# Patient Record
Sex: FEMALE | Race: BLACK OR AFRICAN AMERICAN | NOT HISPANIC OR LATINO | Employment: OTHER | URBAN - METROPOLITAN AREA
[De-identification: names, ages, dates, MRNs, and addresses within clinical notes are randomized per-mention and may not be internally consistent; named-entity substitution may affect disease eponyms.]

---

## 2017-01-11 ENCOUNTER — OFFICE VISIT (OUTPATIENT)
Dept: SURGERY | Facility: CLINIC | Age: 72
End: 2017-01-11
Payer: MEDICARE

## 2017-01-11 VITALS
HEART RATE: 82 BPM | HEIGHT: 64 IN | DIASTOLIC BLOOD PRESSURE: 79 MMHG | SYSTOLIC BLOOD PRESSURE: 159 MMHG | WEIGHT: 208.75 LBS | BODY MASS INDEX: 35.64 KG/M2

## 2017-01-11 DIAGNOSIS — K51.219 ULCERATIVE PROCTITIS WITH COMPLICATION: Primary | ICD-10-CM

## 2017-01-11 PROCEDURE — 88305 TISSUE EXAM BY PATHOLOGIST: CPT | Performed by: PATHOLOGY

## 2017-01-11 PROCEDURE — 88342 IMHCHEM/IMCYTCHM 1ST ANTB: CPT | Mod: 26,,, | Performed by: PATHOLOGY

## 2017-01-11 PROCEDURE — 45331 SIGMOIDOSCOPY AND BIOPSY: CPT | Mod: S$PBB,,, | Performed by: COLON & RECTAL SURGERY

## 2017-01-11 PROCEDURE — 99999 PR PBB SHADOW E&M-EST. PATIENT-LVL III: CPT | Mod: PBBFAC,,, | Performed by: COLON & RECTAL SURGERY

## 2017-01-11 PROCEDURE — 99213 OFFICE O/P EST LOW 20 MIN: CPT | Mod: PBBFAC | Performed by: COLON & RECTAL SURGERY

## 2017-01-11 PROCEDURE — 99213 OFFICE O/P EST LOW 20 MIN: CPT | Mod: 25,S$PBB,, | Performed by: COLON & RECTAL SURGERY

## 2017-01-11 PROCEDURE — 45331 SIGMOIDOSCOPY AND BIOPSY: CPT | Mod: PBBFAC | Performed by: COLON & RECTAL SURGERY

## 2017-01-11 PROCEDURE — 88305 TISSUE EXAM BY PATHOLOGIST: CPT | Mod: 26,,, | Performed by: PATHOLOGY

## 2017-01-11 RX ORDER — MESALAMINE 1000 MG/1
1000 SUPPOSITORY RECTAL NIGHTLY
Qty: 60 SUPPOSITORY | Refills: 3 | Status: SHIPPED | OUTPATIENT
Start: 2017-01-11 | End: 2017-01-12 | Stop reason: SDUPTHER

## 2017-01-11 NOTE — MR AVS SNAPSHOT
[facility]  [address]  [facility] [address]  Phone: [phone]                  Anabella Aranda   2017 9:00 AM   Office Visit    Description:  Female : 1945   Provider:  Franco Barry MD   Department:  [facility]           Reason for Visit     Ulcerative Colitis     Follow-up           Diagnoses this Visit        Comments    Ulcerative proctitis with complication    -  Primary            To Do List           Goals (5 Years of Data)     None       These Medications        Disp Refills Start End    mesalamine (CANASA) 1000 MG Supp 60 suppository 3 2017    Place 1 suppository (1,000 mg total) rectally nightly. Twice daily for 2 weeks than nightly - Rectal    Pharmacy: [facility] - [address] #: [phone]         [facility] On Call     [facility] On Call Nurse Care Line -  Assistance  Registered nurses in the [facility] On Call Center provide clinical advisement, health education, appointment booking, and other advisory services.  Call for this free service at [phone].             Medications           Message regarding Medications     Verify the changes and/or additions to your medication regime listed below are the same as discussed with your clinician today.  If any of these changes or additions are incorrect, please notify your healthcare provider.        START taking these NEW medications        Refills    mesalamine (CANASA) 1000 MG Supp 3    Sig: Place 1 suppository (1,000 mg total) rectally nightly. Twice daily for 2 weeks than nightly    Class: Normal    Route: Rectal           Verify that the below list of medications is an accurate representation of the medications you are currently taking.  If none reported, the list may be blank. If incorrect, please contact your healthcare provider. Carry this list with you in case of emergency.           Current Medications      "calcium carbonate (OS-NELSY) 600 mg (1,500 mg) Tab Take 1 tablet (600 mg total) by mouth 2 (two) times daily with meals.    ergocalciferol (VITAMIN D2) 50,000 unit Cap Take 1 capsule (50,000 Units total) by mouth every 7 days.    ferrous sulfate 325 mg (65 mg iron) Tab tablet Take 1 tablet (325 mg total) by mouth 2 (two) times daily.    MULTIVITS W-FE,OTHER MIN/LUT (CENTRUM SILVER ULTRA WOMEN'S ORAL) Take by mouth.    omeprazole (PRILOSEC) 40 MG capsule Take 1 capsule (40 mg total) by mouth once daily.    potassium chloride SA (K-DUR,KLOR-CON) 20 MEQ tablet Take 1 tablet by mouth once daily    mesalamine (CANASA) 1000 MG Supp Place 1 suppository (1,000 mg total) rectally nightly. Twice daily for 2 weeks than nightly           Clinical Reference Information           Vital Signs - Last Recorded  Most recent update: 1/11/2017  9:15 AM by Sayda Nguyễn MA    BP Pulse Ht Wt BMI    (!) 159/79 82 5' 4" (1.626 m) 94.7 kg (208 lb 12.4 oz) 35.84 kg/m2      Blood Pressure          Most Recent Value    BP  (!)  159/79      Allergies as of 1/11/2017     Sulfa (Sulfonamide Antibiotics)      Immunizations Administered on Date of Encounter - 1/11/2017     None      Orders Placed During Today's Visit      Normal Orders This Visit    Tissue Specimen To Pathology, Surgery       Instructions    Imodium twice a day  Canasa suppository nightly       "

## 2017-01-11 NOTE — PROGRESS NOTES
Patient ID:  Anabella Aranda is a 71 y.o. female     Chief Complaint:   Chief Complaint   Patient presents with    Ulcerative Colitis    Follow-up        HPI: 6/24/16 TAC and TAMARA for UC  PAth UC no cancer    Doing well 3-5 BMs/day no Imodium Is taking fiber. HAs had no GI f/u    Several year hx of UC. Has anemia and fatigue with obstipation Had c-scope 4/29/16 The procedure was aborted due to bowel stenosis.                        - Inflammation was found in the colon secondary to colitis. The findings are unchanged compared to                         previous examinations. - Stricture in the sigmoid colon. BX Chronic colitis with mild to moderate activity No dysplasia No viral inclusions (CMV immunostain)    ROS:        Constitutional: No fever, chills, activity or appetite change.      HENT: No hearing loss, facial swelling, neck pain or stiffness.       Eyes: No discharge, itching and visual disturbance.      Respiratory: No apnea, cough, choking or shortness of breath.       Cardiovascular: No leg swelling or chest pain      Gastrointestinal: No abdominal distention or change in bowel habbits     Genitourinary: No dysuria, frequency or flank pain.      Musculoskeletal: No arthralgias or gait problem.      Neurological: No dizziness, seizures or weakness.      Hematological: No adenopathy.      Psychiatric/Behavioral: No hallucinations or behavioral problems.       PE:    APPEARANCE: Well nourished, well developed, in no acute distress.   CHEST: Lungs clear. Normal respiratory effort.  CARDIOVASCULAR: Normal S1, S2. No rubs, murmurs or gallops. No edema.  ABDOMEN: Soft. No tenderness or masses.  Rectum:  Normal skin, NST, no masses or tenderness    Musculoskeletal: Symmetric, normal range of motion and strength.   Neurological: Alert and oriented to person, place, and time. He has normal reflexes.   Skin: Skin is warm and dry.   Psychiatric: Normal mood and affect. Behavior is normal and appropriate.      PROCEDURE: Flexible Sigmoidoscopy   Scope # [id number]    With informed consent the flexible sigmoidoscope was passed 20 cm under direcet vision. Prep quality: good    Findings:patent anastomosis at 18 cm moderate proctitis. Biopsies taken x 4  EBL: None    The procedure was tolerated well.        Impression: UC  S/p TAC and TAMARA  PLAN: Imodium BID, Canasa check path RTC 6 months f/u with GI

## 2017-01-12 RX ORDER — MESALAMINE 1000 MG/1
1000 SUPPOSITORY RECTAL NIGHTLY
Qty: 60 SUPPOSITORY | Refills: 3 | Status: SHIPPED | OUTPATIENT
Start: 2017-01-12 | End: 2017-01-30

## 2017-01-12 NOTE — TELEPHONE ENCOUNTER
----- Message from Love Adler sent at 1/12/2017 11:25 AM CST -----  Contact: pt#[phone]  Pt wants Rx for suppositories moved to [facility] [address]

## 2017-01-17 ENCOUNTER — TELEPHONE (OUTPATIENT)
Dept: GASTROENTEROLOGY | Facility: CLINIC | Age: 72
End: 2017-01-17

## 2017-01-17 NOTE — TELEPHONE ENCOUNTER
Spoke to pt-she saw Dr Brambila this morning and he advised her to schedule a follow up visit with Dr Arenas (loose stools).  Schedule for 1/30/17 at 2:45p.  Pt will call to make a sooner appointment with Aisha Moy if her sx's get worst.    Pt had questions about the Rx Dr Brambila prescribed to her today.  She will contact his office in reference to that medicine.

## 2017-01-17 NOTE — TELEPHONE ENCOUNTER
----- Message from Lizbeth Andrews sent at 1/17/2017  9:52 AM CST -----  Contact: [phone]  Patient is requesting to see paul for a f/u on meds.

## 2017-01-19 RX ORDER — HYDROCORTISONE ACETATE 25 MG/1
25 SUPPOSITORY RECTAL 2 TIMES DAILY
Qty: 48 SUPPOSITORY | Refills: 0 | Status: SHIPPED | OUTPATIENT
Start: 2017-01-19 | End: 2017-01-30

## 2017-01-24 RX ORDER — HYDROCORTISONE ACETATE 25 MG/1
25 SUPPOSITORY RECTAL 2 TIMES DAILY
Qty: 36 SUPPOSITORY | Refills: 0 | Status: SHIPPED | OUTPATIENT
Start: 2017-01-24 | End: 2017-01-30

## 2017-01-30 ENCOUNTER — OFFICE VISIT (OUTPATIENT)
Dept: GASTROENTEROLOGY | Facility: CLINIC | Age: 72
End: 2017-01-30
Payer: MEDICARE

## 2017-01-30 ENCOUNTER — LAB VISIT (OUTPATIENT)
Dept: LAB | Facility: HOSPITAL | Age: 72
End: 2017-01-30
Attending: INTERNAL MEDICINE
Payer: MEDICARE

## 2017-01-30 VITALS
SYSTOLIC BLOOD PRESSURE: 124 MMHG | WEIGHT: 214.31 LBS | DIASTOLIC BLOOD PRESSURE: 82 MMHG | BODY MASS INDEX: 36.59 KG/M2 | HEIGHT: 64 IN

## 2017-01-30 DIAGNOSIS — K51.20 ULCERATIVE PROCTITIS WITHOUT COMPLICATION: ICD-10-CM

## 2017-01-30 DIAGNOSIS — K51.20 ULCERATIVE PROCTITIS WITHOUT COMPLICATION: Primary | ICD-10-CM

## 2017-01-30 LAB
ALBUMIN SERPL BCP-MCNC: 3.7 G/DL
ALP SERPL-CCNC: 103 U/L
ALT SERPL W/O P-5'-P-CCNC: 12 U/L
ANION GAP SERPL CALC-SCNC: 11 MMOL/L
AST SERPL-CCNC: 22 U/L
BASOPHILS # BLD AUTO: 0.03 K/UL
BASOPHILS NFR BLD: 0.5 %
BILIRUB SERPL-MCNC: 0.4 MG/DL
BUN SERPL-MCNC: 15 MG/DL
CALCIUM SERPL-MCNC: 9.3 MG/DL
CHLORIDE SERPL-SCNC: 105 MMOL/L
CO2 SERPL-SCNC: 25 MMOL/L
CREAT SERPL-MCNC: 0.9 MG/DL
DIFFERENTIAL METHOD: ABNORMAL
EOSINOPHIL # BLD AUTO: 0.1 K/UL
EOSINOPHIL NFR BLD: 1.8 %
ERYTHROCYTE [DISTWIDTH] IN BLOOD BY AUTOMATED COUNT: 13.4 %
EST. GFR  (AFRICAN AMERICAN): >60 ML/MIN/1.73 M^2
EST. GFR  (NON AFRICAN AMERICAN): >60 ML/MIN/1.73 M^2
GLUCOSE SERPL-MCNC: 80 MG/DL
HCT VFR BLD AUTO: 35.3 %
HGB BLD-MCNC: 11.5 G/DL
LYMPHOCYTES # BLD AUTO: 1.5 K/UL
LYMPHOCYTES NFR BLD: 25.8 %
MCH RBC QN AUTO: 29.5 PG
MCHC RBC AUTO-ENTMCNC: 32.6 %
MCV RBC AUTO: 91 FL
MONOCYTES # BLD AUTO: 0.4 K/UL
MONOCYTES NFR BLD: 7.8 %
NEUTROPHILS # BLD AUTO: 3.6 K/UL
NEUTROPHILS NFR BLD: 63.9 %
PLATELET # BLD AUTO: 352 K/UL
PMV BLD AUTO: 8.7 FL
POTASSIUM SERPL-SCNC: 4.3 MMOL/L
PROT SERPL-MCNC: 8 G/DL
RBC # BLD AUTO: 3.9 M/UL
SODIUM SERPL-SCNC: 141 MMOL/L
WBC # BLD AUTO: 5.63 K/UL

## 2017-01-30 PROCEDURE — 99214 OFFICE O/P EST MOD 30 MIN: CPT | Mod: S$PBB,,, | Performed by: INTERNAL MEDICINE

## 2017-01-30 PROCEDURE — 80053 COMPREHEN METABOLIC PANEL: CPT

## 2017-01-30 PROCEDURE — 85025 COMPLETE CBC W/AUTO DIFF WBC: CPT

## 2017-01-30 PROCEDURE — 36415 COLL VENOUS BLD VENIPUNCTURE: CPT

## 2017-01-30 PROCEDURE — 99999 PR PBB SHADOW E&M-EST. PATIENT-LVL II: CPT | Mod: PBBFAC,,, | Performed by: INTERNAL MEDICINE

## 2017-01-30 PROCEDURE — 99212 OFFICE O/P EST SF 10 MIN: CPT | Mod: PBBFAC,PN | Performed by: INTERNAL MEDICINE

## 2017-01-30 RX ORDER — HYDROCORTISONE 100 MG/60ML
100 SUSPENSION RECTAL NIGHTLY
Qty: 30 ENEMA | Refills: 1 | Status: SHIPPED | OUTPATIENT
Start: 2017-01-30 | End: 2017-10-06

## 2017-01-30 RX ORDER — AZATHIOPRINE 50 MG/1
50 TABLET ORAL DAILY
Qty: 30 TABLET | Refills: 3 | Status: SHIPPED | OUTPATIENT
Start: 2017-01-30 | End: 2017-09-24 | Stop reason: SDUPTHER

## 2017-01-30 NOTE — PROGRESS NOTES
Subjective:       Patient ID: Anabella Aranda is a 71 y.o. female.    Chief Complaint: Diarrhea (loose stools)    Diarrhea    Pertinent negatives include no coughing or headaches.    Patient has 3 to 5 BM/d. There is no blood in her stool. She denies abdominal pain.  Recent proctoscopy revealed moderately active disease in the rectum. Patient is s/p TAC and ileorectal anastomosis.  Canasa supp was recommended but patient could not afford the medication. Currently not taking any medication.    Review of Systems   Constitutional: Negative for appetite change.   HENT: Negative for trouble swallowing.    Eyes: Negative for photophobia.   Respiratory: Negative for cough and shortness of breath.    Cardiovascular: Negative for palpitations.   Gastrointestinal: Positive for diarrhea.        See HPI for details   Genitourinary: Negative for frequency and hematuria.   Skin: Negative for rash.   Neurological: Negative for weakness and headaches.   Psychiatric/Behavioral: Negative for behavioral problems and suicidal ideas.       Objective:       Vitals:    01/30/17 1429   BP: 124/82       Physical Exam   Eyes: Pupils are equal, round, and reactive to light.   Neck: No thyromegaly present.   Cardiovascular: Normal rate, regular rhythm and normal heart sounds.    Pulmonary/Chest: Effort normal and breath sounds normal.   Abdominal: Soft. She exhibits no mass. There is no tenderness. There is no rebound and no guarding.   Musculoskeletal: She exhibits no tenderness.   Psychiatric: Her behavior is normal. Thought content normal.       Assessment:       1. Ulcerative proctitis without complication        Plan:       Anabella was seen today for diarrhea.    Diagnoses and all orders for this visit:    Ulcerative proctitis without complication  -     CBC auto differential; Future  -     Comprehensive metabolic panel; Future    Other orders  -     hydrocortisone (CORTENEMA) 100 mg/60 mL enema; Place 1 enema (100 mg total) rectally  every evening.  -     azathioprine (IMURAN) 50 mg Tab; Take 1 tablet (50 mg total) by mouth once daily.    RTC in 1 month  Schedule FU flex sig next visit.

## 2017-02-08 ENCOUNTER — TELEPHONE (OUTPATIENT)
Dept: GASTROENTEROLOGY | Facility: CLINIC | Age: 72
End: 2017-02-08

## 2017-02-08 NOTE — TELEPHONE ENCOUNTER
Spoke with patient to inform her that she is mild anemia and she should take iron supplement. Patient verbalized understanding.

## 2017-02-20 ENCOUNTER — OFFICE VISIT (OUTPATIENT)
Dept: GASTROENTEROLOGY | Facility: CLINIC | Age: 72
End: 2017-02-20
Payer: MEDICARE

## 2017-02-20 VITALS
SYSTOLIC BLOOD PRESSURE: 128 MMHG | WEIGHT: 216.5 LBS | HEIGHT: 64 IN | HEART RATE: 90 BPM | DIASTOLIC BLOOD PRESSURE: 76 MMHG | BODY MASS INDEX: 36.96 KG/M2

## 2017-02-20 DIAGNOSIS — K51.20 ULCERATIVE PROCTITIS WITHOUT COMPLICATION: Primary | ICD-10-CM

## 2017-02-20 PROCEDURE — 99213 OFFICE O/P EST LOW 20 MIN: CPT | Mod: PBBFAC,PN | Performed by: INTERNAL MEDICINE

## 2017-02-20 PROCEDURE — 99214 OFFICE O/P EST MOD 30 MIN: CPT | Mod: S$PBB,,, | Performed by: INTERNAL MEDICINE

## 2017-02-20 PROCEDURE — 99999 PR PBB SHADOW E&M-EST. PATIENT-LVL III: CPT | Mod: PBBFAC,,, | Performed by: INTERNAL MEDICINE

## 2017-02-20 NOTE — PROGRESS NOTES
Subjective:       Patient ID: Anabella Aranda is a 71 y.o. female.    Chief Complaint: GI Problem (follow up from last visit)    GI Problem   Primary symptoms do not include rash.   Patient with h/o UC S/p TAC and TAMARA She was having up to 5 BM per day. Last flex sig revealed Proctitis. Dr. Barry prescribed Anusol HC which she did not . She also did not  the Cortenema that I prescribed. She denies blood in her stool. She reports that her BM frequency has improved.  There is no abdominal pain.    Review of Systems   Constitutional: Negative for appetite change.   HENT: Negative for trouble swallowing.    Eyes: Negative for photophobia.   Respiratory: Negative for cough and shortness of breath.    Cardiovascular: Negative for palpitations.   Gastrointestinal:        See HPI for details   Genitourinary: Negative for frequency and hematuria.   Skin: Negative for rash.   Neurological: Negative for weakness and headaches.   Psychiatric/Behavioral: Negative for behavioral problems and suicidal ideas.       Objective:      Physical Exam   Eyes: Pupils are equal, round, and reactive to light.   Neck: No thyromegaly present.   Cardiovascular: Normal rate, regular rhythm and normal heart sounds.    Pulmonary/Chest: Effort normal and breath sounds normal.   Abdominal: Soft. She exhibits no mass. There is no tenderness. There is no rebound and no guarding.   Musculoskeletal: She exhibits no tenderness.   Psychiatric: Her behavior is normal. Thought content normal.       Assessment:       1. Ulcerative proctitis without complication        Plan:       Anabella was seen today for gi problem.    Diagnoses and all orders for this visit:    Ulcerative proctitis without complication    UC S/p TAC and TAMARA  Medication compliance stressed.  She promised to  her prescription.  We will hold off on FU FS at this time.

## 2017-03-01 ENCOUNTER — TELEPHONE (OUTPATIENT)
Dept: GASTROENTEROLOGY | Facility: CLINIC | Age: 72
End: 2017-03-01

## 2017-03-01 RX ORDER — POTASSIUM CHLORIDE 20 MEQ/1
TABLET, EXTENDED RELEASE ORAL
Qty: 30 TABLET | Refills: 11 | Status: SHIPPED | OUTPATIENT
Start: 2017-03-01 | End: 2017-12-06 | Stop reason: SDUPTHER

## 2017-03-01 NOTE — TELEPHONE ENCOUNTER
Spoke with Pt and she stated that her Rx came in today. This Rx was a mail order and pt is doing fine. Verbal agreement.

## 2017-03-06 ENCOUNTER — PATIENT MESSAGE (OUTPATIENT)
Dept: GASTROENTEROLOGY | Facility: HOSPITAL | Age: 72
End: 2017-03-06

## 2017-05-01 ENCOUNTER — OFFICE VISIT (OUTPATIENT)
Dept: GASTROENTEROLOGY | Facility: CLINIC | Age: 72
End: 2017-05-01
Payer: MEDICARE

## 2017-05-01 ENCOUNTER — LAB VISIT (OUTPATIENT)
Dept: LAB | Facility: HOSPITAL | Age: 72
End: 2017-05-01
Attending: INTERNAL MEDICINE
Payer: MEDICARE

## 2017-05-01 VITALS
SYSTOLIC BLOOD PRESSURE: 116 MMHG | DIASTOLIC BLOOD PRESSURE: 62 MMHG | BODY MASS INDEX: 36.88 KG/M2 | WEIGHT: 216 LBS | HEIGHT: 64 IN

## 2017-05-01 DIAGNOSIS — K51.20 ULCERATIVE PROCTITIS WITHOUT COMPLICATION: Primary | ICD-10-CM

## 2017-05-01 DIAGNOSIS — K51.20 ULCERATIVE PROCTITIS WITHOUT COMPLICATION: ICD-10-CM

## 2017-05-01 LAB
ALBUMIN SERPL BCP-MCNC: 3.7 G/DL
ALP SERPL-CCNC: 94 U/L
ALT SERPL W/O P-5'-P-CCNC: 10 U/L
ANION GAP SERPL CALC-SCNC: 11 MMOL/L
AST SERPL-CCNC: 20 U/L
BASOPHILS # BLD AUTO: 0.01 K/UL
BASOPHILS NFR BLD: 0.2 %
BILIRUB SERPL-MCNC: 0.5 MG/DL
BUN SERPL-MCNC: 20 MG/DL
CALCIUM SERPL-MCNC: 9 MG/DL
CHLORIDE SERPL-SCNC: 107 MMOL/L
CO2 SERPL-SCNC: 23 MMOL/L
CREAT SERPL-MCNC: 1 MG/DL
DIFFERENTIAL METHOD: ABNORMAL
EOSINOPHIL # BLD AUTO: 0 K/UL
EOSINOPHIL NFR BLD: 1 %
ERYTHROCYTE [DISTWIDTH] IN BLOOD BY AUTOMATED COUNT: 13.6 %
EST. GFR  (AFRICAN AMERICAN): >60 ML/MIN/1.73 M^2
EST. GFR  (NON AFRICAN AMERICAN): 58 ML/MIN/1.73 M^2
GLUCOSE SERPL-MCNC: 86 MG/DL
HCT VFR BLD AUTO: 34.4 %
HGB BLD-MCNC: 11.8 G/DL
LYMPHOCYTES # BLD AUTO: 0.9 K/UL
LYMPHOCYTES NFR BLD: 22 %
MCH RBC QN AUTO: 29.6 PG
MCHC RBC AUTO-ENTMCNC: 34.3 %
MCV RBC AUTO: 86 FL
MONOCYTES # BLD AUTO: 0.4 K/UL
MONOCYTES NFR BLD: 9.6 %
NEUTROPHILS # BLD AUTO: 2.8 K/UL
NEUTROPHILS NFR BLD: 67.2 %
PLATELET # BLD AUTO: 293 K/UL
PMV BLD AUTO: 8.5 FL
POTASSIUM SERPL-SCNC: 4 MMOL/L
PROT SERPL-MCNC: 8.1 G/DL
RBC # BLD AUTO: 3.98 M/UL
SODIUM SERPL-SCNC: 141 MMOL/L
WBC # BLD AUTO: 4.18 K/UL

## 2017-05-01 PROCEDURE — 36415 COLL VENOUS BLD VENIPUNCTURE: CPT

## 2017-05-01 PROCEDURE — 99999 PR PBB SHADOW E&M-EST. PATIENT-LVL II: CPT | Mod: PBBFAC,,, | Performed by: INTERNAL MEDICINE

## 2017-05-01 PROCEDURE — 99214 OFFICE O/P EST MOD 30 MIN: CPT | Mod: S$PBB,,, | Performed by: INTERNAL MEDICINE

## 2017-05-01 PROCEDURE — 80053 COMPREHEN METABOLIC PANEL: CPT

## 2017-05-01 PROCEDURE — 85025 COMPLETE CBC W/AUTO DIFF WBC: CPT

## 2017-05-01 PROCEDURE — 99212 OFFICE O/P EST SF 10 MIN: CPT | Mod: PBBFAC,PN | Performed by: INTERNAL MEDICINE

## 2017-05-01 NOTE — PROGRESS NOTES
Subjective:       Patient ID: Anabella Aranda is a 71 y.o. female.    Chief Complaint: Follow-up    HPI Patient with h/o UC. She is s/p AC and TAMARA. Her BM is down 3 per day. There is no blood in her stool. She continues to gain weight- 50 pounds in 1 year.    Review of Systems   Constitutional: Negative for appetite change.   HENT: Negative for trouble swallowing.    Eyes: Negative for photophobia.   Respiratory: Negative for cough and shortness of breath.    Cardiovascular: Negative for palpitations.   Gastrointestinal:        See HPI for details   Genitourinary: Negative for frequency and hematuria.   Musculoskeletal: Positive for joint swelling (knees).   Skin: Negative for rash.   Neurological: Negative for weakness and headaches.   Psychiatric/Behavioral: Negative for behavioral problems and suicidal ideas.       Objective:       Vitals:    05/01/17 1400   BP: 116/62       Physical Exam   Eyes: Pupils are equal, round, and reactive to light.   Neck: No thyromegaly present.   Cardiovascular: Normal rate, regular rhythm and normal heart sounds.    Pulmonary/Chest: Effort normal and breath sounds normal.   Abdominal: Soft. She exhibits no mass. There is no tenderness. There is no rebound and no guarding.   Musculoskeletal: She exhibits no tenderness.   Psychiatric: Her behavior is normal. Thought content normal.       Assessment:       1. Ulcerative proctitis without complication        Plan:       Anabella was seen today for follow-up.    Diagnoses and all orders for this visit:    Ulcerative proctitis without complication  -     Comprehensive metabolic panel; Future  -     CBC auto differential; Future

## 2017-05-02 ENCOUNTER — TELEPHONE (OUTPATIENT)
Dept: GASTROENTEROLOGY | Facility: CLINIC | Age: 72
End: 2017-05-02

## 2017-05-02 NOTE — TELEPHONE ENCOUNTER
Informed pt of lab results . Verbal Understanding .           ----- Message from Umm Arenas MD sent at 5/1/2017  4:51 PM CDT -----  Blood count is slightly improved.  Continue current treatment.

## 2017-09-24 ENCOUNTER — NURSE TRIAGE (OUTPATIENT)
Dept: ADMINISTRATIVE | Facility: CLINIC | Age: 72
End: 2017-09-24

## 2017-09-24 RX ORDER — AZATHIOPRINE 50 MG/1
50 TABLET ORAL DAILY
Qty: 30 TABLET | Refills: 0 | Status: SHIPPED | OUTPATIENT
Start: 2017-09-24 | End: 2017-10-17 | Stop reason: SDUPTHER

## 2017-09-24 NOTE — TELEPHONE ENCOUNTER
"    Reason for Disposition   [1] Request for URGENT new prescription or refill of "essential" medication (i.e., likelihood of harm to patient if not taken) AND [2] triager unable to fill per unit policy    Answer Assessment - Initial Assessment Questions  1. SYMPTOMS: "Do you have any symptoms?"      Refill azathioprine   2. SEVERITY: If symptoms are present, ask "Are they mild, moderate or severe?"      N/a    Protocols used: ST MEDICATION QUESTION CALL-A-    Patient called for refills of the azathioprine 50 mg, 1 tab PO daily, #30, no refills. Paged Dr. Barrera for refill request. Refill request approved by Dr. Barrera. Left message on patient's voicemail to f/u with [facility] pharmacy.   "

## 2017-09-24 NOTE — TELEPHONE ENCOUNTER
Reason for Disposition   Caller has already spoken with another triager or PCP AND has further questions AND triager able to answer questions.    Protocols used: ST NO CONTACT OR DUPLICATE CONTACT CALL-A-AH    Call transferred to TONIE Means RN.

## 2017-10-03 RX ORDER — AZATHIOPRINE 50 MG/1
TABLET ORAL
Qty: 30 TABLET | Refills: 3 | Status: SHIPPED | OUTPATIENT
Start: 2017-10-03 | End: 2017-12-07 | Stop reason: SDUPTHER

## 2017-10-06 ENCOUNTER — TELEPHONE (OUTPATIENT)
Dept: FAMILY MEDICINE | Facility: CLINIC | Age: 72
End: 2017-10-06

## 2017-10-06 ENCOUNTER — TELEPHONE (OUTPATIENT)
Dept: GASTROENTEROLOGY | Facility: CLINIC | Age: 72
End: 2017-10-06

## 2017-10-06 ENCOUNTER — OFFICE VISIT (OUTPATIENT)
Dept: GASTROENTEROLOGY | Facility: CLINIC | Age: 72
End: 2017-10-06
Payer: MEDICARE

## 2017-10-06 VITALS
DIASTOLIC BLOOD PRESSURE: 65 MMHG | WEIGHT: 219.38 LBS | SYSTOLIC BLOOD PRESSURE: 129 MMHG | BODY MASS INDEX: 37.65 KG/M2

## 2017-10-06 DIAGNOSIS — F41.9 ANXIETY: ICD-10-CM

## 2017-10-06 DIAGNOSIS — K51.90 ULCERATIVE COLITIS WITHOUT COMPLICATIONS, UNSPECIFIED LOCATION: Primary | ICD-10-CM

## 2017-10-06 PROCEDURE — 99212 OFFICE O/P EST SF 10 MIN: CPT | Mod: PBBFAC,PN | Performed by: NURSE PRACTITIONER

## 2017-10-06 PROCEDURE — 99999 PR PBB SHADOW E&M-EST. PATIENT-LVL II: CPT | Mod: PBBFAC,,, | Performed by: NURSE PRACTITIONER

## 2017-10-06 PROCEDURE — 99213 OFFICE O/P EST LOW 20 MIN: CPT | Mod: S$PBB,,, | Performed by: NURSE PRACTITIONER

## 2017-10-06 NOTE — PROGRESS NOTES
"Subjective:       Patient ID: Anabella Aranda is a 72 y.o. female.    Chief Complaint: Other (Medication) and Other (leg and side pain )    HPI  History of ulcerative colitis, currently well controlled with Imuran daily, fiber supplement and imodium BID.  She has been doing well with this combination for two years.  Denies blood with bowel movements or abdominal pain.  Reports that she typically has a bowel movement four times daily.  Some foods will cause a looser type stool and she avoids these.  She reports that over the last two months she has been very anxious.  She questions that this is related to the imuran, though she has been on the medication for over two years.  She reports that she lives in a very large house and lives alone and hears things in the house- both voices ( she is unsure if these are inside or outside of the house) and a buzzing that she thinks may be the air conditioner. She reports anxiety over hearing these.  Rpeorts that she rarely gets outside besides "to do just what I need to".  She reports that she feels depressed at times.  She is laughing and smiling at the visit today.  She denies any thoughts of suicide, harming herself or others, or fear in her home.  She has not see her PCP in over one year.  She had colonoscopy last year.    Review of Systems   Constitutional: Negative.  Negative for activity change, appetite change, fatigue and fever.   Respiratory: Negative for shortness of breath.    Cardiovascular: Negative for chest pain.   Gastrointestinal: Negative for abdominal distention, abdominal pain, blood in stool, constipation, diarrhea, nausea and vomiting.   Skin: Negative.    Neurological: Negative.    Psychiatric/Behavioral: Negative for confusion, hallucinations, self-injury and suicidal ideas. The patient is nervous/anxious.        Objective:      Physical Exam   Constitutional: She is oriented to person, place, and time. She appears well-developed and well-nourished. " No distress.   HENT:   Head: Normocephalic.   Eyes: No scleral icterus.   Neck: Neck supple.   Cardiovascular: Normal rate.    Pulmonary/Chest: Effort normal. No respiratory distress.   Abdominal: Soft. There is no tenderness.   Neurological: She is alert and oriented to person, place, and time.   Skin: Skin is warm and dry. She is not diaphoretic.   Psychiatric: She has a normal mood and affect. Her behavior is normal. Judgment and thought content normal.   Vitals reviewed.      Assessment:       1. Ulcerative colitis without complications, unspecified location    2. Anxiety        Plan:        Anabella was seen today for other and other.    Diagnoses and all orders for this visit:    Ulcerative colitis without complications, unspecified location    Anxiety         Continue current medications.      Will make her an appointment with her PCP regarding her anxiety and depression.

## 2017-10-06 NOTE — TELEPHONE ENCOUNTER
----- Message from Sofia Bennett MA sent at 10/6/2017  2:56 PM CDT -----  Contact: Sofia Bennett  This pt was seen today in the [facility]. Pt is suffering with anxiety and depression. Pt needs to be scheduled for an OV to come in and see Dr. Brambila.     Can we get this pt scheduled soon?

## 2017-10-17 ENCOUNTER — TELEPHONE (OUTPATIENT)
Dept: FAMILY MEDICINE | Facility: CLINIC | Age: 72
End: 2017-10-17

## 2017-10-17 ENCOUNTER — OFFICE VISIT (OUTPATIENT)
Dept: FAMILY MEDICINE | Facility: CLINIC | Age: 72
End: 2017-10-17
Attending: FAMILY MEDICINE
Payer: MEDICARE

## 2017-10-17 VITALS
DIASTOLIC BLOOD PRESSURE: 78 MMHG | WEIGHT: 220.44 LBS | HEART RATE: 81 BPM | OXYGEN SATURATION: 98 % | SYSTOLIC BLOOD PRESSURE: 133 MMHG | BODY MASS INDEX: 37.63 KG/M2 | HEIGHT: 64 IN

## 2017-10-17 DIAGNOSIS — Z95.828 S/P IVC FILTER: Chronic | ICD-10-CM

## 2017-10-17 DIAGNOSIS — Z12.31 ENCOUNTER FOR SCREENING MAMMOGRAM FOR BREAST CANCER: ICD-10-CM

## 2017-10-17 DIAGNOSIS — K51.219 ULCERATIVE PROCTITIS WITH COMPLICATION: ICD-10-CM

## 2017-10-17 DIAGNOSIS — Z23 IMMUNIZATION DUE: ICD-10-CM

## 2017-10-17 DIAGNOSIS — I87.2 VENOUS INSUFFICIENCY OF BOTH LOWER EXTREMITIES: Chronic | ICD-10-CM

## 2017-10-17 DIAGNOSIS — G47.00 INSOMNIA, UNSPECIFIED TYPE: ICD-10-CM

## 2017-10-17 DIAGNOSIS — I82.5Y1 CHRONIC DEEP VEIN THROMBOSIS (DVT) OF PROXIMAL VEIN OF RIGHT LOWER EXTREMITY: ICD-10-CM

## 2017-10-17 DIAGNOSIS — E66.01 MORBID OBESITY: ICD-10-CM

## 2017-10-17 DIAGNOSIS — E55.9 VITAMIN D DEFICIENCY: ICD-10-CM

## 2017-10-17 DIAGNOSIS — Z00.00 ROUTINE GENERAL MEDICAL EXAMINATION AT A HEALTH CARE FACILITY: Primary | ICD-10-CM

## 2017-10-17 DIAGNOSIS — Z13.6 ENCOUNTER FOR SCREENING FOR CARDIOVASCULAR DISORDERS: ICD-10-CM

## 2017-10-17 DIAGNOSIS — R44.0 AUDITORY HALLUCINATIONS: ICD-10-CM

## 2017-10-17 PROCEDURE — 99214 OFFICE O/P EST MOD 30 MIN: CPT | Mod: PBBFAC,PO | Performed by: FAMILY MEDICINE

## 2017-10-17 PROCEDURE — 99397 PER PM REEVAL EST PAT 65+ YR: CPT | Mod: 25,S$PBB,, | Performed by: FAMILY MEDICINE

## 2017-10-17 PROCEDURE — 99999 PR PBB SHADOW E&M-EST. PATIENT-LVL IV: CPT | Mod: PBBFAC,,, | Performed by: FAMILY MEDICINE

## 2017-10-17 PROCEDURE — G0008 ADMIN INFLUENZA VIRUS VAC: HCPCS | Mod: PBBFAC,PO

## 2017-10-17 RX ORDER — ALPRAZOLAM 0.25 MG/1
0.25 TABLET ORAL NIGHTLY PRN
Qty: 90 TABLET | Refills: 0 | Status: SHIPPED | OUTPATIENT
Start: 2017-10-17 | End: 2017-11-09 | Stop reason: SDUPTHER

## 2017-10-17 NOTE — TELEPHONE ENCOUNTER
----- Message from Cecil Beltran sent at 10/17/2017 12:27 PM CDT -----  Contact: Bryson (son)/[phone]  Son called to speak to doctor or nurse about checking his mother's memory as she did not tell him about this appointment.    Please call and advise.

## 2017-10-17 NOTE — PROGRESS NOTES
Subjective:       Patient ID: Anabella Aranda is a 72 y.o. female.    Chief Complaint: Annual Exam; Memory Loss; and Home Health Need    72 yr old pleasant black female with chronic iron deficiency anemia ulcerative colitis,  Chronic venous insufficiency, s/o IVC filter, presents today for her annual wellness check and few issues.      Obesity - she gained a lot since last year - she is mostly staying home and eating a lot and  has no activity outside of house - she is lonely in a big house and sometimes hearing voices - they ar good voices and she is not hearing when she is out of house - she denies any depression or anxiety - she does have issues with sleeping at night - she denies any personal or family h/o psychiatry disorders - no SI/HI/delusions    Ulcerative colitis - she is on Imuran and follows GI - no side effects      Anemia - chronic - due to IBD - she is on diet alone and takes MVI - labs due        LE edema/lymphedema - had h/o DVT and she also had Green field filter - she was on anticoagulation till hospital and stopped after surgery - seen vascular medicine and stable since then - she denies any heart problems, liver disorder - she also denies any chest pain, SOB, ROBLES      History as below - reviewed         HM  -labs due  -vaccines defer to next visit      Review of Systems   Constitutional: Negative.  Negative for activity change, diaphoresis and unexpected weight change.   HENT: Negative.  Negative for congestion, ear discharge, hearing loss, rhinorrhea, sore throat and voice change.    Eyes: Negative.  Negative for pain, discharge and visual disturbance.   Respiratory: Negative.  Negative for chest tightness, shortness of breath and wheezing.    Cardiovascular: Negative.  Negative for chest pain.   Gastrointestinal: Negative.  Negative for abdominal distention, anal bleeding, constipation and nausea.   Endocrine: Negative.  Negative for cold intolerance, polydipsia and polyuria.    Genitourinary: Negative.  Negative for decreased urine volume, difficulty urinating, dysuria, frequency, menstrual problem and vaginal pain.   Musculoskeletal: Negative.  Negative for arthralgias, gait problem and myalgias.   Skin: Negative.  Negative for color change, pallor and wound.   Allergic/Immunologic: Negative.  Negative for environmental allergies and immunocompromised state.   Neurological: Negative.  Negative for dizziness, tremors, seizures, speech difficulty and headaches.   Hematological: Negative.  Negative for adenopathy. Does not bruise/bleed easily.   Psychiatric/Behavioral: Positive for hallucinations. Negative for agitation, confusion, decreased concentration, self-injury and suicidal ideas. The patient is not nervous/anxious.        Active Ambulatory Problems     Diagnosis Date Noted    Anemia due to acute blood loss 08/14/2014    S/P IVC filter 08/05/2015    Chronic deep vein thrombosis of right lower extremity 08/26/2015    Venous insufficiency of both lower extremities 08/26/2015    Iron deficiency anemia 09/09/2015    Chronic kidney disease, stage III (moderate) 07/15/2016    Ulcerative proctitis with complication 01/11/2017    Morbid obesity 10/17/2017     Resolved Ambulatory Problems     Diagnosis Date Noted    Rectal bleeding 03/13/2014    Diarrhea 08/07/2014    Genital sore 08/14/2014    CKD (chronic kidney disease) stage 3, GFR 30-59 ml/min 08/18/2014    ORIANA (acute kidney injury) 08/18/2014    Decubitus ulcers 08/18/2014    HTN (hypertension) 08/19/2014    Hypokalemia 08/19/2014    Hypoalbuminemia 08/19/2014    ORIANA (acute kidney injury) 08/19/2014    Hypomagnesemia 08/19/2014    Decubitus ulcer of buttock, unstageable 08/19/2014    Decubitus ulcer of thigh 08/19/2014    Volume depletion 08/19/2014    Weakness generalized 08/19/2014    Prediabetes 08/21/2014    Acidosis, metabolic 08/23/2014    Lower extremity edema 04/01/2015    BMI 33.0-33.9,adult  04/20/2015    UC (ulcerative colitis) 04/21/2015    BMI 32.0-32.9,adult 06/08/2015    DVT (deep venous thrombosis) 06/26/2015    Edema 08/05/2015    GI bleed 04/28/2016    Stenosis colon 04/29/2016    Ulcerative colitis 06/24/2016     Past Medical History:   Diagnosis Date    Arthritis     C. difficile colitis 10/13/2014    Hepatitis B     Hypertension     Ulcerative colitis 03/2014     Past Surgical History:   Procedure Laterality Date    CHOLECYSTECTOMY      COLONOSCOPY N/A 4/29/2016    Procedure: COLONOSCOPY;  Surgeon: Umm Arenas MD;  Location: [facility];  Service: Endoscopy;  Laterality: N/A;    BETZY FILTER PLACEMENT Right 01/2014    JOINT REPLACEMENT      knee    NASAL SINUS SURGERY      TONSILLECTOMY      TOTAL KNEE ARTHROPLASTY Right 04/07/2008     Family History   Problem Relation Age of Onset    Throat cancer Father     Colon cancer Paternal Grandmother      Social History     Social History    Marital status: Single     Spouse name: N/A    Number of children: N/A    Years of education: N/A     Occupational History    Not on file.     Social History Main Topics    Smoking status: Former Smoker     Types: Cigarettes     Quit date: 8/6/1997    Smokeless tobacco: Former User    Alcohol use No    Drug use: No    Sexual activity: Not Currently     Other Topics Concern    Not on file     Social History Narrative    No narrative on file     Review of patient's allergies indicates:   Allergen Reactions    Sulfa (sulfonamide antibiotics) Swelling     Current Outpatient Prescriptions on File Prior to Visit   Medication Sig Dispense Refill    azathioprine (IMURAN) 50 mg Tab TAKE 1 TABLET BY MOUTH ONCE DAILY 30 tablet 3    calcium carbonate (OS-NELSY) 600 mg (1,500 mg) Tab Take 1 tablet (600 mg total) by mouth 2 (two) times daily with meals. 180 tablet 3    ergocalciferol (VITAMIN D2) 50,000 unit Cap Take 1 capsule (50,000 Units total) by mouth every 7 days. 12  capsule 3    ferrous sulfate 325 mg (65 mg iron) Tab tablet Take 1 tablet (325 mg total) by mouth 2 (two) times daily. 180 tablet 3    loperamide (IMODIUM) 1 mg/5 mL solution Take by mouth every 6 (six) hours as needed for Diarrhea.      MULTIVITS W-FE,OTHER MIN/LUT (CENTRUM SILVER ULTRA WOMEN'S ORAL) Take by mouth.      omeprazole (PRILOSEC) 40 MG capsule Take 1 capsule (40 mg total) by mouth once daily. 90 capsule 3    potassium chloride SA (K-DUR,KLOR-CON) 20 MEQ tablet Take 1 tablet by mouth once daily 30 tablet 11    [DISCONTINUED] azathioprine (IMURAN) 50 mg Tab Take 1 tablet (50 mg total) by mouth once daily. 30 tablet 0     No current facility-administered medications on file prior to visit.        Objective:       Vitals:    10/17/17 1325   BP: 133/78   Pulse: 81       Physical Exam   Constitutional: She is oriented to person, place, and time. She appears well-developed and well-nourished. No distress.   HENT:   Head: Normocephalic and atraumatic.   Right Ear: External ear normal.   Left Ear: External ear normal.   Nose: Nose normal.   Mouth/Throat: Oropharynx is clear and moist. No oropharyngeal exudate.   Eyes: Conjunctivae and EOM are normal. Pupils are equal, round, and reactive to light. Right eye exhibits no discharge. Left eye exhibits no discharge. No scleral icterus.   Neck: Normal range of motion. Neck supple. No JVD present. No tracheal deviation present. No thyromegaly present.   Cardiovascular: Normal rate, regular rhythm, normal heart sounds and intact distal pulses.  Exam reveals no gallop and no friction rub.    No murmur heard.  Pulmonary/Chest: Effort normal and breath sounds normal. No stridor. She has no wheezes. She has no rales. She exhibits no tenderness.   Abdominal: Soft. Bowel sounds are normal. She exhibits no distension and no mass. There is no tenderness. There is no rebound and no guarding. No hernia.   Musculoskeletal: Normal range of motion. She exhibits no edema or  tenderness.   Lymphadenopathy:     She has no cervical adenopathy.   Neurological: She is alert and oriented to person, place, and time. She has normal reflexes. She displays normal reflexes. No cranial nerve deficit. She exhibits normal muscle tone. Coordination normal.   Skin: Skin is warm and dry. No rash noted. She is not diaphoretic. No erythema. No pallor.   Psychiatric: She has a normal mood and affect. Her behavior is normal. Judgment and thought content normal.       Assessment:       1. Routine general medical examination at a health care facility    2. Ulcerative proctitis with complication    3. S/P IVC filter    4. Chronic deep vein thrombosis (DVT) of proximal vein of right lower extremity    5. Venous insufficiency of both lower extremities    6. Morbid obesity    7. Auditory hallucinations    8. Insomnia, unspecified type    9. Encounter for screening mammogram for breast cancer    10. Vitamin D deficiency    11. Encounter for screening for cardiovascular disorders         Plan:       Anabella was seen today for annual exam.    Diagnoses and all orders for this visit:    Routine general medical examination at a health care facility  -     CBC auto differential; Future  -     Comprehensive metabolic panel; Future  -     Lipid panel; Future  -     TSH; Future  -     Vitamin D; Future    Ulcerative proctitis with complication  -     CBC auto differential; Future  -     Comprehensive metabolic panel; Future  -     Lipid panel; Future  -     TSH; Future  -     Vitamin D; Future    S/P IVC filter    Chronic deep vein thrombosis (DVT) of proximal vein of right lower extremity  -     CBC auto differential; Future  -     Comprehensive metabolic panel; Future  -     Lipid panel; Future  -     TSH; Future    Venous insufficiency of both lower extremities  -     CBC auto differential; Future  -     Comprehensive metabolic panel; Future  -     Lipid panel; Future  -     TSH; Future    Morbid obesity    Auditory  hallucinations  -     Ambulatory referral to Psychiatry    Insomnia, unspecified type  -     alprazolam (XANAX) 0.25 MG tablet; Take 1 tablet (0.25 mg total) by mouth nightly as needed for Insomnia or Anxiety.  -     TSH; Future    Encounter for screening mammogram for breast cancer  -     Mammo Digital Screening Bilat with Tomosynthesis CAD; Future    Vitamin D deficiency  -     Vitamin D; Future    Encounter for screening for cardiovascular disorders   -     Lipid panel; Future    Immunization due  -     Influenza - High Dose (65+) (PF) (IM)      Wellness check  -normal exam  -labs    auditory hallucinations  -likely loneliness  -advised to watch symptoms and also consult Psychiatry  -SI/HI/ER precautions given    S/P IVC filter and chronic venous insufficiency  -not on anticoagulant due to UC and rectal bleed/anemia  -stable    US/anemia  -refilled iron  - GI for follow up  -on Imuran daily    CKD III  -no worsening  -labs    Spent adequate time in obtaining history and explaining differentials    40 minutes spent during this visit of which greater than 50% devoted to face-face counseling and coordination of care regarding diagnosis and management plan    Return in about 6 months (around 4/17/2018), or if symptoms worsen or fail to improve.

## 2017-10-17 NOTE — TELEPHONE ENCOUNTER
Spoke to pt's son, Bryson and wanted to make sure that Dr. Brambila addressed his mother's memory loss and a need for Home Health.

## 2017-10-20 ENCOUNTER — HOSPITAL ENCOUNTER (OUTPATIENT)
Dept: RADIOLOGY | Facility: HOSPITAL | Age: 72
Discharge: HOME | End: 2017-10-20
Attending: FAMILY MEDICINE
Payer: MEDICARE

## 2017-10-20 ENCOUNTER — TELEPHONE (OUTPATIENT)
Dept: FAMILY MEDICINE | Facility: CLINIC | Age: 72
End: 2017-10-20

## 2017-10-20 DIAGNOSIS — Z12.31 ENCOUNTER FOR SCREENING MAMMOGRAM FOR BREAST CANCER: ICD-10-CM

## 2017-10-20 PROCEDURE — 77067 SCR MAMMO BI INCL CAD: CPT | Mod: 26,,, | Performed by: RADIOLOGY

## 2017-10-20 PROCEDURE — 77063 BREAST TOMOSYNTHESIS BI: CPT | Mod: 26,,, | Performed by: RADIOLOGY

## 2017-10-20 PROCEDURE — 77067 SCR MAMMO BI INCL CAD: CPT | Mod: TC

## 2017-10-20 NOTE — TELEPHONE ENCOUNTER
----- Message from Shon Brambila MD sent at 10/20/2017  9:34 AM CDT -----  Call    Normal mammogram

## 2017-11-09 DIAGNOSIS — G47.00 INSOMNIA, UNSPECIFIED TYPE: ICD-10-CM

## 2017-11-09 RX ORDER — ALPRAZOLAM 0.25 MG/1
0.25 TABLET ORAL NIGHTLY PRN
Qty: 90 TABLET | Refills: 0 | Status: SHIPPED | OUTPATIENT
Start: 2017-11-09 | End: 2017-11-13

## 2017-11-09 NOTE — TELEPHONE ENCOUNTER
----- Message from Phyllis Yeung sent at 11/9/2017 10:24 AM CST -----  Contact: Pt can be reached at [phone]  Pt is calling for new script for anxiety for daytime please      [facility] [address] phone [phone]      Thank you!

## 2017-11-13 ENCOUNTER — TELEPHONE (OUTPATIENT)
Dept: FAMILY MEDICINE | Facility: CLINIC | Age: 72
End: 2017-11-13

## 2017-11-13 RX ORDER — DIAZEPAM 5 MG/1
5 TABLET ORAL NIGHTLY PRN
Qty: 30 TABLET | Refills: 0 | Status: SHIPPED | OUTPATIENT
Start: 2017-11-13 | End: 2018-01-30

## 2017-11-13 NOTE — TELEPHONE ENCOUNTER
----- Message from Kj Antony sent at 11/13/2017  8:40 AM CST -----  Contact: Patient  Patient called in requesting that Dr. Brambila or Dr. Brambila's nurse would give her a call in requesting to have a prescription change. Please contact patient at [phone] and home number [phone].    Notified mom to set up an appointment for a px.  She plans to do this after school starts.  Karolina Dumont RN

## 2017-11-13 NOTE — TELEPHONE ENCOUNTER
Spoke to pt and states that the Xanax is not working. Pt states that she is still up at night and requesting something for during the day. Pt states that she is still hearing voices. Pt states that she is not suicidal. Pls advise.

## 2017-12-05 RX ORDER — ERGOCALCIFEROL 1.25 MG/1
CAPSULE ORAL
Qty: 12 CAPSULE | Refills: 3 | Status: SHIPPED | OUTPATIENT
Start: 2017-12-05 | End: 2017-12-06 | Stop reason: SDUPTHER

## 2017-12-06 RX ORDER — POTASSIUM CHLORIDE 20 MEQ/1
20 TABLET, EXTENDED RELEASE ORAL DAILY
Qty: 90 TABLET | Refills: 1 | Status: SHIPPED | OUTPATIENT
Start: 2017-12-06 | End: 2018-04-30

## 2017-12-06 RX ORDER — ERGOCALCIFEROL 1.25 MG/1
50000 CAPSULE ORAL
Qty: 12 CAPSULE | Refills: 3 | Status: SHIPPED | OUTPATIENT
Start: 2017-12-06 | End: 2018-12-06

## 2017-12-06 NOTE — TELEPHONE ENCOUNTER
----- Message from Marika Crump sent at 12/6/2017  4:23 PM CST -----  Contact: Self/ [phone]  Patient would like a refill for ergocalciferol (VITAMIN D2) 50,000 unit Cap and potassium chloride SA (K-DUR,KLOR-CON) 20 MEQ tablet sent to [facility] [address] [facility] [phone]. Please advise.

## 2017-12-07 DIAGNOSIS — K51.919 ULCERATIVE COLITIS WITH COMPLICATION, UNSPECIFIED LOCATION: Primary | ICD-10-CM

## 2017-12-07 RX ORDER — AZATHIOPRINE 50 MG/1
50 TABLET ORAL DAILY
Qty: 30 TABLET | Refills: 3 | Status: SHIPPED | OUTPATIENT
Start: 2017-12-07 | End: 2017-12-15 | Stop reason: SDUPTHER

## 2017-12-08 DIAGNOSIS — K51.919 ULCERATIVE COLITIS WITH COMPLICATION, UNSPECIFIED LOCATION: ICD-10-CM

## 2017-12-09 DIAGNOSIS — K51.919 ULCERATIVE COLITIS WITH COMPLICATION, UNSPECIFIED LOCATION: ICD-10-CM

## 2017-12-11 ENCOUNTER — TELEPHONE (OUTPATIENT)
Dept: GASTROENTEROLOGY | Facility: CLINIC | Age: 72
End: 2017-12-11

## 2017-12-11 RX ORDER — AZATHIOPRINE 50 MG/1
TABLET ORAL
Qty: 30 TABLET | Refills: 0 | OUTPATIENT
Start: 2017-12-11

## 2017-12-11 NOTE — TELEPHONE ENCOUNTER
30 day supply of Imuran [facility] until Mail Order is received.

## 2017-12-11 NOTE — TELEPHONE ENCOUNTER
----- Message from Markia Crump sent at 12/11/2017 10:31 AM CST -----  Contact: [facility]. [phone]  Pharmacy would like to receive an authorization on a refill for azaTHIOprine (IMURAN) 50 mg Tab. Please advise.

## 2017-12-15 ENCOUNTER — TELEPHONE (OUTPATIENT)
Dept: GASTROENTEROLOGY | Facility: CLINIC | Age: 72
End: 2017-12-15

## 2017-12-15 DIAGNOSIS — K51.919 ULCERATIVE COLITIS WITH COMPLICATION, UNSPECIFIED LOCATION: ICD-10-CM

## 2017-12-15 RX ORDER — AZATHIOPRINE 50 MG/1
50 TABLET ORAL DAILY
Qty: 30 TABLET | Refills: 3 | Status: SHIPPED | OUTPATIENT
Start: 2017-12-15 | End: 2018-03-01 | Stop reason: SDUPTHER

## 2017-12-15 NOTE — TELEPHONE ENCOUNTER
----- Message from Veronica Fuller MA sent at 12/15/2017  7:54 AM CST -----  Contact: patient  Patient requesting refill on IMURAN.   ----- Message -----  From: Torrie Cat LPN  Sent: 12/14/2017   2:57 PM  To: LASHAY Medina Dr. says he has never seen this patient.   ----- Message -----  From: Veronica Fuller MA  Sent: 12/14/2017   1:59 PM  To: Bruce Abrams Staff        ----- Message -----  From: Kj Antony  Sent: 12/14/2017   1:28 PM  To: Dagoberto GERMAIN Staff    Patient called in requesting that Dr. Barrera to call her for an authorization for medication. Patient stated that Dr. Barrera works with Dr. Arenas. Please contact patient at [phone] or [phone]. Thank You.

## 2017-12-15 NOTE — TELEPHONE ENCOUNTER
Spoke with patient. She is requesting refill on Imuran. Advised patient that message will be sent to NP to request this. Verbalized understanding.    ----- Message from Torrie Cat LPN sent at 12/14/2017  2:57 PM CST -----  Contact: patient  Dr. Barrera says he has never seen this patient.   ----- Message -----  From: Veronica Fuller MA  Sent: 12/14/2017   1:59 PM  To: Bruce Abrams Staff        ----- Message -----  From: Kj Antony  Sent: 12/14/2017   1:28 PM  To: Dagoberto GERMAIN Staff    Patient called in requesting that Dr. Barrera to call her for an authorization for medication. Patient stated that Dr. Barrera works with Dr. Arenas. Please contact patient at [phone] or [phone]. Thank You.

## 2017-12-18 ENCOUNTER — TELEPHONE (OUTPATIENT)
Dept: FAMILY MEDICINE | Facility: CLINIC | Age: 72
End: 2017-12-18

## 2017-12-18 NOTE — TELEPHONE ENCOUNTER
----- Message from Nathan Denton sent at 12/18/2017  9:13 AM CST -----  Contact: [phone] Bryson Cox  Patient son wanted to speak with the doctor about the patient mental state. Please call and advise.

## 2018-01-08 ENCOUNTER — TELEPHONE (OUTPATIENT)
Dept: FAMILY MEDICINE | Facility: CLINIC | Age: 73
End: 2018-01-08

## 2018-01-08 NOTE — TELEPHONE ENCOUNTER
----- Message from Destinee Caraballo sent at 1/8/2018  1:00 PM CST -----  Contact: [facility] [employer]/[phone]  She fax over a request for neck and back brace on January 4 and no one has responded.

## 2018-01-08 NOTE — TELEPHONE ENCOUNTER
Spoke to pt's son, Bryson and states that the pt has been hallucinating. Pt was seen by her Psychiatrist today and blood work was done. Pt's son, Bryson stated that she doesn't know which medication is causing the hallucinations, but he will call Dr. Brambila's office as soon as pt's lab results are available.

## 2018-01-08 NOTE — TELEPHONE ENCOUNTER
Spoke to Jorge with [facility] and informed her that we spoke to pt and has scheduled an appt for her to be evaluated for the neck and back pain.

## 2018-01-08 NOTE — TELEPHONE ENCOUNTER
----- Message from Rossy Neves sent at 1/8/2018  8:17 AM CST -----  Contact: Bryson (son)/ [phone]  Patients son called in requesting to speak with you. Son said patient is hallucinating with medication she is taking.    Please call and advise.

## 2018-01-25 ENCOUNTER — TELEPHONE (OUTPATIENT)
Dept: FAMILY MEDICINE | Facility: CLINIC | Age: 73
End: 2018-01-25

## 2018-01-25 NOTE — TELEPHONE ENCOUNTER
Spoke to pt's sonBryson and stated that it is no longer safe to leave his mom at home alone. She is still hallucinating and now she's hiding knives. Pt son's is unable to stay with her due to him having to work. Son is requesting a referral for a sitter. Spoke to Dr. Brambila and stated for pt's son to bring pt to the ER to be evaluated. Spoke back to pt's son and stated that he will have to find someone to bring his mom to the ER. She already see a Psychiatrist. Son did not call the Psychiatrist. Stated that pt was seen and released. Pt has another appt on Tuesday. Pt does not want to be committed. Son don't know the name of the DrMayte or the facility. Stated that the doctor put his mom on a sleeping aid that is really working, but son does not know what the medication. Son was advised again to bring pt to the ER to be treated. Son stated that he will find someone to bring her to the ER.

## 2018-01-25 NOTE — TELEPHONE ENCOUNTER
----- Message from Sarai Marinelli sent at 1/25/2018  1:34 PM CST -----  Contact: son Bryson [phone]  Patient's son calling to schedule a sitter for his mother. She has been hallucinating and is having a hard time caring for herself. Please call and advise.

## 2018-01-26 ENCOUNTER — TELEPHONE (OUTPATIENT)
Dept: FAMILY MEDICINE | Facility: CLINIC | Age: 73
End: 2018-01-26

## 2018-01-26 NOTE — TELEPHONE ENCOUNTER
Spoke to pt's son, Bryson and pt and pt's son states that he's still concern with pt hallucinations. Pt signed an Involvement of Care. Son called pt's psychiatrist on the phone regarding his concerns. Spoke to pt's Psychiatrist, Dr. Angel Mendoza at [facility] and stated that pt has Mild-Moderate Dementia and Paranoia. Dr. Mendoza would like to speak to Dr. Brambila regarding care. Dr. Mendoza phone number [phone].

## 2018-01-29 RX ORDER — QUETIAPINE FUMARATE 25 MG/1
TABLET, FILM COATED ORAL
COMMUNITY
Start: 2017-12-29 | End: 2018-08-02

## 2018-01-29 RX ORDER — QUETIAPINE FUMARATE 100 MG/1
TABLET, FILM COATED ORAL
COMMUNITY
Start: 2018-01-22 | End: 2018-01-29

## 2018-01-29 NOTE — TELEPHONE ENCOUNTER
CALLED N SPOKE TO DR BANUELOS, WHO REPORTED PT HAS MMSE 19/30 - PT HAS PSYCHOSIS - STARTED ON REMERON N SEROQUEL - NOT SURE IF PT IS TAKING HER MEDS - DR BANUELOS WILL TAKE CARE OF MRI AND MANAGEMENT OF PSYCHOSIS

## 2018-01-30 ENCOUNTER — LAB VISIT (OUTPATIENT)
Dept: LAB | Facility: HOSPITAL | Age: 73
End: 2018-01-30
Attending: FAMILY MEDICINE
Payer: MEDICARE

## 2018-01-30 ENCOUNTER — OFFICE VISIT (OUTPATIENT)
Dept: FAMILY MEDICINE | Facility: CLINIC | Age: 73
End: 2018-01-30
Attending: FAMILY MEDICINE
Payer: MEDICARE

## 2018-01-30 VITALS
HEART RATE: 80 BPM | DIASTOLIC BLOOD PRESSURE: 74 MMHG | TEMPERATURE: 98 F | OXYGEN SATURATION: 100 % | BODY MASS INDEX: 37.48 KG/M2 | SYSTOLIC BLOOD PRESSURE: 139 MMHG | WEIGHT: 219.56 LBS | HEIGHT: 64 IN

## 2018-01-30 DIAGNOSIS — E55.9 VITAMIN D DEFICIENCY: ICD-10-CM

## 2018-01-30 DIAGNOSIS — I82.5Y1 CHRONIC DEEP VEIN THROMBOSIS (DVT) OF PROXIMAL VEIN OF RIGHT LOWER EXTREMITY: ICD-10-CM

## 2018-01-30 DIAGNOSIS — R73.02 GLUCOSE INTOLERANCE (IMPAIRED GLUCOSE TOLERANCE): ICD-10-CM

## 2018-01-30 DIAGNOSIS — E66.01 MORBID OBESITY: ICD-10-CM

## 2018-01-30 DIAGNOSIS — R68.89 COLD INTOLERANCE: ICD-10-CM

## 2018-01-30 DIAGNOSIS — F32.A ANXIETY AND DEPRESSION: ICD-10-CM

## 2018-01-30 DIAGNOSIS — K51.219 ULCERATIVE PROCTITIS WITH COMPLICATION: Primary | ICD-10-CM

## 2018-01-30 DIAGNOSIS — Z95.828 S/P IVC FILTER: Chronic | ICD-10-CM

## 2018-01-30 DIAGNOSIS — Z11.59 NEED FOR HEPATITIS C SCREENING TEST: ICD-10-CM

## 2018-01-30 DIAGNOSIS — F41.9 ANXIETY AND DEPRESSION: ICD-10-CM

## 2018-01-30 DIAGNOSIS — R44.3 HALLUCINATIONS: ICD-10-CM

## 2018-01-30 LAB
25(OH)D3+25(OH)D2 SERPL-MCNC: 44 NG/ML
ALBUMIN SERPL BCP-MCNC: 3.5 G/DL
ALP SERPL-CCNC: 89 U/L
ALT SERPL W/O P-5'-P-CCNC: 12 U/L
ANION GAP SERPL CALC-SCNC: 9 MMOL/L
AST SERPL-CCNC: 20 U/L
BILIRUB SERPL-MCNC: 0.6 MG/DL
BUN SERPL-MCNC: 13 MG/DL
CALCIUM SERPL-MCNC: 9.4 MG/DL
CHLORIDE SERPL-SCNC: 107 MMOL/L
CO2 SERPL-SCNC: 28 MMOL/L
CREAT SERPL-MCNC: 0.9 MG/DL
EST. GFR  (AFRICAN AMERICAN): >60 ML/MIN/1.73 M^2
EST. GFR  (NON AFRICAN AMERICAN): >60 ML/MIN/1.73 M^2
ESTIMATED AVG GLUCOSE: 97 MG/DL
GLUCOSE SERPL-MCNC: 95 MG/DL
HBA1C MFR BLD HPLC: 5 %
HCV AB SERPL QL IA: NEGATIVE
POTASSIUM SERPL-SCNC: 3.6 MMOL/L
PROT SERPL-MCNC: 7.7 G/DL
SODIUM SERPL-SCNC: 144 MMOL/L

## 2018-01-30 PROCEDURE — 99214 OFFICE O/P EST MOD 30 MIN: CPT | Mod: PBBFAC,PO | Performed by: FAMILY MEDICINE

## 2018-01-30 PROCEDURE — 83036 HEMOGLOBIN GLYCOSYLATED A1C: CPT

## 2018-01-30 PROCEDURE — 99214 OFFICE O/P EST MOD 30 MIN: CPT | Mod: S$PBB,,, | Performed by: FAMILY MEDICINE

## 2018-01-30 PROCEDURE — 80053 COMPREHEN METABOLIC PANEL: CPT

## 2018-01-30 PROCEDURE — 86038 ANTINUCLEAR ANTIBODIES: CPT

## 2018-01-30 PROCEDURE — 36415 COLL VENOUS BLD VENIPUNCTURE: CPT

## 2018-01-30 PROCEDURE — 86803 HEPATITIS C AB TEST: CPT

## 2018-01-30 PROCEDURE — 82306 VITAMIN D 25 HYDROXY: CPT

## 2018-01-30 PROCEDURE — 99999 PR PBB SHADOW E&M-EST. PATIENT-LVL IV: CPT | Mod: PBBFAC,,, | Performed by: FAMILY MEDICINE

## 2018-01-30 NOTE — PROGRESS NOTES
Subjective:       Patient ID: Anabella Aranda is a 72 y.o. female.    Chief Complaint: Back Pain; Knee Pain; and Hallucinations    72 yr old pleasant black female with chronic iron deficiency anemia ulcerative colitis, Chronic venous insufficiency, s/o IVC filter, presents today for her 3 month check and few issues. C/o auditory hallucinations, and anxiety. FOLLOWS DR BANUELOS - SCHEDULED MRI BRAIN - HALLUCINATIONS ARE AUDITORY - PARANOID - SEROQUEL NIGHTLY      Obesity - she gained a lot since last year - she is mostly staying home and eating a lot and  has no activity outside of house - she is lonely in a big house and sometimes hearing voices - they ar good voices and she is not hearing when she is out of house - she denies any depression or anxiety - she does have issues with sleeping at night - she denies any personal or family h/o psychiatry disorders - no SI/HI/delusions    Ulcerative colitis - she is on Imuran and follows GI - no side effects      Anemia - chronic - due to IBD - she is on diet alone and takes MVI - labs due        LE edema/lymphedema - had h/o DVT and she also had Green field filter - she was on anticoagulation till hospital and stopped after surgery - seen vascular medicine and stable since then - she denies any heart problems, liver disorder - she also denies any chest pain, SOB, ROBLES      History as below - reviewed         HM  -labs UTD  -vaccines defer to next visit      Review of Systems   Constitutional: Negative.  Negative for activity change, diaphoresis and unexpected weight change.   HENT: Negative.  Negative for congestion, ear discharge, hearing loss, rhinorrhea, sore throat and voice change.    Eyes: Negative.  Negative for pain, discharge and visual disturbance.   Respiratory: Negative.  Negative for chest tightness, shortness of breath and wheezing.    Cardiovascular: Negative.  Negative for chest pain.   Gastrointestinal: Negative.  Negative for abdominal distention,  anal bleeding, constipation and nausea.   Endocrine: Negative.  Negative for cold intolerance, polydipsia and polyuria.   Genitourinary: Negative.  Negative for decreased urine volume, difficulty urinating, dysuria, frequency, menstrual problem and vaginal pain.   Musculoskeletal: Negative.  Negative for arthralgias, gait problem and myalgias.   Skin: Negative.  Negative for color change, pallor and wound.   Allergic/Immunologic: Negative.  Negative for environmental allergies and immunocompromised state.   Neurological: Negative.  Negative for dizziness, tremors, seizures, speech difficulty and headaches.   Hematological: Negative.  Negative for adenopathy. Does not bruise/bleed easily.   Psychiatric/Behavioral: Negative.  Negative for agitation, confusion, decreased concentration, hallucinations, self-injury and suicidal ideas. The patient is not nervous/anxious.        PMH/PSH/FH/SH/MED/ALLERGY reviewed    Objective:       Vitals:    01/30/18 0832   BP: 139/74   Pulse: 80   Temp: 98.1 °F (36.7 °C)       Physical Exam   Constitutional: She is oriented to person, place, and time. She appears well-developed and well-nourished. No distress.   HENT:   Head: Normocephalic and atraumatic.   Right Ear: External ear normal.   Left Ear: External ear normal.   Nose: Nose normal.   Mouth/Throat: Oropharynx is clear and moist. No oropharyngeal exudate.   Eyes: Conjunctivae and EOM are normal. Pupils are equal, round, and reactive to light. Right eye exhibits no discharge. Left eye exhibits no discharge. No scleral icterus.   Neck: Normal range of motion. Neck supple. No JVD present. No tracheal deviation present. No thyromegaly present.   Cardiovascular: Normal rate, regular rhythm, normal heart sounds and intact distal pulses.  Exam reveals no gallop and no friction rub.    No murmur heard.  Pulmonary/Chest: Effort normal and breath sounds normal. No stridor. She has no wheezes. She has no rales. She exhibits no tenderness.    Abdominal: Soft. Bowel sounds are normal. She exhibits no distension and no mass. There is no tenderness. There is no rebound and no guarding. No hernia.   Musculoskeletal: Normal range of motion. She exhibits no edema or tenderness.   Lymphadenopathy:     She has no cervical adenopathy.   Neurological: She is alert and oriented to person, place, and time. She has normal reflexes. She displays normal reflexes. No cranial nerve deficit. She exhibits normal muscle tone. Coordination normal.   Skin: Skin is warm and dry. No rash noted. She is not diaphoretic. No erythema. No pallor.   Psychiatric: She has a normal mood and affect. Her behavior is normal. Judgment and thought content normal.       Assessment:       1. Ulcerative proctitis with complication    2. Morbid obesity    3. Chronic deep vein thrombosis (DVT) of proximal vein of right lower extremity    4. S/P IVC filter    5. Anxiety and depression    6. Glucose intolerance (impaired glucose tolerance)    7. Cold intolerance    8. Vitamin D deficiency    9. Hallucinations    10. Need for hepatitis C screening test        Plan:       Anabella was seen today for back pain, knee pain and hallucinations.    Diagnoses and all orders for this visit:    Ulcerative proctitis with complication  -     Ambulatory referral to Home Health    Morbid obesity    Chronic deep vein thrombosis (DVT) of proximal vein of right lower extremity  -     Ambulatory referral to Hematology / Oncology  -     Ambulatory referral to Home Health    S/P IVC filter  -     Ambulatory referral to Hematology / Oncology  -     Ambulatory referral to Home Health    Anxiety and depression  -     Ambulatory referral to Home Health    Glucose intolerance (impaired glucose tolerance)  -     Hemoglobin A1c; Future  -     Comprehensive metabolic panel; Future  -     Ambulatory referral to Home Health    Cold intolerance  -     FRANCESCO; Future  -     Vitamin D; Future  -     Ambulatory referral to Home  Health    Vitamin D deficiency  -     Vitamin D; Future    Hallucinations  -     Ambulatory referral to Home Health    Need for hepatitis C screening test  -     Hepatitis C antibody; Future      Auditory hallucinations  -likely loneliness  -advised to watch symptoms and also FOLLOW Psychiatry  -SI/HI/ER precautions given    ANXIETY/DEPRESSION  -FOLLOW PSYCH    GLUC INTOLERANCE  -LABS    S/P IVC filter and chronic venous insufficiency  -not on anticoagulant due to UC and rectal bleed/anemia  -stable    US/anemia  -refilled iron  - GI for follow up  -on Imuran daily    CHRONIC DVT  -HEM/ONC REFERRAL    CKD III  -no worsening  -labs    Spent adequate time in obtaining history and explaining differentials    40 minutes spent during this visit of which greater than 50% devoted to face-face counseling and coordination of care regarding diagnosis and management plan    Follow-up in about 3 months (around 4/30/2018), or if symptoms worsen or fail to improve.

## 2018-01-31 LAB — ANA SER QL IF: NORMAL

## 2018-02-07 ENCOUNTER — TELEPHONE (OUTPATIENT)
Dept: FAMILY MEDICINE | Facility: CLINIC | Age: 73
End: 2018-02-07

## 2018-02-07 NOTE — TELEPHONE ENCOUNTER
----- Message from Bertha Lang sent at 2/7/2018  3:12 PM CST -----  Contact: Bryson, son, [phone]  Patient has an appointment scheduled on 2/14. States she feel, has a swollen knee and was seen at ED and would like to know if she needs to be seen sooner. Please advise.

## 2018-02-07 NOTE — TELEPHONE ENCOUNTER
Spoke pt's sonBryson and states that pt had a fall. Son was offered an appt with Dr. Brambila on Friday, but declined. Son stated that he will keep his appt on 2/14.

## 2018-02-08 ENCOUNTER — TELEPHONE (OUTPATIENT)
Dept: FAMILY MEDICINE | Facility: CLINIC | Age: 73
End: 2018-02-08

## 2018-02-08 NOTE — TELEPHONE ENCOUNTER
----- Message from Tracy Denton sent at 2/8/2018  8:20 AM CST -----  Contact: Florina from [facility] [phone] Fax [phone]  [facility]  requesting orders for Therapy. Please advise.

## 2018-02-12 RX ORDER — HYDROCODONE BITARTRATE AND ACETAMINOPHEN 5; 325 MG/1; MG/1
TABLET ORAL
COMMUNITY
Start: 2018-02-07 | End: 2019-10-09 | Stop reason: CLARIF

## 2018-03-01 DIAGNOSIS — K51.919 ULCERATIVE COLITIS WITH COMPLICATION, UNSPECIFIED LOCATION: ICD-10-CM

## 2018-03-01 RX ORDER — AZATHIOPRINE 50 MG/1
50 TABLET ORAL DAILY
Qty: 30 TABLET | Refills: 3 | Status: SHIPPED | OUTPATIENT
Start: 2018-03-01 | End: 2018-07-20 | Stop reason: SDUPTHER

## 2018-03-01 NOTE — TELEPHONE ENCOUNTER
----- Message from Amber Phelps sent at 3/1/2018 10:02 AM CST -----  Contact: [phone]/Bryson pt's son   Pt requesting a refill on rx azaTHIOprine (IMURAN) 50 mg Tab sent to [facility] [phone]. Please advise

## 2018-03-05 ENCOUNTER — TELEPHONE (OUTPATIENT)
Dept: GASTROENTEROLOGY | Facility: CLINIC | Age: 73
End: 2018-03-05

## 2018-03-05 NOTE — TELEPHONE ENCOUNTER
Spoke with patients son. He said that his mother is starting to hallucinate. He said that he read on the bottle that this is a side affect of this medication. Informed son that she should stop taking this medication until we get advice from Aisha. Informed Mr. Massey that he would receive a call back. Verbal Understanding.

## 2018-03-05 NOTE — TELEPHONE ENCOUNTER
----- Message from Sarai Marinelli sent at 3/5/2018 12:00 PM CST -----  Contact: kate Massey  [phone]  Patient's son is requesting to speak with you regarding side effects of azaTHIOprine (IMURAN) 50 mg Tab. Patient is experiencing hallucinations. Please advise.

## 2018-03-06 NOTE — TELEPHONE ENCOUNTER
She is being followed by a behavioral health specialist and has been diagnosed with paranoia and dementia.  She has been on Imuran for over one year.  Do not suspect Imuran as the cause and this as a side effect is not reported as a common effect of the medication on multiple sites including up to date.  I have recommended that son discuss this with the behavior health specialist.  If deemed that she needs to discontinue or change the medication, we will discuss other options.  Reports he will be seeing the behavior specialist today.

## 2018-03-08 ENCOUNTER — TELEPHONE (OUTPATIENT)
Dept: FAMILY MEDICINE | Facility: CLINIC | Age: 73
End: 2018-03-08

## 2018-03-08 DIAGNOSIS — R73.02 GLUCOSE INTOLERANCE (IMPAIRED GLUCOSE TOLERANCE): ICD-10-CM

## 2018-03-08 DIAGNOSIS — D50.9 IRON DEFICIENCY ANEMIA, UNSPECIFIED IRON DEFICIENCY ANEMIA TYPE: ICD-10-CM

## 2018-03-08 DIAGNOSIS — D62 ANEMIA DUE TO ACUTE BLOOD LOSS: Primary | ICD-10-CM

## 2018-03-08 DIAGNOSIS — K51.219 ULCERATIVE PROCTITIS WITH COMPLICATION: ICD-10-CM

## 2018-03-08 NOTE — TELEPHONE ENCOUNTER
----- Message from Va Chávez sent at 3/8/2018  8:44 AM CST -----  Contact:  BrysonKenny alvarez/  son  [phone]  Mr Cox states need to speak with nurse   Patient states doctor would like labwork.   Please call  Kenny [phone]

## 2018-03-08 NOTE — TELEPHONE ENCOUNTER
Patient's son, Bryson called asking if patient needs any lab work prior to follow up on 4/30.  Please advise.

## 2018-03-09 ENCOUNTER — TELEPHONE (OUTPATIENT)
Dept: FAMILY MEDICINE | Facility: CLINIC | Age: 73
End: 2018-03-09

## 2018-03-09 NOTE — TELEPHONE ENCOUNTER
----- Message from Amber Phelps sent at 3/9/2018  2:44 PM CST -----  Contact: [phone]/Bryson pt's son   Patient's son  called in returning your call. Please advise

## 2018-03-12 ENCOUNTER — TELEPHONE (OUTPATIENT)
Dept: FAMILY MEDICINE | Facility: CLINIC | Age: 73
End: 2018-03-12

## 2018-03-12 NOTE — TELEPHONE ENCOUNTER
Spoke to Galina with [facility] and stated that she has faxed will refax clinic notes.

## 2018-03-12 NOTE — TELEPHONE ENCOUNTER
----- Message from Rossy Neves sent at 3/12/2018  9:03 AM CDT -----  Contact: Galina from [facility]/ [phone]  Galina called in to give you a heads up regarding patients health. She also sent a fax to your office about the patient.     Please advise.

## 2018-04-23 ENCOUNTER — LAB VISIT (OUTPATIENT)
Dept: LAB | Facility: HOSPITAL | Age: 73
End: 2018-04-23
Attending: FAMILY MEDICINE
Payer: MEDICARE

## 2018-04-23 DIAGNOSIS — K51.219 ULCERATIVE PROCTITIS WITH COMPLICATION: ICD-10-CM

## 2018-04-23 DIAGNOSIS — R73.02 GLUCOSE INTOLERANCE (IMPAIRED GLUCOSE TOLERANCE): ICD-10-CM

## 2018-04-23 DIAGNOSIS — D62 ANEMIA DUE TO ACUTE BLOOD LOSS: ICD-10-CM

## 2018-04-23 LAB
ALBUMIN SERPL BCP-MCNC: 3.5 G/DL
ALP SERPL-CCNC: 86 U/L
ALT SERPL W/O P-5'-P-CCNC: 13 U/L
ANION GAP SERPL CALC-SCNC: 7 MMOL/L
AST SERPL-CCNC: 21 U/L
BASOPHILS # BLD AUTO: 0.02 K/UL
BASOPHILS NFR BLD: 0.6 %
BILIRUB SERPL-MCNC: 0.5 MG/DL
BUN SERPL-MCNC: 15 MG/DL
CALCIUM SERPL-MCNC: 8.9 MG/DL
CHLORIDE SERPL-SCNC: 108 MMOL/L
CHOLEST SERPL-MCNC: 206 MG/DL
CHOLEST/HDLC SERPL: 2.6 {RATIO}
CO2 SERPL-SCNC: 25 MMOL/L
CREAT SERPL-MCNC: 1 MG/DL
DIFFERENTIAL METHOD: ABNORMAL
EOSINOPHIL # BLD AUTO: 0.1 K/UL
EOSINOPHIL NFR BLD: 2.8 %
ERYTHROCYTE [DISTWIDTH] IN BLOOD BY AUTOMATED COUNT: 14.1 %
EST. GFR  (AFRICAN AMERICAN): >60 ML/MIN/1.73 M^2
EST. GFR  (NON AFRICAN AMERICAN): 57 ML/MIN/1.73 M^2
ESTIMATED AVG GLUCOSE: 97 MG/DL
GLUCOSE SERPL-MCNC: 86 MG/DL
HBA1C MFR BLD HPLC: 5 %
HCT VFR BLD AUTO: 34.3 %
HDLC SERPL-MCNC: 78 MG/DL
HDLC SERPL: 37.9 %
HGB BLD-MCNC: 11.2 G/DL
LDLC SERPL CALC-MCNC: 117.2 MG/DL
LYMPHOCYTES # BLD AUTO: 1.3 K/UL
LYMPHOCYTES NFR BLD: 40.1 %
MCH RBC QN AUTO: 29.5 PG
MCHC RBC AUTO-ENTMCNC: 32.7 G/DL
MCV RBC AUTO: 90 FL
MONOCYTES # BLD AUTO: 0.3 K/UL
MONOCYTES NFR BLD: 9.4 %
NEUTROPHILS # BLD AUTO: 1.5 K/UL
NEUTROPHILS NFR BLD: 47.1 %
NONHDLC SERPL-MCNC: 128 MG/DL
PLATELET # BLD AUTO: 261 K/UL
PMV BLD AUTO: 8.7 FL
POTASSIUM SERPL-SCNC: 3.9 MMOL/L
PROT SERPL-MCNC: 7.6 G/DL
RBC # BLD AUTO: 3.8 M/UL
SODIUM SERPL-SCNC: 140 MMOL/L
TRIGL SERPL-MCNC: 54 MG/DL
WBC # BLD AUTO: 3.19 K/UL

## 2018-04-23 PROCEDURE — 80053 COMPREHEN METABOLIC PANEL: CPT

## 2018-04-23 PROCEDURE — 80061 LIPID PANEL: CPT

## 2018-04-23 PROCEDURE — 36415 COLL VENOUS BLD VENIPUNCTURE: CPT

## 2018-04-23 PROCEDURE — 85025 COMPLETE CBC W/AUTO DIFF WBC: CPT

## 2018-04-23 PROCEDURE — 83036 HEMOGLOBIN GLYCOSYLATED A1C: CPT

## 2018-04-30 ENCOUNTER — OFFICE VISIT (OUTPATIENT)
Dept: FAMILY MEDICINE | Facility: CLINIC | Age: 73
End: 2018-04-30
Attending: FAMILY MEDICINE
Payer: MEDICARE

## 2018-04-30 VITALS
SYSTOLIC BLOOD PRESSURE: 131 MMHG | WEIGHT: 214.94 LBS | HEART RATE: 73 BPM | HEIGHT: 64 IN | BODY MASS INDEX: 36.7 KG/M2 | TEMPERATURE: 98 F | OXYGEN SATURATION: 100 % | DIASTOLIC BLOOD PRESSURE: 76 MMHG

## 2018-04-30 DIAGNOSIS — D62 ANEMIA DUE TO ACUTE BLOOD LOSS: ICD-10-CM

## 2018-04-30 DIAGNOSIS — E66.01 MORBID OBESITY: ICD-10-CM

## 2018-04-30 DIAGNOSIS — F32.3 MAJOR DEPRESSION WITH PSYCHOTIC FEATURES: ICD-10-CM

## 2018-04-30 DIAGNOSIS — I87.2 VENOUS INSUFFICIENCY OF BOTH LOWER EXTREMITIES: Chronic | ICD-10-CM

## 2018-04-30 DIAGNOSIS — I82.5Y1 CHRONIC DEEP VEIN THROMBOSIS (DVT) OF PROXIMAL VEIN OF RIGHT LOWER EXTREMITY: ICD-10-CM

## 2018-04-30 DIAGNOSIS — H53.8 BLURRED VISION, BILATERAL: ICD-10-CM

## 2018-04-30 DIAGNOSIS — R73.02 GLUCOSE INTOLERANCE (IMPAIRED GLUCOSE TOLERANCE): Primary | ICD-10-CM

## 2018-04-30 DIAGNOSIS — D50.9 IRON DEFICIENCY ANEMIA, UNSPECIFIED IRON DEFICIENCY ANEMIA TYPE: ICD-10-CM

## 2018-04-30 DIAGNOSIS — K51.219 ULCERATIVE PROCTITIS WITH COMPLICATION: ICD-10-CM

## 2018-04-30 PROCEDURE — 99213 OFFICE O/P EST LOW 20 MIN: CPT | Mod: PBBFAC,PO | Performed by: FAMILY MEDICINE

## 2018-04-30 PROCEDURE — 99999 PR PBB SHADOW E&M-EST. PATIENT-LVL III: CPT | Mod: PBBFAC,,, | Performed by: FAMILY MEDICINE

## 2018-04-30 PROCEDURE — 99214 OFFICE O/P EST MOD 30 MIN: CPT | Mod: S$PBB,,, | Performed by: FAMILY MEDICINE

## 2018-04-30 RX ORDER — DONEPEZIL HYDROCHLORIDE 10 MG/1
10 TABLET, FILM COATED ORAL NIGHTLY
COMMUNITY
End: 2019-09-24 | Stop reason: SDUPTHER

## 2018-04-30 NOTE — PROGRESS NOTES
Subjective:       Patient ID: Anabella Aranda is a 72 y.o. female.    Chief Complaint: Follow-up (3 month follow up.  Also wants to discuss anemia.) and Medication Management (Donepezil)    72 yr old pleasant black female with chronic iron deficiency anemia ulcerative colitis, Chronic venous insufficiency, s/o IVC filter, presents today for her 3 month check and few issues. C/o auditory hallucinations, depression and anxiety. FOLLOWS DR BANUELOS - SCHEDULED MRI BRAIN - HALLUCINATIONS ARE AUDITORY - PARANOID - SEROQUEL NIGHTLY. She also reported blurred vision and need referrals.      Obesity - she gained a lot since last year - she is mostly staying home and eating a lot and  has no activity outside of house - she is lonely in a big house and sometimes hearing voices - they ar good voices and she is not hearing when she is out of house - she denies any depression or anxiety - she does have issues with sleeping at night - she denies any personal or family h/o psychiatry disorders - no SI/HI/delusions    Ulcerative colitis - she is on Imuran and follows GI - no side effects      Anemia - chronic - due to IBD - she is on diet alone and takes MVI - labs due        LE edema/lymphedema - had h/o DVT and she also had Green field filter - she was on anticoagulation till hospital and stopped after surgery - seen vascular medicine and stable since then - she denies any heart problems, liver disorder - she also denies any chest pain, SOB, ROBLES      History as below - reviewed         HM  -labs UTD  -vaccines defer to next visit      Review of Systems   Constitutional: Negative.  Negative for activity change, diaphoresis and unexpected weight change.   HENT: Negative.  Negative for congestion, ear discharge, hearing loss, rhinorrhea, sore throat and voice change.    Eyes: Negative.  Negative for pain, discharge and visual disturbance.   Respiratory: Negative.  Negative for chest tightness, shortness of breath and wheezing.     Cardiovascular: Negative.  Negative for chest pain.   Gastrointestinal: Negative.  Negative for abdominal distention, anal bleeding, constipation and nausea.   Endocrine: Negative.  Negative for cold intolerance, polydipsia and polyuria.   Genitourinary: Negative.  Negative for decreased urine volume, difficulty urinating, dysuria, frequency, menstrual problem and vaginal pain.   Musculoskeletal: Negative.  Negative for arthralgias, gait problem and myalgias.   Skin: Negative.  Negative for color change, pallor and wound.   Allergic/Immunologic: Negative.  Negative for environmental allergies and immunocompromised state.   Neurological: Negative.  Negative for dizziness, tremors, seizures, speech difficulty and headaches.   Hematological: Negative.  Negative for adenopathy. Does not bruise/bleed easily.   Psychiatric/Behavioral: Negative.  Negative for agitation, confusion, decreased concentration, hallucinations, self-injury and suicidal ideas. The patient is not nervous/anxious.        PMH/PSH/FH/SH/MED/ALLERGY reviewed    Objective:       Vitals:    04/30/18 0825   BP: 131/76   Pulse: 73   Temp: 98 °F (36.7 °C)       Physical Exam   Constitutional: She is oriented to person, place, and time. She appears well-developed and well-nourished. No distress.   HENT:   Head: Normocephalic and atraumatic.   Right Ear: External ear normal.   Left Ear: External ear normal.   Nose: Nose normal.   Mouth/Throat: Oropharynx is clear and moist. No oropharyngeal exudate.   Eyes: Conjunctivae and EOM are normal. Pupils are equal, round, and reactive to light. Right eye exhibits no discharge. Left eye exhibits no discharge. No scleral icterus.   Neck: Normal range of motion. Neck supple. No JVD present. No tracheal deviation present. No thyromegaly present.   Cardiovascular: Normal rate, regular rhythm, normal heart sounds and intact distal pulses.  Exam reveals no gallop and no friction rub.    No murmur heard.  Pulmonary/Chest:  Effort normal and breath sounds normal. No stridor. She has no wheezes. She has no rales. She exhibits no tenderness.   Abdominal: Soft. Bowel sounds are normal. She exhibits no distension and no mass. There is no tenderness. There is no rebound and no guarding. No hernia.   Musculoskeletal: Normal range of motion. She exhibits no edema or tenderness.   Lymphadenopathy:     She has no cervical adenopathy.   Neurological: She is alert and oriented to person, place, and time. She has normal reflexes. She displays normal reflexes. No cranial nerve deficit. She exhibits normal muscle tone. Coordination normal.   Skin: Skin is warm and dry. No rash noted. She is not diaphoretic. No erythema. No pallor.   Psychiatric: She has a normal mood and affect. Her behavior is normal. Judgment and thought content normal.       Assessment:       1. Venous insufficiency of both lower extremities    2. Chronic deep vein thrombosis (DVT) of proximal vein of right lower extremity    3. Anemia due to acute blood loss    4. Iron deficiency anemia, unspecified iron deficiency anemia type    5. Morbid obesity    6. Glucose intolerance (impaired glucose tolerance)    7. Ulcerative proctitis with complication        Plan:       Anabella was seen today for follow-up and medication management.    Diagnoses and all orders for this visit:    Glucose intolerance (impaired glucose tolerance)    Venous insufficiency of both lower extremities    Chronic deep vein thrombosis (DVT) of proximal vein of right lower extremity    Anemia due to acute blood loss    Iron deficiency anemia, unspecified iron deficiency anemia type    Morbid obesity    Ulcerative proctitis with complication    Blurred vision, bilateral  -     Ambulatory Referral to Optometry    Major depression with psychotic features      Auditory hallucinations  -likely loneliness  -advised to watch symptoms and also FOLLOW Psychiatry  -SI/HI/ER precautions given    ANXIETY/DEPRESSION  -FOLLOW  PSYCH  -on seroquel    GLUC INTOLERANCE  -LABS reassuring    S/P IVC filter and chronic venous insufficiency  -not on anticoagulant due to UC and rectal bleed/anemia  -stable    US/anemia  -refilled iron  - GI for follow up  -on Imuran daily    CHRONIC DVT  -HEM/ONC REFERRAL done    CKD III  -no worsening  -labs    Spent adequate time in obtaining history and explaining differentials    40 minutes spent during this visit of which greater than 50% devoted to face-face counseling and coordination of care regarding diagnosis and management plan    Follow-up in about 3 months (around 7/30/2018), or if symptoms worsen or fail to improve.

## 2018-05-14 ENCOUNTER — INITIAL CONSULT (OUTPATIENT)
Dept: OPTOMETRY | Facility: CLINIC | Age: 73
End: 2018-05-14
Payer: MEDICARE

## 2018-05-14 DIAGNOSIS — H25.13 NUCLEAR SCLEROSIS, BILATERAL: Primary | ICD-10-CM

## 2018-05-14 DIAGNOSIS — Z13.5 GLAUCOMA SCREENING: ICD-10-CM

## 2018-05-14 DIAGNOSIS — H52.4 HYPEROPIA OF BOTH EYES WITH ASTIGMATISM AND PRESBYOPIA: ICD-10-CM

## 2018-05-14 DIAGNOSIS — H52.203 HYPEROPIA OF BOTH EYES WITH ASTIGMATISM AND PRESBYOPIA: ICD-10-CM

## 2018-05-14 DIAGNOSIS — H52.03 HYPEROPIA OF BOTH EYES WITH ASTIGMATISM AND PRESBYOPIA: ICD-10-CM

## 2018-05-14 DIAGNOSIS — H02.88B MEIBOMIAN GLAND DYSFUNCTION (MGD), BILATERAL, BOTH UPPER AND LOWER LIDS: ICD-10-CM

## 2018-05-14 DIAGNOSIS — H02.88A MEIBOMIAN GLAND DYSFUNCTION (MGD), BILATERAL, BOTH UPPER AND LOWER LIDS: ICD-10-CM

## 2018-05-14 PROCEDURE — 99212 OFFICE O/P EST SF 10 MIN: CPT | Mod: PBBFAC | Performed by: OPTOMETRIST

## 2018-05-14 PROCEDURE — 92015 DETERMINE REFRACTIVE STATE: CPT | Mod: ,,, | Performed by: OPTOMETRIST

## 2018-05-14 PROCEDURE — 92004 COMPRE OPH EXAM NEW PT 1/>: CPT | Mod: S$PBB,,, | Performed by: OPTOMETRIST

## 2018-05-14 PROCEDURE — 99999 PR PBB SHADOW E&M-EST. PATIENT-LVL II: CPT | Mod: PBBFAC,,, | Performed by: OPTOMETRIST

## 2018-05-14 NOTE — LETTER
May 14, 2018      Shon Brambila MD  [address]  [address]  [facility] [address]           [facility] - Optometry  [address]  [place] [address]  Phone: [phone]  Fax: [phone]          Patient: Anabella Aranda   MR Number: [id number]   YOB: 1945   Date of Visit: 5/14/2018       Dear Dr. Shon Brambila:    Thank you for referring Anabella Aranda to me for evaluation. Attached you will find relevant portions of my assessment and plan of care.    If you have questions, please do not hesitate to call me. I look forward to following Anabella Aranda along with you.    Sincerely,    Larry Robles, OD    Enclosure  CC:  No Recipients    If you would like to receive this communication electronically, please contact [email or web address] or [phone] to request more information on [facility] Link access.    For providers and/or their staff who would like to refer a patient to [facility], please contact us through our one-stop-shop provider referral line, [facility], at [phone].    If you feel you have received this communication in error or would no longer like to receive these types of communications, please e-mail [email or web address]

## 2018-07-20 ENCOUNTER — OFFICE VISIT (OUTPATIENT)
Dept: GASTROENTEROLOGY | Facility: CLINIC | Age: 73
End: 2018-07-20
Payer: MEDICARE

## 2018-07-20 ENCOUNTER — LAB VISIT (OUTPATIENT)
Dept: LAB | Facility: HOSPITAL | Age: 73
End: 2018-07-20
Attending: NURSE PRACTITIONER
Payer: MEDICARE

## 2018-07-20 VITALS
DIASTOLIC BLOOD PRESSURE: 73 MMHG | HEART RATE: 79 BPM | SYSTOLIC BLOOD PRESSURE: 149 MMHG | HEIGHT: 64 IN | WEIGHT: 216 LBS | BODY MASS INDEX: 36.88 KG/M2

## 2018-07-20 DIAGNOSIS — K51.919 ULCERATIVE COLITIS WITH COMPLICATION, UNSPECIFIED LOCATION: ICD-10-CM

## 2018-07-20 DIAGNOSIS — R10.13 ABDOMINAL PAIN, EPIGASTRIC: Primary | ICD-10-CM

## 2018-07-20 LAB
BASOPHILS # BLD AUTO: 0.02 K/UL
BASOPHILS NFR BLD: 0.5 %
DIFFERENTIAL METHOD: ABNORMAL
EOSINOPHIL # BLD AUTO: 0.1 K/UL
EOSINOPHIL NFR BLD: 1.3 %
ERYTHROCYTE [DISTWIDTH] IN BLOOD BY AUTOMATED COUNT: 13.3 %
HCT VFR BLD AUTO: 35.6 %
HGB BLD-MCNC: 11.4 G/DL
LYMPHOCYTES # BLD AUTO: 1.4 K/UL
LYMPHOCYTES NFR BLD: 36 %
MCH RBC QN AUTO: 29 PG
MCHC RBC AUTO-ENTMCNC: 32 G/DL
MCV RBC AUTO: 91 FL
MONOCYTES # BLD AUTO: 0.3 K/UL
MONOCYTES NFR BLD: 7.1 %
NEUTROPHILS # BLD AUTO: 2.2 K/UL
NEUTROPHILS NFR BLD: 55.1 %
PLATELET # BLD AUTO: 276 K/UL
PMV BLD AUTO: 8.7 FL
RBC # BLD AUTO: 3.93 M/UL
WBC # BLD AUTO: 3.97 K/UL

## 2018-07-20 PROCEDURE — 99999 PR PBB SHADOW E&M-EST. PATIENT-LVL II: CPT | Mod: PBBFAC,,, | Performed by: NURSE PRACTITIONER

## 2018-07-20 PROCEDURE — 85025 COMPLETE CBC W/AUTO DIFF WBC: CPT

## 2018-07-20 PROCEDURE — 36415 COLL VENOUS BLD VENIPUNCTURE: CPT

## 2018-07-20 PROCEDURE — 99212 OFFICE O/P EST SF 10 MIN: CPT | Mod: PBBFAC,PO | Performed by: NURSE PRACTITIONER

## 2018-07-20 PROCEDURE — 99213 OFFICE O/P EST LOW 20 MIN: CPT | Mod: S$PBB,,, | Performed by: NURSE PRACTITIONER

## 2018-07-20 RX ORDER — QUETIAPINE FUMARATE 200 MG/1
TABLET, FILM COATED ORAL
COMMUNITY
Start: 2018-05-14 | End: 2018-08-02 | Stop reason: SDUPTHER

## 2018-07-20 RX ORDER — QUETIAPINE FUMARATE 100 MG/1
TABLET, FILM COATED ORAL
Refills: 1 | Status: ON HOLD | COMMUNITY
Start: 2018-06-04 | End: 2019-11-30

## 2018-07-20 RX ORDER — AZATHIOPRINE 50 MG/1
50 TABLET ORAL DAILY
Qty: 30 TABLET | Refills: 6 | Status: SHIPPED | OUTPATIENT
Start: 2018-07-20 | End: 2019-03-02 | Stop reason: SDUPTHER

## 2018-07-20 RX ORDER — MEMANTINE HYDROCHLORIDE 10 MG/1
10 TABLET ORAL 2 TIMES DAILY
COMMUNITY
Start: 2018-07-17 | End: 2019-09-24 | Stop reason: SDUPTHER

## 2018-07-20 NOTE — PROGRESS NOTES
Subjective:       Patient ID: Anabella Aranda is a 73 y.o. female.    Chief Complaint: No chief complaint on file.    HPI  History of ulcerative colitis treated with imuran 50 mg daily.  She is also taking a fiber supplement and imodium daily.  With this combination, she reports regular bowel movements and no diarrhea.  She denies blood with bowel movements or black stools.   She is reporting epigastric burning today as her only complaint.  Worse at night.  Appetite is stable. No nausea or vomiting.    She is not currently using acid suppression.  She had EGD with gastritis and hiatal hernia, benign duodenal polyps in 9/2015.    Colonoscopy at that time also.    Since our last visit, she has been diagnosed with dementia and paranoia according to her son who accompanies her today.    Review of Systems   Constitutional: Negative for activity change, appetite change, fatigue, fever and unexpected weight change.   HENT: Negative for trouble swallowing.    Respiratory: Negative for shortness of breath.    Cardiovascular: Negative for chest pain.   Gastrointestinal: Positive for abdominal pain and diarrhea. Negative for blood in stool and constipation.   Skin: Negative.    Neurological: Negative.    Psychiatric/Behavioral: Positive for confusion.       Objective:      Physical Exam   Constitutional: She is oriented to person, place, and time. She appears well-developed and well-nourished. No distress.   Eyes: No scleral icterus.   Cardiovascular: Normal rate.    Pulmonary/Chest: Effort normal. No respiratory distress.   Abdominal: Soft. Bowel sounds are normal. She exhibits no distension and no mass. There is no tenderness. There is no guarding.   Neurological: She is alert and oriented to person, place, and time.   Skin: Skin is warm and dry. She is not diaphoretic.   Psychiatric: She has a normal mood and affect. Her behavior is normal.   Vitals reviewed.      Assessment:       1. Abdominal pain, epigastric    2.  Ulcerative colitis with complication, unspecified location        Plan:     Diagnoses and all orders for this visit:    Abdominal pain, epigastric    Ulcerative colitis with complication, unspecified location  -     azaTHIOprine (IMURAN) 50 mg Tab; Take 1 tablet (50 mg total) by mouth once daily.  -     CBC auto differential; Future    Other orders  -     ranitidine (ZANTAC) 150 MG tablet; Take 1 tablet (150 mg total) by mouth 2 (two) times daily.      Continue imuran.  Trial of ranitidine.    Follow up if complaints continue.

## 2018-07-23 ENCOUNTER — TELEPHONE (OUTPATIENT)
Dept: GASTROENTEROLOGY | Facility: CLINIC | Age: 73
End: 2018-07-23

## 2018-07-23 NOTE — TELEPHONE ENCOUNTER
----- Message from ANNEL Ribera sent at 7/20/2018  1:07 PM CDT -----  Let her know that WBC that I was wanting to recheck is normal.  Remains anemic but stable.

## 2018-08-02 ENCOUNTER — OFFICE VISIT (OUTPATIENT)
Dept: FAMILY MEDICINE | Facility: CLINIC | Age: 73
End: 2018-08-02
Attending: FAMILY MEDICINE
Payer: MEDICARE

## 2018-08-02 ENCOUNTER — HOSPITAL ENCOUNTER (OUTPATIENT)
Dept: RADIOLOGY | Facility: HOSPITAL | Age: 73
Discharge: HOME | End: 2018-08-02
Attending: FAMILY MEDICINE
Payer: MEDICARE

## 2018-08-02 ENCOUNTER — TELEPHONE (OUTPATIENT)
Dept: FAMILY MEDICINE | Facility: CLINIC | Age: 73
End: 2018-08-02

## 2018-08-02 VITALS
WEIGHT: 220 LBS | OXYGEN SATURATION: 99 % | DIASTOLIC BLOOD PRESSURE: 73 MMHG | BODY MASS INDEX: 37.56 KG/M2 | SYSTOLIC BLOOD PRESSURE: 136 MMHG | HEART RATE: 86 BPM | TEMPERATURE: 98 F | HEIGHT: 64 IN

## 2018-08-02 DIAGNOSIS — M81.0 OSTEOPOROSIS, UNSPECIFIED OSTEOPOROSIS TYPE, UNSPECIFIED PATHOLOGICAL FRACTURE PRESENCE: ICD-10-CM

## 2018-08-02 DIAGNOSIS — E66.01 MORBID OBESITY: ICD-10-CM

## 2018-08-02 DIAGNOSIS — Z95.828 S/P IVC FILTER: Chronic | ICD-10-CM

## 2018-08-02 DIAGNOSIS — M81.0 OSTEOPOROSIS, UNSPECIFIED OSTEOPOROSIS TYPE, UNSPECIFIED PATHOLOGICAL FRACTURE PRESENCE: Primary | ICD-10-CM

## 2018-08-02 DIAGNOSIS — Z12.31 ENCOUNTER FOR SCREENING MAMMOGRAM FOR BREAST CANCER: ICD-10-CM

## 2018-08-02 DIAGNOSIS — D62 ANEMIA DUE TO ACUTE BLOOD LOSS: ICD-10-CM

## 2018-08-02 DIAGNOSIS — K51.219 ULCERATIVE PROCTITIS WITH COMPLICATION: Primary | ICD-10-CM

## 2018-08-02 DIAGNOSIS — F32.3 MAJOR DEPRESSION WITH PSYCHOTIC FEATURES: ICD-10-CM

## 2018-08-02 DIAGNOSIS — I82.5Y1 CHRONIC DEEP VEIN THROMBOSIS (DVT) OF PROXIMAL VEIN OF RIGHT LOWER EXTREMITY: ICD-10-CM

## 2018-08-02 DIAGNOSIS — I87.2 VENOUS INSUFFICIENCY OF BOTH LOWER EXTREMITIES: Chronic | ICD-10-CM

## 2018-08-02 DIAGNOSIS — D50.9 IRON DEFICIENCY ANEMIA, UNSPECIFIED IRON DEFICIENCY ANEMIA TYPE: ICD-10-CM

## 2018-08-02 DIAGNOSIS — R73.02 GLUCOSE INTOLERANCE (IMPAIRED GLUCOSE TOLERANCE): ICD-10-CM

## 2018-08-02 PROCEDURE — 77080 DXA BONE DENSITY AXIAL: CPT | Mod: TC

## 2018-08-02 PROCEDURE — 99999 PR PBB SHADOW E&M-EST. PATIENT-LVL III: CPT | Mod: PBBFAC,,, | Performed by: FAMILY MEDICINE

## 2018-08-02 PROCEDURE — 77080 DXA BONE DENSITY AXIAL: CPT | Mod: 26,,, | Performed by: RADIOLOGY

## 2018-08-02 PROCEDURE — 99214 OFFICE O/P EST MOD 30 MIN: CPT | Mod: S$PBB,,, | Performed by: FAMILY MEDICINE

## 2018-08-02 PROCEDURE — 99213 OFFICE O/P EST LOW 20 MIN: CPT | Mod: PBBFAC,25,PO | Performed by: FAMILY MEDICINE

## 2018-08-02 RX ORDER — ALENDRONATE SODIUM 70 MG/1
70 TABLET ORAL
Qty: 12 TABLET | Refills: 3 | Status: SHIPPED | OUTPATIENT
Start: 2018-08-02 | End: 2019-01-03

## 2018-08-02 NOTE — TELEPHONE ENCOUNTER
Informed pt's Bryson that pt can start taking Fosamax weekly. Drink first thing in Am with full glass of water and not lie down down for an hr and then eat something. Pt's son verbalized understanding.

## 2018-08-02 NOTE — TELEPHONE ENCOUNTER
----- Message from Shon Brambila MD sent at 8/2/2018 11:08 AM CDT -----  Call    Bone density still shows osteoporosis in hip - let me know if interested in treatment

## 2018-08-02 NOTE — TELEPHONE ENCOUNTER
Informed pt's son, Bryson that pt's bone density still shows Osteoporosis in hip. Informed pt that her bone density showed Osteoporosis and pt would like to start  Treatment. Pls advise.

## 2018-08-02 NOTE — PROGRESS NOTES
Subjective:       Patient ID: Anabella Aranda is a 73 y.o. female.    Chief Complaint: Follow-up    73 yr old pleasant black female with chronic iron deficiency anemia ulcerative colitis, Chronic venous insufficiency, s/o IVC filter, presents today for her 3 month check and few issues. C/o auditory hallucinations, depression and anxiety. NO NEW COMPLAINTS TODAY.    Obesity - she gained a lot since last year - she is mostly staying home and eating a lot and  has no activity outside of house - she is lonely in a big house and sometimes hearing voices - they ar good voices and she is not hearing when she is out of house - she denies any depression or anxiety - she does have issues with sleeping at night - she denies any personal or family h/o psychiatry disorders - no SI/HI/delusions    Ulcerative colitis - she is on Imuran and follows GI - no side effects      Anemia - chronic - due to IBD - she is on diet alone and takes MVI - labs due        LE edema/lymphedema - had h/o DVT and she also had Green field filter - she was on anticoagulation till hospital and stopped after surgery - seen vascular medicine and stable since then - she denies any heart problems, liver disorder - she also denies any chest pain, SOB, ROBLES      History as below - reviewed         HM  -labs UTD  -vaccines defer to next visit      Review of Systems   Constitutional: Negative.  Negative for activity change, diaphoresis and unexpected weight change.   HENT: Negative.  Negative for congestion, ear discharge, hearing loss, rhinorrhea, sore throat and voice change.    Eyes: Negative.  Negative for pain, discharge and visual disturbance.   Respiratory: Negative.  Negative for chest tightness, shortness of breath and wheezing.    Cardiovascular: Negative.  Negative for chest pain.   Gastrointestinal: Negative.  Negative for abdominal distention, anal bleeding, constipation and nausea.   Endocrine: Negative.  Negative for cold intolerance,  polydipsia and polyuria.   Genitourinary: Negative.  Negative for decreased urine volume, difficulty urinating, dysuria, frequency, menstrual problem and vaginal pain.   Musculoskeletal: Negative.  Negative for arthralgias, gait problem and myalgias.   Skin: Negative.  Negative for color change, pallor and wound.   Allergic/Immunologic: Negative.  Negative for environmental allergies and immunocompromised state.   Neurological: Negative.  Negative for dizziness, tremors, seizures, speech difficulty and headaches.   Hematological: Negative.  Negative for adenopathy. Does not bruise/bleed easily.   Psychiatric/Behavioral: Negative.  Negative for agitation, confusion, decreased concentration, hallucinations, self-injury and suicidal ideas. The patient is not nervous/anxious.        PMH/PSH/FH/SH/MED/ALLERGY reviewed    Objective:       Vitals:    08/02/18 0812   BP: 136/73   Pulse: 86   Temp: 98.1 °F (36.7 °C)       Physical Exam   Constitutional: She is oriented to person, place, and time. She appears well-developed and well-nourished. No distress.   HENT:   Head: Normocephalic and atraumatic.   Right Ear: External ear normal.   Left Ear: External ear normal.   Nose: Nose normal.   Mouth/Throat: Oropharynx is clear and moist. No oropharyngeal exudate.   Eyes: Conjunctivae and EOM are normal. Pupils are equal, round, and reactive to light. Right eye exhibits no discharge. Left eye exhibits no discharge. No scleral icterus.   Neck: Normal range of motion. Neck supple. No JVD present. No tracheal deviation present. No thyromegaly present.   Cardiovascular: Normal rate, regular rhythm, normal heart sounds and intact distal pulses.  Exam reveals no gallop and no friction rub.    No murmur heard.  Pulmonary/Chest: Effort normal and breath sounds normal. No stridor. She has no wheezes. She has no rales. She exhibits no tenderness.   Abdominal: Soft. Bowel sounds are normal. She exhibits no distension and no mass. There is no  tenderness. There is no rebound and no guarding. No hernia.   Musculoskeletal: Normal range of motion. She exhibits no edema or tenderness.   Lymphadenopathy:     She has no cervical adenopathy.   Neurological: She is alert and oriented to person, place, and time. She has normal reflexes. She displays normal reflexes. No cranial nerve deficit. She exhibits normal muscle tone. Coordination normal.   Skin: Skin is warm and dry. No rash noted. She is not diaphoretic. No erythema. No pallor.   Psychiatric: She has a normal mood and affect. Her behavior is normal. Judgment and thought content normal.       Assessment:       1. Ulcerative proctitis with complication    2. Glucose intolerance (impaired glucose tolerance)    3. Morbid obesity    4. S/P IVC filter    5. Chronic deep vein thrombosis (DVT) of proximal vein of right lower extremity    6. Anemia due to acute blood loss    7. Major depression with psychotic features    8. Iron deficiency anemia, unspecified iron deficiency anemia type    9. Venous insufficiency of both lower extremities    10. Osteoporosis, unspecified osteoporosis type, unspecified pathological fracture presence    11. Encounter for screening mammogram for breast cancer        Plan:       Anabella was seen today for follow-up.    Diagnoses and all orders for this visit:    Ulcerative proctitis with complication    Glucose intolerance (impaired glucose tolerance)    Morbid obesity    S/P IVC filter    Chronic deep vein thrombosis (DVT) of proximal vein of right lower extremity    Anemia due to acute blood loss    Major depression with psychotic features    Iron deficiency anemia, unspecified iron deficiency anemia type    Venous insufficiency of both lower extremities    Osteoporosis, unspecified osteoporosis type, unspecified pathological fracture presence  -     DXA Bone Density Spine And Hip; Future    Encounter for screening mammogram for breast cancer  -     Mammo Digital Screening Bilat  with Tomosynthesis CAD; Future      ANXIETY/DEPRESSION  -FOLLOW PSYCH  -on seroquel    GLUC INTOLERANCE  -LABS reassuring    S/P IVC filter and chronic venous insufficiency  -not on anticoagulant due to UC and rectal bleed/anemia  -stable    US/anemia  -refilled iron  - GI for follow up  -on Imuran daily    CHRONIC DVT  -HEM/ONC REFERRAL done    CKD III  -no worsening  -labs    Spent adequate time in obtaining history and explaining differentials    40 minutes spent during this visit of which greater than 50% devoted to face-face counseling and coordination of care regarding diagnosis and management plan    Follow-up in about 3 months (around 11/2/2018), or if symptoms worsen or fail to improve.

## 2018-08-02 NOTE — TELEPHONE ENCOUNTER
Left msg for CB to inform pt She can start Fosamax - drink first thing in Am with full glass of water and not lie down down for an hr and then eat something

## 2018-08-02 NOTE — TELEPHONE ENCOUNTER
She can start Fosamax - drink first thing in Am with full glass of water and not lie down down for an hr and then eat something

## 2018-08-03 ENCOUNTER — TELEPHONE (OUTPATIENT)
Dept: FAMILY MEDICINE | Facility: CLINIC | Age: 73
End: 2018-08-03

## 2018-08-03 NOTE — TELEPHONE ENCOUNTER
----- Message from Marika Crump sent at 8/3/2018 12:05 PM CDT -----  Contact: Son [phone]  Patient's son would like to speak with you about verifying the directions of alendronate (FOSAMAX) 70 MG tablet. Please advise.

## 2018-08-03 NOTE — TELEPHONE ENCOUNTER
Spoke to pt's son, Bryson and informed him that pt takes the Fosamax weekly and to take with a full glass of water. Sit up for 1 hour and then she can eat thereafter. Pt's son verbalized understanding.

## 2018-10-23 ENCOUNTER — TELEPHONE (OUTPATIENT)
Dept: FAMILY MEDICINE | Facility: CLINIC | Age: 73
End: 2018-10-23

## 2018-10-23 NOTE — TELEPHONE ENCOUNTER
----- Message from Belen Bran sent at 10/23/2018  2:10 PM CDT -----  Contact: Son [phone]  Patient's son is wondering about a cancer test he found for his mother and he would like to look into home health. Please call and advise.

## 2018-10-23 NOTE — TELEPHONE ENCOUNTER
Spoke to pt's sonBryson regarding Home Health orders regarding pt's Alzheimer's diagnosis. Son states that he needs help with her getting out of the home. Pt's son states that pt had a cancer screening test that was ordered and wanted to know if Dr. Brambila's office ordered the test for her. Pls advise.

## 2018-10-23 NOTE — TELEPHONE ENCOUNTER
Pt's son Bryson stated that he found the cancer screening kit and his mother's house, but did not know where it came from. Son thought the kit came from Dr. Brambila's office. Son states that he needs someone to be there at the house with him mom while he works. Pt may need a sitter. Pls advise.

## 2018-10-24 ENCOUNTER — TELEPHONE (OUTPATIENT)
Dept: FAMILY MEDICINE | Facility: CLINIC | Age: 73
End: 2018-10-24

## 2018-10-24 NOTE — TELEPHONE ENCOUNTER
Can he contact insurance and see if pt qualifies for sitter - then let us know - is it home health or any agency that we need to contact    Yes it may be stool test for FIT - she need to do sample and mail it

## 2018-10-24 NOTE — TELEPHONE ENCOUNTER
Informed pt he can contact insurance and see if pt qualifies for sitter and let Dr. Brambila know. Yes, it may be stool test for FIT and she need to do sample and mail it. SonBryson stated that he will review the kit when he gets home.

## 2018-11-06 ENCOUNTER — HOSPITAL ENCOUNTER (OUTPATIENT)
Dept: RADIOLOGY | Facility: HOSPITAL | Age: 73
Discharge: HOME | End: 2018-11-06
Attending: FAMILY MEDICINE
Payer: MEDICARE

## 2018-11-06 ENCOUNTER — OFFICE VISIT (OUTPATIENT)
Dept: FAMILY MEDICINE | Facility: CLINIC | Age: 73
End: 2018-11-06
Attending: FAMILY MEDICINE
Payer: MEDICARE

## 2018-11-06 VITALS
OXYGEN SATURATION: 99 % | HEIGHT: 64 IN | BODY MASS INDEX: 36.4 KG/M2 | SYSTOLIC BLOOD PRESSURE: 120 MMHG | HEART RATE: 76 BPM | DIASTOLIC BLOOD PRESSURE: 78 MMHG | WEIGHT: 213.19 LBS

## 2018-11-06 DIAGNOSIS — Z13.6 ENCOUNTER FOR SCREENING FOR CARDIOVASCULAR DISORDERS: ICD-10-CM

## 2018-11-06 DIAGNOSIS — E66.01 MORBID OBESITY: ICD-10-CM

## 2018-11-06 DIAGNOSIS — M81.0 OSTEOPOROSIS, UNSPECIFIED OSTEOPOROSIS TYPE, UNSPECIFIED PATHOLOGICAL FRACTURE PRESENCE: ICD-10-CM

## 2018-11-06 DIAGNOSIS — Z95.828 S/P IVC FILTER: Chronic | ICD-10-CM

## 2018-11-06 DIAGNOSIS — R73.02 GLUCOSE INTOLERANCE (IMPAIRED GLUCOSE TOLERANCE): ICD-10-CM

## 2018-11-06 DIAGNOSIS — D50.9 IRON DEFICIENCY ANEMIA, UNSPECIFIED IRON DEFICIENCY ANEMIA TYPE: Primary | ICD-10-CM

## 2018-11-06 DIAGNOSIS — Z23 IMMUNIZATION DUE: ICD-10-CM

## 2018-11-06 DIAGNOSIS — I82.5Y1 CHRONIC DEEP VEIN THROMBOSIS (DVT) OF PROXIMAL VEIN OF RIGHT LOWER EXTREMITY: ICD-10-CM

## 2018-11-06 DIAGNOSIS — K51.219 ULCERATIVE PROCTITIS WITH COMPLICATION: ICD-10-CM

## 2018-11-06 DIAGNOSIS — F32.3 MAJOR DEPRESSION WITH PSYCHOTIC FEATURES: ICD-10-CM

## 2018-11-06 DIAGNOSIS — Z12.31 ENCOUNTER FOR SCREENING MAMMOGRAM FOR BREAST CANCER: ICD-10-CM

## 2018-11-06 PROCEDURE — 99999 PR PBB SHADOW E&M-EST. PATIENT-LVL III: CPT | Mod: PBBFAC,,, | Performed by: FAMILY MEDICINE

## 2018-11-06 PROCEDURE — 77063 BREAST TOMOSYNTHESIS BI: CPT | Mod: 26,,, | Performed by: RADIOLOGY

## 2018-11-06 PROCEDURE — 77063 BREAST TOMOSYNTHESIS BI: CPT | Mod: TC

## 2018-11-06 PROCEDURE — 90662 IIV NO PRSV INCREASED AG IM: CPT | Mod: PBBFAC,PO

## 2018-11-06 PROCEDURE — 99213 OFFICE O/P EST LOW 20 MIN: CPT | Mod: PBBFAC,PO | Performed by: FAMILY MEDICINE

## 2018-11-06 PROCEDURE — 77067 SCR MAMMO BI INCL CAD: CPT | Mod: TC

## 2018-11-06 PROCEDURE — 99214 OFFICE O/P EST MOD 30 MIN: CPT | Mod: 25,S$PBB,, | Performed by: FAMILY MEDICINE

## 2018-11-06 PROCEDURE — 77067 SCR MAMMO BI INCL CAD: CPT | Mod: 26,,, | Performed by: RADIOLOGY

## 2018-11-06 NOTE — PROGRESS NOTES
Subjective:       Patient ID: Anabella Aranda is a 73 y.o. female.    Chief Complaint: Follow-up (3 mths follow-up)    73 yr old pleasant black female with chronic iron deficiency anemia ulcerative colitis, Chronic venous insufficiency, s/o IVC filter, presents today for her 3 month check. NO NEW COMPLAINTS TODAY.    Obesity - she gained a lot since last year - she is mostly staying home and eating a lot and  has no activity outside of house - she is lonely in a big house and sometimes hearing voices - they ar good voices and she is not hearing when she is out of house - she denies any depression or anxiety - she does have issues with sleeping at night - she denies any personal or family h/o psychiatry disorders - no SI/HI/delusions    Ulcerative colitis - she is on Imuran and follows GI - no side effects    Depression with dementia - follows Psych and neuro - pt grand son reports she is still same but not worse - she still has issues with sleeping - caregiver also applied for home health      Anemia - chronic - due to IBD - she is on diet alone and takes MVI - labs due        LE edema/lymphedema - had h/o DVT and she also had Green field filter - she was on anticoagulation till hospital and stopped after surgery - seen vascular medicine and stable since then - she denies any heart problems, liver disorder - she also denies any chest pain, SOB, ROBLES      History as below - reviewed         HM  -labs UTD  -vaccines defer to next visit      Review of Systems   Constitutional: Negative.  Negative for activity change, diaphoresis and unexpected weight change.   HENT: Negative.  Negative for congestion, ear discharge, hearing loss, rhinorrhea, sore throat and voice change.    Eyes: Negative.  Negative for pain, discharge and visual disturbance.   Respiratory: Negative.  Negative for chest tightness, shortness of breath and wheezing.    Cardiovascular: Negative.  Negative for chest pain.   Gastrointestinal: Negative.   Negative for abdominal distention, anal bleeding, constipation and nausea.   Endocrine: Negative.  Negative for cold intolerance, polydipsia and polyuria.   Genitourinary: Negative.  Negative for decreased urine volume, difficulty urinating, dysuria, frequency, menstrual problem and vaginal pain.   Musculoskeletal: Negative.  Negative for arthralgias, gait problem and myalgias.   Skin: Negative.  Negative for color change, pallor and wound.   Allergic/Immunologic: Negative.  Negative for environmental allergies and immunocompromised state.   Neurological: Negative.  Negative for dizziness, tremors, seizures, speech difficulty and headaches.   Hematological: Negative.  Negative for adenopathy. Does not bruise/bleed easily.   Psychiatric/Behavioral: Negative.  Negative for agitation, confusion, decreased concentration, hallucinations, self-injury and suicidal ideas. The patient is not nervous/anxious.        PMH/PSH/FH/SH/MED/ALLERGY reviewed    Objective:       Vitals:    11/06/18 1125   BP: 120/78   Pulse: 76       Physical Exam   Constitutional: She is oriented to person, place, and time. She appears well-developed and well-nourished. No distress.   HENT:   Head: Normocephalic and atraumatic.   Right Ear: External ear normal.   Left Ear: External ear normal.   Nose: Nose normal.   Mouth/Throat: Oropharynx is clear and moist. No oropharyngeal exudate.   Eyes: Conjunctivae and EOM are normal. Pupils are equal, round, and reactive to light. Right eye exhibits no discharge. Left eye exhibits no discharge. No scleral icterus.   Neck: Normal range of motion. Neck supple. No JVD present. No tracheal deviation present. No thyromegaly present.   Cardiovascular: Normal rate, regular rhythm, normal heart sounds and intact distal pulses. Exam reveals no gallop and no friction rub.   No murmur heard.  Pulmonary/Chest: Effort normal and breath sounds normal. No stridor. She has no wheezes. She has no rales. She exhibits no  tenderness.   Abdominal: Soft. Bowel sounds are normal. She exhibits no distension and no mass. There is no tenderness. There is no rebound and no guarding. No hernia.   Musculoskeletal: Normal range of motion. She exhibits no edema or tenderness.   Lymphadenopathy:     She has no cervical adenopathy.   Neurological: She is alert and oriented to person, place, and time. She has normal reflexes. She displays normal reflexes. No cranial nerve deficit. She exhibits normal muscle tone. Coordination normal.   Skin: Skin is warm and dry. No rash noted. She is not diaphoretic. No erythema. No pallor.   Psychiatric: She has a normal mood and affect. Her behavior is normal. Judgment and thought content normal.       Assessment:       1. Iron deficiency anemia, unspecified iron deficiency anemia type    2. Major depression with psychotic features    3. Morbid obesity    4. Osteoporosis, unspecified osteoporosis type, unspecified pathological fracture presence    5. S/P IVC filter    6. Ulcerative proctitis with complication    7. Chronic deep vein thrombosis (DVT) of proximal vein of right lower extremity    8. Glucose intolerance (impaired glucose tolerance)    9. Immunization due    10. Encounter for screening for cardiovascular disorders         Plan:       Anabella was seen today for follow-up.    Diagnoses and all orders for this visit:    Iron deficiency anemia, unspecified iron deficiency anemia type  -     CBC auto differential; Future  -     Iron and TIBC; Future  -     Ferritin; Future    Major depression with psychotic features  -     TSH; Future    Morbid obesity    Osteoporosis, unspecified osteoporosis type, unspecified pathological fracture presence  -     CBC auto differential; Future  -     Comprehensive metabolic panel; Future  -     Lipid panel; Future    S/P IVC filter    Ulcerative proctitis with complication  -     CBC auto differential; Future  -     Comprehensive metabolic panel; Future  -     TSH;  Future    Chronic deep vein thrombosis (DVT) of proximal vein of right lower extremity    Glucose intolerance (impaired glucose tolerance)  -     Comprehensive metabolic panel; Future  -     Hemoglobin A1c; Future    Immunization due  -     Influenza - High Dose (65+) (PF) (IM)    Encounter for screening for cardiovascular disorders   -     Lipid panel; Future      ANXIETY/DEPRESSION/dementia  -FOLLOW PSYCH  -on seroquel and aricept and namenda    GLUC INTOLERANCE  -LABS reassuring    S/P IVC filter and chronic venous insufficiency  -not on anticoagulant due to UC and rectal bleed/anemia  -stable    US/anemia  -refilled iron  - GI for follow up  -on Imuran daily    CHRONIC DVT  -HEM/ONC follow up    CKD III  -no worsening  -labs    Spent adequate time in obtaining history and explaining differentials    40 minutes spent during this visit of which greater than 50% devoted to face-face counseling and coordination of care regarding diagnosis and management plan    Follow-up in about 3 months (around 2/6/2019), or if symptoms worsen or fail to improve.

## 2018-12-19 ENCOUNTER — TELEPHONE (OUTPATIENT)
Dept: FAMILY MEDICINE | Facility: CLINIC | Age: 73
End: 2018-12-19

## 2018-12-19 NOTE — TELEPHONE ENCOUNTER
----- Message from Tracy Denton sent at 12/19/2018 11:28 AM CST -----  Contact: [phone]/pt's son Bryson  Patient's son called in requesting to speak with you. Patient prefers to speak with a nurse. Please advise.

## 2018-12-20 NOTE — TELEPHONE ENCOUNTER
----- Message from Sofia Beltran sent at 12/20/2018 10:49 AM CST -----  Contact: [phone]/ pts son Bryson   Called in requesting call back in regards to symptoms pts is experiencing.  prefers to discuss symptoms with nurse to determine if office visit or surgery is needed. Please call to further discuss and advise.

## 2019-01-03 ENCOUNTER — OFFICE VISIT (OUTPATIENT)
Dept: GASTROENTEROLOGY | Facility: CLINIC | Age: 74
End: 2019-01-03
Payer: MEDICARE

## 2019-01-03 ENCOUNTER — LAB VISIT (OUTPATIENT)
Dept: LAB | Facility: HOSPITAL | Age: 74
End: 2019-01-03
Attending: NURSE PRACTITIONER
Payer: MEDICARE

## 2019-01-03 VITALS
DIASTOLIC BLOOD PRESSURE: 80 MMHG | HEIGHT: 64 IN | BODY MASS INDEX: 36.7 KG/M2 | WEIGHT: 214.94 LBS | SYSTOLIC BLOOD PRESSURE: 136 MMHG

## 2019-01-03 DIAGNOSIS — K51.919 ULCERATIVE COLITIS WITH COMPLICATION, UNSPECIFIED LOCATION: ICD-10-CM

## 2019-01-03 DIAGNOSIS — K51.919 ULCERATIVE COLITIS WITH COMPLICATION, UNSPECIFIED LOCATION: Primary | ICD-10-CM

## 2019-01-03 LAB
ALBUMIN SERPL BCP-MCNC: 3.4 G/DL
ALP SERPL-CCNC: 89 U/L
ALT SERPL W/O P-5'-P-CCNC: 12 U/L
ANION GAP SERPL CALC-SCNC: 10 MMOL/L
AST SERPL-CCNC: 22 U/L
BASOPHILS # BLD AUTO: 0.01 K/UL
BASOPHILS NFR BLD: 0.3 %
BILIRUB SERPL-MCNC: 0.5 MG/DL
BUN SERPL-MCNC: 10 MG/DL
CALCIUM SERPL-MCNC: 9.2 MG/DL
CHLORIDE SERPL-SCNC: 107 MMOL/L
CO2 SERPL-SCNC: 24 MMOL/L
CREAT SERPL-MCNC: 1 MG/DL
DIFFERENTIAL METHOD: ABNORMAL
EOSINOPHIL # BLD AUTO: 0.1 K/UL
EOSINOPHIL NFR BLD: 2.3 %
ERYTHROCYTE [DISTWIDTH] IN BLOOD BY AUTOMATED COUNT: 13.8 %
EST. GFR  (AFRICAN AMERICAN): >60 ML/MIN/1.73 M^2
EST. GFR  (NON AFRICAN AMERICAN): 57 ML/MIN/1.73 M^2
GLUCOSE SERPL-MCNC: 85 MG/DL
HCT VFR BLD AUTO: 34.6 %
HGB BLD-MCNC: 11.3 G/DL
LYMPHOCYTES # BLD AUTO: 1.3 K/UL
LYMPHOCYTES NFR BLD: 33.2 %
MCH RBC QN AUTO: 30.1 PG
MCHC RBC AUTO-ENTMCNC: 32.7 G/DL
MCV RBC AUTO: 92 FL
MONOCYTES # BLD AUTO: 0.5 K/UL
MONOCYTES NFR BLD: 11.9 %
NEUTROPHILS # BLD AUTO: 2.1 K/UL
NEUTROPHILS NFR BLD: 52 %
PLATELET # BLD AUTO: 271 K/UL
PMV BLD AUTO: 8.6 FL
POTASSIUM SERPL-SCNC: 3.2 MMOL/L
PROT SERPL-MCNC: 7.9 G/DL
RBC # BLD AUTO: 3.75 M/UL
SODIUM SERPL-SCNC: 141 MMOL/L
WBC # BLD AUTO: 3.94 K/UL

## 2019-01-03 PROCEDURE — 99999 PR PBB SHADOW E&M-EST. PATIENT-LVL II: CPT | Mod: PBBFAC,,, | Performed by: NURSE PRACTITIONER

## 2019-01-03 PROCEDURE — 80053 COMPREHEN METABOLIC PANEL: CPT

## 2019-01-03 PROCEDURE — 85025 COMPLETE CBC W/AUTO DIFF WBC: CPT

## 2019-01-03 PROCEDURE — 99214 OFFICE O/P EST MOD 30 MIN: CPT | Mod: S$PBB,,, | Performed by: NURSE PRACTITIONER

## 2019-01-03 PROCEDURE — 99212 OFFICE O/P EST SF 10 MIN: CPT | Mod: PBBFAC,PO | Performed by: NURSE PRACTITIONER

## 2019-01-03 PROCEDURE — 99999 PR PBB SHADOW E&M-EST. PATIENT-LVL II: ICD-10-PCS | Mod: PBBFAC,,, | Performed by: NURSE PRACTITIONER

## 2019-01-03 PROCEDURE — 99214 PR OFFICE/OUTPT VISIT, EST, LEVL IV, 30-39 MIN: ICD-10-PCS | Mod: S$PBB,,, | Performed by: NURSE PRACTITIONER

## 2019-01-03 PROCEDURE — 36415 COLL VENOUS BLD VENIPUNCTURE: CPT

## 2019-01-03 NOTE — PROGRESS NOTES
Subjective:       Patient ID: Anabella Aranda is a 73 y.o. female.    Chief Complaint: No chief complaint on file.    HPI  Presents for routine follow up and is accompanied by her son today.  She has a history of ulcerative colitis, with diarrhea difficult to control and colonoscopy 2016:  - The procedure was aborted due to bowel stenosis.                        - Inflammation was found in the colon secondary to                         colitis. The findings are unchanged compared to                         previous examinations.                        - Stricture in the sigmoid colon.                        - No specimens collected.    She was referred to colorectal surgery for TAC and TAMARA.  She had flex sig 1/2017:  PROCEDURE: Flexible Sigmoidoscopy   Scope # [id number]     With informed consent the flexible sigmoidoscope was passed 20 cm under direcet vision. Prep quality: good     Findings:patent anastomosis at 18 cm moderate proctitis. Biopsies taken x 4  EBL: None    Pathology:  Rectum, biopsy:  Marked chronic active colitis with cryptitis, crypt abscess and surface erosion.  Immunohistochemical stain for cytomegalovirus will be completed and results will follow in a supplemental  report.  No evidence of dysplasia or malignancy.    Imodium BID and canasa recommended.  She has continued with imuran.  Not using canasa.  Using fiber supplement and imodium.  Her son reports she is taking imodium every time she eats.  Report that she has three mushy but formed stools daily with no watery stool, no abdominal pain and no blood with bowel movements.  She does at times note dark stools.    No fever, no weight loss.  She is being treated for dementia.  She is not using an iron supplement any longer.  She has had chronic anemia.  Last labs 7/2018 stable.  EGD 2015:  - Hiatus hernia.                        - Gastritis.                        - Three duodenal polyps. Resected and retrieved.                        -  Normal 2nd part of the duodenum.  Polyps fundic gland.  She has occasional epigastric burning.  No nausea or vomiting.  No pain with eating.  She uses ranitidine as needed.      Review of Systems   Constitutional: Positive for chills. Negative for activity change, appetite change, fatigue, fever and unexpected weight change.   Respiratory: Negative for shortness of breath.    Cardiovascular: Negative for chest pain.   Gastrointestinal: Negative for abdominal pain, blood in stool, constipation, diarrhea, nausea and vomiting.   Skin: Negative.    Neurological: Negative.    Psychiatric/Behavioral: Positive for confusion.       Objective:      Physical Exam   Constitutional: She is oriented to person, place, and time. She appears well-developed and well-nourished. No distress.   Eyes: No scleral icterus.   Cardiovascular: Normal rate.   Pulmonary/Chest: Effort normal. No respiratory distress.   Abdominal: Soft. Bowel sounds are normal. She exhibits no distension and no mass. There is no tenderness. There is no guarding.   Neurological: She is alert and oriented to person, place, and time.   Skin: Skin is warm and dry. She is not diaphoretic.   Psychiatric: She has a normal mood and affect. Her behavior is normal. Judgment and thought content normal.   Vitals reviewed.      Assessment:       1. Ulcerative colitis with complication, unspecified location        Plan:         Diagnoses and all orders for this visit:    Ulcerative colitis with complication, unspecified location  -     CBC auto differential; Future  -     Comprehensive metabolic panel; Future    Labs today.  Continue imuran.  Decrease imodium to BID.  Continue fiber supplement.    Will make further recommendations based on findings.

## 2019-01-04 DIAGNOSIS — E87.6 HYPOKALEMIA: Primary | ICD-10-CM

## 2019-01-08 ENCOUNTER — TELEPHONE (OUTPATIENT)
Dept: GASTROENTEROLOGY | Facility: CLINIC | Age: 74
End: 2019-01-08

## 2019-01-08 NOTE — TELEPHONE ENCOUNTER
----- Message from ANNEL Ribera sent at 1/4/2019  2:26 PM CST -----  Let her know that her labs look fine.  Anemia is stable.  Potassium is very slightly low.  Would recommend recheck in one week.  I have ordered, please schedule.  Please review this also with her son per her request

## 2019-01-08 NOTE — TELEPHONE ENCOUNTER
Spoke with patients son and he stated that he was told to cut imodium in half and she takes half in the morning and half at night. Patient has c/o black stools and diarrhea now. She was originally taking the imodium every time she would eat.

## 2019-01-08 NOTE — TELEPHONE ENCOUNTER
Informed pts son of lab results. He son stated that he would bring her this week to [facility] to have labs done.

## 2019-01-08 NOTE — TELEPHONE ENCOUNTER
----- Message from Sayda Caraballo sent at 1/8/2019  2:16 PM CST -----  Contact: [phone]/Bryson (son)  Would like to speak with you concerning patients medication. Please call and advise

## 2019-01-11 ENCOUNTER — LAB VISIT (OUTPATIENT)
Dept: LAB | Facility: OTHER | Age: 74
End: 2019-01-11
Payer: MEDICARE

## 2019-01-11 DIAGNOSIS — E87.6 HYPOKALEMIA: ICD-10-CM

## 2019-01-11 LAB — POTASSIUM SERPL-SCNC: 3.9 MMOL/L

## 2019-01-11 PROCEDURE — 84132 ASSAY OF SERUM POTASSIUM: CPT

## 2019-01-11 PROCEDURE — 36415 COLL VENOUS BLD VENIPUNCTURE: CPT

## 2019-01-14 ENCOUNTER — TELEPHONE (OUTPATIENT)
Dept: GASTROENTEROLOGY | Facility: CLINIC | Age: 74
End: 2019-01-14

## 2019-01-14 DIAGNOSIS — Z90.49 S/P COLECTOMY: ICD-10-CM

## 2019-01-14 DIAGNOSIS — K92.1 MELENA: Primary | ICD-10-CM

## 2019-01-14 DIAGNOSIS — K51.919 ULCERATIVE COLITIS WITH COMPLICATION, UNSPECIFIED LOCATION: ICD-10-CM

## 2019-01-14 DIAGNOSIS — D64.9 ANEMIA, UNSPECIFIED TYPE: ICD-10-CM

## 2019-01-14 NOTE — TELEPHONE ENCOUNTER
Spoke with patients son and he stated that she is still experiencing diarrhea and Black stools. He would like a call back to proceed with the next step.       I also informed him of her normal Potassium results.

## 2019-01-15 NOTE — TELEPHONE ENCOUNTER
With continued reports of diarrhea and black stools, can schedule EGD and flex sig.    Please call to schedule.     Let her and son know that Dagoberto is not doing procedures here and if they would like to discuss/schedule with her they will need appt at [facility].

## 2019-01-16 ENCOUNTER — TELEPHONE (OUTPATIENT)
Dept: GASTROENTEROLOGY | Facility: CLINIC | Age: 74
End: 2019-01-16

## 2019-01-16 NOTE — TELEPHONE ENCOUNTER
Spoke with patients son and was able to explain instructions. Instructions were mailed out to [address] [facility] [id number]. Verbal Understanding.

## 2019-01-16 NOTE — TELEPHONE ENCOUNTER
Spoke with patients son and was able to schedule the EGD and Flex Sig on 1/25/19 with Dr. Judah stratton. I informed her son that she will have to prep the day of the procedures and that I would call him back with the instructions.

## 2019-01-23 ENCOUNTER — TELEPHONE (OUTPATIENT)
Dept: ENDOSCOPY | Facility: HOSPITAL | Age: 74
End: 2019-01-23

## 2019-01-23 NOTE — TELEPHONE ENCOUNTER
.Spoke with patient about arrival time @ *. 0830    NPO status reviewed: Patient must have nothing to eat after midnight.  Pt may have CLEAR liquids ONLY until completely NPO 3 hrs @ 0430*    Medications: Do not take Insulin or oral diabetic medications the day of the procedure.  Take as prescribed: heart, seizure and blood pressure medication in the morning with a sip of water (less than an ounce).  Take any breathing medications and bring inhalers to hospital with you Leave all valuables and jewelry at home.     Wear comfortable clothes to procedure to change into hospital gown You cannot drive for 24 hours after your procedure because you will receive sedation for your procedure to make you comfortable.  A ride must be provided at discharge.   1st Enema instructed to give 2 hours before arrival time  2nd enema give to patient an hour before arrival time  Son will consent for patient. Patient has dementia

## 2019-01-24 ENCOUNTER — TELEPHONE (OUTPATIENT)
Dept: GASTROENTEROLOGY | Facility: CLINIC | Age: 74
End: 2019-01-24

## 2019-01-24 NOTE — TELEPHONE ENCOUNTER
----- Message from Tawana Lr sent at 1/24/2019  9:13 AM CST -----  Patient's son, Bryson, called.   No. [phone]    Patient is having a procedure on 1/25/19.  The prep is not at [facility] on [address] [facility].

## 2019-01-24 NOTE — TELEPHONE ENCOUNTER
Informed pt of the flex sig prep. And how patient will have to purchase 2 enemas. Instructions were reviewed with pts son. Verbal Understanding.

## 2019-01-25 ENCOUNTER — ANESTHESIA EVENT (OUTPATIENT)
Dept: ENDOSCOPY | Facility: HOSPITAL | Age: 74
End: 2019-01-25
Payer: MEDICARE

## 2019-01-25 ENCOUNTER — HOSPITAL ENCOUNTER (OUTPATIENT)
Facility: HOSPITAL | Age: 74
Discharge: HOME | End: 2019-01-25
Attending: INTERNAL MEDICINE | Admitting: INTERNAL MEDICINE
Payer: MEDICARE

## 2019-01-25 ENCOUNTER — ANESTHESIA (OUTPATIENT)
Dept: ENDOSCOPY | Facility: HOSPITAL | Age: 74
End: 2019-01-25
Payer: MEDICARE

## 2019-01-25 VITALS
HEART RATE: 75 BPM | HEIGHT: 64 IN | RESPIRATION RATE: 13 BRPM | DIASTOLIC BLOOD PRESSURE: 68 MMHG | WEIGHT: 220 LBS | OXYGEN SATURATION: 100 % | SYSTOLIC BLOOD PRESSURE: 128 MMHG | TEMPERATURE: 98 F | BODY MASS INDEX: 37.56 KG/M2

## 2019-01-25 DIAGNOSIS — K92.1 MELENA: ICD-10-CM

## 2019-01-25 DIAGNOSIS — D50.9 IRON DEFICIENCY ANEMIA, UNSPECIFIED IRON DEFICIENCY ANEMIA TYPE: Primary | ICD-10-CM

## 2019-01-25 PROCEDURE — 43239 EGD BIOPSY SINGLE/MULTIPLE: CPT | Performed by: INTERNAL MEDICINE

## 2019-01-25 PROCEDURE — 45331 SIGMOIDOSCOPY AND BIOPSY: CPT | Performed by: INTERNAL MEDICINE

## 2019-01-25 PROCEDURE — 88305 TISSUE SPECIMEN TO PATHOLOGY: ICD-10-PCS | Mod: 26,,, | Performed by: PATHOLOGY

## 2019-01-25 PROCEDURE — 25000003 PHARM REV CODE 250: Performed by: NURSE ANESTHETIST, CERTIFIED REGISTERED

## 2019-01-25 PROCEDURE — 88305 TISSUE EXAM BY PATHOLOGIST: CPT | Mod: 26,,, | Performed by: PATHOLOGY

## 2019-01-25 PROCEDURE — 45334 SIGMOIDOSCOPY FOR BLEEDING: CPT | Performed by: INTERNAL MEDICINE

## 2019-01-25 PROCEDURE — 27200997: Performed by: INTERNAL MEDICINE

## 2019-01-25 PROCEDURE — 88305 TISSUE EXAM BY PATHOLOGIST: CPT | Performed by: PATHOLOGY

## 2019-01-25 PROCEDURE — 25000003 PHARM REV CODE 250: Performed by: INTERNAL MEDICINE

## 2019-01-25 PROCEDURE — 45331 SIGMOIDOSCOPY AND BIOPSY: CPT | Mod: 51,,, | Performed by: INTERNAL MEDICINE

## 2019-01-25 PROCEDURE — 45331 PR SIGMOIDOSCOPY,BIOPSY: ICD-10-PCS | Mod: 51,,, | Performed by: INTERNAL MEDICINE

## 2019-01-25 PROCEDURE — 37000009 HC ANESTHESIA EA ADD 15 MINS: Performed by: INTERNAL MEDICINE

## 2019-01-25 PROCEDURE — 43239 EGD BIOPSY SINGLE/MULTIPLE: CPT | Mod: ,,, | Performed by: INTERNAL MEDICINE

## 2019-01-25 PROCEDURE — 37000008 HC ANESTHESIA 1ST 15 MINUTES: Performed by: INTERNAL MEDICINE

## 2019-01-25 PROCEDURE — 43239 PR EGD, FLEX, W/BIOPSY, SGL/MULTI: ICD-10-PCS | Mod: ,,, | Performed by: INTERNAL MEDICINE

## 2019-01-25 PROCEDURE — 27201012 HC FORCEPS, HOT/COLD, DISP: Performed by: INTERNAL MEDICINE

## 2019-01-25 PROCEDURE — 63600175 PHARM REV CODE 636 W HCPCS: Performed by: NURSE ANESTHETIST, CERTIFIED REGISTERED

## 2019-01-25 RX ORDER — SODIUM CHLORIDE 9 MG/ML
INJECTION, SOLUTION INTRAVENOUS CONTINUOUS
Status: DISCONTINUED | OUTPATIENT
Start: 2019-01-25 | End: 2019-01-25 | Stop reason: HOSPADM

## 2019-01-25 RX ORDER — LIDOCAINE HCL/PF 100 MG/5ML
SYRINGE (ML) INTRAVENOUS
Status: DISCONTINUED | OUTPATIENT
Start: 2019-01-25 | End: 2019-01-25

## 2019-01-25 RX ORDER — PHENYLEPHRINE HYDROCHLORIDE 10 MG/ML
INJECTION INTRAVENOUS
Status: DISCONTINUED | OUTPATIENT
Start: 2019-01-25 | End: 2019-01-25

## 2019-01-25 RX ORDER — LOPERAMIDE HCL 2 MG
1 TABLET ORAL
Status: ON HOLD | COMMUNITY
End: 2019-11-30

## 2019-01-25 RX ORDER — PROPOFOL 10 MG/ML
VIAL (ML) INTRAVENOUS
Status: DISCONTINUED | OUTPATIENT
Start: 2019-01-25 | End: 2019-01-25

## 2019-01-25 RX ORDER — PROPOFOL 10 MG/ML
VIAL (ML) INTRAVENOUS CONTINUOUS PRN
Status: DISCONTINUED | OUTPATIENT
Start: 2019-01-25 | End: 2019-01-25

## 2019-01-25 RX ORDER — SODIUM CHLORIDE 0.9 % (FLUSH) 0.9 %
3 SYRINGE (ML) INJECTION
Status: DISCONTINUED | OUTPATIENT
Start: 2019-01-25 | End: 2019-01-25 | Stop reason: HOSPADM

## 2019-01-25 RX ADMIN — PROPOFOL 20 MG: 10 INJECTION, EMULSION INTRAVENOUS at 11:01

## 2019-01-25 RX ADMIN — PHENYLEPHRINE HYDROCHLORIDE 200 MCG: 10 INJECTION INTRAVENOUS at 11:01

## 2019-01-25 RX ADMIN — TOPICAL ANESTHETIC 1 EACH: 200 SPRAY DENTAL; PERIODONTAL at 11:01

## 2019-01-25 RX ADMIN — LIDOCAINE HYDROCHLORIDE 100 MG: 20 INJECTION, SOLUTION INTRAVENOUS at 11:01

## 2019-01-25 RX ADMIN — PROPOFOL 140 MCG/KG/MIN: 10 INJECTION, EMULSION INTRAVENOUS at 11:01

## 2019-01-25 RX ADMIN — PROPOFOL 70 MG: 10 INJECTION, EMULSION INTRAVENOUS at 11:01

## 2019-01-25 RX ADMIN — SODIUM CHLORIDE: 0.9 INJECTION, SOLUTION INTRAVENOUS at 10:01

## 2019-01-25 NOTE — PROVATION PATIENT INSTRUCTIONS
Discharge Summary/Instructions after an Endoscopic Procedure  Patient Name: Anabella Aranda  Patient MRN: [id number]  Patient YOB: 1945 Friday, January 25, 2019  Jenise Hallman MD  RESTRICTIONS:  During your procedure today, you received medications for sedation.  These   medications may affect your judgment, balance and coordination.  Therefore,   for 24 hours, you have the following restrictions:   - DO NOT drive a car, operate machinery, make legal/financial decisions,   sign important papers or drink alcohol.    ACTIVITY:  Today: no heavy lifting, straining or running due to procedural   sedation/anesthesia.  The following day: return to full activity including work.  DIET:  Eat and drink normally unless instructed otherwise.     TREATMENT FOR COMMON SIDE EFFECTS:  - Mild abdominal pain, nausea, belching, bloating or excessive gas:  rest,   eat lightly and use a heating pad.  - Sore Throat: treat with throat lozenges and/or gargle with warm salt   water.  - Because air was used during the procedure, expelling large amounts of air   from your rectum or belching is normal.  - If a bowel prep was taken, you may not have a bowel movement for 1-3 days.    This is normal.  SYMPTOMS TO WATCH FOR AND REPORT TO YOUR PHYSICIAN:  1. Abdominal pain or bloating, other than gas cramps.  2. Chest pain.  3. Back pain.  4. Signs of infection such as: chills or fever occurring within 24 hours   after the procedure.  5. Rectal bleeding, which would show as bright red, maroon, or black stools.   (A tablespoon of blood from the rectum is not serious, especially if   hemorrhoids are present.)  6. Vomiting.  7. Weakness or dizziness.  GO DIRECTLY TO THE NEAREST EMERGENCY ROOM IF YOU HAVE ANY OF THE FOLLOWING:      Difficulty breathing              Chills and/or fever over 101 F   Persistent vomiting and/or vomiting blood   Severe abdominal pain   Severe chest pain   Black, tarry stools   Bleeding- more than one  tablespoon   Any other symptom or condition that you feel may need urgent attention  Your doctor recommends these additional instructions:  If any biopsies were taken, your doctors clinic will contact you in 1 to 2   weeks with any results.  - Discharge patient to home.   - Resume previous diet.   - Continue present medications.   - Return to referring physician.   - May benefit of 5 ASA enamas if clinically indicated.   - Await pathology results.  For questions, problems or results please call your physician - Jenise Hallman MD at Work:  [phone].  EMERGENCY PHONE NUMBER: [phone],  LAB RESULTS: [phone]  IF A COMPLICATION OR EMERGENCY SITUATION ARISES AND YOU ARE UNABLE TO REACH   YOUR PHYSICIAN - GO DIRECTLY TO THE EMERGENCY ROOM.  Jenise Hallman MD  1/25/2019 12:04:42 PM  This report has been verified and signed electronically.  PROVATION

## 2019-01-25 NOTE — ANESTHESIA PREPROCEDURE EVALUATION
01/25/2019  Anabella Aranda is a 73 y.o., female.  Problem List        Anemia due to acute blood loss   S/P IVC filter   Chronic deep vein thrombosis of right lower extremity   Venous insufficiency of both lower extremities   Iron deficiency anemia   Ulcerative proctitis with complication   Morbid obesity   Glucose intolerance (impaired glucose tolerance)   Major depression with psychotic features   Osteoporosis       Anesthesia Evaluation      I have reviewed the Medications.     Review of Systems  Anesthesia Hx:  Denies Family Hx of Anesthesia complications.   Cardiovascular:   Exercise tolerance: poor Hypertension    Hepatic/GI:   PUD, Liver Disease, Hepatitis, B    Psych:   Psychiatric History          Physical Exam  General:  Well nourished    Airway/Jaw/Neck:  Airway Findings: Mouth Opening: Normal Tongue: Normal  General Airway Assessment: Adult  Mallampati: II      Dental:  Dental Findings: In tact   Chest/Lungs:  Chest/Lungs Findings: Clear to auscultation, Normal Respiratory Rate     Heart/Vascular:  Heart Findings: Rate: Normal  Rhythm: Regular Rhythm        Mental Status:  Mental Status Findings:  Cooperative, Alert and Oriented         Anesthesia Plan  Type of Anesthesia, risks & benefits discussed:  Anesthesia Type:  MAC  Patient's Preference: MAC  Intra-op Monitoring Plan:   Intra-op Monitoring Plan Comments:   Post Op Pain Control Plan:   Post Op Pain Control Plan Comments:   Induction:   IV  Beta Blocker:  Patient is not currently on a Beta-Blocker (No further documentation required).       Informed Consent: Patient understands risks and agrees with Anesthesia plan.  Questions answered. Anesthesia consent signed with patient.  ASA Score: 2     Day of Surgery Review of History & Physical: I have interviewed and examined the patient. I have reviewed the patient's H&P dated:            Ready  For Surgery From Anesthesia Perspective.

## 2019-01-25 NOTE — H&P
History & Physical - Short Stay  Gastroenterology      SUBJECTIVE:     Procedure: EGD and Flexible Sigmoidoscopy    Chief Complaint/Indication for Procedure: Melena  History of Present Illness:  Patient is a 73 y.o. female with melena and hx of UC s/p TAMARA.     PTA Medications   Medication Sig    azaTHIOprine (IMURAN) 50 mg Tab Take 1 tablet (50 mg total) by mouth once daily.    donepezil (ARICEPT) 10 MG tablet Take 10 mg by mouth every evening.    loperamide (IMODIUM A-D) 2 mg Tab Take 1 mg by mouth 2 (two) times daily before meals.    memantine (NAMENDA) 10 MG Tab Take 10 mg by mouth 2 (two) times daily.     MULTIVITS W-FE,OTHER MIN/LUT (CENTRUM SILVER ULTRA WOMEN'S ORAL) Take by mouth.    QUEtiapine (SEROQUEL) 100 MG Tab TAKE 1 2 TABLET BY MOUTH EVERY NIGHT AT BEDTIME, THEN TAKE ONE TABLET BY MOUTH EVERY NIGHT AT BEDTIME THEREAFTER    calcium carbonate (OS-NELSY) 600 mg (1,500 mg) Tab Take 1 tablet (600 mg total) by mouth 2 (two) times daily with meals.    ferrous sulfate 325 mg (65 mg iron) Tab tablet Take 1 tablet (325 mg total) by mouth 2 (two) times daily.    hydrocodone-acetaminophen 5-325mg (NORCO) 5-325 mg per tablet     loperamide (IMODIUM) 1 mg/5 mL solution Take by mouth every 6 (six) hours as needed for Diarrhea.    ranitidine (ZANTAC) 150 MG tablet Take 1 tablet (150 mg total) by mouth 2 (two) times daily.       Review of patient's allergies indicates:   Allergen Reactions    Sulfa (sulfonamide antibiotics) Swelling        Past Medical History:   Diagnosis Date    Arthritis     C. difficile colitis 10/13/2014    4/15/2015    Hepatitis B     Hypertension     Ulcerative colitis 03/2014     Past Surgical History:   Procedure Laterality Date    CHOLECYSTECTOMY      COLECTOMY-LAPAROSCOPIC-TOTAL, ileorectal anastomosis N/A 6/24/2016    Performed by Franco Barry MD at [facility] OR [address] [place] [address]    COLON SURGERY      COLONOSCOPY N/A 4/29/2016    [facility]     COLONOSCOPY N/A 2013    [facility]    ESOPHAGOGASTRODUODENOSCOPY (EGD) N/A 2015    [facility]    BETZY FILTER PLACEMENT Right 2014    JOINT REPLACEMENT      knee    NASAL SINUS SURGERY      SIGMOIDOSCOPY-FLEXIBLE N/A 4/15/2015    [facility]    TONSILLECTOMY      TOTAL KNEE ARTHROPLASTY Right 2008     Family History   Problem Relation Age of Onset    Throat cancer Father     Colon cancer Paternal Grandmother      Social History     Tobacco Use    Smoking status: Former Smoker     Types: Cigarettes     Last attempt to quit: 1997     Years since quittin.4    Smokeless tobacco: Former User   Substance Use Topics    Alcohol use: No    Drug use: No            OBJECTIVE:     Vital Signs (Most Recent)  Temp: 98.1 °F (36.7 °C) (19)  Pulse: 68 (19)  Resp: 18 (19)  BP: 128/61 (19)  SpO2: 97 % (19)         ASSESSMENT/PLAN:      Patient is a 73 y.o. female with melena and hx of UC s/p TAMARA.     Plan: EGD and Flexible Sigmoidoscopy    Anesthesia Plan: Moderate Sedation    ASA Grade: ASA 2 - Patient with mild systemic disease with no functional limitations

## 2019-01-25 NOTE — PROVATION PATIENT INSTRUCTIONS
Discharge Summary/Instructions after an Endoscopic Procedure  Patient Name: Anabella Aranda  Patient MRN: [id number]  Patient YOB: 1945 Friday, January 25, 2019  Jenise Hallman MD  RESTRICTIONS:  During your procedure today, you received medications for sedation.  These   medications may affect your judgment, balance and coordination.  Therefore,   for 24 hours, you have the following restrictions:   - DO NOT drive a car, operate machinery, make legal/financial decisions,   sign important papers or drink alcohol.    ACTIVITY:  Today: no heavy lifting, straining or running due to procedural   sedation/anesthesia.  The following day: return to full activity including work.  DIET:  Eat and drink normally unless instructed otherwise.     TREATMENT FOR COMMON SIDE EFFECTS:  - Mild abdominal pain, nausea, belching, bloating or excessive gas:  rest,   eat lightly and use a heating pad.  - Sore Throat: treat with throat lozenges and/or gargle with warm salt   water.  - Because air was used during the procedure, expelling large amounts of air   from your rectum or belching is normal.  - If a bowel prep was taken, you may not have a bowel movement for 1-3 days.    This is normal.  SYMPTOMS TO WATCH FOR AND REPORT TO YOUR PHYSICIAN:  1. Abdominal pain or bloating, other than gas cramps.  2. Chest pain.  3. Back pain.  4. Signs of infection such as: chills or fever occurring within 24 hours   after the procedure.  5. Rectal bleeding, which would show as bright red, maroon, or black stools.   (A tablespoon of blood from the rectum is not serious, especially if   hemorrhoids are present.)  6. Vomiting.  7. Weakness or dizziness.  GO DIRECTLY TO THE NEAREST EMERGENCY ROOM IF YOU HAVE ANY OF THE FOLLOWING:      Difficulty breathing              Chills and/or fever over 101 F   Persistent vomiting and/or vomiting blood   Severe abdominal pain   Severe chest pain   Black, tarry stools   Bleeding- more than one  tablespoon   Any other symptom or condition that you feel may need urgent attention  Your doctor recommends these additional instructions:  If any biopsies were taken, your doctors clinic will contact you in 1 to 2   weeks with any results.  - Discharge patient to home.   - Resume previous diet.   - Continue present medications.   - Await pathology results.  For questions, problems or results please call your physician - Jenise Hallman MD at Work:  [phone].  EMERGENCY PHONE NUMBER: [phone],  LAB RESULTS: [phone]  IF A COMPLICATION OR EMERGENCY SITUATION ARISES AND YOU ARE UNABLE TO REACH   YOUR PHYSICIAN - GO DIRECTLY TO THE EMERGENCY ROOM.  Jenise Hallman MD  1/25/2019 11:57:43 AM  This report has been verified and signed electronically.  PROVATION

## 2019-01-25 NOTE — PLAN OF CARE
Recovery complete. Patient recovered to baseline. Discharge instructions reviewed with patient and her son. Both verbalized understanding. VSS and no complaints of pain or discomfort noted at this time. Patient ready for discharge. Awaiting Dr. Judah Hallman to speak to patient and her son about outcome of the procedure.

## 2019-01-25 NOTE — ANESTHESIA POSTPROCEDURE EVALUATION
"Anesthesia Post Evaluation    Patient: Anabella Aranda    Procedure(s) Performed: Procedure(s) (LRB):  ESOPHAGOGASTRODUODENOSCOPY (EGD) (N/A)  SIGMOIDOSCOPY, FLEXIBLE (N/A)    Final Anesthesia Type: MAC  Patient location during evaluation: GI PACU  Patient participation: Yes- Able to Participate  Level of consciousness: awake and alert and oriented  Post-procedure vital signs: reviewed and stable  Pain management: adequate  Airway patency: patent  PONV status at discharge: No PONV  Anesthetic complications: no      Cardiovascular status: stable, hemodynamically stable and blood pressure returned to baseline  Respiratory status: room air, unassisted and spontaneous ventilation  Hydration status: euvolemic  Follow-up not needed.        Visit Vitals  /61   Pulse 68   Temp 36.7 °C (98.1 °F)   Resp 18   Ht 5' 4" (1.626 m)   Wt 99.8 kg (220 lb)   LMP  (LMP Unknown)   SpO2 97%   Breastfeeding? No   BMI 37.76 kg/m²       Pain/Dimitry Score: No Data Recorded      "

## 2019-01-25 NOTE — TRANSFER OF CARE
"Anesthesia Transfer of Care Note    Patient: Anabella Aranda    Procedure(s) Performed: Procedure(s) (LRB):  ESOPHAGOGASTRODUODENOSCOPY (EGD) (N/A)  SIGMOIDOSCOPY, FLEXIBLE (N/A)    Patient location: GI    Anesthesia Type: MAC    Transport from OR: Transported from OR on room air with adequate spontaneous ventilation    Post pain: adequate analgesia    Post assessment: no apparent anesthetic complications and tolerated procedure well    Post vital signs: stable    Level of consciousness: awake, alert and oriented    Nausea/Vomiting: no nausea/vomiting    Complications: none    Transfer of care protocol was followed      Last vitals:   Visit Vitals  /61   Pulse 68   Temp 36.7 °C (98.1 °F)   Resp 18   Ht 5' 4" (1.626 m)   Wt 99.8 kg (220 lb)   LMP  (LMP Unknown)   SpO2 97%   Breastfeeding? No   BMI 37.76 kg/m²     "

## 2019-02-04 ENCOUNTER — LAB VISIT (OUTPATIENT)
Dept: LAB | Facility: HOSPITAL | Age: 74
End: 2019-02-04
Attending: FAMILY MEDICINE
Payer: MEDICARE

## 2019-02-04 DIAGNOSIS — Z13.6 ENCOUNTER FOR SCREENING FOR CARDIOVASCULAR DISORDERS: ICD-10-CM

## 2019-02-04 DIAGNOSIS — M81.0 OSTEOPOROSIS, UNSPECIFIED OSTEOPOROSIS TYPE, UNSPECIFIED PATHOLOGICAL FRACTURE PRESENCE: ICD-10-CM

## 2019-02-04 DIAGNOSIS — R73.02 GLUCOSE INTOLERANCE (IMPAIRED GLUCOSE TOLERANCE): ICD-10-CM

## 2019-02-04 DIAGNOSIS — D50.9 IRON DEFICIENCY ANEMIA, UNSPECIFIED IRON DEFICIENCY ANEMIA TYPE: ICD-10-CM

## 2019-02-04 DIAGNOSIS — F32.3 MAJOR DEPRESSION WITH PSYCHOTIC FEATURES: ICD-10-CM

## 2019-02-04 DIAGNOSIS — K51.219 ULCERATIVE PROCTITIS WITH COMPLICATION: ICD-10-CM

## 2019-02-04 LAB
ALBUMIN SERPL BCP-MCNC: 3.4 G/DL
ALP SERPL-CCNC: 87 U/L
ALT SERPL W/O P-5'-P-CCNC: 10 U/L
ANION GAP SERPL CALC-SCNC: 6 MMOL/L
AST SERPL-CCNC: 18 U/L
BASOPHILS # BLD AUTO: 0.05 K/UL
BASOPHILS NFR BLD: 1.6 %
BILIRUB SERPL-MCNC: 0.5 MG/DL
BUN SERPL-MCNC: 17 MG/DL
CALCIUM SERPL-MCNC: 8.7 MG/DL
CHLORIDE SERPL-SCNC: 108 MMOL/L
CHOLEST SERPL-MCNC: 221 MG/DL
CHOLEST/HDLC SERPL: 2.8 {RATIO}
CO2 SERPL-SCNC: 27 MMOL/L
CREAT SERPL-MCNC: 1.1 MG/DL
DIFFERENTIAL METHOD: ABNORMAL
EOSINOPHIL # BLD AUTO: 0.1 K/UL
EOSINOPHIL NFR BLD: 2.6 %
ERYTHROCYTE [DISTWIDTH] IN BLOOD BY AUTOMATED COUNT: 14 %
EST. GFR  (AFRICAN AMERICAN): 58 ML/MIN/1.73 M^2
EST. GFR  (NON AFRICAN AMERICAN): 51 ML/MIN/1.73 M^2
ESTIMATED AVG GLUCOSE: 97 MG/DL
FERRITIN SERPL-MCNC: 97 NG/ML
GLUCOSE SERPL-MCNC: 79 MG/DL
HBA1C MFR BLD HPLC: 5 %
HCT VFR BLD AUTO: 35.2 %
HDLC SERPL-MCNC: 79 MG/DL
HDLC SERPL: 35.7 %
HGB BLD-MCNC: 11.4 G/DL
IRON SERPL-MCNC: 59 UG/DL
LDLC SERPL CALC-MCNC: 133.2 MG/DL
LYMPHOCYTES # BLD AUTO: 1.2 K/UL
LYMPHOCYTES NFR BLD: 40.7 %
MCH RBC QN AUTO: 30.2 PG
MCHC RBC AUTO-ENTMCNC: 32.4 G/DL
MCV RBC AUTO: 93 FL
MONOCYTES # BLD AUTO: 0.3 K/UL
MONOCYTES NFR BLD: 10.2 %
NEUTROPHILS # BLD AUTO: 1.4 K/UL
NEUTROPHILS NFR BLD: 44.9 %
NONHDLC SERPL-MCNC: 142 MG/DL
PLATELET # BLD AUTO: 256 K/UL
PMV BLD AUTO: 8.8 FL
POTASSIUM SERPL-SCNC: 4.3 MMOL/L
PROT SERPL-MCNC: 7.4 G/DL
RBC # BLD AUTO: 3.78 M/UL
SATURATED IRON: 18 %
SODIUM SERPL-SCNC: 141 MMOL/L
TOTAL IRON BINDING CAPACITY: 327 UG/DL
TRANSFERRIN SERPL-MCNC: 221 MG/DL
TRIGL SERPL-MCNC: 44 MG/DL
TSH SERPL DL<=0.005 MIU/L-ACNC: 1.94 UIU/ML
WBC # BLD AUTO: 3.05 K/UL

## 2019-02-04 PROCEDURE — 80053 COMPREHEN METABOLIC PANEL: CPT

## 2019-02-04 PROCEDURE — 82728 ASSAY OF FERRITIN: CPT

## 2019-02-04 PROCEDURE — 36415 COLL VENOUS BLD VENIPUNCTURE: CPT

## 2019-02-04 PROCEDURE — 83036 HEMOGLOBIN GLYCOSYLATED A1C: CPT

## 2019-02-04 PROCEDURE — 84443 ASSAY THYROID STIM HORMONE: CPT

## 2019-02-04 PROCEDURE — 85025 COMPLETE CBC W/AUTO DIFF WBC: CPT

## 2019-02-04 PROCEDURE — 80061 LIPID PANEL: CPT

## 2019-02-04 PROCEDURE — 83540 ASSAY OF IRON: CPT

## 2019-02-07 ENCOUNTER — OFFICE VISIT (OUTPATIENT)
Dept: FAMILY MEDICINE | Facility: CLINIC | Age: 74
End: 2019-02-07
Attending: FAMILY MEDICINE
Payer: MEDICARE

## 2019-02-07 VITALS
SYSTOLIC BLOOD PRESSURE: 117 MMHG | DIASTOLIC BLOOD PRESSURE: 67 MMHG | WEIGHT: 212.94 LBS | BODY MASS INDEX: 36.35 KG/M2 | HEIGHT: 64 IN | OXYGEN SATURATION: 99 % | TEMPERATURE: 98 F | HEART RATE: 79 BPM

## 2019-02-07 DIAGNOSIS — F32.3 MAJOR DEPRESSION WITH PSYCHOTIC FEATURES: Primary | ICD-10-CM

## 2019-02-07 DIAGNOSIS — Z95.828 S/P IVC FILTER: Chronic | ICD-10-CM

## 2019-02-07 DIAGNOSIS — I82.5Y1 CHRONIC DEEP VEIN THROMBOSIS (DVT) OF PROXIMAL VEIN OF RIGHT LOWER EXTREMITY: ICD-10-CM

## 2019-02-07 DIAGNOSIS — I87.2 VENOUS INSUFFICIENCY OF BOTH LOWER EXTREMITIES: Chronic | ICD-10-CM

## 2019-02-07 DIAGNOSIS — M81.0 OSTEOPOROSIS, UNSPECIFIED OSTEOPOROSIS TYPE, UNSPECIFIED PATHOLOGICAL FRACTURE PRESENCE: ICD-10-CM

## 2019-02-07 DIAGNOSIS — R73.02 GLUCOSE INTOLERANCE (IMPAIRED GLUCOSE TOLERANCE): ICD-10-CM

## 2019-02-07 DIAGNOSIS — E66.01 MORBID OBESITY: ICD-10-CM

## 2019-02-07 DIAGNOSIS — K51.219 ULCERATIVE PROCTITIS WITH COMPLICATION: ICD-10-CM

## 2019-02-07 DIAGNOSIS — D50.9 IRON DEFICIENCY ANEMIA, UNSPECIFIED IRON DEFICIENCY ANEMIA TYPE: ICD-10-CM

## 2019-02-07 DIAGNOSIS — E55.9 VITAMIN D DEFICIENCY: ICD-10-CM

## 2019-02-07 PROBLEM — K92.1 MELENA: Status: RESOLVED | Noted: 2019-01-25 | Resolved: 2019-02-07

## 2019-02-07 PROCEDURE — 99214 PR OFFICE/OUTPT VISIT, EST, LEVL IV, 30-39 MIN: ICD-10-PCS | Mod: S$PBB,,, | Performed by: FAMILY MEDICINE

## 2019-02-07 PROCEDURE — 99999 PR PBB SHADOW E&M-EST. PATIENT-LVL III: ICD-10-PCS | Mod: PBBFAC,,, | Performed by: FAMILY MEDICINE

## 2019-02-07 PROCEDURE — 99999 PR PBB SHADOW E&M-EST. PATIENT-LVL III: CPT | Mod: PBBFAC,,, | Performed by: FAMILY MEDICINE

## 2019-02-07 PROCEDURE — 99214 OFFICE O/P EST MOD 30 MIN: CPT | Mod: S$PBB,,, | Performed by: FAMILY MEDICINE

## 2019-02-07 PROCEDURE — 99213 OFFICE O/P EST LOW 20 MIN: CPT | Mod: PBBFAC,PO | Performed by: FAMILY MEDICINE

## 2019-02-07 RX ORDER — ERGOCALCIFEROL 1.25 MG/1
50000 CAPSULE ORAL
Qty: 12 CAPSULE | Refills: 3 | Status: ON HOLD | OUTPATIENT
Start: 2019-02-07 | End: 2019-11-30

## 2019-02-07 NOTE — PROGRESS NOTES
Subjective:       Patient ID: Anabella Aranda is a 73 y.o. female.    Chief Complaint: Follow-up (3 month)    73 yr old pleasant black female with HLD,  chronic iron deficiency anemia ulcerative colitis, Chronic venous insufficiency, s/o IVC filter, presents today for her 3 month check. NO NEW COMPLAINTS TODAY.    Obesity - she gained a lot since last year - she is mostly staying home and eating a lot and  has no activity outside of house - she is lonely in a big house and sometimes hearing voices - they ar good voices and she is not hearing when she is out of house - she denies any depression or anxiety - she does have issues with sleeping at night - she denies any personal or family h/o psychiatry disorders - no SI/HI/delusions    Ulcerative colitis - she is on Imuran and follows GI - no side effects    Depression with dementia - follows Psych and neuro - pt grand son reports she is still same but not worse - she still has issues with sleeping - caregiver also applied for home health      Anemia - chronic - due to IBD - she is on diet alone and takes MVI -      HLD - need to b on low fat diet - LDLCALC                  133.2               02/04/2019                  LE edema/lymphedema - had h/o DVT and she also had Green field filter - she was on anticoagulation till hospital and stopped after surgery - seen vascular medicine and stable since then - she denies any heart problems, liver disorder - she also denies any chest pain, SOB, ROBLES      History as below - reviewed         HM  -labs UTD  -vaccines defer to next visit      Review of Systems   Constitutional: Negative.  Negative for activity change, diaphoresis and unexpected weight change.   HENT: Negative.  Negative for congestion, ear discharge, hearing loss, rhinorrhea, sore throat and voice change.    Eyes: Negative.  Negative for pain, discharge and visual disturbance.   Respiratory: Negative.  Negative for chest tightness, shortness of breath and  wheezing.    Cardiovascular: Negative.  Negative for chest pain.   Gastrointestinal: Negative.  Negative for abdominal distention, anal bleeding, constipation and nausea.   Endocrine: Negative.  Negative for cold intolerance, polydipsia and polyuria.   Genitourinary: Negative.  Negative for decreased urine volume, difficulty urinating, dysuria, frequency, menstrual problem and vaginal pain.   Musculoskeletal: Negative.  Negative for arthralgias, gait problem and myalgias.   Skin: Negative.  Negative for color change, pallor and wound.   Allergic/Immunologic: Negative.  Negative for environmental allergies and immunocompromised state.   Neurological: Negative.  Negative for dizziness, tremors, seizures, speech difficulty and headaches.   Hematological: Negative.  Negative for adenopathy. Does not bruise/bleed easily.   Psychiatric/Behavioral: Negative.  Negative for agitation, confusion, decreased concentration, hallucinations, self-injury and suicidal ideas. The patient is not nervous/anxious.        PMH/PSH/FH/SH/MED/ALLERGY reviewed    Objective:       Vitals:    02/07/19 0818   BP: 117/67   Pulse: 79   Temp: 97.5 °F (36.4 °C)       Physical Exam   Constitutional: She is oriented to person, place, and time. She appears well-developed and well-nourished. No distress.   HENT:   Head: Normocephalic and atraumatic.   Right Ear: External ear normal.   Left Ear: External ear normal.   Nose: Nose normal.   Mouth/Throat: Oropharynx is clear and moist. No oropharyngeal exudate.   Eyes: Conjunctivae and EOM are normal. Pupils are equal, round, and reactive to light. Right eye exhibits no discharge. Left eye exhibits no discharge. No scleral icterus.   Neck: Normal range of motion. Neck supple. No JVD present. No tracheal deviation present. No thyromegaly present.   Cardiovascular: Normal rate, regular rhythm, normal heart sounds and intact distal pulses. Exam reveals no gallop and no friction rub.   No murmur  heard.  Pulmonary/Chest: Effort normal and breath sounds normal. No stridor. She has no wheezes. She has no rales. She exhibits no tenderness.   Abdominal: Soft. Bowel sounds are normal. She exhibits no distension and no mass. There is no tenderness. There is no rebound and no guarding. No hernia.   Musculoskeletal: Normal range of motion. She exhibits no edema or tenderness.   Lymphadenopathy:     She has no cervical adenopathy.   Neurological: She is alert and oriented to person, place, and time. She has normal reflexes. She displays normal reflexes. No cranial nerve deficit. She exhibits normal muscle tone. Coordination normal.   Skin: Skin is warm and dry. No rash noted. She is not diaphoretic. No erythema. No pallor.   Psychiatric: She has a normal mood and affect. Her behavior is normal. Judgment and thought content normal.       Assessment:       1. Major depression with psychotic features    2. Iron deficiency anemia, unspecified iron deficiency anemia type    3. Glucose intolerance (impaired glucose tolerance)    4. Chronic deep vein thrombosis (DVT) of proximal vein of right lower extremity    5. Osteoporosis, unspecified osteoporosis type, unspecified pathological fracture presence    6. Morbid obesity    7. Ulcerative proctitis with complication    8. S/P IVC filter    9. Venous insufficiency of both lower extremities    10. Vitamin D deficiency        Plan:       Anabella was seen today for follow-up.    Diagnoses and all orders for this visit:    Major depression with psychotic features    Iron deficiency anemia, unspecified iron deficiency anemia type    Glucose intolerance (impaired glucose tolerance)    Chronic deep vein thrombosis (DVT) of proximal vein of right lower extremity    Osteoporosis, unspecified osteoporosis type, unspecified pathological fracture presence    Morbid obesity    Ulcerative proctitis with complication    S/P IVC filter    Venous insufficiency of both lower  extremities    Vitamin D deficiency  -     ergocalciferol (ERGOCALCIFEROL) 50,000 unit Cap; Take 1 capsule (50,000 Units total) by mouth every 7 days.      ANXIETY/DEPRESSION/dementia  -FOLLOW outside PSYCH  -on seroquel and aricept and namenda    GLUC INTOLERANCE  -LABS reassuring    S/P IVC filter and chronic venous insufficiency  -not on anticoagulant due to UC and rectal bleed/anemia  -stable    US/anemia  -refilled iron  - GI for follow up  -on Imuran daily    CHRONIC DVT  -HEM/ONC follow up    CKD III  -no worsening  -labs    HLD  -low fat diet    Spent adequate time in obtaining history and explaining differentials    40 minutes spent during this visit of which greater than 50% devoted to face-face counseling and coordination of care regarding diagnosis and management plan    Follow-up in about 6 months (around 8/7/2019), or if symptoms worsen or fail to improve.

## 2019-03-02 DIAGNOSIS — K51.919 ULCERATIVE COLITIS WITH COMPLICATION, UNSPECIFIED LOCATION: ICD-10-CM

## 2019-03-04 RX ORDER — AZATHIOPRINE 50 MG/1
TABLET ORAL
Qty: 30 TABLET | Refills: 1 | Status: SHIPPED | OUTPATIENT
Start: 2019-03-04 | End: 2019-09-14 | Stop reason: SDUPTHER

## 2019-03-04 NOTE — TELEPHONE ENCOUNTER
This is a very low dose of Imuran, not usually used for anything other than autoimmune hepatitis.  Please check dose and see if it needs to be increased (should be 2-2.5 mg/kg)

## 2019-03-25 ENCOUNTER — TELEPHONE (OUTPATIENT)
Dept: GASTROENTEROLOGY | Facility: CLINIC | Age: 74
End: 2019-03-25

## 2019-03-25 NOTE — TELEPHONE ENCOUNTER
----- Message from Aisha Roman sent at 3/25/2019  2:31 PM CDT -----  Contact: Self [phone]  Patient Returning Your Phone Call

## 2019-03-25 NOTE — TELEPHONE ENCOUNTER
----- Message from Bisi Abernathy sent at 3/25/2019  1:52 PM CDT -----  Contact: Pt's Son Bryson Cox [phone]  Pt's Son Bryson Cox called to speak to the nurse regarding the pt's care due to pt running out of a medication that he doesn't know the name of and would like a call back today.    Pt stats that her stomach is feeling hot and can be reached at [phone]

## 2019-03-25 NOTE — TELEPHONE ENCOUNTER
Spoke with patient's son Bryson. Bryson stated that patient has been complaining of stomach pains because she ran out of her Zantac and would like another refill. Informed Bryson that Aisha is not in the office on Mondays but a refill request will be sent.

## 2019-04-18 ENCOUNTER — TELEPHONE (OUTPATIENT)
Dept: FAMILY MEDICINE | Facility: CLINIC | Age: 74
End: 2019-04-18

## 2019-04-18 NOTE — TELEPHONE ENCOUNTER
----- Message from Cecil Beltran sent at 4/18/2019  2:30 PM CDT -----  Contact: Bryson (son)/[phone]  Patient's son called to state he received  2 home cancer test kits.    Please call to discuss exactly what this is for and if your office ordered this for her.

## 2019-04-18 NOTE — TELEPHONE ENCOUNTER
Spoke to pt's son, Bryson and informed him that Dr. Brambila's office did not send out the cancer test kits. Informed pt's son, Bryson to look on the paperwork and see if there is a phone number and call them to find who sent the kits and why. Son verbalized understanding.

## 2019-05-07 DIAGNOSIS — K51.90 ULCERATIVE COLITIS WITHOUT COMPLICATIONS, UNSPECIFIED LOCATION: Primary | ICD-10-CM

## 2019-05-07 RX ORDER — AZATHIOPRINE 50 MG/1
50 TABLET ORAL DAILY
Qty: 30 TABLET | Refills: 3 | Status: SHIPPED | OUTPATIENT
Start: 2019-05-07 | End: 2019-06-06

## 2019-05-23 ENCOUNTER — TELEPHONE (OUTPATIENT)
Dept: FAMILY MEDICINE | Facility: CLINIC | Age: 74
End: 2019-05-23

## 2019-05-23 NOTE — TELEPHONE ENCOUNTER
----- Message from Perri Rodriguez sent at 5/23/2019  8:37 AM CDT -----  Contact: [phone]/Alex/  [employer] [facility] is requesting confirmation you received a faxed order. This is for back and knee braces for the patient. Please call to confirm

## 2019-06-04 ENCOUNTER — TELEPHONE (OUTPATIENT)
Dept: FAMILY MEDICINE | Facility: CLINIC | Age: 74
End: 2019-06-04

## 2019-06-04 NOTE — TELEPHONE ENCOUNTER
Spoke to pt's sonBryson and states that pt has been having problems with her knees. Pt was offered an appt with another doctor, but pt declined. Son stated that he will call back if pt starts to complain about her knees again.

## 2019-06-04 NOTE — TELEPHONE ENCOUNTER
----- Message from Tawana Lr sent at 6/4/2019 12:01 PM CDT -----  Patient's son, Bryson, called.   No. [phone]    Patient is having knee problems.  He would like to speak to the nurse.

## 2019-06-26 ENCOUNTER — TELEPHONE (OUTPATIENT)
Dept: FAMILY MEDICINE | Facility: CLINIC | Age: 74
End: 2019-06-26

## 2019-06-26 NOTE — TELEPHONE ENCOUNTER
----- Message from Roseline Paz sent at 6/26/2019  8:19 AM CDT -----  Contact: SON  Pt son Bryson would like to be called back regarding a referral   Pt can be reached at [phone]

## 2019-06-26 NOTE — TELEPHONE ENCOUNTER
Spoke to pt's son, Brsyon and stated that he received a letter stating that Aisha Moy is no longer with [facility]. Son was informed to call the Gastro dept and see who is taking over [facility] patients. Son verbalized understanding.

## 2019-07-10 ENCOUNTER — TELEPHONE (OUTPATIENT)
Dept: GASTROENTEROLOGY | Facility: CLINIC | Age: 74
End: 2019-07-10

## 2019-07-10 NOTE — TELEPHONE ENCOUNTER
----- Message from Mi Lozada NP sent at 7/10/2019  9:25 AM CDT -----  This pt has Ulcerative Colitis. Needs to be seen by an MD, not me. She was supposed to see me today but rescheduled to the 16th.

## 2019-07-10 NOTE — TELEPHONE ENCOUNTER
Spoke with patient's son. He is aware Mrs. Aranda was scheduled incorrectly with Mi and the next available MD appointment is 9/11 w/Dr. Magana.     He verbalized understanding, but is requesting a sooner appointment than 9/11.

## 2019-07-25 ENCOUNTER — TELEPHONE (OUTPATIENT)
Dept: FAMILY MEDICINE | Facility: CLINIC | Age: 74
End: 2019-07-25

## 2019-07-25 NOTE — TELEPHONE ENCOUNTER
----- Message from Sarai Marinelli sent at 7/25/2019  2:56 PM CDT -----  Contact: Chris kumar/ [facility] [phone] fax  Chris is calling to get the status of a fax sent today for DME products. Please advise.

## 2019-08-06 ENCOUNTER — TELEPHONE (OUTPATIENT)
Dept: FAMILY MEDICINE | Facility: CLINIC | Age: 74
End: 2019-08-06

## 2019-08-06 DIAGNOSIS — D50.9 IRON DEFICIENCY ANEMIA, UNSPECIFIED IRON DEFICIENCY ANEMIA TYPE: Primary | ICD-10-CM

## 2019-08-06 DIAGNOSIS — K51.219 ULCERATIVE PROCTITIS WITH COMPLICATION: ICD-10-CM

## 2019-08-06 DIAGNOSIS — Z13.6 ENCOUNTER FOR SCREENING FOR CARDIOVASCULAR DISORDERS: ICD-10-CM

## 2019-08-06 NOTE — TELEPHONE ENCOUNTER
Spoke to pt's son, Bryson and would like to know if pt needs labs prior to pt's appt on 8/14. Pls advise.

## 2019-08-06 NOTE — TELEPHONE ENCOUNTER
----- Message from Phyllis Yeung sent at 8/6/2019 12:09 PM CDT -----  Pt is requesting orders for blood labs.      Pt kate Massey can be reached at [phone]    Thank you!

## 2019-08-10 ENCOUNTER — LAB VISIT (OUTPATIENT)
Dept: LAB | Facility: HOSPITAL | Age: 74
End: 2019-08-10
Attending: FAMILY MEDICINE
Payer: MEDICARE

## 2019-08-10 DIAGNOSIS — D50.9 IRON DEFICIENCY ANEMIA, UNSPECIFIED IRON DEFICIENCY ANEMIA TYPE: ICD-10-CM

## 2019-08-10 DIAGNOSIS — Z13.6 ENCOUNTER FOR SCREENING FOR CARDIOVASCULAR DISORDERS: ICD-10-CM

## 2019-08-10 DIAGNOSIS — K51.219 ULCERATIVE PROCTITIS WITH COMPLICATION: ICD-10-CM

## 2019-08-10 LAB
ALBUMIN SERPL BCP-MCNC: 3.3 G/DL (ref 3.5–5.2)
ALP SERPL-CCNC: 101 U/L (ref 55–135)
ALT SERPL W/O P-5'-P-CCNC: 6 U/L (ref 10–44)
ANION GAP SERPL CALC-SCNC: 9 MMOL/L (ref 8–16)
AST SERPL-CCNC: 17 U/L (ref 10–40)
BASOPHILS # BLD AUTO: 0.01 K/UL (ref 0–0.2)
BASOPHILS NFR BLD: 0.2 % (ref 0–1.9)
BILIRUB SERPL-MCNC: 0.4 MG/DL (ref 0.1–1)
BUN SERPL-MCNC: 9 MG/DL (ref 8–23)
CALCIUM SERPL-MCNC: 9.3 MG/DL (ref 8.7–10.5)
CHLORIDE SERPL-SCNC: 107 MMOL/L (ref 95–110)
CHOLEST SERPL-MCNC: 223 MG/DL (ref 120–199)
CHOLEST/HDLC SERPL: 3.6 {RATIO} (ref 2–5)
CO2 SERPL-SCNC: 27 MMOL/L (ref 23–29)
CREAT SERPL-MCNC: 1.2 MG/DL (ref 0.5–1.4)
DIFFERENTIAL METHOD: ABNORMAL
EOSINOPHIL # BLD AUTO: 0.1 K/UL (ref 0–0.5)
EOSINOPHIL NFR BLD: 3.1 % (ref 0–8)
ERYTHROCYTE [DISTWIDTH] IN BLOOD BY AUTOMATED COUNT: 13.1 % (ref 11.5–14.5)
EST. GFR  (AFRICAN AMERICAN): 52.2 ML/MIN/1.73 M^2
EST. GFR  (NON AFRICAN AMERICAN): 45.3 ML/MIN/1.73 M^2
GLUCOSE SERPL-MCNC: 103 MG/DL (ref 70–110)
HCT VFR BLD AUTO: 35.1 % (ref 37–48.5)
HDLC SERPL-MCNC: 62 MG/DL (ref 40–75)
HDLC SERPL: 27.8 % (ref 20–50)
HGB BLD-MCNC: 11.6 G/DL (ref 12–16)
LDLC SERPL CALC-MCNC: 145.2 MG/DL (ref 63–159)
LYMPHOCYTES # BLD AUTO: 1 K/UL (ref 1–4.8)
LYMPHOCYTES NFR BLD: 22.7 % (ref 18–48)
MCH RBC QN AUTO: 31.7 PG (ref 27–31)
MCHC RBC AUTO-ENTMCNC: 33 G/DL (ref 32–36)
MCV RBC AUTO: 96 FL (ref 82–98)
MONOCYTES # BLD AUTO: 0.4 K/UL (ref 0.3–1)
MONOCYTES NFR BLD: 8.7 % (ref 4–15)
NEUTROPHILS # BLD AUTO: 2.9 K/UL (ref 1.8–7.7)
NEUTROPHILS NFR BLD: 65.3 % (ref 38–73)
NONHDLC SERPL-MCNC: 161 MG/DL
PLATELET # BLD AUTO: 243 K/UL (ref 150–350)
PMV BLD AUTO: 8.9 FL (ref 9.2–12.9)
POTASSIUM SERPL-SCNC: 4.2 MMOL/L (ref 3.5–5.1)
PROT SERPL-MCNC: 7.5 G/DL (ref 6–8.4)
RBC # BLD AUTO: 3.66 M/UL (ref 4–5.4)
SODIUM SERPL-SCNC: 143 MMOL/L (ref 136–145)
TRIGL SERPL-MCNC: 79 MG/DL (ref 30–150)
WBC # BLD AUTO: 4.5 K/UL (ref 3.9–12.7)

## 2019-08-10 PROCEDURE — 85025 COMPLETE CBC W/AUTO DIFF WBC: CPT

## 2019-08-10 PROCEDURE — 36415 COLL VENOUS BLD VENIPUNCTURE: CPT

## 2019-08-10 PROCEDURE — 80053 COMPREHEN METABOLIC PANEL: CPT

## 2019-08-10 PROCEDURE — 80061 LIPID PANEL: CPT

## 2019-08-14 ENCOUNTER — OFFICE VISIT (OUTPATIENT)
Dept: FAMILY MEDICINE | Facility: CLINIC | Age: 74
End: 2019-08-14
Attending: FAMILY MEDICINE
Payer: MEDICARE

## 2019-08-14 VITALS
OXYGEN SATURATION: 97 % | BODY MASS INDEX: 37.98 KG/M2 | HEART RATE: 94 BPM | DIASTOLIC BLOOD PRESSURE: 66 MMHG | SYSTOLIC BLOOD PRESSURE: 116 MMHG | WEIGHT: 222.44 LBS | HEIGHT: 64 IN

## 2019-08-14 DIAGNOSIS — D50.9 IRON DEFICIENCY ANEMIA, UNSPECIFIED IRON DEFICIENCY ANEMIA TYPE: ICD-10-CM

## 2019-08-14 DIAGNOSIS — M81.0 OSTEOPOROSIS, UNSPECIFIED OSTEOPOROSIS TYPE, UNSPECIFIED PATHOLOGICAL FRACTURE PRESENCE: ICD-10-CM

## 2019-08-14 DIAGNOSIS — F32.3 MAJOR DEPRESSION WITH PSYCHOTIC FEATURES: ICD-10-CM

## 2019-08-14 DIAGNOSIS — K21.9 GASTROESOPHAGEAL REFLUX DISEASE, ESOPHAGITIS PRESENCE NOT SPECIFIED: Primary | ICD-10-CM

## 2019-08-14 DIAGNOSIS — G89.29 CHRONIC PAIN OF BOTH KNEES: ICD-10-CM

## 2019-08-14 DIAGNOSIS — I87.2 VENOUS INSUFFICIENCY OF BOTH LOWER EXTREMITIES: Chronic | ICD-10-CM

## 2019-08-14 DIAGNOSIS — K51.219 ULCERATIVE PROCTITIS WITH COMPLICATION: ICD-10-CM

## 2019-08-14 DIAGNOSIS — I82.5Y1 CHRONIC DEEP VEIN THROMBOSIS (DVT) OF PROXIMAL VEIN OF RIGHT LOWER EXTREMITY: ICD-10-CM

## 2019-08-14 DIAGNOSIS — R73.02 GLUCOSE INTOLERANCE (IMPAIRED GLUCOSE TOLERANCE): ICD-10-CM

## 2019-08-14 DIAGNOSIS — M25.562 CHRONIC PAIN OF BOTH KNEES: ICD-10-CM

## 2019-08-14 DIAGNOSIS — M17.0 PRIMARY OSTEOARTHRITIS OF BOTH KNEES: ICD-10-CM

## 2019-08-14 DIAGNOSIS — M25.561 CHRONIC PAIN OF BOTH KNEES: ICD-10-CM

## 2019-08-14 DIAGNOSIS — E66.01 MORBID OBESITY: ICD-10-CM

## 2019-08-14 PROCEDURE — 99999 PR PBB SHADOW E&M-EST. PATIENT-LVL IV: CPT | Mod: PBBFAC,,, | Performed by: FAMILY MEDICINE

## 2019-08-14 PROCEDURE — 99214 PR OFFICE/OUTPT VISIT, EST, LEVL IV, 30-39 MIN: ICD-10-PCS | Mod: S$PBB,,, | Performed by: FAMILY MEDICINE

## 2019-08-14 PROCEDURE — 99999 PR PBB SHADOW E&M-EST. PATIENT-LVL IV: ICD-10-PCS | Mod: PBBFAC,,, | Performed by: FAMILY MEDICINE

## 2019-08-14 PROCEDURE — 99214 OFFICE O/P EST MOD 30 MIN: CPT | Mod: PBBFAC,PO | Performed by: FAMILY MEDICINE

## 2019-08-14 PROCEDURE — 99214 OFFICE O/P EST MOD 30 MIN: CPT | Mod: S$PBB,,, | Performed by: FAMILY MEDICINE

## 2019-08-14 RX ORDER — DICLOFENAC SODIUM 10 MG/G
2 GEL TOPICAL DAILY
Qty: 100 G | Refills: 3 | Status: ON HOLD | OUTPATIENT
Start: 2019-08-14 | End: 2019-11-08 | Stop reason: HOSPADM

## 2019-08-14 NOTE — PROGRESS NOTES
Subjective:       Patient ID: Anabella Aranda is a 74 y.o. female.    Chief Complaint: Knee Pain and Abdominal Pain (Referral for Gastro)    74 yr old pleasant black female with HLD,  chronic iron deficiency anemia ulcerative colitis, Chronic venous insufficiency, s/o IVC filter, presents today for her 3 month check. C/O KNEE PAIN AND ALSO WORSENING GERD SYMPTOMS.       KNEE PAIN BILATERAL - CHRONIC AND GETTING WORSE - SHE HAS OA AND SHE USES WALKER FOR THAT -     GERD - ON ZANTAC AS NEEDED HOWEVER IT IS NOT HELPING - SEEN GI BEFORE - DENIES ANY N/V/C/D/FEVER     Obesity - she gained a lot since last year - she is mostly staying home and eating a lot and  has no activity outside of house - she is lonely in a big house and sometimes hearing voices - they ar good voices and she is not hearing when she is out of house - she denies any depression or anxiety - she does have issues with sleeping at night - she denies any personal or family h/o psychiatry disorders - no SI/HI/delusions    Ulcerative colitis - she is on Imuran and follows GI - no side effects    Depression with dementia - follows Psych and neuro - pt grand son reports she is still same but not worse - she still has issues with sleeping - caregiver also applied for home health      Anemia - chronic - due to IBD - she is on diet alone and takes MVI -      HLD - need to b on low fat diet - LDLCALC                  133.2               02/04/2019                  LE edema/lymphedema - had h/o DVT and she also had Green field filter - she was on anticoagulation till hospital and stopped after surgery - seen vascular medicine and stable since then - she denies any heart problems, liver disorder - she also denies any chest pain, SOB, ROBLES      History as below - reviewed         HM  -labs UTD  -vaccines defer to next visit    Review of Systems   Constitutional: Negative.  Negative for activity change, diaphoresis and unexpected weight change.   HENT: Negative.   Negative for congestion, ear discharge, hearing loss, rhinorrhea, sore throat and voice change.    Eyes: Negative.  Negative for pain, discharge and visual disturbance.   Respiratory: Negative.  Negative for chest tightness, shortness of breath and wheezing.    Cardiovascular: Negative.  Negative for chest pain.   Gastrointestinal: Negative.  Negative for abdominal distention, anal bleeding, constipation and nausea.   Endocrine: Negative.  Negative for cold intolerance, polydipsia and polyuria.   Genitourinary: Negative.  Negative for decreased urine volume, difficulty urinating, dysuria, frequency, menstrual problem and vaginal pain.   Musculoskeletal: Positive for arthralgias and myalgias. Negative for gait problem.   Skin: Negative.  Negative for color change, pallor and wound.   Allergic/Immunologic: Negative.  Negative for environmental allergies and immunocompromised state.   Neurological: Negative.  Negative for dizziness, tremors, seizures, speech difficulty and headaches.   Hematological: Negative.  Negative for adenopathy. Does not bruise/bleed easily.   Psychiatric/Behavioral: Negative.  Negative for agitation, confusion, decreased concentration, hallucinations, self-injury and suicidal ideas. The patient is not nervous/anxious.        PMH/PSH/FH/SH/MED/ALLERGY reviewed    Objective:       Vitals:    08/14/19 1110   BP: 116/66   Pulse: 94       Physical Exam   Constitutional: She is oriented to person, place, and time. She appears well-developed and well-nourished. No distress.   HENT:   Head: Normocephalic and atraumatic.   Right Ear: External ear normal.   Left Ear: External ear normal.   Nose: Nose normal.   Mouth/Throat: Oropharynx is clear and moist. No oropharyngeal exudate.   Eyes: Pupils are equal, round, and reactive to light. Conjunctivae and EOM are normal. Right eye exhibits no discharge. Left eye exhibits no discharge. No scleral icterus.   Neck: Normal range of motion. Neck supple. No JVD  present. No tracheal deviation present. No thyromegaly present.   Cardiovascular: Normal rate, regular rhythm, normal heart sounds and intact distal pulses. Exam reveals no gallop and no friction rub.   No murmur heard.  Pulmonary/Chest: Effort normal and breath sounds normal. No stridor. She has no wheezes. She has no rales. She exhibits no tenderness.   Abdominal: Soft. Bowel sounds are normal. She exhibits no distension and no mass. There is no tenderness. There is no rebound and no guarding. No hernia.   Musculoskeletal: Normal range of motion. She exhibits edema and tenderness.   B/l knee edematous, restricted rom and impingement+   Lymphadenopathy:     She has no cervical adenopathy.   Neurological: She is alert and oriented to person, place, and time. She has normal reflexes. She displays normal reflexes. No cranial nerve deficit. She exhibits normal muscle tone. Coordination normal.   Skin: Skin is warm and dry. No rash noted. She is not diaphoretic. No erythema. No pallor.   Psychiatric: She has a normal mood and affect. Her behavior is normal. Judgment and thought content normal.       Assessment:       1. Gastroesophageal reflux disease, esophagitis presence not specified    2. Major depression with psychotic features    3. Morbid obesity    4. Osteoporosis, unspecified osteoporosis type, unspecified pathological fracture presence    5. Ulcerative proctitis with complication    6. Glucose intolerance (impaired glucose tolerance)    7. Iron deficiency anemia, unspecified iron deficiency anemia type    8. Chronic deep vein thrombosis (DVT) of proximal vein of right lower extremity    9. Venous insufficiency of both lower extremities    10. Primary osteoarthritis of both knees    11. Chronic pain of both knees        Plan:           Anabella was seen today for knee pain and abdominal pain.    Diagnoses and all orders for this visit:    Gastroesophageal reflux disease, esophagitis presence not specified  -      Ambulatory referral to Gastroenterology    Major depression with psychotic features    Morbid obesity    Osteoporosis, unspecified osteoporosis type, unspecified pathological fracture presence    Ulcerative proctitis with complication    Glucose intolerance (impaired glucose tolerance)    Iron deficiency anemia, unspecified iron deficiency anemia type    Chronic deep vein thrombosis (DVT) of proximal vein of right lower extremity    Venous insufficiency of both lower extremities    Primary osteoarthritis of both knees  -     Ambulatory referral to Home Health  -     diclofenac sodium (VOLTAREN) 1 % Gel; Apply 2 g topically once daily.    Chronic pain of both knees  -     Ambulatory referral to Home Health  -     diclofenac sodium (VOLTAREN) 1 % Gel; Apply 2 g topically once daily.    B/L knee pain/OA  -voltaren gel prn  -heat/ice  -refer HH for PT    GERD  -worsening  -refer GI    ANXIETY/DEPRESSION/dementia  -FOLLOW outside PSYCH  -on seroquel and aricept and namenda    GLUC INTOLERANCE  -LABS reassuring    S/P IVC filter and chronic venous insufficiency  -not on anticoagulant due to UC and rectal bleed/anemia  -stable    US/anemia  -refilled iron  - GI for follow up  -on Imuran daily    CHRONIC DVT  -HEM/ONC follow up    CKD III  -no worsening  -labs    HLD  -low fat diet    Spent adequate time in obtaining history and explaining differentials    40 minutes spent during this visit of which greater than 50% devoted to face-face counseling and coordination of care regarding diagnosis and management plan    rtc 6 M OR PRN

## 2019-08-20 ENCOUNTER — TELEPHONE (OUTPATIENT)
Dept: FAMILY MEDICINE | Facility: CLINIC | Age: 74
End: 2019-08-20

## 2019-08-20 DIAGNOSIS — I82.5Y1 CHRONIC DEEP VEIN THROMBOSIS (DVT) OF PROXIMAL VEIN OF RIGHT LOWER EXTREMITY: ICD-10-CM

## 2019-08-20 DIAGNOSIS — M25.561 CHRONIC PAIN OF BOTH KNEES: ICD-10-CM

## 2019-08-20 DIAGNOSIS — M81.0 OSTEOPOROSIS, UNSPECIFIED OSTEOPOROSIS TYPE, UNSPECIFIED PATHOLOGICAL FRACTURE PRESENCE: ICD-10-CM

## 2019-08-20 DIAGNOSIS — K51.219 ULCERATIVE PROCTITIS WITH COMPLICATION: ICD-10-CM

## 2019-08-20 DIAGNOSIS — G89.29 CHRONIC PAIN OF BOTH KNEES: ICD-10-CM

## 2019-08-20 DIAGNOSIS — D50.9 IRON DEFICIENCY ANEMIA, UNSPECIFIED IRON DEFICIENCY ANEMIA TYPE: ICD-10-CM

## 2019-08-20 DIAGNOSIS — I87.2 VENOUS INSUFFICIENCY OF BOTH LOWER EXTREMITIES: Primary | Chronic | ICD-10-CM

## 2019-08-20 DIAGNOSIS — M25.562 CHRONIC PAIN OF BOTH KNEES: ICD-10-CM

## 2019-08-20 DIAGNOSIS — F32.3 MAJOR DEPRESSION WITH PSYCHOTIC FEATURES: ICD-10-CM

## 2019-08-20 PROCEDURE — G0180 PR HOME HEALTH MD CERTIFICATION: ICD-10-PCS | Mod: ,,, | Performed by: FAMILY MEDICINE

## 2019-08-20 PROCEDURE — G0180 MD CERTIFICATION HHA PATIENT: HCPCS | Mod: ,,, | Performed by: FAMILY MEDICINE

## 2019-08-20 NOTE — TELEPHONE ENCOUNTER
----- Message from Bryce De Jesus sent at 8/20/2019  1:31 PM CDT -----  Contact: Zhen kumar/[facility]  [phone]  Zhen is requesting orders for a  be put in the system.  Call back to assist at [phone]

## 2019-08-21 ENCOUNTER — TELEPHONE (OUTPATIENT)
Dept: FAMILY MEDICINE | Facility: CLINIC | Age: 74
End: 2019-08-21

## 2019-08-21 NOTE — TELEPHONE ENCOUNTER
She can hold aripcet if it not helping with memory and use seroquel for depression and sleep - continue vitamins

## 2019-08-21 NOTE — TELEPHONE ENCOUNTER
Zhen with [facility] stated that there is an interaction between Aricept and Seroquel. Does the Dr. Brambila want the pt to take Oscal and Ferrous Sulfate. Pls advise.

## 2019-08-21 NOTE — TELEPHONE ENCOUNTER
----- Message from Bisi Abernathy sent at 8/21/2019  1:36 PM CDT -----  Contact: Zhen NAYAK with [facility] [phone]  NAEL Fontaine with [facility] called to speak to the nurse to request an order for a  for the Pt to assist the Pt with community resources. There is an interaction between Aricept and Seroquel. Does the MD want the pt to take Oscal and Perrous Sulfate. These medications were listed on the pt's profile. NAEL Fontaine can be reached at [phone].

## 2019-08-21 NOTE — TELEPHONE ENCOUNTER
Informed Zhen with [facility] that pt  can hold Aricept if it not helping with memory and use Seroquel for depression and sleep and to continue vitamins

## 2019-08-26 ENCOUNTER — TELEPHONE (OUTPATIENT)
Dept: FAMILY MEDICINE | Facility: CLINIC | Age: 74
End: 2019-08-26

## 2019-08-26 DIAGNOSIS — M25.561 CHRONIC PAIN OF BOTH KNEES: Primary | ICD-10-CM

## 2019-08-26 DIAGNOSIS — M25.562 CHRONIC PAIN OF BOTH KNEES: Primary | ICD-10-CM

## 2019-08-26 DIAGNOSIS — M17.0 PRIMARY OSTEOARTHRITIS OF BOTH KNEES: ICD-10-CM

## 2019-08-26 DIAGNOSIS — G89.29 CHRONIC PAIN OF BOTH KNEES: Primary | ICD-10-CM

## 2019-08-26 NOTE — TELEPHONE ENCOUNTER
Spoke to pt's sonBryson and stated that since pt started PT. Pt has not been able to get out of bed. Son is requesting an Orthopedic referral. Pls advise.

## 2019-08-26 NOTE — TELEPHONE ENCOUNTER
----- Message from Marika Crump sent at 8/26/2019 10:22 AM CDT -----  Contact:  [phone]  Patient would like to speak with you about needing a referral for her knees. Please advise

## 2019-08-29 ENCOUNTER — HOSPITAL ENCOUNTER (OUTPATIENT)
Dept: RADIOLOGY | Facility: HOSPITAL | Age: 74
Discharge: HOME | End: 2019-08-29
Attending: ORTHOPAEDIC SURGERY
Payer: MEDICARE

## 2019-08-29 ENCOUNTER — OFFICE VISIT (OUTPATIENT)
Dept: ORTHOPEDICS | Facility: CLINIC | Age: 74
End: 2019-08-29
Attending: FAMILY MEDICINE
Payer: MEDICARE

## 2019-08-29 ENCOUNTER — TELEPHONE (OUTPATIENT)
Dept: ORTHOPEDICS | Facility: CLINIC | Age: 74
End: 2019-08-29

## 2019-08-29 DIAGNOSIS — M17.12 PRIMARY OSTEOARTHRITIS OF LEFT KNEE: Primary | ICD-10-CM

## 2019-08-29 DIAGNOSIS — M25.569 KNEE PAIN, UNSPECIFIED CHRONICITY, UNSPECIFIED LATERALITY: Primary | ICD-10-CM

## 2019-08-29 DIAGNOSIS — M25.569 KNEE PAIN, UNSPECIFIED CHRONICITY, UNSPECIFIED LATERALITY: ICD-10-CM

## 2019-08-29 PROCEDURE — 99203 PR OFFICE/OUTPT VISIT, NEW, LEVL III, 30-44 MIN: ICD-10-PCS | Mod: S$PBB,,, | Performed by: ORTHOPAEDIC SURGERY

## 2019-08-29 PROCEDURE — 73562 X-RAY EXAM OF KNEE 3: CPT | Mod: 50,TC,PN

## 2019-08-29 PROCEDURE — 99203 OFFICE O/P NEW LOW 30 MIN: CPT | Mod: S$PBB,,, | Performed by: ORTHOPAEDIC SURGERY

## 2019-08-29 PROCEDURE — 73562 XR KNEE 3 VIEW BILATERAL: ICD-10-PCS | Mod: 26,50,, | Performed by: RADIOLOGY

## 2019-08-29 PROCEDURE — 99999 PR PBB SHADOW E&M-EST. PATIENT-LVL II: CPT | Mod: PBBFAC,,, | Performed by: ORTHOPAEDIC SURGERY

## 2019-08-29 PROCEDURE — 99999 PR PBB SHADOW E&M-EST. PATIENT-LVL II: ICD-10-PCS | Mod: PBBFAC,,, | Performed by: ORTHOPAEDIC SURGERY

## 2019-08-29 PROCEDURE — 73562 X-RAY EXAM OF KNEE 3: CPT | Mod: 26,50,, | Performed by: RADIOLOGY

## 2019-08-29 PROCEDURE — 99212 OFFICE O/P EST SF 10 MIN: CPT | Mod: PBBFAC,25,PN | Performed by: ORTHOPAEDIC SURGERY

## 2019-08-29 RX ORDER — MUPIROCIN 20 MG/G
OINTMENT TOPICAL
Status: CANCELLED | OUTPATIENT
Start: 2019-08-29

## 2019-08-29 RX ORDER — NAPROXEN 250 MG/1
500 TABLET ORAL
Status: CANCELLED | OUTPATIENT
Start: 2019-08-29 | End: 2019-08-29

## 2019-08-29 RX ORDER — SODIUM CHLORIDE 0.9 % (FLUSH) 0.9 %
10 SYRINGE (ML) INJECTION
Status: CANCELLED | OUTPATIENT
Start: 2019-08-29

## 2019-08-29 RX ORDER — PREGABALIN 50 MG/1
150 CAPSULE ORAL
Status: CANCELLED | OUTPATIENT
Start: 2019-08-29 | End: 2019-08-29

## 2019-08-29 RX ORDER — ACETAMINOPHEN 325 MG/1
1000 TABLET ORAL
Status: CANCELLED | OUTPATIENT
Start: 2019-08-29 | End: 2019-08-29

## 2019-08-29 NOTE — PROGRESS NOTES
Subjective:      Patient ID: Anabella Aranda is a 74 y.o. female.    Chief Complaint:  Left knee pain  HPI     They have experienced problems with their left knee over the past Several years. The patient denies relevant history of injury/aggravation. Pain is located medially and  anteriorly Associated symptoms include swelling and giving way.  Symptoms are aggravated by walking. They have been treated with over the counter analgesics and cane/walker.   Symptoms have recently worsened. Ambulation reportedly has been impaired. Self care ADLs are painful.     Review of Systems   Constitution: Negative for fever and weight loss.   HENT: Negative for congestion.    Eyes: Negative for visual disturbance.   Cardiovascular: Negative for chest pain.   Respiratory: Negative for shortness of breath.    Hematologic/Lymphatic: Negative for bleeding problem. Does not bruise/bleed easily.   Skin: Negative for poor wound healing.   Musculoskeletal: Positive for joint pain.   Gastrointestinal: Negative for abdominal pain.   Genitourinary: Negative for dysuria.   Neurological: Negative for seizures.   Psychiatric/Behavioral: Negative for altered mental status.   Allergic/Immunologic: Negative for persistent infections.         Objective:      Ortho/SPM Exam      Left knee    [unfilled]    The patient is not in acute distress.   Sclerae normal  Body habitus is overweight.  Respiratory distress:  none   The patient walks with a limp.  Hip irritability  negative.   The skin over the knee is intact. Old, traumatic puncture wound noted.  Knee effusion 1+  Tendernes is located medially  Range of motion- Flexion 100 deg, Extension 5 deg,   Ligament laxity exam:   MCL 2+   Lachman 0   Post sag  0    LCL 0  Patellar apprehension negative.  Popliteal cyst negative  Patellar crepitation present.  Flexion/pinch negative  Pulses DP present, PT present.  Motor normal 5/5 strength in all tested muscle groups.   Sensory normal.    I reviewed the  relevant radiographic images for the patient's condition:  Left knee films show advanced loss of medial joint space with osteophytes and varus deformity  Assessment:       Encounter Diagnosis   Name Primary?    Primary osteoarthritis of left knee Yes          The condition is objectively advanced with significant pain. Activity modification and use of a walker not controlled symptoms.  Plan:       Anabella was seen today for pain and pain.    Diagnoses and all orders for this visit:    Primary osteoarthritis of left knee        I explained my diagnostic impression and the reasoning behind it in detail, using layman's terms.  Models and/or pictures were used to help in the explanation.    Treatment options were discussed. The surgical process of left knee replacement was discussed in detail with the patient including a detailed discussion of the procedure itself (including visual model, x-ray review, and literature review). The typical perioperative and post-operative course was discussed and perioperative risks were discussed to the patient's satisfaction.  Risks and complications discussed included but were not limited to the risks of anesthetic complications, infection, bleeding, wound healing complications, stiffness, aseptic loosening, instability, limb length inequality, neurologic dysfunction including numbness and weakness, additional surgery,  DVT, pulmonary embolism, perioperative medical risks (cardiac, pulmonary, renal, neurologic), and death and the patient elects to proceed.

## 2019-08-29 NOTE — LETTER
August 29, 2019      Shon Brambila MD  [address]  [address]  [place] [address]           [facility]  [address]  [place] [address]  Phone: [phone]          Patient: Anabella Aranda   MR Number: [id number]   YOB: 1945   Date of Visit: 8/29/2019       Dear Dr. Shon Brambila:    Thank you for referring Anabella Aranda to me for evaluation. Attached you will find relevant portions of my assessment and plan of care.    If you have questions, please do not hesitate to call me. I look forward to following Anabella Aranda along with you.    Sincerely,    Ghassan Lindsay MD    Enclosure  CC:  No Recipients    If you would like to receive this communication electronically, please contact [email or web address] or [phone] to request more information on [employer] access.    For providers and/or their staff who would like to refer a patient to [facility], please contact us through our one-stop-shop provider referral line, [facility], at [phone].    If you feel you have received this communication in error or would no longer like to receive these types of communications, please e-mail [email or web address]

## 2019-09-03 ENCOUNTER — TELEPHONE (OUTPATIENT)
Dept: HOME HEALTH SERVICES | Facility: HOSPITAL | Age: 74
End: 2019-09-03

## 2019-09-03 ENCOUNTER — EXTERNAL HOME HEALTH (OUTPATIENT)
Dept: HOME HEALTH SERVICES | Facility: HOSPITAL | Age: 74
End: 2019-09-03
Payer: MEDICARE

## 2019-09-14 ENCOUNTER — NURSE TRIAGE (OUTPATIENT)
Dept: ADMINISTRATIVE | Facility: CLINIC | Age: 74
End: 2019-09-14

## 2019-09-14 DIAGNOSIS — K51.919 ULCERATIVE COLITIS WITH COMPLICATION, UNSPECIFIED LOCATION: ICD-10-CM

## 2019-09-14 RX ORDER — AZATHIOPRINE 50 MG/1
TABLET ORAL
Qty: 30 TABLET | Refills: 0 | Status: SHIPPED | OUTPATIENT
Start: 2019-09-14 | End: 2019-09-14 | Stop reason: SDUPTHER

## 2019-09-14 NOTE — TELEPHONE ENCOUNTER
"  Reason for Disposition   Caller has urgent medication question about med that PCP prescribed and triager unable to answer question    Additional Information   Negative: Drug overdose and nurse unable to answer question   Negative: Caller requesting information not related to medicine   Negative: Caller requesting a prescription for Strep throat and has a positive culture result   Negative: Rash while taking a medication or within 3 days of stopping it   Negative: Immunization reaction suspected   Negative: [1] Asthma AND [2] having symptoms of asthma (cough, wheezing, etc)   Negative: MORE THAN A DOUBLE DOSE of a prescription or over-the-counter (OTC) drug   Negative: [1] DOUBLE DOSE (an extra dose or lesser amount) of over-the-counter (OTC) drug AND [2] any symptoms (e.g., dizziness, nausea, pain, sleepiness)   Negative: [1] DOUBLE DOSE (an extra dose or lesser amount) of prescription drug AND [2] any symptoms (e.g., dizziness, nausea, pain, sleepiness)   Negative: Took another person's prescription drug   Negative: Pharmacy calling with prescription questions and triager unable to answer question   Negative: [1] Prescription not at pharmacy AND [2] was prescribed today by PCP   Negative: [1] Request for URGENT new prescription or refill of "essential" medication (i.e., likelihood of harm to patient if not taken) AND [2] triager unable to fill per unit policy   Negative: Diabetes drug error or overdose (e.g., insulin or extra dose)   Negative: [1] DOUBLE DOSE (an extra dose or lesser amount) of prescription drug AND [2] NO symptoms (Exception: a double dose of antibiotics)    Protocols used: MEDICATION QUESTION CALL-A-  Relative called re gastro in [facility] - pt hasnt been in new gastro appt yet. Pt needs imuran refill. Spoke with dr claudia cabello for 30 day supply of imuran. No refill. attempted to call med into pharm-about to close son notified. States pt last took med a week ago. MD " notified. Son states to [facility]. LM on pharm VM at 550pm

## 2019-09-15 RX ORDER — AZATHIOPRINE 50 MG/1
TABLET ORAL
Qty: 90 TABLET | Refills: 0 | Status: ON HOLD | OUTPATIENT
Start: 2019-09-15 | End: 2019-10-16

## 2019-09-20 ENCOUNTER — PATIENT OUTREACH (OUTPATIENT)
Dept: ADMINISTRATIVE | Facility: OTHER | Age: 74
End: 2019-09-20

## 2019-09-23 ENCOUNTER — TELEPHONE (OUTPATIENT)
Dept: FAMILY MEDICINE | Facility: CLINIC | Age: 74
End: 2019-09-23

## 2019-09-23 DIAGNOSIS — F02.80 LATE ONSET ALZHEIMER'S DISEASE WITHOUT BEHAVIORAL DISTURBANCE: Primary | ICD-10-CM

## 2019-09-23 DIAGNOSIS — G30.1 LATE ONSET ALZHEIMER'S DISEASE WITHOUT BEHAVIORAL DISTURBANCE: Primary | ICD-10-CM

## 2019-09-23 DIAGNOSIS — G31.84 MCI (MILD COGNITIVE IMPAIRMENT): ICD-10-CM

## 2019-09-23 NOTE — TELEPHONE ENCOUNTER
Spoke to Dr. Ware, pt 's Psychiartist and requesting a phone call from Dr. Brambila in regards to stopping the pt's Aricept. Pls advise.

## 2019-09-23 NOTE — TELEPHONE ENCOUNTER
"----- Message from Perri Rodriguez sent at 9/23/2019  3:45 PM CDT -----  Contact: Dr. Ware/[phone]/personal phone  Dr. Ware is requesting a call doctor stopping her "Aricept" and patient is declining. Would like to discuss the reason for stopping it. The family is upset. Thanks  "

## 2019-09-24 ENCOUNTER — OFFICE VISIT (OUTPATIENT)
Dept: GASTROENTEROLOGY | Facility: CLINIC | Age: 74
End: 2019-09-24
Attending: FAMILY MEDICINE
Payer: MEDICARE

## 2019-09-24 ENCOUNTER — LAB VISIT (OUTPATIENT)
Dept: LAB | Facility: HOSPITAL | Age: 74
End: 2019-09-24
Attending: INTERNAL MEDICINE
Payer: MEDICARE

## 2019-09-24 ENCOUNTER — OFFICE VISIT (OUTPATIENT)
Dept: ORTHOPEDICS | Facility: CLINIC | Age: 74
End: 2019-09-24
Payer: MEDICARE

## 2019-09-24 ENCOUNTER — TELEPHONE (OUTPATIENT)
Dept: GASTROENTEROLOGY | Facility: CLINIC | Age: 74
End: 2019-09-24

## 2019-09-24 VITALS — TEMPERATURE: 99 F | HEART RATE: 81 BPM | SYSTOLIC BLOOD PRESSURE: 125 MMHG | DIASTOLIC BLOOD PRESSURE: 66 MMHG

## 2019-09-24 VITALS
WEIGHT: 222.69 LBS | BODY MASS INDEX: 38.02 KG/M2 | SYSTOLIC BLOOD PRESSURE: 122 MMHG | DIASTOLIC BLOOD PRESSURE: 75 MMHG | HEIGHT: 64 IN

## 2019-09-24 DIAGNOSIS — Z90.49 S/P COLECTOMY: ICD-10-CM

## 2019-09-24 DIAGNOSIS — Z01.818 PREOP EXAMINATION: Primary | ICD-10-CM

## 2019-09-24 DIAGNOSIS — K52.9 INFLAMMATORY BOWEL DISEASE: ICD-10-CM

## 2019-09-24 DIAGNOSIS — K51.919 ULCERATIVE COLITIS WITH COMPLICATION, UNSPECIFIED LOCATION: ICD-10-CM

## 2019-09-24 DIAGNOSIS — K51.919 ULCERATIVE COLITIS WITH COMPLICATION, UNSPECIFIED LOCATION: Primary | ICD-10-CM

## 2019-09-24 DIAGNOSIS — K21.9 GASTROESOPHAGEAL REFLUX DISEASE, ESOPHAGITIS PRESENCE NOT SPECIFIED: ICD-10-CM

## 2019-09-24 DIAGNOSIS — R19.7 DIARRHEA, UNSPECIFIED TYPE: ICD-10-CM

## 2019-09-24 LAB
C DIFF GDH STL QL: NEGATIVE
C DIFF TOX A+B STL QL IA: NEGATIVE
OB PNL STL: POSITIVE
WBC #/AREA STL HPF: ABNORMAL /[HPF]

## 2019-09-24 PROCEDURE — 87329 GIARDIA AG IA: CPT

## 2019-09-24 PROCEDURE — 87045 FECES CULTURE AEROBIC BACT: CPT

## 2019-09-24 PROCEDURE — 99499 NO LOS: ICD-10-PCS | Mod: S$PBB,,, | Performed by: ORTHOPAEDIC SURGERY

## 2019-09-24 PROCEDURE — 87046 STOOL CULTR AEROBIC BACT EA: CPT | Mod: 59

## 2019-09-24 PROCEDURE — 99999 PR PBB SHADOW E&M-EST. PATIENT-LVL III: CPT | Mod: PBBFAC,,, | Performed by: INTERNAL MEDICINE

## 2019-09-24 PROCEDURE — 99214 OFFICE O/P EST MOD 30 MIN: CPT | Mod: S$PBB,,, | Performed by: INTERNAL MEDICINE

## 2019-09-24 PROCEDURE — 99499 UNLISTED E&M SERVICE: CPT | Mod: S$PBB,,, | Performed by: ORTHOPAEDIC SURGERY

## 2019-09-24 PROCEDURE — 87449 NOS EACH ORGANISM AG IA: CPT

## 2019-09-24 PROCEDURE — 99999 PR PBB SHADOW E&M-EST. PATIENT-LVL III: ICD-10-PCS | Mod: PBBFAC,,, | Performed by: ORTHOPAEDIC SURGERY

## 2019-09-24 PROCEDURE — 82272 OCCULT BLD FECES 1-3 TESTS: CPT

## 2019-09-24 PROCEDURE — 99999 PR PBB SHADOW E&M-EST. PATIENT-LVL III: CPT | Mod: PBBFAC,,, | Performed by: ORTHOPAEDIC SURGERY

## 2019-09-24 PROCEDURE — 99999 PR PBB SHADOW E&M-EST. PATIENT-LVL III: ICD-10-PCS | Mod: PBBFAC,,, | Performed by: INTERNAL MEDICINE

## 2019-09-24 PROCEDURE — 99214 PR OFFICE/OUTPT VISIT, EST, LEVL IV, 30-39 MIN: ICD-10-PCS | Mod: S$PBB,,, | Performed by: INTERNAL MEDICINE

## 2019-09-24 PROCEDURE — 89055 LEUKOCYTE ASSESSMENT FECAL: CPT

## 2019-09-24 PROCEDURE — 99213 OFFICE O/P EST LOW 20 MIN: CPT | Mod: PBBFAC,PO | Performed by: INTERNAL MEDICINE

## 2019-09-24 PROCEDURE — 99213 OFFICE O/P EST LOW 20 MIN: CPT | Mod: PBBFAC,27,PN | Performed by: ORTHOPAEDIC SURGERY

## 2019-09-24 PROCEDURE — 87427 SHIGA-LIKE TOXIN AG IA: CPT | Mod: 59

## 2019-09-24 RX ORDER — DONEPEZIL HYDROCHLORIDE 10 MG/1
10 TABLET, FILM COATED ORAL NIGHTLY
Qty: 90 TABLET | Refills: 3 | Status: ON HOLD | OUTPATIENT
Start: 2019-09-24 | End: 2019-11-30

## 2019-09-24 RX ORDER — PANTOPRAZOLE SODIUM 40 MG/1
40 TABLET, DELAYED RELEASE ORAL DAILY
Qty: 30 TABLET | Refills: 11 | Status: ON HOLD | OUTPATIENT
Start: 2019-09-24 | End: 2019-11-30

## 2019-09-24 RX ORDER — MESALAMINE 0.38 G/1
1.5 CAPSULE, EXTENDED RELEASE ORAL DAILY
Qty: 120 CAPSULE | Refills: 11 | Status: ON HOLD | OUTPATIENT
Start: 2019-09-24 | End: 2019-11-08 | Stop reason: HOSPADM

## 2019-09-24 RX ORDER — MEMANTINE HYDROCHLORIDE 10 MG/1
10 TABLET ORAL 2 TIMES DAILY
Qty: 180 TABLET | Refills: 3 | Status: ON HOLD | OUTPATIENT
Start: 2019-09-24 | End: 2019-10-17 | Stop reason: SDUPTHER

## 2019-09-24 RX ORDER — MESALAMINE 1000 MG/1
1000 SUPPOSITORY RECTAL NIGHTLY
Qty: 30 SUPPOSITORY | Refills: 5 | Status: SHIPPED | OUTPATIENT
Start: 2019-09-24 | End: 2019-10-09 | Stop reason: CLARIF

## 2019-09-24 RX ORDER — DIPHENOXYLATE HYDROCHLORIDE AND ATROPINE SULFATE 2.5; .025 MG/1; MG/1
1 TABLET ORAL 3 TIMES DAILY PRN
Qty: 30 TABLET | Refills: 1 | Status: SHIPPED | OUTPATIENT
Start: 2019-09-24 | End: 2019-10-14

## 2019-09-24 NOTE — PROGRESS NOTES
GASTROENTEROLOGY CLINIC NOTE    Reason for visit: The primary encounter diagnosis was Ulcerative colitis with complication, unspecified location. Diagnoses of S/P colectomy, Diarrhea, unspecified type, and Gastroesophageal reflux disease, esophagitis presence not specified were also pertinent to this visit.  Referring provider/PCP: Shon Brambila MD      HPI:  Anabella Aranda is a 74 y.o. female here today for follow up of epigastric burning, loose stool and diarrhea, and black stool. Accompanied by her son, who assists with history. New patient to me, previously seen by Aisha in our clinic.  Hx of colectomy for refractory UC with stricture, now with TAMARA in 2016. Hx of dementia on aricept.     Epigastric burning.  This has been worsening over the past few months described as worse after meals.  There is some indigestion and some heartburn symptoms.  She feels like her stomach is hot.  She is currently taking Zantac twice a day.  She is not on a PPI.  Recent EGD as described below, 1/2019    She has black stools and diarrhea at times.  The son feels like she is always in the bathroom with loose stool.  She states at times this is very small volume stool.  She does not see bright red blood.  She thinks it is black but she believes that is been before she started the iron, she is currently on iron therapy.  She takes Imodium approximately twice a day.        Prior Endoscopy:  EGD:  1/2019   Impression:           - Normal esophagus.                        - Small hiatal hernia.                        - One duodenal polyp. Resected and retrieved.                        - Normal examined duodenum.    Colon:  1/2019  Impression:           - Patent end-to-end ileo-rectal anastomosis,                         characterized by healthy appearing mucosa.                        - Scattered moderate inflammation was found in the                         rectum. Biopsied. Clip was placed.  Recommendation:       - Discharge  patient to home.                        - Resume previous diet.                        - Continue present medications.                        - Return to referring physician.                        - May benefit of 5 ASA enamas if clinically                         indicated.  PATH - brunners gland. Hyperplastic polyp    colonoscopy 2016:  - The procedure was aborted due to bowel stenosis.                        - Inflammation was found in the colon secondary to                         colitis. The findings are unchanged compared to                         previous examinations.                        - Stricture in the sigmoid colon.                        - No specimens collected.     She was referred to colorectal surgery for TAC and TAMAAR.  She had flex sig 1/2017:  PROCEDURE: Flexible Sigmoidoscopy   Scope # [id number]   With informed consent the flexible sigmoidoscope was passed 20 cm under direcet vision. Prep quality: good   Findings:patent anastomosis at 18 cm moderate proctitis. Biopsies taken x 4  EBL: None     Pathology:  Rectum, biopsy:  Marked chronic active colitis with cryptitis, crypt abscess and surface erosion.  Immunohistochemical stain for cytomegalovirus will be completed and results will follow in a supplemental  Report. - negative  No evidence of dysplasia or malignancy.      (Portions of this note were dictated using voice recognition software and may contain dictation related errors in spelling/grammar/syntax not found on text review)    Review of Systems   Constitutional: Negative for chills and fever.   Respiratory: Negative for cough and hemoptysis.    Gastrointestinal: Positive for diarrhea, heartburn, melena and nausea. Negative for blood in stool.       Past Medical History: has a past medical history of Arthritis, C. difficile colitis, Hepatitis B, Hypertension, and Ulcerative colitis.    Past Surgical History: has a past surgical history that includes Total knee arthroplasty  (Right, 04/07/2008); Nasal sinus surgery; Cholecystectomy; Tonsillectomy; Joint replacement; emma filter placement (Right, 01/2014); Colonoscopy (N/A, 4/29/2016); Colon surgery; Esophagogastroduodenoscopy (N/A, 1/25/2019); and Flexible sigmoidoscopy (N/A, 1/25/2019).    Family History:family history includes Colon cancer in her paternal grandmother; Throat cancer in her father.    Allergies:   Review of patient's allergies indicates:   Allergen Reactions    Sulfa (sulfonamide antibiotics) Swelling       Social History: reports that she quit smoking about 22 years ago. Her smoking use included cigarettes. She has quit using smokeless tobacco. She reports that she does not drink alcohol or use drugs.    Home medications:   Current Outpatient Medications on File Prior to Visit   Medication Sig Dispense Refill    azaTHIOprine (IMURAN) 50 mg Tab TAKE 1 TABLET BY MOUTH ONCE DAILY 90 tablet 0    donepezil (ARICEPT) 10 MG tablet Take 10 mg by mouth every evening.      ergocalciferol (ERGOCALCIFEROL) 50,000 unit Cap Take 1 capsule (50,000 Units total) by mouth every 7 days. 12 capsule 3    ferrous sulfate 325 mg (65 mg iron) Tab tablet Take 1 tablet (325 mg total) by mouth 2 (two) times daily. 180 tablet 3    memantine (NAMENDA) 10 MG Tab Take 10 mg by mouth 2 (two) times daily.       QUEtiapine (SEROQUEL) 100 MG Tab TAKE 1 2 TABLET BY MOUTH EVERY NIGHT AT BEDTIME, THEN TAKE ONE TABLET BY MOUTH EVERY NIGHT AT BEDTIME THEREAFTER  1    [DISCONTINUED] ranitidine (ZANTAC) 150 MG tablet Take 1 tablet (150 mg total) by mouth 2 (two) times daily. 60 tablet 11    calcium carbonate (OS-NELSY) 600 mg (1,500 mg) Tab Take 1 tablet (600 mg total) by mouth 2 (two) times daily with meals. 180 tablet 3    diclofenac sodium (VOLTAREN) 1 % Gel Apply 2 g topically once daily. 100 g 3    hydrocodone-acetaminophen 5-325mg (NORCO) 5-325 mg per tablet       loperamide (IMODIUM A-D) 2 mg Tab Take 1 mg by mouth 2 (two) times daily  "before meals.      loperamide (IMODIUM) 1 mg/5 mL solution Take by mouth every 6 (six) hours as needed for Diarrhea.      MULTIVITS W-FE,OTHER MIN/LUT (CENTRUM SILVER ULTRA WOMEN'S ORAL) Take by mouth.       No current facility-administered medications on file prior to visit.        Vital signs:  /75   Ht 5' 4" (1.626 m)   Wt 101 kg (222 lb 10.6 oz)   LMP  (LMP Unknown)   BMI 38.22 kg/m²     Physical Exam   Constitutional: No distress.   HENT:   Head: Normocephalic and atraumatic.   Eyes: Conjunctivae are normal. No scleral icterus.   Cardiovascular: Normal rate and intact distal pulses.   Abdominal: Soft. She exhibits no distension and no mass. There is no tenderness. There is no rebound.   Skin: Skin is warm. No rash noted. No pallor.   Psychiatric: She has a normal mood and affect. Her behavior is normal.   Vitals reviewed.      Routine labs:  Lab Results   Component Value Date    WBC 4.50 08/10/2019    HGB 11.6 (L) 08/10/2019    HCT 35.1 (L) 08/10/2019    MCV 96 08/10/2019     08/10/2019     Lab Results   Component Value Date    INR 1.0 08/25/2014     Lab Results   Component Value Date    IRON 59 02/04/2019    FERRITIN 97 02/04/2019    TIBC 327 02/04/2019    FESATURATED 18 (L) 02/04/2019     Lab Results   Component Value Date     08/10/2019    K 4.2 08/10/2019     08/10/2019    CO2 27 08/10/2019    BUN 9 08/10/2019    CREATININE 1.2 08/10/2019     Lab Results   Component Value Date    ALBUMIN 3.3 (L) 08/10/2019    ALT 6 (L) 08/10/2019    AST 17 08/10/2019    ALKPHOS 101 08/10/2019    BILITOT 0.4 08/10/2019     No results found for: GLUCOSE    Labs reviewed  Normal LFTs  Mild chronic normocytic anemia with normal iron studies. Normal ferritin.    Imaging:  Reviewed      Assessment:    1. Ulcerative colitis with complication, unspecified location    2. S/P colectomy    3. Diarrhea, unspecified type    4. Gastroesophageal reflux disease, esophagitis presence not specified  "       Plan:    Orders Placed This Encounter    Stool culture    Clostridium difficile EIA    Occult blood x 1, stool    WBC, Stool    Giardia / Cryptosporidum, EIA    Stool Exam-Ova,Cysts,Parasites    pantoprazole (PROTONIX) 40 MG tablet    mesalamine (CANASA) 1000 MG Supp    diphenoxylate-atropine 2.5-0.025 mg (LOMOTIL) 2.5-0.025 mg per tablet     Stool studies. Rule out infection although suspicion low.  Switch zantac to protonix to see if helps upper symptoms.    Check for occult blood. If positive, need to consider VCE vs. CT enterography to rule out small bowel source.    canasa for rectal inflammation seen at last flex sig.   - Can consider oral 5ASA if she is ultimately unable to tolerate suppository    Continue immodium, will give lomotil to use PRN  Advised to use metamucil and make slurry to bulk up stool   - can consider bile sequestrant down the road.     Will need flex sig every 2 years, surveillance the remaining rectal tissue. Will need to weigh risk / benefit given medical comorbidities / dementia.    RTC  2 months    A total of 25 minutes were spent face-to-face with the patient during this encounter and over half of that time was spent on counseling and coordination of care.       Og Carrera MD  [facility] [place]

## 2019-09-24 NOTE — TELEPHONE ENCOUNTER
----- Message from Margaret Del Castillo sent at 9/24/2019  2:58 PM CDT -----  Contact: patient  Called to get a cheaper brand of medication prescribed for patient.       She can be reached at [phone]    Thanks  KB

## 2019-09-24 NOTE — TELEPHONE ENCOUNTER
----- Message from Sherie Guzman sent at 9/24/2019  9:22 AM CDT -----  Contact: Patient came in   Patient dropped off paper work to be completed by Dr. Brambila. Paperwork is placed in Family Medicine at the . Contact number for patient is [phone].

## 2019-09-24 NOTE — H&P (VIEW-ONLY)
Subjective:       Patient ID: Anabella Aranda is a 74 y.o. female.    Chief Complaint: No chief complaint on file.      Anabella Aranda is a 74 y.o. female with PMH significant for Dementia, h/o DVT status post IVC filter, ulcerative colitis, iron deficiency anemia, GERD,  Major depression with psychotic features, and history of hep B. She is here today for a pre-operative visit in preparation for a Left total knee arthroplasty to be performed by  Dr. Lindsay on 10/16/19.  Anabella Aranda has a >1 year history of Left knee pain.  Pain is worse with activity and weight bearing. Patient has experienced interference of ADLs due to increased pain and decreased range of motion. Patient has failed non-operative treatment including NSAIDs, activity modification, and bracing. Anabella Aranda ambulates Rolator walker.  There has been no significant change in medical status since last visit.  She was seen by PCP Dr. Brambila on 08/14/19 at which time chronic conditions were well controlled.     Past Medical History:   Diagnosis Date    Arthritis     C. difficile colitis 10/13/2014    4/15/2015    Hepatitis B     Hypertension     Ulcerative colitis 03/2014     Past Surgical History:   Procedure Laterality Date    CHOLECYSTECTOMY      COLON SURGERY      COLONOSCOPY N/A 4/29/2016    Procedure: COLONOSCOPY;  Surgeon: Umm Arenas MD;  Location: [facility];  Service: Endoscopy;  Laterality: N/A;    ESOPHAGOGASTRODUODENOSCOPY N/A 1/25/2019    Procedure: ESOPHAGOGASTRODUODENOSCOPY (EGD);  Surgeon: Jenise Hallman MD;  Location: [facility];  Service: Endoscopy;  Laterality: N/A;    FLEXIBLE SIGMOIDOSCOPY N/A 1/25/2019    Procedure: SIGMOIDOSCOPY, FLEXIBLE;  Surgeon: Jenise Hallman MD;  Location: [facility];  Service: Endoscopy;  Laterality: N/A;    BETZY FILTER PLACEMENT Right 01/2014    JOINT REPLACEMENT      knee    NASAL SINUS SURGERY      TONSILLECTOMY      TOTAL KNEE ARTHROPLASTY  Right 2008     Family History   Problem Relation Age of Onset    Throat cancer Father     Colon cancer Paternal Grandmother      Social History     Socioeconomic History    Marital status: Single     Spouse name: Not on file    Number of children: Not on file    Years of education: Not on file    Highest education level: Not on file   Occupational History    Not on file   Social Needs    Financial resource strain: Not on file    Food insecurity:     Worry: Not on file     Inability: Not on file    Transportation needs:     Medical: Not on file     Non-medical: Not on file   Tobacco Use    Smoking status: Former Smoker     Types: Cigarettes     Last attempt to quit: 1997     Years since quittin.1    Smokeless tobacco: Former User   Substance and Sexual Activity    Alcohol use: No    Drug use: No    Sexual activity: Not Currently   Lifestyle    Physical activity:     Days per week: Not on file     Minutes per session: Not on file    Stress: Not on file   Relationships    Social connections:     Talks on phone: Not on file     Gets together: Not on file     Attends Evangelical service: Not on file     Active member of club or organization: Not on file     Attends meetings of clubs or organizations: Not on file     Relationship status: Not on file   Other Topics Concern    Not on file   Social History Narrative    Not on file       Current Outpatient Medications   Medication Sig Dispense Refill    azaTHIOprine (IMURAN) 50 mg Tab TAKE 1 TABLET BY MOUTH ONCE DAILY 90 tablet 0    calcium carbonate (OS-NELSY) 600 mg (1,500 mg) Tab Take 1 tablet (600 mg total) by mouth 2 (two) times daily with meals. 180 tablet 3    diclofenac sodium (VOLTAREN) 1 % Gel Apply 2 g topically once daily. 100 g 3    diphenoxylate-atropine 2.5-0.025 mg (LOMOTIL) 2.5-0.025 mg per tablet Take 1 tablet by mouth 3 (three) times daily as needed for Diarrhea. 30 tablet 1    ergocalciferol (ERGOCALCIFEROL) 50,000 unit  Cap Take 1 capsule (50,000 Units total) by mouth every 7 days. 12 capsule 3    ferrous sulfate 325 mg (65 mg iron) Tab tablet Take 1 tablet (325 mg total) by mouth 2 (two) times daily. 180 tablet 3    hydrocodone-acetaminophen 5-325mg (NORCO) 5-325 mg per tablet       loperamide (IMODIUM A-D) 2 mg Tab Take 1 mg by mouth 2 (two) times daily before meals.      loperamide (IMODIUM) 1 mg/5 mL solution Take by mouth every 6 (six) hours as needed for Diarrhea.      mesalamine (CANASA) 1000 MG Supp Place 1 suppository (1,000 mg total) rectally nightly. 30 suppository 5    MULTIVITS W-FE,OTHER MIN/LUT (CENTRUM SILVER ULTRA WOMEN'S ORAL) Take by mouth.      pantoprazole (PROTONIX) 40 MG tablet Take 1 tablet (40 mg total) by mouth once daily. 30 tablet 11    QUEtiapine (SEROQUEL) 100 MG Tab TAKE 1 2 TABLET BY MOUTH EVERY NIGHT AT BEDTIME, THEN TAKE ONE TABLET BY MOUTH EVERY NIGHT AT BEDTIME THEREAFTER  1    donepezil (ARICEPT) 10 MG tablet Take 1 tablet (10 mg total) by mouth every evening. 90 tablet 3    memantine (NAMENDA) 10 MG Tab Take 1 tablet (10 mg total) by mouth 2 (two) times daily. 180 tablet 3    mesalamine (APRISO) 0.375 gram Cp24 Take 4 capsules (1.5 g total) by mouth once daily. 120 capsule 11     No current facility-administered medications for this visit.      Review of patient's allergies indicates:   Allergen Reactions    Sulfa (sulfonamide antibiotics) Swelling       Review of Systems   Constitutional: Negative for chills and fever.   HENT: Negative for trouble swallowing.    Respiratory: Negative for chest tightness and shortness of breath.    Cardiovascular: Negative for chest pain and palpitations.   Gastrointestinal: Positive for blood in stool. Negative for abdominal pain.   Genitourinary: Negative for dysuria and hematuria.   Musculoskeletal: Positive for arthralgias and gait problem.   Skin: Negative for rash and wound.   Allergic/Immunologic: Negative for immunocompromised state.    Neurological: Negative for dizziness, syncope and light-headedness.   Psychiatric/Behavioral: Negative for confusion and hallucinations.       Objective:      Vitals:    09/24/19 1310   BP: 125/66   BP Location: Right arm   Patient Position: Sitting   BP Method: Large (Automatic)   Pulse: 81   Temp: 98.6 °F (37 °C)   TempSrc: Oral     Physical Exam   Constitutional: She appears well-developed and well-nourished.   HENT:   Head: Normocephalic and atraumatic.   Cardiovascular: Normal rate, regular rhythm and normal heart sounds.   Pulmonary/Chest: Effort normal and breath sounds normal.   Neurological: She is alert.   Confused about upcoming surgery and medical hx. Alert to place and time.    Skin: Skin is dry. Capillary refill takes less than 2 seconds.   Psychiatric: She has a normal mood and affect. Her behavior is normal.   Vitals reviewed.    LEFT KNEE  Body habitus is overweight.  Respiratory distress:  none   The patient walks with a limp.  Hip irritability  negative.   The skin over the knee is intact. Old, traumatic puncture wound noted.  Knee effusion 1+  Tendernes is located medially  Range of motion- Flexion 100 deg, Extension 5 deg,   Ligament laxity exam:   MCL 2+   Lachman 0   Post sag  0    LCL 0  Patellar apprehension negative.  Popliteal cyst negative  Patellar crepitation present.  Flexion/pinch negative  Pulses DP present, PT present.  Motor normal 5/5 strength in all tested muscle groups.   Sensory normal.    Lab Review:   CBC:   Lab Results   Component Value Date    WBC 3.57 (L) 10/03/2019    RBC 3.96 (L) 10/03/2019    HGB 11.9 (L) 10/03/2019    HCT 37.0 10/03/2019     10/03/2019     BMP:   Lab Results   Component Value Date    GLU 90 10/03/2019     10/03/2019    K 3.8 10/03/2019     10/03/2019    CO2 25 10/03/2019    BUN 16 10/03/2019    CREATININE 1.4 10/03/2019    CALCIUM 9.6 10/03/2019     Diagnostics Review: X-Ray: Reviewed   EKG PENDING  Assessment:       1. Preop  examination        Plan:       EKG pending.  Son will contact PCP  I will send staff message to PCP and GI about DVT prophalaxis and medical optimization.  We discussed the risk of increased confusion after surgery. Both the pt and son would still like to proceed.   Plan for LEFT TKA ON 10/16/19     Pre, ger, and post operative procedures and expectations discussed. All questions were answered.   Anabella Aranda will contact us if there are any questions, concerns, or changes in medical status prior to surgery.     addendum:   I spoke with both the PCP and GI.  They  Both have no objection to upcoming surgery with  standard DVT prophylaxis.   at this point, the only barrier to surgery would be patient's cognitive awareness/ status

## 2019-09-24 NOTE — TELEPHONE ENCOUNTER
Spoke with patients son and the mesalamine suppositories were too expensive. With insurance they are $200. Patients son requesting a cheaper brand.

## 2019-09-24 NOTE — PATIENT INSTRUCTIONS
1. Switch zantac to daily protonix - for burning and indigestion  2. Start taking metamucil daily - mix with small amount of water to make a slurry  3. Continue immodium, can use the new prescription called lomotil as needed for diarrhea.  4. Start taking canasa suppository nightly    Stool studies.

## 2019-09-24 NOTE — TELEPHONE ENCOUNTER
----- Message from Og Carrera MD sent at 9/24/2019  2:09 PM CDT -----  Occult blood in stool positive.  May be related to rectal inflammation, but would like to proceed with CT enterography (then VCE if CTE negative.)  Will need to repeat labs, for contrast administration prior to CT and repeat cbc as well.    Will place orders.

## 2019-09-24 NOTE — LETTER
September 24, 2019      Shon Brambila MD  [address]  [address]  [facility] [address]           [facility]  [address]  [facility] [address]  Phone: [phone]          Patient: Anabella Aranda   MR Number: [id number]   YOB: 1945   Date of Visit: 9/24/2019       Dear Dr. Shon Brambila:    Thank you for referring Anabella Aranda to me for evaluation. Attached you will find relevant portions of my assessment and plan of care.    If you have questions, please do not hesitate to call me. I look forward to following Anabella Aranda along with you.    Sincerely,    Og Carrera MD    Enclosure  CC:  No Recipients    If you would like to receive this communication electronically, please contact [email or web address] or [phone] to request more information on [employer] access.    For providers and/or their staff who would like to refer a patient to [facility], please contact us through our one-stop-shop provider referral line, [facility], at [phone].    If you feel you have received this communication in error or would no longer like to receive these types of communications, please e-mail [email or web address]

## 2019-09-25 LAB
CRYPTOSP AG STL QL IA: NEGATIVE
E COLI SXT1 STL QL IA: NEGATIVE
E COLI SXT2 STL QL IA: NEGATIVE
G LAMBLIA AG STL QL IA: NEGATIVE

## 2019-09-27 LAB
BACTERIA STL CULT: NORMAL
O+P STL MICRO: NORMAL

## 2019-10-03 ENCOUNTER — HOSPITAL ENCOUNTER (OUTPATIENT)
Dept: RADIOLOGY | Facility: HOSPITAL | Age: 74
Discharge: HOME | End: 2019-10-03
Attending: INTERNAL MEDICINE
Payer: MEDICARE

## 2019-10-03 DIAGNOSIS — K52.9 INFLAMMATORY BOWEL DISEASE: ICD-10-CM

## 2019-10-03 DIAGNOSIS — K51.919 ULCERATIVE COLITIS WITH COMPLICATION, UNSPECIFIED LOCATION: ICD-10-CM

## 2019-10-03 DIAGNOSIS — K51.919 ULCERATIVE COLITIS WITH COMPLICATION, UNSPECIFIED LOCATION: Primary | ICD-10-CM

## 2019-10-03 PROCEDURE — 25500020 PHARM REV CODE 255: Performed by: INTERNAL MEDICINE

## 2019-10-03 PROCEDURE — 74177 CT ABD & PELVIS W/CONTRAST: CPT | Mod: 26,,, | Performed by: RADIOLOGY

## 2019-10-03 PROCEDURE — 74177 CT ENTEROGRAPHY ABD_PELVIS WITH CONTRAST: ICD-10-PCS | Mod: 26,,, | Performed by: RADIOLOGY

## 2019-10-03 PROCEDURE — 74177 CT ABD & PELVIS W/CONTRAST: CPT | Mod: TC

## 2019-10-03 RX ADMIN — IOHEXOL 150 ML: 350 INJECTION, SOLUTION INTRAVENOUS at 12:10

## 2019-10-08 ENCOUNTER — TELEPHONE (OUTPATIENT)
Dept: GASTROENTEROLOGY | Facility: CLINIC | Age: 74
End: 2019-10-08

## 2019-10-08 NOTE — TELEPHONE ENCOUNTER
Spoke with patients son Bryson and patient still has c/o diarrhea. She is currently taking imodium, lomotil, also metamucil. Bryson stated that patient has been complaining of diarrhea since office visit on 9/24/19 with no relief. Please advise.

## 2019-10-08 NOTE — TELEPHONE ENCOUNTER
----- Message from Veronica Dominguez sent at 10/8/2019  2:16 PM CDT -----  Contact: Peter Massey [phone]    Patient's son is calling to let you know pt is having severe diarrhea from medication, does not know the name. Please advise.

## 2019-10-09 ENCOUNTER — HOSPITAL ENCOUNTER (OUTPATIENT)
Dept: PREADMISSION TESTING | Facility: HOSPITAL | Age: 74
Discharge: HOME | End: 2019-10-09
Attending: ORTHOPAEDIC SURGERY
Payer: MEDICARE

## 2019-10-09 ENCOUNTER — ANESTHESIA EVENT (OUTPATIENT)
Dept: SURGERY | Facility: HOSPITAL | Age: 74
DRG: 469 | End: 2019-10-09
Payer: MEDICARE

## 2019-10-09 ENCOUNTER — CLINICAL SUPPORT (OUTPATIENT)
Dept: LAB | Facility: HOSPITAL | Age: 74
End: 2019-10-09
Attending: ORTHOPAEDIC SURGERY
Payer: MEDICARE

## 2019-10-09 VITALS
HEIGHT: 64 IN | TEMPERATURE: 98 F | HEART RATE: 100 BPM | SYSTOLIC BLOOD PRESSURE: 134 MMHG | DIASTOLIC BLOOD PRESSURE: 66 MMHG | OXYGEN SATURATION: 96 % | RESPIRATION RATE: 18 BRPM | WEIGHT: 212.5 LBS | BODY MASS INDEX: 36.28 KG/M2

## 2019-10-09 DIAGNOSIS — Z01.818 PREOP EXAMINATION: ICD-10-CM

## 2019-10-09 PROCEDURE — 93010 ELECTROCARDIOGRAM REPORT: CPT | Mod: ,,, | Performed by: INTERNAL MEDICINE

## 2019-10-09 PROCEDURE — 93010 EKG 12-LEAD: ICD-10-PCS | Mod: ,,, | Performed by: INTERNAL MEDICINE

## 2019-10-09 PROCEDURE — 93005 ELECTROCARDIOGRAM TRACING: CPT

## 2019-10-09 RX ORDER — SODIUM CHLORIDE, SODIUM LACTATE, POTASSIUM CHLORIDE, CALCIUM CHLORIDE 600; 310; 30; 20 MG/100ML; MG/100ML; MG/100ML; MG/100ML
INJECTION, SOLUTION INTRAVENOUS CONTINUOUS
Status: CANCELLED | OUTPATIENT
Start: 2019-10-09

## 2019-10-09 RX ORDER — LIDOCAINE HYDROCHLORIDE 10 MG/ML
1 INJECTION, SOLUTION EPIDURAL; INFILTRATION; INTRACAUDAL; PERINEURAL ONCE
Status: CANCELLED | OUTPATIENT
Start: 2019-10-09 | End: 2019-10-09

## 2019-10-09 NOTE — ANESTHESIA PREPROCEDURE EVALUATION
10/09/2019  Anabella Aranda is a 74 y.o., female scheduled for left TKA under spinal/MAC/gen on 10/16/19.    Past Medical History:   Diagnosis Date    Arthritis     C. difficile colitis 10/13/2014    4/15/2015    Hepatitis B     Hypertension     Ulcerative colitis 03/2014     Past Surgical History:   Procedure Laterality Date    CHOLECYSTECTOMY      COLON SURGERY      COLONOSCOPY N/A 4/29/2016    Procedure: COLONOSCOPY;  Surgeon: Umm Arenas MD;  Location: [facility];  Service: Endoscopy;  Laterality: N/A;    ESOPHAGOGASTRODUODENOSCOPY N/A 1/25/2019    Procedure: ESOPHAGOGASTRODUODENOSCOPY (EGD);  Surgeon: Jenise Hallman MD;  Location: [facility];  Service: Endoscopy;  Laterality: N/A;    FLEXIBLE SIGMOIDOSCOPY N/A 1/25/2019    Procedure: SIGMOIDOSCOPY, FLEXIBLE;  Surgeon: Jenise Hallman MD;  Location: [facility];  Service: Endoscopy;  Laterality: N/A;    BETZY FILTER PLACEMENT Right 01/2014    JOINT REPLACEMENT Left     knee    NASAL SINUS SURGERY      TONSILLECTOMY      TOTAL KNEE ARTHROPLASTY Right 04/07/2008       Anesthesia Evaluation    I have reviewed the Patient Summary Reports.    I have reviewed the Nursing Notes.   I have reviewed the Medications.     Review of Systems  Anesthesia Hx:  No problems with previous Anesthesia  Denies Family Hx of Anesthesia complications.    Social:  Non-Smoker, No Alcohol Use    Hematology/Oncology:  Hematology Normal        Cardiovascular:   Hypertension, well controlled  Denies Angina.  Deep Venous Thrombosis (DVT) (5 yrs ago s/p IVC filter)    Pulmonary:  Pulmonary Normal  Denies Shortness of breath.    Renal/:   Chronic Renal Disease (CKD)    Hepatic/GI:   Hiatal Hernia, GERD  Bowel Conditions:  Inflammatory Bowel Disease, Ulcerative Colitis    Neurological:  Dementia    Endocrine:  Endocrine Normal        Physical  Exam  General:  Obesity    Airway/Jaw/Neck:  Airway Findings: Mouth Opening: Normal Tongue: Normal  General Airway Assessment: Adult  Mallampati: III      Dental:  DENTAL FINDINGS: Normal   Chest/Lungs:  Chest/Lungs Findings: Clear to auscultation, Normal Respiratory Rate     Heart/Vascular:  Heart Findings: Rate: Tachycardia  Rhythm: Regular Rhythm  Sounds: Normal        Mental Status:  Mental Status Findings:  Cooperative, Alert and Oriented         Anesthesia Plan  Type of Anesthesia, risks & benefits discussed:  Anesthesia Type:  spinal, MAC, regional, general  Patient's Preference:   Intra-op Monitoring Plan: standard ASA monitors  Intra-op Monitoring Plan Comments:   Post Op Pain Control Plan: multimodal analgesia  Post Op Pain Control Plan Comments:   Induction:   IV  Beta Blocker:  Patient is not currently on a Beta-Blocker (No further documentation required).       Informed Consent:    ASA Score: 3     Day of Surgery Review of History & Physical: I have interviewed and examined the patient. I have reviewed the patient's H&P dated:            Ready For Surgery From Anesthesia Perspective.

## 2019-10-09 NOTE — DISCHARGE INSTRUCTIONS
Your surgery is scheduled for 10/16    Please report to Procedure Check In Room on the 2nd FLOOR at 0730a.m.     We will call  you 10/15 after 2pm to verify your arrival time.     INSTRUCTIONS IMPORTANT!!!  ¨ Do not eat or drink after 12 midnight-including water. OK to brush teeth, no   gum, candy or mints!    ¨ Take only these medicines with a small swallow of water-morning of surgery.        ____  Proceed to [facility] on *** for additional testing.        ____  Do not wear makeup, including mascara.  ____  No powder, lotions or creams to surgical area.  ____  Please remove all jewelry, including piercings and leave at home.  ____  No money or valuables needed. Please leave at home.  ____  Please bring any documents given by your doctor.  ____  If going home the same day, arrange for a ride home. You will not be able to             drive if Anesthesia was used.  ____  Children under 18 years require a parent / guardian present the entire time             they are in surgery / recovery.  ____  Wear loose fitting clothing. Allow for dressings, bandages.  ____  Stop Aspirin, Ibuprofen, Motrin and Aleve at least 3-5 days before surgery, unless otherwise instructed by your doctor, or the nurse.   You MAY use Tylenol/acetaminophen until day of surgery.  ____  Wash the surgical area with Hibiclens the night before surgery, and again the             morning of surgery.  Be sure to rinse hibiclens off completely (if instructed by   nurse).  ____  If you take diabetic medication, do not take am of surgery unless instructed by Doctor.  ____  Call MD for temperature above 101 degrees or any other signs of infection such as Urinary (bladder) infection, Upper respiratory infection, skin boils, etc.  ____ Stop taking any Fish Oil supplement or any Vitamins that contain Vitamin E at least 5 days prior to surgery.  ____ Do Not wear your contact lenses the day of your procedure.  You may wear your glasses.       ____Do not shave surgical site for 3 days prior to surgery.  ____ Practice Good hand washing before, during, and after procedure.      I have read or had read and explained to me, and understand the above information.  Additional comments or instructions:  For additional questions call [phone]      ANESTHESIA SIDE EFFECTS  -For the first 24 hours after surgery:  Do not drive, use heavy equipment, make important decisions, or drink alcohol  -It is normal to feel sleepy for several hours.  Rest until you are more awake.  -Have someone stay with you, if needed.  They can watch for problems and help keep you safe.  -Some possible post anesthesia side effects include: nausea and vomiting, sore throat and hoarseness, sleepiness, and dizziness.        Pre-Op Bathing Instructions    Before surgery, you can play an important role in your own health.    Because skin is not sterile, we need to be sure that your skin is as free of germs as possible. By following the instructions below, you can reduce the number of germs on your skin before surgery.    IMPORTANT: You will need to shower with a special soap called Hibiclens*, available at any pharmacy.  If you are allergic to Chlorhexidine (the antiseptic in Hibiclens), use an antibacterial soap such as Dial Soap for your preoperative shower.  You will shower with Hibiclens both the night before your surgery and the morning of your surgery.  Do not use Hibiclens on the head, face or genitals to avoid injury to those areas.    STEP #1: THE NIGHT BEFORE YOUR SURGERY     1. Do not shave the area of your body where your surgery will be performed.  2. Shower and wash your hair and body as usual with your normal soap and shampoo.  3. Rinse your hair and body thoroughly after you shower to remove all soap residue.  4. With your hand, apply one packet of Hibiclens soap to the surgical site.   5. Wash the site gently for five (5) minutes. Do not scrub your skin too hard.   6. Do not  wash with your regular soap after Hibiclens is used.  7. Rinse your body thoroughly.  8. Pat yourself dry with a clean, soft towel.  9. Do not use lotion, cream, or powder.  10. Wear clean clothes.    STEP #2: THE MORNING OF YOUR SURGERY     1. Repeat Step #1.    * Not to be used by people allergic to Chlorhexidine.      Total Knee Replacement  During total knee replacement surgery, your damaged knee joint is replaced with an artificial joint, called a prosthesis. This surgery almost always reduces joint pain and improves your quality of life.     The parts of the prosthesis are secured to the bones of the knee. Together they form the new joint.   Before your surgery  You will most likely arrive at the hospital on the morning of the surgery. Be sure to follow all of your doctors instructions on preparing for surgery:  · Follow any directions you are given for taking medicines or for not eating or drinking before surgery.  · At the hospital, your temperature, pulse, breathing, and blood pressure will be checked.  · An IV (intravenous) line will be started to provide fluids and medicines needed during surgery.  The surgical procedure  When the surgical team is ready, youll be taken to the operating room. There youll be given anesthesia to help you sleep through surgery, or to make you numb from the waist down. Then an incision is made on the front or side of your knee. Any damaged bone is cleaned away, and the new joint components are put into place. The incision is closed with surgical staples or stitches.  After your surgery  After surgery, youll be sent to the recovery room. When you are fully awake, youll be moved to your room. The nurses will give you medicines to ease your pain. You may have a catheter (small tube) in your bladder. A continuous passive motion machine may be used on your knee to keep it from getting stiff. A sequential compression machine may be used to prevent blood clots by gently  squeezing then releasing your leg. You may be given medicine to prevent blood clots. Soon, healthcare providers will help you get up and moving.  When to call your doctor  Once at home, call your doctor if you have any of the symptoms below:  · An increase in knee pain  · Pain or swelling in the calf or leg  · Unusual redness, heat, or drainage at the incision site  · Fever of 100.4°F (38°C) or higher  · Shaking chills  · Trouble breathing or chest pain (call 911)   Date Last Reviewed: 9/20/2015  © 2958-0494 [employer]. [address] [place] [address]. All rights reserved. This information is not intended as a substitute for professional medical care. Always follow your healthcare professional's instructions.        Types of Anesthesia  Your anesthesiologist is a key member of your surgical team. He or she gives you anesthetics (medications to keep you comfortable and decrease your awareness of surgery) and monitors your condition to keep you safe during surgery. You will have 1 of 3 kinds of anesthesia during your surgery.  Monitored anesthesia care (MAC)  · Often used for surgery that is short or not too invasive.  · Sedatives (medicines to relax you) are given through an IV (intravenous) line.  · The area around the surgical site is usually numbed with a local anesthetic.  · You may choose to remain awake or sleep lightly.  Regional anesthesia (sometimes called spinal epidural or genny block)  · Often used for surgery on the arms, legs, and abdomen. It is also used during childbirth.  · A specific region of your body is numbed by injecting anesthetic near nerves, near your spine, or near the operative site.  · You may also be given sedatives through an IV line to relax you.  · With regional anesthesia, you may choose to remain awake or sleep lightly.  General anesthesia  · Often used for extensive surgery, such as on the heart, brain, or abdominal operation.  · Also used when the patient  wants to be totally asleep.  · May be given as a gas that you breathe and as medicines that are injected through an IV line.  · Because you are asleep, you feel no pain and remember nothing of the surgery.  The risks and complications of anesthesia depend on your overall health. If you are healthy, the risks are low. The risks are higher for patients with heart or lung problems. Your anesthesiologist or nurse anesthetist will discuss the risks with you.   Date Last Reviewed: 12/1/2016 © 2000-2017 [employer]. [address] [place] [address]. All rights reserved. This information is not intended as a substitute for professional medical care. Always follow your healthcare professional's instructions.

## 2019-10-09 NOTE — TELEPHONE ENCOUNTER
Spoke with chelsie.  Still having loose stools and urgency and some incontinence.  Advised lomotil PRN and immodium prn , can alternate    Still taking apriso.    All questions answerd.

## 2019-10-09 NOTE — PRE-PROCEDURE INSTRUCTIONS
Being admitted.    Allergies, medical, surgical, family and psychosocial histories reviewed with patient. Periop plan of care reviewed. Preop instructions given, including medications to take and to hold. Hibiclens soap and instructions on use given. Time allotted for questions to be addressed.  Patient verbalized understanding.

## 2019-10-10 ENCOUNTER — OFFICE VISIT (OUTPATIENT)
Dept: ORTHOPEDICS | Facility: CLINIC | Age: 74
End: 2019-10-10
Payer: MEDICARE

## 2019-10-10 VITALS — WEIGHT: 212 LBS | BODY MASS INDEX: 36.19 KG/M2 | HEIGHT: 64 IN

## 2019-10-10 DIAGNOSIS — M17.12 PRIMARY OSTEOARTHRITIS OF LEFT KNEE: Primary | ICD-10-CM

## 2019-10-10 PROCEDURE — 99212 OFFICE O/P EST SF 10 MIN: CPT | Mod: PBBFAC,PN | Performed by: ORTHOPAEDIC SURGERY

## 2019-10-10 PROCEDURE — 99214 PR OFFICE/OUTPT VISIT, EST, LEVL IV, 30-39 MIN: ICD-10-PCS | Mod: S$PBB,,, | Performed by: ORTHOPAEDIC SURGERY

## 2019-10-10 PROCEDURE — 99999 PR PBB SHADOW E&M-EST. PATIENT-LVL II: CPT | Mod: PBBFAC,,, | Performed by: ORTHOPAEDIC SURGERY

## 2019-10-10 PROCEDURE — 99999 PR PBB SHADOW E&M-EST. PATIENT-LVL II: ICD-10-PCS | Mod: PBBFAC,,, | Performed by: ORTHOPAEDIC SURGERY

## 2019-10-10 PROCEDURE — 99214 OFFICE O/P EST MOD 30 MIN: CPT | Mod: S$PBB,,, | Performed by: ORTHOPAEDIC SURGERY

## 2019-10-10 NOTE — PROGRESS NOTES
Subjective:      Patient ID: Anabella Aranda is a 74 y.o. female.    Chief Complaint: Follow-up of the Left Knee    HPI the patient is seen along with son.  The purpose of the visit was to ascertain that the patient and her family desired to proceed with left total knee replacement. The patient and her family confirm that she has increasing pain in her left knee and that she is comfortable with her right total knee. The patient stated that she wished that her left knee felt the same as her right.  Review of Systems   Constitution: Negative for fever and weight loss.   HENT: Negative for congestion.    Eyes: Negative for visual disturbance.   Cardiovascular: Negative for chest pain.   Respiratory: Negative for shortness of breath.    Hematologic/Lymphatic: Negative for bleeding problem. Does not bruise/bleed easily.   Skin: Negative for poor wound healing.   Musculoskeletal: Positive for joint pain and joint swelling.   Gastrointestinal: Negative for abdominal pain.   Genitourinary: Negative for dysuria.   Neurological: Negative for seizures.   Psychiatric/Behavioral: Negative for altered mental status.   Allergic/Immunologic: Negative for persistent infections.         Objective:      Ortho/SPM Exam      Left knee      The patient is not in acute distress.   Sclerae normal  Body habitus is overweight.  Respiratory distress:  none   The patient walks with a limp.  Hip irritability  negative.   The skin over the knee is intact. Old, traumatic puncture wound noted.  Knee effusion 1+  Tendernes is located medially  Range of motion- Flexion 100 deg, Extension 5 deg,   Ligament laxity exam:   MCL 2+   Lachman 0   Post sag  0    LCL 0  Patellar apprehension negative.  Popliteal cyst negative  Patellar crepitation present.  Flexion/pinch negative  Pulses DP present, PT present.  Motor normal 5/5 strength in all tested muscle groups.   Sensory normal.        Radiographs previously confirmed advanced osteoarthritis of the  left knee  Assessment:       Encounter Diagnosis   Name Primary?    Primary osteoarthritis of left knee Yes        The condition objectively advanced.  Despite the patient's cognitive issues, she and her family confirm that her left knee limits her ability to move around the community and causes her constant pain.    Considering this, left knee arthroplasty is the only intervention likely to be of significant benefit in improving the situation.      Plan:       Anabella was seen today for follow-up.    Diagnoses and all orders for this visit:    Primary osteoarthritis of left knee          I explained my diagnostic impression and the reasoning behind it in detail, using layman's terms.  I explained that nonsurgical care is possible for palliative reasons, but progression is likely, and future surgery would be more difficult in terms of stress to the patient's medical and cognitive status.    I specifically explained that in addition to complications previously discussed that the patient is likely to have increased confusion for significant amount time after surgery and that it may slow her rehabilitation.  I explained the usual course of rehabilitation is 6-12 weeks.  They understood and accepted that wish to proceed with left knee replacement

## 2019-10-14 ENCOUNTER — TELEPHONE (OUTPATIENT)
Dept: GASTROENTEROLOGY | Facility: CLINIC | Age: 74
End: 2019-10-14

## 2019-10-14 NOTE — TELEPHONE ENCOUNTER
Spoke with chelsie.  Patient reporting going to bathroom multiple times but he cannot confirm this.  He did say she soiled herself one time and it was very tiny amount of loose dark stool (she takes iron).  Her albumin and K values have been completely normal of recent, with favors against high volume diarrhea or poor nutritional values.    Advised close monitoring and we can repeat labs in future if need be.  All questions answered.

## 2019-10-14 NOTE — TELEPHONE ENCOUNTER
Spoke with patients son Bryson, patient has not gotten out of the bed since Thursday. She has c/o diarrhea. Bryson does not know if she is eating correctly. He said that she just has black diarrhea, and sometimes she does not make it to the bathroom. She is currently taking Lomotil, Imodium, and Apriso.

## 2019-10-14 NOTE — TELEPHONE ENCOUNTER
----- Message from Heidi Hart sent at 10/14/2019  3:08 PM CDT -----  Contact: [phone] Bryson son  Pt's son is calling to inform you that the pt is having serve diarrhea and is refusing to get out of the bed. He states that she only get out of the bed to use the bathroom. Pt's son would also like to inform you that she is having a procedure with Dr. Lindsay on Wednesday 10/16. Please advise

## 2019-10-16 ENCOUNTER — ANESTHESIA (OUTPATIENT)
Dept: SURGERY | Facility: HOSPITAL | Age: 74
DRG: 469 | End: 2019-10-16
Payer: MEDICARE

## 2019-10-16 ENCOUNTER — HOSPITAL ENCOUNTER (INPATIENT)
Facility: HOSPITAL | Age: 74
LOS: 13 days | Discharge: NURSING, REHAB OR LONG-TERM CARE | DRG: 469 | End: 2019-10-29
Attending: ORTHOPAEDIC SURGERY | Admitting: ORTHOPAEDIC SURGERY
Payer: MEDICARE

## 2019-10-16 DIAGNOSIS — Z96.652 HISTORY OF LEFT KNEE REPLACEMENT: Primary | ICD-10-CM

## 2019-10-16 DIAGNOSIS — M17.12 PRIMARY OSTEOARTHRITIS OF LEFT KNEE: ICD-10-CM

## 2019-10-16 DIAGNOSIS — K51.219 ULCERATIVE PROCTITIS WITH COMPLICATION: Chronic | ICD-10-CM

## 2019-10-16 DIAGNOSIS — M25.562 CHRONIC PAIN OF BOTH KNEES: ICD-10-CM

## 2019-10-16 DIAGNOSIS — G30.1 LATE ONSET ALZHEIMER'S DISEASE WITHOUT BEHAVIORAL DISTURBANCE: ICD-10-CM

## 2019-10-16 DIAGNOSIS — M25.561 CHRONIC PAIN OF BOTH KNEES: ICD-10-CM

## 2019-10-16 DIAGNOSIS — K51.219 ULCERATIVE PROCTITIS WITH COMPLICATION: Primary | ICD-10-CM

## 2019-10-16 DIAGNOSIS — G89.29 CHRONIC PAIN OF BOTH KNEES: ICD-10-CM

## 2019-10-16 DIAGNOSIS — R00.0 TACHYCARDIA: ICD-10-CM

## 2019-10-16 DIAGNOSIS — F02.80 LATE ONSET ALZHEIMER'S DISEASE WITHOUT BEHAVIORAL DISTURBANCE: ICD-10-CM

## 2019-10-16 DIAGNOSIS — D50.9 IRON DEFICIENCY ANEMIA, UNSPECIFIED IRON DEFICIENCY ANEMIA TYPE: Chronic | ICD-10-CM

## 2019-10-16 DIAGNOSIS — R19.7 DIARRHEA, UNSPECIFIED TYPE: ICD-10-CM

## 2019-10-16 PROBLEM — M17.0 PRIMARY OSTEOARTHRITIS OF BOTH KNEES: Chronic | Status: ACTIVE | Noted: 2019-08-14

## 2019-10-16 PROBLEM — N17.9 ACUTE RENAL FAILURE SUPERIMPOSED ON STAGE 3 CHRONIC KIDNEY DISEASE: Status: ACTIVE | Noted: 2019-10-16

## 2019-10-16 PROBLEM — E66.01 MORBID OBESITY: Chronic | Status: ACTIVE | Noted: 2017-10-17

## 2019-10-16 PROBLEM — N18.30 ACUTE RENAL FAILURE SUPERIMPOSED ON STAGE 3 CHRONIC KIDNEY DISEASE: Status: ACTIVE | Noted: 2019-10-16

## 2019-10-16 PROBLEM — F32.3 MAJOR DEPRESSION WITH PSYCHOTIC FEATURES: Chronic | Status: ACTIVE | Noted: 2018-04-30

## 2019-10-16 LAB
ALBUMIN SERPL BCP-MCNC: 2.6 G/DL (ref 3.5–5.2)
ALP SERPL-CCNC: 103 U/L (ref 55–135)
ALT SERPL W/O P-5'-P-CCNC: 12 U/L (ref 10–44)
ANION GAP SERPL CALC-SCNC: 11 MMOL/L (ref 8–16)
AST SERPL-CCNC: 14 U/L (ref 10–40)
BASOPHILS # BLD AUTO: 0.01 K/UL (ref 0–0.2)
BASOPHILS NFR BLD: 0.1 % (ref 0–1.9)
BILIRUB SERPL-MCNC: 0.3 MG/DL (ref 0.1–1)
BUN SERPL-MCNC: 42 MG/DL (ref 8–23)
CALCIUM SERPL-MCNC: 8.4 MG/DL (ref 8.7–10.5)
CHLORIDE SERPL-SCNC: 99 MMOL/L (ref 95–110)
CO2 SERPL-SCNC: 22 MMOL/L (ref 23–29)
CREAT SERPL-MCNC: 4.8 MG/DL (ref 0.5–1.4)
DIFFERENTIAL METHOD: ABNORMAL
EOSINOPHIL # BLD AUTO: 0 K/UL (ref 0–0.5)
EOSINOPHIL NFR BLD: 0.4 % (ref 0–8)
ERYTHROCYTE [DISTWIDTH] IN BLOOD BY AUTOMATED COUNT: 12.9 % (ref 11.5–14.5)
EST. GFR  (AFRICAN AMERICAN): 10 ML/MIN/1.73 M^2
EST. GFR  (NON AFRICAN AMERICAN): 8 ML/MIN/1.73 M^2
GLUCOSE SERPL-MCNC: 114 MG/DL (ref 70–110)
HCT VFR BLD AUTO: 32 % (ref 37–48.5)
HGB BLD-MCNC: 10.9 G/DL (ref 12–16)
INR PPP: 1.1 (ref 0.8–1.2)
LYMPHOCYTES # BLD AUTO: 0.7 K/UL (ref 1–4.8)
LYMPHOCYTES NFR BLD: 7.7 % (ref 18–48)
MCH RBC QN AUTO: 30 PG (ref 27–31)
MCHC RBC AUTO-ENTMCNC: 34.1 G/DL (ref 32–36)
MCV RBC AUTO: 88 FL (ref 82–98)
MONOCYTES # BLD AUTO: 0.8 K/UL (ref 0.3–1)
MONOCYTES NFR BLD: 8.7 % (ref 4–15)
NEUTROPHILS # BLD AUTO: 7.8 K/UL (ref 1.8–7.7)
NEUTROPHILS NFR BLD: 83.1 % (ref 38–73)
PLATELET # BLD AUTO: 312 K/UL (ref 150–350)
PMV BLD AUTO: 8.4 FL (ref 9.2–12.9)
POTASSIUM SERPL-SCNC: 4.2 MMOL/L (ref 3.5–5.1)
PROT SERPL-MCNC: 6.8 G/DL (ref 6–8.4)
PROTHROMBIN TIME: 11.7 SEC (ref 9–12.5)
RBC # BLD AUTO: 3.63 M/UL (ref 4–5.4)
SODIUM SERPL-SCNC: 132 MMOL/L (ref 136–145)
WBC # BLD AUTO: 9.47 K/UL (ref 3.9–12.7)

## 2019-10-16 PROCEDURE — 37000008 HC ANESTHESIA 1ST 15 MINUTES: Performed by: ORTHOPAEDIC SURGERY

## 2019-10-16 PROCEDURE — 25000003 PHARM REV CODE 250: Performed by: STUDENT IN AN ORGANIZED HEALTH CARE EDUCATION/TRAINING PROGRAM

## 2019-10-16 PROCEDURE — 37000009 HC ANESTHESIA EA ADD 15 MINS: Performed by: ORTHOPAEDIC SURGERY

## 2019-10-16 PROCEDURE — 27447 PR TOTAL KNEE ARTHROPLASTY: ICD-10-PCS | Mod: AS,LT,, | Performed by: ORTHOPAEDIC SURGERY

## 2019-10-16 PROCEDURE — 63600175 PHARM REV CODE 636 W HCPCS: Performed by: STUDENT IN AN ORGANIZED HEALTH CARE EDUCATION/TRAINING PROGRAM

## 2019-10-16 PROCEDURE — 63600175 PHARM REV CODE 636 W HCPCS: Performed by: ANESTHESIOLOGY

## 2019-10-16 PROCEDURE — C9290 INJ, BUPIVACAINE LIPOSOME: HCPCS | Performed by: STUDENT IN AN ORGANIZED HEALTH CARE EDUCATION/TRAINING PROGRAM

## 2019-10-16 PROCEDURE — 94761 N-INVAS EAR/PLS OXIMETRY MLT: CPT

## 2019-10-16 PROCEDURE — 80053 COMPREHEN METABOLIC PANEL: CPT

## 2019-10-16 PROCEDURE — 27201423 OPTIME MED/SURG SUP & DEVICES STERILE SUPPLY: Performed by: ORTHOPAEDIC SURGERY

## 2019-10-16 PROCEDURE — 36000710: Performed by: ORTHOPAEDIC SURGERY

## 2019-10-16 PROCEDURE — 27447 TOTAL KNEE ARTHROPLASTY: CPT | Mod: AS,LT,, | Performed by: ORTHOPAEDIC SURGERY

## 2019-10-16 PROCEDURE — 71000039 HC RECOVERY, EACH ADD'L HOUR: Performed by: ORTHOPAEDIC SURGERY

## 2019-10-16 PROCEDURE — 71000033 HC RECOVERY, INTIAL HOUR: Performed by: ORTHOPAEDIC SURGERY

## 2019-10-16 PROCEDURE — S0020 INJECTION, BUPIVICAINE HYDRO: HCPCS | Performed by: ANESTHESIOLOGY

## 2019-10-16 PROCEDURE — 25000003 PHARM REV CODE 250: Performed by: ORTHOPAEDIC SURGERY

## 2019-10-16 PROCEDURE — 27447 PR TOTAL KNEE ARTHROPLASTY: ICD-10-PCS | Mod: LT,,, | Performed by: ORTHOPAEDIC SURGERY

## 2019-10-16 PROCEDURE — 85025 COMPLETE CBC W/AUTO DIFF WBC: CPT

## 2019-10-16 PROCEDURE — 63600175 PHARM REV CODE 636 W HCPCS: Performed by: NURSE PRACTITIONER

## 2019-10-16 PROCEDURE — 25000003 PHARM REV CODE 250: Performed by: ANESTHESIOLOGY

## 2019-10-16 PROCEDURE — 85610 PROTHROMBIN TIME: CPT

## 2019-10-16 PROCEDURE — 64447 NJX AA&/STRD FEMORAL NRV IMG: CPT | Performed by: STUDENT IN AN ORGANIZED HEALTH CARE EDUCATION/TRAINING PROGRAM

## 2019-10-16 PROCEDURE — 20000000 HC ICU ROOM

## 2019-10-16 PROCEDURE — 36415 COLL VENOUS BLD VENIPUNCTURE: CPT

## 2019-10-16 PROCEDURE — 63600175 PHARM REV CODE 636 W HCPCS: Performed by: ORTHOPAEDIC SURGERY

## 2019-10-16 PROCEDURE — 27201121 HC TRAY,SPINAL-HYPERBARIC: Performed by: STUDENT IN AN ORGANIZED HEALTH CARE EDUCATION/TRAINING PROGRAM

## 2019-10-16 PROCEDURE — 36000711: Performed by: ORTHOPAEDIC SURGERY

## 2019-10-16 PROCEDURE — 27447 TOTAL KNEE ARTHROPLASTY: CPT | Mod: LT,,, | Performed by: ORTHOPAEDIC SURGERY

## 2019-10-16 PROCEDURE — C1713 ANCHOR/SCREW BN/BN,TIS/BN: HCPCS | Performed by: ORTHOPAEDIC SURGERY

## 2019-10-16 PROCEDURE — C1776 JOINT DEVICE (IMPLANTABLE): HCPCS | Performed by: ORTHOPAEDIC SURGERY

## 2019-10-16 DEVICE — BASEPLATE TIBIAL CEM MOD SZ 2: Type: IMPLANTABLE DEVICE | Site: KNEE | Status: FUNCTIONAL

## 2019-10-16 DEVICE — CEMENT BONE IMPLANT: Type: IMPLANTABLE DEVICE | Site: KNEE | Status: FUNCTIONAL

## 2019-10-16 DEVICE — COMPONENT PATELLA PFCSIG 32MM: Type: IMPLANTABLE DEVICE | Site: KNEE | Status: FUNCTIONAL

## 2019-10-16 DEVICE — KIT PIN STEINMANN PFC .025X5IN: Type: IMPLANTABLE DEVICE | Site: KNEE | Status: FUNCTIONAL

## 2019-10-16 DEVICE — COMPONENT FEM POST SZ 3 LEFT: Type: IMPLANTABLE DEVICE | Site: KNEE | Status: FUNCTIONAL

## 2019-10-16 RX ORDER — EPHEDRINE SULFATE 50 MG/ML
INJECTION, SOLUTION INTRAVENOUS
Status: COMPLETED
Start: 2019-10-16 | End: 2019-10-16

## 2019-10-16 RX ORDER — OXYCODONE HYDROCHLORIDE 5 MG/1
5 TABLET ORAL
Status: DISCONTINUED | OUTPATIENT
Start: 2019-10-16 | End: 2019-10-29 | Stop reason: HOSPADM

## 2019-10-16 RX ORDER — BISACODYL 10 MG
10 SUPPOSITORY, RECTAL RECTAL DAILY PRN
Status: DISCONTINUED | OUTPATIENT
Start: 2019-10-16 | End: 2019-10-19

## 2019-10-16 RX ORDER — LACTULOSE 10 G/15ML
20 SOLUTION ORAL EVERY 6 HOURS PRN
Status: DISCONTINUED | OUTPATIENT
Start: 2019-10-16 | End: 2019-10-19

## 2019-10-16 RX ORDER — ACETAMINOPHEN 500 MG
1000 TABLET ORAL 3 TIMES DAILY
Status: DISCONTINUED | OUTPATIENT
Start: 2019-10-16 | End: 2019-10-29 | Stop reason: HOSPADM

## 2019-10-16 RX ORDER — CEFAZOLIN SODIUM 2 G/50ML
2 SOLUTION INTRAVENOUS
Status: COMPLETED | OUTPATIENT
Start: 2019-10-16 | End: 2019-10-16

## 2019-10-16 RX ORDER — SODIUM CHLORIDE, SODIUM LACTATE, POTASSIUM CHLORIDE, CALCIUM CHLORIDE 600; 310; 30; 20 MG/100ML; MG/100ML; MG/100ML; MG/100ML
INJECTION, SOLUTION INTRAVENOUS CONTINUOUS
Status: DISCONTINUED | OUTPATIENT
Start: 2019-10-16 | End: 2019-10-16

## 2019-10-16 RX ORDER — BUPIVACAINE HYDROCHLORIDE 2.5 MG/ML
INJECTION, SOLUTION INFILTRATION; PERINEURAL
Status: COMPLETED | OUTPATIENT
Start: 2019-10-16 | End: 2019-10-16

## 2019-10-16 RX ORDER — EPHEDRINE SULFATE 50 MG/ML
INJECTION, SOLUTION INTRAVENOUS
Status: DISCONTINUED | OUTPATIENT
Start: 2019-10-16 | End: 2019-10-18

## 2019-10-16 RX ORDER — SODIUM CHLORIDE 9 MG/ML
INJECTION, SOLUTION INTRAVENOUS CONTINUOUS
Status: DISCONTINUED | OUTPATIENT
Start: 2019-10-16 | End: 2019-10-17

## 2019-10-16 RX ORDER — ONDANSETRON 2 MG/ML
INJECTION INTRAMUSCULAR; INTRAVENOUS
Status: DISCONTINUED | OUTPATIENT
Start: 2019-10-16 | End: 2019-10-18

## 2019-10-16 RX ORDER — BUPIVACAINE HYDROCHLORIDE 7.5 MG/ML
INJECTION, SOLUTION INTRASPINAL
Status: DISCONTINUED | OUTPATIENT
Start: 2019-10-16 | End: 2019-10-18

## 2019-10-16 RX ORDER — SODIUM CHLORIDE, SODIUM LACTATE, POTASSIUM CHLORIDE, CALCIUM CHLORIDE 600; 310; 30; 20 MG/100ML; MG/100ML; MG/100ML; MG/100ML
INJECTION, SOLUTION INTRAVENOUS CONTINUOUS PRN
Status: DISCONTINUED | OUTPATIENT
Start: 2019-10-16 | End: 2019-10-18

## 2019-10-16 RX ORDER — FERROUS SULFATE 325(65) MG
325 TABLET, DELAYED RELEASE (ENTERIC COATED) ORAL 2 TIMES DAILY
Status: DISCONTINUED | OUTPATIENT
Start: 2019-10-16 | End: 2019-10-29 | Stop reason: HOSPADM

## 2019-10-16 RX ORDER — AZATHIOPRINE 50 MG/1
50 TABLET ORAL DAILY
Status: DISCONTINUED | OUTPATIENT
Start: 2019-10-17 | End: 2019-10-17

## 2019-10-16 RX ORDER — ACETAMINOPHEN 500 MG
1000 TABLET ORAL
Status: COMPLETED | OUTPATIENT
Start: 2019-10-16 | End: 2019-10-16

## 2019-10-16 RX ORDER — ASPIRIN 81 MG/1
81 TABLET ORAL DAILY
Status: DISCONTINUED | OUTPATIENT
Start: 2019-10-17 | End: 2019-10-29 | Stop reason: HOSPADM

## 2019-10-16 RX ORDER — PREGABALIN 75 MG/1
150 CAPSULE ORAL
Status: COMPLETED | OUTPATIENT
Start: 2019-10-16 | End: 2019-10-16

## 2019-10-16 RX ORDER — AZATHIOPRINE 50 MG/1
100 TABLET ORAL DAILY
Qty: 60 TABLET | Refills: 3 | Status: SHIPPED | OUTPATIENT
Start: 2019-10-16 | End: 2019-10-18 | Stop reason: SDUPTHER

## 2019-10-16 RX ORDER — ONDANSETRON 2 MG/ML
4 INJECTION INTRAMUSCULAR; INTRAVENOUS DAILY PRN
Status: DISCONTINUED | OUTPATIENT
Start: 2019-10-16 | End: 2019-10-16 | Stop reason: HOSPADM

## 2019-10-16 RX ORDER — PROPOFOL 10 MG/ML
VIAL (ML) INTRAVENOUS
Status: DISCONTINUED | OUTPATIENT
Start: 2019-10-16 | End: 2019-10-18

## 2019-10-16 RX ORDER — SODIUM CHLORIDE, SODIUM LACTATE, POTASSIUM CHLORIDE, CALCIUM CHLORIDE 600; 310; 30; 20 MG/100ML; MG/100ML; MG/100ML; MG/100ML
INJECTION, SOLUTION INTRAVENOUS CONTINUOUS
Status: DISCONTINUED | OUTPATIENT
Start: 2019-10-16 | End: 2019-10-27

## 2019-10-16 RX ORDER — SODIUM CHLORIDE 0.9 % (FLUSH) 0.9 %
10 SYRINGE (ML) INJECTION
Status: DISCONTINUED | OUTPATIENT
Start: 2019-10-16 | End: 2019-10-29 | Stop reason: HOSPADM

## 2019-10-16 RX ORDER — PHENYLEPHRINE HYDROCHLORIDE 10 MG/ML
INJECTION INTRAVENOUS
Status: DISCONTINUED | OUTPATIENT
Start: 2019-10-16 | End: 2019-10-18

## 2019-10-16 RX ORDER — QUETIAPINE FUMARATE 100 MG/1
100 TABLET, FILM COATED ORAL NIGHTLY
Status: DISCONTINUED | OUTPATIENT
Start: 2019-10-16 | End: 2019-10-29 | Stop reason: HOSPADM

## 2019-10-16 RX ORDER — DONEPEZIL HYDROCHLORIDE 5 MG/1
10 TABLET, FILM COATED ORAL NIGHTLY
Status: DISCONTINUED | OUTPATIENT
Start: 2019-10-16 | End: 2019-10-29 | Stop reason: HOSPADM

## 2019-10-16 RX ORDER — TRANEXAMIC ACID 100 MG/ML
INJECTION, SOLUTION INTRAVENOUS
Status: DISCONTINUED | OUTPATIENT
Start: 2019-10-16 | End: 2019-10-16 | Stop reason: HOSPADM

## 2019-10-16 RX ORDER — OXYCODONE HYDROCHLORIDE 5 MG/1
5 TABLET ORAL
Status: DISCONTINUED | OUTPATIENT
Start: 2019-10-16 | End: 2019-10-16 | Stop reason: HOSPADM

## 2019-10-16 RX ORDER — MEMANTINE HYDROCHLORIDE 5 MG/1
10 TABLET ORAL 2 TIMES DAILY
Status: DISCONTINUED | OUTPATIENT
Start: 2019-10-16 | End: 2019-10-29 | Stop reason: HOSPADM

## 2019-10-16 RX ORDER — ONDANSETRON 4 MG/1
4 TABLET, ORALLY DISINTEGRATING ORAL EVERY 6 HOURS PRN
Status: DISCONTINUED | OUTPATIENT
Start: 2019-10-16 | End: 2019-10-29 | Stop reason: HOSPADM

## 2019-10-16 RX ORDER — ONDANSETRON 2 MG/ML
4 INJECTION INTRAMUSCULAR; INTRAVENOUS EVERY 6 HOURS PRN
Status: DISCONTINUED | OUTPATIENT
Start: 2019-10-16 | End: 2019-10-29 | Stop reason: HOSPADM

## 2019-10-16 RX ORDER — HYDROMORPHONE HYDROCHLORIDE 1 MG/ML
0.2 INJECTION, SOLUTION INTRAMUSCULAR; INTRAVENOUS; SUBCUTANEOUS EVERY 6 HOURS PRN
Status: DISCONTINUED | OUTPATIENT
Start: 2019-10-16 | End: 2019-10-29 | Stop reason: HOSPADM

## 2019-10-16 RX ORDER — MUPIROCIN 20 MG/G
OINTMENT TOPICAL
Status: DISCONTINUED | OUTPATIENT
Start: 2019-10-16 | End: 2019-10-16 | Stop reason: HOSPADM

## 2019-10-16 RX ORDER — CEFAZOLIN SODIUM 2 G/50ML
2 SOLUTION INTRAVENOUS
Status: COMPLETED | OUTPATIENT
Start: 2019-10-16 | End: 2019-10-17

## 2019-10-16 RX ORDER — HYDROMORPHONE HYDROCHLORIDE 2 MG/ML
0.2 INJECTION, SOLUTION INTRAMUSCULAR; INTRAVENOUS; SUBCUTANEOUS EVERY 5 MIN PRN
Status: DISCONTINUED | OUTPATIENT
Start: 2019-10-16 | End: 2019-10-16 | Stop reason: HOSPADM

## 2019-10-16 RX ORDER — LOPERAMIDE HYDROCHLORIDE 2 MG/1
4 CAPSULE ORAL 3 TIMES DAILY
Status: DISCONTINUED | OUTPATIENT
Start: 2019-10-16 | End: 2019-10-18

## 2019-10-16 RX ORDER — LIDOCAINE HYDROCHLORIDE 10 MG/ML
1 INJECTION, SOLUTION EPIDURAL; INFILTRATION; INTRACAUDAL; PERINEURAL ONCE
Status: DISCONTINUED | OUTPATIENT
Start: 2019-10-16 | End: 2019-10-16 | Stop reason: HOSPADM

## 2019-10-16 RX ORDER — HYDROMORPHONE HYDROCHLORIDE 2 MG/ML
0.5 INJECTION, SOLUTION INTRAMUSCULAR; INTRAVENOUS; SUBCUTANEOUS
Status: DISCONTINUED | OUTPATIENT
Start: 2019-10-16 | End: 2019-10-16 | Stop reason: HOSPADM

## 2019-10-16 RX ORDER — SODIUM CHLORIDE 0.9 % (FLUSH) 0.9 %
10 SYRINGE (ML) INJECTION
Status: DISCONTINUED | OUTPATIENT
Start: 2019-10-16 | End: 2019-10-16 | Stop reason: HOSPADM

## 2019-10-16 RX ORDER — CALCIUM CARBONATE 200(500)MG
500 TABLET,CHEWABLE ORAL 2 TIMES DAILY
Status: DISCONTINUED | OUTPATIENT
Start: 2019-10-16 | End: 2019-10-29 | Stop reason: HOSPADM

## 2019-10-16 RX ORDER — PANTOPRAZOLE SODIUM 40 MG/1
40 TABLET, DELAYED RELEASE ORAL DAILY
Status: DISCONTINUED | OUTPATIENT
Start: 2019-10-17 | End: 2019-10-22

## 2019-10-16 RX ORDER — BUPIVACAINE HYDROCHLORIDE 7.5 MG/ML
INJECTION, SOLUTION EPIDURAL; RETROBULBAR
Status: DISCONTINUED | OUTPATIENT
Start: 2019-10-16 | End: 2019-10-16

## 2019-10-16 RX ORDER — NAPROXEN 500 MG/1
500 TABLET ORAL
Status: COMPLETED | OUTPATIENT
Start: 2019-10-16 | End: 2019-10-16

## 2019-10-16 RX ORDER — CALCIUM CARBONATE 500(1250)
500 TABLET ORAL 2 TIMES DAILY
Status: DISCONTINUED | OUTPATIENT
Start: 2019-10-16 | End: 2019-10-16

## 2019-10-16 RX ADMIN — SODIUM CHLORIDE, SODIUM LACTATE, POTASSIUM CHLORIDE, AND CALCIUM CHLORIDE 1000 ML: .6; .31; .03; .02 INJECTION, SOLUTION INTRAVENOUS at 03:10

## 2019-10-16 RX ADMIN — BUPIVACAINE 10 ML: 13.3 INJECTION, SUSPENSION, LIPOSOMAL INFILTRATION at 06:10

## 2019-10-16 RX ADMIN — ACETAMINOPHEN 1000 MG: 500 TABLET ORAL at 08:10

## 2019-10-16 RX ADMIN — CEFAZOLIN SODIUM 2 G: 2 SOLUTION INTRAVENOUS at 09:10

## 2019-10-16 RX ADMIN — PHENYLEPHRINE HYDROCHLORIDE 200 MCG: 10 INJECTION INTRAVENOUS at 12:10

## 2019-10-16 RX ADMIN — LOPERAMIDE HYDROCHLORIDE 4 MG: 2 CAPSULE ORAL at 08:10

## 2019-10-16 RX ADMIN — PHENYLEPHRINE HYDROCHLORIDE 100 MCG: 10 INJECTION INTRAVENOUS at 12:10

## 2019-10-16 RX ADMIN — BUPIVACAINE HYDROCHLORIDE 20 ML: 2.5 INJECTION, SOLUTION INFILTRATION; PERINEURAL at 06:10

## 2019-10-16 RX ADMIN — SODIUM CHLORIDE 500 ML: 0.9 INJECTION, SOLUTION INTRAVENOUS at 08:10

## 2019-10-16 RX ADMIN — SODIUM CHLORIDE: 0.9 INJECTION, SOLUTION INTRAVENOUS at 07:10

## 2019-10-16 RX ADMIN — TRANEXAMIC ACID 1000 MG: 100 INJECTION, SOLUTION INTRAVENOUS at 12:10

## 2019-10-16 RX ADMIN — QUETIAPINE 100 MG: 100 TABLET ORAL at 08:10

## 2019-10-16 RX ADMIN — PROPOFOL 20 MG: 10 INJECTION, EMULSION INTRAVENOUS at 12:10

## 2019-10-16 RX ADMIN — PHENYLEPHRINE HYDROCHLORIDE 200 MCG: 10 INJECTION INTRAVENOUS at 03:10

## 2019-10-16 RX ADMIN — FERROUS SULFATE TAB EC 325 MG (65 MG FE EQUIVALENT) 325 MG: 325 (65 FE) TABLET DELAYED RESPONSE at 08:10

## 2019-10-16 RX ADMIN — CEFAZOLIN SODIUM 2 G: 2 SOLUTION INTRAVENOUS at 12:10

## 2019-10-16 RX ADMIN — DONEPEZIL HYDROCHLORIDE 10 MG: 5 TABLET, FILM COATED ORAL at 08:10

## 2019-10-16 RX ADMIN — MUPIROCIN: 20 OINTMENT TOPICAL at 08:10

## 2019-10-16 RX ADMIN — NAPROXEN 500 MG: 500 TABLET ORAL at 08:10

## 2019-10-16 RX ADMIN — CALCIUM CARBONATE 500 MG: 500 TABLET, CHEWABLE ORAL at 08:10

## 2019-10-16 RX ADMIN — PREGABALIN 150 MG: 75 CAPSULE ORAL at 08:10

## 2019-10-16 RX ADMIN — SODIUM CHLORIDE, SODIUM LACTATE, POTASSIUM CHLORIDE, AND CALCIUM CHLORIDE: .6; .31; .03; .02 INJECTION, SOLUTION INTRAVENOUS at 08:10

## 2019-10-16 RX ADMIN — EPHEDRINE SULFATE 50 MG: 50 INJECTION, SOLUTION INTRAMUSCULAR; INTRAVENOUS; SUBCUTANEOUS at 03:10

## 2019-10-16 RX ADMIN — SODIUM CHLORIDE, SODIUM LACTATE, POTASSIUM CHLORIDE, AND CALCIUM CHLORIDE: .6; .31; .03; .02 INJECTION, SOLUTION INTRAVENOUS at 11:10

## 2019-10-16 RX ADMIN — ONDANSETRON 4 MG: 2 INJECTION, SOLUTION INTRAMUSCULAR; INTRAVENOUS at 02:10

## 2019-10-16 RX ADMIN — PROPOFOL 30 MG: 10 INJECTION, EMULSION INTRAVENOUS at 12:10

## 2019-10-16 RX ADMIN — SODIUM CHLORIDE, SODIUM LACTATE, POTASSIUM CHLORIDE, AND CALCIUM CHLORIDE: .6; .31; .03; .02 INJECTION, SOLUTION INTRAVENOUS at 12:10

## 2019-10-16 RX ADMIN — PHENYLEPHRINE HYDROCHLORIDE 100 MCG: 10 INJECTION INTRAVENOUS at 02:10

## 2019-10-16 RX ADMIN — PHENYLEPHRINE HYDROCHLORIDE 25 MCG/MIN: 10 INJECTION INTRAVENOUS at 12:10

## 2019-10-16 RX ADMIN — MEMANTINE HYDROCHLORIDE 10 MG: 5 TABLET ORAL at 08:10

## 2019-10-16 RX ADMIN — BUPIVACAINE HYDROCHLORIDE 1.6 ML: 7.5 INJECTION, SOLUTION SUBARACHNOID at 12:10

## 2019-10-16 RX ADMIN — DEXMEDETOMIDINE HYDROCHLORIDE 1 MCG/KG/HR: 100 INJECTION, SOLUTION, CONCENTRATE INTRAVENOUS at 12:10

## 2019-10-16 NOTE — EICU
This is an eICU summary note. Called into paitient's room via eElert for documentation and time out of nerve block. Anesthesia at bedside to perform post op nerve block. Dr. Anabel Beltran and Dr. Watkins at bedside. Time out done; procedure under anesthesia consent on chart. Procedure explained to patient and patient's family member. All questions answered. Patient tolerated well.

## 2019-10-16 NOTE — ANESTHESIA PROCEDURE NOTES
Spinal    Diagnosis: knee ostearthritis  Patient location during procedure: OR  Start time: 10/16/2019 12:02 PM  Timeout: 10/16/2019 12:00 PM  End time: 10/16/2019 12:15 PM    Staffing  Authorizing Provider: Roberta Mcelroy MD  Performing Provider: Roberta Mcelroy MD    Staffing  Other anesthesia staff: Fabiana Ruiz MD  Preanesthetic Checklist  Completed: patient identified, site marked, surgical consent, pre-op evaluation, timeout performed, IV checked, risks and benefits discussed and monitors and equipment checked  Spinal Block  Patient position: sitting  Prep: ChloraPrep  Patient monitoring: heart rate, cardiac monitor, continuous pulse ox and frequent blood pressure checks  Approach: midline  Location: L2-3  Injection technique: single shot  CSF Fluid: clear free-flowing CSF  Needle  Needle type: Quincke   Needle gauge: 22 G  Needle length: 4 in  Additional Documentation: incremental injection, negative aspiration for heme and no paresthesia on injection  Needle localization: anatomical landmarks  Assessment  Sensory level: T8   Dermatomal levels determined by alcohol wipe  Ease of block: moderate  Patient's tolerance of the procedure: comfortable throughout block

## 2019-10-16 NOTE — NURSING
Dr. GRETCHEN Watkins and Dr. CHANEL Giles on bedside doing a nerve block to the saphenous and Left Femoral Cutaneous Nerves.

## 2019-10-16 NOTE — TREATMENT PLAN
GI Brief Consultation Note    Pt underwent knee replacement with Dr. Lindsay today.    Patient was seen in post-op area. She was being recovered from surgery. I was unable to complete full assessment given she had just arrived post-op.    I met with her son Bryson and had long discussion.    There are multiple ongoing symptoms which I have adressed over the phone with Bryson.  He now states he thinks patient has not eaten in 1 week. He states she chews food and then spits it out. He states she was spitting out or vomiting up her pills.  He is concerned about her stating she constantly goes to the bathroom.    I have reviewed her recent labs  I have reviewed her recent endoscopy and EGD which showed normal esophagus.    I suspect there may be some volitional or dementia related issues ongoing.    PLAN:  OK for standard post-op prophylaxis from GI perspective  I will order some basic labs overnight.  Patient should be monitored for stool output overnight.  Any possible Home health services in post- op state would be greatly appreciated as there seems to be some confusion from the son as to the exact nature of all of her symptoms at times.      I will be out of office tomorrow but will follow up with patient on Friday is she remains in the hospital.  Otherwise the family knows how to contact me and will be available.  Please call with any additional concerns /questions    Og Carrera MD  [facility]

## 2019-10-16 NOTE — OP NOTE
Certification of Assistant at Surgery       Surgery Date: 10/16/2019     Participating Surgeons:  Surgeon(s) and Role:     * Ghassan Lindsay MD - Primary    Procedures:  Procedure(s) (LRB):  ARTHROPLASTY, KNEE (Left)    Assistant Surgeon's Certification of Necessity:  I understand that section 1842 (b) (6) (d) of the Social Security Act generally prohibits Medicare Part B reasonable charge payment for the services of assistants at surgery in teaching hospitals when qualified residents are available to furnish such services. I certify that the services for which payment is claimed were medically necessary, and that no qualified resident was available to perform the services. I further understand that these services are subject to post-payment review by the Medicare carrier.  **    Umu Cuellar PA-C    10/16/2019  2:58 PM

## 2019-10-16 NOTE — BRIEF OP NOTE
[facility]  Brief Operative Note    SUMMARY     Surgery Date: 10/16/2019     Surgeon(s) and Role:     * Ghassan Lindsay MD - Primary    Assisting Surgeon:  Alisa ROBERSON    Pre-op Diagnosis:  Primary osteoarthritis of left knee [M17.12]    Post-op Diagnosis:  Post-Op Diagnosis Codes:     * Primary osteoarthritis of left knee [M17.12]    Procedure(s) (LRB):  ARTHROPLASTY, KNEE (Left)    Anesthesia: Choice    Description of Procedure:  Left total knee replacement    Description of the findings of the procedure:  Severe osteoarthritis    Estimated Blood Loss: * No values recorded between 10/16/2019 12:41 PM and 10/16/2019  2:27 PM *         Specimens:   Specimen (12h ago, onward)    None

## 2019-10-16 NOTE — TRANSFER OF CARE
"Anesthesia Transfer of Care Note    Patient: Anabella Aranda    Procedure(s) Performed: Procedure(s) (LRB):  ARTHROPLASTY, KNEE (Left)    Patient location: PACU    Anesthesia Type: MAC and spinal    Transport from OR: Transported from OR on room air with adequate spontaneous ventilation    Post pain: adequate analgesia    Post assessment: no apparent anesthetic complications    Post vital signs: stable (hypotension that is actively being treated. )    Level of consciousness: responds to stimulation and lethargic (neuro baseline)    Nausea/Vomiting: no nausea/vomiting    Complications: none    Transfer of care protocol was followed      Last vitals:   Visit Vitals  BP (!) 105/58 (BP Location: Right arm, Patient Position: Lying)   Pulse (!) 112   Temp 36.4 °C (97.5 °F) (Skin)   Resp 18   Ht 5' 4" (1.626 m)   Wt 96.2 kg (212 lb)   LMP  (LMP Unknown)   SpO2 98%   Breastfeeding? No   BMI 36.39 kg/m²     "

## 2019-10-16 NOTE — ANESTHESIA PROCEDURE NOTES
Peripheral Block    Patient location during procedure: ICU   Block not for primary anesthetic.  Reason for block: at surgeon's request and post-op pain management   Post-op Pain Location: left knee luma  Start time: 10/16/2019 6:32 PM  Timeout: 10/16/2019 6:31 PM   End time: 10/16/2019 6:40 PM    Staffing  Authorizing Provider: Roberta Mcelroy MD  Performing Provider: Belen Barton MD    Preanesthetic Checklist  Completed: patient identified, site marked, surgical consent, pre-op evaluation, timeout performed, IV checked, risks and benefits discussed and monitors and equipment checked  Peripheral Block  Patient position: sitting  Prep: ChloraPrep  Patient monitoring: heart rate, continuous pulse ox, cardiac monitor and frequent blood pressure checks  Block type: lateral femoral cutaneous and adductor canal  Laterality: left  Injection technique: single shot  Needle  Needle type: Echogenic   Needle gauge: 22 G  Needle length: 3.5 in  Needle localization: anatomical landmarks     Assessment  Injection assessment: negative parasthesia and local visualized surrounding nerve  Paresthesia pain: immediately resolved  Heart rate change: no  Slow fractionated injection: yes  Additional Notes  VSS.  [facility] RN monitoring vitals throughout procedure.  Patient tolerated procedure well.

## 2019-10-16 NOTE — OP NOTE
INDICATION/PREOPERATIVE DIAGNOSIS: Osteoarthritis of the leftknee.    POSTOPERATIVE DIAGNOSIS: Same.    DATE OF SURGERY: 10/16/2019    NAME OF PROCEDURE:left total knee replacement.    SURGEON: Ghassan Lindsay MD    ASSISTANT: KAROL Cuellar    IMPLANT SYSTEM: Depuy PFC Sigma PS     EBL: trace    DESCRIPTION OF OPERATION: The patient was taken to the Operating Room. spinal anesthesia was administered and preoperative antibiotics were given. A tourniquet was placed on the proximal thigh. The lower extremity was prepped and draped in the usual sterile fashion. It was exsanguinated with an Esmarch and the tourniquet was inflated to 300 mmHg. An anterior incision was made. Sharp dissection was carried down to the extensor mechanism. A medial parapatellar arthrotomy was performed. The proximal medial face of the tibia was exposed. The patella was everted. The knee was carefully flexed. The remnants of the anterior cruciate ligament and the patellofemoral ligament were released. The fat pad was resected with the anterior part of the lateral meniscus. Osteophytes around the femur were debrided. The femoral canal was entered with the drill. The alignment guide was inserted. The distal jig was applied in 7 degrees of valgus and the distal cut was made. The distal femur was measured as a size 3. The combination cutting block was applied in 3 degrees of external rotation. Anterior, posterior and chamfer cuts were made. The center box was then cut at the same size. The meniscal remnants were resected. The proximal tibia was exposed. The surrounding soft tissues were protected with retractors. The external jig was applied in neutral alignment and a neutral cut was made. This cut was checked and found to be satisfactory. A lamina  was inserted with the knee in flexion. The posterior compartment was debrided of osteophytes and synovitis. The tibia was then drilled and punched with the size to base plate in the appropriate  rotational position, aligned with the tibial tubercle. Trial implants were inserted. There was excellent range of motion and stability with a size 8 mm spacer. The patella was then exposed. It was resected using the guide leaving 15 mm of residual bone. It was then drilled for the size 32 mm button. The trial had central tracking. The trials were all removed. The knee was irrigated and dried. Antibiotic cement was mixed. The implants were cemented with the knee held in extension and the patella clamped. After the cement had dried a careful search for extra cement was made and all was removed. The knee was irrigated. The final spacer was snapped into place. The extensor mechanism was closed with interrupted #1 Vicryl. This was checked in flexion and found to be secure. The joint was injected with 1g of Transexemic acid. The subcutaneous tissue was closed with 2-0 Vicryl. The skin was closed with clips and an occlusive plastic dressing with an Ace wrap was applied. The tourniquet was released. There were no known complications. I was present for the entire procedure.

## 2019-10-16 NOTE — PLAN OF CARE
Dr Escalante in PACU and pt will now transfer to ICU as blood pressure decreased into the 80's . phenelephrine on att 0.5mcg/kg/min

## 2019-10-17 PROBLEM — M25.562 CHRONIC PAIN OF BOTH KNEES: Chronic | Status: RESOLVED | Noted: 2019-08-14 | Resolved: 2019-10-17

## 2019-10-17 PROBLEM — M17.12 PRIMARY OSTEOARTHRITIS OF LEFT KNEE: Status: RESOLVED | Noted: 2019-10-16 | Resolved: 2019-10-17

## 2019-10-17 PROBLEM — G89.29 CHRONIC PAIN OF BOTH KNEES: Chronic | Status: RESOLVED | Noted: 2019-08-14 | Resolved: 2019-10-17

## 2019-10-17 PROBLEM — M25.561 CHRONIC PAIN OF BOTH KNEES: Chronic | Status: RESOLVED | Noted: 2019-08-14 | Resolved: 2019-10-17

## 2019-10-17 LAB
ALBUMIN SERPL BCP-MCNC: 2.4 G/DL (ref 3.5–5.2)
ANION GAP SERPL CALC-SCNC: 12 MMOL/L (ref 8–16)
ANION GAP SERPL CALC-SCNC: 12 MMOL/L (ref 8–16)
BUN SERPL-MCNC: 43 MG/DL (ref 8–23)
BUN SERPL-MCNC: 43 MG/DL (ref 8–23)
CALCIUM SERPL-MCNC: 7.9 MG/DL (ref 8.7–10.5)
CALCIUM SERPL-MCNC: 7.9 MG/DL (ref 8.7–10.5)
CHLORIDE SERPL-SCNC: 102 MMOL/L (ref 95–110)
CHLORIDE SERPL-SCNC: 102 MMOL/L (ref 95–110)
CO2 SERPL-SCNC: 20 MMOL/L (ref 23–29)
CO2 SERPL-SCNC: 20 MMOL/L (ref 23–29)
CREAT SERPL-MCNC: 4.7 MG/DL (ref 0.5–1.4)
CREAT SERPL-MCNC: 4.7 MG/DL (ref 0.5–1.4)
EST. GFR  (AFRICAN AMERICAN): 10 ML/MIN/1.73 M^2
EST. GFR  (AFRICAN AMERICAN): 10 ML/MIN/1.73 M^2
EST. GFR  (NON AFRICAN AMERICAN): 9 ML/MIN/1.73 M^2
EST. GFR  (NON AFRICAN AMERICAN): 9 ML/MIN/1.73 M^2
GLUCOSE SERPL-MCNC: 102 MG/DL (ref 70–110)
GLUCOSE SERPL-MCNC: 102 MG/DL (ref 70–110)
HCT VFR BLD AUTO: 29.9 % (ref 37–48.5)
HGB BLD-MCNC: 10.2 G/DL (ref 12–16)
POTASSIUM SERPL-SCNC: 3.6 MMOL/L (ref 3.5–5.1)
POTASSIUM SERPL-SCNC: 3.6 MMOL/L (ref 3.5–5.1)
SODIUM SERPL-SCNC: 134 MMOL/L (ref 136–145)
SODIUM SERPL-SCNC: 134 MMOL/L (ref 136–145)

## 2019-10-17 PROCEDURE — 36415 COLL VENOUS BLD VENIPUNCTURE: CPT

## 2019-10-17 PROCEDURE — 82040 ASSAY OF SERUM ALBUMIN: CPT

## 2019-10-17 PROCEDURE — 25000003 PHARM REV CODE 250: Performed by: ORTHOPAEDIC SURGERY

## 2019-10-17 PROCEDURE — 63600175 PHARM REV CODE 636 W HCPCS: Performed by: ORTHOPAEDIC SURGERY

## 2019-10-17 PROCEDURE — 85018 HEMOGLOBIN: CPT

## 2019-10-17 PROCEDURE — 80048 BASIC METABOLIC PNL TOTAL CA: CPT

## 2019-10-17 PROCEDURE — 85014 HEMATOCRIT: CPT

## 2019-10-17 PROCEDURE — 11000001 HC ACUTE MED/SURG PRIVATE ROOM

## 2019-10-17 PROCEDURE — 97162 PT EVAL MOD COMPLEX 30 MIN: CPT | Performed by: PHYSICAL THERAPIST

## 2019-10-17 PROCEDURE — 97165 OT EVAL LOW COMPLEX 30 MIN: CPT

## 2019-10-17 PROCEDURE — 97535 SELF CARE MNGMENT TRAINING: CPT

## 2019-10-17 PROCEDURE — 30200315 PPD INTRADERMAL TEST REV CODE 302: Performed by: ORTHOPAEDIC SURGERY

## 2019-10-17 PROCEDURE — 94761 N-INVAS EAR/PLS OXIMETRY MLT: CPT

## 2019-10-17 PROCEDURE — 86580 TB INTRADERMAL TEST: CPT | Performed by: ORTHOPAEDIC SURGERY

## 2019-10-17 RX ORDER — ASPIRIN 81 MG/1
81 TABLET ORAL DAILY
Refills: 0 | Status: ON HOLD | COMMUNITY
Start: 2019-10-18 | End: 2019-11-30

## 2019-10-17 RX ORDER — OXYCODONE AND ACETAMINOPHEN 5; 325 MG/1; MG/1
1 TABLET ORAL EVERY 6 HOURS PRN
Qty: 40 TABLET | Refills: 0 | Status: ON HOLD | OUTPATIENT
Start: 2019-10-17 | End: 2019-11-08 | Stop reason: HOSPADM

## 2019-10-17 RX ORDER — OXYCODONE HYDROCHLORIDE 5 MG/1
5 TABLET ORAL EVERY 4 HOURS PRN
Qty: 60 TABLET | Refills: 0 | Status: SHIPPED | OUTPATIENT
Start: 2019-10-17 | End: 2019-10-17 | Stop reason: HOSPADM

## 2019-10-17 RX ORDER — MEMANTINE HYDROCHLORIDE 10 MG/1
10 TABLET ORAL DAILY
Qty: 180 TABLET | Refills: 3 | Status: ON HOLD | OUTPATIENT
Start: 2019-10-17 | End: 2019-11-30

## 2019-10-17 RX ADMIN — ACETAMINOPHEN 1000 MG: 500 TABLET ORAL at 03:10

## 2019-10-17 RX ADMIN — CALCIUM CARBONATE 500 MG: 500 TABLET, CHEWABLE ORAL at 09:10

## 2019-10-17 RX ADMIN — MESALAMINE 1500 MG: 250 CAPSULE ORAL at 09:10

## 2019-10-17 RX ADMIN — LOPERAMIDE HYDROCHLORIDE 4 MG: 2 CAPSULE ORAL at 09:10

## 2019-10-17 RX ADMIN — OXYCODONE HYDROCHLORIDE 5 MG: 5 TABLET ORAL at 11:10

## 2019-10-17 RX ADMIN — LOPERAMIDE HYDROCHLORIDE 4 MG: 2 CAPSULE ORAL at 03:10

## 2019-10-17 RX ADMIN — ACETAMINOPHEN 1000 MG: 500 TABLET ORAL at 09:10

## 2019-10-17 RX ADMIN — DONEPEZIL HYDROCHLORIDE 10 MG: 5 TABLET, FILM COATED ORAL at 09:10

## 2019-10-17 RX ADMIN — OXYCODONE HYDROCHLORIDE 5 MG: 5 TABLET ORAL at 03:10

## 2019-10-17 RX ADMIN — FERROUS SULFATE TAB EC 325 MG (65 MG FE EQUIVALENT) 325 MG: 325 (65 FE) TABLET DELAYED RESPONSE at 09:10

## 2019-10-17 RX ADMIN — QUETIAPINE 100 MG: 100 TABLET ORAL at 09:10

## 2019-10-17 RX ADMIN — MEMANTINE HYDROCHLORIDE 10 MG: 5 TABLET ORAL at 09:10

## 2019-10-17 RX ADMIN — CEFAZOLIN SODIUM 2 G: 2 SOLUTION INTRAVENOUS at 04:10

## 2019-10-17 RX ADMIN — PANTOPRAZOLE SODIUM 40 MG: 40 TABLET, DELAYED RELEASE ORAL at 09:10

## 2019-10-17 RX ADMIN — ASPIRIN 81 MG: 81 TABLET, COATED ORAL at 09:10

## 2019-10-17 RX ADMIN — ONDANSETRON 4 MG: 4 TABLET, ORALLY DISINTEGRATING ORAL at 09:10

## 2019-10-17 RX ADMIN — TUBERCULIN PURIFIED PROTEIN DERIVATIVE 5 UNITS: 5 INJECTION, SOLUTION INTRADERMAL at 03:10

## 2019-10-17 NOTE — DISCHARGE INSTRUCTIONS
GENERAL DISCHARGE INSTRUCTIONS:        FOLLOW UP APPOINTMENT:  Call your surgeon's office to schedule your post operative appointment, if one has not already been scheduled.     WEIGHTBEARING:  You may bear as much weight as you can tolerate on your operative leg.  Continue to use a walking device until d/c'd by your physical therapist.    PAIN MANAGEMENT:  Take your pain medication as prescribed.  Ween yourself off narcotic pain medication slowly, supplementing with acetominophen (Tylenol).  Do not exceed 3000 mg of Tylenol per day.    SURGICAL DRESSING: Do not change dressing unless it falls off or your nurse feels that it is saturated. Replace with similar dressing, only if absolutely necessary.    DRIVING:  Do not drive until you have your follow up appointment with your surgeon.    SHOWERING:  You can shower as long as your dressing is intact and your physical therapist has instructed you on how to safely get in and out of your shower.    TO PREVENT BLOOD CLOTS: continue to take aspirin (or other medication) as instructed above.  Also continue to walk frequently throughout the day.    TO PREVENT CONSTIPATION:  continue to take stool softeners regularly for as long as you are on narcotic pain medication (oxycodone/hydrocodone). If you do not have normal bowel movement for 1-2 days, purchase an over the counter laxative, such as Milk of Magnesia, and follow the instructions on the label. Call your doctor for severe abdominal pain, vomiting or diarrhea.    To PREVENT SWELLING:  This is normal for at least 2 months after surgery. Spend time each day with your leg elevated on several pillows. Use ice as needed. VIVIANA stockings are helpful for swelling, but MAY be removed, if desired.    NOTIFY YOUR SURGEON IF: Unrelenting pain that does not improve, even after taking prescribed pain medication. Increasing drainage from the wound.

## 2019-10-17 NOTE — EICU
New admit, notified of hypotension    73 y/o F history of , Hep B, DVT s/p IVC filter insertion, ulcerative colitis s/p colectomy for refractory UC with stricture with end to end ileo-rectal anastomosis maintained on azathioprine, denies recent steroid use, underwent left knee arthroplasty under spinal anesthesia earlier today without significant blood loss.  Transferred to ICU for closer monitoring as with hypotension.  She reportedly has not been eating well for the past week, spitting out food and pills, and going to the bathroom often. Most recent CT enterography shows active colitis of remaining rectal tissue GI following.     Camera assessment:  Patient not in distress, conversant, son doing most of the talking though    Telemetry:  88/46  HR 92 O2 100%    Data:  Labs from 1-2 weeks ago without significant abnormality    · Patient with decreased PO intake and possible GI losses and was NPO for surgery.  Will give fluids, ordered a 500 cc NS bolus. Ordered repeat labs.  · Enoxaparin for DVT prophylaxis once cleared by orthopedic surgery

## 2019-10-17 NOTE — PT/OT/SLP EVAL
Occupational Therapy   Evaluation    Name: Anabella Aranda  MRN: [id number]  Admitting Diagnosis:  History of left knee replacement 1 Day Post-Op    Recommendations:     Discharge Recommendations: nursing facility, skilled, home health OT, home health PT  Discharge Equipment Recommendations:  none  Barriers to discharge:  None    Assessment:     Anabella Aranda is a 74 y.o. female with a medical diagnosis of History of left knee replacement.  She presents with performance deficits affecting function: weakness, impaired endurance, impaired self care skills, impaired functional mobilty, gait instability, impaired balance, impaired cognition, decreased lower extremity function, pain, decreased safety awareness, decreased ROM.      Rehab Prognosis: Fair; patient would benefit from acute skilled OT services to address these deficits and reach maximum level of function.       Plan:     Patient to be seen 5 x/week to address the above listed problems via self-care/home management, therapeutic activities, therapeutic exercises  · Plan of Care Expires: 11/17/19  · Plan of Care Reviewed with: patient, son    Subjective     Chief Complaint: knee pain w/mvmt or wt bearing  Patient/Family Comments/goals: son would like pt to regain strength and indep    Occupational Profile:  Living Environment: Lives w/son  Previous level of function: son reports pt required supervision 2/2 dementia, but was able to perform basic ADLs  Roles and Routines:   Equipment Used at Home:  cane, straight, bath bench, bedside commode, walker, rolling  Assistance upon Discharge: return to PLOF    Pain/Comfort:  · Pain Rating 1: (did not rate)  · Location - Side 1: Left  · Location - Orientation 1: generalized(in WB'ing)  · Location 1: knee  · Pain Addressed 1: Cessation of Activity, Reposition, Distraction, Nurse notified  · Pain Rating Post-Intervention 1: 0/10    Patients cultural, spiritual, Taoism conflicts given the current situation:  no    Objective:     Communicated with: nurse prior to session.  Patient found HOB elevated with peripheral IV, telemetry, SCD, pulse ox (continuous), blood pressure cuff upon OT entry to room.    General Precautions: Standard, fall   Orthopedic Precautions:LLE weight bearing as tolerated   Braces:       Occupational Performance:    Bed Mobility:    · Patient completed Rolling/Turning to Left with  stand by assistance and contact guard assistance  · Patient completed Scooting/Bridging with stand by assistance and contact guard assistance  · Patient completed Supine to Sit with stand by assistance and contact guard assistance    Functional Mobility/Transfers:  · Patient completed Sit <> Stand Transfer with contact guard assistance and minimum assistance  with  rolling walker   · Patient completed Bed <> Chair Transfer using Step Transfer technique with contact guard assistance and minimum assistance with rolling walker  · Patient completed Toilet Transfer Step Transfer technique with contact guard assistance and minimum assistance with  bedside commode  · Functional Mobility: NT    Activities of Daily Living:  · Toileting: minimum assistance      Cognitive/Visual Perceptual:  Alert, oriented to self only    Physical Exam:  BUE AROM/strength WFL  Good sit balance, fair stand balance    AMPAC 6 Click ADL:  AMPAC Total Score: 17    Treatment & Education:  Pt/fam educated on role of OT/POC, general safety, use of DME  Education:    Patient left up in chair with all lines intact, call button in reach, nurse notified and son present    GOALS:   Multidisciplinary Problems     Occupational Therapy Goals        Problem: Occupational Therapy Goal    Goal Priority Disciplines Outcome Interventions   Occupational Therapy Goal     OT, PT/OT Ongoing, Progressing    Description:  Goals to be met by: 11/17     Patient will increase functional independence with ADLs by performing:    UE Dressing with Supervision.  LE Dressing with  Supervision.  Grooming while standing with Supervision.  Toileting from toilet with Supervision for hygiene and clothing management.   Toilet transfer to toilet with Supervision.  Upper extremity exercise program x10 reps per handout, with assistance as needed.                      History:     Past Medical History:   Diagnosis Date    Arthritis     C. difficile colitis 10/13/2014    4/15/2015    Chronic kidney disease, stage 3     Dementia     Hepatitis B     Hypertension     Major depression with psychotic features     Ulcerative colitis 03/2014       Past Surgical History:   Procedure Laterality Date    CHOLECYSTECTOMY      COLONOSCOPY N/A 4/29/2016    Procedure: COLONOSCOPY;  Surgeon: Umm rAenas MD;  Location: [facility] ENDO;  Service: Endoscopy;  Laterality: N/A;    ESOPHAGOGASTRODUODENOSCOPY N/A 1/25/2019    Procedure: ESOPHAGOGASTRODUODENOSCOPY (EGD);  Surgeon: Jenise Hallman MD;  Location: [facility] ENDO;  Service: Endoscopy;  Laterality: N/A;    FLEXIBLE SIGMOIDOSCOPY N/A 1/25/2019    Procedure: SIGMOIDOSCOPY, FLEXIBLE;  Surgeon: Jenise Hallman MD;  Location: [facility] ENDO;  Service: Endoscopy;  Laterality: N/A;    BETZY FILTER PLACEMENT Right 01/2014    KNEE ARTHROPLASTY Left 10/16/2019    Procedure: ARTHROPLASTY, KNEE;  Surgeon: Ghassan Lindsay MD;  Location: [facility] OR;  Service: Orthopedics;  Laterality: Left;  Franco notifiedconfirmed with Franco  [phone] 10/15/19 kb    LAPAROSCOPIC TOTAL COLECTOMY  06/24/2016    NASAL SINUS SURGERY      TONSILLECTOMY      TOTAL KNEE ARTHROPLASTY Right 04/07/2008    TOTAL KNEE ARTHROPLASTY Left 10/16/2019       Time Tracking:     OT Date of Treatment: 10/17/19  OT Start Time: 1129  OT Stop Time: 1154  OT Total Time (min): 25 min w/PT    Billable Minutes:Evaluation 15  Self Care/Home Management 10    Nick Munoz, OT  10/17/2019

## 2019-10-17 NOTE — PLAN OF CARE
Pt is AAOx3. Pt is forgetful. No complaints of nausea, vomiting, or diarrhea. No complaints of pain. L knee dressing is intact. Pt's MAP has been maintained >65. Purewick applied. Safety precautions maintained. Tolerated all medications well.

## 2019-10-17 NOTE — PLAN OF CARE
10/17/19 1531   Post-Acute Status   Post-Acute Authorization Placement   Post-Acute Placement Status Pending Post-Acute Medical Review

## 2019-10-17 NOTE — NURSING
Pt arrived to ICU via stretcher with two PACU nurses. Pt awake, alert, and oriented to person, place, time, and situation. Pt placed in ICU bed, ICU monitor applied, and son called to bedside. Pt denies pain at this time. Dressing to left knee dry and intact. No signs or symptoms of distress noted. Phenylephrine gtt not started due to systolic blood pressure being above 90. Vitals per flowsheet.

## 2019-10-17 NOTE — PLAN OF CARE
Problem: Physical Therapy Goal  Goal: Physical Therapy Goal  Description  1.  Supine to/from sit with with supervision  2.  Bed to/from chair with RW with supervision  3.  Ambulate 50' with RW with supervision  4.  Tolerate sitting 2 hours   Outcome: Ongoing, Progressing   Pt would benefit from skilled PT to progress functional mobility.

## 2019-10-17 NOTE — PLAN OF CARE
IPR referral sent to [facility].       10/17/19 2861   Post-Acute Status   Post-Acute Authorization Placement   Post-Acute Placement Status Referrals Sent

## 2019-10-17 NOTE — PT/OT/SLP EVAL
Physical Therapy Evaluation    Patient Name:  Anabella Aranda   MRN:  [id number]    Recommendations:     Discharge Recommendations:  nursing facility, skilled   Discharge Equipment Recommendations: none   Barriers to discharge: None    Assessment:     Anabella Aranda is a 74 y.o. female admitted with a medical diagnosis of History of left knee replacement.  She presents with the following impairments/functional limitations:  weakness, impaired self care skills, impaired balance, impaired joint extensibility, impaired skin, decreased safety awareness, impaired endurance, impaired functional mobilty, pain, gait instability, impaired cognition, decreased lower extremity function, impaired cardiopulmonary response to activity, orthopedic precautions.  Pt required Mod assist of 1 for supine to sit.  Pt transferred bed to b/s commode with RW with WBAT LLE with Min assist of 2 and verbal cueing for safety and to place LLE forward to avoid excessive knee flexion.  Pt sit to stand with RW with WBAT LLE with Min assist of 1.    Rehab Prognosis: Fair and noting patient's cognitive deficits, unaware that she had knee surgery, not oriented to place, time or situation.  ; patient would benefit from acute skilled PT services to address these deficits and reach maximum level of function.    Recent Surgery: Procedure(s) (LRB):  ARTHROPLASTY, KNEE (Left) 1 Day Post-Op    Plan:     During this hospitalization, patient to be seen daily to address the identified rehab impairments via gait training, therapeutic activities, therapeutic exercises, neuromuscular re-education and progress toward the following goals:    · Plan of Care Expires:  11/17/19    Subjective     Chief Complaint: reported she did not like sitting with left knee flexed  Patient/Family Comments/goals: have her return to adult day care setting when able to walk again  Pain/Comfort:  · Pain Rating 1: (no pain prior to treatment, vocalized pain when moving LLE and  initially with weight bearing and with prolonged sitting with left knee flexed)  · Pain Addressed 1: Reposition, Distraction, Cessation of Activity  · Pain Rating Post-Intervention 1: 0/10    Patients cultural, spiritual, Restoration conflicts given the current situation: no    Living Environment:  Pt lives with her son and attends an adult day care setting.    Prior to admission, patients level of function was limited ambulation with RW or Rollator.  Equipment used at home: cane, straight, walker, standard.  DME owned (not currently used): none.  Upon discharge, patient will have assistance from the next level of care/son.    Objective:     Communicated with nurse prior to session.  Patient found supine with telemetry, SCD, blood pressure cuff, peripheral IV, PureWick  upon PT entry to room.    General Precautions: Standard, fall   Orthopedic Precautions:LLE weight bearing as tolerated   Braces:       Exams:  · Pt is not oriented to place, time or situation.  Pt was pleasant and fairly cooperative.  Pt has RLE PROM WFL.  Pt has 3+ to 5/5 RLE strength.     Functional Mobility:   Pt required Mod assist of 1 for supine to sit.  Pt transferred bed to b/s commode with RW with WBAT LLE with Min assist of 2 and verbal cueing for safety and to place LLE forward to avoid excessive knee flexion.  Pt sit to stand with RW with WBAT LLE with Min assist of 1.        AM-PAC 6 CLICK MOBILITY  Total Score:15     Patient left up in chair with all lines intact, call button in reach, nurse notified, son present and left knee extended forward while eating lunch.    GOALS:   Multidisciplinary Problems     Physical Therapy Goals        Problem: Physical Therapy Goal    Goal Priority Disciplines Outcome Goal Variances Interventions   Physical Therapy Goal     PT, PT/OT Ongoing, Progressing     Description:  1.  Supine to/from sit with with supervision  2.  Bed to/from chair with RW with supervision  3.  Ambulate 50' with RW with  supervision  4.  Tolerate sitting 2 hours                    History:     Past Medical History:   Diagnosis Date    Arthritis     C. difficile colitis 10/13/2014    4/15/2015    Chronic kidney disease, stage 3     Dementia     Hepatitis B     Hypertension     Major depression with psychotic features     Ulcerative colitis 03/2014       Past Surgical History:   Procedure Laterality Date    CHOLECYSTECTOMY      COLONOSCOPY N/A 4/29/2016    Procedure: COLONOSCOPY;  Surgeon: Umm Arenas MD;  Location: [facility] ENDO;  Service: Endoscopy;  Laterality: N/A;    ESOPHAGOGASTRODUODENOSCOPY N/A 1/25/2019    Procedure: ESOPHAGOGASTRODUODENOSCOPY (EGD);  Surgeon: Jenise Hallman MD;  Location: [facility] ENDO;  Service: Endoscopy;  Laterality: N/A;    FLEXIBLE SIGMOIDOSCOPY N/A 1/25/2019    Procedure: SIGMOIDOSCOPY, FLEXIBLE;  Surgeon: Jenise Hallman MD;  Location: [facility] ENDO;  Service: Endoscopy;  Laterality: N/A;    BETZY FILTER PLACEMENT Right 01/2014    KNEE ARTHROPLASTY Left 10/16/2019    Procedure: ARTHROPLASTY, KNEE;  Surgeon: Ghassan Lindsay MD;  Location: [facility] OR;  Service: Orthopedics;  Laterality: Left;  Franco notifiedconfirmed with Franco  [phone] 10/15/19 kb    LAPAROSCOPIC TOTAL COLECTOMY  06/24/2016    NASAL SINUS SURGERY      TONSILLECTOMY      TOTAL KNEE ARTHROPLASTY Right 04/07/2008    TOTAL KNEE ARTHROPLASTY Left 10/16/2019       Time Tracking:     PT Received On: 10/17/19  PT Start Time: 1130     PT Stop Time: 1157  PT Total Time (min): 27 min     Billable Minutes: Evaluation 27      Carol Tomlin, PT  10/17/2019

## 2019-10-17 NOTE — NURSING
Pt has documented pressure wounds. Upon assessment, pt does not have any pressure wounds and mepliex applied.

## 2019-10-17 NOTE — PLAN OF CARE
Pt currently remains in the ICU, KAROL Cuellar suggesting that pt will need SNF placement on discharge. TN met pt's son Bryson at bedside to discuss discharge planning and complete DCA. TN discussed different levels of post acute care needs with pt's son (IPR,SNF, and Home health). Pt's son states he believes that pt would be able to complete the 3 hrs of therapy at [facility] and prefers for patient to go to [facility] upon discharge. Pt's son does not want patient to go SNF at this time, TN discussed SNF locations and hospital based SNFs. Pt's son believes patient needs more aggressive therapy than SNF and HH. TN informed him that pt would have to be medically accepted into IPR and IPR would have to be able to meet patient needs regarding dx of Dementia. Pt's son's states his IPR preferences is [facility] and [facility]. TN made contact with Kristen [facility] clinical liaison who states will come to hospital to speak with patient's son and assess pt.    Discharge brochure and blue discharge folder given to pt. TN updated contact information on whiteboard.       10/17/19 0588   Discharge Assessment   Assessment Type Discharge Planning Assessment   Confirmed/corrected address and phone number on facesheet? Yes   Assessment information obtained from? Caregiver   Communicated expected length of stay with patient/caregiver yes   Prior to hospitilization cognitive status: Unable to Assess   Prior to hospitalization functional status: Assistive Equipment   Current cognitive status: Unable to Assess   Current Functional Status: Assistive Equipment   Lives With child(angela), adult   Able to Return to Prior Arrangements   (TBD)   Is patient able to care for self after discharge? No   Who are your caregiver(s) and their phone number(s)? Bryson Cox (son) [phone]   Patient's perception of discharge disposition rehab facility   Readmission Within the Last 30 Days no previous admission in last 30 days   Patient currently being  followed by outpatient case management? No   Patient currently receives any other outside agency services? No   Equipment Currently Used at Home rollator;cane, straight   Do you have any problems affording any of your prescribed medications? No   Does the patient have transportation home? Yes   Transportation Anticipated health plan transportation   Does the patient receive services at the [facility]? No   Discharge Plan A Rehab   Discharge Plan B Skilled Nursing Facility   DME Needed Upon Discharge  none   Patient/Family in Agreement with Plan yes   Giovani Rose RN-BC  Transitional Navigator  [phone]

## 2019-10-17 NOTE — PLAN OF CARE
TN called Charleen at [facility] to check on bed availability for Monday due to patient would require 3 IP midnights per Medicare guidelines. Per Charleen, Monday beds has already bed filled but will flag patient if any admissions get cancelled.

## 2019-10-17 NOTE — PLAN OF CARE
Problem: Occupational Therapy Goal  Goal: Occupational Therapy Goal  Description  Goals to be met by: 11/17     Patient will increase functional independence with ADLs by performing:    UE Dressing with Supervision.  LE Dressing with Supervision.  Grooming while standing with Supervision.  Toileting from toilet with Supervision for hygiene and clothing management.   Toilet transfer to toilet with Supervision.  Upper extremity exercise program x10 reps per handout, with assistance as needed.     10/17/2019 1318 by Nick Munoz, OT  Outcome: Ongoing, Progressing  10/17/2019 1317 by Nick Munoz, OT  Outcome: Ongoing, Progressing

## 2019-10-17 NOTE — PROGRESS NOTES
[facility]  Orthopedics  Progress Note    Patient Name: Anabella Aranda  MRN: [id number]  Admission Date: 10/16/2019  Hospital Length of Stay: 1 days  Attending Provider: Ghassan Lindsay MD  Primary Care Provider: Shon Brambila MD  Follow-up For: Procedure(s) (LRB):  ARTHROPLASTY, KNEE (Left)    Post-Operative Day: 1 Day Post-Op  Subjective:     Principal Problem:History of left knee replacement    Principal Orthopedic Problem:  Same    Interval History:  Patient indicates that she is comfortable.    Review of patient's allergies indicates:   Allergen Reactions    Sulfa (sulfonamide antibiotics) Swelling    Tomato (solanum lycopersicum) Other (See Comments)       Current Facility-Administered Medications   Medication    acetaminophen tablet 1,000 mg    aspirin EC tablet 81 mg    bisacodyl suppository 10 mg    calcium carbonate 200 mg calcium (500 mg) chewable tablet 500 mg    donepezil tablet 10 mg    ferrous sulfate EC tablet 325 mg    HYDROmorphone injection 0.2 mg    lactated ringers infusion    lactulose 20 gram/30 mL solution Soln 20 g    loperamide capsule 4 mg    memantine tablet 10 mg    mesalamine CR capsule 1,500 mg    ondansetron disintegrating tablet 4 mg    ondansetron injection 4 mg    oxyCODONE immediate release tablet 5 mg    pantoprazole EC tablet 40 mg    phenylephrine (MEGGAN-SYNEPHRINE) 20 mg in sodium chloride 0.9% 250 mL infusion    QUEtiapine tablet 100 mg    sodium chloride 0.9% flush 10 mL    tuberculin injection 5 Units     Facility-Administered Medications Ordered in Other Encounters   Medication    bupivacaine 0.75% in dextrose 8.25% (intrathecal) 0.75 % (7.5 mg/mL) injection    bupivacaine liposome (PF) 1.3 % (13.3 mg/mL) suspension    dexMEDEtomidine (PRECEDEX) 200 mcg in dextrose 5 % 48 mL IV syringe (conc: 4 mcg/mL)    dexmedetomidine IV bolus from bag/infusion    ePHEDrine sulfate    lactated ringers infusion    ondansetron injection  "   phenylephrine (MEGGAN-SYNEPHRINE) 20 mg in sodium chloride 0.9% 250 mL infusion    phenylephrine injection    propofol (DIPRIVAN) 10 mg/mL infusion     Objective:     Vital Signs (Most Recent):  Temp: 97.9 °F (36.6 °C) (10/17/19 1115)  Pulse: 94 (10/17/19 1045)  Resp: 14 (10/17/19 1045)  BP: (!) 119/58 (10/17/19 1000)  SpO2: 100 % (10/17/19 1045) Vital Signs (24h Range):  Temp:  [97.4 °F (36.3 °C)-98.7 °F (37.1 °C)] 97.9 °F (36.6 °C)  Pulse:  [] 94  Resp:  [8-53] 14  SpO2:  [95 %-100 %] 100 %  BP: ()/() 119/58     Weight: 96.7 kg (213 lb 3 oz)  Height: 5' 4" (162.6 cm)  Body mass index is 36.59 kg/m².      Intake/Output Summary (Last 24 hours) at 10/17/2019 1232  Last data filed at 10/17/2019 0935  Gross per 24 hour   Intake 1642.5 ml   Output 500 ml   Net 1142.5 ml       Ortho/SPM Exam     Appears alert and comfortable  Mental status and conversation is at baseline clarity  Left knee dressing intact with minimal blood  Left lower extremity neurovascular intact    Significant Labs: All pertinent labs within the past 24 hours have been reviewed.    Significant Imaging: None    Assessment/Plan:     Active Diagnoses:    Diagnosis Date Noted POA    PRINCIPAL PROBLEM:  History of left knee replacement [Z96.652] 10/16/2019 Not Applicable    Acute renal failure superimposed on stage 3 chronic kidney disease [N17.9, N18.3] 10/16/2019 Yes    Dementia [F03.90]  Yes     Chronic    Major depression with psychotic features [F32.3] 04/30/2018 Yes     Chronic    Morbid obesity [E66.01] 10/17/2017 Yes     Chronic    Ulcerative proctitis with complication [K51.219] 01/11/2017 Yes     Chronic    History of total colectomy [Z90.49] 06/24/2016 Not Applicable     Chronic    Iron deficiency anemia [D50.9] 09/09/2015 Yes     Chronic    Venous insufficiency of both lower extremities [I87.2] 08/26/2015 Yes     Chronic      Problems Resolved During this Admission:    Diagnosis Date Noted Date Resolved POA    " Primary osteoarthritis of left knee [M17.12] 10/16/2019 10/17/2019 Yes    Chronic pain of both knees [M25.561, M25.562, G89.29] 08/14/2019 10/17/2019 Yes     Chronic    Normal postop day 1 with regard to the knee itself.  The patient should start physical therapy as soon as possible.     The patient's hemodynamic status is at baseline.  She is stable for transfer to the floor.    The patient's mental status is also at baseline.    Given her overall condition, discharged to another facility is anticipated    Ghassan Lindsay MD  Orthopedics  [facility]

## 2019-10-17 NOTE — NURSING
Notified Dr Lindsay about medical management consult in the ICU. Stated he was aware Anesthesia team was taking over as primary. I stated Anesthesia was under the impression that Dr Lindsay was going put in a consult for medical management. Dr Lindsay stated to put in consult to [facility] hospitalist for medical management. Sayda Duque NP notified of the consult.

## 2019-10-17 NOTE — PLAN OF CARE
[facility]    FACILITY TRANSFER ORDERS      Patient Name: Anabella Aranda  YOB: 1945    PCP: Shon Brambila MD   PCP Address: [address] / [place] [address]  PCP Phone Number: [phone]  PCP Fax: [phone]    Encounter Date: 10/17/2019    Admit to: SNF    Vital Signs:  Routine    Diagnoses:   Active Hospital Problems    Diagnosis  POA    *History of left knee replacement [Z96.652]  Not Applicable    Acute renal failure superimposed on stage 3 chronic kidney disease [N17.9, N18.3]  Yes    Dementia [F03.90]  Yes     Chronic    Major depression with psychotic features [F32.3]  Yes     Chronic    Morbid obesity [E66.01]  Yes     Chronic    Ulcerative proctitis with complication [K51.219]  Yes     Chronic    History of total colectomy [Z90.49]  Not Applicable     Chronic    Iron deficiency anemia [D50.9]  Yes     Chronic    Venous insufficiency of both lower extremities [I87.2]  Yes     Chronic      Resolved Hospital Problems    Diagnosis Date Resolved POA    Primary osteoarthritis of left knee [M17.12] 10/17/2019 Yes    Chronic pain of both knees [M25.561, M25.562, G89.29] 10/17/2019 Yes     Chronic       Allergies:  Review of patient's allergies indicates:   Allergen Reactions    Sulfa (sulfonamide antibiotics) Swelling    Tomato (solanum lycopersicum) Other (See Comments)       Diet: regular diet    Activities: Activity as tolerated    Nursing: n/a     Labs: n/a    CONSULTS:    Physical Therapy to evaluate and treat.  and Occupational Therapy to evaluate and treat.    MISCELLANEOUS CARE:  n/a    WOUND CARE ORDERS:   Yes: Leave dressing in place until follow-up.  Dressing is waterproof.  Patient may shower regularly.  Pat dry.  Only replace if leaking or peeling off.  Then replaced with similar occlusive dressing and keep dry.    Medications: Review discharge medications with patient and family and provide education.      Current Discharge Medication  List      START taking these medications    Details   aspirin (ECOTRIN) 81 MG EC tablet Take 1 tablet (81 mg total) by mouth once daily.  Refills: 0      oxyCODONE-acetaminophen (PERCOCET) 5-325 mg per tablet Take 1 tablet by mouth every 6 (six) hours as needed for Pain.  Qty: 40 tablet, Refills: 0    Comments: Quantity prescribed more than 7 day supply? Yes, quantity medically necessary         CONTINUE these medications which have CHANGED    Details   memantine (NAMENDA) 10 MG Tab Take 1 tablet (10 mg total) by mouth once daily.  Qty: 180 tablet, Refills: 3    Associated Diagnoses: Late onset Alzheimer's disease without behavioral disturbance         CONTINUE these medications which have NOT CHANGED    Details   calcium carbonate (OS-NELSY) 600 mg (1,500 mg) Tab Take 1 tablet (600 mg total) by mouth 2 (two) times daily with meals.  Qty: 180 tablet, Refills: 3      diclofenac sodium (VOLTAREN) 1 % Gel Apply 2 g topically once daily.  Qty: 100 g, Refills: 3    Associated Diagnoses: Primary osteoarthritis of both knees; Chronic pain of both knees      ergocalciferol (ERGOCALCIFEROL) 50,000 unit Cap Take 1 capsule (50,000 Units total) by mouth every 7 days.  Qty: 12 capsule, Refills: 3    Associated Diagnoses: Vitamin D deficiency      ferrous sulfate 325 mg (65 mg iron) Tab tablet Take 1 tablet (325 mg total) by mouth 2 (two) times daily.  Qty: 180 tablet, Refills: 3    Associated Diagnoses: Iron deficiency anemia, unspecified iron deficiency; Anemia due to acute blood loss      loperamide (IMODIUM A-D) 2 mg Tab Take 1 mg by mouth 2 (two) times daily before meals.      mesalamine (APRISO) 0.375 gram Cp24 Take 4 capsules (1.5 g total) by mouth once daily.  Qty: 120 capsule, Refills: 11    Associated Diagnoses: Ulcerative colitis with complication, unspecified location      MULTIVITS W-FE,OTHER MIN/LUT (CENTRUM SILVER ULTRA WOMEN'S ORAL) Take by mouth.      pantoprazole (PROTONIX) 40 MG tablet Take 1 tablet (40 mg total)  by mouth once daily.  Qty: 30 tablet, Refills: 11    Associated Diagnoses: Ulcerative colitis with complication, unspecified location; S/P colectomy; Diarrhea, unspecified type      QUEtiapine (SEROQUEL) 100 MG Tab TAKE 1 2 TABLET BY MOUTH EVERY NIGHT AT BEDTIME, THEN TAKE ONE TABLET BY MOUTH EVERY NIGHT AT BEDTIME THEREAFTER  Refills: 1      azaTHIOprine (IMURAN) 50 mg Tab Take 2 tablets (100 mg total) by mouth once daily.  Qty: 60 tablet, Refills: 3    Associated Diagnoses: Ulcerative proctitis with complication      donepezil (ARICEPT) 10 MG tablet Take 1 tablet (10 mg total) by mouth every evening.  Qty: 90 tablet, Refills: 3    Associated Diagnoses: Late onset Alzheimer's disease without behavioral disturbance         STOP taking these medications       loperamide (IMODIUM) 1 mg/5 mL solution Comments:   Reason for Stopping:                    _________________________________  Umu Cuellar PA-C  10/17/2019

## 2019-10-18 DIAGNOSIS — K51.219 ULCERATIVE PROCTITIS WITH COMPLICATION: ICD-10-CM

## 2019-10-18 LAB
ANION GAP SERPL CALC-SCNC: 13 MMOL/L (ref 8–16)
BUN SERPL-MCNC: 36 MG/DL (ref 8–23)
C DIFF GDH STL QL: NEGATIVE
C DIFF TOX A+B STL QL IA: NEGATIVE
CALCIUM SERPL-MCNC: 8.3 MG/DL (ref 8.7–10.5)
CHLORIDE SERPL-SCNC: 104 MMOL/L (ref 95–110)
CO2 SERPL-SCNC: 18 MMOL/L (ref 23–29)
CREAT SERPL-MCNC: 2.7 MG/DL (ref 0.5–1.4)
EST. GFR  (AFRICAN AMERICAN): 20 ML/MIN/1.73 M^2
EST. GFR  (NON AFRICAN AMERICAN): 17 ML/MIN/1.73 M^2
GLUCOSE SERPL-MCNC: 107 MG/DL (ref 70–110)
HCT VFR BLD AUTO: 32.6 % (ref 37–48.5)
HGB BLD-MCNC: 11.3 G/DL (ref 12–16)
LACTATE SERPL-SCNC: 0.9 MMOL/L (ref 0.5–2.2)
POTASSIUM SERPL-SCNC: 4.3 MMOL/L (ref 3.5–5.1)
SODIUM SERPL-SCNC: 135 MMOL/L (ref 136–145)

## 2019-10-18 PROCEDURE — 85014 HEMATOCRIT: CPT

## 2019-10-18 PROCEDURE — 94761 N-INVAS EAR/PLS OXIMETRY MLT: CPT

## 2019-10-18 PROCEDURE — 97530 THERAPEUTIC ACTIVITIES: CPT | Performed by: PHYSICAL THERAPIST

## 2019-10-18 PROCEDURE — 97110 THERAPEUTIC EXERCISES: CPT | Performed by: PHYSICAL THERAPIST

## 2019-10-18 PROCEDURE — 99232 PR SUBSEQUENT HOSPITAL CARE,LEVL II: ICD-10-PCS | Mod: ,,, | Performed by: INTERNAL MEDICINE

## 2019-10-18 PROCEDURE — 36415 COLL VENOUS BLD VENIPUNCTURE: CPT

## 2019-10-18 PROCEDURE — 97110 THERAPEUTIC EXERCISES: CPT

## 2019-10-18 PROCEDURE — 63600175 PHARM REV CODE 636 W HCPCS: Performed by: HOSPITALIST

## 2019-10-18 PROCEDURE — 25000003 PHARM REV CODE 250: Performed by: ORTHOPAEDIC SURGERY

## 2019-10-18 PROCEDURE — 27000221 HC OXYGEN, UP TO 24 HOURS

## 2019-10-18 PROCEDURE — 63600175 PHARM REV CODE 636 W HCPCS: Performed by: INTERNAL MEDICINE

## 2019-10-18 PROCEDURE — 25000003 PHARM REV CODE 250: Performed by: INTERNAL MEDICINE

## 2019-10-18 PROCEDURE — 87449 NOS EACH ORGANISM AG IA: CPT

## 2019-10-18 PROCEDURE — 21400001 HC TELEMETRY ROOM

## 2019-10-18 PROCEDURE — 97535 SELF CARE MNGMENT TRAINING: CPT

## 2019-10-18 PROCEDURE — 80048 BASIC METABOLIC PNL TOTAL CA: CPT

## 2019-10-18 PROCEDURE — 63600175 PHARM REV CODE 636 W HCPCS: Performed by: ANESTHESIOLOGY

## 2019-10-18 PROCEDURE — 99232 SBSQ HOSP IP/OBS MODERATE 35: CPT | Mod: ,,, | Performed by: INTERNAL MEDICINE

## 2019-10-18 PROCEDURE — 85018 HEMOGLOBIN: CPT

## 2019-10-18 PROCEDURE — 83605 ASSAY OF LACTIC ACID: CPT

## 2019-10-18 RX ORDER — LOPERAMIDE HYDROCHLORIDE 2 MG/1
4 CAPSULE ORAL 2 TIMES DAILY PRN
Status: DISCONTINUED | OUTPATIENT
Start: 2019-10-18 | End: 2019-10-29 | Stop reason: HOSPADM

## 2019-10-18 RX ORDER — AZATHIOPRINE 50 MG/1
100 TABLET ORAL DAILY
Qty: 60 TABLET | Refills: 3 | Status: SHIPPED | OUTPATIENT
Start: 2019-10-18 | End: 2019-11-08 | Stop reason: SDUPTHER

## 2019-10-18 RX ORDER — AZATHIOPRINE 50 MG/1
100 TABLET ORAL DAILY
Status: DISCONTINUED | OUTPATIENT
Start: 2019-10-18 | End: 2019-10-21

## 2019-10-18 RX ORDER — DIPHENOXYLATE HYDROCHLORIDE AND ATROPINE SULFATE 2.5; .025 MG/1; MG/1
1 TABLET ORAL 2 TIMES DAILY
Status: DISCONTINUED | OUTPATIENT
Start: 2019-10-18 | End: 2019-10-29 | Stop reason: HOSPADM

## 2019-10-18 RX ORDER — HYDROCORTISONE ACETATE 25 MG/1
25 SUPPOSITORY RECTAL 2 TIMES DAILY
Status: DISCONTINUED | OUTPATIENT
Start: 2019-10-18 | End: 2019-10-21

## 2019-10-18 RX ADMIN — QUETIAPINE 100 MG: 100 TABLET ORAL at 08:10

## 2019-10-18 RX ADMIN — FERROUS SULFATE TAB EC 325 MG (65 MG FE EQUIVALENT) 325 MG: 325 (65 FE) TABLET DELAYED RESPONSE at 08:10

## 2019-10-18 RX ADMIN — ACETAMINOPHEN 1000 MG: 500 TABLET ORAL at 02:10

## 2019-10-18 RX ADMIN — ASPIRIN 81 MG: 81 TABLET, COATED ORAL at 09:10

## 2019-10-18 RX ADMIN — PANTOPRAZOLE SODIUM 40 MG: 40 TABLET, DELAYED RELEASE ORAL at 08:10

## 2019-10-18 RX ADMIN — METHOTREXATE 1 TABLET: 2.5 TABLET ORAL at 08:10

## 2019-10-18 RX ADMIN — SODIUM CHLORIDE 250 ML: 0.9 INJECTION, SOLUTION INTRAVENOUS at 09:10

## 2019-10-18 RX ADMIN — MESALAMINE 1500 MG: 250 CAPSULE ORAL at 08:10

## 2019-10-18 RX ADMIN — CALCIUM CARBONATE 500 MG: 500 TABLET, CHEWABLE ORAL at 08:10

## 2019-10-18 RX ADMIN — LOPERAMIDE HYDROCHLORIDE 4 MG: 2 CAPSULE ORAL at 08:10

## 2019-10-18 RX ADMIN — OXYCODONE HYDROCHLORIDE 5 MG: 5 TABLET ORAL at 12:10

## 2019-10-18 RX ADMIN — ACETAMINOPHEN 1000 MG: 500 TABLET ORAL at 08:10

## 2019-10-18 RX ADMIN — AZATHIOPRINE 100 MG: 50 TABLET ORAL at 04:10

## 2019-10-18 RX ADMIN — MEMANTINE HYDROCHLORIDE 10 MG: 5 TABLET ORAL at 08:10

## 2019-10-18 RX ADMIN — DONEPEZIL HYDROCHLORIDE 10 MG: 5 TABLET, FILM COATED ORAL at 08:10

## 2019-10-18 RX ADMIN — SODIUM CHLORIDE, SODIUM LACTATE, POTASSIUM CHLORIDE, AND CALCIUM CHLORIDE: .6; .31; .03; .02 INJECTION, SOLUTION INTRAVENOUS at 08:10

## 2019-10-18 RX ADMIN — HYDROCORTISONE ACETATE 25 MG: 25 SUPPOSITORY RECTAL at 08:10

## 2019-10-18 NOTE — NURSING
Notified by REESE Duque through securechat floor nurse was concerned with bp 88/56 hr-126 at this time.

## 2019-10-18 NOTE — ANESTHESIA POSTPROCEDURE EVALUATION
Anesthesia Post Evaluation    Patient: Anabella Aranda    Procedure(s) Performed: Procedure(s) (LRB):  ARTHROPLASTY, KNEE (Left)    Final Anesthesia Type: general  Patient location during evaluation: PACU  Patient participation: Yes- Able to Participate  Level of consciousness: awake and alert, oriented and awake  Post-procedure vital signs: reviewed and stable  Pain management: adequate  Airway patency: patent  PONV status at discharge: No PONV  Anesthetic complications: no      Cardiovascular status: blood pressure returned to baseline  Respiratory status: unassisted and room air  Hydration status: euvolemic  Follow-up not needed.          Vitals Value Taken Time   BP 84/58 10/18/2019  8:57 AM   Temp 36.6 °C (97.9 °F) 10/18/2019  7:52 AM   Pulse 131 10/18/2019  7:52 AM   Resp 15 10/18/2019  7:52 AM   SpO2 96 % 10/18/2019  7:42 AM         Event Time     Out of Recovery 18:09:57          Pain/Dimitry Score: Pain Rating Prior to Med Admin: 0 (10/18/2019  1:04 AM)  Pain Rating Post Med Admin: 0 (10/18/2019  1:04 AM)  Dimitry Score: 9 (10/17/2019  7:43 PM)         cc:

ASHLEE MOODY

****

 

DATE OF ADMISSION:  3/23/2017

 

ADMITTING DIAGNOSIS:

Right knee osteoarthritis.

 

HISTORY

This is an 86 year-old female with significant right knee pain and deformity

related to significant osteoarthritis.  Despite conservative care the patient

is painful and symptomatic.  She presents for surgical treatment.

 

PAST MEDICAL HISTORY, SOCIAL HISTORY AND FAMILY HISTORY

See attached notes.

 

PHYSICAL EXAMINATION:

GENERAL:  The patient is an 86 year-old female in moderate distress with her

right knee.

HEENT:  Normocephalic, atraumatic.  Pupils equal, round, reactive to light and

accommodation.  Extraocular motions intact.

NECK:  Supple.

CHEST:  Clear.

HEART:  Regular rate and rhythm.

ABDOMEN:  Soft, nontender, normoactive bowel sounds.  MUSCULOSKELETAL

EXAMINATION:  Right knee, pain with range of motion, mild deformity, crepitus

with range of motion.  Neurologic and vascular examination is within normal

limits.

 

IMPRESSION:

Osteoarthritis right knee.

 

PLAN:

Right total knee replacement arthroplasty.

 

CONSENT:

There are risks with surgery including infection, bleeding, loss of motion,

continued pain, need for further surgery, neurologic or vascular injury, etc.

The patient understands these issues and wishes to press on with surgery as

outlined above.

 

 

 

                               __________________________________

                           Ashlee Moody MD

 

 

 

 

[facility]

D:  3/22/2017/11:17 PM

T:  3/22/2017/11:37 PM

Visit #:  [id number]

Job #:  [id number]

## 2019-10-18 NOTE — CONSULTS
[facility] Hospital Medicine  Consult Note    Patient Name: Anabella Aranda  MRN: [id number]  Admission Date: 10/16/2019  Hospital Length of Stay: 2 days  Attending Physician: Herberth Leblanc DO  Primary Care Provider: Shon Brambila MD           Patient information was obtained from patient, past medical records and ER records.     Inpatient consult to [facility]  Consult performed by: Herberth Leblanc DO  Consult ordered by: Ghassan Lindsay MD        Subjective:     Principal Problem: History of left knee replacement    Chief Complaint: No chief complaint on file.       HPI: The patient is a 74 y.o. female with PMH significant for Dementia, h/o DVT status post IVC filter, ulcerative colitis, iron deficiency anemia, GERD,  Major depression with psychotic features, and history of hep B. She is here today for a pre-operative visit in preparation for a Left total knee arthroplasty to be performed by  Dr. Lindsay on 10/16/19.  Anabella Aranda has a >1 year history of Left knee pain.  Pain is worse with activity and weight bearing. Patient has experienced interference of ADLs due to increased pain and decreased range of motion. Patient has failed non-operative treatment including NSAIDs, activity modification, and bracing. Anabella Aranda ambulates Rolator walker.  There has been no significant change in medical status since last visit.  She was seen by PCP Dr. Brambila on 08/14/19 at which time chronic conditions were well controlled    The pt underwent a left TKA during this admission, and we were consulted for medical management.      Past Medical History:   Diagnosis Date    Arthritis     C. difficile colitis 10/13/2014    4/15/2015    Chronic kidney disease, stage 3     Dementia     Hepatitis B     Hypertension     Major depression with psychotic features     Ulcerative colitis 03/2014       Past Surgical History:   Procedure Laterality Date    CHOLECYSTECTOMY       COLONOSCOPY N/A 4/29/2016    Procedure: COLONOSCOPY;  Surgeon: Umm Arenas MD;  Location: [facility] ENDO;  Service: Endoscopy;  Laterality: N/A;    ESOPHAGOGASTRODUODENOSCOPY N/A 1/25/2019    Procedure: ESOPHAGOGASTRODUODENOSCOPY (EGD);  Surgeon: Jenise Hallman MD;  Location: [facility] ENDO;  Service: Endoscopy;  Laterality: N/A;    FLEXIBLE SIGMOIDOSCOPY N/A 1/25/2019    Procedure: SIGMOIDOSCOPY, FLEXIBLE;  Surgeon: Jenise Hallman MD;  Location: [facility] ENDO;  Service: Endoscopy;  Laterality: N/A;    BETZY FILTER PLACEMENT Right 01/2014    KNEE ARTHROPLASTY Left 10/16/2019    Procedure: ARTHROPLASTY, KNEE;  Surgeon: Ghassan Lindsay MD;  Location: [facility] OR;  Service: Orthopedics;  Laterality: Left;  Franco notifiedconfirmed with Franco  [phone] 10/15/19 kb    LAPAROSCOPIC TOTAL COLECTOMY  06/24/2016    NASAL SINUS SURGERY      TONSILLECTOMY      TOTAL KNEE ARTHROPLASTY Right 04/07/2008    TOTAL KNEE ARTHROPLASTY Left 10/16/2019       Review of patient's allergies indicates:   Allergen Reactions    Sulfa (sulfonamide antibiotics) Swelling    Tomato (solanum lycopersicum) Other (See Comments)       No current facility-administered medications on file prior to encounter.      Current Outpatient Medications on File Prior to Encounter   Medication Sig    calcium carbonate (OS-NELSY) 600 mg (1,500 mg) Tab Take 1 tablet (600 mg total) by mouth 2 (two) times daily with meals.    diclofenac sodium (VOLTAREN) 1 % Gel Apply 2 g topically once daily.    ergocalciferol (ERGOCALCIFEROL) 50,000 unit Cap Take 1 capsule (50,000 Units total) by mouth every 7 days.    ferrous sulfate 325 mg (65 mg iron) Tab tablet Take 1 tablet (325 mg total) by mouth 2 (two) times daily.    loperamide (IMODIUM A-D) 2 mg Tab Take 1 mg by mouth 2 (two) times daily before meals.    MULTIVITS W-FE,OTHER MIN/LUT (CENTRUM SILVER ULTRA WOMEN'S ORAL) Take by mouth.    QUEtiapine (SEROQUEL) 100 MG Tab TAKE 1 2 TABLET BY MOUTH  EVERY NIGHT AT BEDTIME, THEN TAKE ONE TABLET BY MOUTH EVERY NIGHT AT BEDTIME THEREAFTER     Family History     Problem Relation (Age of Onset)    Colon cancer Paternal Grandmother    Throat cancer Father        Tobacco Use    Smoking status: Former Smoker     Types: Cigarettes     Last attempt to quit: 1997     Years since quittin.2    Smokeless tobacco: Former User   Substance and Sexual Activity    Alcohol use: No    Drug use: No    Sexual activity: Not Currently     Review of Systems   Constitutional: Positive for activity change and fatigue. Negative for fever.   Respiratory: Negative for apnea, cough and shortness of breath.    Cardiovascular: Negative for chest pain and palpitations.   Gastrointestinal: Positive for diarrhea and nausea. Negative for abdominal distention and vomiting.   Genitourinary: Negative for difficulty urinating and hematuria.   Musculoskeletal: Positive for arthralgias.   Neurological: Negative for dizziness and numbness.   Psychiatric/Behavioral: Negative for agitation. The patient is not nervous/anxious.      Objective:     Vital Signs (Most Recent):  Temp: 98 °F (36.7 °C) (10/18/19 1201)  Pulse: (!) 133 (10/18/19 1201)  Resp: 15 (10/18/19 1201)  BP: 113/61 (10/18/19 1201)  SpO2: 97 % (10/18/19 1232) Vital Signs (24h Range):  Temp:  [97.6 °F (36.4 °C)-98.7 °F (37.1 °C)] 98 °F (36.7 °C)  Pulse:  [] 133  Resp:  [12-46] 15  SpO2:  [96 %-100 %] 97 %  BP: ()/(50-67) 113/61     Weight: 99.8 kg (220 lb 0.3 oz)  Body mass index is 37.77 kg/m².    Physical Exam   Constitutional: She appears well-nourished.   HENT:   Head: Normocephalic and atraumatic.   Eyes: EOM are normal.   Cardiovascular: Normal rate and regular rhythm.   Pulmonary/Chest: Effort normal. No respiratory distress.   Abdominal: Soft.   Musculoskeletal: Normal range of motion.   Neurological: She is alert.   Skin: Skin is warm and dry.   Psychiatric: She has a normal mood and affect. Her behavior is  normal.   Vitals reviewed.      Significant Labs:   Recent Labs   Lab 10/16/19  1950 10/17/19  0341 10/18/19  0611   WBC 9.47  --   --    HGB 10.9* 10.2* 11.3*   HCT 32.0* 29.9* 32.6*     --   --      Recent Labs   Lab 10/16/19  1950 10/17/19  0341 10/18/19  0452   * 134*  134* 135*   K 4.2 3.6  3.6 4.3   CL 99 102  102 104   CO2 22* 20*  20* 18*   BUN 42* 43*  43* 36*   CREATININE 4.8* 4.7*  4.7* 2.7*   * 102  102 107   CALCIUM 8.4* 7.9*  7.9* 8.3*     Recent Labs   Lab 10/16/19  1950 10/17/19  0341   ALKPHOS 103  --    ALT 12  --    AST 14  --    ALBUMIN 2.6* 2.4*   PROT 6.8  --    BILITOT 0.3  --    INR 1.1  --       No results for input(s): CPK, CPKMB, MB, TROPONINI in the last 72 hours.  No results for input(s): POCTGLUCOSE in the last 168 hours.  Hemoglobin A1C   Date Value Ref Range Status   02/04/2019 5.0 4.0 - 5.6 % Final     Comment:     ADA Screening Guidelines:  5.7-6.4%  Consistent with prediabetes  >or=6.5%  Consistent with diabetes  High levels of fetal hemoglobin interfere with the HbA1C  assay. Heterozygous hemoglobin variants (HbS, HgC, etc)do  not significantly interfere with this assay.   However, presence of multiple variants may affect accuracy.     04/23/2018 5.0 4.0 - 5.6 % Final     Comment:     According to ADA guidelines, hemoglobin A1c <7.0% represents  optimal control in non-pregnant diabetic patients. Different  metrics may apply to specific patient populations.   Standards of Medical Care in Diabetes-2016.  For the purpose of screening for the presence of diabetes:  <5.7%     Consistent with the absence of diabetes  5.7-6.4%  Consistent with increasing risk for diabetes   (prediabetes)  >or=6.5%  Consistent with diabetes  Currently, no consensus exists for use of hemoglobin A1c  for diagnosis of diabetes for children.  This Hemoglobin A1c assay has significant interference with fetal   hemoglobin   (HbF). The results are invalid for patients with abnormal  amounts of   HbF,   including those with known Hereditary Persistence   of Fetal Hemoglobin. Heterozygous hemoglobin variants (HbAS, HbAC,   HbAD, HbAE, HbA2) do not significantly interfere with this assay;   however, presence of multiple variants in a sample may impact the %   interference.     01/30/2018 5.0 4.0 - 5.6 % Final     Comment:     According to ADA guidelines, hemoglobin A1c <7.0% represents  optimal control in non-pregnant diabetic patients. Different  metrics may apply to specific patient populations.   Standards of Medical Care in Diabetes-2016.  For the purpose of screening for the presence of diabetes:  <5.7%     Consistent with the absence of diabetes  5.7-6.4%  Consistent with increasing risk for diabetes   (prediabetes)  >or=6.5%  Consistent with diabetes  Currently, no consensus exists for use of hemoglobin A1c  for diagnosis of diabetes for children.  This Hemoglobin A1c assay has significant interference with fetal   hemoglobin   (HbF). The results are invalid for patients with abnormal amounts of   HbF,   including those with known Hereditary Persistence   of Fetal Hemoglobin. Heterozygous hemoglobin variants (HbAS, HbAC,   HbAD, HbAE, HbA2) do not significantly interfere with this assay;   however, presence of multiple variants in a sample may impact the %   interference.       Scheduled Meds:   acetaminophen  1,000 mg Oral TID    aspirin  81 mg Oral Daily    azaTHIOprine  100 mg Oral Daily    calcium carbonate  500 mg Oral BID    diphenoxylate-atropine 2.5-0.025 mg  1 tablet Oral BID    donepezil  10 mg Oral QHS    ferrous sulfate  325 mg Oral BID    hydrocortisone  25 mg Rectal BID    memantine  10 mg Oral BID    mesalamine  1,500 mg Oral Daily    pantoprazole  40 mg Oral Daily    QUEtiapine  100 mg Oral QHS     Continuous Infusions:   lactated ringers 125 mL/hr at 10/18/19 0000     As Needed: bisacodyl, HYDROmorphone, lactulose, loperamide, ondansetron, ondansetron,  oxyCODONE, sodium chloride 0.9%      Significant Imaging:   X-Ray Chest AP Portable  Narrative: EXAMINATION:  XR CHEST AP PORTABLE    CLINICAL HISTORY:  snf;    TECHNIQUE:  Single frontal view of the chest was performed.    COMPARISON:  August 20, 2014    FINDINGS:  Single portable chest view is submitted.  Mild diminished depth of inspiration noted, the cardiomediastinal silhouette appears stable.  Minimal atelectatic change noted at the lung bases there is no evidence for confluent infiltrate or consolidation, significant pleural effusion or pneumothorax.  The osseous structures demonstrate chronic change.  Impression: Minimal atelectatic change noted at the lung bases, there is no additional radiographic evidence for acute intrathoracic process.    Electronically signed by: Hiren Delgado  Date:    10/18/2019  Time:    00:56      Assessment/Plan:     Acute renal failure superimposed on stage 3 chronic kidney disease  Cr is improving from 2 days ago,   Down to 2.7 from the 4's  Dehydrated.  Given a bolus of IVF.  Will monitor labs and BP/symptoms       Dementia  On matias meds  Continue current management      Major depression with psychotic features  On oral meds.  Continue current management      Ulcerative proctitis with complication  Per GI management      Iron deficiency anemia  H/H is stable  GI is following        VTE Risk Mitigation (From admission, onward)         Ordered     IP VTE HIGH RISK PATIENT  Once      10/16/19 1816     Place VIVIANA hose  Until discontinued      10/16/19 1816     Place sequential compression device  Until discontinued      10/16/19 1816                    Thank you for your consult. I will follow-up with patient. Please contact us if you have any additional questions.    Herberth Leblanc DO  Department of Hospital Medicine   [facility]

## 2019-10-18 NOTE — PLAN OF CARE
Pt accepted to [facility] pending medical stability. Pt can admit over the weekend if medically stable. SW called patient's son due to patient's hx of dementia to update him on patient's anticipated discharge plan to [facility]. Pt's son in agreement with discharge plan.        10/18/19 1658   Discharge Reassessment   Assessment Type Discharge Planning Reassessment   Provided patient/caregiver education on the expected discharge date and the discharge plan Yes   Do you have any problems affording any of your prescribed medications? No   Discharge Plan A Rehab   Discharge Plan B Skilled Nursing Facility   DME Needed Upon Discharge  none   Patient choice form signed by patient/caregiver No   Anticipated Discharge Disposition Rehab   Can the patient answer the patient profile reliably? No, cognitively impaired

## 2019-10-18 NOTE — PLAN OF CARE
"Pt found supine in bed. Pt agreeable to participate /c encouragement. Pt reported "no pain". Pt only complaint was of constant diarrhea. Pt tolerated session well and reported feeling "tired" at end of session.  "

## 2019-10-18 NOTE — HOSPITAL COURSE
The pt seen at [facility], she knows her name and age, but has some memory lapses to the events.  The pt had hypotension earlier and was given a bolus on 250ml NS x 1 dose.  GI is also on the case given her colitis.  Ordering c diff titers, and adjusting her immunosuppression meds as well.  10/19 pt still feeling weak and not at the level where she can participate in therapy, still having diarrhea. Will need to order labs to f/u on bmp  10/21 cr level is down to normal, pt is being also managed by GI for her diarrhea/colitis.  GI report on EGD and Flex sif 10/21 with pathology report severe small bowel inflammation and severe retained rectum inflammation with deep heaped up ulcers. No evidence of infectious process, stains negative. However on review of colectomy specimen from years ago, there was evidence of Crohns disease at that time. Thus, this is likely severe crohns flare given the extent of small bowel involvement  10/29 pt seen sitting in chair, pt has low oral intake, pt could continue to benefit from TPN.  ANTHONY had mentioned the pt is being accepted [facility], called the son and updated him, he agreed for transferring the pt to [facility]

## 2019-10-18 NOTE — NURSING
Gwen Duque notified of pts bp and hr . Orders in computer for stat EKG and manual bp . Attending MD to be notified. Charge Nurse and Floor nurse notified .

## 2019-10-18 NOTE — SUBJECTIVE & OBJECTIVE
Past Medical History:   Diagnosis Date    Arthritis     C. difficile colitis 10/13/2014    4/15/2015    Chronic kidney disease, stage 3     Dementia     Hepatitis B     Hypertension     Major depression with psychotic features     Ulcerative colitis 03/2014       Past Surgical History:   Procedure Laterality Date    CHOLECYSTECTOMY      COLONOSCOPY N/A 4/29/2016    Procedure: COLONOSCOPY;  Surgeon: Umm Arenas MD;  Location: [facility] ENDO;  Service: Endoscopy;  Laterality: N/A;    ESOPHAGOGASTRODUODENOSCOPY N/A 1/25/2019    Procedure: ESOPHAGOGASTRODUODENOSCOPY (EGD);  Surgeon: Jenise Hallman MD;  Location: [facility] ENDO;  Service: Endoscopy;  Laterality: N/A;    FLEXIBLE SIGMOIDOSCOPY N/A 1/25/2019    Procedure: SIGMOIDOSCOPY, FLEXIBLE;  Surgeon: Jenise Hallman MD;  Location: [facility] ENDO;  Service: Endoscopy;  Laterality: N/A;    BETZY FILTER PLACEMENT Right 01/2014    KNEE ARTHROPLASTY Left 10/16/2019    Procedure: ARTHROPLASTY, KNEE;  Surgeon: Ghassan Lindsay MD;  Location: [facility] OR;  Service: Orthopedics;  Laterality: Left;  Franco notifiedconfirmed with Franco  [phone] 10/15/19 kb    LAPAROSCOPIC TOTAL COLECTOMY  06/24/2016    NASAL SINUS SURGERY      TONSILLECTOMY      TOTAL KNEE ARTHROPLASTY Right 04/07/2008    TOTAL KNEE ARTHROPLASTY Left 10/16/2019       Review of patient's allergies indicates:   Allergen Reactions    Sulfa (sulfonamide antibiotics) Swelling    Tomato (solanum lycopersicum) Other (See Comments)       No current facility-administered medications on file prior to encounter.      Current Outpatient Medications on File Prior to Encounter   Medication Sig    calcium carbonate (OS-NELSY) 600 mg (1,500 mg) Tab Take 1 tablet (600 mg total) by mouth 2 (two) times daily with meals.    diclofenac sodium (VOLTAREN) 1 % Gel Apply 2 g topically once daily.    ergocalciferol (ERGOCALCIFEROL) 50,000 unit Cap Take 1 capsule (50,000 Units total) by mouth every 7  days.    ferrous sulfate 325 mg (65 mg iron) Tab tablet Take 1 tablet (325 mg total) by mouth 2 (two) times daily.    loperamide (IMODIUM A-D) 2 mg Tab Take 1 mg by mouth 2 (two) times daily before meals.    MULTIVITS W-FE,OTHER MIN/LUT (CENTRUM SILVER ULTRA WOMEN'S ORAL) Take by mouth.    QUEtiapine (SEROQUEL) 100 MG Tab TAKE 1 2 TABLET BY MOUTH EVERY NIGHT AT BEDTIME, THEN TAKE ONE TABLET BY MOUTH EVERY NIGHT AT BEDTIME THEREAFTER     Family History     Problem Relation (Age of Onset)    Colon cancer Paternal Grandmother    Throat cancer Father        Tobacco Use    Smoking status: Former Smoker     Types: Cigarettes     Last attempt to quit: 1997     Years since quittin.2    Smokeless tobacco: Former User   Substance and Sexual Activity    Alcohol use: No    Drug use: No    Sexual activity: Not Currently     Review of Systems   Constitutional: Positive for activity change and fatigue. Negative for fever.   Respiratory: Negative for apnea, cough and shortness of breath.    Cardiovascular: Negative for chest pain and palpitations.   Gastrointestinal: Positive for diarrhea and nausea. Negative for abdominal distention and vomiting.   Genitourinary: Negative for difficulty urinating and hematuria.   Musculoskeletal: Positive for arthralgias.   Neurological: Negative for dizziness and numbness.   Psychiatric/Behavioral: Negative for agitation. The patient is not nervous/anxious.      Objective:     Vital Signs (Most Recent):  Temp: 98 °F (36.7 °C) (10/18/19 1201)  Pulse: (!) 133 (10/18/19 1201)  Resp: 15 (10/18/19 1201)  BP: 113/61 (10/18/19 1201)  SpO2: 97 % (10/18/19 1232) Vital Signs (24h Range):  Temp:  [97.6 °F (36.4 °C)-98.7 °F (37.1 °C)] 98 °F (36.7 °C)  Pulse:  [] 133  Resp:  [12-46] 15  SpO2:  [96 %-100 %] 97 %  BP: ()/(50-67) 113/61     Weight: 99.8 kg (220 lb 0.3 oz)  Body mass index is 37.77 kg/m².    Physical Exam   Constitutional: She appears well-nourished.   HENT:   Head:  Normocephalic and atraumatic.   Eyes: EOM are normal.   Cardiovascular: Normal rate and regular rhythm.   Pulmonary/Chest: Effort normal. No respiratory distress.   Abdominal: Soft.   Musculoskeletal: Normal range of motion.   Neurological: She is alert.   Skin: Skin is warm and dry.   Psychiatric: She has a normal mood and affect. Her behavior is normal.   Vitals reviewed.      Significant Labs:   Recent Labs   Lab 10/16/19  1950 10/17/19  0341 10/18/19  0611   WBC 9.47  --   --    HGB 10.9* 10.2* 11.3*   HCT 32.0* 29.9* 32.6*     --   --      Recent Labs   Lab 10/16/19  1950 10/17/19  0341 10/18/19  0452   * 134*  134* 135*   K 4.2 3.6  3.6 4.3   CL 99 102  102 104   CO2 22* 20*  20* 18*   BUN 42* 43*  43* 36*   CREATININE 4.8* 4.7*  4.7* 2.7*   * 102  102 107   CALCIUM 8.4* 7.9*  7.9* 8.3*     Recent Labs   Lab 10/16/19  1950 10/17/19  0341   ALKPHOS 103  --    ALT 12  --    AST 14  --    ALBUMIN 2.6* 2.4*   PROT 6.8  --    BILITOT 0.3  --    INR 1.1  --       No results for input(s): CPK, CPKMB, MB, TROPONINI in the last 72 hours.  No results for input(s): POCTGLUCOSE in the last 168 hours.  Hemoglobin A1C   Date Value Ref Range Status   02/04/2019 5.0 4.0 - 5.6 % Final     Comment:     ADA Screening Guidelines:  5.7-6.4%  Consistent with prediabetes  >or=6.5%  Consistent with diabetes  High levels of fetal hemoglobin interfere with the HbA1C  assay. Heterozygous hemoglobin variants (HbS, HgC, etc)do  not significantly interfere with this assay.   However, presence of multiple variants may affect accuracy.     04/23/2018 5.0 4.0 - 5.6 % Final     Comment:     According to ADA guidelines, hemoglobin A1c <7.0% represents  optimal control in non-pregnant diabetic patients. Different  metrics may apply to specific patient populations.   Standards of Medical Care in Diabetes-2016.  For the purpose of screening for the presence of diabetes:  <5.7%     Consistent with the absence of  diabetes  5.7-6.4%  Consistent with increasing risk for diabetes   (prediabetes)  >or=6.5%  Consistent with diabetes  Currently, no consensus exists for use of hemoglobin A1c  for diagnosis of diabetes for children.  This Hemoglobin A1c assay has significant interference with fetal   hemoglobin   (HbF). The results are invalid for patients with abnormal amounts of   HbF,   including those with known Hereditary Persistence   of Fetal Hemoglobin. Heterozygous hemoglobin variants (HbAS, HbAC,   HbAD, HbAE, HbA2) do not significantly interfere with this assay;   however, presence of multiple variants in a sample may impact the %   interference.     01/30/2018 5.0 4.0 - 5.6 % Final     Comment:     According to ADA guidelines, hemoglobin A1c <7.0% represents  optimal control in non-pregnant diabetic patients. Different  metrics may apply to specific patient populations.   Standards of Medical Care in Diabetes-2016.  For the purpose of screening for the presence of diabetes:  <5.7%     Consistent with the absence of diabetes  5.7-6.4%  Consistent with increasing risk for diabetes   (prediabetes)  >or=6.5%  Consistent with diabetes  Currently, no consensus exists for use of hemoglobin A1c  for diagnosis of diabetes for children.  This Hemoglobin A1c assay has significant interference with fetal   hemoglobin   (HbF). The results are invalid for patients with abnormal amounts of   HbF,   including those with known Hereditary Persistence   of Fetal Hemoglobin. Heterozygous hemoglobin variants (HbAS, HbAC,   HbAD, HbAE, HbA2) do not significantly interfere with this assay;   however, presence of multiple variants in a sample may impact the %   interference.       Scheduled Meds:   acetaminophen  1,000 mg Oral TID    aspirin  81 mg Oral Daily    azaTHIOprine  100 mg Oral Daily    calcium carbonate  500 mg Oral BID    diphenoxylate-atropine 2.5-0.025 mg  1 tablet Oral BID    donepezil  10 mg Oral QHS    ferrous sulfate   325 mg Oral BID    hydrocortisone  25 mg Rectal BID    memantine  10 mg Oral BID    mesalamine  1,500 mg Oral Daily    pantoprazole  40 mg Oral Daily    QUEtiapine  100 mg Oral QHS     Continuous Infusions:   lactated ringers 125 mL/hr at 10/18/19 0000     As Needed: bisacodyl, HYDROmorphone, lactulose, loperamide, ondansetron, ondansetron, oxyCODONE, sodium chloride 0.9%      Significant Imaging:   X-Ray Chest AP Portable  Narrative: EXAMINATION:  XR CHEST AP PORTABLE    CLINICAL HISTORY:  snf;    TECHNIQUE:  Single frontal view of the chest was performed.    COMPARISON:  August 20, 2014    FINDINGS:  Single portable chest view is submitted.  Mild diminished depth of inspiration noted, the cardiomediastinal silhouette appears stable.  Minimal atelectatic change noted at the lung bases there is no evidence for confluent infiltrate or consolidation, significant pleural effusion or pneumothorax.  The osseous structures demonstrate chronic change.  Impression: Minimal atelectatic change noted at the lung bases, there is no additional radiographic evidence for acute intrathoracic process.    Electronically signed by: Hiren Delgado  Date:    10/18/2019  Time:    00:56

## 2019-10-18 NOTE — PT/OT/SLP PROGRESS
"Occupational Therapy   Treatment    Name: Anabella Aranda  MRN: [id number]  Admitting Diagnosis:  History of left knee replacement  2 Days Post-Op    Recommendations:     Discharge Recommendations: nursing facility, skilled, home health OT, home health PT  Discharge Equipment Recommendations:  none  Barriers to discharge:  None    Assessment:     Pt found supine in bed. Pt agreeable to participate /c encouragement. Pt reported "no pain". Pt only complaint was of constant diarrhea. Pt tolerated session well and reported feeling "tired" at end of session.    Anabella Aranda is a 74 y.o. female with a medical diagnosis of History of left knee replacement Performance deficits affecting function are weakness, impaired endurance, impaired self care skills, impaired functional mobilty, gait instability, impaired balance, impaired cognition, decreased safety awareness, decreased ROM.     Rehab Prognosis:  Fair; patient would benefit from acute skilled OT services to address these deficits and reach maximum level of function.       Plan:     Patient to be seen 5 x/week to address the above listed problems via self-care/home management, therapeutic activities, therapeutic exercises  · Plan of Care Expires: 11/17/19  · Plan of Care Reviewed with: patient, son    Subjective     Pain/Comfort:  · Pain Rating Post-Intervention 1: 0/10  · Pain Rating 2: 0/10  · Pain Rating Post-Intervention 2: 0/10    Objective:     Communicated with: nurse prior to session.  Patient found supine with peripheral IV, SCD upon OT entry to room.    General Precautions: Standard, fall   Orthopedic Precautions:LLE weight bearing as tolerated   Braces: N/A     Occupational Performance:     Bed Mobility:    · Patient completed Rolling/Turning to Left with  stand by assistance and contact guard assistance  · Patient completed Scooting/Bridging with contact guard assistance and for VCs for sequencing  · Patient completed Supine to Sit with stand by " "assistance  · Patient completed Sit to Supine with stand by assistance     Functional Mobility/Transfers:  · Patient completed Sit <> Stand Transfer with minimum assistance  with  rolling walker   · Patient completed Toilet Transfer Stand Pivot technique with contact guard assistance with  rolling walker      Activities of Daily Living:  · Grooming:  pt completed G/H while seated on BSC /c set up    · Toileting: contact guard assistance pt requested assisntance /c pericare       AMPAC 6 Click ADL: 16    Treatment & Education:  Pt approached while supine in bed. Pt agreeable to therapy. Pt supine>EOB /c CGA and VC for sequencing and pushing up /c elbow. Pt using BUE to pull from RW EOB>stance /c CGA. Pt stand pivoted to BSC. Pt expressed disinterest in standing at sink to completed G/H even though pt reported no pain. Pt completed G/H while seated on BSC /c set up. Pt BSC>EOB /c CGA. Pt returned to supine and completed scooting up in bed /c VCs for sequencing. Pt participated in there ex of shoulder ab/ad, horizontal ab/ad, circ, [id number] /c 3 lb dowel. Pt reported feeling "tired' post therapy ut tolerated session well. Cont OT POC.    Patient left supine with call button in reach and son presentEducation:      GOALS:   Multidisciplinary Problems     Occupational Therapy Goals        Problem: Occupational Therapy Goal    Goal Priority Disciplines Outcome Interventions   Occupational Therapy Goal     OT, PT/OT Ongoing, Progressing    Description:  Goals to be met by: 11/17     Patient will increase functional independence with ADLs by performing:    UE Dressing with Supervision.  LE Dressing with Supervision.  Grooming while standing with Supervision.  Toileting from toilet with Supervision for hygiene and clothing management.   Toilet transfer to toilet with Supervision.  Upper extremity exercise program x10 reps per handout, with assistance as needed.---MET 10/18/2019                       Time Tracking:     OT Date " of Treatment: 10/18/19  OT Start Time: 1013  OT Stop Time: 1037  OT Total Time (min): 24 min    Billable Minutes:Therapeutic Activity 16  Therapeutic Exercise 8    AIXA Hernnadez  10/18/2019

## 2019-10-18 NOTE — PLAN OF CARE
Pt vitals were maintained, MD was notified about Pt BP which was low. Also NP was notified as well. EKG was ordered as well as a bladder scan. MD called back and canceled EKG and kept bladder scan. Pt complained of mild pain and was given PO pain meds, before BP was recorded. Pt was confused and mumbled at lot at night, son stayed with her to keep her calm also CATERINA was also ordered. Cardiac monitoring was placed.

## 2019-10-18 NOTE — SUBJECTIVE & OBJECTIVE
Subjective:     Interval History:   Pt seen today in room.  Having some bloody streaks with her diarrhea.  Mild diffuse abd pain.  Ate yesterday after surgery but minimal PO intake today.  No vomiting.    Nursing reports continued diarrhea, moderate amount as well.    Review of Systems   Constitutional: Positive for activity change, appetite change and fatigue. Negative for fever.   Gastrointestinal: Positive for abdominal pain, anal bleeding, diarrhea and nausea. Negative for vomiting.     Objective:     Vital Signs (Most Recent):  Temp: 98 °F (36.7 °C) (10/18/19 1201)  Pulse: (!) 133 (10/18/19 1201)  Resp: 15 (10/18/19 1201)  BP: 113/61 (10/18/19 1201)  SpO2: 96 % (10/18/19 0742) Vital Signs (24h Range):  Temp:  [97.6 °F (36.4 °C)-98.7 °F (37.1 °C)] 98 °F (36.7 °C)  Pulse:  [] 133  Resp:  [12-46] 15  SpO2:  [80 %-100 %] 96 %  BP: ()/(50-67) 113/61     Weight: 99.8 kg (220 lb 0.3 oz) (10/18/19 0446)  Body mass index is 37.77 kg/m².      Intake/Output Summary (Last 24 hours) at 10/18/2019 1245  Last data filed at 10/18/2019 0951  Gross per 24 hour   Intake 1120 ml   Output --   Net 1120 ml       Lines/Drains/Airways     Peripheral Intravenous Line                 Peripheral IV - Single Lumen 20 G Right Antecubital -- days                Physical Exam   Eyes: Conjunctivae are normal. No scleral icterus.   Abdominal: Soft. She exhibits no mass.   Active BS ; minimally tender throughout ; nonrigid   Neurological: She is alert.   Vitals reviewed.      Significant Labs:  CBC:   Recent Labs   Lab 10/16/19  1950 10/17/19  0341 10/18/19  0611   WBC 9.47  --   --    HGB 10.9* 10.2* 11.3*   HCT 32.0* 29.9* 32.6*     --   --      CMP:   Recent Labs   Lab 10/16/19  1950 10/17/19  0341 10/18/19  0452   * 102  102 107   CALCIUM 8.4* 7.9*  7.9* 8.3*   ALBUMIN 2.6* 2.4*  --    PROT 6.8  --   --    * 134*  134* 135*   K 4.2 3.6  3.6 4.3   CO2 22* 20*  20* 18*   CL 99 102  102 104   BUN 42* 43*   43* 36*   CREATININE 4.8* 4.7*  4.7* 2.7*   ALKPHOS 103  --   --    ALT 12  --   --    AST 14  --   --    BILITOT 0.3  --   --      Coagulation:   Recent Labs   Lab 10/16/19  1950   INR 1.1     CRP: No results for input(s): CRP in the last 48 hours.      Significant Imaging:  Imaging results within the past 24 hours have been reviewed.     Reviewed prior imaging as well.

## 2019-10-18 NOTE — PT/OT/SLP PROGRESS
Physical Therapy Treatment    Patient Name:  Anabella Aranda   MRN:  [id number]    Recommendations:     Discharge Recommendations:  Skilled nursing  Discharge Equipment Recommendations: none   Barriers to discharge: decreased functional mobility tolerance    Assessment:     Anabella Aranda is a 74 y.o. female admitted with a medical diagnosis of History of left knee replacement.  She presents with the following impairments/functional limitations:  weakness, impaired self care skills, impaired endurance, impaired functional mobilty, gait instability, decreased lower extremity function, impaired cardiopulmonary response to activity, impaired cognition, pain, decreased ROM, impaired balance, impaired joint extensibility.  Pt performed supine to sit with min assist of 1.  Pt went sit to stand with RW with min assist of 1. Pt scooted to [facility] with supervision.  Pt declined further functional treatment 2/2 belly pain, not feeling well.  Pt required Mod assist for sit to supine transition.  Pt functional progress is limited by c/o belly pain.    Rehab Prognosis: Fair; patient would benefit from acute skilled PT services to address these deficits and reach maximum level of function.    Recent Surgery: Procedure(s) (LRB):  ARTHROPLASTY, KNEE (Left) 2 Days Post-Op    Plan:     During this hospitalization, patient to be seen daily to address the identified rehab impairments via gait training, therapeutic activities, therapeutic exercises, neuromuscular re-education and progress toward the following goals:    · Plan of Care Expires:  11/18/19    Subjective     Chief Complaint: belly pain  Patient/Family Comments/goals: lay back down  Pain/Comfort:  · Pain Rating 1: (Pt c/o belly pain throughout treatment, left knee pain with movement only.)  · Pain Addressed 1: Reposition, Cessation of Activity, Distraction, Nurse notified  · Pain Rating Post-Intervention 1: (continued c/o belly pain, not rated)      Objective:  "    Communicated with nurse prior to session.  Patient found HOB elevated with peripheral IV, SCD upon PT entry to room.     General Precautions: Standard, fall   Orthopedic Precautions:LLE weight bearing as tolerated   Braces:       Functional Mobility:   Pt performed supine to sit with min assist of 1.  Pt went sit to stand with RW with min assist of 1. Pt scooted to HOB with supervision.  Pt declined further functional treatment 2/2 belly pain, not feeling well.  Pt required Mod assist for sit to supine transition.  Pt functional progress is limited by c/o belly pain.        AM-PAC 6 CLICK MOBILITY  Turning over in bed (including adjusting bedclothes, sheets and blankets)?: 2  Sitting down on and standing up from a chair with arms (e.g., wheelchair, bedside commode, etc.): 3  Moving from lying on back to sitting on the side of the bed?: 3  Moving to and from a bed to a chair (including a wheelchair)?: 3  Need to walk in hospital room?: 3  Climbing 3-5 steps with a railing?: 1  Basic Mobility Total Score: 15       Therapeutic Activities and Exercises:   Pt performed supine AP x 20.  Rx of achilles stretch BLE 30" x 3.  Rx of assisted heel slide left x 10 and hip abd/add x 10.  Positioned LLE in knee extension with pillow lateral to calf to prevent hip external rotation and knee flexion.  Nurse educated in proper positioning.     Patient left HOB elevated with all lines intact, call button in reach, bed alarm on and nurse notified..    GOALS:   Multidisciplinary Problems     Physical Therapy Goals        Problem: Physical Therapy Goal    Goal Priority Disciplines Outcome Goal Variances Interventions   Physical Therapy Goal     PT, PT/OT Ongoing, Progressing     Description:  1.  Supine to/from sit with with supervision  2.  Bed to/from chair with RW with supervision  3.  Ambulate 50' with RW with supervision  4.  Tolerate sitting 2 hours                    Time Tracking:     PT Received On: 10/18/19  PT Start Time: " 1133     PT Stop Time: 1201  PT Total Time (min): 28 min     Billable Minutes: Therapeutic Activity 14 and Therapeutic Exercise 14    Treatment Type: Treatment  PT/PTA: PT     PTA Visit Number: 0     Carol Tomlin, PT  10/18/2019

## 2019-10-18 NOTE — PLAN OF CARE
Per Kristin with [facility]- patient medically accepted pending medical clearance.       10/18/19 1026   Post-Acute Status   Post-Acute Authorization Placement   Post-Acute Placement Status Awaiting Internal Medical Clearance   Discharge Delays (!) Change in Medical Condition

## 2019-10-18 NOTE — ASSESSMENT & PLAN NOTE
Hx of UC s/p colectomy with retained rectal stump, had flex sig 1/2019 showing continued inflammation of the rectal stump, otherwise the small bowel was normal.  CT enterography recently showed continued inflammation of the rectal stump.  I suspect her symptoms are related to ongoing rectal inflammation, but this has been very difficult to treat as outpatient as the patient / family cannot afford topical treatments (canasa etc).    Recs:  Ordered Cdiff, given her history and recent ABx exposure  Continue oral mesalamine  Will add hydrocortisone suppository  I will order scheduled lomotil BID  Can use immodium PRN and alternate with lomotil  Will add back imuran at 100mg daily and can titrate up (goal near 2.5 mg / kg)    Can consider questran powder in future     Please ensure patient is being followed by hospital medicine for multiple other medical co morbidities.     I will not be rounding over weekend, but if there is any acute GI needs, please contact GI on call team.

## 2019-10-18 NOTE — ASSESSMENT & PLAN NOTE
Cr is improving from 2 days ago,   Down to 2.7 from the 4's  Dehydrated.  Given a bolus of IVF.  Will monitor labs and BP/symptoms

## 2019-10-18 NOTE — NURSING
VN called into pt's room per floor nurse. Floor nurse reporting that pt has had multiple episodes of diarrhea, now with mod amount of bright red blood. IRVING notified MD.

## 2019-10-18 NOTE — PROGRESS NOTES
[facility]  Gastroenterology  Progress Note    Patient Name: Anabella Aranda  MRN: [id number]  Admission Date: 10/16/2019  Hospital Length of Stay: 2 days  Code Status: Full Code   Attending Provider: Ghassan Lindsay MD  Consulting Provider: Og Carrera MD  Primary Care Physician: Shon Brambila MD  Principal Problem: History of left knee replacement      Subjective:     Interval History:   Pt seen today in room.  Having some bloody streaks with her diarrhea.  Mild diffuse abd pain.  Ate yesterday after surgery but minimal PO intake today.  No vomiting.    Nursing reports continued diarrhea, moderate amount as well.    Review of Systems   Constitutional: Positive for activity change, appetite change and fatigue. Negative for fever.   Gastrointestinal: Positive for abdominal pain, anal bleeding, diarrhea and nausea. Negative for vomiting.     Objective:     Vital Signs (Most Recent):  Temp: 98 °F (36.7 °C) (10/18/19 1201)  Pulse: (!) 133 (10/18/19 1201)  Resp: 15 (10/18/19 1201)  BP: 113/61 (10/18/19 1201)  SpO2: 96 % (10/18/19 0742) Vital Signs (24h Range):  Temp:  [97.6 °F (36.4 °C)-98.7 °F (37.1 °C)] 98 °F (36.7 °C)  Pulse:  [] 133  Resp:  [12-46] 15  SpO2:  [80 %-100 %] 96 %  BP: ()/(50-67) 113/61     Weight: 99.8 kg (220 lb 0.3 oz) (10/18/19 0446)  Body mass index is 37.77 kg/m².      Intake/Output Summary (Last 24 hours) at 10/18/2019 1245  Last data filed at 10/18/2019 0951  Gross per 24 hour   Intake 1120 ml   Output --   Net 1120 ml       Lines/Drains/Airways     Peripheral Intravenous Line                 Peripheral IV - Single Lumen 20 G Right Antecubital -- days                Physical Exam   Eyes: Conjunctivae are normal. No scleral icterus.   Abdominal: Soft. She exhibits no mass.   Active BS ; minimally tender throughout ; nonrigid   Neurological: She is alert.   Vitals reviewed.      Significant Labs:  CBC:   Recent Labs   Lab 10/16/19  1950 10/17/19  0343  10/18/19  0611   WBC 9.47  --   --    HGB 10.9* 10.2* 11.3*   HCT 32.0* 29.9* 32.6*     --   --      CMP:   Recent Labs   Lab 10/16/19  1950 10/17/19  0341 10/18/19  0452   * 102  102 107   CALCIUM 8.4* 7.9*  7.9* 8.3*   ALBUMIN 2.6* 2.4*  --    PROT 6.8  --   --    * 134*  134* 135*   K 4.2 3.6  3.6 4.3   CO2 22* 20*  20* 18*   CL 99 102  102 104   BUN 42* 43*  43* 36*   CREATININE 4.8* 4.7*  4.7* 2.7*   ALKPHOS 103  --   --    ALT 12  --   --    AST 14  --   --    BILITOT 0.3  --   --      Coagulation:   Recent Labs   Lab 10/16/19  1950   INR 1.1     CRP: No results for input(s): CRP in the last 48 hours.      Significant Imaging:  Imaging results within the past 24 hours have been reviewed.     Reviewed prior imaging as well.    Assessment/Plan:     Ulcerative proctitis with complication  Hx of UC s/p colectomy with retained rectal stump, had flex sig 1/2019 showing continued inflammation of the rectal stump, otherwise the small bowel was normal.  CT enterography recently showed continued inflammation of the rectal stump.  I suspect her symptoms are related to ongoing rectal inflammation, but this has been very difficult to treat as outpatient as the patient / family cannot afford topical treatments (canasa etc).    Recs:  Ordered Cdiff, given her history and recent ABx exposure  Continue oral mesalamine  Will add hydrocortisone suppository  I will order scheduled lomotil BID  Can use immodium PRN and alternate with lomotil  Will add back imuran at 100mg daily and can titrate up (goal near 2.5 mg / kg)    Can consider questran powder in future     Please ensure patient is being followed by hospital medicine for multiple other medical co morbidities.     I will not be rounding over weekend, but if there is any acute GI needs, please contact GI on call team.          Thank you for your consult.      Og Carrera MD  Gastroenterology  [facility]

## 2019-10-18 NOTE — PLAN OF CARE
Problem: Physical Therapy Goal  Goal: Physical Therapy Goal  Description  1.  Supine to/from sit with with supervision  2.  Bed to/from chair with RW with supervision  3.  Ambulate 50' with RW with supervision  4.  Tolerate sitting 2 hours   Outcome: Ongoing, Progressing   Pt would benefit from ongoing skilled PT to progress functional mobility.

## 2019-10-18 NOTE — HPI
The patient is a 74 y.o. female with PMH significant for Dementia, h/o DVT status post IVC filter, ulcerative colitis, iron deficiency anemia, GERD,  Major depression with psychotic features, and history of hep B. She is here today for a pre-operative visit in preparation for a Left total knee arthroplasty to be performed by  Dr. Lindsay on 10/16/19.  Anabella Aranda has a >1 year history of Left knee pain.  Pain is worse with activity and weight bearing. Patient has experienced interference of ADLs due to increased pain and decreased range of motion. Patient has failed non-operative treatment including NSAIDs, activity modification, and bracing. Anabella Aranda ambulates Rolator walker.  There has been no significant change in medical status since last visit.  She was seen by PCP Dr. Brambila on 08/14/19 at which time chronic conditions were well controlled    The pt underwent a left TKA during this admission, and we were consulted for medical management.

## 2019-10-18 NOTE — PLAN OF CARE
Progress notes reviewed. Evening rounds completed. Introduced self as VN for this shift. Educated pt on VN's role in patient's care.  Plan of care reviewed with patient. Opportunity given for pt's questions. No questions or concerns expressed at this time. VN to continue to monitor.

## 2019-10-18 NOTE — PLAN OF CARE
ACO/CM met with patient. Patient with nurse at bedside to start IV. Son on couch sleeping. Patient states she lives by herself but her son has been staying with her. She does state she has a person who comes to her home 5 days a week to help out with ADLs, meds, cooking, cleaning, etc. Patient does not know what the discharge plan is but son woke up and states he thinks is a rehab plan as of yesterday. PLOF is ambulatory with walker and currently working with PT. Patient son drives her to her appts and he manages her medications. Educated patient/son on post discharge TCC call for fu and to write down any question that he/she may need to ask the nurse at that time. Also gave patient/son education on 24/7 nurse triage line as a free tool to use for himself/herself or anyone in the community. Patient verbalized understanding of all the above.    IQ done for possible IP rehab but did not meet criteria. Recommendations for SNF per PT/OT.

## 2019-10-19 ENCOUNTER — TELEPHONE (OUTPATIENT)
Dept: FAMILY MEDICINE | Facility: CLINIC | Age: 74
End: 2019-10-19

## 2019-10-19 LAB
ANION GAP SERPL CALC-SCNC: 14 MMOL/L (ref 8–16)
BUN SERPL-MCNC: 27 MG/DL (ref 8–23)
CALCIUM SERPL-MCNC: 8.5 MG/DL (ref 8.7–10.5)
CHLORIDE SERPL-SCNC: 103 MMOL/L (ref 95–110)
CO2 SERPL-SCNC: 17 MMOL/L (ref 23–29)
CREAT SERPL-MCNC: 1.5 MG/DL (ref 0.5–1.4)
EST. GFR  (AFRICAN AMERICAN): 40 ML/MIN/1.73 M^2
EST. GFR  (NON AFRICAN AMERICAN): 35 ML/MIN/1.73 M^2
GLUCOSE SERPL-MCNC: 81 MG/DL (ref 70–110)
POTASSIUM SERPL-SCNC: 4.2 MMOL/L (ref 3.5–5.1)
SODIUM SERPL-SCNC: 134 MMOL/L (ref 136–145)
TB INDURATION 48 - 72 HR READ: 0 MM
TROPONIN I SERPL DL<=0.01 NG/ML-MCNC: 0.09 NG/ML (ref 0–0.03)

## 2019-10-19 PROCEDURE — 21400001 HC TELEMETRY ROOM

## 2019-10-19 PROCEDURE — 84484 ASSAY OF TROPONIN QUANT: CPT

## 2019-10-19 PROCEDURE — 63600175 PHARM REV CODE 636 W HCPCS: Performed by: ANESTHESIOLOGY

## 2019-10-19 PROCEDURE — 94761 N-INVAS EAR/PLS OXIMETRY MLT: CPT

## 2019-10-19 PROCEDURE — 93010 EKG 12-LEAD: ICD-10-PCS | Mod: ,,, | Performed by: INTERNAL MEDICINE

## 2019-10-19 PROCEDURE — 93005 ELECTROCARDIOGRAM TRACING: CPT

## 2019-10-19 PROCEDURE — 25000003 PHARM REV CODE 250: Performed by: INTERNAL MEDICINE

## 2019-10-19 PROCEDURE — 80048 BASIC METABOLIC PNL TOTAL CA: CPT

## 2019-10-19 PROCEDURE — 97530 THERAPEUTIC ACTIVITIES: CPT

## 2019-10-19 PROCEDURE — 36415 COLL VENOUS BLD VENIPUNCTURE: CPT

## 2019-10-19 PROCEDURE — 97110 THERAPEUTIC EXERCISES: CPT

## 2019-10-19 PROCEDURE — 93010 ELECTROCARDIOGRAM REPORT: CPT | Mod: ,,, | Performed by: INTERNAL MEDICINE

## 2019-10-19 PROCEDURE — 25000003 PHARM REV CODE 250: Performed by: ORTHOPAEDIC SURGERY

## 2019-10-19 PROCEDURE — 63600175 PHARM REV CODE 636 W HCPCS: Performed by: INTERNAL MEDICINE

## 2019-10-19 RX ADMIN — CALCIUM CARBONATE 500 MG: 500 TABLET, CHEWABLE ORAL at 08:10

## 2019-10-19 RX ADMIN — LOPERAMIDE HYDROCHLORIDE 4 MG: 2 CAPSULE ORAL at 03:10

## 2019-10-19 RX ADMIN — HYDROCORTISONE ACETATE 25 MG: 25 SUPPOSITORY RECTAL at 11:10

## 2019-10-19 RX ADMIN — PANTOPRAZOLE SODIUM 40 MG: 40 TABLET, DELAYED RELEASE ORAL at 08:10

## 2019-10-19 RX ADMIN — ACETAMINOPHEN 1000 MG: 500 TABLET ORAL at 08:10

## 2019-10-19 RX ADMIN — METHOTREXATE 1 TABLET: 2.5 TABLET ORAL at 11:10

## 2019-10-19 RX ADMIN — METHOTREXATE 1 TABLET: 2.5 TABLET ORAL at 08:10

## 2019-10-19 RX ADMIN — HYDROCORTISONE ACETATE 25 MG: 25 SUPPOSITORY RECTAL at 08:10

## 2019-10-19 RX ADMIN — MESALAMINE 1500 MG: 250 CAPSULE ORAL at 08:10

## 2019-10-19 RX ADMIN — FERROUS SULFATE TAB EC 325 MG (65 MG FE EQUIVALENT) 325 MG: 325 (65 FE) TABLET DELAYED RESPONSE at 11:10

## 2019-10-19 RX ADMIN — FERROUS SULFATE TAB EC 325 MG (65 MG FE EQUIVALENT) 325 MG: 325 (65 FE) TABLET DELAYED RESPONSE at 08:10

## 2019-10-19 RX ADMIN — LOPERAMIDE HYDROCHLORIDE 4 MG: 2 CAPSULE ORAL at 12:10

## 2019-10-19 RX ADMIN — MEMANTINE HYDROCHLORIDE 10 MG: 5 TABLET ORAL at 08:10

## 2019-10-19 RX ADMIN — QUETIAPINE 100 MG: 100 TABLET ORAL at 11:10

## 2019-10-19 RX ADMIN — ACETAMINOPHEN 1000 MG: 500 TABLET ORAL at 11:10

## 2019-10-19 RX ADMIN — CALCIUM CARBONATE 500 MG: 500 TABLET, CHEWABLE ORAL at 11:10

## 2019-10-19 RX ADMIN — ACETAMINOPHEN 1000 MG: 500 TABLET ORAL at 03:10

## 2019-10-19 RX ADMIN — MEMANTINE HYDROCHLORIDE 10 MG: 5 TABLET ORAL at 11:10

## 2019-10-19 RX ADMIN — SODIUM CHLORIDE, SODIUM LACTATE, POTASSIUM CHLORIDE, AND CALCIUM CHLORIDE: .6; .31; .03; .02 INJECTION, SOLUTION INTRAVENOUS at 11:10

## 2019-10-19 RX ADMIN — AZATHIOPRINE 100 MG: 50 TABLET ORAL at 08:10

## 2019-10-19 RX ADMIN — DONEPEZIL HYDROCHLORIDE 10 MG: 5 TABLET, FILM COATED ORAL at 11:10

## 2019-10-19 RX ADMIN — ASPIRIN 81 MG: 81 TABLET, COATED ORAL at 08:10

## 2019-10-19 NOTE — PT/OT/SLP PROGRESS
Physical Therapy Treatment    Patient Name:  Anabella Aranda   MRN:  [id number]    Recommendations:     Discharge Recommendations:  nursing facility, skilled, home with home health   Discharge Equipment Recommendations: none   Barriers to discharge: decreased mobility,endurance and strength    Assessment:     Anabella Aranda is a 74 y.o. female admitted with a medical diagnosis of History of left knee replacement.  She presents with the following impairments/functional limitations:  weakness, impaired endurance, impaired functional mobilty, gait instability, impaired balance, impaired cognition, decreased lower extremity function, decreased ROM, impaired coordination, impaired skin, orthopedic precautions,pt requires increased encouragement and and appears to have memory loss with inability to answer questions,pt remains with poor mobility,endurance and strength and will benefit from continuing PT services upon discharge.    Rehab Prognosis: Fair; patient would benefit from acute skilled PT services to address these deficits and reach maximum level of function.    Recent Surgery: Procedure(s) (LRB):  ARTHROPLASTY, KNEE (Left) 3 Days Post-Op    Plan:     During this hospitalization, patient to be seen daily to address the identified rehab impairments via gait training, therapeutic activities, therapeutic exercises, neuromuscular re-education and progress toward the following goals:    · Plan of Care Expires:  11/18/19    Subjective     Chief Complaint: n/a  Patient/Family Comments/goals: pt states she doesn't remember.  Pain/Comfort:  · Pain Rating 1: (no c/o's)      Objective:     Communicated with nsg prior to session.  Patient found supine with peripheral IV, telemetry upon PT entry to room.     General Precautions: Standard, fall   Orthopedic Precautions:LLE weight bearing as tolerated   Braces: N/A     Functional Mobility:  · Bed Mobility:     · Supine to Sit: minimum assistance  · Sit to Supine: moderate  assistance and L le  · Transfers:     · Sit to Stand:  minimum assistance and X 2 trials with rolling walker  · Balance: fair standing balance with RW      AM-PAC 6 CLICK MOBILITY  Turning over in bed (including adjusting bedclothes, sheets and blankets)?: 2  Sitting down on and standing up from a chair with arms (e.g., wheelchair, bedside commode, etc.): 3  Moving from lying on back to sitting on the side of the bed?: 3  Moving to and from a bed to a chair (including a wheelchair)?: 3  Need to walk in hospital room?: 2  Climbing 3-5 steps with a railing?: 1  Basic Mobility Total Score: 14       Therapeutic Activities and Exercises: le supine ex's X 10-12 reps inc: ap,qs,hs,abd/add,slr with assistance on L,pt stood inside RW X 2 trials with CGA/Min A with 2 sidesteps to HOB with Min A and flexed posture,declined OOB.       Patient left supine with all lines intact, call button in reach and bed alarm on..    GOALS: see general POC  Multidisciplinary Problems     Physical Therapy Goals        Problem: Physical Therapy Goal    Goal Priority Disciplines Outcome Goal Variances Interventions   Physical Therapy Goal     PT, PT/OT Ongoing, Progressing     Description:  1.  Supine to/from sit with with supervision  2.  Bed to/from chair with RW with supervision  3.  Ambulate 50' with RW with supervision  4.  Tolerate sitting 2 hours                    Time Tracking:     PT Received On: 10/19/19  PT Start Time: 1351     PT Stop Time: 1415  PT Total Time (min): 24 min     Billable Minutes: Therapeutic Activity 11 and Therapeutic Exercise 13    Treatment Type: Treatment  PT/PTA: PTA     PTA Visit Number: 1     Kris Henderson, DONI  10/19/2019

## 2019-10-19 NOTE — PLAN OF CARE
Pt vitals were maintained, pt had 4x large liquid bowel movements that were green and jelly like. Pt was mildly confused but didn't get up without calling. Pt didn't eat much of her dinner stated that she wasn't hungry, no complaints of nausea or abd pain. Will continue to monitor. Pt GFR rate did increase from last labs.

## 2019-10-19 NOTE — CONSULTS
[facility] Hospital Medicine  Consult Note    Patient Name: Anabella Aranda  MRN: [id number]  Admission Date: 10/16/2019  Hospital Length of Stay: 3 days  Attending Physician: Herberth Leblanc DO  Primary Care Provider: Shon Brambila MD           Patient information was obtained from patient, relative(s) and ER records.     Consults  Subjective:     Principal Problem: History of left knee replacement    Chief Complaint: No chief complaint on file.       HPI: The patient is a 74 y.o. female with PMH significant for Dementia, h/o DVT status post IVC filter, ulcerative colitis, iron deficiency anemia, GERD,  Major depression with psychotic features, and history of hep B. She is here today for a pre-operative visit in preparation for a Left total knee arthroplasty to be performed by  Dr. Lindsay on 10/16/19.  Anabella Aranda has a >1 year history of Left knee pain.  Pain is worse with activity and weight bearing. Patient has experienced interference of ADLs due to increased pain and decreased range of motion. Patient has failed non-operative treatment including NSAIDs, activity modification, and bracing. Anabella Aranda ambulates Rolator walker.  There has been no significant change in medical status since last visit.  She was seen by PCP Dr. Brambila on 08/14/19 at which time chronic conditions were well controlled    The pt underwent a left TKA during this admission, and we were consulted for medical management.      Past Medical History:   Diagnosis Date    Arthritis     C. difficile colitis 10/13/2014    4/15/2015    Chronic kidney disease, stage 3     Dementia     Hepatitis B     Hypertension     Major depression with psychotic features     Ulcerative colitis 03/2014       Past Surgical History:   Procedure Laterality Date    CHOLECYSTECTOMY      COLONOSCOPY N/A 4/29/2016    Procedure: COLONOSCOPY;  Surgeon: Umm Arenas MD;  Location: [facility];  Service: Endoscopy;   Laterality: N/A;    ESOPHAGOGASTRODUODENOSCOPY N/A 1/25/2019    Procedure: ESOPHAGOGASTRODUODENOSCOPY (EGD);  Surgeon: Jenise Hallman MD;  Location: [facility] ENDO;  Service: Endoscopy;  Laterality: N/A;    FLEXIBLE SIGMOIDOSCOPY N/A 1/25/2019    Procedure: SIGMOIDOSCOPY, FLEXIBLE;  Surgeon: Jenise Hallman MD;  Location: [facility] ENDO;  Service: Endoscopy;  Laterality: N/A;    BETZY FILTER PLACEMENT Right 01/2014    KNEE ARTHROPLASTY Left 10/16/2019    Procedure: ARTHROPLASTY, KNEE;  Surgeon: Ghassan Lindsay MD;  Location: [facility] OR;  Service: Orthopedics;  Laterality: Left;  Franco notifiedconfirmed with Franco  [phone] 10/15/19 kb    LAPAROSCOPIC TOTAL COLECTOMY  06/24/2016    NASAL SINUS SURGERY      TONSILLECTOMY      TOTAL KNEE ARTHROPLASTY Right 04/07/2008    TOTAL KNEE ARTHROPLASTY Left 10/16/2019       Review of patient's allergies indicates:   Allergen Reactions    Sulfa (sulfonamide antibiotics) Swelling    Tomato (solanum lycopersicum) Other (See Comments)       No current facility-administered medications on file prior to encounter.      Current Outpatient Medications on File Prior to Encounter   Medication Sig    calcium carbonate (OS-NELSY) 600 mg (1,500 mg) Tab Take 1 tablet (600 mg total) by mouth 2 (two) times daily with meals.    diclofenac sodium (VOLTAREN) 1 % Gel Apply 2 g topically once daily.    ergocalciferol (ERGOCALCIFEROL) 50,000 unit Cap Take 1 capsule (50,000 Units total) by mouth every 7 days.    ferrous sulfate 325 mg (65 mg iron) Tab tablet Take 1 tablet (325 mg total) by mouth 2 (two) times daily.    loperamide (IMODIUM A-D) 2 mg Tab Take 1 mg by mouth 2 (two) times daily before meals.    MULTIVITS W-FE,OTHER MIN/LUT (CENTRUM SILVER ULTRA WOMEN'S ORAL) Take by mouth.    QUEtiapine (SEROQUEL) 100 MG Tab TAKE 1 2 TABLET BY MOUTH EVERY NIGHT AT BEDTIME, THEN TAKE ONE TABLET BY MOUTH EVERY NIGHT AT BEDTIME THEREAFTER     Family History     Problem Relation (Age  of Onset)    Colon cancer Paternal Grandmother    Throat cancer Father        Tobacco Use    Smoking status: Former Smoker     Types: Cigarettes     Last attempt to quit: 1997     Years since quittin.2    Smokeless tobacco: Former User   Substance and Sexual Activity    Alcohol use: No    Drug use: No    Sexual activity: Not Currently     Review of Systems   Constitutional: Positive for activity change and fatigue. Negative for fever.   Respiratory: Negative for apnea, cough and shortness of breath.    Cardiovascular: Negative for chest pain and palpitations.   Gastrointestinal: Positive for diarrhea. Negative for abdominal distention, nausea and vomiting.   Genitourinary: Negative for difficulty urinating and hematuria.   Musculoskeletal: Positive for arthralgias.   Neurological: Negative for dizziness and numbness.   Psychiatric/Behavioral: Negative for agitation. The patient is not nervous/anxious.      Objective:     Vital Signs (Most Recent):  Temp: 96.5 °F (35.8 °C) (10/19/19 07)  Pulse: (!) 111 (10/19/19 0800)  Resp: 18 (10/19/19 07)  BP: 118/62 (10/19/19 0743)  SpO2: 95 % (10/19/19 0743) Vital Signs (24h Range):  Temp:  [96.5 °F (35.8 °C)-98 °F (36.7 °C)] 96.5 °F (35.8 °C)  Pulse:  [111-144] 111  Resp:  [15-18] 18  SpO2:  [95 %-97 %] 95 %  BP: ()/(53-76) 118/62     Weight: 101.1 kg (222 lb 14.2 oz)  Body mass index is 38.26 kg/m².    Physical Exam   Constitutional: She appears well-nourished.   HENT:   Head: Normocephalic and atraumatic.   Eyes: EOM are normal.   Cardiovascular: Normal rate and regular rhythm.   Pulmonary/Chest: Effort normal. No respiratory distress.   Abdominal: Soft.   Musculoskeletal: Normal range of motion.   Neurological: She is alert.   Skin: Skin is warm and dry.   Psychiatric: She has a normal mood and affect. Her behavior is normal.   Vitals reviewed.      Significant Labs:   Recent Labs   Lab 10/16/19  1950 10/17/19  0341 10/18/19  0611   WBC 9.47  --   --     HGB 10.9* 10.2* 11.3*   HCT 32.0* 29.9* 32.6*     --   --      Recent Labs   Lab 10/16/19  1950 10/17/19  0341 10/18/19  0452   * 134*  134* 135*   K 4.2 3.6  3.6 4.3   CL 99 102  102 104   CO2 22* 20*  20* 18*   BUN 42* 43*  43* 36*   CREATININE 4.8* 4.7*  4.7* 2.7*   * 102  102 107   CALCIUM 8.4* 7.9*  7.9* 8.3*     Recent Labs   Lab 10/16/19  1950 10/17/19  0341   ALKPHOS 103  --    ALT 12  --    AST 14  --    ALBUMIN 2.6* 2.4*   PROT 6.8  --    BILITOT 0.3  --    INR 1.1  --       No results for input(s): CPK, CPKMB, MB, TROPONINI in the last 72 hours.  No results for input(s): POCTGLUCOSE in the last 168 hours.  Hemoglobin A1C   Date Value Ref Range Status   02/04/2019 5.0 4.0 - 5.6 % Final     Comment:     ADA Screening Guidelines:  5.7-6.4%  Consistent with prediabetes  >or=6.5%  Consistent with diabetes  High levels of fetal hemoglobin interfere with the HbA1C  assay. Heterozygous hemoglobin variants (HbS, HgC, etc)do  not significantly interfere with this assay.   However, presence of multiple variants may affect accuracy.     04/23/2018 5.0 4.0 - 5.6 % Final     Comment:     According to ADA guidelines, hemoglobin A1c <7.0% represents  optimal control in non-pregnant diabetic patients. Different  metrics may apply to specific patient populations.   Standards of Medical Care in Diabetes-2016.  For the purpose of screening for the presence of diabetes:  <5.7%     Consistent with the absence of diabetes  5.7-6.4%  Consistent with increasing risk for diabetes   (prediabetes)  >or=6.5%  Consistent with diabetes  Currently, no consensus exists for use of hemoglobin A1c  for diagnosis of diabetes for children.  This Hemoglobin A1c assay has significant interference with fetal   hemoglobin   (HbF). The results are invalid for patients with abnormal amounts of   HbF,   including those with known Hereditary Persistence   of Fetal Hemoglobin. Heterozygous hemoglobin variants (HbAS, HbAC,    HbAD, HbAE, HbA2) do not significantly interfere with this assay;   however, presence of multiple variants in a sample may impact the %   interference.     01/30/2018 5.0 4.0 - 5.6 % Final     Comment:     According to ADA guidelines, hemoglobin A1c <7.0% represents  optimal control in non-pregnant diabetic patients. Different  metrics may apply to specific patient populations.   Standards of Medical Care in Diabetes-2016.  For the purpose of screening for the presence of diabetes:  <5.7%     Consistent with the absence of diabetes  5.7-6.4%  Consistent with increasing risk for diabetes   (prediabetes)  >or=6.5%  Consistent with diabetes  Currently, no consensus exists for use of hemoglobin A1c  for diagnosis of diabetes for children.  This Hemoglobin A1c assay has significant interference with fetal   hemoglobin   (HbF). The results are invalid for patients with abnormal amounts of   HbF,   including those with known Hereditary Persistence   of Fetal Hemoglobin. Heterozygous hemoglobin variants (HbAS, HbAC,   HbAD, HbAE, HbA2) do not significantly interfere with this assay;   however, presence of multiple variants in a sample may impact the %   interference.       Scheduled Meds:   acetaminophen  1,000 mg Oral TID    aspirin  81 mg Oral Daily    azaTHIOprine  100 mg Oral Daily    calcium carbonate  500 mg Oral BID    diphenoxylate-atropine 2.5-0.025 mg  1 tablet Oral BID    donepezil  10 mg Oral QHS    ferrous sulfate  325 mg Oral BID    hydrocortisone  25 mg Rectal BID    memantine  10 mg Oral BID    mesalamine  1,500 mg Oral Daily    pantoprazole  40 mg Oral Daily    QUEtiapine  100 mg Oral QHS     Continuous Infusions:   lactated ringers 125 mL/hr at 10/18/19 2038     As Needed: HYDROmorphone, loperamide, ondansetron, ondansetron, oxyCODONE, sodium chloride 0.9%      Significant Imaging:   X-Ray Chest AP Portable  Narrative: EXAMINATION:  XR CHEST AP PORTABLE    CLINICAL  HISTORY:  snf;    TECHNIQUE:  Single frontal view of the chest was performed.    COMPARISON:  August 20, 2014    FINDINGS:  Single portable chest view is submitted.  Mild diminished depth of inspiration noted, the cardiomediastinal silhouette appears stable.  Minimal atelectatic change noted at the lung bases there is no evidence for confluent infiltrate or consolidation, significant pleural effusion or pneumothorax.  The osseous structures demonstrate chronic change.  Impression: Minimal atelectatic change noted at the lung bases, there is no additional radiographic evidence for acute intrathoracic process.    Electronically signed by: Hiren Delgado  Date:    10/18/2019  Time:    00:56      Assessment/Plan:     Acute renal failure superimposed on stage 3 chronic kidney disease  Cr is improving from 2 days ago,   Down to 2.7 from the 4's  Dehydrated.  Given a bolus of IVF.  Will monitor labs and BP/symptoms       Dementia  On matias meds  Continue current management      Major depression with psychotic features  On oral meds.  Continue current management      Ulcerative proctitis with complication  Per GI management      Iron deficiency anemia  H/H is stable  GI is following        VTE Risk Mitigation (From admission, onward)         Ordered     IP VTE HIGH RISK PATIENT  Once      10/16/19 1816     Place VIVIANA hose  Until discontinued      10/16/19 1816     Place sequential compression device  Until discontinued      10/16/19 1816                    Thank you for your consult. I will follow-up with patient. Please contact us if you have any additional questions.    Herberth Leblanc DO  Department of Hospital Medicine   [facility]

## 2019-10-19 NOTE — PLAN OF CARE
Cued into patient's room.  Permission received per patient to turn camera to view patient.  Introduced as VN for night shift that will be working with floor nurse and nursing assistant.  Bhavna DUTTA and son at bedside.  Educated patient on VN's role in patient care. Plan of care reviewed with patient. Education per flowsheet.  Opportunity given for questions and questions answered.  C/o diarrhea and blood in stool; explained collecting stool to r/o c. Diff and using hydrocortisone supp. for rectal inflammation.  Instructed to call for assistance.  Will cont to monitor.    Labs, notes, and orders reviewed.

## 2019-10-19 NOTE — PLAN OF CARE
Problem: Hypertension Comorbidity  Goal: Blood Pressure in Desired Range  Outcome: Ongoing, Progressing   Goals ongoing

## 2019-10-19 NOTE — PROGRESS NOTES
"[facility]  Orthopedics  Progress Note    Patient Name: Anabella Aranda  MRN: [id number]  Admission Date: 10/16/2019  Hospital Length of Stay: 3 days  Attending Provider: Ghassan Lindsay MD  Primary Care Provider: Shon Brambila MD  Follow-up For: Procedure(s) (LRB):  ARTHROPLASTY, KNEE (Left)    Post-Operative Day: 3 Days Post-Op  Subjective:     Principal Problem:History of left knee replacement    Principal Orthopedic Problem: left knee TKA    Interval History: Still having diarrhea No pain in knee    Review of patient's allergies indicates:   Allergen Reactions    Sulfa (sulfonamide antibiotics) Swelling    Tomato (solanum lycopersicum) Other (See Comments)       Current Facility-Administered Medications   Medication    acetaminophen tablet 1,000 mg    aspirin EC tablet 81 mg    azaTHIOprine tablet 100 mg    calcium carbonate 200 mg calcium (500 mg) chewable tablet 500 mg    diphenoxylate-atropine 2.5-0.025 mg per tablet 1 tablet    donepezil tablet 10 mg    ferrous sulfate EC tablet 325 mg    hydrocortisone suppository 25 mg    HYDROmorphone injection 0.2 mg    lactated ringers infusion    loperamide capsule 4 mg    memantine tablet 10 mg    mesalamine CR capsule 1,500 mg    ondansetron disintegrating tablet 4 mg    ondansetron injection 4 mg    oxyCODONE immediate release tablet 5 mg    pantoprazole EC tablet 40 mg    QUEtiapine tablet 100 mg    sodium chloride 0.9% flush 10 mL     Objective:     Vital Signs (Most Recent):  Temp: 96.5 °F (35.8 °C) (10/19/19 0743)  Pulse: (!) 111 (10/19/19 0800)  Resp: 18 (10/19/19 0743)  BP: 118/62 (10/19/19 0743)  SpO2: 95 % (10/19/19 0743) Vital Signs (24h Range):  Temp:  [96.5 °F (35.8 °C)-98 °F (36.7 °C)] 96.5 °F (35.8 °C)  Pulse:  [111-144] 111  Resp:  [15-18] 18  SpO2:  [95 %-97 %] 95 %  BP: ()/(53-76) 118/62     Weight: 101.1 kg (222 lb 14.2 oz)  Height: 5' 4" (162.6 cm)  Body mass index is 38.26 kg/m².      Intake/Output " Summary (Last 24 hours) at 10/19/2019 1003  Last data filed at 10/19/2019 0001  Gross per 24 hour   Intake 2252.08 ml   Output --   Net 2252.08 ml       Ortho/SPM Exam Left knee dressing intact  ROM limited    Significant Labs: All pertinent labs within the past 24 hours have been reviewed.    Significant Imaging: None    Assessment/Plan:     Active Diagnoses:    Diagnosis Date Noted POA    PRINCIPAL PROBLEM:  History of left knee replacement [Z96.652] 10/16/2019 Not Applicable    Acute renal failure superimposed on stage 3 chronic kidney disease [N17.9, N18.3] 10/16/2019 Yes    Dementia [F03.90]  Yes     Chronic    Major depression with psychotic features [F32.3] 04/30/2018 Yes     Chronic    Morbid obesity [E66.01] 10/17/2017 Yes     Chronic    Ulcerative proctitis with complication [K51.219] 01/11/2017 Yes     Chronic    History of total colectomy [Z90.49] 06/24/2016 Not Applicable     Chronic    Iron deficiency anemia [D50.9] 09/09/2015 Yes     Chronic    Venous insufficiency of both lower extremities [I87.2] 08/26/2015 Yes     Chronic      Problems Resolved During this Admission:    Diagnosis Date Noted Date Resolved POA    Primary osteoarthritis of left knee [M17.12] 10/16/2019 10/17/2019 Yes    Chronic pain of both knees [M25.561, M25.562, G89.29] 08/14/2019 10/17/2019 Yes     Chronic     P: Treat for GI issues        PT    Trung Roger Jr, MD  Orthopedics  [facility]

## 2019-10-19 NOTE — SUBJECTIVE & OBJECTIVE
Past Medical History:   Diagnosis Date    Arthritis     C. difficile colitis 10/13/2014    4/15/2015    Chronic kidney disease, stage 3     Dementia     Hepatitis B     Hypertension     Major depression with psychotic features     Ulcerative colitis 03/2014       Past Surgical History:   Procedure Laterality Date    CHOLECYSTECTOMY      COLONOSCOPY N/A 4/29/2016    Procedure: COLONOSCOPY;  Surgeon: Umm Arenas MD;  Location: [facility] ENDO;  Service: Endoscopy;  Laterality: N/A;    ESOPHAGOGASTRODUODENOSCOPY N/A 1/25/2019    Procedure: ESOPHAGOGASTRODUODENOSCOPY (EGD);  Surgeon: Jenise Hallman MD;  Location: [facility] ENDO;  Service: Endoscopy;  Laterality: N/A;    FLEXIBLE SIGMOIDOSCOPY N/A 1/25/2019    Procedure: SIGMOIDOSCOPY, FLEXIBLE;  Surgeon: Jenise Hallman MD;  Location: [facility] ENDO;  Service: Endoscopy;  Laterality: N/A;    BETZY FILTER PLACEMENT Right 01/2014    KNEE ARTHROPLASTY Left 10/16/2019    Procedure: ARTHROPLASTY, KNEE;  Surgeon: Ghassan Lindsay MD;  Location: [facility] OR;  Service: Orthopedics;  Laterality: Left;  Franco notifiedconfirmed with Franco  [phone] 10/15/19 kb    LAPAROSCOPIC TOTAL COLECTOMY  06/24/2016    NASAL SINUS SURGERY      TONSILLECTOMY      TOTAL KNEE ARTHROPLASTY Right 04/07/2008    TOTAL KNEE ARTHROPLASTY Left 10/16/2019       Review of patient's allergies indicates:   Allergen Reactions    Sulfa (sulfonamide antibiotics) Swelling    Tomato (solanum lycopersicum) Other (See Comments)       No current facility-administered medications on file prior to encounter.      Current Outpatient Medications on File Prior to Encounter   Medication Sig    calcium carbonate (OS-NELSY) 600 mg (1,500 mg) Tab Take 1 tablet (600 mg total) by mouth 2 (two) times daily with meals.    diclofenac sodium (VOLTAREN) 1 % Gel Apply 2 g topically once daily.    ergocalciferol (ERGOCALCIFEROL) 50,000 unit Cap Take 1 capsule (50,000 Units total) by mouth every 7  days.    ferrous sulfate 325 mg (65 mg iron) Tab tablet Take 1 tablet (325 mg total) by mouth 2 (two) times daily.    loperamide (IMODIUM A-D) 2 mg Tab Take 1 mg by mouth 2 (two) times daily before meals.    MULTIVITS W-FE,OTHER MIN/LUT (CENTRUM SILVER ULTRA WOMEN'S ORAL) Take by mouth.    QUEtiapine (SEROQUEL) 100 MG Tab TAKE 1 2 TABLET BY MOUTH EVERY NIGHT AT BEDTIME, THEN TAKE ONE TABLET BY MOUTH EVERY NIGHT AT BEDTIME THEREAFTER     Family History     Problem Relation (Age of Onset)    Colon cancer Paternal Grandmother    Throat cancer Father        Tobacco Use    Smoking status: Former Smoker     Types: Cigarettes     Last attempt to quit: 1997     Years since quittin.2    Smokeless tobacco: Former User   Substance and Sexual Activity    Alcohol use: No    Drug use: No    Sexual activity: Not Currently     Review of Systems   Constitutional: Positive for activity change and fatigue. Negative for fever.   Respiratory: Negative for apnea, cough and shortness of breath.    Cardiovascular: Negative for chest pain and palpitations.   Gastrointestinal: Positive for diarrhea. Negative for abdominal distention, nausea and vomiting.   Genitourinary: Negative for difficulty urinating and hematuria.   Musculoskeletal: Positive for arthralgias.   Neurological: Negative for dizziness and numbness.   Psychiatric/Behavioral: Negative for agitation. The patient is not nervous/anxious.      Objective:     Vital Signs (Most Recent):  Temp: 96.5 °F (35.8 °C) (10/19/19 0743)  Pulse: (!) 111 (10/19/19 0800)  Resp: 18 (10/19/19 0743)  BP: 118/62 (10/19/19 0743)  SpO2: 95 % (10/19/19 0743) Vital Signs (24h Range):  Temp:  [96.5 °F (35.8 °C)-98 °F (36.7 °C)] 96.5 °F (35.8 °C)  Pulse:  [111-144] 111  Resp:  [15-18] 18  SpO2:  [95 %-97 %] 95 %  BP: ()/(53-76) 118/62     Weight: 101.1 kg (222 lb 14.2 oz)  Body mass index is 38.26 kg/m².    Physical Exam   Constitutional: She appears well-nourished.   HENT:   Head:  Normocephalic and atraumatic.   Eyes: EOM are normal.   Cardiovascular: Normal rate and regular rhythm.   Pulmonary/Chest: Effort normal. No respiratory distress.   Abdominal: Soft.   Musculoskeletal: Normal range of motion.   Neurological: She is alert.   Skin: Skin is warm and dry.   Psychiatric: She has a normal mood and affect. Her behavior is normal.   Vitals reviewed.      Significant Labs:   Recent Labs   Lab 10/16/19  1950 10/17/19  0341 10/18/19  0611   WBC 9.47  --   --    HGB 10.9* 10.2* 11.3*   HCT 32.0* 29.9* 32.6*     --   --      Recent Labs   Lab 10/16/19  1950 10/17/19  0341 10/18/19  0452   * 134*  134* 135*   K 4.2 3.6  3.6 4.3   CL 99 102  102 104   CO2 22* 20*  20* 18*   BUN 42* 43*  43* 36*   CREATININE 4.8* 4.7*  4.7* 2.7*   * 102  102 107   CALCIUM 8.4* 7.9*  7.9* 8.3*     Recent Labs   Lab 10/16/19  1950 10/17/19  0341   ALKPHOS 103  --    ALT 12  --    AST 14  --    ALBUMIN 2.6* 2.4*   PROT 6.8  --    BILITOT 0.3  --    INR 1.1  --       No results for input(s): CPK, CPKMB, MB, TROPONINI in the last 72 hours.  No results for input(s): POCTGLUCOSE in the last 168 hours.  Hemoglobin A1C   Date Value Ref Range Status   02/04/2019 5.0 4.0 - 5.6 % Final     Comment:     ADA Screening Guidelines:  5.7-6.4%  Consistent with prediabetes  >or=6.5%  Consistent with diabetes  High levels of fetal hemoglobin interfere with the HbA1C  assay. Heterozygous hemoglobin variants (HbS, HgC, etc)do  not significantly interfere with this assay.   However, presence of multiple variants may affect accuracy.     04/23/2018 5.0 4.0 - 5.6 % Final     Comment:     According to ADA guidelines, hemoglobin A1c <7.0% represents  optimal control in non-pregnant diabetic patients. Different  metrics may apply to specific patient populations.   Standards of Medical Care in Diabetes-2016.  For the purpose of screening for the presence of diabetes:  <5.7%     Consistent with the absence of  diabetes  5.7-6.4%  Consistent with increasing risk for diabetes   (prediabetes)  >or=6.5%  Consistent with diabetes  Currently, no consensus exists for use of hemoglobin A1c  for diagnosis of diabetes for children.  This Hemoglobin A1c assay has significant interference with fetal   hemoglobin   (HbF). The results are invalid for patients with abnormal amounts of   HbF,   including those with known Hereditary Persistence   of Fetal Hemoglobin. Heterozygous hemoglobin variants (HbAS, HbAC,   HbAD, HbAE, HbA2) do not significantly interfere with this assay;   however, presence of multiple variants in a sample may impact the %   interference.     01/30/2018 5.0 4.0 - 5.6 % Final     Comment:     According to ADA guidelines, hemoglobin A1c <7.0% represents  optimal control in non-pregnant diabetic patients. Different  metrics may apply to specific patient populations.   Standards of Medical Care in Diabetes-2016.  For the purpose of screening for the presence of diabetes:  <5.7%     Consistent with the absence of diabetes  5.7-6.4%  Consistent with increasing risk for diabetes   (prediabetes)  >or=6.5%  Consistent with diabetes  Currently, no consensus exists for use of hemoglobin A1c  for diagnosis of diabetes for children.  This Hemoglobin A1c assay has significant interference with fetal   hemoglobin   (HbF). The results are invalid for patients with abnormal amounts of   HbF,   including those with known Hereditary Persistence   of Fetal Hemoglobin. Heterozygous hemoglobin variants (HbAS, HbAC,   HbAD, HbAE, HbA2) do not significantly interfere with this assay;   however, presence of multiple variants in a sample may impact the %   interference.       Scheduled Meds:   acetaminophen  1,000 mg Oral TID    aspirin  81 mg Oral Daily    azaTHIOprine  100 mg Oral Daily    calcium carbonate  500 mg Oral BID    diphenoxylate-atropine 2.5-0.025 mg  1 tablet Oral BID    donepezil  10 mg Oral QHS    ferrous sulfate   325 mg Oral BID    hydrocortisone  25 mg Rectal BID    memantine  10 mg Oral BID    mesalamine  1,500 mg Oral Daily    pantoprazole  40 mg Oral Daily    QUEtiapine  100 mg Oral QHS     Continuous Infusions:   lactated ringers 125 mL/hr at 10/18/19 2038     As Needed: HYDROmorphone, loperamide, ondansetron, ondansetron, oxyCODONE, sodium chloride 0.9%      Significant Imaging:   X-Ray Chest AP Portable  Narrative: EXAMINATION:  XR CHEST AP PORTABLE    CLINICAL HISTORY:  snf;    TECHNIQUE:  Single frontal view of the chest was performed.    COMPARISON:  August 20, 2014    FINDINGS:  Single portable chest view is submitted.  Mild diminished depth of inspiration noted, the cardiomediastinal silhouette appears stable.  Minimal atelectatic change noted at the lung bases there is no evidence for confluent infiltrate or consolidation, significant pleural effusion or pneumothorax.  The osseous structures demonstrate chronic change.  Impression: Minimal atelectatic change noted at the lung bases, there is no additional radiographic evidence for acute intrathoracic process.    Electronically signed by: Hiren Delgado  Date:    10/18/2019  Time:    00:56

## 2019-10-19 NOTE — TELEPHONE ENCOUNTER
"Called number back that was given, it was number to "life lock" number was incorrect. Could not return phone call.  "

## 2019-10-19 NOTE — TELEPHONE ENCOUNTER
----- Message from Karolina Hirsch sent at 10/19/2019  9:29 AM CDT -----  Contact: Paul kumar/ [facility] [phone]   Paul kumar/ [facility] is requesting a callback with confirmation of receiving the pt's prescription for a back brace.    Please call and advise.

## 2019-10-19 NOTE — PLAN OF CARE
VN informed Dr. Leblanc of negative Cdiff results of toxin and antigen. Special contact precautions DC per MD.

## 2019-10-20 PROCEDURE — 94761 N-INVAS EAR/PLS OXIMETRY MLT: CPT

## 2019-10-20 PROCEDURE — 21400001 HC TELEMETRY ROOM

## 2019-10-20 PROCEDURE — 25000003 PHARM REV CODE 250: Performed by: INTERNAL MEDICINE

## 2019-10-20 PROCEDURE — 63600175 PHARM REV CODE 636 W HCPCS: Performed by: ANESTHESIOLOGY

## 2019-10-20 PROCEDURE — 25000003 PHARM REV CODE 250: Performed by: NURSE PRACTITIONER

## 2019-10-20 PROCEDURE — 63600175 PHARM REV CODE 636 W HCPCS: Performed by: INTERNAL MEDICINE

## 2019-10-20 PROCEDURE — 97110 THERAPEUTIC EXERCISES: CPT

## 2019-10-20 PROCEDURE — 97530 THERAPEUTIC ACTIVITIES: CPT

## 2019-10-20 PROCEDURE — 25000003 PHARM REV CODE 250: Performed by: ORTHOPAEDIC SURGERY

## 2019-10-20 RX ORDER — HYOSCYAMINE SULFATE 0.12 MG/1
0.12 TABLET SUBLINGUAL ONCE
Status: COMPLETED | OUTPATIENT
Start: 2019-10-20 | End: 2019-10-20

## 2019-10-20 RX ORDER — HYOSCYAMINE SULFATE 0.12 MG/1
0.25 TABLET SUBLINGUAL ONCE
Status: DISCONTINUED | OUTPATIENT
Start: 2019-10-20 | End: 2019-10-20

## 2019-10-20 RX ORDER — HYOSCYAMINE SULFATE 0.5 MG/ML
0.25 INJECTION, SOLUTION SUBCUTANEOUS ONCE
Status: DISCONTINUED | OUTPATIENT
Start: 2019-10-20 | End: 2019-10-20

## 2019-10-20 RX ADMIN — METHOTREXATE 1 TABLET: 2.5 TABLET ORAL at 08:10

## 2019-10-20 RX ADMIN — MEMANTINE HYDROCHLORIDE 10 MG: 5 TABLET ORAL at 08:10

## 2019-10-20 RX ADMIN — HYDROCORTISONE ACETATE 25 MG: 25 SUPPOSITORY RECTAL at 08:10

## 2019-10-20 RX ADMIN — QUETIAPINE 100 MG: 100 TABLET ORAL at 09:10

## 2019-10-20 RX ADMIN — HYOSCYAMINE SULFATE 0.12 MG: 0.12 TABLET ORAL; SUBLINGUAL at 11:10

## 2019-10-20 RX ADMIN — MESALAMINE 1500 MG: 250 CAPSULE ORAL at 08:10

## 2019-10-20 RX ADMIN — LOPERAMIDE HYDROCHLORIDE 4 MG: 2 CAPSULE ORAL at 03:10

## 2019-10-20 RX ADMIN — CALCIUM CARBONATE 500 MG: 500 TABLET, CHEWABLE ORAL at 08:10

## 2019-10-20 RX ADMIN — FERROUS SULFATE TAB EC 325 MG (65 MG FE EQUIVALENT) 325 MG: 325 (65 FE) TABLET DELAYED RESPONSE at 09:10

## 2019-10-20 RX ADMIN — AZATHIOPRINE 100 MG: 50 TABLET ORAL at 08:10

## 2019-10-20 RX ADMIN — HYDROCORTISONE ACETATE 25 MG: 25 SUPPOSITORY RECTAL at 09:10

## 2019-10-20 RX ADMIN — FERROUS SULFATE TAB EC 325 MG (65 MG FE EQUIVALENT) 325 MG: 325 (65 FE) TABLET DELAYED RESPONSE at 08:10

## 2019-10-20 RX ADMIN — SODIUM CHLORIDE, SODIUM LACTATE, POTASSIUM CHLORIDE, AND CALCIUM CHLORIDE: .6; .31; .03; .02 INJECTION, SOLUTION INTRAVENOUS at 03:10

## 2019-10-20 RX ADMIN — DONEPEZIL HYDROCHLORIDE 10 MG: 5 TABLET, FILM COATED ORAL at 09:10

## 2019-10-20 RX ADMIN — SODIUM CHLORIDE, SODIUM LACTATE, POTASSIUM CHLORIDE, AND CALCIUM CHLORIDE: .6; .31; .03; .02 INJECTION, SOLUTION INTRAVENOUS at 09:10

## 2019-10-20 RX ADMIN — ACETAMINOPHEN 1000 MG: 500 TABLET ORAL at 08:10

## 2019-10-20 RX ADMIN — PANTOPRAZOLE SODIUM 40 MG: 40 TABLET, DELAYED RELEASE ORAL at 08:10

## 2019-10-20 RX ADMIN — CALCIUM CARBONATE 500 MG: 500 TABLET, CHEWABLE ORAL at 09:10

## 2019-10-20 RX ADMIN — METHOTREXATE 1 TABLET: 2.5 TABLET ORAL at 09:10

## 2019-10-20 RX ADMIN — MEMANTINE HYDROCHLORIDE 10 MG: 5 TABLET ORAL at 09:10

## 2019-10-20 RX ADMIN — HYOSCYAMINE SULFATE 0.25 MG: 0.12 TABLET ORAL at 10:10

## 2019-10-20 RX ADMIN — ASPIRIN 81 MG: 81 TABLET, COATED ORAL at 08:10

## 2019-10-20 RX ADMIN — ACETAMINOPHEN 1000 MG: 500 TABLET ORAL at 03:10

## 2019-10-20 NOTE — PLAN OF CARE
VN rounds: VN cued into pt's room with pt's permission. Pt resting in bed. Fall risk protocol discussed with pt. VN instructed pt to call for assistance. Pt aware and agreeable. NAD noted. Allowed time for questions.  Will cont to be available and intervene as needed.     10/20/19 1152   Type of Frequent Check   Type Patient Rounds;Other (see comments)  (vn rounds)   Safety/Activity   Patient Rounds visualized patient;call light in patient/parent reach   Safety Promotion/Fall Prevention instructed to call staff for mobility;Fall Risk reviewed with patient/family   Activity Management up in chair   Pain/Comfort/Sleep   Preferred Pain Scale word (verbal rating pain scale)   Pain Rating (0-10): Rest 0   Sleep/Rest/Relaxation awake;no problem identified   Assessments (Pre/Post)   Level of Consciousness (AVPU) alert

## 2019-10-20 NOTE — EICU
Spoke with Moni RN, Charge Nurse.  Explained that watching patient's constant movement was very difficult since she moves about so frequently in room.  Moni stated her main concern was Elopement, and that is what we need to focus on.  Nicolette Aguirre RN, BSN,  [facility]

## 2019-10-20 NOTE — PT/OT/SLP PROGRESS
Physical Therapy Treatment    Patient Name:  Anabella Aranda   MRN:  [id number]    Recommendations:     Discharge Recommendations:  nursing facility, skilled, home with home health   Discharge Equipment Recommendations: none   Barriers to discharge: Decreased caregiver support    Assessment:     Anabella Aranda is a 74 y.o. female admitted with a medical diagnosis of History of left knee replacement.  She presents with the following impairments/functional limitations:  weakness, impaired self care skills, impaired balance, decreased safety awareness, decreased ROM, impaired skin, impaired endurance, impaired functional mobilty, pain, orthopedic precautions, decreased lower extremity function, impaired cognition, gait instability, edema, decreased upper extremity function. Pt required Min A-CGA /c bed mobility and T/F. Pt incontinent of BM. Pt required vc's for encouragement to sit up OOB for at least 2 hrs to increase tolerance.     Rehab Prognosis: Good; patient would benefit from acute skilled PT services to address these deficits and reach maximum level of function.    Recent Surgery: Procedure(s) (LRB):  ARTHROPLASTY, KNEE (Left) 4 Days Post-Op    Plan:     During this hospitalization, patient to be seen daily to address the identified rehab impairments via gait training, therapeutic activities, therapeutic exercises and progress toward the following goals:    · Plan of Care Expires:  11/18/19    Subjective     Chief Complaint: none stated  Patient/Family Comments/goals: How long do I have to sit up?  Pain/Comfort:  · Pain Rating 1: (pt reports no pain initial tx session)  · Location - Side 1: Left  · Location - Orientation 1: generalized  · Location 1: knee  · Pain Addressed 1: Distraction, Reposition  · Pain Rating Post-Intervention 1: (pt reports minimal pain in L knee /c activities but did not rate pain)      Objective:     Communicated with nurse Downing prior to session.  Patient found supine /c HOB  elevated with telemetry, bed alarm, peripheral IV upon PT entry to room.     General Precautions: Standard, fall   Orthopedic Precautions:LLE weight bearing as tolerated   Braces: N/A     Functional Mobility:  · Bed Mobility:     · Rolling Left:  stand by assistance  · Rolling Right: minimum assistance  · Scooting: minimum assistance  · Supine to Sit: minimum assistance  · Sit to Supine: minimum assistance  · Transfers:     · Sit to Stand:  contact guard assistance with rolling walker  · Bed to Chair: contact guard assistance with  rolling walker  using  Step Transfer  · Gait: Amb'd from EOB to bedside chair ~5 ft /c RW /c CGA      AM-PAC 6 CLICK MOBILITY  Turning over in bed (including adjusting bedclothes, sheets and blankets)?: 3  Sitting down on and standing up from a chair with arms (e.g., wheelchair, bedside commode, etc.): 3  Moving from lying on back to sitting on the side of the bed?: 3  Moving to and from a bed to a chair (including a wheelchair)?: 3  Need to walk in hospital room?: 3  Climbing 3-5 steps with a railing?: 2  Basic Mobility Total Score: 17       Therapeutic Activities and Exercises:   Performed above activities as noted.  Sup>sit /c Min A /c LLE x2 trials. Scooted to EOB /c Min A. 1st trial, noted pt /c incontinent of BM. Returned pt back to supine for pericare.  Rolled ea way x couple of trials /c Min-SBA for pericare /c Total A.  Performed BLE's seated therex in bedside chair. AROM RLE & AA/AROM LLE x10 reps for strengthening and ROM: AP, LAQ, hip flex, HS, hip abd/add /c pillow    Educated benefits and importance of performing BLE's therex and being OOB in BSChair for 1-2 hours for strengthening, ROM, and to increase tolerance. Pt verb'd understanding but required vc's for encouragement.      Patient left up in chair with call button in reach and nurse notified..    GOALS:   Multidisciplinary Problems     Physical Therapy Goals        Problem: Physical Therapy Goal    Goal Priority  Disciplines Outcome Goal Variances Interventions   Physical Therapy Goal     PT, PT/OT Ongoing, Progressing     Description:  1.  Supine to/from sit with with supervision  2.  Bed to/from chair with RW with supervision  3.  Ambulate 50' with RW with supervision  4.  Tolerate sitting 2 hours                    Time Tracking:     PT Received On: 10/20/19  PT Start Time: 0942     PT Stop Time: 1016  PT Total Time (min): 34 min     Billable Minutes: Therapeutic Activity 22 Therapeutic Exercise 12    Treatment Type: Treatment  PT/PTA: PTA     PTA Visit Number: 2     Meka Heath PTA  10/20/2019

## 2019-10-20 NOTE — PLAN OF CARE
Pt VS stable throughout shift, no c/o pain. Up to chair today with moderate assist. Diarrhea continued, treated prn med. Pt has poor appetite, intake an fluids encouraged throughout shift. Pt educated on importance of nutrition for healing, however due to dementia, pt understanding is very limited. Boost plus given, pt tolerated well. WCM

## 2019-10-21 ENCOUNTER — TELEPHONE (OUTPATIENT)
Dept: FAMILY MEDICINE | Facility: CLINIC | Age: 74
End: 2019-10-21

## 2019-10-21 ENCOUNTER — ANESTHESIA (OUTPATIENT)
Dept: ENDOSCOPY | Facility: HOSPITAL | Age: 74
DRG: 469 | End: 2019-10-21
Payer: MEDICARE

## 2019-10-21 ENCOUNTER — ANESTHESIA EVENT (OUTPATIENT)
Dept: ENDOSCOPY | Facility: HOSPITAL | Age: 74
DRG: 469 | End: 2019-10-21
Payer: MEDICARE

## 2019-10-21 LAB
ANION GAP SERPL CALC-SCNC: 17 MMOL/L (ref 8–16)
ANISOCYTOSIS BLD QL SMEAR: SLIGHT
BASOPHILS # BLD AUTO: ABNORMAL K/UL (ref 0–0.2)
BASOPHILS NFR BLD: 0 % (ref 0–1.9)
BUN SERPL-MCNC: 18 MG/DL (ref 8–23)
CALCIUM SERPL-MCNC: 8.6 MG/DL (ref 8.7–10.5)
CHLORIDE SERPL-SCNC: 102 MMOL/L (ref 95–110)
CO2 SERPL-SCNC: 18 MMOL/L (ref 23–29)
CREAT SERPL-MCNC: 1 MG/DL (ref 0.5–1.4)
DIFFERENTIAL METHOD: ABNORMAL
EOSINOPHIL # BLD AUTO: ABNORMAL K/UL (ref 0–0.5)
EOSINOPHIL NFR BLD: 0 % (ref 0–8)
ERYTHROCYTE [DISTWIDTH] IN BLOOD BY AUTOMATED COUNT: 13.6 % (ref 11.5–14.5)
EST. GFR  (AFRICAN AMERICAN): >60 ML/MIN/1.73 M^2
EST. GFR  (NON AFRICAN AMERICAN): 56 ML/MIN/1.73 M^2
GLUCOSE SERPL-MCNC: 55 MG/DL (ref 70–110)
HCT VFR BLD AUTO: 32 % (ref 37–48.5)
HGB BLD-MCNC: 10.7 G/DL (ref 12–16)
HYPOCHROMIA BLD QL SMEAR: ABNORMAL
LYMPHOCYTES # BLD AUTO: ABNORMAL K/UL (ref 1–4.8)
LYMPHOCYTES NFR BLD: 16 % (ref 18–48)
MCH RBC QN AUTO: 30 PG (ref 27–31)
MCHC RBC AUTO-ENTMCNC: 33.4 G/DL (ref 32–36)
MCV RBC AUTO: 90 FL (ref 82–98)
MONOCYTES # BLD AUTO: ABNORMAL K/UL (ref 0.3–1)
MONOCYTES NFR BLD: 6 % (ref 4–15)
NEUTROPHILS NFR BLD: 57 % (ref 38–73)
NEUTS BAND NFR BLD MANUAL: 21 %
PLATELET # BLD AUTO: 362 K/UL (ref 150–350)
PLATELET BLD QL SMEAR: ABNORMAL
PMV BLD AUTO: 8.4 FL (ref 9.2–12.9)
POLYCHROMASIA BLD QL SMEAR: ABNORMAL
POTASSIUM SERPL-SCNC: 4.2 MMOL/L (ref 3.5–5.1)
RBC # BLD AUTO: 3.57 M/UL (ref 4–5.4)
SODIUM SERPL-SCNC: 137 MMOL/L (ref 136–145)
WBC # BLD AUTO: 7.81 K/UL (ref 3.9–12.7)

## 2019-10-21 PROCEDURE — 21400001 HC TELEMETRY ROOM

## 2019-10-21 PROCEDURE — 27201012 HC FORCEPS, HOT/COLD, DISP: Performed by: INTERNAL MEDICINE

## 2019-10-21 PROCEDURE — 88313 SPECIAL STAINS GROUP 2: CPT | Mod: 26,,, | Performed by: PATHOLOGY

## 2019-10-21 PROCEDURE — 88312 TISSUE SPECIMEN TO PATHOLOGY - SURGERY: ICD-10-PCS | Mod: 26,,, | Performed by: PATHOLOGY

## 2019-10-21 PROCEDURE — 88305 TISSUE SPECIMEN TO PATHOLOGY - SURGERY: ICD-10-PCS | Mod: 26,,, | Performed by: PATHOLOGY

## 2019-10-21 PROCEDURE — 45331 PR SIGMOIDOSCOPY,BIOPSY: ICD-10-PCS | Mod: 51,,, | Performed by: INTERNAL MEDICINE

## 2019-10-21 PROCEDURE — 25000003 PHARM REV CODE 250: Performed by: INTERNAL MEDICINE

## 2019-10-21 PROCEDURE — 88342 IMHCHEM/IMCYTCHM 1ST ANTB: CPT | Mod: 26,,, | Performed by: PATHOLOGY

## 2019-10-21 PROCEDURE — 97530 THERAPEUTIC ACTIVITIES: CPT

## 2019-10-21 PROCEDURE — 37000008 HC ANESTHESIA 1ST 15 MINUTES: Performed by: INTERNAL MEDICINE

## 2019-10-21 PROCEDURE — 37000009 HC ANESTHESIA EA ADD 15 MINS: Performed by: INTERNAL MEDICINE

## 2019-10-21 PROCEDURE — 85027 COMPLETE CBC AUTOMATED: CPT

## 2019-10-21 PROCEDURE — 88342 TISSUE SPECIMEN TO PATHOLOGY - SURGERY: ICD-10-PCS | Mod: 26,,, | Performed by: PATHOLOGY

## 2019-10-21 PROCEDURE — 88312 SPECIAL STAINS GROUP 1: CPT | Mod: 26,,, | Performed by: PATHOLOGY

## 2019-10-21 PROCEDURE — 43239 PR EGD, FLEX, W/BIOPSY, SGL/MULTI: ICD-10-PCS | Mod: ,,, | Performed by: INTERNAL MEDICINE

## 2019-10-21 PROCEDURE — 85007 BL SMEAR W/DIFF WBC COUNT: CPT

## 2019-10-21 PROCEDURE — 97535 SELF CARE MNGMENT TRAINING: CPT

## 2019-10-21 PROCEDURE — 43239 EGD BIOPSY SINGLE/MULTIPLE: CPT | Performed by: INTERNAL MEDICINE

## 2019-10-21 PROCEDURE — 43239 EGD BIOPSY SINGLE/MULTIPLE: CPT | Mod: ,,, | Performed by: INTERNAL MEDICINE

## 2019-10-21 PROCEDURE — 63600175 PHARM REV CODE 636 W HCPCS: Performed by: INTERNAL MEDICINE

## 2019-10-21 PROCEDURE — 94761 N-INVAS EAR/PLS OXIMETRY MLT: CPT

## 2019-10-21 PROCEDURE — 45331 SIGMOIDOSCOPY AND BIOPSY: CPT | Mod: 51,,, | Performed by: INTERNAL MEDICINE

## 2019-10-21 PROCEDURE — 80048 BASIC METABOLIC PNL TOTAL CA: CPT

## 2019-10-21 PROCEDURE — 88305 TISSUE EXAM BY PATHOLOGIST: CPT | Mod: 26,,, | Performed by: PATHOLOGY

## 2019-10-21 PROCEDURE — 97110 THERAPEUTIC EXERCISES: CPT

## 2019-10-21 PROCEDURE — 88313 SPECIAL STAINS GROUP 2: CPT | Performed by: PATHOLOGY

## 2019-10-21 PROCEDURE — 88341 IMHCHEM/IMCYTCHM EA ADD ANTB: CPT | Mod: 26,,, | Performed by: PATHOLOGY

## 2019-10-21 PROCEDURE — 97116 GAIT TRAINING THERAPY: CPT

## 2019-10-21 PROCEDURE — 99232 SBSQ HOSP IP/OBS MODERATE 35: CPT | Mod: 25,,, | Performed by: INTERNAL MEDICINE

## 2019-10-21 PROCEDURE — 88341 PR IHC OR ICC EACH ADD'L SINGLE ANTIBODY  STAINPR: ICD-10-PCS | Mod: 26,,, | Performed by: PATHOLOGY

## 2019-10-21 PROCEDURE — 88313 TISSUE SPECIMEN TO PATHOLOGY - SURGERY: ICD-10-PCS | Mod: 26,,, | Performed by: PATHOLOGY

## 2019-10-21 PROCEDURE — 25000003 PHARM REV CODE 250: Performed by: ORTHOPAEDIC SURGERY

## 2019-10-21 PROCEDURE — 45331 SIGMOIDOSCOPY AND BIOPSY: CPT | Performed by: INTERNAL MEDICINE

## 2019-10-21 PROCEDURE — 63600175 PHARM REV CODE 636 W HCPCS: Performed by: NURSE ANESTHETIST, CERTIFIED REGISTERED

## 2019-10-21 PROCEDURE — 63600175 PHARM REV CODE 636 W HCPCS: Performed by: ANESTHESIOLOGY

## 2019-10-21 PROCEDURE — 99232 PR SUBSEQUENT HOSPITAL CARE,LEVL II: ICD-10-PCS | Mod: 25,,, | Performed by: INTERNAL MEDICINE

## 2019-10-21 PROCEDURE — 36415 COLL VENOUS BLD VENIPUNCTURE: CPT

## 2019-10-21 RX ORDER — PROPOFOL 10 MG/ML
VIAL (ML) INTRAVENOUS CONTINUOUS PRN
Status: DISCONTINUED | OUTPATIENT
Start: 2019-10-21 | End: 2019-10-21

## 2019-10-21 RX ORDER — SODIUM CHLORIDE 9 MG/ML
INJECTION, SOLUTION INTRAVENOUS CONTINUOUS PRN
Status: DISCONTINUED | OUTPATIENT
Start: 2019-10-21 | End: 2019-10-21

## 2019-10-21 RX ORDER — PROPOFOL 10 MG/ML
VIAL (ML) INTRAVENOUS
Status: DISCONTINUED | OUTPATIENT
Start: 2019-10-21 | End: 2019-10-21

## 2019-10-21 RX ORDER — LIDOCAINE HCL/PF 100 MG/5ML
SYRINGE (ML) INTRAVENOUS
Status: DISCONTINUED | OUTPATIENT
Start: 2019-10-21 | End: 2019-10-21

## 2019-10-21 RX ADMIN — SODIUM CHLORIDE, SODIUM LACTATE, POTASSIUM CHLORIDE, AND CALCIUM CHLORIDE: .6; .31; .03; .02 INJECTION, SOLUTION INTRAVENOUS at 02:10

## 2019-10-21 RX ADMIN — DONEPEZIL HYDROCHLORIDE 10 MG: 5 TABLET, FILM COATED ORAL at 08:10

## 2019-10-21 RX ADMIN — OXYCODONE HYDROCHLORIDE 5 MG: 5 TABLET ORAL at 09:10

## 2019-10-21 RX ADMIN — AZATHIOPRINE 100 MG: 50 TABLET ORAL at 11:10

## 2019-10-21 RX ADMIN — SODIUM CHLORIDE: 0.9 INJECTION, SOLUTION INTRAVENOUS at 04:10

## 2019-10-21 RX ADMIN — FERROUS SULFATE TAB EC 325 MG (65 MG FE EQUIVALENT) 325 MG: 325 (65 FE) TABLET DELAYED RESPONSE at 11:10

## 2019-10-21 RX ADMIN — ACETAMINOPHEN 1000 MG: 500 TABLET ORAL at 08:10

## 2019-10-21 RX ADMIN — CALCIUM CARBONATE 500 MG: 500 TABLET, CHEWABLE ORAL at 08:10

## 2019-10-21 RX ADMIN — ACETAMINOPHEN 1000 MG: 500 TABLET ORAL at 11:10

## 2019-10-21 RX ADMIN — METHOTREXATE 1 TABLET: 2.5 TABLET ORAL at 08:10

## 2019-10-21 RX ADMIN — SODIUM CHLORIDE, SODIUM LACTATE, POTASSIUM CHLORIDE, AND CALCIUM CHLORIDE: .6; .31; .03; .02 INJECTION, SOLUTION INTRAVENOUS at 06:10

## 2019-10-21 RX ADMIN — QUETIAPINE 100 MG: 100 TABLET ORAL at 08:10

## 2019-10-21 RX ADMIN — PANTOPRAZOLE SODIUM 40 MG: 40 TABLET, DELAYED RELEASE ORAL at 11:10

## 2019-10-21 RX ADMIN — ASPIRIN 81 MG: 81 TABLET, COATED ORAL at 11:10

## 2019-10-21 RX ADMIN — MESALAMINE 1500 MG: 250 CAPSULE ORAL at 11:10

## 2019-10-21 RX ADMIN — MEMANTINE HYDROCHLORIDE 10 MG: 5 TABLET ORAL at 08:10

## 2019-10-21 RX ADMIN — PROPOFOL 125 MCG/KG/MIN: 10 INJECTION, EMULSION INTRAVENOUS at 04:10

## 2019-10-21 RX ADMIN — HYDROCORTISONE ACETATE 25 MG: 25 SUPPOSITORY RECTAL at 11:10

## 2019-10-21 RX ADMIN — FERROUS SULFATE TAB EC 325 MG (65 MG FE EQUIVALENT) 325 MG: 325 (65 FE) TABLET DELAYED RESPONSE at 08:10

## 2019-10-21 RX ADMIN — CALCIUM CARBONATE 500 MG: 500 TABLET, CHEWABLE ORAL at 11:10

## 2019-10-21 RX ADMIN — METHOTREXATE 1 TABLET: 2.5 TABLET ORAL at 11:10

## 2019-10-21 RX ADMIN — MEMANTINE HYDROCHLORIDE 10 MG: 5 TABLET ORAL at 11:10

## 2019-10-21 RX ADMIN — LIDOCAINE HYDROCHLORIDE 100 MG: 20 INJECTION, SOLUTION INTRAVENOUS at 04:10

## 2019-10-21 RX ADMIN — PROPOFOL 80 MG: 10 INJECTION, EMULSION INTRAVENOUS at 04:10

## 2019-10-21 NOTE — ANESTHESIA POSTPROCEDURE EVALUATION
Anesthesia Post Evaluation    Patient: Anabella Aranda    Procedure(s) Performed: Procedure(s) (LRB):  EGD (ESOPHAGOGASTRODUODENOSCOPY) (N/A)  SIGMOIDOSCOPY, FLEXIBLE (N/A)    Final Anesthesia Type: MAC  Patient location during evaluation: GI PACU  Patient participation: Yes- Able to Participate  Level of consciousness: awake and alert and oriented  Post-procedure vital signs: reviewed and stable  Pain management: adequate  Airway patency: patent  PONV status at discharge: No PONV  Anesthetic complications: no      Cardiovascular status: blood pressure returned to baseline, hemodynamically stable and stable  Respiratory status: unassisted and spontaneous ventilation  Hydration status: euvolemic  Follow-up not needed.          Vitals Value Taken Time   /80 10/21/2019  5:15 PM   Temp 36.6 °C (97.9 °F) 10/21/2019  5:15 PM   Pulse 116 10/21/2019  5:15 PM   Resp 16 10/21/2019  5:15 PM   SpO2 98 % 10/21/2019  5:15 PM         No case tracking events are documented in the log.      Pain/Dimitry Score: Pain Rating Prior to Med Admin: 0 (10/21/2019 11:13 AM)  Dimitry Score: 9 (10/21/2019  5:15 PM)

## 2019-10-21 NOTE — TELEPHONE ENCOUNTER
Tried calling representative at [facility], but was unable to speak to someone. Was put on hold and the call eventually disconnected.

## 2019-10-21 NOTE — PLAN OF CARE
Pt progressing well towards goals. Pt c/o dizziness upon seated on BSC, headache once returned to bed. RN notified and BP taken (122/60 ) Cont OT POC

## 2019-10-21 NOTE — CONSULTS
[facility] Hospital Medicine  Consult Note    Patient Name: Anabella Aranda  MRN: [id number]  Admission Date: 10/16/2019  Hospital Length of Stay: 5 days  Attending Physician: Herberth Leblanc DO  Primary Care Provider: Shon Brambila MD           Patient information was obtained from past medical records.     Consults  Subjective:     Principal Problem: History of left knee replacement    Chief Complaint: No chief complaint on file.       HPI: The patient is a 74 y.o. female with PMH significant for Dementia, h/o DVT status post IVC filter, ulcerative colitis, iron deficiency anemia, GERD,  Major depression with psychotic features, and history of hep B. She is here today for a pre-operative visit in preparation for a Left total knee arthroplasty to be performed by  Dr. Lindsay on 10/16/19.  Anabella Aranda has a >1 year history of Left knee pain.  Pain is worse with activity and weight bearing. Patient has experienced interference of ADLs due to increased pain and decreased range of motion. Patient has failed non-operative treatment including NSAIDs, activity modification, and bracing. Anabella Aranda ambulates Rolator walker.  There has been no significant change in medical status since last visit.  She was seen by PCP Dr. Brambila on 08/14/19 at which time chronic conditions were well controlled    The pt underwent a left TKA during this admission, and we were consulted for medical management.      Past Medical History:   Diagnosis Date    Arthritis     C. difficile colitis 10/13/2014    4/15/2015    Chronic kidney disease, stage 3     Dementia     Hepatitis B     Hypertension     Major depression with psychotic features     Ulcerative colitis 03/2014       Past Surgical History:   Procedure Laterality Date    CHOLECYSTECTOMY      COLONOSCOPY N/A 4/29/2016    Procedure: COLONOSCOPY;  Surgeon: Umm Arenas MD;  Location: [facility];  Service: Endoscopy;  Laterality: N/A;     ESOPHAGOGASTRODUODENOSCOPY N/A 1/25/2019    Procedure: ESOPHAGOGASTRODUODENOSCOPY (EGD);  Surgeon: Jenise Hallman MD;  Location: [facility] ENDO;  Service: Endoscopy;  Laterality: N/A;    FLEXIBLE SIGMOIDOSCOPY N/A 1/25/2019    Procedure: SIGMOIDOSCOPY, FLEXIBLE;  Surgeon: Jenise Hallman MD;  Location: [facility] ENDO;  Service: Endoscopy;  Laterality: N/A;    BETZY FILTER PLACEMENT Right 01/2014    KNEE ARTHROPLASTY Left 10/16/2019    Procedure: ARTHROPLASTY, KNEE;  Surgeon: Ghassan Lindsay MD;  Location: [facility] OR;  Service: Orthopedics;  Laterality: Left;  Franco notifiedconfirmed with Franco  [phone] 10/15/19 kb    LAPAROSCOPIC TOTAL COLECTOMY  06/24/2016    NASAL SINUS SURGERY      TONSILLECTOMY      TOTAL KNEE ARTHROPLASTY Right 04/07/2008    TOTAL KNEE ARTHROPLASTY Left 10/16/2019       Review of patient's allergies indicates:   Allergen Reactions    Sulfa (sulfonamide antibiotics) Swelling    Tomato (solanum lycopersicum) Other (See Comments)       No current facility-administered medications on file prior to encounter.      Current Outpatient Medications on File Prior to Encounter   Medication Sig    calcium carbonate (OS-NELSY) 600 mg (1,500 mg) Tab Take 1 tablet (600 mg total) by mouth 2 (two) times daily with meals.    diclofenac sodium (VOLTAREN) 1 % Gel Apply 2 g topically once daily.    ergocalciferol (ERGOCALCIFEROL) 50,000 unit Cap Take 1 capsule (50,000 Units total) by mouth every 7 days.    ferrous sulfate 325 mg (65 mg iron) Tab tablet Take 1 tablet (325 mg total) by mouth 2 (two) times daily.    loperamide (IMODIUM A-D) 2 mg Tab Take 1 mg by mouth 2 (two) times daily before meals.    MULTIVITS W-FE,OTHER MIN/LUT (CENTRUM SILVER ULTRA WOMEN'S ORAL) Take by mouth.    QUEtiapine (SEROQUEL) 100 MG Tab TAKE 1 2 TABLET BY MOUTH EVERY NIGHT AT BEDTIME, THEN TAKE ONE TABLET BY MOUTH EVERY NIGHT AT BEDTIME THEREAFTER     Family History     Problem Relation (Age of Onset)    Colon  cancer Paternal Grandmother    Throat cancer Father        Tobacco Use    Smoking status: Former Smoker     Types: Cigarettes     Last attempt to quit: 1997     Years since quittin.2    Smokeless tobacco: Former User   Substance and Sexual Activity    Alcohol use: No    Drug use: No    Sexual activity: Not Currently     Review of Systems   Constitutional: Negative for activity change, fatigue and fever.   Respiratory: Negative for apnea, cough and shortness of breath.    Cardiovascular: Negative for chest pain and palpitations.   Gastrointestinal: Positive for diarrhea. Negative for abdominal distention, nausea and vomiting.   Genitourinary: Negative for difficulty urinating and hematuria.   Musculoskeletal: Positive for arthralgias.   Neurological: Negative for dizziness and numbness.   Psychiatric/Behavioral: Negative for agitation. The patient is not nervous/anxious.      Objective:     Vital Signs (Most Recent):  Temp: 98.5 °F (36.9 °C) (10/21/19 1218)  Pulse: 102 (10/21/19 1218)  Resp: 20 (10/21/19 1218)  BP: (!) 134/59 (10/21/19 1218)  SpO2: (!) 94 % (10/21/19 0853) Vital Signs (24h Range):  Temp:  [97.1 °F (36.2 °C)-98.6 °F (37 °C)] 98.5 °F (36.9 °C)  Pulse:  [102-143] 102  Resp:  [16-20] 20  SpO2:  [94 %-96 %] 94 %  BP: (112-134)/(56-70) 134/59     Weight: 99 kg (218 lb 4.1 oz)  Body mass index is 37.46 kg/m².    Physical Exam   Constitutional: She appears well-nourished.   HENT:   Head: Normocephalic and atraumatic.   Pulmonary/Chest: No respiratory distress.   Neurological: She is alert.   Vitals reviewed.      Significant Labs:   Recent Labs   Lab 10/16/19  1950 10/17/19  0341 10/18/19  0611 10/21/19  1209   WBC 9.47  --   --  7.81   HGB 10.9* 10.2* 11.3* 10.7*   HCT 32.0* 29.9* 32.6* 32.0*     --   --  362*     Recent Labs   Lab 10/18/19  0452 10/19/19  1110 10/21/19  1209   * 134* 137   K 4.3 4.2 4.2    103 102   CO2 18* 17* 18*   BUN 36* 27* 18   CREATININE 2.7* 1.5*  1.0    81 55*   CALCIUM 8.3* 8.5* 8.6*     Recent Labs   Lab 10/16/19  1950 10/17/19  0341   ALKPHOS 103  --    ALT 12  --    AST 14  --    ALBUMIN 2.6* 2.4*   PROT 6.8  --    BILITOT 0.3  --    INR 1.1  --       Recent Labs     10/19/19  1110   TROPONINI 0.090*     No results for input(s): POCTGLUCOSE in the last 168 hours.  Hemoglobin A1C   Date Value Ref Range Status   02/04/2019 5.0 4.0 - 5.6 % Final     Comment:     ADA Screening Guidelines:  5.7-6.4%  Consistent with prediabetes  >or=6.5%  Consistent with diabetes  High levels of fetal hemoglobin interfere with the HbA1C  assay. Heterozygous hemoglobin variants (HbS, HgC, etc)do  not significantly interfere with this assay.   However, presence of multiple variants may affect accuracy.     04/23/2018 5.0 4.0 - 5.6 % Final     Comment:     According to ADA guidelines, hemoglobin A1c <7.0% represents  optimal control in non-pregnant diabetic patients. Different  metrics may apply to specific patient populations.   Standards of Medical Care in Diabetes-2016.  For the purpose of screening for the presence of diabetes:  <5.7%     Consistent with the absence of diabetes  5.7-6.4%  Consistent with increasing risk for diabetes   (prediabetes)  >or=6.5%  Consistent with diabetes  Currently, no consensus exists for use of hemoglobin A1c  for diagnosis of diabetes for children.  This Hemoglobin A1c assay has significant interference with fetal   hemoglobin   (HbF). The results are invalid for patients with abnormal amounts of   HbF,   including those with known Hereditary Persistence   of Fetal Hemoglobin. Heterozygous hemoglobin variants (HbAS, HbAC,   HbAD, HbAE, HbA2) do not significantly interfere with this assay;   however, presence of multiple variants in a sample may impact the %   interference.     01/30/2018 5.0 4.0 - 5.6 % Final     Comment:     According to ADA guidelines, hemoglobin A1c <7.0% represents  optimal control in non-pregnant diabetic  patients. Different  metrics may apply to specific patient populations.   Standards of Medical Care in Diabetes-2016.  For the purpose of screening for the presence of diabetes:  <5.7%     Consistent with the absence of diabetes  5.7-6.4%  Consistent with increasing risk for diabetes   (prediabetes)  >or=6.5%  Consistent with diabetes  Currently, no consensus exists for use of hemoglobin A1c  for diagnosis of diabetes for children.  This Hemoglobin A1c assay has significant interference with fetal   hemoglobin   (HbF). The results are invalid for patients with abnormal amounts of   HbF,   including those with known Hereditary Persistence   of Fetal Hemoglobin. Heterozygous hemoglobin variants (HbAS, HbAC,   HbAD, HbAE, HbA2) do not significantly interfere with this assay;   however, presence of multiple variants in a sample may impact the %   interference.       Scheduled Meds:   acetaminophen  1,000 mg Oral TID    aspirin  81 mg Oral Daily    azaTHIOprine  100 mg Oral Daily    calcium carbonate  500 mg Oral BID    diphenoxylate-atropine 2.5-0.025 mg  1 tablet Oral BID    donepezil  10 mg Oral QHS    ferrous sulfate  325 mg Oral BID    hydrocortisone  25 mg Rectal BID    memantine  10 mg Oral BID    mesalamine  1,500 mg Oral Daily    pantoprazole  40 mg Oral Daily    QUEtiapine  100 mg Oral QHS     Continuous Infusions:   lactated ringers 125 mL/hr at 10/21/19 1427     As Needed: HYDROmorphone, loperamide, ondansetron, ondansetron, oxyCODONE, sodium chloride 0.9%      Significant Imaging:   X-Ray Chest AP Portable  Narrative: EXAMINATION:  XR CHEST AP PORTABLE    CLINICAL HISTORY:  snf;    TECHNIQUE:  Single frontal view of the chest was performed.    COMPARISON:  August 20, 2014    FINDINGS:  Single portable chest view is submitted.  Mild diminished depth of inspiration noted, the cardiomediastinal silhouette appears stable.  Minimal atelectatic change noted at the lung bases there is no evidence for  confluent infiltrate or consolidation, significant pleural effusion or pneumothorax.  The osseous structures demonstrate chronic change.  Impression: Minimal atelectatic change noted at the lung bases, there is no additional radiographic evidence for acute intrathoracic process.    Electronically signed by: Hiren Delgado  Date:    10/18/2019  Time:    00:56      Assessment/Plan:     Acute renal failure superimposed on stage 3 chronic kidney disease        Dementia  On matias meds  Continue current management      Major depression with psychotic features  On oral meds.  Continue current management      Ulcerative proctitis with complication  Per GI management      Iron deficiency anemia  H/H is stable  GI is following      Venous insufficiency of both lower extremities          VTE Risk Mitigation (From admission, onward)         Ordered     IP VTE HIGH RISK PATIENT  Once      10/16/19 1816     Place VIVIANA hose  Until discontinued      10/16/19 1816     Place sequential compression device  Until discontinued      10/16/19 1816                    Thank you for your consult. I will sign off. Please contact us if you have any additional questions.    Herberth Leblanc DO  Department of Hospital Medicine   [facility]

## 2019-10-21 NOTE — TRANSFER OF CARE
"Anesthesia Transfer of Care Note    Patient: Anabella Aranda    Procedure(s) Performed: Procedure(s) (LRB):  EGD (ESOPHAGOGASTRODUODENOSCOPY) (N/A)  SIGMOIDOSCOPY, FLEXIBLE (N/A)    Patient location: GI    Anesthesia Type: MAC    Transport from OR: Transported from OR on room air with adequate spontaneous ventilation    Post pain: adequate analgesia    Post assessment: no apparent anesthetic complications and tolerated procedure well    Post vital signs: stable    Level of consciousness: awake and alert    Nausea/Vomiting: no nausea/vomiting    Complications: none    Transfer of care protocol was followed      Last vitals:   Visit Vitals  /80   Pulse (!) 116   Temp 36.6 °C (97.9 °F) (Temporal)   Resp 16   Ht 5' 4" (1.626 m)   Wt 99 kg (218 lb 4.1 oz)   LMP  (LMP Unknown)   SpO2 98%   Breastfeeding? No   BMI 37.46 kg/m²     "

## 2019-10-21 NOTE — ASSESSMENT & PLAN NOTE
Hx of UC s/p colectomy with retained rectal stump, had flex sig 1/2019 showing continued inflammation of the rectal stump, otherwise the small bowel was normal. CT enterography recently showed continued inflammation of the rectal stump.    I suspect her symptoms are related to ongoing rectal inflammation, but this has been very difficult to treat as outpatient as the patient / family cannot afford topical treatments (canasa etc).    Diarrhea continues. Cdiff negative this admission.  Seems like rectal bleeding has stopped.    Recs:  Continue oral mesalamine  Continue hydrocortisone suppository  scheduled lomotil BID ; Can use immodium PRN and alternate with lomotil  Will add back imuran at 100mg daily and can titrate up (goal near 2.5 mg / kg)    Will plan Flex sig today to rule out CMV, further assess extent of colitis, rule out small bowel changes as well   - will need to ensure troponin leak is resolved  Continued management of medical comorbidites by hospital medicine.    Can consider questran powder in future vs. Course of steroids (although reluctant to add more aggressive therapies given hx of dementia / recent surgery etc)

## 2019-10-21 NOTE — [employer] PATIENT INSTRUCTIONS
Discharge Summary/Instructions after an Endoscopic Procedure  Patient Name: Anabella Aranda  Patient MRN: [id number]  Patient YOB: 1945 Monday, October 21, 2019  Og Carrera MD  RESTRICTIONS:  During your procedure today, you received medications for sedation.  These   medications may affect your judgment, balance and coordination.  Therefore,   for 24 hours, you have the following restrictions:   - DO NOT drive a car, operate machinery, make legal/financial decisions,   sign important papers or drink alcohol.    ACTIVITY:  Today: no heavy lifting, straining or running due to procedural   sedation/anesthesia.  The following day: return to full activity including work.  DIET:  Eat and drink normally unless instructed otherwise.     TREATMENT FOR COMMON SIDE EFFECTS:  - Mild abdominal pain, nausea, belching, bloating or excessive gas:  rest,   eat lightly and use a heating pad.  - Sore Throat: treat with throat lozenges and/or gargle with warm salt   water.  - Because air was used during the procedure, expelling large amounts of air   from your rectum or belching is normal.  - If a bowel prep was taken, you may not have a bowel movement for 1-3 days.    This is normal.  SYMPTOMS TO WATCH FOR AND REPORT TO YOUR PHYSICIAN:  1. Abdominal pain or bloating, other than gas cramps.  2. Chest pain.  3. Back pain.  4. Signs of infection such as: chills or fever occurring within 24 hours   after the procedure.  5. Rectal bleeding, which would show as bright red, maroon, or black stools.   (A tablespoon of blood from the rectum is not serious, especially if   hemorrhoids are present.)  6. Vomiting.  7. Weakness or dizziness.  GO DIRECTLY TO THE NEAREST EMERGENCY ROOM IF YOU HAVE ANY OF THE FOLLOWING:      Difficulty breathing              Chills and/or fever over 101 F   Persistent vomiting and/or vomiting blood   Severe abdominal pain   Severe chest pain   Black, tarry stools   Bleeding- more than one  tablespoon   Any other symptom or condition that you feel may need urgent attention  Your doctor recommends these additional instructions:  If any biopsies were taken, your doctors clinic will contact you in 1 to 2   weeks with any results.  - Return patient to [facility] for ongoing care.   - Advance diet as tolerated.   - Await pathology results.   - Stop immunosuppression and await path, suspect infectious etiology.  For questions, problems or results please call your physician - Og Carrera MD at Work:  [phone].  EMERGENCY PHONE NUMBER: [phone],  LAB RESULTS: [phone]  IF A COMPLICATION OR EMERGENCY SITUATION ARISES AND YOU ARE UNABLE TO REACH   YOUR PHYSICIAN - GO DIRECTLY TO THE EMERGENCY ROOM.  Og Carrera MD  10/21/2019 5:15:22 PM  This report has been verified and signed electronically.  [employer]

## 2019-10-21 NOTE — TELEPHONE ENCOUNTER
----- Message from Perri Rodriguez sent at 10/21/2019 11:16 AM CDT -----  Contact: [phone]/[facility]  Paul with pain resource management is calling about paperwork faxed to the office 10/18. Please verify if the rx will be signed by the doctor or not. Please call . thanks

## 2019-10-21 NOTE — PLAN OF CARE
Report received from Madai wisdom   Patient npo since 8:am  vss  Daughter present   Iv patent and infusing

## 2019-10-21 NOTE — PT/OT/SLP PROGRESS
Occupational Therapy  MISSED VISIT    Patient Name:  Anabella Aranda   MRN:  [id number]    Patient visit attempted at 11:05 but not seen 2/2 patient being bathed by aid. Will follow-up as time allows.    AIXA Hernandez  10/21/2019

## 2019-10-21 NOTE — PROGRESS NOTES
[facility]  Gastroenterology  Progress Note    Patient Name: Anabella Aranda  MRN: [id number]  Admission Date: 10/16/2019  Hospital Length of Stay: 5 days  Code Status: Full Code   Attending Provider: Ghassan Lindsay MD  Consulting Provider: Og Carrera MD  Primary Care Physician: Shon Brambila MD  Principal Problem: History of left knee replacement      Subjective:     Interval History:   Reviewed events over weekend.    This am, patient without complaints. States she is 'doing fine'. Denies any diarrhea.    Reviewed events overnight with nurse.  Had 1 larger bowel movement and 2 very small BM, all liquid.  No blood.  No vomiting although poor PO intake. Some abd cramping overnight.    Denies cp or sob.    Review of Systems   Constitutional: Positive for activity change and appetite change. Negative for chills and fever.   Respiratory: Negative for cough and shortness of breath.    Cardiovascular: Negative for chest pain and palpitations.   Gastrointestinal: Positive for diarrhea. Negative for abdominal distention, abdominal pain and blood in stool.     Objective:     Vital Signs (Most Recent):  Temp: 97.1 °F (36.2 °C) (10/21/19 0739)  Pulse: (!) 118 (10/21/19 0746)  Resp: 20 (10/21/19 0739)  BP: 112/60 (10/21/19 0739)  SpO2: 95 % (10/21/19 0401) Vital Signs (24h Range):  Temp:  [97.1 °F (36.2 °C)-98.6 °F (37 °C)] 97.1 °F (36.2 °C)  Pulse:  [103-143] 118  Resp:  [16-20] 20  SpO2:  [94 %-99 %] 95 %  BP: (112-123)/(56-70) 112/60     Weight: 99 kg (218 lb 4.1 oz) (10/21/19 0447)  Body mass index is 37.46 kg/m².      Intake/Output Summary (Last 24 hours) at 10/21/2019 0752  Last data filed at 10/21/2019 0601  Gross per 24 hour   Intake 3615 ml   Output 1 ml   Net 3614 ml       Lines/Drains/Airways     Peripheral Intravenous Line                 Peripheral IV - Single Lumen 10/18/19 20 G Right Antecubital 3 days                Physical Exam   Constitutional: No distress.   Pleasant and  appearing non toxic ; resting   Pulmonary/Chest: Effort normal. No respiratory distress.   Abdominal: Soft. Bowel sounds are normal. She exhibits no distension and no mass. There is no tenderness.   Neurological: She is alert.   Vitals reviewed.      Significant Labs:  Amylase: No results for input(s): AMYLASE in the last 48 hours.  Blood Culture: No results for input(s): LABBLOO in the last 48 hours.  CBC: No results for input(s): WBC, HGB, HCT, PLT in the last 48 hours.  CMP:   Recent Labs   Lab 10/19/19  1110   GLU 81   CALCIUM 8.5*   *   K 4.2   CO2 17*      BUN 27*   CREATININE 1.5*     Coagulation: No results for input(s): PT, INR, APTT in the last 48 hours.  Lipase: No results for input(s): LIPASE in the last 48 hours.      Significant Imaging:  Imaging results within the past 24 hours have been reviewed.    Assessment/Plan:     Ulcerative proctitis with complication  Hx of UC s/p colectomy with retained rectal stump, had flex sig 1/2019 showing continued inflammation of the rectal stump, otherwise the small bowel was normal. CT enterography recently showed continued inflammation of the rectal stump.    I suspect her symptoms are related to ongoing rectal inflammation, but this has been very difficult to treat as outpatient as the patient / family cannot afford topical treatments (canasa etc).    Diarrhea continues. Cdiff negative this admission.  Seems like rectal bleeding has stopped.    Recs:  Continue oral mesalamine  Continue hydrocortisone suppository  scheduled lomotil BID ; Can use immodium PRN and alternate with lomotil  Will add back imuran at 100mg daily and can titrate up (goal near 2.5 mg / kg)    Will plan Flex sig today to rule out CMV, further assess extent of colitis, rule out small bowel changes as well   - will need to ensure troponin leak is resolved  Continued management of medical comorbidites by hospital medicine.    Can consider questran powder in future vs. Course of  steroids (although reluctant to add more aggressive therapies given hx of dementia / recent surgery etc)              Thank you for your consult. I will follow-up with patient. Please contact us if you have any additional questions.    Og Carrera MD  Gastroenterology  [facility]

## 2019-10-21 NOTE — SUBJECTIVE & OBJECTIVE
Subjective:     Interval History:   Reviewed events over weekend.    This am, patient without complaints. States she is 'doing fine'. Denies any diarrhea.    Reviewed events overnight with nurse.  Had 1 larger bowel movement and 2 very small BM, all liquid.  No blood.  No vomiting although poor PO intake. Some abd cramping overnight.    Denies cp or sob.    Review of Systems   Constitutional: Positive for activity change and appetite change. Negative for chills and fever.   Respiratory: Negative for cough and shortness of breath.    Cardiovascular: Negative for chest pain and palpitations.   Gastrointestinal: Positive for diarrhea. Negative for abdominal distention, abdominal pain and blood in stool.     Objective:     Vital Signs (Most Recent):  Temp: 97.1 °F (36.2 °C) (10/21/19 0739)  Pulse: (!) 118 (10/21/19 0746)  Resp: 20 (10/21/19 0739)  BP: 112/60 (10/21/19 0739)  SpO2: 95 % (10/21/19 0401) Vital Signs (24h Range):  Temp:  [97.1 °F (36.2 °C)-98.6 °F (37 °C)] 97.1 °F (36.2 °C)  Pulse:  [103-143] 118  Resp:  [16-20] 20  SpO2:  [94 %-99 %] 95 %  BP: (112-123)/(56-70) 112/60     Weight: 99 kg (218 lb 4.1 oz) (10/21/19 0447)  Body mass index is 37.46 kg/m².      Intake/Output Summary (Last 24 hours) at 10/21/2019 0752  Last data filed at 10/21/2019 0601  Gross per 24 hour   Intake 3615 ml   Output 1 ml   Net 3614 ml       Lines/Drains/Airways     Peripheral Intravenous Line                 Peripheral IV - Single Lumen 10/18/19 20 G Right Antecubital 3 days                Physical Exam   Constitutional: No distress.   Pleasant and appearing non toxic ; resting   Pulmonary/Chest: Effort normal. No respiratory distress.   Abdominal: Soft. Bowel sounds are normal. She exhibits no distension and no mass. There is no tenderness.   Neurological: She is alert.   Vitals reviewed.      Significant Labs:  Amylase: No results for input(s): AMYLASE in the last 48 hours.  Blood Culture: No results for input(s): LABBLOO in the  last 48 hours.  CBC: No results for input(s): WBC, HGB, HCT, PLT in the last 48 hours.  CMP:   Recent Labs   Lab 10/19/19  1110   GLU 81   CALCIUM 8.5*   *   K 4.2   CO2 17*      BUN 27*   CREATININE 1.5*     Coagulation: No results for input(s): PT, INR, APTT in the last 48 hours.  Lipase: No results for input(s): LIPASE in the last 48 hours.      Significant Imaging:  Imaging results within the past 24 hours have been reviewed.

## 2019-10-21 NOTE — PLAN OF CARE
Past Medical History:   Diagnosis Date    Arthritis     C. difficile colitis 10/13/2014    4/15/2015    Chronic kidney disease, stage 3     Dementia     Hepatitis B     Hypertension     Major depression with psychotic features     Ulcerative colitis 03/2014   Recovery complete. Patient recovered to baseline. Discharge instructions reviewed with patient. VSS.

## 2019-10-21 NOTE — [employer] PATIENT INSTRUCTIONS
Discharge Summary/Instructions after an Endoscopic Procedure  Patient Name: Anabella Aranda  Patient MRN: [id number]  Patient YOB: 1945 Monday, October 21, 2019  Og Carrera MD  RESTRICTIONS:  During your procedure today, you received medications for sedation.  These   medications may affect your judgment, balance and coordination.  Therefore,   for 24 hours, you have the following restrictions:   - DO NOT drive a car, operate machinery, make legal/financial decisions,   sign important papers or drink alcohol.    ACTIVITY:  Today: no heavy lifting, straining or running due to procedural   sedation/anesthesia.  The following day: return to full activity including work.  DIET:  Eat and drink normally unless instructed otherwise.     TREATMENT FOR COMMON SIDE EFFECTS:  - Mild abdominal pain, nausea, belching, bloating or excessive gas:  rest,   eat lightly and use a heating pad.  - Sore Throat: treat with throat lozenges and/or gargle with warm salt   water.  - Because air was used during the procedure, expelling large amounts of air   from your rectum or belching is normal.  - If a bowel prep was taken, you may not have a bowel movement for 1-3 days.    This is normal.  SYMPTOMS TO WATCH FOR AND REPORT TO YOUR PHYSICIAN:  1. Abdominal pain or bloating, other than gas cramps.  2. Chest pain.  3. Back pain.  4. Signs of infection such as: chills or fever occurring within 24 hours   after the procedure.  5. Rectal bleeding, which would show as bright red, maroon, or black stools.   (A tablespoon of blood from the rectum is not serious, especially if   hemorrhoids are present.)  6. Vomiting.  7. Weakness or dizziness.  GO DIRECTLY TO THE NEAREST EMERGENCY ROOM IF YOU HAVE ANY OF THE FOLLOWING:      Difficulty breathing              Chills and/or fever over 101 F   Persistent vomiting and/or vomiting blood   Severe abdominal pain   Severe chest pain   Black, tarry stools   Bleeding- more than one  tablespoon   Any other symptom or condition that you feel may need urgent attention  Your doctor recommends these additional instructions:  If any biopsies were taken, your doctors clinic will contact you in 1 to 2   weeks with any results.  - [facility] to [facility].   - Await pathology results.   - Perform a flexible sigmoidoscopy today.  For questions, problems or results please call your physician - Og Carrera MD at Work:  [phone].  EMERGENCY PHONE NUMBER: [phone],  LAB RESULTS: [phone]  IF A COMPLICATION OR EMERGENCY SITUATION ARISES AND YOU ARE UNABLE TO REACH   YOUR PHYSICIAN - GO DIRECTLY TO THE EMERGENCY ROOM.  Og Carrera MD  10/21/2019 5:20:31 PM  This report has been verified and signed electronically.  [employer]

## 2019-10-21 NOTE — PT/OT/SLP PROGRESS
Physical Therapy Treatment    Patient Name:  Anabella Aranda   MRN:  [id number]    Recommendations:     Discharge Recommendations:  home with home health, nursing facility, skilled   Discharge Equipment Recommendations: none   Barriers to discharge: pt with decreased mobility,endurance and strength    Assessment:     Anabella Aranda is a 74 y.o. female admitted with a medical diagnosis of History of left knee replacement.  She presents with the following impairments/functional limitations:  weakness, impaired endurance, impaired functional mobilty, gait instability, impaired balance, impaired cognition, decreased lower extremity function, decreased safety awareness, decreased ROM, impaired coordination, impaired skin, orthopedic precautions,pt requested commode again in PM and requires assistance with all mobility,pt able to take some steps this PM and will benefit from continuing PT services upon discharge.    Rehab Prognosis: Fair; patient would benefit from acute skilled PT services to address these deficits and reach maximum level of function.    Recent Surgery: Procedure(s) (LRB):  ARTHROPLASTY, KNEE (Left) 5 Days Post-Op    Plan:     During this hospitalization, patient to be seen BID to address the identified rehab impairments via gait training, therapeutic activities, therapeutic exercises and progress toward the following goals:    · Plan of Care Expires:  11/18/19    Subjective     Chief Complaint: n/a  Patient/Family Comments/goals: pt wants some cold water.  Pain/Comfort:  · Pain Rating 1: (no c/o's)      Objective:     Communicated with nsg prior to session.  Patient found supine with bed alarm, peripheral IV, telemetry upon PT entry to room.     General Precautions: Standard, fall   Orthopedic Precautions:LLE weight bearing as tolerated   Braces: N/A     Functional Mobility:  · Bed Mobility:     · Supine to Sit: minimum assistance  · Sit to Supine: minimum assistance  · Transfers:     · Sit to  Stand:  minimum assistance with rolling walker  · Toilet Transfer: minimum assistance with  rolling walker  using  Step Transfer  · Gait: amb ~8' X 1 with RW and Min A with v/c's,IV in tow  · Balance: fair standing balance with RW      AM-PAC 6 CLICK MOBILITY  Turning over in bed (including adjusting bedclothes, sheets and blankets)?: 3  Sitting down on and standing up from a chair with arms (e.g., wheelchair, bedside commode, etc.): 3  Moving from lying on back to sitting on the side of the bed?: 3  Moving to and from a bed to a chair (including a wheelchair)?: 3  Need to walk in hospital room?: 3  Climbing 3-5 steps with a railing?: 2  Basic Mobility Total Score: 17       Therapeutic Activities and Exercises: le supine ex's X 15 reps inc: ap,qs,hs,abd/add,slr with assistance on L,AA L knee flexion,pt stood inside RW with CGA while pct cleaned pt from BM.       Patient left supine with all lines intact, call button in reach, bed alarm on and son present..    GOALS: see general POC  Multidisciplinary Problems     Physical Therapy Goals        Problem: Physical Therapy Goal    Goal Priority Disciplines Outcome Goal Variances Interventions   Physical Therapy Goal     PT, PT/OT Ongoing, Progressing     Description:  1.  Supine to/from sit with with supervision  2.  Bed to/from chair with RW with supervision  3.  Ambulate 50' with RW with supervision  4.  Tolerate sitting 2 hours                    Time Tracking:     PT Received On: 10/21/19  PT Start Time: 1216     PT Stop Time: 1255  PT Total Time (min): 39 min     Billable Minutes: Gait Training 15, Therapeutic Activity 13 and Therapeutic Exercise 11    Treatment Type: Treatment  PT/PTA: PTA     PTA Visit Number: 3     Kris Henderson, PTA  10/21/2019

## 2019-10-21 NOTE — PLAN OF CARE
IV fluids infusing per MAR. Patient c/o abd cramping  scheduled medications given per MD orders. Safety maintain Diarrhea continued throughout  shift. Safety maintained.

## 2019-10-21 NOTE — PROGRESS NOTES
[facility]  Orthopedics  Progress Note    Patient Name: Anabella Aranda  MRN: [id number]  Admission Date: 10/16/2019  Hospital Length of Stay: 5 days  Attending Provider: Ghassan Lindsay MD  Primary Care Provider: Shon Brambila MD  Follow-up For: Procedure(s) (LRB):  ARTHROPLASTY, KNEE (Left)    Post-Operative Day: 5 Days Post-Op  Subjective:     Principal Problem:History of left knee replacement    Principal Orthopedic Problem: same    Interval History: Pt resting in bed. She has no complaints at all. Son reports no complaints of knee pain. Still having frequent watery BMs; neg for Cdiff. She is going for flex sig with GI today. Pt renal fx has improved. Cognition at baseline.     Spoke with PT (Kris Henderson) who states pt has no pain/difficutly with therapy but is most limited by cognition.    Review of patient's allergies indicates:   Allergen Reactions    Sulfa (sulfonamide antibiotics) Swelling    Tomato (solanum lycopersicum) Other (See Comments)       Current Facility-Administered Medications   Medication    acetaminophen tablet 1,000 mg    aspirin EC tablet 81 mg    azaTHIOprine tablet 100 mg    calcium carbonate 200 mg calcium (500 mg) chewable tablet 500 mg    diphenoxylate-atropine 2.5-0.025 mg per tablet 1 tablet    donepezil tablet 10 mg    ferrous sulfate EC tablet 325 mg    hydrocortisone suppository 25 mg    HYDROmorphone injection 0.2 mg    lactated ringers infusion    loperamide capsule 4 mg    memantine tablet 10 mg    mesalamine CR capsule 1,500 mg    ondansetron disintegrating tablet 4 mg    ondansetron injection 4 mg    oxyCODONE immediate release tablet 5 mg    pantoprazole EC tablet 40 mg    QUEtiapine tablet 100 mg    sodium chloride 0.9% flush 10 mL     Objective:     Vital Signs (Most Recent):  Temp: 98.5 °F (36.9 °C) (10/21/19 1218)  Pulse: 102 (10/21/19 1218)  Resp: 20 (10/21/19 1218)  BP: (!) 134/59 (10/21/19 1218)  SpO2: (!) 94 % (10/21/19  "1656) Vital Signs (24h Range):  Temp:  [97.1 °F (36.2 °C)-98.6 °F (37 °C)] 98.5 °F (36.9 °C)  Pulse:  [102-143] 102  Resp:  [16-20] 20  SpO2:  [94 %-96 %] 94 %  BP: (112-134)/(56-70) 134/59     Weight: 99 kg (218 lb 4.1 oz)  Height: 5' 4" (162.6 cm)  Body mass index is 37.46 kg/m².      Intake/Output Summary (Last 24 hours) at 10/21/2019 1337  Last data filed at 10/21/2019 0601  Gross per 24 hour   Intake 3615 ml   Output --   Net 3615 ml       General    Nursing note and vitals reviewed.  Constitutional: She appears well-developed and well-nourished.   HENT:   Head: Atraumatic.   Pulmonary/Chest: Effort normal.   Neurological: She is alert.           LEFT LOWER EXTREMITY:   Bandage in tact. Moderate amount of bloody drainage.   No redness or sign of infection.   No significant swelling.   Negative homans.  Wiggle toes well.  PROM 75 deg flexion, 0 deg extension.     Significant Labs:   CBC:   Recent Labs   Lab 10/21/19  1209   WBC 7.81   HGB 10.7*   HCT 32.0*   *     CMP:   Recent Labs   Lab 10/21/19  1209      K 4.2      CO2 18*   GLU 55*   BUN 18   CREATININE 1.0   CALCIUM 8.6*   ANIONGAP 17*   EGFRNONAA 56*     All pertinent labs within the past 24 hours have been reviewed.    Significant Imaging: I have reviewed all pertinent imaging results/findings.    Assessment/Plan:     Active Diagnoses:    Diagnosis Date Noted POA    PRINCIPAL PROBLEM:  History of left knee replacement [Z96.652] 10/16/2019 Not Applicable    Acute renal failure superimposed on stage 3 chronic kidney disease [N17.9, N18.3] 10/16/2019 Yes    Dementia [F03.90]  Yes     Chronic    Major depression with psychotic features [F32.3] 04/30/2018 Yes     Chronic    Morbid obesity [E66.01] 10/17/2017 Yes     Chronic    Ulcerative proctitis with complication [K51.219] 01/11/2017 Yes     Chronic    History of total colectomy [Z90.49] 06/24/2016 Not Applicable     Chronic    Iron deficiency anemia [D50.9] 09/09/2015 Yes     " Chronic    Venous insufficiency of both lower extremities [I87.2] 08/26/2015 Yes     Chronic      Problems Resolved During this Admission:    Diagnosis Date Noted Date Resolved POA    Primary osteoarthritis of left knee [M17.12] 10/16/2019 10/17/2019 Yes    Chronic pain of both knees [M25.561, M25.562, G89.29] 08/14/2019 10/17/2019 Yes     Chronic     Continue PT/OT.  Will change bandage before d/c.    Continue management of medical comorbidites by hospital medicine -- Cr normalized and BP stable. Cognition at baseline.     Pt going for flex sig today.    Pt is ready for d/c to [facility] from ortho standpoint.   Will look for reccomendations from hospital medicine and GI regarding d/c.    KEVIN YeagerC  Orthopedics  [facility]

## 2019-10-21 NOTE — PLAN OF CARE
Cued into patient's room.  Permission received per patient to turn camera to view patient.  Introduced as VN for night shift that will be working with floor nurse and nursing assistant.  Family member at bedside.  Educated patient on VN's role in patient care. Plan of care reviewed with patient.  Education per flowsheet.  Opportunity given for questions and questions answered.  C/o abd cramping; NP Dunia notified.  Instructed to call for assistance.  Will cont to monitor.    Labs, notes, and orders reviewed.

## 2019-10-21 NOTE — PLAN OF CARE
Dr. Carrera visited at bedside, discussed findings and recommendations with  family member; all questions asked and answered. Verbalized understanding of information given. Procedure reports provided for personal records.

## 2019-10-21 NOTE — PLAN OF CARE
TN met with pt's son Bryson for continued discharge planning. Pt currently working with PT at this time  Pt to transfer to [facility] when medically stable; pt going for flex sig today. Pt's son verbalized understanding of anticipated discharge plan.        10/21/19 1255   Discharge Reassessment   Assessment Type Discharge Planning Reassessment   Provided patient/caregiver education on the expected discharge date and the discharge plan Yes   Do you have any problems affording any of your prescribed medications? No   Discharge Plan A Rehab   Discharge Plan B Skilled Nursing Facility   DME Needed Upon Discharge  none   Patient choice form signed by patient/caregiver No   Anticipated Discharge Disposition Rehab

## 2019-10-21 NOTE — PLAN OF CARE
Report cvalled to nurse on unit. Patient alert and talkative, son at bedside. Transported back to  509 on stretcher.

## 2019-10-21 NOTE — SUBJECTIVE & OBJECTIVE
Past Medical History:   Diagnosis Date    Arthritis     C. difficile colitis 10/13/2014    4/15/2015    Chronic kidney disease, stage 3     Dementia     Hepatitis B     Hypertension     Major depression with psychotic features     Ulcerative colitis 03/2014       Past Surgical History:   Procedure Laterality Date    CHOLECYSTECTOMY      COLONOSCOPY N/A 4/29/2016    Procedure: COLONOSCOPY;  Surgeon: Umm Arenas MD;  Location: [facility] ENDO;  Service: Endoscopy;  Laterality: N/A;    ESOPHAGOGASTRODUODENOSCOPY N/A 1/25/2019    Procedure: ESOPHAGOGASTRODUODENOSCOPY (EGD);  Surgeon: Jenise Hallman MD;  Location: [facility] ENDO;  Service: Endoscopy;  Laterality: N/A;    FLEXIBLE SIGMOIDOSCOPY N/A 1/25/2019    Procedure: SIGMOIDOSCOPY, FLEXIBLE;  Surgeon: Jenise Hallman MD;  Location: [facility] ENDO;  Service: Endoscopy;  Laterality: N/A;    BETZY FILTER PLACEMENT Right 01/2014    KNEE ARTHROPLASTY Left 10/16/2019    Procedure: ARTHROPLASTY, KNEE;  Surgeon: Ghassan Lindsay MD;  Location: [facility] OR;  Service: Orthopedics;  Laterality: Left;  Franco notifiedconfirmed with Franco  [phone] 10/15/19 kb    LAPAROSCOPIC TOTAL COLECTOMY  06/24/2016    NASAL SINUS SURGERY      TONSILLECTOMY      TOTAL KNEE ARTHROPLASTY Right 04/07/2008    TOTAL KNEE ARTHROPLASTY Left 10/16/2019       Review of patient's allergies indicates:   Allergen Reactions    Sulfa (sulfonamide antibiotics) Swelling    Tomato (solanum lycopersicum) Other (See Comments)       No current facility-administered medications on file prior to encounter.      Current Outpatient Medications on File Prior to Encounter   Medication Sig    calcium carbonate (OS-NELSY) 600 mg (1,500 mg) Tab Take 1 tablet (600 mg total) by mouth 2 (two) times daily with meals.    diclofenac sodium (VOLTAREN) 1 % Gel Apply 2 g topically once daily.    ergocalciferol (ERGOCALCIFEROL) 50,000 unit Cap Take 1 capsule (50,000 Units total) by mouth every 7  days.    ferrous sulfate 325 mg (65 mg iron) Tab tablet Take 1 tablet (325 mg total) by mouth 2 (two) times daily.    loperamide (IMODIUM A-D) 2 mg Tab Take 1 mg by mouth 2 (two) times daily before meals.    MULTIVITS W-FE,OTHER MIN/LUT (CENTRUM SILVER ULTRA WOMEN'S ORAL) Take by mouth.    QUEtiapine (SEROQUEL) 100 MG Tab TAKE 1 2 TABLET BY MOUTH EVERY NIGHT AT BEDTIME, THEN TAKE ONE TABLET BY MOUTH EVERY NIGHT AT BEDTIME THEREAFTER     Family History     Problem Relation (Age of Onset)    Colon cancer Paternal Grandmother    Throat cancer Father        Tobacco Use    Smoking status: Former Smoker     Types: Cigarettes     Last attempt to quit: 1997     Years since quittin.2    Smokeless tobacco: Former User   Substance and Sexual Activity    Alcohol use: No    Drug use: No    Sexual activity: Not Currently     Review of Systems   Constitutional: Negative for activity change, fatigue and fever.   Respiratory: Negative for apnea, cough and shortness of breath.    Cardiovascular: Negative for chest pain and palpitations.   Gastrointestinal: Positive for diarrhea. Negative for abdominal distention, nausea and vomiting.   Genitourinary: Negative for difficulty urinating and hematuria.   Musculoskeletal: Positive for arthralgias.   Neurological: Negative for dizziness and numbness.   Psychiatric/Behavioral: Negative for agitation. The patient is not nervous/anxious.      Objective:     Vital Signs (Most Recent):  Temp: 98.5 °F (36.9 °C) (10/21/19 1218)  Pulse: 102 (10/21/19 1218)  Resp: 20 (10/21/19 1218)  BP: (!) 134/59 (10/21/19 1218)  SpO2: (!) 94 % (10/21/19 0853) Vital Signs (24h Range):  Temp:  [97.1 °F (36.2 °C)-98.6 °F (37 °C)] 98.5 °F (36.9 °C)  Pulse:  [102-143] 102  Resp:  [16-20] 20  SpO2:  [94 %-96 %] 94 %  BP: (112-134)/(56-70) 134/59     Weight: 99 kg (218 lb 4.1 oz)  Body mass index is 37.46 kg/m².    Physical Exam   Constitutional: She appears well-nourished.   HENT:   Head:  Normocephalic and atraumatic.   Pulmonary/Chest: No respiratory distress.   Neurological: She is alert.   Vitals reviewed.      Significant Labs:   Recent Labs   Lab 10/16/19  1950 10/17/19  0341 10/18/19  0611 10/21/19  1209   WBC 9.47  --   --  7.81   HGB 10.9* 10.2* 11.3* 10.7*   HCT 32.0* 29.9* 32.6* 32.0*     --   --  362*     Recent Labs   Lab 10/18/19  0452 10/19/19  1110 10/21/19  1209   * 134* 137   K 4.3 4.2 4.2    103 102   CO2 18* 17* 18*   BUN 36* 27* 18   CREATININE 2.7* 1.5* 1.0    81 55*   CALCIUM 8.3* 8.5* 8.6*     Recent Labs   Lab 10/16/19  1950 10/17/19  0341   ALKPHOS 103  --    ALT 12  --    AST 14  --    ALBUMIN 2.6* 2.4*   PROT 6.8  --    BILITOT 0.3  --    INR 1.1  --       Recent Labs     10/19/19  1110   TROPONINI 0.090*     No results for input(s): POCTGLUCOSE in the last 168 hours.  Hemoglobin A1C   Date Value Ref Range Status   02/04/2019 5.0 4.0 - 5.6 % Final     Comment:     ADA Screening Guidelines:  5.7-6.4%  Consistent with prediabetes  >or=6.5%  Consistent with diabetes  High levels of fetal hemoglobin interfere with the HbA1C  assay. Heterozygous hemoglobin variants (HbS, HgC, etc)do  not significantly interfere with this assay.   However, presence of multiple variants may affect accuracy.     04/23/2018 5.0 4.0 - 5.6 % Final     Comment:     According to ADA guidelines, hemoglobin A1c <7.0% represents  optimal control in non-pregnant diabetic patients. Different  metrics may apply to specific patient populations.   Standards of Medical Care in Diabetes-2016.  For the purpose of screening for the presence of diabetes:  <5.7%     Consistent with the absence of diabetes  5.7-6.4%  Consistent with increasing risk for diabetes   (prediabetes)  >or=6.5%  Consistent with diabetes  Currently, no consensus exists for use of hemoglobin A1c  for diagnosis of diabetes for children.  This Hemoglobin A1c assay has significant interference with fetal   hemoglobin    (HbF). The results are invalid for patients with abnormal amounts of   HbF,   including those with known Hereditary Persistence   of Fetal Hemoglobin. Heterozygous hemoglobin variants (HbAS, HbAC,   HbAD, HbAE, HbA2) do not significantly interfere with this assay;   however, presence of multiple variants in a sample may impact the %   interference.     01/30/2018 5.0 4.0 - 5.6 % Final     Comment:     According to ADA guidelines, hemoglobin A1c <7.0% represents  optimal control in non-pregnant diabetic patients. Different  metrics may apply to specific patient populations.   Standards of Medical Care in Diabetes-2016.  For the purpose of screening for the presence of diabetes:  <5.7%     Consistent with the absence of diabetes  5.7-6.4%  Consistent with increasing risk for diabetes   (prediabetes)  >or=6.5%  Consistent with diabetes  Currently, no consensus exists for use of hemoglobin A1c  for diagnosis of diabetes for children.  This Hemoglobin A1c assay has significant interference with fetal   hemoglobin   (HbF). The results are invalid for patients with abnormal amounts of   HbF,   including those with known Hereditary Persistence   of Fetal Hemoglobin. Heterozygous hemoglobin variants (HbAS, HbAC,   HbAD, HbAE, HbA2) do not significantly interfere with this assay;   however, presence of multiple variants in a sample may impact the %   interference.       Scheduled Meds:   acetaminophen  1,000 mg Oral TID    aspirin  81 mg Oral Daily    azaTHIOprine  100 mg Oral Daily    calcium carbonate  500 mg Oral BID    diphenoxylate-atropine 2.5-0.025 mg  1 tablet Oral BID    donepezil  10 mg Oral QHS    ferrous sulfate  325 mg Oral BID    hydrocortisone  25 mg Rectal BID    memantine  10 mg Oral BID    mesalamine  1,500 mg Oral Daily    pantoprazole  40 mg Oral Daily    QUEtiapine  100 mg Oral QHS     Continuous Infusions:   lactated ringers 125 mL/hr at 10/21/19 1427     As Needed: HYDROmorphone,  loperamide, ondansetron, ondansetron, oxyCODONE, sodium chloride 0.9%      Significant Imaging:   X-Ray Chest AP Portable  Narrative: EXAMINATION:  XR CHEST AP PORTABLE    CLINICAL HISTORY:  snf;    TECHNIQUE:  Single frontal view of the chest was performed.    COMPARISON:  August 20, 2014    FINDINGS:  Single portable chest view is submitted.  Mild diminished depth of inspiration noted, the cardiomediastinal silhouette appears stable.  Minimal atelectatic change noted at the lung bases there is no evidence for confluent infiltrate or consolidation, significant pleural effusion or pneumothorax.  The osseous structures demonstrate chronic change.  Impression: Minimal atelectatic change noted at the lung bases, there is no additional radiographic evidence for acute intrathoracic process.    Electronically signed by: Hiren Delgado  Date:    10/18/2019  Time:    00:56

## 2019-10-21 NOTE — PT/OT/SLP PROGRESS
Physical Therapy Treatment    Patient Name:  Anabella Aranda   MRN:  [id number]    Recommendations:     Discharge Recommendations:  home with home health, nursing facility, skilled   Discharge Equipment Recommendations: none   Barriers to discharge: decreased mobility,strength and endurance    Assessment:     Anabella Aranda is a 74 y.o. female admitted with a medical diagnosis of History of left knee replacement.  She presents with the following impairments/functional limitations:  weakness, impaired endurance, impaired functional mobilty, gait instability, impaired balance, impaired cognition, decreased lower extremity function, decreased safety awareness, decreased ROM, impaired coordination, impaired skin, orthopedic precautions,pt with good participation and requires assistance with all mobility,pt remains with decreased strength and mobility and will benefit from continuing PT services upon discharge  .    Rehab Prognosis: Fair; patient would benefit from acute skilled PT services to address these deficits and reach maximum level of function.    Recent Surgery: Procedure(s) (LRB):  ARTHROPLASTY, KNEE (Left) 5 Days Post-Op    Plan:     During this hospitalization, patient to be seen BID to address the identified rehab impairments via gait training, therapeutic activities, therapeutic exercises and progress toward the following goals:    · Plan of Care Expires:  11/18/19    Subjective     Chief Complaint: n/a  Patient/Family Comments/goals: pt requested to use commode.  Pain/Comfort:  · Pain Rating 1: (no c/o's)      Objective:     Communicated with nsg prior to session.  Patient found supine with bed alarm, telemetry, peripheral IV upon PT entry to room.     General Precautions: Standard, fall   Orthopedic Precautions:LLE weight bearing as tolerated   Braces: N/A     Functional Mobility:  · Bed Mobility:     · Supine to Sit: minimum assistance  · Sit to Supine: minimum assistance  · Transfers:     · Sit to  Stand:  minimum assistance with rolling walker  · Toilet Transfer: minimum assistance with  rolling walker  using  Step Transfer  · Balance: fair standing balance with RW      AM-PAC 6 CLICK MOBILITY  Turning over in bed (including adjusting bedclothes, sheets and blankets)?: 3  Sitting down on and standing up from a chair with arms (e.g., wheelchair, bedside commode, etc.): 3  Moving from lying on back to sitting on the side of the bed?: 3  Need to walk in hospital room?: 3  Climbing 3-5 steps with a railing?: 2       Therapeutic Activities and Exercises: le supine ex's X 15 reps inc: ap,qs,hs,abd/add,slr with assistance on L.       Patient left supine with all lines intact, call button in reach, bed alarm on and nsg notified..    GOALS: see general POC  Multidisciplinary Problems     Physical Therapy Goals        Problem: Physical Therapy Goal    Goal Priority Disciplines Outcome Goal Variances Interventions   Physical Therapy Goal     PT, PT/OT Ongoing, Progressing     Description:  1.  Supine to/from sit with with supervision  2.  Bed to/from chair with RW with supervision  3.  Ambulate 50' with RW with supervision  4.  Tolerate sitting 2 hours                    Time Tracking:     PT Received On: 10/21/19  PT Start Time: 0818     PT Stop Time: 0845  PT Total Time (min): 27 min     Billable Minutes: Therapeutic Activity 17 and Therapeutic Exercise 10    Treatment Type: Treatment  PT/PTA: PTA     PTA Visit Number: 3     Kris Henderson, PTA  10/21/2019

## 2019-10-21 NOTE — PLAN OF CARE
Problem: Physical Therapy Goal  Goal: Physical Therapy Goal  Description  1.  Supine to/from sit with with supervision  2.  Bed to/from chair with RW with supervision  3.  Ambulate 50' with RW with supervision  4.  Tolerate sitting 2 hours   Outcome: Ongoing, Progressing   Goals ongoing

## 2019-10-21 NOTE — PT/OT/SLP PROGRESS
Occupational Therapy   Treatment    Name: Anabella Aranda  MRN: [id number]  Admitting Diagnosis:  History of left knee replacement  5 Days Post-Op    Recommendations:     Discharge Recommendations: (HHOT/PT vs SNF)  Discharge Equipment Recommendations:  none  Barriers to discharge:  None    Assessment:     Anabella Aranda is a 74 y.o. female with a medical diagnosis of History of left knee replacement.  She presents with deconditioning, limited tolerance for therapies today 2/2 c/o dizziness upon t/f to [facility]  and headache after return to bed. Performance deficits affecting function are weakness, impaired self care skills, impaired balance, decreased coordination, decreased safety awareness, decreased ROM, decreased upper extremity function, decreased lower extremity function, impaired cognition, gait instability, impaired endurance, impaired functional mobilty, impaired coordination, impaired skin, orthopedic precautions.     Rehab Prognosis:  Good; patient would benefit from acute skilled OT services to address these deficits and reach maximum level of function.       Plan:     Patient to be seen 5 x/week to address the above listed problems via self-care/home management, therapeutic activities, therapeutic exercises  · Plan of Care Expires: 11/17/19  · Plan of Care Reviewed with: patient, son    Subjective     Pain/Comfort:  · Pain Rating 1: 0/10    Objective:     Communicated with: nsg prior to session.  Patient found HOB elevated with peripheral IV, telemetry upon OT entry to room.    General Precautions: Standard, fall   Orthopedic Precautions:LLE weight bearing as tolerated   Braces: N/A     Occupational Performance:     Bed Mobility:    · Patient completed Rolling/Turning to Left with  stand by assistance  · Patient completed Scooting/Bridging with stand by assistance  · Patient completed Supine to Sit with stand by assistance  · Patient completed Sit to Supine with stand by assistance     Functional  Mobility/Transfers:  · Patient completed Sit <> Stand Transfer with minimum assistance  with  rolling walker   · Patient completed Toilet Transfer Step Transfer technique with contact guard assistance with  rolling walker  Functional Mobility: Pt with fair dynamic seated and standing balance.    Activities of Daily Living:  · Lower Body Dressing: minimum assistance total A to don/doff L sock but able to don/doff R sock seated EOB with use of side seated modified figure of four technique  · Toileting: minimum assistance for clothing management      [facility] 6 Click ADL: 19    Treatment & Education:  Pt educated on role of OT and POC.   Pt performing skills as listed above.  Pt c/o dizziness seated on BSC after ~3 minutes and was assisted back to bed. Pt stated decreased dizziness but c/o headache with HOB elevated. Nsg notified of watery green stool, dizziness, and headache and entered. Nurse assessed BP, 122/6 .     Patient left HOB elevated with all lines intact, call button in reach, bed alarm on, nsg notified and nsg and son presentEducation:      GOALS:   Multidisciplinary Problems     Occupational Therapy Goals        Problem: Occupational Therapy Goal    Goal Priority Disciplines Outcome Interventions   Occupational Therapy Goal     OT, PT/OT Ongoing, Progressing    Description:  Goals to be met by: 11/17     Patient will increase functional independence with ADLs by performing:    UE Dressing with Supervision.  LE Dressing with Supervision.  Grooming while standing with Supervision.  Toileting from toilet with Supervision for hygiene and clothing management.   Toilet transfer to toilet with Supervision.  Upper extremity exercise program x10 reps per handout, with assistance as needed.---MET 10/18/2019                       Time Tracking:     OT Date of Treatment: 10/21/19  OT Start Time: 1418  OT Stop Time: 1445  OT Total Time (min): 27 min    Billable Minutes:Self Care/Home Management 27    Sumaya  Jamar OT  10/21/2019

## 2019-10-21 NOTE — ANESTHESIA PREPROCEDURE EVALUATION
10/21/2019  Anabella Aranda is a 74 y.o., female hx Alzheimers dementia, CKD3, HTN, ulcerative colitis scheduled for EGD/sigmoidoscopy. Underwent left TKA under spinal/MAC with precedex infusion on 10/16/19 with postoperative hypotension requiring prolonged phenylephrine gtt and ICU admission    Past Medical History:   Diagnosis Date    Arthritis     C. difficile colitis 10/13/2014    4/15/2015    Chronic kidney disease, stage 3     Dementia     Hepatitis B     Hypertension     Major depression with psychotic features     Ulcerative colitis 03/2014     Past Surgical History:   Procedure Laterality Date    CHOLECYSTECTOMY      COLONOSCOPY N/A 4/29/2016    Procedure: COLONOSCOPY;  Surgeon: Umm Arenas MD;  Location: [facility] ENDO;  Service: Endoscopy;  Laterality: N/A;    ESOPHAGOGASTRODUODENOSCOPY N/A 1/25/2019    Procedure: ESOPHAGOGASTRODUODENOSCOPY (EGD);  Surgeon: Jenise Hallman MD;  Location: [facility] ENDO;  Service: Endoscopy;  Laterality: N/A;    FLEXIBLE SIGMOIDOSCOPY N/A 1/25/2019    Procedure: SIGMOIDOSCOPY, FLEXIBLE;  Surgeon: Jenise Hallman MD;  Location: [facility] ENDO;  Service: Endoscopy;  Laterality: N/A;    BETZY FILTER PLACEMENT Right 01/2014    KNEE ARTHROPLASTY Left 10/16/2019    Procedure: ARTHROPLASTY, KNEE;  Surgeon: Ghassan Lindsay MD;  Location: [facility] OR;  Service: Orthopedics;  Laterality: Left;  Franco notifiedconfirmed with Franco  [phone] 10/15/19 kb    LAPAROSCOPIC TOTAL COLECTOMY  06/24/2016    NASAL SINUS SURGERY      TONSILLECTOMY      TOTAL KNEE ARTHROPLASTY Right 04/07/2008    TOTAL KNEE ARTHROPLASTY Left 10/16/2019       Pre-op Assessment    I have reviewed the Patient Summary Reports.     I have reviewed the Nursing Notes.   I have reviewed the Medications.     Review of Systems  Anesthesia Hx:  No problems with previous Anesthesia   Denies Family Hx of Anesthesia complications.    Social:  Non-Smoker, No Alcohol Use    Hematology/Oncology:  Hematology Normal        Cardiovascular:   Hypertension, well controlled  Deep Venous Thrombosis (DVT) (5 yrs ago s/p IVC filter)    Pulmonary:  Pulmonary Normal    Renal/:   Chronic Renal Disease (CKD)    Hepatic/GI:   Hiatal Hernia, GERD  Bowel Conditions:  Inflammatory Bowel Disease, Ulcerative Colitis    Neurological:  Dementia    Endocrine:  Endocrine Normal    Psych:   alzheimers dementia         Physical Exam  General:  Obesity    Airway/Jaw/Neck:  Airway Findings: Mouth Opening: Normal Tongue: Normal  General Airway Assessment: Adult  Mallampati: III      Dental:  DENTAL FINDINGS: Normal   Chest/Lungs:  Chest/Lungs Findings: Clear to auscultation, Normal Respiratory Rate     Heart/Vascular:  Heart Findings: Rate: Tachycardia  Rhythm: Regular Rhythm  Sounds: Normal        Mental Status:  Mental Status Findings:  Cooperative, Alert and Oriented       Lab Results   Component Value Date    WBC 7.81 10/21/2019    HGB 10.7 (L) 10/21/2019    HCT 32.0 (L) 10/21/2019     (H) 10/21/2019    CHOL 223 (H) 08/10/2019    TRIG 79 08/10/2019    HDL 62 08/10/2019    ALT 12 10/16/2019    AST 14 10/16/2019     10/21/2019    K 4.2 10/21/2019     10/21/2019    CREATININE 1.0 10/21/2019    BUN 18 10/21/2019    CO2 18 (L) 10/21/2019    TSH 1.941 02/04/2019    INR 1.1 10/16/2019    HGBA1C 5.0 02/04/2019         Anesthesia Plan  Type of Anesthesia, risks & benefits discussed:  Anesthesia Type:  MAC, general  Patient's Preference:   Intra-op Monitoring Plan: standard ASA monitors  Intra-op Monitoring Plan Comments:   Post Op Pain Control Plan: per primary service following discharge from PACU  Post Op Pain Control Plan Comments:   Induction:   IV  Beta Blocker:  Patient is not currently on a Beta-Blocker (No further documentation required).       Informed Consent: Patient representative understands risks  and agrees with Anesthesia plan.  Questions answered. Anesthesia consent signed with patient representative.  ASA Score: 3     Day of Surgery Review of History & Physical: I have interviewed and examined the patient. I have reviewed the patient's H&P dated:        Anesthesia Plan Notes: Phone consent obtained from kate Cox [phone] with two witnesses        Ready For Surgery From Anesthesia Perspective.

## 2019-10-22 LAB
ALBUMIN SERPL BCP-MCNC: 1.8 G/DL (ref 3.5–5.2)
ALP SERPL-CCNC: 99 U/L (ref 55–135)
ALT SERPL W/O P-5'-P-CCNC: 5 U/L (ref 10–44)
ANION GAP SERPL CALC-SCNC: 17 MMOL/L (ref 8–16)
AST SERPL-CCNC: 18 U/L (ref 10–40)
BILIRUB SERPL-MCNC: 0.6 MG/DL (ref 0.1–1)
BUN SERPL-MCNC: 17 MG/DL (ref 8–23)
CALCIUM SERPL-MCNC: 8.2 MG/DL (ref 8.7–10.5)
CHLORIDE SERPL-SCNC: 103 MMOL/L (ref 95–110)
CO2 SERPL-SCNC: 17 MMOL/L (ref 23–29)
CREAT SERPL-MCNC: 1.1 MG/DL (ref 0.5–1.4)
EST. GFR  (AFRICAN AMERICAN): 58 ML/MIN/1.73 M^2
EST. GFR  (NON AFRICAN AMERICAN): 50 ML/MIN/1.73 M^2
GLUCOSE SERPL-MCNC: 54 MG/DL (ref 70–110)
POCT GLUCOSE: 100 MG/DL (ref 70–110)
POCT GLUCOSE: 54 MG/DL (ref 70–110)
POTASSIUM SERPL-SCNC: 4.1 MMOL/L (ref 3.5–5.1)
PROT SERPL-MCNC: 6.1 G/DL (ref 6–8.4)
SODIUM SERPL-SCNC: 137 MMOL/L (ref 136–145)

## 2019-10-22 PROCEDURE — 97116 GAIT TRAINING THERAPY: CPT

## 2019-10-22 PROCEDURE — 94761 N-INVAS EAR/PLS OXIMETRY MLT: CPT

## 2019-10-22 PROCEDURE — 25000003 PHARM REV CODE 250: Performed by: INTERNAL MEDICINE

## 2019-10-22 PROCEDURE — 97530 THERAPEUTIC ACTIVITIES: CPT

## 2019-10-22 PROCEDURE — 36415 COLL VENOUS BLD VENIPUNCTURE: CPT

## 2019-10-22 PROCEDURE — 99232 PR SUBSEQUENT HOSPITAL CARE,LEVL II: ICD-10-PCS | Mod: ,,, | Performed by: INTERNAL MEDICINE

## 2019-10-22 PROCEDURE — 21400001 HC TELEMETRY ROOM

## 2019-10-22 PROCEDURE — 80053 COMPREHEN METABOLIC PANEL: CPT

## 2019-10-22 PROCEDURE — 25000003 PHARM REV CODE 250: Performed by: ORTHOPAEDIC SURGERY

## 2019-10-22 PROCEDURE — 63600175 PHARM REV CODE 636 W HCPCS: Performed by: ANESTHESIOLOGY

## 2019-10-22 PROCEDURE — 99232 SBSQ HOSP IP/OBS MODERATE 35: CPT | Mod: ,,, | Performed by: INTERNAL MEDICINE

## 2019-10-22 RX ORDER — PANTOPRAZOLE SODIUM 40 MG/1
40 TABLET, DELAYED RELEASE ORAL 2 TIMES DAILY
Status: DISCONTINUED | OUTPATIENT
Start: 2019-10-22 | End: 2019-10-29 | Stop reason: HOSPADM

## 2019-10-22 RX ADMIN — PANTOPRAZOLE SODIUM 40 MG: 40 TABLET, DELAYED RELEASE ORAL at 08:10

## 2019-10-22 RX ADMIN — ACETAMINOPHEN 1000 MG: 500 TABLET ORAL at 03:10

## 2019-10-22 RX ADMIN — DONEPEZIL HYDROCHLORIDE 10 MG: 5 TABLET, FILM COATED ORAL at 09:10

## 2019-10-22 RX ADMIN — FERROUS SULFATE TAB EC 325 MG (65 MG FE EQUIVALENT) 325 MG: 325 (65 FE) TABLET DELAYED RESPONSE at 09:10

## 2019-10-22 RX ADMIN — METHOTREXATE 1 TABLET: 2.5 TABLET ORAL at 09:10

## 2019-10-22 RX ADMIN — LOPERAMIDE HYDROCHLORIDE 4 MG: 2 CAPSULE ORAL at 09:10

## 2019-10-22 RX ADMIN — CALCIUM CARBONATE 500 MG: 500 TABLET, CHEWABLE ORAL at 09:10

## 2019-10-22 RX ADMIN — OXYCODONE HYDROCHLORIDE 5 MG: 5 TABLET ORAL at 08:10

## 2019-10-22 RX ADMIN — ACETAMINOPHEN 1000 MG: 500 TABLET ORAL at 08:10

## 2019-10-22 RX ADMIN — OXYCODONE HYDROCHLORIDE 5 MG: 5 TABLET ORAL at 09:10

## 2019-10-22 RX ADMIN — MEMANTINE HYDROCHLORIDE 10 MG: 5 TABLET ORAL at 09:10

## 2019-10-22 RX ADMIN — METHOTREXATE 1 TABLET: 2.5 TABLET ORAL at 08:10

## 2019-10-22 RX ADMIN — CALCIUM CARBONATE 500 MG: 500 TABLET, CHEWABLE ORAL at 08:10

## 2019-10-22 RX ADMIN — ASPIRIN 81 MG: 81 TABLET, COATED ORAL at 08:10

## 2019-10-22 RX ADMIN — SODIUM CHLORIDE, SODIUM LACTATE, POTASSIUM CHLORIDE, AND CALCIUM CHLORIDE: .6; .31; .03; .02 INJECTION, SOLUTION INTRAVENOUS at 11:10

## 2019-10-22 RX ADMIN — OXYCODONE HYDROCHLORIDE 5 MG: 5 TABLET ORAL at 03:10

## 2019-10-22 RX ADMIN — ACETAMINOPHEN 1000 MG: 500 TABLET ORAL at 09:10

## 2019-10-22 RX ADMIN — SODIUM CHLORIDE, SODIUM LACTATE, POTASSIUM CHLORIDE, AND CALCIUM CHLORIDE: .6; .31; .03; .02 INJECTION, SOLUTION INTRAVENOUS at 01:10

## 2019-10-22 RX ADMIN — FERROUS SULFATE TAB EC 325 MG (65 MG FE EQUIVALENT) 325 MG: 325 (65 FE) TABLET DELAYED RESPONSE at 08:10

## 2019-10-22 RX ADMIN — PANTOPRAZOLE SODIUM 40 MG: 40 TABLET, DELAYED RELEASE ORAL at 09:10

## 2019-10-22 RX ADMIN — MEMANTINE HYDROCHLORIDE 10 MG: 5 TABLET ORAL at 08:10

## 2019-10-22 RX ADMIN — QUETIAPINE 100 MG: 100 TABLET ORAL at 09:10

## 2019-10-22 NOTE — ASSESSMENT & PLAN NOTE
S/p left knee replacement  Continue PT/OT   Pt accepted to [facility] but not ready for d/c due to UC complication

## 2019-10-22 NOTE — PT/OT/SLP PROGRESS
Occupational Therapy  Multiple Visit Attempts    Patient Name:  Anabella Aranda   MRN:  [id number]    Patient not seen today 2/2: 8:45 - Patient /c PT.  13:51 - EVS currently mopping and floor wet. 15:07 - Phlebotomist present to draw labs.    Will attempt tomorrow's date.     ZACKARY Ramirez  10/22/2019

## 2019-10-22 NOTE — NURSING
Patient's glucose 54 with morning labs. 120 ml of orange juice with 2 sugar packets given. Will obtain a POCT glucose level and continue to monitor.

## 2019-10-22 NOTE — PLAN OF CARE
VN cued into room.  Pt resting comfortably in bed with call light and personal belongings within reach.  NADN.  Family at bedside.  Pt had no needs or complaints at this time.  VN will continue to follow and be available as needed.

## 2019-10-22 NOTE — PLAN OF CARE
Patient awake and alert. Scheduled medications administered per MD order. Pt complains of pain. Prn pain medication administered for relief of pain. Afebrile. Patient safety maintained. Will continue to monitor.

## 2019-10-22 NOTE — PT/OT/SLP PROGRESS
Physical Therapy Treatment    Patient Name:  Anabella Aranda   MRN:  [id number]    Recommendations:     Discharge Recommendations:  home with home health, nursing facility, skilled   Discharge Equipment Recommendations: none   Barriers to discharge: decreased mobility,strength and endurance    Assessment:     Anabella Aranda is a 74 y.o. female admitted with a medical diagnosis of History of left knee replacement.  She presents with the following impairments/functional limitations:  weakness, impaired functional mobilty, gait instability, impaired balance, impaired cognition, pain, decreased ROM, impaired coordination, impaired skin, orthopedic precautions,ptvery heavily soiled this AM,assisted nsg with pt,remains with decreased mobility and endurance and will benefit from continuing PT services upon discharge.    Rehab Prognosis: Fair; patient would benefit from acute skilled PT services to address these deficits and reach maximum level of function.    Recent Surgery: Procedure(s) (LRB):  EGD (ESOPHAGOGASTRODUODENOSCOPY) (N/A)  SIGMOIDOSCOPY, FLEXIBLE (N/A) 1 Day Post-Op    Plan:     During this hospitalization, patient to be seen BID to address the identified rehab impairments via gait training, therapeutic activities, therapeutic exercises and progress toward the following goals:    · Plan of Care Expires:  11/18/19    Subjective     Chief Complaint: n/a  Patient/Family Comments/goals: pt agreeable to up in chair.  Pain/Comfort:  · Pain Rating 1: (no rating)  · Location 1: (tooth)      Objective:     Communicated with nsg prior to session.  Patient found supine with bed alarm, telemetry upon PT entry to room.     General Precautions: Standard, fall   Orthopedic Precautions:LLE weight bearing as tolerated   Braces: N/A     Functional Mobility:  · Bed Mobility:     · Rolling Left:  minimum assistance  · Rolling Right: minimum assistance  · Supine to Sit: minimum assistance  · Transfers:     · Sit to Stand:   minimum assistance with rolling walker  · Bed to Chair: minimum assistance with  rolling walker  using  ambulation  · Gait: amb ~14' X 1 with RW amd Min A  · Balance: fair standing balance with RW      AM-PAC 6 CLICK MOBILITY  Turning over in bed (including adjusting bedclothes, sheets and blankets)?: 3  Sitting down on and standing up from a chair with arms (e.g., wheelchair, bedside commode, etc.): 3  Moving from lying on back to sitting on the side of the bed?: 3  Moving to and from a bed to a chair (including a wheelchair)?: 3  Need to walk in hospital room?: 3  Climbing 3-5 steps with a railing?: 2  Basic Mobility Total Score: 17       Therapeutic Activities and Exercises: pt heavily soiled this AM,assisted nsg with pt's rolling and cleaning.       Patient left up in chair with all lines intact, call button in reach, chair alarm on and nsg notified..    GOALS: see general POC  Multidisciplinary Problems     Physical Therapy Goals        Problem: Physical Therapy Goal    Goal Priority Disciplines Outcome Goal Variances Interventions   Physical Therapy Goal     PT, PT/OT Ongoing, Progressing     Description:  1.  Supine to/from sit with with supervision  2.  Bed to/from chair with RW with supervision  3.  Ambulate 50' with RW with supervision  4.  Tolerate sitting 2 hours                    Time Tracking:     PT Received On: 10/22/19  PT Start Time: 0812     PT Stop Time: 0852  PT Total Time (min): 40 min     Billable Minutes: Gait Training 13 and Therapeutic Activity 27    Treatment Type: Treatment  PT/PTA: PTA     PTA Visit Number: 4     Kris Henderson, DONI  10/22/2019

## 2019-10-22 NOTE — PROGRESS NOTES
[facility]  Gastroenterology  Progress Note    Patient Name: Anabella Aranda  MRN: [id number]  Admission Date: 10/16/2019  Hospital Length of Stay: 6 days  Code Status: Full Code   Attending Provider: Ghassan Lindsay MD  Consulting Provider: Og Carrera MD  Primary Care Physician: Shon Brambila MD  Principal Problem: History of left knee replacement      Subjective:     Interval History:   States she is doing 'ok' today.  States still has mild abd pain.  Sitting up in bedside chair with lunch tray in front of her. Has not eaten any but states she will try.    EGD and Flex sig yesterday  - SEVERE inflammation throughout small bowel and retained rectum with DEEP punched out ulcerations, concerning for infectious etiology, most concerning for CMV.      Review of Systems   Constitutional: Positive for activity change and appetite change. Negative for chills and fever.   Respiratory: Negative for cough and shortness of breath.    Cardiovascular: Negative for chest pain and palpitations.   Gastrointestinal: Positive for abdominal pain and diarrhea. Negative for abdominal distention and blood in stool.     Objective:     Vital Signs (Most Recent):  Temp: 97 °F (36.1 °C) (10/22/19 0900)  Pulse: (!) 119 (10/22/19 1141)  Resp: 16 (10/22/19 0900)  BP: 123/62 (10/22/19 0900)  SpO2: 97 % (10/22/19 1235) Vital Signs (24h Range):  Temp:  [96.8 °F (36 °C)-99.1 °F (37.3 °C)] 97 °F (36.1 °C)  Pulse:  [] 119  Resp:  [14-17] 16  SpO2:  [94 %-100 %] 97 %  BP: (106-131)/(55-80) 123/62     Weight: 98.5 kg (217 lb 2.5 oz) (10/22/19 0556)  Body mass index is 37.27 kg/m².      Intake/Output Summary (Last 24 hours) at 10/22/2019 1258  Last data filed at 10/22/2019 0600  Gross per 24 hour   Intake 2907.92 ml   Output 250 ml   Net 2657.92 ml       Lines/Drains/Airways     Airway                 Airway - Non-Surgical 10/21/19 1634 Nasal Cannula less than 1 day          Peripheral Intravenous Line                  Peripheral IV - Single Lumen 10/18/19 20 G Right Antecubital 4 days                Physical Exam   Constitutional: No distress.   Pulmonary/Chest: Effort normal.   Abdominal: Soft. She exhibits no distension and no mass. There is no rebound.   Neurological: She is alert.   Psychiatric: She has a normal mood and affect. Her behavior is normal.   Vitals reviewed.      Significant Labs:  Amylase: No results for input(s): AMYLASE in the last 48 hours.  Blood Culture: No results for input(s): LABBLOO in the last 48 hours.  CBC:   Recent Labs   Lab 10/21/19  1209   WBC 7.81   HGB 10.7*   HCT 32.0*   *     CMP:   Recent Labs   Lab 10/22/19  0420   GLU 54*   CALCIUM 8.2*   ALBUMIN 1.8*   PROT 6.1      K 4.1   CO2 17*      BUN 17   CREATININE 1.1   ALKPHOS 99   ALT 5*   AST 18   BILITOT 0.6     Coagulation: No results for input(s): PT, INR, APTT in the last 48 hours.  CRP: No results for input(s): CRP in the last 48 hours.  ESR: No results for input(s): SEDRATE in the last 48 hours.  H.Pylori Ab IgG: No results for input(s): HPYLORIIGG in the last 48 hours.      Significant Imaging:  Imaging results within the past 24 hours have been reviewed.    Assessment/Plan:     Ulcerative proctitis with complication  Hx of UC s/p colectomy with retained rectal stump, had flex sig 1/2019 showing continued inflammation of the rectal stump, otherwise the small bowel was normal. CT enterography recently showed continued inflammation of the rectal stump.    EGD and Flex sig 10/21  SEVERE small bowel inflammation and SEVERE retained rectum inflammation with deep heaped up ulcers, concerning for infectious etiology such as CMV.    Recs:  PPI BID  STOPPED all immunosuppressants.  Have asked pathology to [facility] specimen  Await path results  Likely will need ID consultation for CMV  Await CMV PCR serum  Supportive care per primary and medical team    scheduled lomotil BID ; Can use immodium PRN and alternate with lomotil    Will  need to await path and formulate treatment plan prior to discharge to [facility] given severity of infectious enteritis / colitis.          Thank you for your consult. I will follow-up with patient. Please contact us if you have any additional questions.    Og Carrera MD  Gastroenterology  [facility]

## 2019-10-22 NOTE — NURSING
Patient's glucose still 54 after recheck. Patient awake and alert. Complains of feeling tired. 120 ml of apple juice with 3 packets of sugar given. Dr. Herr paged. No new orders at this time.

## 2019-10-22 NOTE — SUBJECTIVE & OBJECTIVE
Subjective:     Interval History:   States she is doing 'ok' today.  States still has mild abd pain.  Sitting up in bedside chair with lunch tray in front of her. Has not eaten any but states she will try.    EGD and Flex sig yesterday  - SEVERE inflammation throughout small bowel and retained rectum with DEEP punched out ulcerations, concerning for infectious etiology, most concerning for CMV.      Review of Systems   Constitutional: Positive for activity change and appetite change. Negative for chills and fever.   Respiratory: Negative for cough and shortness of breath.    Cardiovascular: Negative for chest pain and palpitations.   Gastrointestinal: Positive for abdominal pain and diarrhea. Negative for abdominal distention and blood in stool.     Objective:     Vital Signs (Most Recent):  Temp: 97 °F (36.1 °C) (10/22/19 0900)  Pulse: (!) 119 (10/22/19 1141)  Resp: 16 (10/22/19 0900)  BP: 123/62 (10/22/19 0900)  SpO2: 97 % (10/22/19 1235) Vital Signs (24h Range):  Temp:  [96.8 °F (36 °C)-99.1 °F (37.3 °C)] 97 °F (36.1 °C)  Pulse:  [] 119  Resp:  [14-17] 16  SpO2:  [94 %-100 %] 97 %  BP: (106-131)/(55-80) 123/62     Weight: 98.5 kg (217 lb 2.5 oz) (10/22/19 0556)  Body mass index is 37.27 kg/m².      Intake/Output Summary (Last 24 hours) at 10/22/2019 1258  Last data filed at 10/22/2019 0600  Gross per 24 hour   Intake 2907.92 ml   Output 250 ml   Net 2657.92 ml       Lines/Drains/Airways     Airway                 Airway - Non-Surgical 10/21/19 1634 Nasal Cannula less than 1 day          Peripheral Intravenous Line                 Peripheral IV - Single Lumen 10/18/19 20 G Right Antecubital 4 days                Physical Exam   Constitutional: No distress.   Pulmonary/Chest: Effort normal.   Abdominal: Soft. She exhibits no distension and no mass. There is no rebound.   Neurological: She is alert.   Psychiatric: She has a normal mood and affect. Her behavior is normal.   Vitals reviewed.      Significant  Labs:  Amylase: No results for input(s): AMYLASE in the last 48 hours.  Blood Culture: No results for input(s): LABBLOO in the last 48 hours.  CBC:   Recent Labs   Lab 10/21/19  1209   WBC 7.81   HGB 10.7*   HCT 32.0*   *     CMP:   Recent Labs   Lab 10/22/19  0420   GLU 54*   CALCIUM 8.2*   ALBUMIN 1.8*   PROT 6.1      K 4.1   CO2 17*      BUN 17   CREATININE 1.1   ALKPHOS 99   ALT 5*   AST 18   BILITOT 0.6     Coagulation: No results for input(s): PT, INR, APTT in the last 48 hours.  CRP: No results for input(s): CRP in the last 48 hours.  ESR: No results for input(s): SEDRATE in the last 48 hours.  H.Pylori Ab IgG: No results for input(s): HPYLORIIGG in the last 48 hours.      Significant Imaging:  Imaging results within the past 24 hours have been reviewed.

## 2019-10-22 NOTE — PROGRESS NOTES
"[facility]  Orthopedics  Progress Note    Patient Name: Anabella Aranda  MRN: [id number]  Admission Date: 10/16/2019  Hospital Length of Stay: 6 days  Attending Provider: Ghassan Lindsay MD  Primary Care Provider: Shon Brambila MD  Follow-up For: Procedure(s) (LRB):  EGD (ESOPHAGOGASTRODUODENOSCOPY) (N/A)  SIGMOIDOSCOPY, FLEXIBLE (N/A)    Post-Operative Day: 1 Day Post-Op  Subjective:     Principal Problem:History of left knee replacement    Principal Orthopedic Problem: same.     Interval History: Pt sitting up in chair comfortably. Reports no pain or complaints about the knee. Still complains of GI discomfort. Cognition at baseline.     Review of patient's allergies indicates:   Allergen Reactions    Sulfa (sulfonamide antibiotics) Swelling    Tomato (solanum lycopersicum) Other (See Comments)       Current Facility-Administered Medications   Medication    acetaminophen tablet 1,000 mg    aspirin EC tablet 81 mg    calcium carbonate 200 mg calcium (500 mg) chewable tablet 500 mg    diphenoxylate-atropine 2.5-0.025 mg per tablet 1 tablet    donepezil tablet 10 mg    ferrous sulfate EC tablet 325 mg    HYDROmorphone injection 0.2 mg    lactated ringers infusion    loperamide capsule 4 mg    memantine tablet 10 mg    ondansetron disintegrating tablet 4 mg    ondansetron injection 4 mg    oxyCODONE immediate release tablet 5 mg    pantoprazole EC tablet 40 mg    QUEtiapine tablet 100 mg    sodium chloride 0.9% flush 10 mL     Objective:     Vital Signs (Most Recent):  Temp: 97 °F (36.1 °C) (10/22/19 0900)  Pulse: (!) 119 (10/22/19 1141)  Resp: 16 (10/22/19 0900)  BP: 123/62 (10/22/19 0900)  SpO2: (pt with PT) (10/22/19 0824) Vital Signs (24h Range):  Temp:  [96.8 °F (36 °C)-99.1 °F (37.3 °C)] 97 °F (36.1 °C)  Pulse:  [] 119  Resp:  [14-17] 16  SpO2:  [94 %-100 %] 95 %  BP: (106-131)/(55-80) 123/62     Weight: 98.5 kg (217 lb 2.5 oz)  Height: 5' 4" (162.6 cm)  Body mass " index is 37.27 kg/m².      Intake/Output Summary (Last 24 hours) at 10/22/2019 1230  Last data filed at 10/22/2019 0600  Gross per 24 hour   Intake 2907.92 ml   Output 250 ml   Net 2657.92 ml       General    Vitals reviewed.  Constitutional: She appears well-developed and well-nourished.   Cardiovascular: Normal rate.    Pulmonary/Chest: Effort normal.   Neurological: She is alert.   Oriented to place             LEFT LOWER EXTREMITY:   Bandage in tact. Moderate amount of bloody drainage.   No redness or sign of infection.   No significant swelling.   Negative homans.  Wiggle toes well.    Significant Labs:   CBC:   Recent Labs   Lab 10/21/19  1209   WBC 7.81   HGB 10.7*   HCT 32.0*   *     CMP:   Recent Labs   Lab 10/21/19  1209 10/22/19  0420    137   K 4.2 4.1    103   CO2 18* 17*   GLU 55* 54*   BUN 18 17   CREATININE 1.0 1.1   CALCIUM 8.6* 8.2*   PROT  --  6.1   ALBUMIN  --  1.8*   BILITOT  --  0.6   ALKPHOS  --  99   AST  --  18   ALT  --  5*   ANIONGAP 17* 17*   EGFRNONAA 56* 50*     All pertinent labs within the past 24 hours have been reviewed.    Significant Imaging: I have reviewed all pertinent imaging results/findings.    Assessment/Plan:     Active Diagnoses:    Diagnosis Date Noted POA    PRINCIPAL PROBLEM:  History of left knee replacement [Z96.652] 10/16/2019 Not Applicable    Acute renal failure superimposed on stage 3 chronic kidney disease [N17.9, N18.3] 10/16/2019 Yes    Dementia [F03.90]  Yes     Chronic    Major depression with psychotic features [F32.3] 04/30/2018 Yes     Chronic    Morbid obesity [E66.01] 10/17/2017 Yes     Chronic    Ulcerative proctitis with complication [K51.219] 01/11/2017 Yes     Chronic    History of total colectomy [Z90.49] 06/24/2016 Not Applicable     Chronic    Iron deficiency anemia [D50.9] 09/09/2015 Yes     Chronic    Venous insufficiency of both lower extremities [I87.2] 08/26/2015 Yes     Chronic      Problems Resolved During this  Admission:    Diagnosis Date Noted Date Resolved POA    Primary osteoarthritis of left knee [M17.12] 10/16/2019 10/17/2019 Yes    Chronic pain of both knees [M25.561, M25.562, G89.29] 08/14/2019 10/17/2019 Yes     Chronic     Continue PT/OT  Pt accepted to [facility] but not ready for d/c at this time 2/2 complication with UC. Pt is being followed by GUNJAN Cuellar PA-C  Orthopedics  [facility]

## 2019-10-22 NOTE — PLAN OF CARE
Problem: Physical Therapy Goal  Goal: Physical Therapy Goal  Description  1.  Supine to/from sit with with supervision  2.  Bed to/from chair with RW with supervision  3.  Ambulate 50' with RW with supervision  4.  Tolerate sitting 2 hours  MET 10/22/19   Outcome: Ongoing, Progressing   Goal 4 met

## 2019-10-22 NOTE — SUBJECTIVE & OBJECTIVE
Interval History: awake and alert, still having loose stool.   Stool test for C.diff neg on 10/18    Review of Systems   Constitutional: Negative for activity change, fatigue and fever.   Respiratory: Negative for apnea, cough and shortness of breath.    Cardiovascular: Negative for chest pain and palpitations.   Gastrointestinal: Positive for diarrhea. Negative for abdominal distention, nausea and vomiting.   Genitourinary: Negative for difficulty urinating and hematuria.   Musculoskeletal: Positive for arthralgias.   Neurological: Negative for dizziness and numbness.   Psychiatric/Behavioral: Negative for agitation. The patient is not nervous/anxious.      Objective:     Vital Signs (Most Recent):  Temp: 97 °F (36.1 °C) (10/22/19 0900)  Pulse: (!) 119 (10/22/19 1141)  Resp: 16 (10/22/19 0900)  BP: 123/62 (10/22/19 0900)  SpO2: (!) 94 % (10/22/19 1513) Vital Signs (24h Range):  Temp:  [96.8 °F (36 °C)-99.1 °F (37.3 °C)] 97 °F (36.1 °C)  Pulse:  [109-128] 119  Resp:  [16-17] 16  SpO2:  [94 %-97 %] 94 %  BP: (107-123)/(57-70) 123/62     Weight: 98.5 kg (217 lb 2.5 oz)  Body mass index is 37.27 kg/m².    Intake/Output Summary (Last 24 hours) at 10/22/2019 1855  Last data filed at 10/22/2019 1712  Gross per 24 hour   Intake 3122.5 ml   Output 250 ml   Net 2872.5 ml      Physical Exam   Constitutional: She appears well-nourished.   HENT:   Head: Normocephalic and atraumatic.   Pulmonary/Chest: No respiratory distress.   Neurological: She is alert.   Vitals reviewed.      Significant Labs:   CBC:   Recent Labs   Lab 10/21/19  1209   WBC 7.81   HGB 10.7*   HCT 32.0*   *     CMP:   Recent Labs   Lab 10/21/19  1209 10/22/19  0420    137   K 4.2 4.1    103   CO2 18* 17*   GLU 55* 54*   BUN 18 17   CREATININE 1.0 1.1   CALCIUM 8.6* 8.2*   PROT  --  6.1   ALBUMIN  --  1.8*   BILITOT  --  0.6   ALKPHOS  --  99   AST  --  18   ALT  --  5*   ANIONGAP 17* 17*   EGFRNONAA 56* 50*     TSH: No results for input(s):  TSH in the last 4320 hours.  Urine Culture: No results for input(s): LABURIN in the last 48 hours.  Urine Studies: No results for input(s): COLORU, APPEARANCEUA, PHUR, SPECGRAV, PROTEINUA, GLUCUA, KETONESU, BILIRUBINUA, OCCULTUA, NITRITE, UROBILINOGEN, LEUKOCYTESUR, RBCUA, WBCUA, BACTERIA, SQUAMEPITHEL, HYALINECASTS in the last 48 hours.    Invalid input(s): WRIGHTSUR    Significant Imaging: none

## 2019-10-22 NOTE — PT/OT/SLP PROGRESS
Physical Therapy Treatment    Patient Name:  Anabella Aranda   MRN:  [id number]    Recommendations:     Discharge Recommendations:  home with home health, nursing facility, skilled   Discharge Equipment Recommendations: none   Barriers to discharge: decreased mobility,endurance and cognition    Assessment:     Anabella Aranda is a 74 y.o. female admitted with a medical diagnosis of History of left knee replacement.  She presents with the following impairments/functional limitations:  weakness, impaired endurance, impaired functional mobilty, gait instability, impaired balance, decreased lower extremity function, decreased safety awareness, decreased ROM, impaired coordination, impaired skin, orthopedic precautions,pt with increased sitting tolerance today and limited by loose bowels,ppt remains with decreased endurance and strength and will benefit from continuing PT services upon discharge.    Rehab Prognosis: Fair; patient would benefit from acute skilled PT services to address these deficits and reach maximum level of function.    Recent Surgery: Procedure(s) (LRB):  EGD (ESOPHAGOGASTRODUODENOSCOPY) (N/A)  SIGMOIDOSCOPY, FLEXIBLE (N/A) 1 Day Post-Op    Plan:     During this hospitalization, patient to be seen BID to address the identified rehab impairments via gait training, therapeutic activities, therapeutic exercises and progress toward the following goals:    · Plan of Care Expires:  11/18/19    Subjective     Chief Complaint: n/a  Patient/Family Comments/goals: pt states her bowels keep moving.  Pain/Comfort:  · Pain Rating 1: 0/10  · Location 1: (tooth)      Objective:     Communicated with nsg prior to session.  Patient found up in chair with telemetry(chair alarm) upon PT entry to room.     General Precautions: Standard, fall   Orthopedic Precautions:LLE weight bearing as tolerated   Braces: N/A     Functional Mobility:  · Bed Mobility:     · Sit to Supine: minimum assistance and moderate  assistance  · Transfers:     · Sit to Stand:  moderate assistance and from decreased surface with rolling walker  · Balance: fair standing balance with RW      AM-PAC 6 CLICK MOBILITY  Turning over in bed (including adjusting bedclothes, sheets and blankets)?: 3  Sitting down on and standing up from a chair with arms (e.g., wheelchair, bedside commode, etc.): 2(decreased surface)  Moving from lying on back to sitting on the side of the bed?: 3  Moving to and from a bed to a chair (including a wheelchair)?: 3  Need to walk in hospital room?: 3  Climbing 3-5 steps with a railing?: 2  Basic Mobility Total Score: 16       Therapeutic Activities and Exercises: pt soiled again in chair,positioned pt in bed and contacted pct to come clean pt.       Patient left supine with all lines intact, call button in reach, bed alarm on and pct notified..    GOALS: see general POC  Multidisciplinary Problems     Physical Therapy Goals        Problem: Physical Therapy Goal    Goal Priority Disciplines Outcome Goal Variances Interventions   Physical Therapy Goal     PT, PT/OT Ongoing, Progressing     Description:  1.  Supine to/from sit with with supervision  2.  Bed to/from chair with RW with supervision  3.  Ambulate 50' with RW with supervision  4.  Tolerate sitting 2 hours  MET 10/22/19                    Time Tracking:     PT Received On: 10/22/19  PT Start Time: 1238     PT Stop Time: 1302  PT Total Time (min): 24 min     Billable Minutes: Therapeutic Activity 24    Treatment Type: Treatment  PT/PTA: PTA     PTA Visit Number: 0     Kris Henderson, PTA  10/22/2019

## 2019-10-22 NOTE — PLAN OF CARE
For 6:45 to 19:45 shift      Pt remains disoriented to situation    pt's son finds her still more confused than on admission    pt completed egd and sigmoidoscopy as ordered    telemetry maintained with sinus tach up to 120's   safety maintained   cleaned frequently for incontinence of stool

## 2019-10-22 NOTE — ASSESSMENT & PLAN NOTE
Hx of UC s/p colectomy with retained rectal stump, had flex sig 1/2019 showing continued inflammation of the rectal stump, otherwise the small bowel was normal. CT enterography recently showed continued inflammation of the rectal stump.    EGD and Flex sig 10/21  SEVERE small bowel inflammation and SEVERE retained rectum inflammation with deep heaped up ulcers, concerning for infectious etiology such as CMV.    Recs:  PPI BID  STOPPED all immunosuppressants.  Have asked pathology to [facility] specimen  Await path results  Likely will need ID consultation for CMV  Await CMV PCR serum  Supportive care per primary and medical team    scheduled lomotil BID ; Can use immodium PRN and alternate with lomotil    Will need to await path and formulate treatment plan prior to discharge to [facility] given severity of infectious enteritis / colitis.

## 2019-10-23 ENCOUNTER — TELEPHONE (OUTPATIENT)
Dept: FAMILY MEDICINE | Facility: CLINIC | Age: 74
End: 2019-10-23

## 2019-10-23 LAB — CMV DNA SERPL NAA+PROBE-ACNC: NORMAL IU/ML

## 2019-10-23 PROCEDURE — 25000003 PHARM REV CODE 250: Performed by: ORTHOPAEDIC SURGERY

## 2019-10-23 PROCEDURE — 21400001 HC TELEMETRY ROOM

## 2019-10-23 PROCEDURE — 97530 THERAPEUTIC ACTIVITIES: CPT

## 2019-10-23 PROCEDURE — 63600175 PHARM REV CODE 636 W HCPCS: Performed by: ANESTHESIOLOGY

## 2019-10-23 PROCEDURE — 25000003 PHARM REV CODE 250: Performed by: INTERNAL MEDICINE

## 2019-10-23 PROCEDURE — 94761 N-INVAS EAR/PLS OXIMETRY MLT: CPT

## 2019-10-23 PROCEDURE — 97535 SELF CARE MNGMENT TRAINING: CPT

## 2019-10-23 PROCEDURE — 99232 SBSQ HOSP IP/OBS MODERATE 35: CPT | Mod: ,,, | Performed by: INTERNAL MEDICINE

## 2019-10-23 PROCEDURE — 97116 GAIT TRAINING THERAPY: CPT

## 2019-10-23 PROCEDURE — 99232 PR SUBSEQUENT HOSPITAL CARE,LEVL II: ICD-10-PCS | Mod: ,,, | Performed by: INTERNAL MEDICINE

## 2019-10-23 PROCEDURE — 97110 THERAPEUTIC EXERCISES: CPT

## 2019-10-23 RX ADMIN — ACETAMINOPHEN 1000 MG: 500 TABLET ORAL at 09:10

## 2019-10-23 RX ADMIN — LOPERAMIDE HYDROCHLORIDE 4 MG: 2 CAPSULE ORAL at 01:10

## 2019-10-23 RX ADMIN — OXYCODONE HYDROCHLORIDE 5 MG: 5 TABLET ORAL at 12:10

## 2019-10-23 RX ADMIN — CALCIUM CARBONATE 500 MG: 500 TABLET, CHEWABLE ORAL at 09:10

## 2019-10-23 RX ADMIN — ACETAMINOPHEN 1000 MG: 500 TABLET ORAL at 08:10

## 2019-10-23 RX ADMIN — MEMANTINE HYDROCHLORIDE 10 MG: 5 TABLET ORAL at 08:10

## 2019-10-23 RX ADMIN — CALCIUM CARBONATE 500 MG: 500 TABLET, CHEWABLE ORAL at 08:10

## 2019-10-23 RX ADMIN — ASPIRIN 81 MG: 81 TABLET, COATED ORAL at 08:10

## 2019-10-23 RX ADMIN — PANTOPRAZOLE SODIUM 40 MG: 40 TABLET, DELAYED RELEASE ORAL at 08:10

## 2019-10-23 RX ADMIN — ACETAMINOPHEN 1000 MG: 500 TABLET ORAL at 03:10

## 2019-10-23 RX ADMIN — METHOTREXATE 1 TABLET: 2.5 TABLET ORAL at 09:10

## 2019-10-23 RX ADMIN — FERROUS SULFATE TAB EC 325 MG (65 MG FE EQUIVALENT) 325 MG: 325 (65 FE) TABLET DELAYED RESPONSE at 08:10

## 2019-10-23 RX ADMIN — DONEPEZIL HYDROCHLORIDE 10 MG: 5 TABLET, FILM COATED ORAL at 09:10

## 2019-10-23 RX ADMIN — LIDOCAINE HYDROCHLORIDE: 20 SOLUTION ORAL; TOPICAL at 12:10

## 2019-10-23 RX ADMIN — PANTOPRAZOLE SODIUM 40 MG: 40 TABLET, DELAYED RELEASE ORAL at 09:10

## 2019-10-23 RX ADMIN — QUETIAPINE 100 MG: 100 TABLET ORAL at 09:10

## 2019-10-23 RX ADMIN — FERROUS SULFATE TAB EC 325 MG (65 MG FE EQUIVALENT) 325 MG: 325 (65 FE) TABLET DELAYED RESPONSE at 09:10

## 2019-10-23 RX ADMIN — SODIUM CHLORIDE, SODIUM LACTATE, POTASSIUM CHLORIDE, AND CALCIUM CHLORIDE: .6; .31; .03; .02 INJECTION, SOLUTION INTRAVENOUS at 04:10

## 2019-10-23 RX ADMIN — MEMANTINE HYDROCHLORIDE 10 MG: 5 TABLET ORAL at 09:10

## 2019-10-23 RX ADMIN — METHOTREXATE 1 TABLET: 2.5 TABLET ORAL at 08:10

## 2019-10-23 NOTE — PT/OT/SLP PROGRESS
Physical Therapy Treatment    Patient Name:  Anabella Aranda   MRN:  [id number]    Recommendations:     Discharge Recommendations:  nursing facility, skilled, home with home health   Discharge Equipment Recommendations: none   Barriers to discharge: impaired mobility    Assessment:     Anabella Aranda is a 74 y.o. female admitted with a medical diagnosis of History of left knee replacement.  She presents with the following impairments/functional limitations:  weakness, decreased upper extremity function, gait instability, decreased ROM, impaired cardiopulmonary response to activity, impaired endurance, impaired balance, decreased lower extremity function, impaired coordination, impaired joint extensibility, decreased safety awareness, impaired self care skills, impaired cognition, pain, impaired skin, orthopedic precautions, impaired fine motor, impaired muscle length, impaired functional mobilty, decreased coordination, edema. Pt instructed in progressive mobility and gait as detailed above.    Rehab Prognosis: Good; patient would benefit from acute skilled PT services to address these deficits and reach maximum level of function.    Recent Surgery: Procedure(s) (LRB):  EGD (ESOPHAGOGASTRODUODENOSCOPY) (N/A)  SIGMOIDOSCOPY, FLEXIBLE (N/A) 2 Days Post-Op    Plan:     During this hospitalization, patient to be seen BID to address the identified rehab impairments via gait training, therapeutic activities, therapeutic exercises and progress toward the following goals:    · Plan of Care Expires:  11/18/19    Subjective     Chief Complaint: pain in abd  Patient/Family Comments/goals: requests to use BSC to have BM  Pain/Comfort:  · Pain Rating 1: 8/10  · Location 1: abdomen  · Pain Addressed 1: Pre-medicate for activity, Reposition, Distraction, Cessation of Activity, Nurse notified  · Pain Rating Post-Intervention 1: (Did not rate; r/o relief post BM)      Objective:     Communicated with RNethan prior to  session.  Patient found HOB elevated with bed alarm, peripheral IV, telemetry upon PT entry to room.     General Precautions: Standard, fall   Orthopedic Precautions:LLE weight bearing as tolerated   Braces: N/A     Functional Mobility:  · Bed Mobility:     · Rolling Left:  minimum assistance  · Scooting: minimum assistance  · Supine to Sit: minimum assistance  · Transfers:     · Sit to Stand:  minimum assistance with rolling walker  · Bed to Chair: minimum assistance with  rolling walker  using  Step Transfer  · Gait: x15ft at Min A c/ RW; slow [place] c/ flat foot landing. VC for safety c/ PT approximation of walker d/t cognitive deficits  · Balance: dynamic gait - fair      AM-PAC 6 CLICK MOBILITY  Turning over in bed (including adjusting bedclothes, sheets and blankets)?: 3  Sitting down on and standing up from a chair with arms (e.g., wheelchair, bedside commode, etc.): 3  Moving from lying on back to sitting on the side of the bed?: 3  Moving to and from a bed to a chair (including a wheelchair)?: 3  Need to walk in hospital room?: 3  Climbing 3-5 steps with a railing?: 2  Basic Mobility Total Score: 17       Therapeutic Activities and Exercises:  Pt r/o stomach burning pain 8/10 on entrance into room; instructed in transfer to BSC at  min A to have loose BM; progressed to gait x15ft at Min A c/ RW.  In sitting in recliner; pt instructed in L knee strength and -90 deg x10 3reps c/ rest as needed; VC for correct mechanics of exercises.       Patient left up in chair with all lines intact, call button in reach, chair alarm on and RN notified..    GOALS:   Multidisciplinary Problems     Physical Therapy Goals        Problem: Physical Therapy Goal    Goal Priority Disciplines Outcome Goal Variances Interventions   Physical Therapy Goal     PT, PT/OT Ongoing, Progressing     Description:  1.  Supine to/from sit with with supervision  2.  Bed to/from chair with RW with supervision  3.  Ambulate 50' with RW  with supervision  4.  Tolerate sitting 2 hours  MET 10/22/19                    Time Tracking:     PT Received On: 10/23/19  PT Start Time: 1026     PT Stop Time: 1055  PT Total Time (min): 29 min     Billable Minutes: Gait Training 10 and Therapeutic Exercise 19    Treatment Type: Treatment, 6th Visit  PT/PTA: PT     PTA Visit Number: 0     Xiang Barone PT, DPT  10/23/2019

## 2019-10-23 NOTE — PLAN OF CARE
Problem: Physical Therapy Goal  Goal: Physical Therapy Goal  Description  1.  Supine to/from sit with with supervision  2.  Bed to/from chair with RW with supervision  3.  Ambulate 50' with RW with supervision  4.  Tolerate sitting 2 hours  MET 10/22/19   Outcome: Ongoing, Progressing       Patient is making progress towards goals; all goals addressed as appropriate. Continue with PT POC.

## 2019-10-23 NOTE — PLAN OF CARE
Pt remains in the hospital, GI following patient-awaiting path results, pt will discharge to [facility] rehab when medically stable. [place] spoke to pt's son Bryson to update him on anticipated discharge plan.        10/23/19 5110   Discharge Reassessment   Assessment Type Discharge Planning Reassessment   Provided patient/caregiver education on the expected discharge date and the discharge plan Yes   Do you have any problems affording any of your prescribed medications? No   Discharge Plan A Rehab   Discharge Plan B Rehab   DME Needed Upon Discharge  none   Patient choice form signed by patient/caregiver No

## 2019-10-23 NOTE — SUBJECTIVE & OBJECTIVE
Subjective:     Interval History:     Pt without complaints this am.  Says no abd pain.  Thinks she has some loose stool overnight.  nontoxic    Review of Systems   Constitutional: Positive for activity change and appetite change. Negative for chills and fever.   Respiratory: Negative for cough and shortness of breath.    Cardiovascular: Negative for chest pain and palpitations.   Gastrointestinal: Positive for abdominal pain and diarrhea. Negative for abdominal distention and blood in stool.     Objective:     Vital Signs (Most Recent):  Temp: 97.7 °F (36.5 °C) (10/23/19 1140)  Pulse: 101 (10/23/19 1148)  Resp: 20 (10/23/19 1140)  BP: 139/62 (10/23/19 1140)  SpO2: 96 % (10/23/19 1156) Vital Signs (24h Range):  Temp:  [97.7 °F (36.5 °C)-98.6 °F (37 °C)] 97.7 °F (36.5 °C)  Pulse:  [101-129] 101  Resp:  [17-20] 20  SpO2:  [96 %-98 %] 96 %  BP: (113-139)/(60-87) 139/62     Weight: 98.1 kg (216 lb 4.3 oz) (10/23/19 0435)  Body mass index is 37.12 kg/m².      Intake/Output Summary (Last 24 hours) at 10/23/2019 1532  Last data filed at 10/23/2019 0600  Gross per 24 hour   Intake 3280 ml   Output 900 ml   Net 2380 ml       Lines/Drains/Airways     Airway                 Airway - Non-Surgical 10/21/19 1634 Nasal Cannula 1 day          Peripheral Intravenous Line                 Peripheral IV - Single Lumen 10/22/19 2247 22 G Distal;Right;Posterior Wrist less than 1 day                Physical Exam   Constitutional: No distress.   Pulmonary/Chest: Effort normal.   Abdominal: Soft. She exhibits no distension and no mass. There is no rebound.   Neurological: She is alert.   Psychiatric: She has a normal mood and affect. Her behavior is normal.   Vitals reviewed.      Significant Labs:  Amylase: No results for input(s): AMYLASE in the last 48 hours.  Blood Culture: No results for input(s): LABBLOO in the last 48 hours.  CBC: No results for input(s): WBC, HGB, HCT, PLT in the last 48 hours.  CMP:   Recent Labs   Lab  10/22/19  0420   GLU 54*   CALCIUM 8.2*   ALBUMIN 1.8*   PROT 6.1      K 4.1   CO2 17*      BUN 17   CREATININE 1.1   ALKPHOS 99   ALT 5*   AST 18   BILITOT 0.6     Coagulation: No results for input(s): PT, INR, APTT in the last 48 hours.  CRP: No results for input(s): CRP in the last 48 hours.  ESR: No results for input(s): SEDRATE in the last 48 hours.      Significant Imaging:  Imaging results within the past 24 hours have been reviewed.

## 2019-10-23 NOTE — ASSESSMENT & PLAN NOTE
Hx of UC s/p colectomy with retained rectal stump, had flex sig 1/2019 showing continued inflammation of the rectal stump, otherwise the small bowel was normal. CT enterography recently showed continued inflammation of the rectal stump.    EGD and Flex sig 10/21  SEVERE small bowel inflammation and SEVERE retained rectum inflammation with deep heaped up ulcers, concerning for infectious etiology such as CMV.    Recs:  I have spoken to pathology and slides are being prepared, results likely this afternoon or tomorrow.  Await CMV PCR in serum as well  PPI BID  STOPPED all immunosuppressants.  Await path results  Likely will need ID consultation for CMV  Supportive care per primary and medical team, appreciate medical management per medicine consult    scheduled lomotil BID ; Can use immodium PRN and alternate with lomotil    Will need to await path and formulate treatment plan prior to discharge to [facility] given severity of infectious enteritis / colitis.

## 2019-10-23 NOTE — PROGRESS NOTES
"[facility]  Orthopedics  Progress Note    Patient Name: Anabella Aranda  MRN: [id number]  Admission Date: 10/16/2019  Hospital Length of Stay: 7 days  Attending Provider: Ghassan Lindsay MD  Primary Care Provider: Shon Brambila MD  Follow-up For: Procedure(s) (LRB):  EGD (ESOPHAGOGASTRODUODENOSCOPY) (N/A)  SIGMOIDOSCOPY, FLEXIBLE (N/A)    Post-Operative Day: 7 Days Post-Op  Subjective:     Principal Problem:History of left knee replacement    Principal Orthopedic Problem: Same    Interval History:  Patient found resting comfortably in chair. Denies knee pain. Complains of burning stomach pain.    Review of patient's allergies indicates:   Allergen Reactions    Sulfa (sulfonamide antibiotics) Swelling    Tomato (solanum lycopersicum) Other (See Comments)       Current Facility-Administered Medications   Medication    acetaminophen tablet 1,000 mg    aspirin EC tablet 81 mg    calcium carbonate 200 mg calcium (500 mg) chewable tablet 500 mg    diphenoxylate-atropine 2.5-0.025 mg per tablet 1 tablet    donepezil tablet 10 mg    ferrous sulfate EC tablet 325 mg    HYDROmorphone injection 0.2 mg    lactated ringers infusion    loperamide capsule 4 mg    memantine tablet 10 mg    ondansetron disintegrating tablet 4 mg    ondansetron injection 4 mg    oxyCODONE immediate release tablet 5 mg    pantoprazole EC tablet 40 mg    QUEtiapine tablet 100 mg    sodium chloride 0.9% flush 10 mL     Objective:     Vital Signs (Most Recent):  Temp: 97.7 °F (36.5 °C) (10/23/19 1140)  Pulse: 101 (10/23/19 1148)  Resp: 20 (10/23/19 1140)  BP: 139/62 (10/23/19 1140)  SpO2: 96 % (10/23/19 1156) Vital Signs (24h Range):  Temp:  [97.7 °F (36.5 °C)-98.6 °F (37 °C)] 97.7 °F (36.5 °C)  Pulse:  [101-129] 101  Resp:  [16-20] 20  SpO2:  [94 %-98 %] 96 %  BP: (113-139)/(60-87) 139/62     Weight: 98.1 kg (216 lb 4.3 oz)  Height: 5' 4" (162.6 cm)  Body mass index is 37.12 kg/m².      Intake/Output Summary (Last " 24 hours) at 10/23/2019 1253  Last data filed at 10/23/2019 0600  Gross per 24 hour   Intake 3600 ml   Output 1300 ml   Net 2300 ml       General    Vitals reviewed.  Constitutional: She appears well-developed.   Cardiovascular: Normal rate.    Neurological: She is alert.   Oriented to self             LEFT LOWER EXTREMITY:   Bandage in tact. Moderate amount of bloody drainage.   No redness or sign of infection.   No significant swelling.   Negative homans.  Wiggle toes well.  Exam unchanged.     Significant Labs: All pertinent labs within the past 24 hours have been reviewed.    Significant Imaging: I have reviewed all pertinent imaging results/findings.    Assessment/Plan:     Active Diagnoses:    Diagnosis Date Noted POA    PRINCIPAL PROBLEM:  History of left knee replacement [Z96.652] 10/16/2019 Not Applicable    Acute renal failure superimposed on stage 3 chronic kidney disease [N17.9, N18.3] 10/16/2019 Yes    Dementia [F03.90]  Yes     Chronic    Major depression with psychotic features [F32.3] 04/30/2018 Yes     Chronic    Morbid obesity [E66.01] 10/17/2017 Yes     Chronic    Ulcerative proctitis with complication [K51.219] 01/11/2017 Yes     Chronic    History of total colectomy [Z90.49] 06/24/2016 Not Applicable     Chronic    Iron deficiency anemia [D50.9] 09/09/2015 Yes     Chronic    Venous insufficiency of both lower extremities [I87.2] 08/26/2015 Yes     Chronic      Problems Resolved During this Admission:    Diagnosis Date Noted Date Resolved POA    Primary osteoarthritis of left knee [M17.12] 10/16/2019 10/17/2019 Yes    Chronic pain of both knees [M25.561, M25.562, G89.29] 08/14/2019 10/17/2019 Yes     Chronic     Continue PT/OT  Hospital med is following for commodities   Waiting for GI results/ plan of care      Umu Cuellar PA-C  Orthopedics  [facility]

## 2019-10-23 NOTE — PLAN OF CARE
Problem: Occupational Therapy Goal  Goal: Occupational Therapy Goal  Description  Goals to be met by: 11/17     Patient will increase functional independence with ADLs by performing:    UE Dressing with Supervision.  LE Dressing with Supervision.  Grooming while standing with Supervision.  Toileting from toilet with Supervision for hygiene and clothing management.   Toilet transfer to toilet with Supervision.  Upper extremity exercise program x10 reps per handout, with assistance as needed.---MET 10/18/2019      Outcome: Ongoing, Progressing

## 2019-10-23 NOTE — PLAN OF CARE
Problem: Physical Therapy Goal  Goal: Physical Therapy Goal  Description  1.  Supine to/from sit with with supervision  2.  Bed to/from chair with RW with supervision  3.  Ambulate 50' with RW with supervision  4.  Tolerate sitting 2 hours  MET 10/22/19   Outcome: Ongoing, Progressing   Goals ongoing

## 2019-10-23 NOTE — PLAN OF CARE
VN rounds: VN cued into pt's room with pt's permission. Pt resting in bed. Fall risk protocol discussed with pt. VN instructed pt to call for assistance. Pt aware and agreeable. NAD noted. Allowed time for questions. C/o abd pain. Pain medication already given.  Will cont to be available and intervene as needed.     10/23/19 1303   Type of Frequent Check   Type Patient Rounds;Other (see comments)  (vn round)   Safety/Activity   Patient Rounds visualized patient;call light in patient/parent reach   Safety Promotion/Fall Prevention instructed to call staff for mobility;Fall Risk reviewed with patient/family   Activity Management up in chair   Pain/Comfort/Sleep   Preferred Pain Scale word (verbal rating pain scale)   Pain Rating (0-10): Rest 8   Sleep/Rest/Relaxation awake

## 2019-10-23 NOTE — SUBJECTIVE & OBJECTIVE
Interval History: awake and alert, still having loose stool. Patient on liquid diet consisting of applesauce, milk and juice- switch diet     Review of Systems   Constitutional: Negative for activity change, fatigue and fever.   Respiratory: Negative for apnea, cough and shortness of breath.    Cardiovascular: Negative for chest pain and palpitations.   Gastrointestinal: Positive for diarrhea. Negative for abdominal distention, nausea and vomiting.   Genitourinary: Negative for difficulty urinating and hematuria.   Musculoskeletal: Positive for arthralgias.   Neurological: Negative for dizziness and numbness.   Psychiatric/Behavioral: Negative for agitation. The patient is not nervous/anxious.      Objective:     Vital Signs (Most Recent):  Temp: 98.6 °F (37 °C) (10/23/19 1543)  Pulse: (!) 113 (10/23/19 1600)  Resp: 18 (10/23/19 1543)  BP: (!) 143/64 (10/23/19 1543)  SpO2: 98 % (10/23/19 1615) Vital Signs (24h Range):  Temp:  [97.7 °F (36.5 °C)-98.6 °F (37 °C)] 98.6 °F (37 °C)  Pulse:  [101-129] 113  Resp:  [17-20] 18  SpO2:  [96 %-98 %] 98 %  BP: (113-143)/(60-87) 143/64     Weight: 98.1 kg (216 lb 4.3 oz)  Body mass index is 37.12 kg/m².    Intake/Output Summary (Last 24 hours) at 10/23/2019 1802  Last data filed at 10/23/2019 0600  Gross per 24 hour   Intake 1600 ml   Output 900 ml   Net 700 ml      Physical Exam   Constitutional: She appears well-nourished.   HENT:   Head: Normocephalic and atraumatic.   Pulmonary/Chest: No respiratory distress.   Neurological: She is alert.   Vitals reviewed.      Significant Labs:   CBC:   No results for input(s): WBC, HGB, HCT, PLT in the last 48 hours.  CMP:   Recent Labs   Lab 10/22/19  0420      K 4.1      CO2 17*   GLU 54*   BUN 17   CREATININE 1.1   CALCIUM 8.2*   PROT 6.1   ALBUMIN 1.8*   BILITOT 0.6   ALKPHOS 99   AST 18   ALT 5*   ANIONGAP 17*   EGFRNONAA 50*     TSH: No results for input(s): TSH in the last 4320 hours.  Urine Culture: No results for  input(s): LABURIN in the last 48 hours.  Urine Studies: No results for input(s): COLORU, APPEARANCEUA, PHUR, SPECGRAV, PROTEINUA, GLUCUA, KETONESU, BILIRUBINUA, OCCULTUA, NITRITE, UROBILINOGEN, LEUKOCYTESUR, RBCUA, WBCUA, BACTERIA, SQUAMEPITHEL, HYALINECASTS in the last 48 hours.    Invalid input(s): WRIGHTSUR    Significant Imaging: none

## 2019-10-23 NOTE — PT/OT/SLP PROGRESS
Occupational Therapy   Treatment    Name: Anabella Aranda  MRN: [id number]  Admitting Diagnosis:  History of left knee replacement  2 Days Post-Op    Recommendations:     Discharge Recommendations: rehabilitation facility  Discharge Equipment Recommendations:  none  Barriers to discharge:  None    Assessment:     Anabella Aranda is a 74 y.o. female with a medical diagnosis of History of left knee replacement.  She presents with c/o her stomach feels agravating complaints, loose watery stools during OT toileting session. Performance deficits affecting function are weakness, impaired self care skills, impaired balance, pain, decreased safety awareness, decreased ROM, impaired skin, impaired joint extensibility, edema, impaired functional mobilty, impaired endurance, gait instability, impaired cognition, orthopedic precautions, decreased lower extremity function.     Rehab Prognosis:  Good; patient would benefit from acute skilled OT services to address these deficits and reach maximum level of function.       Plan:     Patient to be seen 5 x/week to address the above listed problems via self-care/home management, therapeutic exercises, therapeutic activities  · Plan of Care Expires: 11/17/19  · Plan of Care Reviewed with: patient    Subjective     Pain/Comfort:  · Pain Rating 1: 0/10  · Pain Rating Post-Intervention 1: 0/10    Objective:     Communicated with: nurse prior to session.  Patient found HOB elevated with bed alarm, telemetry(telesitter) upon OT entry to room.    General Precautions: Standard, fall   Orthopedic Precautions:LLE weight bearing as tolerated   Braces: N/A     Occupational Performance:     Bed Mobility:    · Patient completed Rolling/Turning to Right with stand by assistance and with side rail  · Patient completed Scooting/Bridging with stand by assistance and with side rail  · Patient completed Supine to Sit with stand by assistance and with side rail  · Patient completed Sit to Supine  with stand by assistance and with side rail     Functional Mobility/Transfers:  · Patient completed Sit <> Stand Transfer with contact guard assistance  with  rolling walker   · Patient completed Toilet Transfer Step Transfer technique with contact guard assistance with  rolling walker and bedside commode    Activities of Daily Living:  · Toileting: moderate assistance don brief supine and doffed seated on BSC; perirectal hygiene  2/2 loose watery stools       AMPAC 6 Click ADL: 19    Treatment & Education:  See above    Patient left HOB elevated with all lines intact, call button in reach, bed alarm on and nurse notifiedEducation:      GOALS:   Multidisciplinary Problems     Occupational Therapy Goals        Problem: Occupational Therapy Goal    Goal Priority Disciplines Outcome Interventions   Occupational Therapy Goal     OT, PT/OT Ongoing, Progressing    Description:  Goals to be met by: 11/17     Patient will increase functional independence with ADLs by performing:    UE Dressing with Supervision.  LE Dressing with Supervision.  Grooming while standing with Supervision.  Toileting from toilet with Supervision for hygiene and clothing management.   Toilet transfer to toilet with Supervision.  Upper extremity exercise program x10 reps per handout, with assistance as needed.---MET 10/18/2019                       Time Tracking:     OT Date of Treatment: 10/23/19  OT Start Time: 1510  OT Stop Time: 1541  OT Total Time (min): 31 min    Billable Minutes:Self Care/Home Management 15  Therapeutic Activity 16  Total Time 31    Natalia Heaton OT  10/23/2019

## 2019-10-23 NOTE — TELEPHONE ENCOUNTER
----- Message from Cecil Beltran sent at 10/23/2019 10:01 AM CDT -----  Contact: [facility]  [facility] called to have form faxed back to them as completed by the doctor.    Please fax to [phone]

## 2019-10-23 NOTE — PT/OT/SLP PROGRESS
Physical Therapy Treatment    Patient Name:  Anabella Aranda   MRN:  [id number]    Recommendations:     Discharge Recommendations:  nursing facility, skilled, home with home health   Discharge Equipment Recommendations: none   Barriers to discharge: decreased mobility,endurance and strength    Assessment:     Anabella Aranda is a 74 y.o. female admitted with a medical diagnosis of History of left knee replacement.  She presents with the following impairments/functional limitations:  weakness, impaired endurance, impaired functional mobilty, decreased lower extremity function, decreased ROM, impaired coordination, impaired skin, orthopedic precautions,pt just back to bed from up and chair and being cleaned from being soiled,pt performed le supine ex's only,pt remains with decreased mobility and endurance and will benefit from continuing PT services upon discharge.    Rehab Prognosis: Fair; patient would benefit from acute skilled PT services to address these deficits and reach maximum level of function.    Recent Surgery: Procedure(s) (LRB):  EGD (ESOPHAGOGASTRODUODENOSCOPY) (N/A)  SIGMOIDOSCOPY, FLEXIBLE (N/A) 2 Days Post-Op    Plan:     During this hospitalization, patient to be seen BID to address the identified rehab impairments via gait training, therapeutic activities, therapeutic exercises and progress toward the following goals:    · Plan of Care Expires:  11/18/19    Subjective     Chief Complaint: n/a  Patient/Family Comments/goals: pt has mouth pain.  Pain/Comfort:  · Pain Rating 1: (no c/o's)      Objective:     Communicated with nsg prior to session.  Patient found supine with telemetry upon PT entry to room.     General Precautions: Standard, fall   Orthopedic Precautions:LLE weight bearing as tolerated   Braces: N/A     Functional Mobility:  · Gait: n/a      AM-PAC 6 CLICK MOBILITY  Turning over in bed (including adjusting bedclothes, sheets and blankets)?: 3  Sitting down on and standing up from  a chair with arms (e.g., wheelchair, bedside commode, etc.): 2  Moving from lying on back to sitting on the side of the bed?: 3  Moving to and from a bed to a chair (including a wheelchair)?: 3  Need to walk in hospital room?: 3  Climbing 3-5 steps with a railing?: 2  Basic Mobility Total Score: 16       Therapeutic Activities and Exercises: le supine ex's x 10-12 reps inc: ap,qs,hs,abd/add,slr with assist on L.       Patient left supine with all lines intact, call button in reach and bed alarm on..    GOALS:   Multidisciplinary Problems     Physical Therapy Goals        Problem: Physical Therapy Goal    Goal Priority Disciplines Outcome Goal Variances Interventions   Physical Therapy Goal     PT, PT/OT Ongoing, Progressing     Description:  1.  Supine to/from sit with with supervision  2.  Bed to/from chair with RW with supervision  3.  Ambulate 50' with RW with supervision  4.  Tolerate sitting 2 hours  MET 10/22/19                    Time Tracking:     PT Received On: 10/23/19  PT Start Time: 1330     PT Stop Time: 1341  PT Total Time (min): 11 min     Billable Minutes: Therapeutic Exercise 11    Treatment Type: Treatment  PT/PTA: PTA     PTA Visit Number: 0     Kris Henderson, PTA  10/23/2019

## 2019-10-23 NOTE — PLAN OF CARE
Patient awake and alert. Scheduled medications administered per MD order. Prn medications administered for pain and diarrhea. Afebrile. Denies any nausea. Patient safety maintained. Will continue to monitor.

## 2019-10-23 NOTE — PROGRESS NOTES
[facility]  Gastroenterology  Progress Note    Patient Name: Anabella Aranda  MRN: [id number]  Admission Date: 10/16/2019  Hospital Length of Stay: 7 days  Code Status: Full Code   Attending Provider: Ghassan Lindsay MD  Consulting Provider: Og Carrera MD  Primary Care Physician: Shon Brambila MD  Principal Problem: History of left knee replacement      Subjective:     Interval History:     Pt without complaints this am.  Says no abd pain.  Thinks she has some loose stool overnight.  nontoxic    Review of Systems   Constitutional: Positive for activity change and appetite change. Negative for chills and fever.   Respiratory: Negative for cough and shortness of breath.    Cardiovascular: Negative for chest pain and palpitations.   Gastrointestinal: Positive for abdominal pain and diarrhea. Negative for abdominal distention and blood in stool.     Objective:     Vital Signs (Most Recent):  Temp: 97.7 °F (36.5 °C) (10/23/19 1140)  Pulse: 101 (10/23/19 1148)  Resp: 20 (10/23/19 1140)  BP: 139/62 (10/23/19 1140)  SpO2: 96 % (10/23/19 1156) Vital Signs (24h Range):  Temp:  [97.7 °F (36.5 °C)-98.6 °F (37 °C)] 97.7 °F (36.5 °C)  Pulse:  [101-129] 101  Resp:  [17-20] 20  SpO2:  [96 %-98 %] 96 %  BP: (113-139)/(60-87) 139/62     Weight: 98.1 kg (216 lb 4.3 oz) (10/23/19 0435)  Body mass index is 37.12 kg/m².      Intake/Output Summary (Last 24 hours) at 10/23/2019 1532  Last data filed at 10/23/2019 0600  Gross per 24 hour   Intake 3280 ml   Output 900 ml   Net 2380 ml       Lines/Drains/Airways     Airway                 Airway - Non-Surgical 10/21/19 1634 Nasal Cannula 1 day          Peripheral Intravenous Line                 Peripheral IV - Single Lumen 10/22/19 2247 22 G Distal;Right;Posterior Wrist less than 1 day                Physical Exam   Constitutional: No distress.   Pulmonary/Chest: Effort normal.   Abdominal: Soft. She exhibits no distension and no mass. There is no rebound.    Neurological: She is alert.   Psychiatric: She has a normal mood and affect. Her behavior is normal.   Vitals reviewed.      Significant Labs:  Amylase: No results for input(s): AMYLASE in the last 48 hours.  Blood Culture: No results for input(s): LABBLOO in the last 48 hours.  CBC: No results for input(s): WBC, HGB, HCT, PLT in the last 48 hours.  CMP:   Recent Labs   Lab 10/22/19  0420   GLU 54*   CALCIUM 8.2*   ALBUMIN 1.8*   PROT 6.1      K 4.1   CO2 17*      BUN 17   CREATININE 1.1   ALKPHOS 99   ALT 5*   AST 18   BILITOT 0.6     Coagulation: No results for input(s): PT, INR, APTT in the last 48 hours.  CRP: No results for input(s): CRP in the last 48 hours.  ESR: No results for input(s): SEDRATE in the last 48 hours.      Significant Imaging:  Imaging results within the past 24 hours have been reviewed.    Assessment/Plan:     Ulcerative proctitis with complication  Hx of UC s/p colectomy with retained rectal stump, had flex sig 1/2019 showing continued inflammation of the rectal stump, otherwise the small bowel was normal. CT enterography recently showed continued inflammation of the rectal stump.    EGD and Flex sig 10/21  SEVERE small bowel inflammation and SEVERE retained rectum inflammation with deep heaped up ulcers, concerning for infectious etiology such as CMV.    Recs:  I have spoken to pathology and slides are being prepared, results likely this afternoon or tomorrow.  Await CMV PCR in serum as well  PPI BID  STOPPED all immunosuppressants.  Await path results  Likely will need ID consultation for CMV  Supportive care per primary and medical team, appreciate medical management per medicine consult    scheduled lomotil BID ; Can use immodium PRN and alternate with lomotil    Will need to await path and formulate treatment plan prior to discharge to [facility] given severity of infectious enteritis / colitis.          Thank you for your consult. I will follow-up with patient. Please contact us  if you have any additional questions.    Og Carrera MD  Gastroenterology  [facility]

## 2019-10-23 NOTE — TELEPHONE ENCOUNTER
Tried calling [facility] at [phone], but the number is for [place]. Faxed paperwork back stating that Dr. Brambila is not the treating physician for her Pain Management for her back.

## 2019-10-23 NOTE — ASSESSMENT & PLAN NOTE
Hx of total colectomy  Still having diarrhea  C.diff neg on 10/18  GI on board- appreciates re'c Lomotil and imodium.awaiting pathology report

## 2019-10-24 ENCOUNTER — TELEPHONE (OUTPATIENT)
Dept: PHARMACY | Facility: CLINIC | Age: 74
End: 2019-10-24

## 2019-10-24 ENCOUNTER — TELEPHONE (OUTPATIENT)
Dept: FAMILY MEDICINE | Facility: CLINIC | Age: 74
End: 2019-10-24

## 2019-10-24 PROCEDURE — 97110 THERAPEUTIC EXERCISES: CPT

## 2019-10-24 PROCEDURE — 94761 N-INVAS EAR/PLS OXIMETRY MLT: CPT

## 2019-10-24 PROCEDURE — 21400001 HC TELEMETRY ROOM

## 2019-10-24 PROCEDURE — 25000003 PHARM REV CODE 250: Performed by: INTERNAL MEDICINE

## 2019-10-24 PROCEDURE — 97535 SELF CARE MNGMENT TRAINING: CPT

## 2019-10-24 PROCEDURE — 97530 THERAPEUTIC ACTIVITIES: CPT

## 2019-10-24 PROCEDURE — 25000003 PHARM REV CODE 250: Performed by: ORTHOPAEDIC SURGERY

## 2019-10-24 RX ADMIN — ASPIRIN 81 MG: 81 TABLET, COATED ORAL at 09:10

## 2019-10-24 RX ADMIN — CALCIUM CARBONATE 500 MG: 500 TABLET, CHEWABLE ORAL at 08:10

## 2019-10-24 RX ADMIN — MEMANTINE HYDROCHLORIDE 10 MG: 5 TABLET ORAL at 08:10

## 2019-10-24 RX ADMIN — ACETAMINOPHEN 1000 MG: 500 TABLET ORAL at 09:10

## 2019-10-24 RX ADMIN — FERROUS SULFATE TAB EC 325 MG (65 MG FE EQUIVALENT) 325 MG: 325 (65 FE) TABLET DELAYED RESPONSE at 08:10

## 2019-10-24 RX ADMIN — PANTOPRAZOLE SODIUM 40 MG: 40 TABLET, DELAYED RELEASE ORAL at 08:10

## 2019-10-24 RX ADMIN — PANTOPRAZOLE SODIUM 40 MG: 40 TABLET, DELAYED RELEASE ORAL at 09:10

## 2019-10-24 RX ADMIN — METHOTREXATE 1 TABLET: 2.5 TABLET ORAL at 08:10

## 2019-10-24 RX ADMIN — QUETIAPINE 100 MG: 100 TABLET ORAL at 08:10

## 2019-10-24 RX ADMIN — FERROUS SULFATE TAB EC 325 MG (65 MG FE EQUIVALENT) 325 MG: 325 (65 FE) TABLET DELAYED RESPONSE at 09:10

## 2019-10-24 RX ADMIN — METHOTREXATE 1 TABLET: 2.5 TABLET ORAL at 09:10

## 2019-10-24 RX ADMIN — DONEPEZIL HYDROCHLORIDE 10 MG: 5 TABLET, FILM COATED ORAL at 08:10

## 2019-10-24 RX ADMIN — MEMANTINE HYDROCHLORIDE 10 MG: 5 TABLET ORAL at 09:10

## 2019-10-24 RX ADMIN — CALCIUM CARBONATE 500 MG: 500 TABLET, CHEWABLE ORAL at 09:10

## 2019-10-24 RX ADMIN — ACETAMINOPHEN 1000 MG: 500 TABLET ORAL at 03:10

## 2019-10-24 NOTE — PT/OT/SLP PROGRESS
Occupational Therapy   Treatment    Name: Anabella Aranda  MRN: [id number]  Admitting Diagnosis:  History of left knee replacement  3 Days Post-Op    Recommendations:     Discharge Recommendations: home with home health, home health PT, home health OT, nursing facility, skilled(HH vs SNF)  Discharge Equipment Recommendations:  none  Barriers to discharge:  None    Assessment:     Anabella Aranda is a 74 y.o. female with a medical diagnosis of History of left knee replacement.  Performance deficits affecting function are weakness, impaired endurance, impaired self care skills, impaired functional mobilty, gait instability, impaired balance, decreased lower extremity function, decreased safety awareness, pain, impaired skin, edema, orthopedic precautions. Pt found in bed, agreeable to therapy, despite continuous diarrhea. Pt progressing towards goals.  No complaints of pain in LLE throughout tx. Continue OT services to address functional goals, progressing as able.      Rehab Prognosis:  Good; patient would benefit from acute skilled OT services to address these deficits and reach maximum level of function.       Plan:     Patient to be seen 5 x/week to address the above listed problems via self-care/home management, therapeutic activities, therapeutic exercises  · Plan of Care Expires: 11/17/19  · Plan of Care Reviewed with: patient    Subjective     Pain/Comfort:  · Pain Rating 1: 0/10  · Pain Rating Post-Intervention 1: 0/10    Objective:     Communicated with: RN prior to session.  Patient found HOB elevated with bed alarm, telemetry, peripheral IV upon OT entry to room.    General Precautions: Standard, fall   Orthopedic Precautions:LLE weight bearing as tolerated   Braces: N/A     Occupational Performance:     Bed Mobility:    · Patient completed Rolling/Turning to Right with stand by assistance and with side rail  · Patient completed Scooting/Bridging with stand by assistance and scoot seated to  EOB  · Patient completed Supine to Sit with stand by assistance and with side rail     Functional Mobility/Transfers:  · Patient completed Sit <> Stand Transfer with stand by assistance  with  rolling walker and vc's for hand placement   · Patient completed Bed <> Chair Transfer using Stand Pivot technique with stand by assistance with rolling walker  · Patient completed Toilet Transfer Stand Pivot technique with stand by assistance with  rolling walker and bedside commode  · Functional Mobility: Pt ambulated ~8 steps to chair with SBA using RW.    Activities of Daily Living:  · Toileting: total assistance for perianal/perineal cleaning after diarrhea.  Pt soiled diaper during Bed Mob and then transferred to [facility] for continued diarrhea.  RN aware.      [facility] 6 Click ADL: 18    Treatment & Education:  Pt performed BUE AROM ex 2 x 10 reps all major jts/planes.  Pt tolerated well without complaints.    Patient left up in chair with all lines intact, call button in reach, chair alarm on and RN notifiedEducation:      GOALS:   Multidisciplinary Problems     Occupational Therapy Goals        Problem: Occupational Therapy Goal    Goal Priority Disciplines Outcome Interventions   Occupational Therapy Goal     OT, PT/OT Ongoing, Progressing    Description:  Goals to be met by: 11/17     Patient will increase functional independence with ADLs by performing:    UE Dressing with Supervision.  LE Dressing with Supervision.  Grooming while standing with Supervision.  Toileting from toilet with Supervision for hygiene and clothing management.   Toilet transfer to toilet with Supervision.  Upper extremity exercise program x10 reps per handout, with assistance as needed.---MET 10/18/2019                       Time Tracking:     OT Date of Treatment: 10/24/19  OT Start Time: 1305  OT Stop Time: 1331  OT Total Time (min): 26 min    Billable Minutes:Self Care/Home Management 16  Therapeutic Exercise 10    Cammy Jean  GUEVARA/L  10/24/2019

## 2019-10-24 NOTE — PT/OT/SLP PROGRESS
Physical Therapy Treatment    Patient Name:  Anabella Aranda   MRN:  [id number]    Recommendations:     Discharge Recommendations:  home with home health, nursing facility, skilled   Discharge Equipment Recommendations: none   Barriers to discharge: decreased mobility,endurance and strength    Assessment:     Anabella Aranda is a 74 y.o. female admitted with a medical diagnosis of History of left knee replacement.  She presents with the following impairments/functional limitations:  weakness, impaired endurance, impaired functional mobilty, impaired cognition, decreased ROM, impaired skin, orthopedic precautions,pt with good participation and remains limited by increased diarrhea,pt remains with decreased endurance,mobility and strength and will benefit from continuing PT services upon discharge.    Rehab Prognosis: Fair; patient would benefit from acute skilled PT services to address these deficits and reach maximum level of function.    Recent Surgery: Procedure(s) (LRB):  EGD (ESOPHAGOGASTRODUODENOSCOPY) (N/A)  SIGMOIDOSCOPY, FLEXIBLE (N/A) 3 Days Post-Op    Plan:     During this hospitalization, patient to be seen BID to address the identified rehab impairments via gait training, therapeutic activities, therapeutic exercises and progress toward the following goals:    · Plan of Care Expires:  11/18/19    Subjective     Chief Complaint: n/a  Patient/Family Comments/goals: pt had a BM in diaper again.  Pain/Comfort:  · Pain Rating 1: 0/10      Objective:     Communicated with nsg prior to session.  Patient found supine with bed alarm, telemetry upon PT entry to room.     General Precautions: Standard, fall   Orthopedic Precautions:LLE weight bearing as tolerated   Braces: N/A     Functional Mobility:  · Bed Mobility:     · Supine to Sit: minimum assistance  · Sit to Supine: minimum assistance  · Transfers:     · Sit to Stand:  minimum assistance with rolling walker  · Balance: fair standing balance with  RW      AM-PAC 6 CLICK MOBILITY  Turning over in bed (including adjusting bedclothes, sheets and blankets)?: 3  Sitting down on and standing up from a chair with arms (e.g., wheelchair, bedside commode, etc.): 3  Moving from lying on back to sitting on the side of the bed?: 3  Moving to and from a bed to a chair (including a wheelchair)?: 3  Need to walk in hospital room?: 3  Climbing 3-5 steps with a railing?: 2  Basic Mobility Total Score: 17       Therapeutic Activities and Exercises: le supine ex's x 15 reps inc: ap,qs,hs,abd/add,slr,sit-stand to RW X 2 trials with Min A and pt soiled herself again,place back in supine to be cleaned and pct notified,set up chair for pt to sit in.       (PM) up in chair,sit-stand to RW with Min A,3-4 steps to bed with RW and Min A,pt soiled again,pct notified,supine with alarm set.       Patient left supine with all lines intact, call button in reach, bed alarm on and pct notified..    GOALS: see general POC  Multidisciplinary Problems     Physical Therapy Goals        Problem: Physical Therapy Goal    Goal Priority Disciplines Outcome Goal Variances Interventions   Physical Therapy Goal     PT, PT/OT Ongoing, Progressing     Description:  1.  Supine to/from sit with with supervision  2.  Bed to/from chair with RW with supervision  3.  Ambulate 50' with RW with supervision  4.  Tolerate sitting 2 hours  MET 10/22/19                    Time Tracking:     PT Received On: 10/24/19  PT Start Time: 0903       1425(PM)  PT Stop Time: 0928        1438(PM)  PT Total Time (min): 25 min     13 min(PM)    Billable Minutes: Therapeutic Activity 15 and Therapeutic Exercise 10    Treatment Type: Treatment  PT/PTA: PTA     PTA Visit Number: 1     Kris Henderson, PTA  10/24/2019

## 2019-10-24 NOTE — PLAN OF CARE
Pt AAOx4. CATERINA at the bedside. Medications administered as ordered. Pt denies any pain, nausea, or discomfort. Cardiac monitoring maintained. Encouraged to call with questions/concerns. Will continue to monitor. Safety maintained.

## 2019-10-24 NOTE — TREATMENT PLAN
GI Follow-up Note    Brief note.    Have contacted pathology daily.  Still awaiting results (was expected to have results by today..)    Treatment decision will need to be sstrictly based on pathology results.    Plan as prior.  Await path.  Needs to stay inpatient until treatment plan figured out, which is based on results of path.      Please call with any additional concerns /questions    Og Carrera MD  [facility]

## 2019-10-24 NOTE — TELEPHONE ENCOUNTER
----- Message from Tawana Lr sent at 10/24/2019  9:26 AM CDT -----  Paul with [facility] called.   No. [phone]   What is the status of the form fax sent on 10/21/19 regarding a back brace.     Fax no. [phone]

## 2019-10-24 NOTE — PLAN OF CARE
Reviewed plan of care with patient.  Patient verbalized understanding.  Patient has been resting most of the shift.  Patient had 5 loose BMs, unable to sit up in chair today.  VSS.  NAD noted.  Safety measures in place.  No falls or injuries noted.  No c/o of pain or discomfort.  Will continue to monitor patients care.

## 2019-10-24 NOTE — TELEPHONE ENCOUNTER
Tried calling Paul with [facility] at [phone]. No answer and voicemail is full and unable to leave a message.

## 2019-10-24 NOTE — PLAN OF CARE
Problem: Occupational Therapy Goal  Goal: Occupational Therapy Goal  Description  Goals to be met by: 11/17     Patient will increase functional independence with ADLs by performing:    UE Dressing with Supervision.  LE Dressing with Supervision.  Grooming while standing with Supervision.  Toileting from toilet with Supervision for hygiene and clothing management.   Toilet transfer to toilet with Supervision.  Upper extremity exercise program x10 reps per handout, with assistance as needed.---MET 10/18/2019      Outcome: Ongoing, Progressing     Anabella Aranda is a 74 y.o. female with a medical diagnosis of History of left knee replacement.  Performance deficits affecting function are weakness, impaired endurance, impaired self care skills, impaired functional mobilty, gait instability, impaired balance, decreased lower extremity function, decreased safety awareness, pain, impaired skin, edema, orthopedic precautions. Pt found in bed, agreeable to therapy, despite continuous diarrhea. Pt progressing towards goals.  No complaints of pain in LLE throughout tx. Continue OT services to address functional goals, progressing as able.      PAOLA Maldonado/HOWARD

## 2019-10-25 ENCOUNTER — TELEPHONE (OUTPATIENT)
Dept: FAMILY MEDICINE | Facility: CLINIC | Age: 74
End: 2019-10-25

## 2019-10-25 PROBLEM — K50.819 CROHN'S DISEASE OF SMALL AND LARGE INTESTINES WITH COMPLICATION: Chronic | Status: ACTIVE | Noted: 2019-10-25

## 2019-10-25 PROBLEM — N17.9 ACUTE RENAL FAILURE SUPERIMPOSED ON STAGE 3 CHRONIC KIDNEY DISEASE: Status: RESOLVED | Noted: 2019-10-16 | Resolved: 2019-10-25

## 2019-10-25 PROBLEM — N18.30 ACUTE RENAL FAILURE SUPERIMPOSED ON STAGE 3 CHRONIC KIDNEY DISEASE: Status: RESOLVED | Noted: 2019-10-16 | Resolved: 2019-10-25

## 2019-10-25 LAB
ALBUMIN SERPL BCP-MCNC: 1.7 G/DL (ref 3.5–5.2)
ALP SERPL-CCNC: 104 U/L (ref 55–135)
ALT SERPL W/O P-5'-P-CCNC: 8 U/L (ref 10–44)
ANION GAP SERPL CALC-SCNC: 13 MMOL/L (ref 8–16)
AST SERPL-CCNC: 15 U/L (ref 10–40)
BASOPHILS # BLD AUTO: 0.01 K/UL (ref 0–0.2)
BASOPHILS NFR BLD: 0.2 % (ref 0–1.9)
BILIRUB SERPL-MCNC: 0.6 MG/DL (ref 0.1–1)
BUN SERPL-MCNC: 8 MG/DL (ref 8–23)
CALCIUM SERPL-MCNC: 8 MG/DL (ref 8.7–10.5)
CHLORIDE SERPL-SCNC: 103 MMOL/L (ref 95–110)
CO2 SERPL-SCNC: 22 MMOL/L (ref 23–29)
CREAT SERPL-MCNC: 0.7 MG/DL (ref 0.5–1.4)
CRP SERPL-MCNC: 179.9 MG/L (ref 0–8.2)
DIFFERENTIAL METHOD: ABNORMAL
EOSINOPHIL # BLD AUTO: 0 K/UL (ref 0–0.5)
EOSINOPHIL NFR BLD: 0.6 % (ref 0–8)
ERYTHROCYTE [DISTWIDTH] IN BLOOD BY AUTOMATED COUNT: 13.8 % (ref 11.5–14.5)
EST. GFR  (AFRICAN AMERICAN): >60 ML/MIN/1.73 M^2
EST. GFR  (NON AFRICAN AMERICAN): >60 ML/MIN/1.73 M^2
GLUCOSE SERPL-MCNC: 64 MG/DL (ref 70–110)
HBV CORE AB SERPL QL IA: POSITIVE
HBV SURFACE AB SER-ACNC: POSITIVE M[IU]/ML
HBV SURFACE AG SERPL QL IA: NEGATIVE
HCT VFR BLD AUTO: 27.6 % (ref 37–48.5)
HGB BLD-MCNC: 9.3 G/DL (ref 12–16)
LYMPHOCYTES # BLD AUTO: 0.8 K/UL (ref 1–4.8)
LYMPHOCYTES NFR BLD: 16.2 % (ref 18–48)
MCH RBC QN AUTO: 30.1 PG (ref 27–31)
MCHC RBC AUTO-ENTMCNC: 33.7 G/DL (ref 32–36)
MCV RBC AUTO: 89 FL (ref 82–98)
MONOCYTES # BLD AUTO: 0.6 K/UL (ref 0.3–1)
MONOCYTES NFR BLD: 11.9 % (ref 4–15)
NEUTROPHILS # BLD AUTO: 3.5 K/UL (ref 1.8–7.7)
NEUTROPHILS NFR BLD: 71.1 % (ref 38–73)
PLATELET # BLD AUTO: 384 K/UL (ref 150–350)
PMV BLD AUTO: 7.7 FL (ref 9.2–12.9)
POTASSIUM SERPL-SCNC: 4.1 MMOL/L (ref 3.5–5.1)
PROT SERPL-MCNC: 6.3 G/DL (ref 6–8.4)
RBC # BLD AUTO: 3.09 M/UL (ref 4–5.4)
SODIUM SERPL-SCNC: 138 MMOL/L (ref 136–145)
WBC # BLD AUTO: 5.05 K/UL (ref 3.9–12.7)

## 2019-10-25 PROCEDURE — 99232 SBSQ HOSP IP/OBS MODERATE 35: CPT | Mod: ,,, | Performed by: INTERNAL MEDICINE

## 2019-10-25 PROCEDURE — 80053 COMPREHEN METABOLIC PANEL: CPT

## 2019-10-25 PROCEDURE — 63600175 PHARM REV CODE 636 W HCPCS: Performed by: INTERNAL MEDICINE

## 2019-10-25 PROCEDURE — 21400001 HC TELEMETRY ROOM

## 2019-10-25 PROCEDURE — 97110 THERAPEUTIC EXERCISES: CPT

## 2019-10-25 PROCEDURE — 97116 GAIT TRAINING THERAPY: CPT

## 2019-10-25 PROCEDURE — 36415 COLL VENOUS BLD VENIPUNCTURE: CPT

## 2019-10-25 PROCEDURE — 97530 THERAPEUTIC ACTIVITIES: CPT

## 2019-10-25 PROCEDURE — 86704 HEP B CORE ANTIBODY TOTAL: CPT

## 2019-10-25 PROCEDURE — 86480 TB TEST CELL IMMUN MEASURE: CPT

## 2019-10-25 PROCEDURE — 97535 SELF CARE MNGMENT TRAINING: CPT

## 2019-10-25 PROCEDURE — 94761 N-INVAS EAR/PLS OXIMETRY MLT: CPT

## 2019-10-25 PROCEDURE — 87340 HEPATITIS B SURFACE AG IA: CPT

## 2019-10-25 PROCEDURE — 25000003 PHARM REV CODE 250: Performed by: INTERNAL MEDICINE

## 2019-10-25 PROCEDURE — 63600175 PHARM REV CODE 636 W HCPCS: Performed by: ANESTHESIOLOGY

## 2019-10-25 PROCEDURE — 85025 COMPLETE CBC W/AUTO DIFF WBC: CPT

## 2019-10-25 PROCEDURE — 99232 PR SUBSEQUENT HOSPITAL CARE,LEVL II: ICD-10-PCS | Mod: ,,, | Performed by: INTERNAL MEDICINE

## 2019-10-25 PROCEDURE — 86140 C-REACTIVE PROTEIN: CPT

## 2019-10-25 PROCEDURE — 86706 HEP B SURFACE ANTIBODY: CPT

## 2019-10-25 PROCEDURE — 25000003 PHARM REV CODE 250: Performed by: ORTHOPAEDIC SURGERY

## 2019-10-25 RX ORDER — AZATHIOPRINE 50 MG/1
100 TABLET ORAL DAILY
Status: DISCONTINUED | OUTPATIENT
Start: 2019-10-25 | End: 2019-10-29 | Stop reason: HOSPADM

## 2019-10-25 RX ORDER — ENOXAPARIN SODIUM 100 MG/ML
40 INJECTION SUBCUTANEOUS EVERY 24 HOURS
Status: DISCONTINUED | OUTPATIENT
Start: 2019-10-25 | End: 2019-10-29 | Stop reason: HOSPADM

## 2019-10-25 RX ADMIN — QUETIAPINE 100 MG: 100 TABLET ORAL at 09:10

## 2019-10-25 RX ADMIN — SODIUM CHLORIDE, SODIUM LACTATE, POTASSIUM CHLORIDE, AND CALCIUM CHLORIDE: .6; .31; .03; .02 INJECTION, SOLUTION INTRAVENOUS at 09:10

## 2019-10-25 RX ADMIN — FERROUS SULFATE TAB EC 325 MG (65 MG FE EQUIVALENT) 325 MG: 325 (65 FE) TABLET DELAYED RESPONSE at 09:10

## 2019-10-25 RX ADMIN — MEMANTINE HYDROCHLORIDE 10 MG: 5 TABLET ORAL at 09:10

## 2019-10-25 RX ADMIN — METHYLPREDNISOLONE SODIUM SUCCINATE 40 MG: 40 INJECTION, POWDER, FOR SOLUTION INTRAMUSCULAR; INTRAVENOUS at 01:10

## 2019-10-25 RX ADMIN — PANTOPRAZOLE SODIUM 40 MG: 40 TABLET, DELAYED RELEASE ORAL at 09:10

## 2019-10-25 RX ADMIN — CALCIUM CARBONATE 500 MG: 500 TABLET, CHEWABLE ORAL at 09:10

## 2019-10-25 RX ADMIN — SODIUM CHLORIDE, SODIUM LACTATE, POTASSIUM CHLORIDE, AND CALCIUM CHLORIDE: .6; .31; .03; .02 INJECTION, SOLUTION INTRAVENOUS at 04:10

## 2019-10-25 RX ADMIN — ACETAMINOPHEN 1000 MG: 500 TABLET ORAL at 09:10

## 2019-10-25 RX ADMIN — ASPIRIN 81 MG: 81 TABLET, COATED ORAL at 09:10

## 2019-10-25 RX ADMIN — ACETAMINOPHEN 1000 MG: 500 TABLET ORAL at 03:10

## 2019-10-25 RX ADMIN — AZATHIOPRINE 100 MG: 50 TABLET ORAL at 01:10

## 2019-10-25 RX ADMIN — METHOTREXATE 1 TABLET: 2.5 TABLET ORAL at 09:10

## 2019-10-25 RX ADMIN — ENOXAPARIN SODIUM 40 MG: 100 INJECTION SUBCUTANEOUS at 09:10

## 2019-10-25 RX ADMIN — LOPERAMIDE HYDROCHLORIDE 4 MG: 2 CAPSULE ORAL at 03:10

## 2019-10-25 RX ADMIN — OXYCODONE HYDROCHLORIDE 5 MG: 5 TABLET ORAL at 03:10

## 2019-10-25 RX ADMIN — DONEPEZIL HYDROCHLORIDE 10 MG: 5 TABLET, FILM COATED ORAL at 09:10

## 2019-10-25 NOTE — PLAN OF CARE
TN met with pt and pt's son Bryson for continued discharge planning. Anticipated discharge plan is [facility] once medically stable. Per GI note, pt dx with Crohn's disease. Chart and plan of care reviewed.    Per GI note:     Crohn's disease of small and large intestines with complication  Hx of UC s/p colectomy with retained rectal stump, had flex sig 1/2019 showing continued inflammation of the rectal stump, otherwise the small bowel was normal. CT enterography recently showed continued inflammation of the rectal stump.     EGD and Flex sig 10/21  SEVERE small bowel inflammation and SEVERE retained rectum inflammation with deep heaped up ulcers.  Discussed case with pathology.  No evidence of infectious process, stains negative  However on review of colectomy specimen from years ago, there was evidence of Crohns disease at that time.  Thus, this is likely severe crohns flare given the extent of small bowel involvement.     Recs:  Will start immunosuppression with solumedrol 40mg IV once today          - solumedrol 20mg BID IV thereafter  Restart imuran at 100mg daily  Daily labs including CMP, CBC  Await hepatitis B serologies  Will likely need remicade, inpatient vs. Outpatient pending response to IV steroids     scheduled lomotil BID ; Can use immodium PRN and alternate with lomotil  PPI BID     I hope to avoid completion proctectomy with end ileostomy, but that would be last resort if she does not respond to steroids.  I would recommend transfer of care to medical team at this point given complexity of medical diagnoses and Severe crohns disease flair.       10/25/19 1513   Discharge Reassessment   Assessment Type Discharge Planning Reassessment   Provided patient/caregiver education on the expected discharge date and the discharge plan Yes   Do you have any problems affording any of your prescribed medications? No   Discharge Plan A Rehab   Discharge Plan B Rehab   DME Needed Upon Discharge  none   Patient  choice form signed by patient/caregiver No   Anticipated Discharge Disposition Rehab   Can the patient answer the patient profile reliably? No, cognitively impaired

## 2019-10-25 NOTE — TELEPHONE ENCOUNTER
----- Message from Izabel Paul sent at 10/25/2019 11:22 AM CDT -----  Contact: Paul with [facility] - [phone]  Did your office receive a fax on October 21, 2019 for a back brace. Please advise       Fax # [phone]

## 2019-10-25 NOTE — PLAN OF CARE
Problem: Physical Therapy Goal  Goal: Physical Therapy Goal  Description  1.  Supine to/from sit with with supervision  2.  Bed to/from chair with RW with supervision  3.  Ambulate 50' with RW with supervision  4.  Tolerate sitting 2 hours  MET 10/22/19   Outcome: Ongoing, Progressing   Making slow progress with functional mobility

## 2019-10-25 NOTE — PROGRESS NOTES
[facility]  Gastroenterology  Progress Note    Patient Name: Anabella Aranda  MRN: [id number]  Admission Date: 10/16/2019  Hospital Length of Stay: 9 days  Code Status: Full Code   Attending Provider: Ghassan Lindsay MD  Consulting Provider: Og Carrera MD  Primary Care Physician: Shon Brambila MD  Principal Problem: History of left knee replacement      Subjective:     Interval History:  Sitting in bedside chair, no complaints.  No pain.     Review of Systems   Constitutional: Positive for activity change and appetite change. Negative for chills and fever.   Respiratory: Negative for cough and shortness of breath.    Cardiovascular: Negative for chest pain and palpitations.   Gastrointestinal: Positive for abdominal pain and diarrhea. Negative for abdominal distention and blood in stool.     Objective:     Vital Signs (Most Recent):  Temp: 97.9 °F (36.6 °C) (10/25/19 1125)  Pulse: 99 (10/25/19 1159)  Resp: 18 (10/25/19 1125)  BP: 130/63 (10/25/19 1125)  SpO2: 96 % (10/25/19 0034) Vital Signs (24h Range):  Temp:  [97.9 °F (36.6 °C)-99.2 °F (37.3 °C)] 97.9 °F (36.6 °C)  Pulse:  [] 99  Resp:  [16-20] 18  SpO2:  [96 %-99 %] 96 %  BP: (124-168)/(57-74) 130/63     Weight: 99 kg (218 lb 4.1 oz) (10/25/19 1104)  Body mass index is 37.46 kg/m².      Intake/Output Summary (Last 24 hours) at 10/25/2019 1314  Last data filed at 10/25/2019 0845  Gross per 24 hour   Intake 3450 ml   Output 300 ml   Net 3150 ml       Lines/Drains/Airways     Airway                 Airway - Non-Surgical 10/21/19 1634 Nasal Cannula 3 days          Peripheral Intravenous Line                 Peripheral IV - Single Lumen 10/22/19 2247 22 G Distal;Right;Posterior Wrist 2 days                Physical Exam   Constitutional: No distress.   Pulmonary/Chest: Effort normal.   Abdominal: Soft. She exhibits no distension and no mass. There is no rebound.   Neurological: She is alert.   Psychiatric: She has a normal mood and  affect. Her behavior is normal.   Vitals reviewed.      Significant Labs:  Blood Culture: No results for input(s): LABBLOO in the last 48 hours.  CBC:   Recent Labs   Lab 10/25/19  0759   WBC 5.05   HGB 9.3*   HCT 27.6*   *     CMP:   Recent Labs   Lab 10/25/19  0758   GLU 64*   CALCIUM 8.0*   ALBUMIN 1.7*   PROT 6.3      K 4.1   CO2 22*      BUN 8   CREATININE 0.7   ALKPHOS 104   ALT 8*   AST 15   BILITOT 0.6     Coagulation: No results for input(s): PT, INR, APTT in the last 48 hours.  CRP:   Recent Labs   Lab 10/25/19  0758   .9*     ESR: No results for input(s): SEDRATE in the last 48 hours.  Lipase: No results for input(s): LIPASE in the last 48 hours.      Significant Imaging:  Imaging results within the past 24 hours have been reviewed.    Assessment/Plan:     Crohn's disease of small and large intestines with complication  Hx of UC s/p colectomy with retained rectal stump, had flex sig 1/2019 showing continued inflammation of the rectal stump, otherwise the small bowel was normal. CT enterography recently showed continued inflammation of the rectal stump.    EGD and Flex sig 10/21  SEVERE small bowel inflammation and SEVERE retained rectum inflammation with deep heaped up ulcers.  Discussed case with pathology.  No evidence of infectious process, stains negative  However on review of colectomy specimen from years ago, there was evidence of Crohns disease at that time.  Thus, this is likely severe crohns flare given the extent of small bowel involvement.    Recs:  Will start immunosuppression with solumedrol 40mg IV once today   - solumedrol 20mg BID IV thereafter  Restart imuran at 100mg daily  Daily labs including CMP, CBC  Await hepatitis B serologies  Will likely need remicade, inpatient vs. Outpatient pending response to IV steroids    scheduled lomotil BID ; Can use immodium PRN and alternate with lomotil  PPI BID    I hope to avoid completion proctectomy with end ileostomy,  but that would be last resort if she does not respond to steroids.  I would recommend transfer of care to medical team at this point given complexity of medical diagnoses and Severe crohns disease flair.    Discussed at length with son at bedside Byrson.      Ulcerative proctitis with complication            Thank you for your consult. I will follow-up with patient. Please contact us if you have any additional questions.    Og Carrera MD  Gastroenterology  [facility]

## 2019-10-25 NOTE — PLAN OF CARE
Problem: Occupational Therapy Goal  Goal: Occupational Therapy Goal  Description  Goals to be met by: 11/17     Patient will increase functional independence with ADLs by performing:    UE Dressing with Supervision.  LE Dressing with Supervision.  Grooming while standing with Supervision.  Toileting from toilet with Supervision for hygiene and clothing management.   Toilet transfer to toilet with Supervision.  Upper extremity exercise program x10 reps per handout, with assistance as needed.---MET 10/18/2019      Outcome: Ongoing, Progressing     nAabella Aranda is a 74 y.o. female with a medical diagnosis of History of left knee replacement.   Performance deficits affecting function are weakness, impaired endurance, impaired self care skills, impaired functional mobilty, gait instability, impaired balance, impaired cognition, decreased lower extremity function, decreased safety awareness, impaired skin, edema, orthopedic precautions.  Pt found sitting EOB with PT present.  Pt requires CGA-Min A with transfers using RW. Pt slow with all activities and requires vc's for safety. Continue OT services to address functional goals, progressing as able.      ZACKARY Maldonado

## 2019-10-25 NOTE — SUBJECTIVE & OBJECTIVE
Interval History: awake and alert, diarrhea is improving.     GI reported severe Crohn's disease and plan for IV steroid, Imuran daily       Review of Systems   Constitutional: Negative for activity change, fatigue and fever.   Respiratory: Negative for apnea, cough and shortness of breath.    Cardiovascular: Negative for chest pain and palpitations.   Gastrointestinal: Positive for diarrhea. Negative for abdominal distention, nausea and vomiting.   Genitourinary: Negative for difficulty urinating and hematuria.   Musculoskeletal: Positive for arthralgias.   Neurological: Negative for dizziness and numbness.   Psychiatric/Behavioral: Negative for agitation. The patient is not nervous/anxious.      Objective:     Vital Signs (Most Recent):  Temp: 97.9 °F (36.6 °C) (10/25/19 1125)  Pulse: 99 (10/25/19 1159)  Resp: 18 (10/25/19 1125)  BP: 130/63 (10/25/19 1125)  SpO2: 96 % (10/25/19 0034) Vital Signs (24h Range):  Temp:  [97.9 °F (36.6 °C)-99.2 °F (37.3 °C)] 97.9 °F (36.6 °C)  Pulse:  [] 99  Resp:  [16-20] 18  SpO2:  [96 %] 96 %  BP: (124-168)/(57-74) 130/63     Weight: 99 kg (218 lb 4.1 oz)  Body mass index is 37.46 kg/m².    Intake/Output Summary (Last 24 hours) at 10/25/2019 1457  Last data filed at 10/25/2019 1245  Gross per 24 hour   Intake 3575 ml   Output 300 ml   Net 3275 ml      Physical Exam   Constitutional: She appears well-nourished.   HENT:   Head: Normocephalic and atraumatic.   Pulmonary/Chest: No respiratory distress.   Neurological: She is alert.   Vitals reviewed.      Significant Labs:   CBC:   Recent Labs   Lab 10/25/19  0759   WBC 5.05   HGB 9.3*   HCT 27.6*   *     CMP:   Recent Labs   Lab 10/25/19  0758      K 4.1      CO2 22*   GLU 64*   BUN 8   CREATININE 0.7   CALCIUM 8.0*   PROT 6.3   ALBUMIN 1.7*   BILITOT 0.6   ALKPHOS 104   AST 15   ALT 8*   ANIONGAP 13   EGFRNONAA >60     TSH: No results for input(s): TSH in the last 4320 hours.  Urine Culture: No results for  input(s): LABURIN in the last 48 hours.  Urine Studies: No results for input(s): COLORU, APPEARANCEUA, PHUR, SPECGRAV, PROTEINUA, GLUCUA, KETONESU, BILIRUBINUA, OCCULTUA, NITRITE, UROBILINOGEN, LEUKOCYTESUR, RBCUA, WBCUA, BACTERIA, SQUAMEPITHEL, HYALINECASTS in the last 48 hours.    Invalid input(s): WRIGHTSUR    Significant Imaging: none

## 2019-10-25 NOTE — SUBJECTIVE & OBJECTIVE
Subjective:     Interval History:  Sitting in bedside chair, no complaints.  No pain.     Review of Systems   Constitutional: Positive for activity change and appetite change. Negative for chills and fever.   Respiratory: Negative for cough and shortness of breath.    Cardiovascular: Negative for chest pain and palpitations.   Gastrointestinal: Positive for abdominal pain and diarrhea. Negative for abdominal distention and blood in stool.     Objective:     Vital Signs (Most Recent):  Temp: 97.9 °F (36.6 °C) (10/25/19 1125)  Pulse: 99 (10/25/19 1159)  Resp: 18 (10/25/19 1125)  BP: 130/63 (10/25/19 1125)  SpO2: 96 % (10/25/19 0034) Vital Signs (24h Range):  Temp:  [97.9 °F (36.6 °C)-99.2 °F (37.3 °C)] 97.9 °F (36.6 °C)  Pulse:  [] 99  Resp:  [16-20] 18  SpO2:  [96 %-99 %] 96 %  BP: (124-168)/(57-74) 130/63     Weight: 99 kg (218 lb 4.1 oz) (10/25/19 1104)  Body mass index is 37.46 kg/m².      Intake/Output Summary (Last 24 hours) at 10/25/2019 1314  Last data filed at 10/25/2019 0845  Gross per 24 hour   Intake 3450 ml   Output 300 ml   Net 3150 ml       Lines/Drains/Airways     Airway                 Airway - Non-Surgical 10/21/19 1634 Nasal Cannula 3 days          Peripheral Intravenous Line                 Peripheral IV - Single Lumen 10/22/19 2247 22 G Distal;Right;Posterior Wrist 2 days                Physical Exam   Constitutional: No distress.   Pulmonary/Chest: Effort normal.   Abdominal: Soft. She exhibits no distension and no mass. There is no rebound.   Neurological: She is alert.   Psychiatric: She has a normal mood and affect. Her behavior is normal.   Vitals reviewed.      Significant Labs:  Blood Culture: No results for input(s): LABBLOO in the last 48 hours.  CBC:   Recent Labs   Lab 10/25/19  0759   WBC 5.05   HGB 9.3*   HCT 27.6*   *     CMP:   Recent Labs   Lab 10/25/19  0758   GLU 64*   CALCIUM 8.0*   ALBUMIN 1.7*   PROT 6.3      K 4.1   CO2 22*      BUN 8   CREATININE  0.7   ALKPHOS 104   ALT 8*   AST 15   BILITOT 0.6     Coagulation: No results for input(s): PT, INR, APTT in the last 48 hours.  CRP:   Recent Labs   Lab 10/25/19  0758   .9*     ESR: No results for input(s): SEDRATE in the last 48 hours.  Lipase: No results for input(s): LIPASE in the last 48 hours.      Significant Imaging:  Imaging results within the past 24 hours have been reviewed.

## 2019-10-25 NOTE — PT/OT/SLP PROGRESS
Physical Therapy Treatment    Patient Name:  Anabella Aranda   MRN:  [id number]    Recommendations:     Discharge Recommendations:  nursing facility, skilled   Discharge Equipment Recommendations: none   Barriers to discharge: None    Assessment:     Anabella Aranda is a 74 y.o. female admitted with a medical diagnosis of History of left knee replacement.  She presents with the following impairments/functional limitations:  weakness, gait instability, decreased lower extremity function, decreased ROM, impaired balance, impaired endurance, impaired cognition, impaired skin, edema, impaired functional mobilty, impaired self care skills, decreased safety awareness, orthopedic precautions . Patient making slow progress with functional mobility. Patient required min to mod assist to come from supine <> sit. Reviewed and practiced scooting and rolling in bent knee position. Gait with RW min to CG assist with vc for RW management..    Rehab Prognosis: Good; patient would benefit from acute skilled PT services to address these deficits and reach maximum level of function.    Recent Surgery: Procedure(s) (LRB):  EGD (ESOPHAGOGASTRODUODENOSCOPY) (N/A)  SIGMOIDOSCOPY, FLEXIBLE (N/A) 4 Days Post-Op    Plan:     During this hospitalization, patient to be seen BID to address the identified rehab impairments via gait training, therapeutic activities, therapeutic exercises and progress toward the following goals:    · Plan of Care Expires:  11/18/19    Subjective     Chief Complaint: upset stomach  Patient/Family Comments/goals: go home  Pain/Comfort:  · Pain Rating 1: 0/10  · Pain Rating Post-Intervention 1: 0/10      Objective:     Communicated with primary nurse prior to session.  Patient found HOB elevated with bed alarm, peripheral IV, telemetry upon PT entry to room.     General Precautions: Standard, fall   Orthopedic Precautions:LLE weight bearing as tolerated   Braces: N/A     Functional Mobility:  · Bed Mobility:      · Supine to Sit: minimum assistance and moderate assistance  · Transfers:     · Sit to Stand:  contact guard assistance with rolling walker  · Gait: Min to CG ~ 10 ft with RW   · Balance: Poor + to fair withAD      AM-PAC 6 CLICK MOBILITY  Turning over in bed (including adjusting bedclothes, sheets and blankets)?: 3  Sitting down on and standing up from a chair with arms (e.g., wheelchair, bedside commode, etc.): 3  Moving from lying on back to sitting on the side of the bed?: 2  Moving to and from a bed to a chair (including a wheelchair)?: 3  Need to walk in hospital room?: 3  Climbing 3-5 steps with a railing?: 2  Basic Mobility Total Score: 16       Therapeutic Activities and Exercises:   see assessment    Patient left up in chair with all lines intact, call button in reach and GUEVARA present..    GOALS:   Multidisciplinary Problems     Physical Therapy Goals        Problem: Physical Therapy Goal    Goal Priority Disciplines Outcome Goal Variances Interventions   Physical Therapy Goal     PT, PT/OT Ongoing, Progressing     Description:  1.  Supine to/from sit with with supervision  2.  Bed to/from chair with RW with supervision  3.  Ambulate 50' with RW with supervision  4.  Tolerate sitting 2 hours  MET 10/22/19                    Time Tracking:     PT Received On: 10/25/19  PT Start Time: 1035     PT Stop Time: 1058  PT Total Time (min): 23 min     Billable Minutes: Gait Training 10 and Therapeutic Activity 13    Treatment Type: Treatment  PT/PTA: PT     PTA Visit Number: 1     Mo Vera, PT  10/25/2019

## 2019-10-25 NOTE — PT/OT/SLP PROGRESS
Physical Therapy Treatment    Patient Name:  Anabella Aranda   MRN:  [id number]    Recommendations:     Discharge Recommendations:  nursing facility, skilled   Discharge Equipment Recommendations: none   Barriers to discharge: Decreased caregiver support and decreased mobility,endurance and strength,pt remains with increased loose stools    Assessment:     Anabella Aranda is a 74 y.o. female admitted with a medical diagnosis of History of left knee replacement.  She presents with the following impairments/functional limitations:  weakness, impaired endurance, impaired functional mobilty, gait instability, impaired balance, impaired cognition, decreased lower extremity function, decreased safety awareness, decreased ROM, impaired coordination, impaired skin, orthopedic precautions,pt participating as able with main limitation being watery stools upon movement,will progress as able,pt will benefit from continuing PT services.    Rehab Prognosis: Fair; patient would benefit from acute skilled PT services to address these deficits and reach maximum level of function.    Recent Surgery: Procedure(s) (LRB):  EGD (ESOPHAGOGASTRODUODENOSCOPY) (N/A)  SIGMOIDOSCOPY, FLEXIBLE (N/A) 4 Days Post-Op    Plan:     During this hospitalization, patient to be seen BID to address the identified rehab impairments via gait training, therapeutic activities, therapeutic exercises and progress toward the following goals:    · Plan of Care Expires:  11/18/19    Subjective     Chief Complaint: n/a  Patient/Family Comments/goals: pt states she is soiled again.  Pain/Comfort:  · Pain Rating 1: 0/10      Objective:     Communicated with nsg prior to session.  Patient found up in chair with telemetry(chair alarm) upon PT entry to room.     General Precautions: Standard, fall   Orthopedic Precautions:LLE weight bearing as tolerated   Braces: N/A     Functional Mobility:  · Bed Mobility:     · Sit to Supine: moderate assistance  · Transfers:      · Sit to Stand:  moderate assistance and from decreased surface with rolling walker      AM-PAC 6 CLICK MOBILITY  Turning over in bed (including adjusting bedclothes, sheets and blankets)?: 3  Sitting down on and standing up from a chair with arms (e.g., wheelchair, bedside commode, etc.): 2(decreased surface)  Moving from lying on back to sitting on the side of the bed?: 2  Moving to and from a bed to a chair (including a wheelchair)?: 3  Need to walk in hospital room?: 3  Climbing 3-5 steps with a railing?: 2  Basic Mobility Total Score: 15       Therapeutic Activities and Exercises: le supine ex's X 15 reps inc: ap,qs,hs abd/add,slr with assistance on L,pt very heavily soiled with watery stool on gown socks and floor upon standing,wiped up floor and EVS notified.       Patient left supine with all lines intact, call button in reach, bed alarm on, pct notified and son present..    GOALS: see general POC  Multidisciplinary Problems     Physical Therapy Goals        Problem: Physical Therapy Goal    Goal Priority Disciplines Outcome Goal Variances Interventions   Physical Therapy Goal     PT, PT/OT Ongoing, Not Progressing     Description:  1.  Supine to/from sit with with supervision  2.  Bed to/from chair with RW with supervision  3.  Ambulate 50' with RW with supervision  4.  Tolerate sitting 2 hours  MET 10/22/19                    Time Tracking:     PT Received On: 10/25/19  PT Start Time: 1245     PT Stop Time: 1308  PT Total Time (min): 23 min     Billable Minutes: Therapeutic Activity 13 and Therapeutic Exercise 10    Treatment Type: Treatment  PT/PTA: PTA     PTA Visit Number: 0     Kris Henderson PTA  10/25/2019

## 2019-10-25 NOTE — ASSESSMENT & PLAN NOTE
Hx of UC s/p colectomy with retained rectal stump, had flex sig 1/2019 showing continued inflammation of the rectal stump, otherwise the small bowel was normal. CT enterography recently showed continued inflammation of the rectal stump.    EGD and Flex sig 10/21  SEVERE small bowel inflammation and SEVERE retained rectum inflammation with deep heaped up ulcers.  Discussed case with pathology.  No evidence of infectious process, stains negative  However on review of colectomy specimen from years ago, there was evidence of Crohns disease at that time.  Thus, this is likely severe crohns flare given the extent of small bowel involvement.    Recs:  Will start immunosuppression with solumedrol 40mg IV once today   - solumedrol 20mg BID IV thereafter  Restart imuran at 100mg daily  Daily labs including CMP, CBC  Await hepatitis B serologies  Will likely need remicade, inpatient vs. Outpatient pending response to IV steroids    scheduled lomotil BID ; Can use immodium PRN and alternate with lomotil  PPI BID    I hope to avoid completion proctectomy with end ileostomy, but that would be last resort if she does not respond to steroids.  I would recommend transfer of care to medical team at this point given complexity of medical diagnoses and Severe crohns disease flair.    Discussed at length with son at bedside Bryson.

## 2019-10-25 NOTE — PROGRESS NOTES
[facility] Hospital Medicine  Progress Note    Patient Name: Anabella Aranda  MRN: [id number]  Patient Class: IP- Inpatient   Admission Date: 10/16/2019  Length of Stay: 9 days  Attending Physician: Ghassan Lindsay MD  Primary Care Provider: Shon Brambila MD        Subjective:     Principal Problem:History of left knee replacement        HPI:  The patient is a 74 y.o. female with PMH significant for Dementia, h/o DVT status post IVC filter, ulcerative colitis, iron deficiency anemia, GERD,  Major depression with psychotic features, and history of hep B. She is here today for a pre-operative visit in preparation for a Left total knee arthroplasty to be performed by  Dr. Lindsay on 10/16/19.  Anabella Aranda has a >1 year history of Left knee pain.  Pain is worse with activity and weight bearing. Patient has experienced interference of ADLs due to increased pain and decreased range of motion. Patient has failed non-operative treatment including NSAIDs, activity modification, and bracing. Anabella Aranda ambulates Rolator walker.  There has been no significant change in medical status since last visit.  She was seen by PCP Dr. Brambila on 08/14/19 at which time chronic conditions were well controlled    The pt underwent a left TKA during this admission, and we were consulted for medical management.      Overview/Hospital Course:  The pt seen at [facility], she knows her name and age, but has some memory lapses to the events.  The pt had hypotension earlier and was given a bolus on 250ml NS x 1 dose.  GI is also on the case given her colitis.  Ordering c diff titers, and adjusting her immunosuppression meds as well.      10/19 pt still feeling weak and not at the level where she can participate in therapy, still having diarrhea. Will need to order labs to f/u on bmp    10/21 cr level is down to normal, pt is being also managed by GI for her diarrhea/colitis.  From the hospital medicine point of  view, the pt is ready for the next level of care at rehab once it is ok with GI who are also consultant on this case    Interval History: awake and alert, still having loose stool. Patient on liquid diet consisting of applesauce, milk and juice- switch diet     Review of Systems   Constitutional: Negative for activity change, fatigue and fever.   Respiratory: Negative for apnea, cough and shortness of breath.    Cardiovascular: Negative for chest pain and palpitations.   Gastrointestinal: Positive for diarrhea. Negative for abdominal distention, nausea and vomiting.   Genitourinary: Negative for difficulty urinating and hematuria.   Musculoskeletal: Positive for arthralgias.   Neurological: Negative for dizziness and numbness.   Psychiatric/Behavioral: Negative for agitation. The patient is not nervous/anxious.      Objective:     Vital Signs (Most Recent):  Temp: 98.6 °F (37 °C) (10/23/19 1543)  Pulse: (!) 113 (10/23/19 1600)  Resp: 18 (10/23/19 1543)  BP: (!) 143/64 (10/23/19 1543)  SpO2: 98 % (10/23/19 1615) Vital Signs (24h Range):  Temp:  [97.7 °F (36.5 °C)-98.6 °F (37 °C)] 98.6 °F (37 °C)  Pulse:  [101-129] 113  Resp:  [17-20] 18  SpO2:  [96 %-98 %] 98 %  BP: (113-143)/(60-87) 143/64     Weight: 98.1 kg (216 lb 4.3 oz)  Body mass index is 37.12 kg/m².    Intake/Output Summary (Last 24 hours) at 10/23/2019 1802  Last data filed at 10/23/2019 0600  Gross per 24 hour   Intake 1600 ml   Output 900 ml   Net 700 ml      Physical Exam   Constitutional: She appears well-nourished.   HENT:   Head: Normocephalic and atraumatic.   Pulmonary/Chest: No respiratory distress.   Neurological: She is alert.   Vitals reviewed.      Significant Labs:   CBC:   No results for input(s): WBC, HGB, HCT, PLT in the last 48 hours.  CMP:   Recent Labs   Lab 10/22/19  0420      K 4.1      CO2 17*   GLU 54*   BUN 17   CREATININE 1.1   CALCIUM 8.2*   PROT 6.1   ALBUMIN 1.8*   BILITOT 0.6   ALKPHOS 99   AST 18   ALT 5*    ANIONGAP 17*   EGFRNONAA 50*     TSH: No results for input(s): TSH in the last 4320 hours.  Urine Culture: No results for input(s): LABURIN in the last 48 hours.  Urine Studies: No results for input(s): COLORU, APPEARANCEUA, PHUR, SPECGRAV, PROTEINUA, GLUCUA, KETONESU, BILIRUBINUA, OCCULTUA, NITRITE, UROBILINOGEN, LEUKOCYTESUR, RBCUA, WBCUA, BACTERIA, SQUAMEPITHEL, HYALINECASTS in the last 48 hours.    Invalid input(s): WRIGHTSUR    Significant Imaging: none      Assessment/Plan:      * History of left knee replacement  S/p left knee replacement  Continue PT/OT   Pt accepted to [facility] but not ready for d/c due to UC complication    Dementia  On oral meds  Continue current management      Major depression with psychotic features  On oral meds.  Continue current management      Ulcerative proctitis with complication  Hx of total colectomy  Still having diarrhea  C.diff neg on 10/18  GI on board- appreciates re'c Lomotil and imodium.awaiting pathology report    Iron deficiency anemia  H/H is stable  GI is following      Venous insufficiency of both lower extremities          VTE Risk Mitigation (From admission, onward)         Ordered     enoxaparin injection 40 mg  Daily      10/25/19 1406     IP VTE HIGH RISK PATIENT  Once      10/16/19 1816     Place VIVIANA hose  Until discontinued      10/16/19 1816     Place sequential compression device  Until discontinued      10/16/19 1816                      Maggie De León MD  Department of Hospital Medicine   [facility]

## 2019-10-25 NOTE — ASSESSMENT & PLAN NOTE
EGD and Flex sig 10/21    Severe small bowel inflammation and severe retained rectum inflammation with deep heaped up ulcers. No evidence of infectious process, stains negative. However on review of colectomy specimen from years ago, there was evidence of Crohns disease at that time. Thus, this is likely severe crohns flare given the extent of small bowel involvement  Appreciates GI rec's solumedrol 40mg IV stat then 20mg BID IV thereafter  Restart Imuran   FU hepatitis B serologies  PPI, lomotil with imodium.

## 2019-10-25 NOTE — TELEPHONE ENCOUNTER
DOCUMENTATION ONLY:  Prior authorization for Azathioprine does not required prior authorization with insurance company.     Co-pay: $10.00    Patient Assistance IS NOT required.     Forward to clinical pharmacist for consult & shipment.

## 2019-10-25 NOTE — PLAN OF CARE
Recommendations    Recommendation/Intervention:   1. Encourage PO intake.   Goals: > 85% of EEN, EPN  Nutrition Goal Status: new

## 2019-10-25 NOTE — TELEPHONE ENCOUNTER
----- Message from Tawana Lr sent at 10/25/2019  2:28 PM CDT -----  Paul with [facility] called.   No. [phone]  What is the status of the back brace.   Please fax paper back.   Fax no. [phone]

## 2019-10-25 NOTE — PT/OT/SLP PROGRESS
Occupational Therapy   Treatment    Name: Anabella Aranda  MRN: [id number]  Admitting Diagnosis:  History of left knee replacement  4 Days Post-Op    Recommendations:     Discharge Recommendations: home with home health, home health PT, home health OT, nursing facility, skilled(Home with HH OT/PT with 24 hour assistance vs SNF)  Discharge Equipment Recommendations:  none  Barriers to discharge:  None    Assessment:     Anabella Aranda is a 74 y.o. female with a medical diagnosis of History of left knee replacement.   Performance deficits affecting function are weakness, impaired endurance, impaired self care skills, impaired functional mobilty, gait instability, impaired balance, impaired cognition, decreased lower extremity function, decreased safety awareness, impaired skin, edema, orthopedic precautions.  Pt found sitting EOB with PT present.  Pt requires CGA-Min A with transfers using RW. Pt slow with all activities and requires vc's for safety. Continue OT services to address functional goals, progressing as able.      Rehab Prognosis:  Good; patient would benefit from acute skilled OT services to address these deficits and reach maximum level of function.       Plan:     Patient to be seen 5 x/week to address the above listed problems via self-care/home management, therapeutic activities, therapeutic exercises  · Plan of Care Expires: 11/17/19  · Plan of Care Reviewed with: patient    Subjective     Pain/Comfort:  · Pain Rating 1: 0/10  · Pain Rating Post-Intervention 1: 0/10    Objective:     Communicated with: RN prior to session.  Patient found seated EOB with bed alarm, telemetry, peripheral IV upon OT entry to room.    General Precautions: Standard, fall   Orthopedic Precautions:LLE weight bearing as tolerated   Braces: N/A     Occupational Performance:     Functional Mobility/Transfers:  · Patient completed Sit <> Stand Transfer with contact guard assistance  with  rolling walker and vc's for hand  placement, increased time   · Patient completed Bed <> Chair Transfer using Stand Pivot technique with contact guard assistance and minimum assistance with rolling walker  · Functional Mobility: Pt ambulated with CGA-Min A using RW.  See PT note for details.     Activities of Daily Living:  · Grooming: stand by assistance chair level      [facility] 6 Click ADL: 18    Treatment & Education:  Pt performed BUE AROM ex 2 x 10 reps all jts/planes.  Good tolerance, no complaints.     Patient left up in chair with all lines intact, call button in reach, chair alarm on and RN notifiedEducation:      GOALS:   Multidisciplinary Problems     Occupational Therapy Goals        Problem: Occupational Therapy Goal    Goal Priority Disciplines Outcome Interventions   Occupational Therapy Goal     OT, PT/OT Ongoing, Progressing    Description:  Goals to be met by: 11/17     Patient will increase functional independence with ADLs by performing:    UE Dressing with Supervision.  LE Dressing with Supervision.  Grooming while standing with Supervision.  Toileting from toilet with Supervision for hygiene and clothing management.   Toilet transfer to toilet with Supervision.  Upper extremity exercise program x10 reps per handout, with assistance as needed.---MET 10/18/2019                       Time Tracking:     OT Date of Treatment: 10/25/19  OT Start Time: 1047  OT Stop Time: 1117  OT Total Time (min): 30 min    Billable Minutes:Self Care/Home Management 13  Therapeutic Exercise 10   *overlap with PT    ZACKARY Maldonado  10/25/2019

## 2019-10-25 NOTE — PROGRESS NOTES
"[facility]  Adult Nutrition  Progress Note    SUMMARY       Recommendations    Recommendation/Intervention:   1. Encourage PO intake.   Goals: > 85% of EEN, EPN  Nutrition Goal Status: new    Reason for Assessment    Reason For Assessment: length of stay  Diagnosis: other (see comments)(History of left knee replacement)  Relevant Medical History: HTN, Hep B, CKD stage 3  Interdisciplinary Rounds: did not attend  General Information Comments: Pt s/p left total knee anthroplasty. Pt eating 50-75% of meals. Pt had 5 loose B's 10.24.  NFPE 10.25.19- well nourished.  Nutrition Discharge Planning: Discharge on renal diet    Nutrition Risk Screen    Nutrition Risk Screen: no indicators present    Nutrition/Diet History    Spiritual, Cultural Beliefs, Religion Practices, Values that Affect Care: no    Anthropometrics    Temp: 98.5 °F (36.9 °C)  Height Method: Stated  Height: 5' 4" (162.6 cm)  Height (inches): 64 in  Weight Method: Bed Scale  Weight: 99 kg (218 lb 4.1 oz)  Weight (lb): 218.26 lb  Ideal Body Weight (IBW), Female: 120 lb  % Ideal Body Weight, Female (lb): 181.88 lb  BMI (Calculated): 37.5  BMI Grade: 35 - 39.9 - obesity - grade II     Lab/Procedures/Meds    Pertinent Labs Reviewed: reviewed  Pertinent Labs Comments: Ca 8.0, Glucose 64  Pertinent Medications Reviewed: reviewed  Pertinent Medications Comments: pantoprazole    Estimated/Assessed Needs    Weight Used For Calorie Calculations: 99 kg (218 lb 4.1 oz)  Energy Calorie Requirements (kcal): 1782 kcals/day(x 1.2)  Energy Need Method: [facility] [facility]  Protein Requirements: 59.52-79.37 g/day(0.6-0.8 g/kg)  Weight Used For Protein Calculations: 99 kg (218 lb 4.1 oz)  Fluid Requirements (mL): per MD  Estimated Fluid Requirement Method: RDA Method  RDA Method (mL): 1782     Nutrition Prescription Ordered    Current Diet Order: Diet Dysphagia Soft (IDDSI Level 6)     Evaluation of Received Nutrient/Fluid Intake    Energy Calories Required: " not meeting needs  Protein Required: not meeting needs  Fluid Required: meeting needs  % Intake of Estimated Energy Needs: 50 - 75 %  % Meal Intake: 50 - 75 %    Nutrition Risk    Level of Risk/Frequency of Follow-up: low     Assessment and Plan    Nutrition Problem  Inadequate Energy Intake    Related to (etiology):   Decreased appetite     Signs and Symptoms (as evidenced by):   <75% of meals consumed    Interventions/Recommendations (treatment strategy):  Collaboration with other providers    Nutrition Diagnosis Status:   New      Monitor and Evaluation    Food and Nutrient Intake: food and beverage intake  Food and Nutrient Adminstration: diet order  Knowledge/Beliefs/Attitudes: food and nutrition knowledge/skill  Physical Activity and Function: nutrition-related ADLs and IADLs  Anthropometric Measurements: weight  Biochemical Data, Medical Tests and Procedures: gastrointestinal profile, inflammatory profile, glucose/endocrine profile, electrolyte and renal panel, lipid profile  Nutrition-Focused Physical Findings: overall appearance     Malnutrition Assessment     Orbital Region (Subcutaneous Fat Loss): well nourished  Upper Arm Region (Subcutaneous Fat Loss): well nourished   [place] Region (Muscle Loss): well nourished  Clavicle Bone Region (Muscle Loss): well nourished       Nutrition Follow-Up    RD Follow-up?: Yes

## 2019-10-25 NOTE — TELEPHONE ENCOUNTER
----- Message from Meka Monroe MA sent at 10/24/2019  3:29 PM CDT -----  Contact: [phone]/ Paul with [facility]  I wasn't able to locate this paperwork. Did you happen to receive it?  ----- Message -----  From: Manas Antony  Sent: 10/24/2019   3:15 PM CDT  To: Kosta Menendez Staff    Paul with [facility] would like to know the status of paperwork he faxed over to you all. Please call.

## 2019-10-26 PROCEDURE — 99232 PR SUBSEQUENT HOSPITAL CARE,LEVL II: ICD-10-PCS | Mod: ,,, | Performed by: INTERNAL MEDICINE

## 2019-10-26 PROCEDURE — 21400001 HC TELEMETRY ROOM

## 2019-10-26 PROCEDURE — 25000003 PHARM REV CODE 250: Performed by: INTERNAL MEDICINE

## 2019-10-26 PROCEDURE — 99232 SBSQ HOSP IP/OBS MODERATE 35: CPT | Mod: ,,, | Performed by: INTERNAL MEDICINE

## 2019-10-26 PROCEDURE — 63600175 PHARM REV CODE 636 W HCPCS: Performed by: INTERNAL MEDICINE

## 2019-10-26 PROCEDURE — 94761 N-INVAS EAR/PLS OXIMETRY MLT: CPT

## 2019-10-26 PROCEDURE — 97110 THERAPEUTIC EXERCISES: CPT

## 2019-10-26 PROCEDURE — 25000003 PHARM REV CODE 250: Performed by: ORTHOPAEDIC SURGERY

## 2019-10-26 RX ADMIN — METHOTREXATE 1 TABLET: 2.5 TABLET ORAL at 09:10

## 2019-10-26 RX ADMIN — METHYLPREDNISOLONE SODIUM SUCCINATE 20 MG: 40 INJECTION, POWDER, FOR SOLUTION INTRAMUSCULAR; INTRAVENOUS at 09:10

## 2019-10-26 RX ADMIN — MEMANTINE HYDROCHLORIDE 10 MG: 5 TABLET ORAL at 09:10

## 2019-10-26 RX ADMIN — DONEPEZIL HYDROCHLORIDE 10 MG: 5 TABLET, FILM COATED ORAL at 09:10

## 2019-10-26 RX ADMIN — MEMANTINE HYDROCHLORIDE 10 MG: 5 TABLET ORAL at 10:10

## 2019-10-26 RX ADMIN — ACETAMINOPHEN 1000 MG: 500 TABLET ORAL at 09:10

## 2019-10-26 RX ADMIN — PANTOPRAZOLE SODIUM 40 MG: 40 TABLET, DELAYED RELEASE ORAL at 09:10

## 2019-10-26 RX ADMIN — FERROUS SULFATE TAB EC 325 MG (65 MG FE EQUIVALENT) 325 MG: 325 (65 FE) TABLET DELAYED RESPONSE at 09:10

## 2019-10-26 RX ADMIN — QUETIAPINE 100 MG: 100 TABLET ORAL at 09:10

## 2019-10-26 RX ADMIN — CALCIUM CARBONATE 500 MG: 500 TABLET, CHEWABLE ORAL at 09:10

## 2019-10-26 RX ADMIN — AZATHIOPRINE 100 MG: 50 TABLET ORAL at 09:10

## 2019-10-26 RX ADMIN — ASPIRIN 81 MG: 81 TABLET, COATED ORAL at 09:10

## 2019-10-26 RX ADMIN — ENOXAPARIN SODIUM 40 MG: 100 INJECTION SUBCUTANEOUS at 05:10

## 2019-10-26 NOTE — PLAN OF CARE
Reviewed plan of care with patient.  Patient verbalized understanding.  Patient had 5 large loose BMs this shift. Patient was given medication to help stop.  As of PCT the BMs did decrease compared to yesterday.  LLE elevated on pillows.  VSS. NAD noted.  Safety measures in place.  No c'/o falls or injuries this shift. Will continue to monitor patient care.

## 2019-10-26 NOTE — ASSESSMENT & PLAN NOTE
Hx of UC s/p colectomy with retained rectal stump, had flex sig 1/2019 showing continued inflammation of the rectal stump, otherwise the small bowel was normal. CT enterography recently showed continued inflammation of the rectal stump.    EGD and Flex sig 10/21  SEVERE small bowel inflammation and SEVERE retained rectum inflammation with deep heaped up ulcers.  Discussed case with pathology.  No evidence of infectious process, stains negative  However on review of colectomy specimen from years ago, there was evidence of Crohns disease at that time.  Thus, this is likely severe crohns flare given the extent of small bowel involvement.    Recs:  solumedrol 20mg BID IV   Restart imuran at 100mg daily  Daily labs including CMP, CBC  Await hepatitis B serologies   - check HBV DNA given HBV core Ab total positive  Will likely need remicade, inpatient vs. Outpatient pending response to IV steroids    scheduled lomotil BID ; Can use immodium PRN and alternate with lomotil  PPI BID    I hope to avoid completion proctectomy with end ileostomy, but that would be last resort if she does not respond to steroids.

## 2019-10-26 NOTE — PLAN OF CARE
Problem: Adult Inpatient Plan of Care  Goal: Plan of Care Review  Outcome: Ongoing, Progressing     Problem: Fall Injury Risk  Goal: Absence of Fall and Fall-Related Injury  Outcome: Ongoing, Progressing     Problem: Diarrhea  Goal: Fluid and Electrolyte Balance  Outcome: Ongoing, Progressing     Problem: Adjustment to Illness (Bowel Disease, Inflammatory)  Goal: Optimal Adaptation to Chronic Illness  Outcome: Ongoing, Progressing     Problem: Diarrhea (Bowel Disease, Inflammatory)  Goal: Diarrhea Symptom Relief  Outcome: Ongoing, Progressing     Problem: Infection (Bowel Disease, Inflammatory)  Goal: Absence of Infection Signs/Symptoms  Outcome: Ongoing, Progressing     Problem: Pain (Bowel Disease, Inflammatory)  Goal: Acceptable Pain Control  Outcome: Ongoing, Progressing    Cued into patient's room.  Permission received per patient to turn camera to view patient.  Introduced as VN for night shift that will be working with floor nurse and nursing assistant.  Educated patient on VN's role in patient care. Plan of care reviewed with patient. Education per flowsheet.  Opportunity given for questions and questions answered.  Instructed to call for assistance.  Will cont to monitor.    Labs, notes, and orders reviewed.

## 2019-10-26 NOTE — SUBJECTIVE & OBJECTIVE
Subjective:     Interval History:     States she is doing better today.  States pain is less.   No family in room to confirm this.    Review of Systems   Constitutional: Positive for activity change and appetite change. Negative for chills and fever.   Respiratory: Negative for cough and shortness of breath.    Cardiovascular: Negative for chest pain and palpitations.   Gastrointestinal: Positive for diarrhea. Negative for abdominal distention, abdominal pain and blood in stool.     Objective:     Vital Signs (Most Recent):  Temp: 98.1 °F (36.7 °C) (10/26/19 1121)  Pulse: 89 (10/26/19 1159)  Resp: 18 (10/26/19 1121)  BP: 124/60 (10/26/19 1121)  SpO2: 96 % (10/26/19 1156) Vital Signs (24h Range):  Temp:  [97.4 °F (36.3 °C)-98.3 °F (36.8 °C)] 98.1 °F (36.7 °C)  Pulse:  [75-97] 89  Resp:  [16-18] 18  SpO2:  [95 %-100 %] 96 %  BP: (124-130)/(58-67) 124/60     Weight: 100.9 kg (222 lb 7.1 oz) (10/26/19 0413)  Body mass index is 38.18 kg/m².      Intake/Output Summary (Last 24 hours) at 10/26/2019 1218  Last data filed at 10/26/2019 1200  Gross per 24 hour   Intake 4075 ml   Output --   Net 4075 ml       Lines/Drains/Airways     Peripheral Intravenous Line                 Peripheral IV - Single Lumen 10/22/19 2247 22 G Distal;Right;Posterior Wrist 3 days                Physical Exam   Constitutional: No distress.   Pulmonary/Chest: Effort normal.   Abdominal: Soft. She exhibits no distension and no mass. There is no rebound.   Neurological: She is alert.   Psychiatric: She has a normal mood and affect. Her behavior is normal.   Vitals reviewed.      Significant Labs:  CBC:   Recent Labs   Lab 10/25/19  0759   WBC 5.05   HGB 9.3*   HCT 27.6*   *     CMP:   Recent Labs   Lab 10/25/19  0758   GLU 64*   CALCIUM 8.0*   ALBUMIN 1.7*   PROT 6.3      K 4.1   CO2 22*      BUN 8   CREATININE 0.7   ALKPHOS 104   ALT 8*   AST 15   BILITOT 0.6         Significant Imaging:  Imaging results within the past 24 hours  have been reviewed.

## 2019-10-26 NOTE — PROGRESS NOTES
[facility] Hospital Medicine  Progress Note    Patient Name: Anabella Aranda  MRN: [id number]  Patient Class: IP- Inpatient   Admission Date: 10/16/2019  Length of Stay: 10 days  Attending Physician: Maggie De León*  Primary Care Provider: Shon Brambila MD        Subjective:     Principal Problem:Crohn's disease of small and large intestines with complication        HPI:  The patient is a 74 y.o. female with PMH significant for Dementia, h/o DVT status post IVC filter, ulcerative colitis, iron deficiency anemia, GERD,  Major depression with psychotic features, and history of hep B. She is here today for a pre-operative visit in preparation for a Left total knee arthroplasty to be performed by  Dr. Lindsay on 10/16/19.  Anabella Aranda has a >1 year history of Left knee pain.  Pain is worse with activity and weight bearing. Patient has experienced interference of ADLs due to increased pain and decreased range of motion. Patient has failed non-operative treatment including NSAIDs, activity modification, and bracing. Anabella Aranda ambulates Rolator walker.  There has been no significant change in medical status since last visit.  She was seen by PCP Dr. Brambila on 08/14/19 at which time chronic conditions were well controlled    The pt underwent a left TKA during this admission, and we were consulted for medical management.      Overview/Hospital Course:  The pt seen at [facility], she knows her name and age, but has some memory lapses to the events.  The pt had hypotension earlier and was given a bolus on 250ml NS x 1 dose.  GI is also on the case given her colitis.  Ordering c diff titers, and adjusting her immunosuppression meds as well.  10/19 pt still feeling weak and not at the level where she can participate in therapy, still having diarrhea. Will need to order labs to f/u on bmp  10/21 cr level is down to normal, pt is being also managed by GI for her diarrhea/colitis.  GI  report on EGD and Flex sif 10/21 with pathology report severe small bowel inflammation and severe retained rectum inflammation with deep heaped up ulcers. No evidence of infectious process, stains negative. However on review of colectomy specimen from years ago, there was evidence of Crohns disease at that time. Thus, this is likely severe crohns flare given the extent of small bowel involvement    Interval History: awake and alert, diarrhea is improving.     GI reported severe Crohn's disease and plan for IV steroid, Imuran daily       Review of Systems   Constitutional: Negative for activity change, fatigue and fever.   Respiratory: Negative for apnea, cough and shortness of breath.    Cardiovascular: Negative for chest pain and palpitations.   Gastrointestinal: Positive for diarrhea. Negative for abdominal distention, nausea and vomiting.   Genitourinary: Negative for difficulty urinating and hematuria.   Musculoskeletal: Positive for arthralgias.   Neurological: Negative for dizziness and numbness.   Psychiatric/Behavioral: Negative for agitation. The patient is not nervous/anxious.      Objective:     Vital Signs (Most Recent):  Temp: 97.9 °F (36.6 °C) (10/25/19 1125)  Pulse: 99 (10/25/19 1159)  Resp: 18 (10/25/19 1125)  BP: 130/63 (10/25/19 1125)  SpO2: 96 % (10/25/19 0034) Vital Signs (24h Range):  Temp:  [97.9 °F (36.6 °C)-99.2 °F (37.3 °C)] 97.9 °F (36.6 °C)  Pulse:  [] 99  Resp:  [16-20] 18  SpO2:  [96 %] 96 %  BP: (124-168)/(57-74) 130/63     Weight: 99 kg (218 lb 4.1 oz)  Body mass index is 37.46 kg/m².    Intake/Output Summary (Last 24 hours) at 10/25/2019 1457  Last data filed at 10/25/2019 1245  Gross per 24 hour   Intake 3575 ml   Output 300 ml   Net 3275 ml      Physical Exam   Constitutional: She appears well-nourished.   HENT:   Head: Normocephalic and atraumatic.   Pulmonary/Chest: No respiratory distress.   Neurological: She is alert.   Vitals reviewed.      Significant Labs:   CBC:    Recent Labs   Lab 10/25/19  0759   WBC 5.05   HGB 9.3*   HCT 27.6*   *     CMP:   Recent Labs   Lab 10/25/19  0758      K 4.1      CO2 22*   GLU 64*   BUN 8   CREATININE 0.7   CALCIUM 8.0*   PROT 6.3   ALBUMIN 1.7*   BILITOT 0.6   ALKPHOS 104   AST 15   ALT 8*   ANIONGAP 13   EGFRNONAA >60     TSH: No results for input(s): TSH in the last 4320 hours.  Urine Culture: No results for input(s): LABURIN in the last 48 hours.  Urine Studies: No results for input(s): COLORU, APPEARANCEUA, PHUR, SPECGRAV, PROTEINUA, GLUCUA, KETONESU, BILIRUBINUA, OCCULTUA, NITRITE, UROBILINOGEN, LEUKOCYTESUR, RBCUA, WBCUA, BACTERIA, SQUAMEPITHEL, HYALINECASTS in the last 48 hours.    Invalid input(s): WRIGHTSUR    Significant Imaging: none      Assessment/Plan:      * Crohn's disease of small and large intestines with complication  EGD and Flex sig 10/21    Severe small bowel inflammation and severe retained rectum inflammation with deep heaped up ulcers. No evidence of infectious process, stains negative. However on review of colectomy specimen from years ago, there was evidence of Crohns disease at that time. Thus, this is likely severe crohns flare given the extent of small bowel involvement  Appreciates GI rec's solumedrol 40mg IV stat then 20mg BID IV thereafter  Restart Imuran   FU hepatitis B serologies  PPI, lomotil with imodium.           Dementia  On oral meds  Continue current management      History of left knee replacement  S/p left knee replacement  Continue PT/OT   Pt accepted to [facility] but not ready for d/c due to UC complication    Major depression with psychotic features  On oral meds.  Continue current management      Ulcerative proctitis with complication  Hx of total colectomy  Still having diarrhea  C.diff neg on 10/18  GI on board- appreciates re'c Lomotil and imodium.awaiting pathology report    Iron deficiency anemia  H/H is stable  GI is following      Venous insufficiency of both lower  extremities          VTE Risk Mitigation (From admission, onward)         Ordered     enoxaparin injection 40 mg  Daily      10/25/19 1406     IP VTE HIGH RISK PATIENT  Once      10/16/19 1816     Place VIVIANA hose  Until discontinued      10/16/19 1816     Place sequential compression device  Until discontinued      10/16/19 1816                      Maggie De León MD  Department of Hospital Medicine   [facility]

## 2019-10-26 NOTE — PROGRESS NOTES
[facility]  Gastroenterology  Progress Note    Patient Name: Anabella Aranda  MRN: [id number]  Admission Date: 10/16/2019  Hospital Length of Stay: 10 days  Code Status: Full Code   Attending Provider: Maggie De León*  Consulting Provider: Og Carrera MD  Primary Care Physician: Shon Brambila MD  Principal Problem: Crohn's disease of small and large intestines with complication      Subjective:     Interval History:     States she is doing better today.  States pain is less.   No family in room to confirm this.    Review of Systems   Constitutional: Positive for activity change and appetite change. Negative for chills and fever.   Respiratory: Negative for cough and shortness of breath.    Cardiovascular: Negative for chest pain and palpitations.   Gastrointestinal: Positive for diarrhea. Negative for abdominal distention, abdominal pain and blood in stool.     Objective:     Vital Signs (Most Recent):  Temp: 98.1 °F (36.7 °C) (10/26/19 1121)  Pulse: 89 (10/26/19 1159)  Resp: 18 (10/26/19 1121)  BP: 124/60 (10/26/19 1121)  SpO2: 96 % (10/26/19 1156) Vital Signs (24h Range):  Temp:  [97.4 °F (36.3 °C)-98.3 °F (36.8 °C)] 98.1 °F (36.7 °C)  Pulse:  [75-97] 89  Resp:  [16-18] 18  SpO2:  [95 %-100 %] 96 %  BP: (124-130)/(58-67) 124/60     Weight: 100.9 kg (222 lb 7.1 oz) (10/26/19 0413)  Body mass index is 38.18 kg/m².      Intake/Output Summary (Last 24 hours) at 10/26/2019 1218  Last data filed at 10/26/2019 1200  Gross per 24 hour   Intake 4075 ml   Output --   Net 4075 ml       Lines/Drains/Airways     Peripheral Intravenous Line                 Peripheral IV - Single Lumen 10/22/19 2247 22 G Distal;Right;Posterior Wrist 3 days                Physical Exam   Constitutional: No distress.   Pulmonary/Chest: Effort normal.   Abdominal: Soft. She exhibits no distension and no mass. There is no rebound.   Neurological: She is alert.   Psychiatric: She has a normal mood and affect. Her  behavior is normal.   Vitals reviewed.      Significant Labs:  CBC:   Recent Labs   Lab 10/25/19  0759   WBC 5.05   HGB 9.3*   HCT 27.6*   *     CMP:   Recent Labs   Lab 10/25/19  0758   GLU 64*   CALCIUM 8.0*   ALBUMIN 1.7*   PROT 6.3      K 4.1   CO2 22*      BUN 8   CREATININE 0.7   ALKPHOS 104   ALT 8*   AST 15   BILITOT 0.6         Significant Imaging:  Imaging results within the past 24 hours have been reviewed.    Assessment/Plan:     * Crohn's disease of small and large intestines with complication  Hx of UC s/p colectomy with retained rectal stump, had flex sig 1/2019 showing continued inflammation of the rectal stump, otherwise the small bowel was normal. CT enterography recently showed continued inflammation of the rectal stump.    EGD and Flex sig 10/21  SEVERE small bowel inflammation and SEVERE retained rectum inflammation with deep heaped up ulcers.  Discussed case with pathology.  No evidence of infectious process, stains negative  However on review of colectomy specimen from years ago, there was evidence of Crohns disease at that time.  Thus, this is likely severe crohns flare given the extent of small bowel involvement.    Recs:  solumedrol 20mg BID IV   Restart imuran at 100mg daily  Daily labs including CMP, CBC  Await hepatitis B serologies   - check HBV DNA given HBV core Ab total positive  Will likely need remicade, inpatient vs. Outpatient pending response to IV steroids    scheduled lomotil BID ; Can use immodium PRN and alternate with lomotil  PPI BID    I hope to avoid completion proctectomy with end ileostomy, but that would be last resort if she does not respond to steroids.            Thank you for your consult. I will follow-up with patient. Please contact us if you have any additional questions.    Og Carrera MD  Gastroenterology  [facility]

## 2019-10-26 NOTE — PT/OT/SLP PROGRESS
"Physical Therapy Treatment    Patient Name:  Anabella Aranda   MRN:  [id number]    Recommendations:     Discharge Recommendations:  nursing facility, skilled   Discharge Equipment Recommendations: none   Barriers to discharge: see precautions    Assessment:     Anabella Aranda is a 74 y.o. female admitted with a medical diagnosis of Crohn's disease of small and large intestines with complication.  She presents with the following impairments/functional limitations:  weakness, impaired endurance, impaired self care skills, impaired functional mobilty, impaired cognition, impaired sensation, impaired muscle length, impaired joint extensibility, gait instability, impaired balance, decreased lower extremity function, decreased ROM pt completed supine therx AAROM with no reports of pain. She stated throughout session she has " Raw Stomach" and was soiled (NSG notified). Completed supine activities with no adverse reactions. She resisted transfer to [facility] today.    Rehab Prognosis: Fair; patient would benefit from acute skilled PT services to address these deficits and reach maximum level of function.    Recent Surgery: Procedure(s) (LRB):  EGD (ESOPHAGOGASTRODUODENOSCOPY) (N/A)  SIGMOIDOSCOPY, FLEXIBLE (N/A) 5 Days Post-Op    Plan:     During this hospitalization, patient to be seen BID to address the identified rehab impairments via gait training, therapeutic activities, therapeutic exercises and progress toward the following goals:    · Plan of Care Expires:  11/18/19    Subjective     Chief Complaint: stomach/ abdominal pain   Patient/Family Comments/goals: none stated  Pain/Comfort:  · Pain Rating 1: 0/10  · Location 1: abdomen      Objective:     Communicated with nsg prior to session.  Patient found supine with   upon PT entry to room.     General Precautions: Standard, fall   Orthopedic Precautions:LLE weight bearing as tolerated   Braces:       Functional Mobility:  ·       AM-PAC 6 CLICK MOBILITY  Turning over " in bed (including adjusting bedclothes, sheets and blankets)?: 3  Sitting down on and standing up from a chair with arms (e.g., wheelchair, bedside commode, etc.): 2  Moving from lying on back to sitting on the side of the bed?: 2  Moving to and from a bed to a chair (including a wheelchair)?: 3  Need to walk in hospital room?: 3  Climbing 3-5 steps with a railing?: 2  Basic Mobility Total Score: 15       Therapeutic Activities and Exercises:   supine: SLR, HEEL SLIDES, SAQ, HIP ABD/ ADD, HIP ADD ISO w/ PILLOW 2x10 AAROM.     Patient left supine with all lines intact, call button in reach and bed alarm on..    GOALS:   Multidisciplinary Problems     Physical Therapy Goals        Problem: Physical Therapy Goal    Goal Priority Disciplines Outcome Goal Variances Interventions   Physical Therapy Goal     PT, PT/OT Ongoing, Not Progressing     Description:  1.  Supine to/from sit with with supervision  2.  Bed to/from chair with RW with supervision  3.  Ambulate 50' with RW with supervision  4.  Tolerate sitting 2 hours  MET 10/22/19                    Time Tracking:     PT Received On: 10/26/19  PT Start Time: 1050     PT Stop Time: 1105  PT Total Time (min): 15 min     Billable Minutes: Therapeutic Exercise 15    Treatment Type: Treatment  PT/PTA: PTA     PTA Visit Number: 1     Fransisco Olson PTA  10/26/2019

## 2019-10-27 PROBLEM — E43 SEVERE MALNUTRITION: Status: ACTIVE | Noted: 2019-10-27

## 2019-10-27 LAB
ALBUMIN SERPL BCP-MCNC: 1.8 G/DL (ref 3.5–5.2)
ALP SERPL-CCNC: 107 U/L (ref 55–135)
ALT SERPL W/O P-5'-P-CCNC: 8 U/L (ref 10–44)
ANION GAP SERPL CALC-SCNC: 13 MMOL/L (ref 8–16)
AST SERPL-CCNC: 15 U/L (ref 10–40)
BASOPHILS # BLD AUTO: 0 K/UL (ref 0–0.2)
BASOPHILS # BLD AUTO: 0.01 K/UL (ref 0–0.2)
BASOPHILS NFR BLD: 0 % (ref 0–1.9)
BASOPHILS NFR BLD: 0.2 % (ref 0–1.9)
BILIRUB SERPL-MCNC: 0.5 MG/DL (ref 0.1–1)
BUN SERPL-MCNC: 14 MG/DL (ref 8–23)
CALCIUM SERPL-MCNC: 8.2 MG/DL (ref 8.7–10.5)
CHLORIDE SERPL-SCNC: 102 MMOL/L (ref 95–110)
CO2 SERPL-SCNC: 22 MMOL/L (ref 23–29)
CREAT SERPL-MCNC: 0.8 MG/DL (ref 0.5–1.4)
CRP SERPL-MCNC: 81.9 MG/L (ref 0–8.2)
DIFFERENTIAL METHOD: ABNORMAL
DIFFERENTIAL METHOD: ABNORMAL
EOSINOPHIL # BLD AUTO: 0 K/UL (ref 0–0.5)
EOSINOPHIL # BLD AUTO: 0 K/UL (ref 0–0.5)
EOSINOPHIL NFR BLD: 0 % (ref 0–8)
EOSINOPHIL NFR BLD: 0 % (ref 0–8)
ERYTHROCYTE [DISTWIDTH] IN BLOOD BY AUTOMATED COUNT: 14 % (ref 11.5–14.5)
ERYTHROCYTE [DISTWIDTH] IN BLOOD BY AUTOMATED COUNT: 14.2 % (ref 11.5–14.5)
EST. GFR  (AFRICAN AMERICAN): >60 ML/MIN/1.73 M^2
EST. GFR  (NON AFRICAN AMERICAN): >60 ML/MIN/1.73 M^2
GLUCOSE SERPL-MCNC: 123 MG/DL (ref 70–110)
HCT VFR BLD AUTO: 26.4 % (ref 37–48.5)
HCT VFR BLD AUTO: 26.6 % (ref 37–48.5)
HGB BLD-MCNC: 8.6 G/DL (ref 12–16)
HGB BLD-MCNC: 8.8 G/DL (ref 12–16)
LYMPHOCYTES # BLD AUTO: 0.4 K/UL (ref 1–4.8)
LYMPHOCYTES # BLD AUTO: 0.4 K/UL (ref 1–4.8)
LYMPHOCYTES NFR BLD: 6.8 % (ref 18–48)
LYMPHOCYTES NFR BLD: 6.9 % (ref 18–48)
MCH RBC QN AUTO: 29.3 PG (ref 27–31)
MCH RBC QN AUTO: 29.9 PG (ref 27–31)
MCHC RBC AUTO-ENTMCNC: 32.6 G/DL (ref 32–36)
MCHC RBC AUTO-ENTMCNC: 33.1 G/DL (ref 32–36)
MCV RBC AUTO: 90 FL (ref 82–98)
MCV RBC AUTO: 91 FL (ref 82–98)
MONOCYTES # BLD AUTO: 0.5 K/UL (ref 0.3–1)
MONOCYTES # BLD AUTO: 0.9 K/UL (ref 0.3–1)
MONOCYTES NFR BLD: 13.7 % (ref 4–15)
MONOCYTES NFR BLD: 8.1 % (ref 4–15)
NEUTROPHILS # BLD AUTO: 4.8 K/UL (ref 1.8–7.7)
NEUTROPHILS # BLD AUTO: 5 K/UL (ref 1.8–7.7)
NEUTROPHILS NFR BLD: 79.3 % (ref 38–73)
NEUTROPHILS NFR BLD: 85 % (ref 38–73)
PLATELET # BLD AUTO: 511 K/UL (ref 150–350)
PLATELET # BLD AUTO: 576 K/UL (ref 150–350)
PMV BLD AUTO: 7.6 FL (ref 9.2–12.9)
PMV BLD AUTO: 7.8 FL (ref 9.2–12.9)
POTASSIUM SERPL-SCNC: 4.9 MMOL/L (ref 3.5–5.1)
PROT SERPL-MCNC: 6.3 G/DL (ref 6–8.4)
RBC # BLD AUTO: 2.94 M/UL (ref 4–5.4)
RBC # BLD AUTO: 2.94 M/UL (ref 4–5.4)
SODIUM SERPL-SCNC: 137 MMOL/L (ref 136–145)
WBC # BLD AUTO: 5.78 K/UL (ref 3.9–12.7)
WBC # BLD AUTO: 6.44 K/UL (ref 3.9–12.7)

## 2019-10-27 PROCEDURE — 21400001 HC TELEMETRY ROOM

## 2019-10-27 PROCEDURE — 63600175 PHARM REV CODE 636 W HCPCS: Performed by: ANESTHESIOLOGY

## 2019-10-27 PROCEDURE — 25000003 PHARM REV CODE 250: Performed by: FAMILY MEDICINE

## 2019-10-27 PROCEDURE — 99232 PR SUBSEQUENT HOSPITAL CARE,LEVL II: ICD-10-PCS | Mod: ,,, | Performed by: INTERNAL MEDICINE

## 2019-10-27 PROCEDURE — 25000003 PHARM REV CODE 250: Performed by: INTERNAL MEDICINE

## 2019-10-27 PROCEDURE — B4185 PARENTERAL SOL 10 GM LIPIDS: HCPCS | Performed by: FAMILY MEDICINE

## 2019-10-27 PROCEDURE — 87517 HEPATITIS B DNA QUANT: CPT

## 2019-10-27 PROCEDURE — 63600175 PHARM REV CODE 636 W HCPCS: Performed by: INTERNAL MEDICINE

## 2019-10-27 PROCEDURE — 25000003 PHARM REV CODE 250: Performed by: ORTHOPAEDIC SURGERY

## 2019-10-27 PROCEDURE — 99232 SBSQ HOSP IP/OBS MODERATE 35: CPT | Mod: ,,, | Performed by: INTERNAL MEDICINE

## 2019-10-27 PROCEDURE — 85025 COMPLETE CBC W/AUTO DIFF WBC: CPT | Mod: 91

## 2019-10-27 PROCEDURE — 80053 COMPREHEN METABOLIC PANEL: CPT

## 2019-10-27 PROCEDURE — 86140 C-REACTIVE PROTEIN: CPT

## 2019-10-27 PROCEDURE — 94761 N-INVAS EAR/PLS OXIMETRY MLT: CPT

## 2019-10-27 PROCEDURE — 36415 COLL VENOUS BLD VENIPUNCTURE: CPT

## 2019-10-27 PROCEDURE — 97530 THERAPEUTIC ACTIVITIES: CPT

## 2019-10-27 PROCEDURE — 97110 THERAPEUTIC EXERCISES: CPT

## 2019-10-27 RX ADMIN — PANTOPRAZOLE SODIUM 40 MG: 40 TABLET, DELAYED RELEASE ORAL at 09:10

## 2019-10-27 RX ADMIN — SODIUM CHLORIDE, SODIUM LACTATE, POTASSIUM CHLORIDE, AND CALCIUM CHLORIDE: .6; .31; .03; .02 INJECTION, SOLUTION INTRAVENOUS at 11:10

## 2019-10-27 RX ADMIN — METHOTREXATE 1 TABLET: 2.5 TABLET ORAL at 09:10

## 2019-10-27 RX ADMIN — I.V. FAT EMULSION 250 ML: 20 EMULSION INTRAVENOUS at 10:10

## 2019-10-27 RX ADMIN — ASPIRIN 81 MG: 81 TABLET, COATED ORAL at 09:10

## 2019-10-27 RX ADMIN — MEMANTINE HYDROCHLORIDE 10 MG: 5 TABLET ORAL at 09:10

## 2019-10-27 RX ADMIN — PANTOPRAZOLE SODIUM 40 MG: 40 TABLET, DELAYED RELEASE ORAL at 10:10

## 2019-10-27 RX ADMIN — CALCIUM CARBONATE 500 MG: 500 TABLET, CHEWABLE ORAL at 09:10

## 2019-10-27 RX ADMIN — METHYLPREDNISOLONE SODIUM SUCCINATE 20 MG: 40 INJECTION, POWDER, FOR SOLUTION INTRAMUSCULAR; INTRAVENOUS at 10:10

## 2019-10-27 RX ADMIN — ENOXAPARIN SODIUM 40 MG: 100 INJECTION SUBCUTANEOUS at 06:10

## 2019-10-27 RX ADMIN — METHOTREXATE 1 TABLET: 2.5 TABLET ORAL at 10:10

## 2019-10-27 RX ADMIN — ASCORBIC ACID, VITAMIN A PALMITATE, CHOLECALCIFEROL, THIAMINE HYDROCHLORIDE, RIBOFLAVIN-5 PHOSPHATE SODIUM, PYRIDOXINE HYDROCHLORIDE, NIACINAMIDE, DEXPANTHENOL, ALPHA-TOCOPHEROL ACETATE, VITAMIN K1, FOLIC ACID, BIOTIN, CYANOCOBALAMIN: 200; 3300; 200; 6; 3.6; 6; 40; 15; 10; 150; 600; 60; 5 INJECTION, SOLUTION INTRAVENOUS at 10:10

## 2019-10-27 RX ADMIN — CALCIUM CARBONATE 500 MG: 500 TABLET, CHEWABLE ORAL at 10:10

## 2019-10-27 RX ADMIN — AZATHIOPRINE 100 MG: 50 TABLET ORAL at 09:10

## 2019-10-27 RX ADMIN — FERROUS SULFATE TAB EC 325 MG (65 MG FE EQUIVALENT) 325 MG: 325 (65 FE) TABLET DELAYED RESPONSE at 09:10

## 2019-10-27 RX ADMIN — ACETAMINOPHEN 1000 MG: 500 TABLET ORAL at 10:10

## 2019-10-27 RX ADMIN — MEMANTINE HYDROCHLORIDE 10 MG: 5 TABLET ORAL at 10:10

## 2019-10-27 RX ADMIN — QUETIAPINE 100 MG: 100 TABLET ORAL at 10:10

## 2019-10-27 RX ADMIN — ACETAMINOPHEN 1000 MG: 500 TABLET ORAL at 09:10

## 2019-10-27 RX ADMIN — FERROUS SULFATE TAB EC 325 MG (65 MG FE EQUIVALENT) 325 MG: 325 (65 FE) TABLET DELAYED RESPONSE at 10:10

## 2019-10-27 RX ADMIN — ACETAMINOPHEN 1000 MG: 500 TABLET ORAL at 02:10

## 2019-10-27 RX ADMIN — METHYLPREDNISOLONE SODIUM SUCCINATE 20 MG: 40 INJECTION, POWDER, FOR SOLUTION INTRAMUSCULAR; INTRAVENOUS at 09:10

## 2019-10-27 RX ADMIN — DONEPEZIL HYDROCHLORIDE 10 MG: 5 TABLET, FILM COATED ORAL at 10:10

## 2019-10-27 NOTE — PT/OT/SLP PROGRESS
Physical Therapy Treatment    Patient Name:  Anabella Aranda   MRN:  [id number]    Recommendations:     Discharge Recommendations:  nursing facility, skilled   Discharge Equipment Recommendations: none   Barriers to discharge: Decreased caregiver support and decreased mobility,strength and endurance    Assessment:     Anabella Aranda is a 74 y.o. female admitted with a medical diagnosis of Crohn's disease of small and large intestines with complication.  She presents with the following impairments/functional limitations:  weakness, impaired endurance, impaired functional mobilty, gait instability, impaired cognition, impaired balance, decreased lower extremity function, decreased ROM, impaired coordination, impaired skin, orthopedic precautions,pt with good participation and no c/o's L knee pain,pt remains with bowel issues along with decreased mobility,endurance and strength,pt will benefit from SNF upon discharge.    Rehab Prognosis: Fair; patient would benefit from acute skilled PT services to address these deficits and reach maximum level of function.    Recent Surgery: Procedure(s) (LRB):  EGD (ESOPHAGOGASTRODUODENOSCOPY) (N/A)  SIGMOIDOSCOPY, FLEXIBLE (N/A) 6 Days Post-Op    Plan:     During this hospitalization, patient to be seen BID to address the identified rehab impairments via gait training, therapeutic activities, therapeutic exercises and progress toward the following goals:    · Plan of Care Expires:  11/18/19    Subjective     Chief Complaint: pt stomach concerns and loose stools.  Patient/Family Comments/goals: pt requested b/s commode.  Pain/Comfort:  · Pain Rating 1: (no rating)  · Location - Orientation 1: generalized  · Location 1: abdomen  · Pain Addressed 1: Reposition, Distraction      Objective:     Communicated with nsg prior to session.  Patient found supine with bed alarm, peripheral IV, telemetry upon PT entry to room.     General Precautions: Standard, fall   Orthopedic  Precautions:LLE weight bearing as tolerated   Braces: N/A     Functional Mobility:  · Bed Mobility:     · Supine to Sit: minimum assistance  · Sit to Supine: minimum assistance  · Transfers:     · Sit to Stand:  minimum assistance and X 4 trials with rolling walker  · Toilet Transfer: minimum assistance and X 2 trials with  rolling walker  using  Step Transfer  · Balance: fair standing balance with RW      AM-PAC 6 CLICK MOBILITY  Turning over in bed (including adjusting bedclothes, sheets and blankets)?: 3  Sitting down on and standing up from a chair with arms (e.g., wheelchair, bedside commode, etc.): 3  Moving from lying on back to sitting on the side of the bed?: 2  Moving to and from a bed to a chair (including a wheelchair)?: 3  Need to walk in hospital room?: 3  Climbing 3-5 steps with a railing?: 2  Basic Mobility Total Score: 16       Therapeutic Activities and Exercises: pt stood inside RW to be cleaned up by pct with Min A of PTA and also to have new diaper donned,rest periods PRN,pt with loose bloody stools,nsg notified.       Patient left supine with all lines intact, call button in reach, bed alarm on and nsg notified..    GOALS: see general POC  Multidisciplinary Problems     Physical Therapy Goals        Problem: Physical Therapy Goal    Goal Priority Disciplines Outcome Goal Variances Interventions   Physical Therapy Goal     PT, PT/OT Ongoing, Not Progressing     Description:  1.  Supine to/from sit with with supervision  2.  Bed to/from chair with RW with supervision  3.  Ambulate 50' with RW with supervision  4.  Tolerate sitting 2 hours  MET 10/22/19                    Time Tracking:     PT Received On: 10/27/19  PT Start Time: 0816     PT Stop Time: 0856  PT Total Time (min): 40 min     Billable Minutes: Therapeutic Activity 28 and Therapeutic Exercise 12    Treatment Type: Treatment  PT/PTA: PTA     PTA Visit Number: 2     Kris Henderson, PTA  10/27/2019

## 2019-10-27 NOTE — PLAN OF CARE
All progress notes reviewed. VN cued into pt's room. Pt's eyes are closed, respirations even and unlabored. Will attempt again as time allows.     10/27/19 4638   Type of Frequent Check   Type Other (see comments)  (vn rounds)   Safety/Activity   Patient Rounds visualized patient   Safety Promotion/Fall Prevention Fall Risk signage in place   Positioning   Body Position supine   Head of Bed (HOB) HOB at 30-45 degrees   Pain/Comfort/Sleep   Pain Rating: Rest 0 - no pain

## 2019-10-27 NOTE — PROGRESS NOTES
[facility]  Gastroenterology  Progress Note    Patient Name: Anabella Aranda  MRN: [id number]  Admission Date: 10/16/2019  Hospital Length of Stay: 11 days  Code Status: Full Code   Attending Provider: Maggie De León*  Consulting Provider: Og Carrera MD  Primary Care Physician: Shon Brambila MD  Principal Problem: Crohn's disease of small and large intestines with complication      Subjective:     Interval History:  States she is doing well and no compalaints voiced.  Spoke with nursing and diarrhea has improved.  Appetite still poor but she takes small bites or sips throughout the day.  Will add nutritional shakes.    Review of Systems   Constitutional: Positive for activity change and appetite change. Negative for chills and fever.   Respiratory: Negative for cough and shortness of breath.    Cardiovascular: Negative for chest pain and palpitations.   Gastrointestinal: Positive for diarrhea. Negative for abdominal distention, abdominal pain and blood in stool.     Objective:     Vital Signs (Most Recent):  Temp: 97.5 °F (36.4 °C) (10/27/19 0748)  Pulse: 87 (10/27/19 0908)  Resp: 19 (10/27/19 0908)  BP: 132/64 (10/27/19 0748)  SpO2: 96 % (10/27/19 0908) Vital Signs (24h Range):  Temp:  [97.5 °F (36.4 °C)-98.2 °F (36.8 °C)] 97.5 °F (36.4 °C)  Pulse:  [80-99] 87  Resp:  [16-20] 19  SpO2:  [94 %-96 %] 96 %  BP: (122-134)/(55-74) 132/64     Weight: 104.8 kg (231 lb 0.7 oz) (10/27/19 0505)  Body mass index is 39.66 kg/m².      Intake/Output Summary (Last 24 hours) at 10/27/2019 0944  Last data filed at 10/27/2019 0600  Gross per 24 hour   Intake 3340 ml   Output --   Net 3340 ml       Lines/Drains/Airways     Peripheral Intravenous Line                 Peripheral IV - Single Lumen 10/26/19 1438 22 G Left Hand less than 1 day                Physical Exam   Constitutional: No distress.   Pulmonary/Chest: Effort normal.   Abdominal: Soft. She exhibits no distension and no mass. There is  no rebound.   Neurological: She is alert.   Psychiatric: She has a normal mood and affect. Her behavior is normal.   Vitals reviewed.      Significant Labs:  Amylase: No results for input(s): AMYLASE in the last 48 hours.  Blood Culture: No results for input(s): LABBLOO in the last 48 hours.  CBC:   Recent Labs   Lab 10/27/19  0438   WBC 5.78   HGB 8.6*   HCT 26.4*   *     CMP:   Recent Labs   Lab 10/27/19  0438   *   CALCIUM 8.2*   ALBUMIN 1.8*   PROT 6.3      K 4.9   CO2 22*      BUN 14   CREATININE 0.8   ALKPHOS 107   ALT 8*   AST 15   BILITOT 0.5     CRP:   Recent Labs   Lab 10/27/19  0438   CRP 81.9*     ESR: No results for input(s): SEDRATE in the last 48 hours.      Significant Imaging:  Imaging results within the past 24 hours have been reviewed.    Assessment/Plan:     * Crohn's disease of small and large intestines with complication  Hx of UC s/p colectomy with retained rectal stump, had flex sig 1/2019 showing continued inflammation of the rectal stump, otherwise the small bowel was normal. CT enterography recently showed continued inflammation of the rectal stump.    EGD and Flex sig 10/21  SEVERE small bowel inflammation and SEVERE retained rectum inflammation with deep heaped up ulcers.  Discussed case with pathology.  No evidence of infectious process, stains negative  However on review of colectomy specimen from years ago, there was evidence of Crohns disease at that time.  Thus, this is likely severe crohns flare given the extent of small bowel involvement.    After 2 days IV steroids, CRP 170s - - > 80s, which is encouraging.      Recs:  solumedrol 20mg BID IV   Repeat CRP on day 5 and consider transition to PO steroids.  Restart imuran at 100mg daily  Daily labs including CMP, CBC  Await hepatitis B serologies   - check HBV DNA given HBV core Ab total positive  Will likely need remicade, inpatient vs. Outpatient pending response to IV steroids  Add nutritional shakes  TID   - if no improvement in diet soon, will likely need to consider TPN.    scheduled lomotil BID ; Can use immodium PRN and alternate with lomotil  PPI BID    I hope to avoid completion proctectomy with end ileostomy, but that would be last resort if she does not respond to steroids.          Thank you for your consult. I will follow-up with patient. Please contact us if you have any additional questions.    Og Carrera MD  Gastroenterology  [facility]

## 2019-10-27 NOTE — SUBJECTIVE & OBJECTIVE
Subjective:     Interval History:  States she is doing well and no compalaints voiced.  Spoke with nursing and diarrhea has improved.  Appetite still poor but she takes small bites or sips throughout the day.  Will add nutritional shakes.    Review of Systems   Constitutional: Positive for activity change and appetite change. Negative for chills and fever.   Respiratory: Negative for cough and shortness of breath.    Cardiovascular: Negative for chest pain and palpitations.   Gastrointestinal: Positive for diarrhea. Negative for abdominal distention, abdominal pain and blood in stool.     Objective:     Vital Signs (Most Recent):  Temp: 97.5 °F (36.4 °C) (10/27/19 0748)  Pulse: 87 (10/27/19 0908)  Resp: 19 (10/27/19 0908)  BP: 132/64 (10/27/19 0748)  SpO2: 96 % (10/27/19 0908) Vital Signs (24h Range):  Temp:  [97.5 °F (36.4 °C)-98.2 °F (36.8 °C)] 97.5 °F (36.4 °C)  Pulse:  [80-99] 87  Resp:  [16-20] 19  SpO2:  [94 %-96 %] 96 %  BP: (122-134)/(55-74) 132/64     Weight: 104.8 kg (231 lb 0.7 oz) (10/27/19 0505)  Body mass index is 39.66 kg/m².      Intake/Output Summary (Last 24 hours) at 10/27/2019 0944  Last data filed at 10/27/2019 0600  Gross per 24 hour   Intake 3340 ml   Output --   Net 3340 ml       Lines/Drains/Airways     Peripheral Intravenous Line                 Peripheral IV - Single Lumen 10/26/19 1438 22 G Left Hand less than 1 day                Physical Exam   Constitutional: No distress.   Pulmonary/Chest: Effort normal.   Abdominal: Soft. She exhibits no distension and no mass. There is no rebound.   Neurological: She is alert.   Psychiatric: She has a normal mood and affect. Her behavior is normal.   Vitals reviewed.      Significant Labs:  Amylase: No results for input(s): AMYLASE in the last 48 hours.  Blood Culture: No results for input(s): LABBLOO in the last 48 hours.  CBC:   Recent Labs   Lab 10/27/19  0438   WBC 5.78   HGB 8.6*   HCT 26.4*   *     CMP:   Recent Labs   Lab  10/27/19  0438   *   CALCIUM 8.2*   ALBUMIN 1.8*   PROT 6.3      K 4.9   CO2 22*      BUN 14   CREATININE 0.8   ALKPHOS 107   ALT 8*   AST 15   BILITOT 0.5     CRP:   Recent Labs   Lab 10/27/19  0438   CRP 81.9*     ESR: No results for input(s): SEDRATE in the last 48 hours.      Significant Imaging:  Imaging results within the past 24 hours have been reviewed.

## 2019-10-27 NOTE — PLAN OF CARE
Problem: Physical Therapy Goal  Goal: Physical Therapy Goal  Description  1.  Supine to/from sit with with supervision  2.  Bed to/from chair with RW with supervision  3.  Ambulate 50' with RW with supervision  4.  Tolerate sitting 2 hours  MET 10/22/19   Outcome: Ongoing, Not Progressing   Goals ongoing

## 2019-10-27 NOTE — ASSESSMENT & PLAN NOTE
Hx of UC s/p colectomy with retained rectal stump, had flex sig 1/2019 showing continued inflammation of the rectal stump, otherwise the small bowel was normal. CT enterography recently showed continued inflammation of the rectal stump.    EGD and Flex sig 10/21  SEVERE small bowel inflammation and SEVERE retained rectum inflammation with deep heaped up ulcers.  Discussed case with pathology.  No evidence of infectious process, stains negative  However on review of colectomy specimen from years ago, there was evidence of Crohns disease at that time.  Thus, this is likely severe crohns flare given the extent of small bowel involvement.    After 2 days IV steroids, CRP 170s - - > 80s, which is encouraging.      Recs:  solumedrol 20mg BID IV   Repeat CRP on day 5 and consider transition to PO steroids.  Restart imuran at 100mg daily  Daily labs including CMP, CBC  Await hepatitis B serologies   - check HBV DNA given HBV core Ab total positive  Will likely need remicade, inpatient vs. Outpatient pending response to IV steroids  Add nutritional shakes TID   - if no improvement in diet soon, will likely need to consider TPN.    scheduled lomotil BID ; Can use immodium PRN and alternate with lomotil  PPI BID    I hope to avoid completion proctectomy with end ileostomy, but that would be last resort if she does not respond to steroids.

## 2019-10-27 NOTE — SUBJECTIVE & OBJECTIVE
Interval History: awake and alert, diarrhea is improving,sitting upin bed with PT.patient ate < 20% her breakfast but stated I am still eating     Appreciates GI rec's, CRP improved with steroid. Continue IV steroid, Imuran daily   Will started on TPN given low Albumin and monitor weakly pre albumin.       Review of Systems   Constitutional: Negative for activity change, fatigue and fever.   Respiratory: Negative for apnea, cough and shortness of breath.    Cardiovascular: Negative for chest pain and palpitations.   Gastrointestinal: Positive for diarrhea. Negative for abdominal distention, nausea and vomiting.   Genitourinary: Negative for difficulty urinating and hematuria.   Musculoskeletal: Positive for arthralgias.   Neurological: Negative for dizziness and numbness.   Psychiatric/Behavioral: Negative for agitation. The patient is not nervous/anxious.      Objective:     Vital Signs (Most Recent):  Temp: 97.8 °F (36.6 °C) (10/27/19 1228)  Pulse: 108 (10/27/19 1607)  Resp: 20 (10/27/19 1228)  BP: (!) 142/73 (10/27/19 1228)  SpO2: 96 % (10/27/19 1144) Vital Signs (24h Range):  Temp:  [97.5 °F (36.4 °C)-98.1 °F (36.7 °C)] 97.8 °F (36.6 °C)  Pulse:  [] 108  Resp:  [16-20] 20  SpO2:  [94 %-96 %] 96 %  BP: (122-142)/(55-74) 142/73     Weight: 104.8 kg (231 lb 0.7 oz)  Body mass index is 39.66 kg/m².    Intake/Output Summary (Last 24 hours) at 10/27/2019 1629  Last data filed at 10/27/2019 0600  Gross per 24 hour   Intake 2470 ml   Output --   Net 2470 ml      Physical Exam   Constitutional: She appears well-nourished.   HENT:   Head: Normocephalic and atraumatic.   Pulmonary/Chest: No respiratory distress.   Neurological: She is alert.   Vitals reviewed.      Significant Labs:   CBC:   Recent Labs   Lab 10/27/19  0438   WBC 5.78   HGB 8.6*   HCT 26.4*   *     CMP:   Recent Labs   Lab 10/27/19  0438      K 4.9      CO2 22*   *   BUN 14   CREATININE 0.8   CALCIUM 8.2*   PROT 6.3    ALBUMIN 1.8*   BILITOT 0.5   ALKPHOS 107   AST 15   ALT 8*   ANIONGAP 13   EGFRNONAA >60     TSH: No results for input(s): TSH in the last 4320 hours.  Urine Culture: No results for input(s): LABURIN in the last 48 hours.  Urine Studies: No results for input(s): COLORU, APPEARANCEUA, PHUR, SPECGRAV, PROTEINUA, GLUCUA, KETONESU, BILIRUBINUA, OCCULTUA, NITRITE, UROBILINOGEN, LEUKOCYTESUR, RBCUA, WBCUA, BACTERIA, SQUAMEPITHEL, HYALINECASTS in the last 48 hours.    Invalid input(s): WRIGHTJUHI    Significant Imaging: none

## 2019-10-27 NOTE — PROGRESS NOTES
[facility] Hospital Medicine  Progress Note    Patient Name: Anabella Aranda  MRN: [id number]  Patient Class: IP- Inpatient   Admission Date: 10/16/2019  Length of Stay: 11 days  Attending Physician: Maggie De León*  Primary Care Provider: Shon Brambila MD        Subjective:     Principal Problem:Crohn's disease of small and large intestines with complication        HPI:  The patient is a 74 y.o. female with PMH significant for Dementia, h/o DVT status post IVC filter, ulcerative colitis, iron deficiency anemia, GERD,  Major depression with psychotic features, and history of hep B. She is here today for a pre-operative visit in preparation for a Left total knee arthroplasty to be performed by  Dr. Lindsay on 10/16/19.  Anabella Aranda has a >1 year history of Left knee pain.  Pain is worse with activity and weight bearing. Patient has experienced interference of ADLs due to increased pain and decreased range of motion. Patient has failed non-operative treatment including NSAIDs, activity modification, and bracing. Anabella Aranda ambulates Rolator walker.  There has been no significant change in medical status since last visit.  She was seen by PCP Dr. Brambila on 08/14/19 at which time chronic conditions were well controlled    The pt underwent a left TKA during this admission, and we were consulted for medical management.      Overview/Hospital Course:  The pt seen at [facility], she knows her name and age, but has some memory lapses to the events.  The pt had hypotension earlier and was given a bolus on 250ml NS x 1 dose.  GI is also on the case given her colitis.  Ordering c diff titers, and adjusting her immunosuppression meds as well.  10/19 pt still feeling weak and not at the level where she can participate in therapy, still having diarrhea. Will need to order labs to f/u on bmp  10/21 cr level is down to normal, pt is being also managed by GI for her diarrhea/colitis.  GI  report on EGD and Flex sif 10/21 with pathology report severe small bowel inflammation and severe retained rectum inflammation with deep heaped up ulcers. No evidence of infectious process, stains negative. However on review of colectomy specimen from years ago, there was evidence of Crohns disease at that time. Thus, this is likely severe crohns flare given the extent of small bowel involvement    Interval History: awake and alert, diarrhea is improving,sitting upin bed with PT.patient ate < 20% her breakfast but stated I am still eating     Appreciates GI rec's, CRP improved with steroid. Continue IV steroid, Imuran daily   Will started on TPN given low Albumin and monitor weakly pre albumin.       Review of Systems   Constitutional: Negative for activity change, fatigue and fever.   Respiratory: Negative for apnea, cough and shortness of breath.    Cardiovascular: Negative for chest pain and palpitations.   Gastrointestinal: Positive for diarrhea. Negative for abdominal distention, nausea and vomiting.   Genitourinary: Negative for difficulty urinating and hematuria.   Musculoskeletal: Positive for arthralgias.   Neurological: Negative for dizziness and numbness.   Psychiatric/Behavioral: Negative for agitation. The patient is not nervous/anxious.      Objective:     Vital Signs (Most Recent):  Temp: 97.8 °F (36.6 °C) (10/27/19 1228)  Pulse: 108 (10/27/19 1607)  Resp: 20 (10/27/19 1228)  BP: (!) 142/73 (10/27/19 1228)  SpO2: 96 % (10/27/19 1144) Vital Signs (24h Range):  Temp:  [97.5 °F (36.4 °C)-98.1 °F (36.7 °C)] 97.8 °F (36.6 °C)  Pulse:  [] 108  Resp:  [16-20] 20  SpO2:  [94 %-96 %] 96 %  BP: (122-142)/(55-74) 142/73     Weight: 104.8 kg (231 lb 0.7 oz)  Body mass index is 39.66 kg/m².    Intake/Output Summary (Last 24 hours) at 10/27/2019 1629  Last data filed at 10/27/2019 0600  Gross per 24 hour   Intake 2470 ml   Output --   Net 2470 ml      Physical Exam   Constitutional: She appears  well-nourished.   HENT:   Head: Normocephalic and atraumatic.   Pulmonary/Chest: No respiratory distress.   Neurological: She is alert.   Vitals reviewed.      Significant Labs:   CBC:   Recent Labs   Lab 10/27/19  0438   WBC 5.78   HGB 8.6*   HCT 26.4*   *     CMP:   Recent Labs   Lab 10/27/19  0438      K 4.9      CO2 22*   *   BUN 14   CREATININE 0.8   CALCIUM 8.2*   PROT 6.3   ALBUMIN 1.8*   BILITOT 0.5   ALKPHOS 107   AST 15   ALT 8*   ANIONGAP 13   EGFRNONAA >60     TSH: No results for input(s): TSH in the last 4320 hours.  Urine Culture: No results for input(s): LABURIN in the last 48 hours.  Urine Studies: No results for input(s): COLORU, APPEARANCEUA, PHUR, SPECGRAV, PROTEINUA, GLUCUA, KETONESU, BILIRUBINUA, OCCULTUA, NITRITE, UROBILINOGEN, LEUKOCYTESUR, RBCUA, WBCUA, BACTERIA, SQUAMEPITHEL, HYALINECASTS in the last 48 hours.    Invalid input(s): WRIGHTSUR    Significant Imaging: none      Assessment/Plan:      * Crohn's disease of small and large intestines with complication  EGD and Flex sig 10/21    Severe small bowel inflammation and severe retained rectum inflammation with deep heaped up ulcers. No evidence of infectious process, stains negative. However on review of colectomy specimen from years ago, there was evidence of Crohns disease at that time. Thus, this is likely severe crohns flare given the extent of small bowel involvement  Appreciates GI rec's solumedrol 40mg IV stat then 20mg BID IV thereafter  Restart Imuran   FU hepatitis B serologies  PPI, lomotil with imodium.           Severe malnutrition  Due to decrease appetite and sever crohn;s disease  Start TPN  Weekly Prealmuni        Dementia  On oral meds  Continue current management      History of left knee replacement  S/p left knee replacement  Continue PT/OT   Pt accepted to [facility] but not ready for d/c due to UC complication    Major depression with psychotic features  On oral meds.  Continue current  management      Ulcerative proctitis with complication  Hx of total colectomy  Still having diarrhea  C.diff neg on 10/18  GI on board- appreciates re'c Lomotil and imodium.awaiting pathology report    Iron deficiency anemia  H/H is stable  GI is following      Venous insufficiency of both lower extremities          VTE Risk Mitigation (From admission, onward)         Ordered     enoxaparin injection 40 mg  Daily      10/25/19 1406     IP VTE HIGH RISK PATIENT  Once      10/16/19 1816     Place VIVIANA hose  Until discontinued      10/16/19 1816     Place sequential compression device  Until discontinued      10/16/19 1816                      Maggie De León MD  Department of Hospital Medicine   [facility]

## 2019-10-27 NOTE — PLAN OF CARE
Pt has severe diarrhea. Vitals are stable. Patient needs to be reoriented and told that she can not get up and walk without help. Safety maintained.

## 2019-10-28 ENCOUNTER — TELEPHONE (OUTPATIENT)
Dept: GASTROENTEROLOGY | Facility: CLINIC | Age: 74
End: 2019-10-28

## 2019-10-28 DIAGNOSIS — K50.819 CROHN'S DISEASE OF SMALL AND LARGE INTESTINES WITH COMPLICATION: Primary | Chronic | ICD-10-CM

## 2019-10-28 LAB
ALBUMIN SERPL BCP-MCNC: 1.8 G/DL (ref 3.5–5.2)
ALP SERPL-CCNC: 108 U/L (ref 55–135)
ALT SERPL W/O P-5'-P-CCNC: 8 U/L (ref 10–44)
ANION GAP SERPL CALC-SCNC: 11 MMOL/L (ref 8–16)
AST SERPL-CCNC: 11 U/L (ref 10–40)
BASOPHILS # BLD AUTO: 0 K/UL (ref 0–0.2)
BASOPHILS # BLD AUTO: 0 K/UL (ref 0–0.2)
BASOPHILS # BLD AUTO: 0.01 K/UL (ref 0–0.2)
BASOPHILS # BLD AUTO: 0.01 K/UL (ref 0–0.2)
BASOPHILS NFR BLD: 0 % (ref 0–1.9)
BASOPHILS NFR BLD: 0 % (ref 0–1.9)
BASOPHILS NFR BLD: 0.1 % (ref 0–1.9)
BASOPHILS NFR BLD: 0.2 % (ref 0–1.9)
BILIRUB SERPL-MCNC: 0.3 MG/DL (ref 0.1–1)
BUN SERPL-MCNC: 18 MG/DL (ref 8–23)
CALCIUM SERPL-MCNC: 8.1 MG/DL (ref 8.7–10.5)
CHLORIDE SERPL-SCNC: 103 MMOL/L (ref 95–110)
CO2 SERPL-SCNC: 24 MMOL/L (ref 23–29)
CREAT SERPL-MCNC: 0.8 MG/DL (ref 0.5–1.4)
DIFFERENTIAL METHOD: ABNORMAL
EOSINOPHIL # BLD AUTO: 0 K/UL (ref 0–0.5)
EOSINOPHIL # BLD AUTO: 0.5 K/UL (ref 0–0.5)
EOSINOPHIL NFR BLD: 0 % (ref 0–8)
EOSINOPHIL NFR BLD: 0 % (ref 0–8)
EOSINOPHIL NFR BLD: 0.2 % (ref 0–8)
EOSINOPHIL NFR BLD: 7.7 % (ref 0–8)
ERYTHROCYTE [DISTWIDTH] IN BLOOD BY AUTOMATED COUNT: 14.2 % (ref 11.5–14.5)
ERYTHROCYTE [DISTWIDTH] IN BLOOD BY AUTOMATED COUNT: 14.5 % (ref 11.5–14.5)
ERYTHROCYTE [DISTWIDTH] IN BLOOD BY AUTOMATED COUNT: 14.5 % (ref 11.5–14.5)
ERYTHROCYTE [DISTWIDTH] IN BLOOD BY AUTOMATED COUNT: 14.6 % (ref 11.5–14.5)
EST. GFR  (AFRICAN AMERICAN): >60 ML/MIN/1.73 M^2
EST. GFR  (NON AFRICAN AMERICAN): >60 ML/MIN/1.73 M^2
GLUCOSE SERPL-MCNC: 235 MG/DL (ref 70–110)
HCT VFR BLD AUTO: 25.1 % (ref 37–48.5)
HCT VFR BLD AUTO: 25.7 % (ref 37–48.5)
HCT VFR BLD AUTO: 27.9 % (ref 37–48.5)
HCT VFR BLD AUTO: 28.8 % (ref 37–48.5)
HGB BLD-MCNC: 8.2 G/DL (ref 12–16)
HGB BLD-MCNC: 8.4 G/DL (ref 12–16)
HGB BLD-MCNC: 9.3 G/DL (ref 12–16)
HGB BLD-MCNC: 9.4 G/DL (ref 12–16)
LYMPHOCYTES # BLD AUTO: 0.4 K/UL (ref 1–4.8)
LYMPHOCYTES # BLD AUTO: 0.5 K/UL (ref 1–4.8)
LYMPHOCYTES # BLD AUTO: 0.8 K/UL (ref 1–4.8)
LYMPHOCYTES # BLD AUTO: 0.9 K/UL (ref 1–4.8)
LYMPHOCYTES NFR BLD: 11.7 % (ref 18–48)
LYMPHOCYTES NFR BLD: 12.2 % (ref 18–48)
LYMPHOCYTES NFR BLD: 13.2 % (ref 18–48)
LYMPHOCYTES NFR BLD: 8 % (ref 18–48)
MCH RBC QN AUTO: 29.2 PG (ref 27–31)
MCH RBC QN AUTO: 29.7 PG (ref 27–31)
MCH RBC QN AUTO: 30.1 PG (ref 27–31)
MCH RBC QN AUTO: 30.5 PG (ref 27–31)
MCHC RBC AUTO-ENTMCNC: 32.6 G/DL (ref 32–36)
MCHC RBC AUTO-ENTMCNC: 32.7 G/DL (ref 32–36)
MCHC RBC AUTO-ENTMCNC: 32.7 G/DL (ref 32–36)
MCHC RBC AUTO-ENTMCNC: 33.3 G/DL (ref 32–36)
MCV RBC AUTO: 89 FL (ref 82–98)
MCV RBC AUTO: 91 FL (ref 82–98)
MCV RBC AUTO: 92 FL (ref 82–98)
MCV RBC AUTO: 92 FL (ref 82–98)
MONOCYTES # BLD AUTO: 0.2 K/UL (ref 0.3–1)
MONOCYTES # BLD AUTO: 0.5 K/UL (ref 0.3–1)
MONOCYTES # BLD AUTO: 0.5 K/UL (ref 0.3–1)
MONOCYTES # BLD AUTO: 0.7 K/UL (ref 0.3–1)
MONOCYTES NFR BLD: 11.2 % (ref 4–15)
MONOCYTES NFR BLD: 16.6 % (ref 4–15)
MONOCYTES NFR BLD: 3.5 % (ref 4–15)
MONOCYTES NFR BLD: 6.5 % (ref 4–15)
NEUTROPHILS # BLD AUTO: 3 K/UL (ref 1.8–7.7)
NEUTROPHILS # BLD AUTO: 3.5 K/UL (ref 1.8–7.7)
NEUTROPHILS # BLD AUTO: 4.4 K/UL (ref 1.8–7.7)
NEUTROPHILS # BLD AUTO: 6 K/UL (ref 1.8–7.7)
NEUTROPHILS NFR BLD: 71.2 % (ref 38–73)
NEUTROPHILS NFR BLD: 75.4 % (ref 38–73)
NEUTROPHILS NFR BLD: 80.6 % (ref 38–73)
NEUTROPHILS NFR BLD: 81.7 % (ref 38–73)
PLATELET # BLD AUTO: 503 K/UL (ref 150–350)
PLATELET # BLD AUTO: 545 K/UL (ref 150–350)
PLATELET # BLD AUTO: 551 K/UL (ref 150–350)
PLATELET # BLD AUTO: 554 K/UL (ref 150–350)
PLATELET BLD QL SMEAR: ABNORMAL
PMV BLD AUTO: 8.1 FL (ref 9.2–12.9)
PMV BLD AUTO: 8.2 FL (ref 9.2–12.9)
PMV BLD AUTO: 8.2 FL (ref 9.2–12.9)
PMV BLD AUTO: 8.6 FL (ref 9.2–12.9)
POTASSIUM SERPL-SCNC: 4.8 MMOL/L (ref 3.5–5.1)
PREALB SERPL-MCNC: 23 MG/DL (ref 20–43)
PROT SERPL-MCNC: 6 G/DL (ref 6–8.4)
RBC # BLD AUTO: 2.76 M/UL (ref 4–5.4)
RBC # BLD AUTO: 2.88 M/UL (ref 4–5.4)
RBC # BLD AUTO: 3.05 M/UL (ref 4–5.4)
RBC # BLD AUTO: 3.12 M/UL (ref 4–5.4)
SODIUM SERPL-SCNC: 138 MMOL/L (ref 136–145)
WBC # BLD AUTO: 4.33 K/UL (ref 3.9–12.7)
WBC # BLD AUTO: 4.36 K/UL (ref 3.9–12.7)
WBC # BLD AUTO: 6.07 K/UL (ref 3.9–12.7)
WBC # BLD AUTO: 7.5 K/UL (ref 3.9–12.7)

## 2019-10-28 PROCEDURE — 97530 THERAPEUTIC ACTIVITIES: CPT

## 2019-10-28 PROCEDURE — 36415 COLL VENOUS BLD VENIPUNCTURE: CPT

## 2019-10-28 PROCEDURE — 84134 ASSAY OF PREALBUMIN: CPT

## 2019-10-28 PROCEDURE — 85025 COMPLETE CBC W/AUTO DIFF WBC: CPT | Mod: 91

## 2019-10-28 PROCEDURE — 97110 THERAPEUTIC EXERCISES: CPT

## 2019-10-28 PROCEDURE — 63600175 PHARM REV CODE 636 W HCPCS: Performed by: INTERNAL MEDICINE

## 2019-10-28 PROCEDURE — 80053 COMPREHEN METABOLIC PANEL: CPT

## 2019-10-28 PROCEDURE — 25000003 PHARM REV CODE 250: Performed by: ORTHOPAEDIC SURGERY

## 2019-10-28 PROCEDURE — 25000003 PHARM REV CODE 250: Performed by: INTERNAL MEDICINE

## 2019-10-28 PROCEDURE — 97535 SELF CARE MNGMENT TRAINING: CPT

## 2019-10-28 PROCEDURE — 99232 PR SUBSEQUENT HOSPITAL CARE,LEVL II: ICD-10-PCS | Mod: ,,, | Performed by: INTERNAL MEDICINE

## 2019-10-28 PROCEDURE — 99232 SBSQ HOSP IP/OBS MODERATE 35: CPT | Mod: ,,, | Performed by: INTERNAL MEDICINE

## 2019-10-28 PROCEDURE — 97116 GAIT TRAINING THERAPY: CPT

## 2019-10-28 PROCEDURE — 11000001 HC ACUTE MED/SURG PRIVATE ROOM

## 2019-10-28 PROCEDURE — 94761 N-INVAS EAR/PLS OXIMETRY MLT: CPT

## 2019-10-28 RX ORDER — HEPARIN 100 UNIT/ML
500 SYRINGE INTRAVENOUS
Status: CANCELLED | OUTPATIENT
Start: 2019-10-31

## 2019-10-28 RX ORDER — SODIUM CHLORIDE 0.9 % (FLUSH) 0.9 %
10 SYRINGE (ML) INJECTION
Status: CANCELLED | OUTPATIENT
Start: 2019-10-31

## 2019-10-28 RX ORDER — DIPHENHYDRAMINE HYDROCHLORIDE 50 MG/ML
25 INJECTION INTRAMUSCULAR; INTRAVENOUS
Status: CANCELLED | OUTPATIENT
Start: 2019-10-31

## 2019-10-28 RX ORDER — METHYLPREDNISOLONE SOD SUCC 125 MG
40 VIAL (EA) INJECTION
Status: CANCELLED | OUTPATIENT
Start: 2019-10-31

## 2019-10-28 RX ORDER — IPRATROPIUM BROMIDE AND ALBUTEROL SULFATE 2.5; .5 MG/3ML; MG/3ML
3 SOLUTION RESPIRATORY (INHALATION)
Status: CANCELLED | OUTPATIENT
Start: 2019-10-31

## 2019-10-28 RX ORDER — ACETAMINOPHEN 325 MG/1
650 TABLET ORAL
Status: CANCELLED | OUTPATIENT
Start: 2019-10-31

## 2019-10-28 RX ORDER — EPINEPHRINE 1 MG/ML
0.3 INJECTION, SOLUTION, CONCENTRATE INTRAVENOUS
Status: CANCELLED | OUTPATIENT
Start: 2019-10-31

## 2019-10-28 RX ADMIN — ENOXAPARIN SODIUM 40 MG: 100 INJECTION SUBCUTANEOUS at 05:10

## 2019-10-28 RX ADMIN — MEMANTINE HYDROCHLORIDE 10 MG: 5 TABLET ORAL at 09:10

## 2019-10-28 RX ADMIN — ACETAMINOPHEN 1000 MG: 500 TABLET ORAL at 09:10

## 2019-10-28 RX ADMIN — AZATHIOPRINE 100 MG: 50 TABLET ORAL at 09:10

## 2019-10-28 RX ADMIN — CALCIUM CARBONATE 500 MG: 500 TABLET, CHEWABLE ORAL at 08:10

## 2019-10-28 RX ADMIN — METHOTREXATE 1 TABLET: 2.5 TABLET ORAL at 08:10

## 2019-10-28 RX ADMIN — MEMANTINE HYDROCHLORIDE 10 MG: 5 TABLET ORAL at 08:10

## 2019-10-28 RX ADMIN — PANTOPRAZOLE SODIUM 40 MG: 40 TABLET, DELAYED RELEASE ORAL at 08:10

## 2019-10-28 RX ADMIN — QUETIAPINE 100 MG: 100 TABLET ORAL at 08:10

## 2019-10-28 RX ADMIN — METHOTREXATE 1 TABLET: 2.5 TABLET ORAL at 09:10

## 2019-10-28 RX ADMIN — ASPIRIN 81 MG: 81 TABLET, COATED ORAL at 09:10

## 2019-10-28 RX ADMIN — FERROUS SULFATE TAB EC 325 MG (65 MG FE EQUIVALENT) 325 MG: 325 (65 FE) TABLET DELAYED RESPONSE at 09:10

## 2019-10-28 RX ADMIN — METHYLPREDNISOLONE SODIUM SUCCINATE 20 MG: 40 INJECTION, POWDER, FOR SOLUTION INTRAMUSCULAR; INTRAVENOUS at 09:10

## 2019-10-28 RX ADMIN — PANTOPRAZOLE SODIUM 40 MG: 40 TABLET, DELAYED RELEASE ORAL at 09:10

## 2019-10-28 RX ADMIN — ACETAMINOPHEN 1000 MG: 500 TABLET ORAL at 08:10

## 2019-10-28 RX ADMIN — CALCIUM CARBONATE 500 MG: 500 TABLET, CHEWABLE ORAL at 09:10

## 2019-10-28 RX ADMIN — METHYLPREDNISOLONE SODIUM SUCCINATE 20 MG: 40 INJECTION, POWDER, FOR SOLUTION INTRAMUSCULAR; INTRAVENOUS at 08:10

## 2019-10-28 RX ADMIN — FERROUS SULFATE TAB EC 325 MG (65 MG FE EQUIVALENT) 325 MG: 325 (65 FE) TABLET DELAYED RESPONSE at 08:10

## 2019-10-28 RX ADMIN — DONEPEZIL HYDROCHLORIDE 10 MG: 5 TABLET, FILM COATED ORAL at 08:10

## 2019-10-28 NOTE — SUBJECTIVE & OBJECTIVE
Interval History: awake and alert, diarrhea is improving,sitting upin bed with PT.patient ate < 20% her breakfast but stated I am still eating     Appreciates GI rec's, CRP improved with steroid. Continue IV steroid, Imuran daily   Will started on TPN given low Albumin and monitor weakly pre albumin.       Review of Systems   Constitutional: Negative for activity change, fatigue and fever.   Respiratory: Negative for apnea, cough and shortness of breath.    Cardiovascular: Negative for chest pain and palpitations.   Gastrointestinal: Positive for diarrhea. Negative for abdominal distention, nausea and vomiting.   Genitourinary: Negative for difficulty urinating and hematuria.   Musculoskeletal: Positive for arthralgias.   Neurological: Negative for dizziness and numbness.   Psychiatric/Behavioral: Negative for agitation. The patient is not nervous/anxious.      Objective:     Vital Signs (Most Recent):  Temp: 97.8 °F (36.6 °C) (10/28/19 1213)  Pulse: 84 (10/28/19 1213)  Resp: 18 (10/28/19 1213)  BP: 138/64 (10/28/19 1213)  SpO2: 98 % (10/28/19 0316) Vital Signs (24h Range):  Temp:  [97.8 °F (36.6 °C)-98.5 °F (36.9 °C)] 97.8 °F (36.6 °C)  Pulse:  [] 84  Resp:  [17-20] 18  SpO2:  [98 %-99 %] 98 %  BP: (125-145)/(56-94) 138/64     Weight: 106 kg (233 lb 11 oz)  Body mass index is 40.11 kg/m².    Intake/Output Summary (Last 24 hours) at 10/28/2019 1225  Last data filed at 10/28/2019 1000  Gross per 24 hour   Intake 1975 ml   Output 593 ml   Net 1382 ml      Physical Exam   Constitutional: She appears well-nourished.   HENT:   Head: Normocephalic and atraumatic.   Pulmonary/Chest: No respiratory distress.   Neurological: She is alert.   Vitals reviewed.      Significant Labs:   CBC:   Recent Labs   Lab 10/27/19  1922 10/28/19  0007 10/28/19  0631   WBC 6.44 4.33 4.36   HGB 8.8* 8.4* 8.2*   HCT 26.6* 25.7* 25.1*   * 554* 503*     CMP:   Recent Labs   Lab 10/27/19  0438 10/28/19  0631    138   K 4.9 4.8     103   CO2 22* 24   * 235*   BUN 14 18   CREATININE 0.8 0.8   CALCIUM 8.2* 8.1*   PROT 6.3 6.0   ALBUMIN 1.8* 1.8*   BILITOT 0.5 0.3   ALKPHOS 107 108   AST 15 11   ALT 8* 8*   ANIONGAP 13 11   EGFRNONAA >60 >60     TSH: No results for input(s): TSH in the last 4320 hours.  Urine Culture: No results for input(s): LABURIN in the last 48 hours.  Urine Studies: No results for input(s): COLORU, APPEARANCEUA, PHUR, SPECGRAV, PROTEINUA, GLUCUA, KETONESU, BILIRUBINUA, OCCULTUA, NITRITE, UROBILINOGEN, LEUKOCYTESUR, RBCUA, WBCUA, BACTERIA, SQUAMEPITHEL, HYALINECASTS in the last 48 hours.    Invalid input(s): COLTON    Significant Imaging: none

## 2019-10-28 NOTE — TELEPHONE ENCOUNTER
Spoke with patients son Bryson, and informed him that he will have to get in contact with her insurance company to see if they cover the infusions.     Contacted Tania Reddy in the infusion Center to assist with scheduling.

## 2019-10-28 NOTE — SUBJECTIVE & OBJECTIVE
Subjective:     Interval History:   Pt isnt sure if she drank any boost today.  She states her bowel are awful.    Nursing states bowels are forming.  Much less stool reported.    Review of Systems   Constitutional: Positive for activity change and appetite change. Negative for chills and fever.   Respiratory: Negative for cough and shortness of breath.    Cardiovascular: Negative for chest pain and palpitations.   Gastrointestinal: Positive for diarrhea. Negative for abdominal distention, abdominal pain and blood in stool.     Objective:     Vital Signs (Most Recent):  Temp: 97.8 °F (36.6 °C) (10/28/19 1213)  Pulse: 82 (10/28/19 1548)  Resp: 18 (10/28/19 1213)  BP: 138/64 (10/28/19 1213)  SpO2: 98 % (10/28/19 0316) Vital Signs (24h Range):  Temp:  [97.8 °F (36.6 °C)-98.5 °F (36.9 °C)] 97.8 °F (36.6 °C)  Pulse:  [78-99] 82  Resp:  [17-20] 18  SpO2:  [98 %-99 %] 98 %  BP: (125-145)/(56-94) 138/64     Weight: 106 kg (233 lb 11 oz) (10/28/19 0440)  Body mass index is 40.11 kg/m².      Intake/Output Summary (Last 24 hours) at 10/28/2019 1616  Last data filed at 10/28/2019 1300  Gross per 24 hour   Intake 2095 ml   Output 893 ml   Net 1202 ml       Lines/Drains/Airways     Peripheral Intravenous Line                 Peripheral IV - Single Lumen 10/26/19 1438 22 G Left Hand 2 days                Physical Exam   Constitutional: No distress.   Pulmonary/Chest: Effort normal.   Abdominal: Soft. She exhibits no distension and no mass. There is no rebound.   Neurological: She is alert.   Psychiatric: She has a normal mood and affect. Her behavior is normal.   Vitals reviewed.      Significant Labs:  Amylase: No results for input(s): AMYLASE in the last 48 hours.  Blood Culture: No results for input(s): LABBLOO in the last 48 hours.  CBC:   Recent Labs   Lab 10/28/19  0007 10/28/19  0631 10/28/19  1201   WBC 4.33 4.36 7.50   HGB 8.4* 8.2* 9.4*   HCT 25.7* 25.1* 28.8*   * 503* 545*     CMP:   Recent Labs   Lab  10/28/19  0631   *   CALCIUM 8.1*   ALBUMIN 1.8*   PROT 6.0      K 4.8   CO2 24      BUN 18   CREATININE 0.8   ALKPHOS 108   ALT 8*   AST 11   BILITOT 0.3     CRP:   Recent Labs   Lab 10/27/19  0438   CRP 81.9*         Significant Imaging:  Imaging results within the past 24 hours have been reviewed.

## 2019-10-28 NOTE — PLAN OF CARE
Pt had oral discomfort relieved by mouth lidocaine. Pt diarrhea improved and patient had more solid stools. Pt is on peripheral tpn @ 100 ml/hr. Pt also received lipids. Pt vitals are stable and safety maintained.

## 2019-10-28 NOTE — PT/OT/SLP PROGRESS
Occupational Therapy   Treatment    Name: Anabella Aranda  MRN: [id number]  Admitting Diagnosis:  Crohn's disease of small and large intestines with complication  7 Days Post-Op    Recommendations:     Discharge Recommendations: nursing facility, skilled  Discharge Equipment Recommendations:  none  Barriers to discharge:  None    Assessment:     Anabella Aranda is a 74 y.o. female with a medical diagnosis of Crohn's disease of small and large intestines with complication.   Performance deficits affecting function are weakness, impaired endurance, impaired self care skills, impaired functional mobilty, gait instability, impaired balance, impaired cognition, decreased lower extremity function, pain, impaired skin, orthopedic precautions. Pt found in bed, agreeable to therapy.  Pt continues with diarrhea throughout session hindering progress with therapy.  She is able to transfer with Min A using RW. No complaints of LLE pain. Continue OT services to address functional goals, progressing as able.      Rehab Prognosis:  Good; patient would benefit from acute skilled OT services to address these deficits and reach maximum level of function.       Plan:     Patient to be seen 5 x/week to address the above listed problems via self-care/home management, therapeutic activities, therapeutic exercises  · Plan of Care Expires: 11/17/19  · Plan of Care Reviewed with: patient    Subjective     Pain/Comfort:  · Pain Rating 1: (Pt reports abdominal cramping and sore bottom from diarrhea)    Objective:     Communicated with: RN prior to session.  Patient found HOB elevated with telemetry, peripheral IV upon OT entry to room.    General Precautions: Standard, fall   Orthopedic Precautions:LLE weight bearing as tolerated   Braces: N/A     Occupational Performance:     Bed Mobility:    · Patient completed Rolling/Turning to Right with minimum assistance and with side rail  · Patient completed Scooting/Bridging with stand by  assistance and scoot seated to EOB  · Patient completed Supine to Sit with minimum assistance, with side rail and increased time and effort, vc's for effective technique     Functional Mobility/Transfers:  · Patient completed Sit <> Stand Transfer with minimum assistance  with  rolling walker and vc's for hand placement   · Patient completed Bed <> Chair Transfer using Stand Pivot technique with minimum assistance with rolling walker  · Patient completed Toilet Transfer Stand Pivot technique with minimum assistance with  rolling walker and bedside commode  · Functional Mobility: Pt took turning steps multiple times to and from Bed>BSC>Chair>BSC>Chair secondary to bouts of diarrhea.     Activities of Daily Living:  · Toileting: dependence for perianal cleaning multiple times      [facility] 6 Click ADL: 18      Patient left up in chair with all lines intact, call button in reach, bed alarm on, RN notified and PCT presentEducation:      GOALS:   Multidisciplinary Problems     Occupational Therapy Goals        Problem: Occupational Therapy Goal    Goal Priority Disciplines Outcome Interventions   Occupational Therapy Goal     OT, PT/OT Ongoing, Progressing    Description:  Goals to be met by: 11/17     Patient will increase functional independence with ADLs by performing:    UE Dressing with Supervision.  LE Dressing with Supervision.  Grooming while standing with Supervision.  Toileting from toilet with Supervision for hygiene and clothing management.   Toilet transfer to toilet with Supervision.  Upper extremity exercise program x10 reps per handout, with assistance as needed.---MET 10/18/2019                       Time Tracking:     OT Date of Treatment: 10/28/19  OT Start Time: 0955  OT Stop Time: 1024  OT Total Time (min): 29 min    Billable Minutes:Self Care/Home Management 15  Therapeutic Activity 14    ZACKARY Maldonado  10/28/2019

## 2019-10-28 NOTE — PLAN OF CARE
[facility], patient's son to review LTAC list for additional preferences per email.       10/28/19 3578   Post-Acute Status   Post-Acute Authorization Placement   Post-Acute Placement Status Referrals Sent

## 2019-10-28 NOTE — PT/OT/SLP PROGRESS
Physical Therapy Treatment    Patient Name:  Anabella Aranda   MRN:  [id number]    Recommendations:     Discharge Recommendations:  nursing facility, skilled   Discharge Equipment Recommendations: none   Barriers to discharge: Decreased caregiver support and decreased mobility,strength and endurance    Assessment:     Anabella Aranda is a 74 y.o. female admitted with a medical diagnosis of Crohn's disease of small and large intestines with complication.  She presents with the following impairments/functional limitations:  weakness, impaired endurance, impaired functional mobilty, gait instability, impaired balance, decreased lower extremity function, decreased safety awareness, decreased ROM, orthopedic precautions,pt's cognition appears better today and pt is up in chair,good participation and remains with decreased mobility,endurance and strength and will benefit from SNF upon discharge.    Rehab Prognosis: Fair; patient would benefit from acute skilled PT services to address these deficits and reach maximum level of function.    Recent Surgery: Procedure(s) (LRB):  EGD (ESOPHAGOGASTRODUODENOSCOPY) (N/A)  SIGMOIDOSCOPY, FLEXIBLE (N/A) 7 Days Post-Op    Plan:     During this hospitalization, patient to be seen BID to address the identified rehab impairments via gait training, therapeutic activities, therapeutic exercises and progress toward the following goals:    · Plan of Care Expires:  11/18/19    Subjective     Chief Complaint: n/a  Patient/Family Comments/goals: pt had her hair washed today.  Pain/Comfort:  · Pain Rating 1: (no rating)  · Location 1: abdomen  · Pain Addressed 1: Reposition, Distraction      Objective:     Communicated with nsg prior to session.  Patient found up in chair with peripheral IV, telemetry(chair alarm) upon PT entry to room.     General Precautions: Standard, fall   Orthopedic Precautions:LLE weight bearing as tolerated   Braces: N/A     Functional Mobility:  · Transfers:      · Sit to Stand:  moderate assistance and from decreased surface with rolling walker  · Gait: pre-gait stepping inside RW X 15 reps with CGA and amb ~7' X 1 with RW and Min A  · Balance: fair standing balance with RW      AM-PAC 6 CLICK MOBILITY  Turning over in bed (including adjusting bedclothes, sheets and blankets)?: 3  Sitting down on and standing up from a chair with arms (e.g., wheelchair, bedside commode, etc.): 2(decreased surface)  Moving from lying on back to sitting on the side of the bed?: 2  Moving to and from a bed to a chair (including a wheelchair)?: 3  Need to walk in hospital room?: 3  Climbing 3-5 steps with a railing?: 2  Basic Mobility Total Score: 15       Therapeutic Activities and Exercises: le supine ex's x 15 reps inc: ap,qs,hs,abd/add,slr.       Patient left up in chair with all lines intact, call button in reach and chair alarm on..    GOALS: see general POC  Multidisciplinary Problems     Physical Therapy Goals        Problem: Physical Therapy Goal    Goal Priority Disciplines Outcome Goal Variances Interventions   Physical Therapy Goal     PT, PT/OT Ongoing, Not Progressing     Description:  1.  Supine to/from sit with with supervision  2.  Bed to/from chair with RW with supervision  3.  Ambulate 50' with RW with supervision  4.  Tolerate sitting 2 hours  MET 10/22/19                    Time Tracking:     PT Received On: 10/28/19  PT Start Time: 1124     PT Stop Time: 1147  PT Total Time (min): 23 min     Billable Minutes: Gait Training 13 and Therapeutic Exercise 10    Treatment Type: Treatment  PT/PTA: PTA     PTA Visit Number: 3     Kris Henderson, DONI  10/28/2019

## 2019-10-28 NOTE — TELEPHONE ENCOUNTER
----- Message from Og Carrera MD sent at 10/28/2019  7:53 AM CDT -----  I have ordered remicade therapy plan.  Can we ensure that her insurance will cover this? She is still hospitalized but will need to start the infusions soon...

## 2019-10-28 NOTE — PLAN OF CARE
10/28/19 1704   Discharge Reassessment   Assessment Type Discharge Planning Reassessment   Provided patient/caregiver education on the expected discharge date and the discharge plan Yes   Do you have any problems affording any of your prescribed medications? No   Discharge Plan A Long-term acute care facility (LTAC)   Discharge Plan B Long-term acute care facility (LTAC)   DME Needed Upon Discharge  none   Patient choice form signed by patient/caregiver No   Anticipated Discharge Disposition LTAC     Pt will need TPN upon discharge. Pt no longer meets IPR criteria due to need for long term TPN. LTAC referral sent to [facility]; awaiting on pt's son to give more LTAC preferences.

## 2019-10-28 NOTE — PLAN OF CARE
Problem: Occupational Therapy Goal  Goal: Occupational Therapy Goal  Description  Goals to be met by: 11/17     Patient will increase functional independence with ADLs by performing:    UE Dressing with Supervision.  LE Dressing with Supervision.  Grooming while standing with Supervision.  Toileting from toilet with Supervision for hygiene and clothing management.   Toilet transfer to toilet with Supervision.  Upper extremity exercise program x10 reps per handout, with assistance as needed.---MET 10/18/2019      Outcome: Ongoing, Progressing     Anabella Aranda is a 74 y.o. female with a medical diagnosis of Crohn's disease of small and large intestines with complication.   Performance deficits affecting function are weakness, impaired endurance, impaired self care skills, impaired functional mobilty, gait instability, impaired balance, impaired cognition, decreased lower extremity function, pain, impaired skin, orthopedic precautions. Pt found in bed, agreeable to therapy.  Pt continues with diarrhea throughout session hindering progress with therapy.  She is able to transfer with Min A using RW. No complaints of LLE pain. Continue OT services to address functional goals, progressing as able.    ZACKARY Maldonado

## 2019-10-28 NOTE — PROGRESS NOTES
[facility]  Orthopedics  Progress Note    Patient Name: Anabella Aranda  MRN: [id number]  Admission Date: 10/16/2019  Hospital Length of Stay: 12 days  Attending Provider: Maggie De León*  Primary Care Provider: Shon Brambila MD  Follow-up For: Procedure(s) (LRB):  EGD (ESOPHAGOGASTRODUODENOSCOPY) (N/A)  SIGMOIDOSCOPY, FLEXIBLE (N/A)    Post-Operative Day: 7 Days Post-Op  Subjective:     Principal Problem:Crohn's disease of small and large intestines with complication    Principal Orthopedic Problem: S/p LEFT TKA POD 12    Interval History: Pt made progress in PT (amb ~7' X 1 with RW '). Reports feeling better. No complaints of knee pain. Pt was interested in drinking her Boost shake.     Review of patient's allergies indicates:   Allergen Reactions    Sulfa (sulfonamide antibiotics) Swelling    Tomato (solanum lycopersicum) Other (See Comments)       Current Facility-Administered Medications   Medication    acetaminophen tablet 1,000 mg    Amino acid 4.25% - dextrose 10% (CLINIMIX-E) solution with additives (1L provides 42.5 gm AA, 100 gm CHO (340 kcal/L dextrose), Na 35, K 30, Mg 5, Ca 4.5, Acetate 70, Cl 39, Phos 15)    aspirin EC tablet 81 mg    azaTHIOprine tablet 100 mg    benzocaine 10 % mucosal gel    calcium carbonate 200 mg calcium (500 mg) chewable tablet 500 mg    diphenoxylate-atropine 2.5-0.025 mg per tablet 1 tablet    donepezil tablet 10 mg    enoxaparin injection 40 mg    ferrous sulfate EC tablet 325 mg    HYDROmorphone injection 0.2 mg    loperamide capsule 4 mg    memantine tablet 10 mg    methylPREDNISolone sodium succinate injection 20 mg    ondansetron disintegrating tablet 4 mg    ondansetron injection 4 mg    oxyCODONE immediate release tablet 5 mg    pantoprazole EC tablet 40 mg    QUEtiapine tablet 100 mg    sodium chloride 0.9% flush 10 mL     Objective:     Vital Signs (Most Recent):  Temp: 97.8 °F (36.6 °C) (10/28/19 1213)  Pulse: 84  "(10/28/19 1213)  Resp: 18 (10/28/19 1213)  BP: 138/64 (10/28/19 1213)  SpO2: 98 % (10/28/19 0316) Vital Signs (24h Range):  Temp:  [97.8 °F (36.6 °C)-98.5 °F (36.9 °C)] 97.8 °F (36.6 °C)  Pulse:  [] 84  Resp:  [17-20] 18  SpO2:  [98 %-99 %] 98 %  BP: (125-145)/(56-94) 138/64     Weight: 106 kg (233 lb 11 oz)  Height: 5' 4" (162.6 cm)  Body mass index is 40.11 kg/m².      Intake/Output Summary (Last 24 hours) at 10/28/2019 1351  Last data filed at 10/28/2019 1000  Gross per 24 hour   Intake 1975 ml   Output 593 ml   Net 1382 ml       General    Vitals reviewed.  Constitutional: She appears well-developed.   Cardiovascular: Normal rate.    Pulmonary/Chest: Effort normal.   Neurological: She is alert.   Oriented to person and time   Psychiatric: She has a normal mood and affect.             LEFT LOWER EXTREMITY:   Bandage in tact. Moderate amount of bloody drainage.   No redness or sign of infection.   No significant swelling.   Negative homans.  Exam unchanged.     Significant Labs: All pertinent labs within the past 24 hours have been reviewed.    Significant Imaging: I have reviewed and interpreted all pertinent imaging results/findings.    Assessment/Plan:     Active Diagnoses:    Diagnosis Date Noted POA    PRINCIPAL PROBLEM:  Crohn's disease of small and large intestines with complication [K50.819] 10/25/2019 Yes     Chronic    Severe malnutrition [E43] 10/27/2019 No    History of left knee replacement [Z96.652] 10/16/2019 Not Applicable    Dementia [F03.90]  Yes     Chronic    Major depression with psychotic features [F32.3] 04/30/2018 Yes     Chronic    Morbid obesity [E66.01] 10/17/2017 Yes     Chronic    Ulcerative proctitis with complication [K51.219] 01/11/2017 Yes     Chronic    History of total colectomy [Z90.49] 06/24/2016 Not Applicable     Chronic    Iron deficiency anemia [D50.9] 09/09/2015 Yes     Chronic    Venous insufficiency of both lower extremities [I87.2] 08/26/2015 Yes     " Chronic    Diarrhea [R19.7] 08/07/2014 Yes      Problems Resolved During this Admission:    Diagnosis Date Noted Date Resolved POA    Primary osteoarthritis of left knee [M17.12] 10/16/2019 10/17/2019 Yes    Acute renal failure superimposed on stage 3 chronic kidney disease [N17.9, N18.3] 10/16/2019 10/25/2019 Yes    Chronic pain of both knees [M25.561, M25.562, G89.29] 08/14/2019 10/17/2019 Yes     Chronic     Continue PT/OT  Consider removing dressing/staples tomorrow or POD 14.     Umu Cuellar PA-C  Orthopedics  [facility]

## 2019-10-28 NOTE — PLAN OF CARE
Patient disoriented to place, time, situtation.  IV patent, intact.  PPN running at 100.  On telemetry running NS.  Incontinent, use of adult brief.  Use of Inter Dry in folds.  Need to be told to swallow pills when administering them.  Old drainage noted to Aquacel to left knee.  Denies pain.  Use of CATERINA.  No concerns stated at this time.

## 2019-10-28 NOTE — ASSESSMENT & PLAN NOTE
Hx of UC s/p colectomy with retained rectal stump, had flex sig 1/2019 showing continued inflammation of the rectal stump, otherwise the small bowel was normal. CT enterography recently showed continued inflammation of the rectal stump.    EGD and Flex sig 10/21  SEVERE small bowel inflammation and SEVERE retained rectum inflammation with deep heaped up ulcers.  Discussed case with pathology.  No evidence of infectious process, stains negative  However on review of colectomy specimen from years ago, there was evidence of Crohns disease at that time.  Thus, this is likely severe crohns flare given the extent of small bowel involvement.    After 2 days IV steroids, CRP 170s - - > 80s, which is encouraging.      Recs:  solumedrol 20mg BID IV   Repeat CRP on day 5 (tomorrow) and consider transition to PO steroids.  Pending CRP tomorrow, will need to decide to transition to PO steroids vs. Inpatient remicade.  imuran at 100mg daily  Daily labs including CMP, CBC  Await hepatitis B serologies   - check HBV DNA given HBV core Ab total positive  Will likely need remicade, inpatient vs. Outpatient pending response to IV steroids  Add nutritional shakes TID  TPN    scheduled lomotil BID ; Can use immodium PRN and alternate with lomotil  PPI BID    I hope to avoid completion proctectomy with end ileostomy, but that would be last resort if she does not respond to steroids.

## 2019-10-28 NOTE — PT/OT/SLP PROGRESS
Physical Therapy Treatment    Patient Name:  Anabella Aranda   MRN:  [id number]    Recommendations:     Discharge Recommendations:  nursing facility, skilled   Discharge Equipment Recommendations: (defer to SNF)   Barriers to discharge: Decreased caregiver support and decreased mobility,strength and endurance    Assessment:     Anabella Aranda is a 74 y.o. female admitted with a medical diagnosis of Crohn's disease of small and large intestines with complication.  She presents with the following impairments/functional limitations:  weakness, impaired endurance, impaired functional mobilty, gait instability, impaired balance, impaired cognition, decreased lower extremity function, decreased safety awareness, decreased ROM, impaired coordination, impaired skin, orthopedic precautions,pt with good participation and no diarrhea issues with both PT rx's,pt remains with decreased mobility and strength and will benefit from SNF upon discharge.    Rehab Prognosis: Fair; patient would benefit from acute skilled PT services to address these deficits and reach maximum level of function.    Recent Surgery: Procedure(s) (LRB):  EGD (ESOPHAGOGASTRODUODENOSCOPY) (N/A)  SIGMOIDOSCOPY, FLEXIBLE (N/A) 7 Days Post-Op    Plan:     During this hospitalization, patient to be seen BID to address the identified rehab impairments via gait training, therapeutic activities, therapeutic exercises and progress toward the following goals:    · Plan of Care Expires:  11/18/19    Subjective     Chief Complaint: n/a  Patient/Family Comments/goals: pt with some stomach pain today.  Pain/Comfort:  · Pain Rating 1: (no rating)  · Location - Orientation 1: generalized  · Location 1: abdomen  · Pain Addressed 1: Reposition, Distraction      Objective:     Communicated with nsg prior to session.  Patient found up in chair with peripheral IV, telemetry(chair alarm) upon PT entry to room.     General Precautions: Standard, fall   Orthopedic  Precautions:LLE weight bearing as tolerated   Braces: N/A     Functional Mobility:  · Bed Mobility:     · Sit to Supine: minimum assistance  · Transfers:     · Sit to Stand:  moderate assistance and from decreased surface with rolling walker  · Balance: fair standing balance with RW      AM-PAC 6 CLICK MOBILITY  Turning over in bed (including adjusting bedclothes, sheets and blankets)?: 3  Sitting down on and standing up from a chair with arms (e.g., wheelchair, bedside commode, etc.): 2(decreased surface)  Moving from lying on back to sitting on the side of the bed?: 3  Moving to and from a bed to a chair (including a wheelchair)?: 3  Need to walk in hospital room?: 3  Climbing 3-5 steps with a railing?: 2  Basic Mobility Total Score: 16       Therapeutic Activities and Exercises: le supine ex's X 15 reps inc: ap,qs,hs,abd/add,slr.       Patient left supine with all lines intact, call button in reach, bed alarm on and nsg notified..    GOALS: see general POC  Multidisciplinary Problems     Physical Therapy Goals        Problem: Physical Therapy Goal    Goal Priority Disciplines Outcome Goal Variances Interventions   Physical Therapy Goal     PT, PT/OT Ongoing, Progressing     Description:  1.  Supine to/from sit with with supervision  2.  Bed to/from chair with RW with supervision  3.  Ambulate 50' with RW with supervision  4.  Tolerate sitting 2 hours  MET 10/22/19                    Time Tracking:     PT Received On: 10/28/19  PT Start Time: 1248     PT Stop Time: 1312  PT Total Time (min): 24 min     Billable Minutes: Therapeutic Activity 13 and Therapeutic Exercise 11    Treatment Type: Treatment  PT/PTA: PTA     PTA Visit Number: 3     Kris Henderson, DONI  10/28/2019

## 2019-10-28 NOTE — PROGRESS NOTES
[facility]  Gastroenterology  Progress Note    Patient Name: Anabella Aranda  MRN: [id number]  Admission Date: 10/16/2019  Hospital Length of Stay: 12 days  Code Status: Full Code   Attending Provider: Maggie De León*  Consulting Provider: Og Carrera MD  Primary Care Physician: Shon Brambila MD  Principal Problem: Crohn's disease of small and large intestines with complication      Subjective:     Interval History:   Pt isnt sure if she drank any boost today.  She states her bowel are awful.    Nursing states bowels are forming.  Much less stool reported.    Review of Systems   Constitutional: Positive for activity change and appetite change. Negative for chills and fever.   Respiratory: Negative for cough and shortness of breath.    Cardiovascular: Negative for chest pain and palpitations.   Gastrointestinal: Positive for diarrhea. Negative for abdominal distention, abdominal pain and blood in stool.     Objective:     Vital Signs (Most Recent):  Temp: 97.8 °F (36.6 °C) (10/28/19 1213)  Pulse: 82 (10/28/19 1548)  Resp: 18 (10/28/19 1213)  BP: 138/64 (10/28/19 1213)  SpO2: 98 % (10/28/19 0316) Vital Signs (24h Range):  Temp:  [97.8 °F (36.6 °C)-98.5 °F (36.9 °C)] 97.8 °F (36.6 °C)  Pulse:  [78-99] 82  Resp:  [17-20] 18  SpO2:  [98 %-99 %] 98 %  BP: (125-145)/(56-94) 138/64     Weight: 106 kg (233 lb 11 oz) (10/28/19 0440)  Body mass index is 40.11 kg/m².      Intake/Output Summary (Last 24 hours) at 10/28/2019 1616  Last data filed at 10/28/2019 1300  Gross per 24 hour   Intake 2095 ml   Output 893 ml   Net 1202 ml       Lines/Drains/Airways     Peripheral Intravenous Line                 Peripheral IV - Single Lumen 10/26/19 1438 22 G Left Hand 2 days                Physical Exam   Constitutional: No distress.   Pulmonary/Chest: Effort normal.   Abdominal: Soft. She exhibits no distension and no mass. There is no rebound.   Neurological: She is alert.   Psychiatric: She has a  normal mood and affect. Her behavior is normal.   Vitals reviewed.      Significant Labs:  Amylase: No results for input(s): AMYLASE in the last 48 hours.  Blood Culture: No results for input(s): LABBLOO in the last 48 hours.  CBC:   Recent Labs   Lab 10/28/19  0007 10/28/19  0631 10/28/19  1201   WBC 4.33 4.36 7.50   HGB 8.4* 8.2* 9.4*   HCT 25.7* 25.1* 28.8*   * 503* 545*     CMP:   Recent Labs   Lab 10/28/19  0631   *   CALCIUM 8.1*   ALBUMIN 1.8*   PROT 6.0      K 4.8   CO2 24      BUN 18   CREATININE 0.8   ALKPHOS 108   ALT 8*   AST 11   BILITOT 0.3     CRP:   Recent Labs   Lab 10/27/19  0438   CRP 81.9*         Significant Imaging:  Imaging results within the past 24 hours have been reviewed.    Assessment/Plan:     * Crohn's disease of small and large intestines with complication  Hx of UC s/p colectomy with retained rectal stump, had flex sig 1/2019 showing continued inflammation of the rectal stump, otherwise the small bowel was normal. CT enterography recently showed continued inflammation of the rectal stump.    EGD and Flex sig 10/21  SEVERE small bowel inflammation and SEVERE retained rectum inflammation with deep heaped up ulcers.  Discussed case with pathology.  No evidence of infectious process, stains negative  However on review of colectomy specimen from years ago, there was evidence of Crohns disease at that time.  Thus, this is likely severe crohns flare given the extent of small bowel involvement.    After 2 days IV steroids, CRP 170s - - > 80s, which is encouraging.      Recs:  solumedrol 20mg BID IV   Repeat CRP on day 5 (tomorrow) and consider transition to PO steroids.  Pending CRP tomorrow, will need to decide to transition to PO steroids vs. Inpatient remicade.  imuran at 100mg daily  Daily labs including CMP, CBC  Await hepatitis B serologies   - check HBV DNA given HBV core Ab total positive  Will likely need remicade, inpatient vs. Outpatient pending response  to IV steroids  Add nutritional shakes TID  TPN    scheduled lomotil BID ; Can use immodium PRN and alternate with lomotil  PPI BID    I hope to avoid completion proctectomy with end ileostomy, but that would be last resort if she does not respond to steroids.          Thank you for your consult. I will follow-up with patient. Please contact us if you have any additional questions.    Og Carrera MD  Gastroenterology  [facility]

## 2019-10-28 NOTE — PLAN OF CARE
Problem: Adult Inpatient Plan of Care  Goal: Plan of Care Review  Outcome: Ongoing, Progressing     Problem: Fall Injury Risk  Goal: Absence of Fall and Fall-Related Injury  Outcome: Ongoing, Progressing     Cued into patient's room.  Permission received per patient to turn camera to view patient.  Introduced as VN for night shift that will be working with floor nurse and nursing assistant.  Educated patient on VN's role in patient care. Plan of care reviewed with patient. Education per flowsheet.  Opportunity given for questions and questions answered.  C/o right lower tooth pain 5/10; will notify NP.  Instructed to call for assistance.  Will cont to monitor.    Labs, notes, and orders reviewed.

## 2019-10-29 ENCOUNTER — TELEPHONE (OUTPATIENT)
Dept: ORTHOPEDICS | Facility: CLINIC | Age: 74
End: 2019-10-29

## 2019-10-29 ENCOUNTER — TELEPHONE (OUTPATIENT)
Dept: FAMILY MEDICINE | Facility: CLINIC | Age: 74
End: 2019-10-29

## 2019-10-29 VITALS
RESPIRATION RATE: 20 BRPM | TEMPERATURE: 99 F | OXYGEN SATURATION: 100 % | DIASTOLIC BLOOD PRESSURE: 70 MMHG | HEIGHT: 64 IN | WEIGHT: 224 LBS | BODY MASS INDEX: 38.24 KG/M2 | SYSTOLIC BLOOD PRESSURE: 124 MMHG | HEART RATE: 94 BPM

## 2019-10-29 DIAGNOSIS — M25.562 LEFT KNEE PAIN, UNSPECIFIED CHRONICITY: Primary | ICD-10-CM

## 2019-10-29 LAB
ALBUMIN SERPL BCP-MCNC: 2.1 G/DL (ref 3.5–5.2)
ALP SERPL-CCNC: 128 U/L (ref 55–135)
ALT SERPL W/O P-5'-P-CCNC: 10 U/L (ref 10–44)
ANION GAP SERPL CALC-SCNC: 9 MMOL/L (ref 8–16)
AST SERPL-CCNC: 16 U/L (ref 10–40)
BASOPHILS # BLD AUTO: 0 K/UL (ref 0–0.2)
BASOPHILS # BLD AUTO: 0.01 K/UL (ref 0–0.2)
BASOPHILS # BLD AUTO: 0.01 K/UL (ref 0–0.2)
BASOPHILS NFR BLD: 0 % (ref 0–1.9)
BASOPHILS NFR BLD: 0.2 % (ref 0–1.9)
BASOPHILS NFR BLD: 0.2 % (ref 0–1.9)
BILIRUB SERPL-MCNC: 0.5 MG/DL (ref 0.1–1)
BUN SERPL-MCNC: 20 MG/DL (ref 8–23)
CALCIUM SERPL-MCNC: 8.3 MG/DL (ref 8.7–10.5)
CHLORIDE SERPL-SCNC: 101 MMOL/L (ref 95–110)
CO2 SERPL-SCNC: 30 MMOL/L (ref 23–29)
CREAT SERPL-MCNC: 0.8 MG/DL (ref 0.5–1.4)
CRP SERPL-MCNC: 26.3 MG/L (ref 0–8.2)
DIFFERENTIAL METHOD: ABNORMAL
EOSINOPHIL # BLD AUTO: 0 K/UL (ref 0–0.5)
EOSINOPHIL NFR BLD: 0 % (ref 0–8)
EOSINOPHIL NFR BLD: 0 % (ref 0–8)
EOSINOPHIL NFR BLD: 0.2 % (ref 0–8)
ERYTHROCYTE [DISTWIDTH] IN BLOOD BY AUTOMATED COUNT: 14.4 % (ref 11.5–14.5)
ERYTHROCYTE [DISTWIDTH] IN BLOOD BY AUTOMATED COUNT: 14.5 % (ref 11.5–14.5)
ERYTHROCYTE [DISTWIDTH] IN BLOOD BY AUTOMATED COUNT: 14.6 % (ref 11.5–14.5)
EST. GFR  (AFRICAN AMERICAN): >60 ML/MIN/1.73 M^2
EST. GFR  (NON AFRICAN AMERICAN): >60 ML/MIN/1.73 M^2
GLUCOSE SERPL-MCNC: 140 MG/DL (ref 70–110)
HBV DNA SERPL NAA+PROBE-ACNC: <10 IU/ML
HBV DNA SERPL NAA+PROBE-LOG IU: <1 LOG (10) IU/ML
HBV DNA SERPL QL NAA+PROBE: NOT DETECTED
HCT VFR BLD AUTO: 24.2 % (ref 37–48.5)
HCT VFR BLD AUTO: 27.4 % (ref 37–48.5)
HCT VFR BLD AUTO: 27.6 % (ref 37–48.5)
HGB BLD-MCNC: 8.1 G/DL (ref 12–16)
HGB BLD-MCNC: 9 G/DL (ref 12–16)
HGB BLD-MCNC: 9 G/DL (ref 12–16)
LYMPHOCYTES # BLD AUTO: 0.5 K/UL (ref 1–4.8)
LYMPHOCYTES # BLD AUTO: 0.7 K/UL (ref 1–4.8)
LYMPHOCYTES # BLD AUTO: 1.4 K/UL (ref 1–4.8)
LYMPHOCYTES NFR BLD: 12.3 % (ref 18–48)
LYMPHOCYTES NFR BLD: 24.4 % (ref 18–48)
LYMPHOCYTES NFR BLD: 9 % (ref 18–48)
M TB IFN-G CD4+ BCKGRND COR BLD-ACNC: 0 IU/ML
MCH RBC QN AUTO: 29.9 PG (ref 27–31)
MCH RBC QN AUTO: 29.9 PG (ref 27–31)
MCH RBC QN AUTO: 30.2 PG (ref 27–31)
MCHC RBC AUTO-ENTMCNC: 32.6 G/DL (ref 32–36)
MCHC RBC AUTO-ENTMCNC: 32.8 G/DL (ref 32–36)
MCHC RBC AUTO-ENTMCNC: 33.5 G/DL (ref 32–36)
MCV RBC AUTO: 90 FL (ref 82–98)
MCV RBC AUTO: 91 FL (ref 82–98)
MCV RBC AUTO: 92 FL (ref 82–98)
MITOGEN IGNF BCKGRD COR BLD-ACNC: 0.2 IU/ML
MITOGEN IGNF BCKGRD COR BLD-ACNC: ABNORMAL [IU]/ML
MONOCYTES # BLD AUTO: 0.7 K/UL (ref 0.3–1)
MONOCYTES # BLD AUTO: 0.7 K/UL (ref 0.3–1)
MONOCYTES # BLD AUTO: 0.8 K/UL (ref 0.3–1)
MONOCYTES NFR BLD: 12.8 % (ref 4–15)
MONOCYTES NFR BLD: 13.3 % (ref 4–15)
MONOCYTES NFR BLD: 13.9 % (ref 4–15)
NEUTROPHILS # BLD AUTO: 3.3 K/UL (ref 1.8–7.7)
NEUTROPHILS # BLD AUTO: 4 K/UL (ref 1.8–7.7)
NEUTROPHILS # BLD AUTO: 4.1 K/UL (ref 1.8–7.7)
NEUTROPHILS NFR BLD: 61.3 % (ref 38–73)
NEUTROPHILS NFR BLD: 74.7 % (ref 38–73)
NEUTROPHILS NFR BLD: 77.7 % (ref 38–73)
NIL: 0.04 IU/ML
PLATELET # BLD AUTO: 588 K/UL (ref 150–350)
PLATELET # BLD AUTO: 612 K/UL (ref 150–350)
PLATELET # BLD AUTO: 651 K/UL (ref 150–350)
PMV BLD AUTO: 7.6 FL (ref 9.2–12.9)
PMV BLD AUTO: 7.7 FL (ref 9.2–12.9)
PMV BLD AUTO: 7.7 FL (ref 9.2–12.9)
POCT GLUCOSE: 182 MG/DL (ref 70–110)
POTASSIUM SERPL-SCNC: 4.3 MMOL/L (ref 3.5–5.1)
PROT SERPL-MCNC: 6.6 G/DL (ref 6–8.4)
RBC # BLD AUTO: 2.68 M/UL (ref 4–5.4)
RBC # BLD AUTO: 3.01 M/UL (ref 4–5.4)
RBC # BLD AUTO: 3.01 M/UL (ref 4–5.4)
SODIUM SERPL-SCNC: 140 MMOL/L (ref 136–145)
TB2 - NIL: 0 IU/ML
WBC # BLD AUTO: 5.32 K/UL (ref 3.9–12.7)
WBC # BLD AUTO: 5.54 K/UL (ref 3.9–12.7)
WBC # BLD AUTO: 5.69 K/UL (ref 3.9–12.7)

## 2019-10-29 PROCEDURE — 97110 THERAPEUTIC EXERCISES: CPT

## 2019-10-29 PROCEDURE — 25000003 PHARM REV CODE 250: Performed by: INTERNAL MEDICINE

## 2019-10-29 PROCEDURE — 36415 COLL VENOUS BLD VENIPUNCTURE: CPT

## 2019-10-29 PROCEDURE — 97535 SELF CARE MNGMENT TRAINING: CPT

## 2019-10-29 PROCEDURE — 80053 COMPREHEN METABOLIC PANEL: CPT

## 2019-10-29 PROCEDURE — 97530 THERAPEUTIC ACTIVITIES: CPT

## 2019-10-29 PROCEDURE — 25000003 PHARM REV CODE 250: Performed by: ORTHOPAEDIC SURGERY

## 2019-10-29 PROCEDURE — 94761 N-INVAS EAR/PLS OXIMETRY MLT: CPT

## 2019-10-29 PROCEDURE — 63600175 PHARM REV CODE 636 W HCPCS: Performed by: INTERNAL MEDICINE

## 2019-10-29 PROCEDURE — 85025 COMPLETE CBC W/AUTO DIFF WBC: CPT | Mod: 91

## 2019-10-29 PROCEDURE — 86140 C-REACTIVE PROTEIN: CPT

## 2019-10-29 RX ORDER — ENOXAPARIN SODIUM 100 MG/ML
40 INJECTION SUBCUTANEOUS EVERY 24 HOURS
Status: CANCELLED | OUTPATIENT
Start: 2019-10-29

## 2019-10-29 RX ORDER — SODIUM CHLORIDE 0.9 % (FLUSH) 0.9 %
10 SYRINGE (ML) INJECTION
Status: CANCELLED | OUTPATIENT
Start: 2019-10-29

## 2019-10-29 RX ORDER — DIPHENOXYLATE HYDROCHLORIDE AND ATROPINE SULFATE 2.5; .025 MG/1; MG/1
1 TABLET ORAL 2 TIMES DAILY
Status: CANCELLED | OUTPATIENT
Start: 2019-10-29

## 2019-10-29 RX ORDER — AZATHIOPRINE 50 MG/1
100 TABLET ORAL DAILY
Status: CANCELLED | OUTPATIENT
Start: 2019-10-30

## 2019-10-29 RX ORDER — ACETAMINOPHEN 500 MG
1000 TABLET ORAL 3 TIMES DAILY
Status: CANCELLED | OUTPATIENT
Start: 2019-10-29

## 2019-10-29 RX ORDER — DONEPEZIL HYDROCHLORIDE 5 MG/1
10 TABLET, FILM COATED ORAL NIGHTLY
Status: CANCELLED | OUTPATIENT
Start: 2019-10-29

## 2019-10-29 RX ORDER — MEMANTINE HYDROCHLORIDE 5 MG/1
10 TABLET ORAL 2 TIMES DAILY
Status: CANCELLED | OUTPATIENT
Start: 2019-10-29

## 2019-10-29 RX ORDER — ONDANSETRON 2 MG/ML
4 INJECTION INTRAMUSCULAR; INTRAVENOUS EVERY 6 HOURS PRN
Status: CANCELLED | OUTPATIENT
Start: 2019-10-29

## 2019-10-29 RX ORDER — PANTOPRAZOLE SODIUM 40 MG/1
40 TABLET, DELAYED RELEASE ORAL 2 TIMES DAILY
Status: CANCELLED | OUTPATIENT
Start: 2019-10-29

## 2019-10-29 RX ORDER — ONDANSETRON 4 MG/1
4 TABLET, ORALLY DISINTEGRATING ORAL EVERY 6 HOURS PRN
Status: CANCELLED | OUTPATIENT
Start: 2019-10-29

## 2019-10-29 RX ORDER — CALCIUM CARBONATE 200(500)MG
500 TABLET,CHEWABLE ORAL 2 TIMES DAILY
Status: CANCELLED | OUTPATIENT
Start: 2019-10-29

## 2019-10-29 RX ORDER — LOPERAMIDE HYDROCHLORIDE 2 MG/1
4 CAPSULE ORAL 2 TIMES DAILY PRN
Status: CANCELLED | OUTPATIENT
Start: 2019-10-29

## 2019-10-29 RX ORDER — QUETIAPINE FUMARATE 100 MG/1
100 TABLET, FILM COATED ORAL NIGHTLY
Status: CANCELLED | OUTPATIENT
Start: 2019-10-29

## 2019-10-29 RX ORDER — OXYCODONE HYDROCHLORIDE 5 MG/1
5 TABLET ORAL
Status: CANCELLED | OUTPATIENT
Start: 2019-10-29

## 2019-10-29 RX ORDER — ASPIRIN 81 MG/1
81 TABLET ORAL DAILY
Status: CANCELLED | OUTPATIENT
Start: 2019-10-30

## 2019-10-29 RX ORDER — FAMOTIDINE 10 MG/ML
20 INJECTION INTRAVENOUS 2 TIMES DAILY
Status: CANCELLED | OUTPATIENT
Start: 2019-10-29

## 2019-10-29 RX ORDER — FERROUS SULFATE 325(65) MG
325 TABLET, DELAYED RELEASE (ENTERIC COATED) ORAL 2 TIMES DAILY
Status: CANCELLED | OUTPATIENT
Start: 2019-10-29

## 2019-10-29 RX ADMIN — ACETAMINOPHEN 1000 MG: 500 TABLET ORAL at 09:10

## 2019-10-29 RX ADMIN — AZATHIOPRINE 100 MG: 50 TABLET ORAL at 09:10

## 2019-10-29 RX ADMIN — METHYLPREDNISOLONE SODIUM SUCCINATE 20 MG: 40 INJECTION, POWDER, FOR SOLUTION INTRAMUSCULAR; INTRAVENOUS at 10:10

## 2019-10-29 RX ADMIN — LOPERAMIDE HYDROCHLORIDE 4 MG: 2 CAPSULE ORAL at 04:10

## 2019-10-29 RX ADMIN — CALCIUM CARBONATE 500 MG: 500 TABLET, CHEWABLE ORAL at 09:10

## 2019-10-29 RX ADMIN — ASPIRIN 81 MG: 81 TABLET, COATED ORAL at 09:10

## 2019-10-29 RX ADMIN — METHOTREXATE 1 TABLET: 2.5 TABLET ORAL at 09:10

## 2019-10-29 RX ADMIN — FERROUS SULFATE TAB EC 325 MG (65 MG FE EQUIVALENT) 325 MG: 325 (65 FE) TABLET DELAYED RESPONSE at 09:10

## 2019-10-29 RX ADMIN — MEMANTINE HYDROCHLORIDE 10 MG: 5 TABLET ORAL at 09:10

## 2019-10-29 RX ADMIN — PANTOPRAZOLE SODIUM 40 MG: 40 TABLET, DELAYED RELEASE ORAL at 09:10

## 2019-10-29 NOTE — PLAN OF CARE
Reviewed plan of care with pt.  Will reinforce plan throughout shift.  Safety m easures remain in progress, bed in lowest position, wheels locked x4, siderails up x2, call light within reach, bed alarm set and audible.

## 2019-10-29 NOTE — NURSING
Informed Mayela that TPN will be sent with patient to start upon admission.  Also informed her that patient's blood sugar is 182.

## 2019-10-29 NOTE — PLAN OF CARE
Problem: Occupational Therapy Goal  Goal: Occupational Therapy Goal  Description  Goals to be met by: 11/17     Patient will increase functional independence with ADLs by performing:    UE Dressing with Supervision.  LE Dressing with Supervision.  Grooming while standing with Supervision.  Toileting from toilet with Supervision for hygiene and clothing management.   Toilet transfer to toilet with Supervision.  Upper extremity exercise program x10 reps per handout, with assistance as needed.---MET 10/18/2019      Outcome: Ongoing, Progressing     Anabella Aranda is a 74 y.o. female with a medical diagnosis of Crohn's disease of small and large intestines with complication.  Performance deficits affecting function are weakness, impaired endurance, impaired self care skills, impaired functional mobilty, gait instability, impaired balance, decreased lower extremity function, decreased safety awareness, impaired skin, edema, orthopedic precautions, impaired cognition.  Pt found up in chair, agreeable to therapy.  Pt tolerated tx well, slowly progressing towards goals. No complaints of pain in LLE only continued reports of diarrhea.  Continue OT services to address functional goals, progressing as able.    ZACKARY Maldonado

## 2019-10-29 NOTE — PT/OT/SLP PROGRESS
Physical Therapy Treatment    Patient Name:  Anabella Aranda   MRN:  [id number]    Recommendations:     Discharge Recommendations:  nursing facility, skilled   Discharge Equipment Recommendations: (defer to SNF)   Barriers to discharge: Decreased caregiver support and decraesed mobility,endurance and ROM    Assessment:     Anabella Aranda is a 74 y.o. female admitted with a medical diagnosis of Crohn's disease of small and large intestines with complication.  She presents with the following impairments/functional limitations:  weakness, impaired endurance, impaired functional mobilty, gait instability, impaired balance, impaired cognition, decreased lower extremity function, decreased safety awareness, pain, decreased ROM, impaired skin, orthopedic precautions,pt with good participation and increased sitting endurance OOB,pt remains with decreased mobility and has limited assistance at home,pt will benefit from SNF upon discharge.    Rehab Prognosis: Fair; patient would benefit from acute skilled PT services to address these deficits and reach maximum level of function.    Recent Surgery: Procedure(s) (LRB):  EGD (ESOPHAGOGASTRODUODENOSCOPY) (N/A)  SIGMOIDOSCOPY, FLEXIBLE (N/A) 8 Days Post-Op    Plan:     During this hospitalization, patient to be seen BID to address the identified rehab impairments via gait training, therapeutic activities, therapeutic exercises and progress toward the following goals:    · Plan of Care Expires:  11/18/19    Subjective     Chief Complaint: n/a  Patient/Family Comments/goals: pt with some stomach discomfort.  Pain/Comfort:  · Pain Rating 1: (no rating)  · Location - Orientation 1: generalized  · Location 1: abdomen(discomfort)  · Pain Addressed 1: Reposition, Distraction, Nurse notified      Objective:     Communicated with nsg prior to session.  Patient found up in chair with peripheral IV, telemetry(chair alarm) upon PT entry to room.     General Precautions: Standard, fall    Orthopedic Precautions:LLE weight bearing as tolerated   Braces: N/A     Functional Mobility:  · Bed Mobility:     · Sit to Supine: moderate assistance and at le's  · Transfers:     · Sit to Stand:  moderate assistance and from decreased surface with rolling walker  · Balance: fair standing balance with RW      AM-PAC 6 CLICK MOBILITY  Turning over in bed (including adjusting bedclothes, sheets and blankets)?: 3  Sitting down on and standing up from a chair with arms (e.g., wheelchair, bedside commode, etc.): 3  Moving from lying on back to sitting on the side of the bed?: 3  Moving to and from a bed to a chair (including a wheelchair)?: 3  Need to walk in hospital room?: 3  Climbing 3-5 steps with a railing?: 2  Basic Mobility Total Score: 17       Therapeutic Activities and Exercises: le supine ex's X 15 reps inc: ap,qs,hs,abd/add,slr.       Patient left supine with all lines intact, call button in reach, bed alarm on and nsg notified..    GOALS: see general POC  Multidisciplinary Problems     Physical Therapy Goals        Problem: Physical Therapy Goal    Goal Priority Disciplines Outcome Goal Variances Interventions   Physical Therapy Goal     PT, PT/OT Ongoing, Progressing     Description:  1.  Supine to/from sit with with supervision  2.  Bed to/from chair with RW with supervision  3.  Ambulate 50' with RW with supervision  4.  Tolerate sitting 2 hours  MET 10/22/19                    Time Tracking:     PT Received On: 10/29/19  PT Start Time: 1231     PT Stop Time: 1254  PT Total Time (min): 23 min     Billable Minutes: Therapeutic Activity 12 and Therapeutic Exercise 11    Treatment Type: Treatment  PT/PTA: PTA     PTA Visit Number: 0     Kris Henderson PTA  10/29/2019

## 2019-10-29 NOTE — PT/OT/SLP PROGRESS
Occupational Therapy   Treatment    Name: Anabella Aranda  MRN: [id number]  Admitting Diagnosis:  Crohn's disease of small and large intestines with complication  8 Days Post-Op    Recommendations:     Discharge Recommendations: nursing facility, skilled  Discharge Equipment Recommendations:  other (see comments)(defer to SNF)  Barriers to discharge:  Decreased caregiver support    Assessment:     Anabella Aranda is a 74 y.o. female with a medical diagnosis of Crohn's disease of small and large intestines with complication.  Performance deficits affecting function are weakness, impaired endurance, impaired self care skills, impaired functional mobilty, gait instability, impaired balance, decreased lower extremity function, decreased safety awareness, impaired skin, edema, orthopedic precautions, impaired cognition.  Pt found up in chair, agreeable to therapy.  Pt tolerated tx well, slowly progressing towards goals. No complaints of pain in LLE only continued reports of diarrhea.  Continue OT services to address functional goals, progressing as able.      Rehab Prognosis:  Good; patient would benefit from acute skilled OT services to address these deficits and reach maximum level of function.       Plan:     Patient to be seen 5 x/week to address the above listed problems via self-care/home management, therapeutic activities, therapeutic exercises  · Plan of Care Expires: 11/17/19  · Plan of Care Reviewed with: patient    Subjective     Pain/Comfort:  · Pain Rating 1: 0/10  · Pain Rating Post-Intervention 1: 0/10    Objective:     Communicated with: RN prior to session.  Patient found up in chair with telemetry, peripheral IV upon OT entry to room.    General Precautions: Standard, fall   Orthopedic Precautions:LLE weight bearing as tolerated   Braces: N/A     Occupational Performance:     Functional Mobility/Transfers:  · Patient completed Sit <> Stand Transfer with moderate assistance from low surface  with   rolling walker and vc's for hand placement and effective technique.  · Functional Mobility: Pt ambulated to and from sink ~20 feet with CGA using RW.  Pt requires vc's for RW safety/mgmt    Activities of Daily Living:  · Grooming: stand by assistance and contact guard assistance standing at sink  · Upper Body Dressing: minimum assistance jannette gown behind back      [facility] 6 Click ADL: 18    Treatment & Education:  Pt performed BUE AROM ex 2 x 10 reps all jts/planes.  Pt tolerated well with short rest breaks.     Patient left up in chair with all lines intact, call button in reach and chair alarm onEducation:      GOALS:   Multidisciplinary Problems     Occupational Therapy Goals        Problem: Occupational Therapy Goal    Goal Priority Disciplines Outcome Interventions   Occupational Therapy Goal     OT, PT/OT Ongoing, Progressing    Description:  Goals to be met by: 11/17     Patient will increase functional independence with ADLs by performing:    UE Dressing with Supervision.  LE Dressing with Supervision.  Grooming while standing with Supervision.  Toileting from toilet with Supervision for hygiene and clothing management.   Toilet transfer to toilet with Supervision.  Upper extremity exercise program x10 reps per handout, with assistance as needed.---MET 10/18/2019                       Time Tracking:     OT Date of Treatment: 10/29/19  OT Start Time: 1030  OT Stop Time: 1055  OT Total Time (min): 25 min    Billable Minutes:Self Care/Home Management 15  Therapeutic Exercise 10    ZACKARY Maldonado  10/29/2019

## 2019-10-29 NOTE — PLAN OF CARE
Per Faith at [facility], pt accepted to their facility and pt would be able to transfer today if MD clears patient.  notified and made of aware that pt accepted into [facility] ; MD to speak to patient son regarding transferring to LTAC today.       10/29/19 1145   Post-Acute Status   Post-Acute Authorization Placement   Post-Acute Placement Status Authorization Obtained

## 2019-10-29 NOTE — PLAN OF CARE
TN called Faith at [facility] that pt's son in agreement with discharge plan to LTAC and discharge to readmit orders are in. Faith reviewed orders and orders are accepted. Pt going to Room 234. Nurse to call report to [phone] 2nd floor. Per Faith at [facility], they can accept patient with a peripheral IV and place PICC at facility if needed.     Nurse Medina made aware of pt discharging to [facility] today, to keep PIV in place and transportation to be set up with in the next hour for patient via [facility].

## 2019-10-29 NOTE — PT/OT/SLP PROGRESS
Physical Therapy Treatment    Patient Name:  Anabella Aranda   MRN:  [id number]    Recommendations:     Discharge Recommendations:  nursing facility, skilled   Discharge Equipment Recommendations: (defer to SNF)   Barriers to discharge: Decreased caregiver support and decreased mobility,endurance and ROM    Assessment:     Anabella Aranda is a 74 y.o. female admitted with a medical diagnosis of Crohn's disease of small and large intestines with complication.  She presents with the following impairments/functional limitations:  weakness, impaired endurance, impaired functional mobilty, gait instability, decreased lower extremity function, decreased safety awareness, decreased ROM, impaired coordination, impaired skin, orthopedic precautions,pt with good participation and improving mobility and endurance,pt having less issues with diarrhea also,pt with decreased assistance at home and remains with decreased strength and endurance,pt will benefit from SNF upon discharge.    Rehab Prognosis: Fair; patient would benefit from acute skilled PT services to address these deficits and reach maximum level of function.    Recent Surgery: Procedure(s) (LRB):  EGD (ESOPHAGOGASTRODUODENOSCOPY) (N/A)  SIGMOIDOSCOPY, FLEXIBLE (N/A) 8 Days Post-Op    Plan:     During this hospitalization, patient to be seen BID to address the identified rehab impairments via gait training, therapeutic activities, therapeutic exercises and progress toward the following goals:    · Plan of Care Expires:  11/18/19    Subjective     Chief Complaint: n/a  Patient/Family Comments/goals: pt states she is always cold.  Pain/Comfort:  · Pain Rating 1: 0/10      Objective:     Communicated with nsg prior to session.  Patient found supine with bed alarm, telemetry upon PT entry to room.     General Precautions: Standard, fall   Orthopedic Precautions:LLE weight bearing as tolerated   Braces: N/A     Functional Mobility:  · Bed Mobility:     · Supine to  Sit: minimum assistance  · Transfers:     · Sit to Stand:  contact guard assistance and minimum assistance with rolling walker  · Bed to Chair: minimum assistance with  rolling walker  using  ambulation  · Gait: amb ~28' X 1 with RW and Min A  · Balance: fair standing balance with RW      AM-PAC 6 CLICK MOBILITY  Turning over in bed (including adjusting bedclothes, sheets and blankets)?: 3  Sitting down on and standing up from a chair with arms (e.g., wheelchair, bedside commode, etc.): 3  Moving from lying on back to sitting on the side of the bed?: 3  Moving to and from a bed to a chair (including a wheelchair)?: 3  Need to walk in hospital room?: 3  Climbing 3-5 steps with a railing?: 2  Basic Mobility Total Score: 17       Therapeutic Activities and Exercises: le supine ex's X 15 reps inc: ap,qs,hs,abd/add,slr.       Patient left up in chair with all lines intact, call button in reach, chair alarm on and pct present..    GOALS: see general POC  Multidisciplinary Problems     Physical Therapy Goals        Problem: Physical Therapy Goal    Goal Priority Disciplines Outcome Goal Variances Interventions   Physical Therapy Goal     PT, PT/OT Ongoing, Progressing     Description:  1.  Supine to/from sit with with supervision  2.  Bed to/from chair with RW with supervision  3.  Ambulate 50' with RW with supervision  4.  Tolerate sitting 2 hours  MET 10/22/19                    Time Tracking:     PT Received On: 10/29/19  PT Start Time: 0811     PT Stop Time: 0837  PT Total Time (min): 26 min     Billable Minutes: Gait Training 16 and Therapeutic Exercise 10    Treatment Type: Treatment  PT/PTA: PTA     PTA Visit Number: 4     Kris Henderson, DONI  10/29/2019

## 2019-10-29 NOTE — TELEPHONE ENCOUNTER
Spoke to Paul from [facility] at [phone] and stated that pt is requesting a back brace. Informed Paul that pt would have to be seen for the back brace.

## 2019-10-29 NOTE — DISCHARGE SUMMARY
[facility] Hospital Medicine  Discharge Summary      Patient Name: Anabella Aranda  MRN: [id number]  Admission Date: 10/16/2019  Hospital Length of Stay: 13 days  Discharge Date and Time: No discharge date for patient encounter.  Attending Physician: Herberth Leblanc DO   Discharging Provider: Herberth Leblanc DO  Primary Care Provider: Shon Brambila MD      HPI:   The patient is a 74 y.o. female with PMH significant for Dementia, h/o DVT status post IVC filter, ulcerative colitis, iron deficiency anemia, GERD,  Major depression with psychotic features, and history of hep B. She is here today for a pre-operative visit in preparation for a Left total knee arthroplasty to be performed by  Dr. Lindsay on 10/16/19.  Anabella Aranda has a >1 year history of Left knee pain.  Pain is worse with activity and weight bearing. Patient has experienced interference of ADLs due to increased pain and decreased range of motion. Patient has failed non-operative treatment including NSAIDs, activity modification, and bracing. Anabella Aranda ambulates Rolator walker.  There has been no significant change in medical status since last visit.  She was seen by PCP Dr. Brambila on 08/14/19 at which time chronic conditions were well controlled    The pt underwent a left TKA during this admission, and we were consulted for medical management.      Procedure(s) (LRB):  EGD (ESOPHAGOGASTRODUODENOSCOPY) (N/A)  SIGMOIDOSCOPY, FLEXIBLE (N/A)      Hospital Course:   The pt seen at [facility], she knows her name and age, but has some memory lapses to the events.  The pt had hypotension earlier and was given a bolus on 250ml NS x 1 dose.  GI is also on the case given her colitis.  Ordering c diff titers, and adjusting her immunosuppression meds as well.  10/19 pt still feeling weak and not at the level where she can participate in therapy, still having diarrhea. Will need to order labs to f/u on bmp  10/21 cr level is down to  normal, pt is being also managed by GI for her diarrhea/colitis.  GI report on EGD and Flex sif 10/21 with pathology report severe small bowel inflammation and severe retained rectum inflammation with deep heaped up ulcers. No evidence of infectious process, stains negative. However on review of colectomy specimen from years ago, there was evidence of Crohns disease at that time. Thus, this is likely severe crohns flare given the extent of small bowel involvement  10/29 pt seen sitting in chair, pt has low oral intake, pt could continue to benefit from TPN.  SW had mentioned the [facility] is [facility], called the son and updated him, he agreed for transferring the pt to ltac      Consults:   Consults (From admission, onward)        Status Ordering Provider     Inpatient [facility]  Once     Provider:  Og Carrera MD    Acknowledged GABBIE IGLESIAS     Inpatient consult to Hospital Medicine-[facility]  Once     Provider:  Herberth Leblanc DO    Acknowledged GABBIE IGLESIAS     Inpatient consult to Hospital Medicine-[facility]  Once     Provider:  (Not yet assigned)    Completed GABBIE IGLESIAS     Inpatient consult to [facility]  Once     Provider:  (Not yet assigned)    Acknowledged GABBIE IGLESIAS     Inpatient consult to Social Work  Once     Provider:  (Not yet assigned)    Acknowledged JASPAL PRESLEY     Inpatient consult to Social Work  Once     Provider:  (Not yet assigned)    Acknowledged VADIM OWENS          No new Assessment & Plan notes have been filed under this hospital service since the last note was generated.  Service: Hospital Medicine    Final Active Diagnoses:    Diagnosis Date Noted POA    PRINCIPAL PROBLEM:  Crohn's disease of small and large intestines with complication [K50.819] 10/25/2019 Yes     Chronic    Severe malnutrition [E43] 10/27/2019 No    History of left knee replacement [Z96.652] 10/16/2019 Not Applicable    Dementia  [F03.90]  Yes     Chronic    Major depression with psychotic features [F32.3] 04/30/2018 Yes     Chronic    Morbid obesity [E66.01] 10/17/2017 Yes     Chronic    Ulcerative proctitis with complication [K51.219] 01/11/2017 Yes     Chronic    History of total colectomy [Z90.49] 06/24/2016 Not Applicable     Chronic    Iron deficiency anemia [D50.9] 09/09/2015 Yes     Chronic    Venous insufficiency of both lower extremities [I87.2] 08/26/2015 Yes     Chronic    Diarrhea [R19.7] 08/07/2014 Yes      Problems Resolved During this Admission:    Diagnosis Date Noted Date Resolved POA    Acute renal failure superimposed on stage 3 chronic kidney disease [N17.9, N18.3] 10/16/2019 10/25/2019 Yes    Primary osteoarthritis of left knee [M17.12] 10/16/2019 10/17/2019 Yes    Chronic pain of both knees [M25.561, M25.562, G89.29] 08/14/2019 10/17/2019 Yes     Chronic       Discharged Condition: stable    Disposition:     Follow Up:  Follow-up Information     Ghassan Lindsay MD In 2 weeks.    Specialty:  Orthopedic Surgery  Contact information:  [address] [place] [address]  [address]  [place] [id number] [phone]                 Patient Instructions:   No discharge procedures on file.    Significant Diagnostic Studies: Labs:   BMP:   Recent Labs   Lab 10/28/19  0631 10/29/19  0604   * 140*    140   K 4.8 4.3    101   CO2 24 30*   BUN 18 20   CREATININE 0.8 0.8   CALCIUM 8.1* 8.3*   , CMP   Recent Labs   Lab 10/28/19  0631 10/29/19  0604    140   K 4.8 4.3    101   CO2 24 30*   * 140*   BUN 18 20   CREATININE 0.8 0.8   CALCIUM 8.1* 8.3*   PROT 6.0 6.6   ALBUMIN 1.8* 2.1*   BILITOT 0.3 0.5   ALKPHOS 108 128   AST 11 16   ALT 8* 10   ANIONGAP 11 9   ESTGFRAFRICA >60 >60   EGFRNONAA >60 >60   , CBC   Recent Labs   Lab 10/28/19  2330 10/29/19  0604 10/29/19  1130   WBC 5.32 5.69 5.54   HGB 8.1* 9.0* 9.0*   HCT 24.2* 27.4* 27.6*   * 612* 651*   , INR   Lab Results   Component Value Date     INR 1.1 10/16/2019    INR 1.0 08/25/2014    INR 1.3 (H) 08/18/2014   , Lipid Panel   Lab Results   Component Value Date    CHOL 223 (H) 08/10/2019    HDL 62 08/10/2019    LDLCALC 145.2 08/10/2019    TRIG 79 08/10/2019    CHOLHDL 27.8 08/10/2019   , Troponin No results for input(s): TROPONINI in the last 168 hours. and A1C: No results for input(s): HGBA1C in the last 4320 hours.  Microbiology:   Blood Culture   Lab Results   Component Value Date    LABBLOO No growth after 5 days. 08/19/2014       Pending Diagnostic Studies:     Procedure Component Value Units Date/Time    HEPATITIS B VIRAL DNA, QUANTITATIVE [[id number]] Collected:  10/27/19 0438    Order Status:  Sent Lab Status:  In process Updated:  10/28/19 0948    Specimen:  Blood          Medications:  Transfer Medications (for Discharge Readmit only):   Current Facility-Administered Medications   Medication Dose Route Frequency Provider Last Rate Last Dose    acetaminophen tablet 1,000 mg  1,000 mg Oral TID Ghassan Lindsay MD   1,000 mg at 10/29/19 0941    Amino acid 4.25% - dextrose 10% (CLINIMIX-E) solution with additives (1L provides 42.5 gm AA, 100 gm CHO (340 kcal/L dextrose), Na 35, K 30, Mg 5, Ca 4.5, Acetate 70, Cl 39, Phos 15)   Intravenous Continuous Herberth DO Deana        aspirin EC tablet 81 mg  81 mg Oral Daily Ghassan Lindsay MD   81 mg at 10/29/19 0941    azaTHIOprine tablet 100 mg  100 mg Oral Daily Og Carrera MD   100 mg at 10/29/19 0941    benzocaine 10 % mucosal gel   Mouth/Throat QID PRN Jessi Burnette, NP        calcium carbonate 200 mg calcium (500 mg) chewable tablet 500 mg  500 mg Oral BID Ghassan Lindsay MD   500 mg at 10/29/19 0941    diphenoxylate-atropine 2.5-0.025 mg per tablet 1 tablet  1 tablet Oral BID Og Carrera MD   1 tablet at 10/29/19 0941    donepezil tablet 10 mg  10 mg Oral QHS Ghassan Lindsay MD   10 mg at 10/28/19 2017    enoxaparin injection 40 mg  40 mg Subcutaneous Daily Og Carrera,  MD   40 mg at 10/28/19 1742    fat emulsion 20% infusion 250 mL  250 mL Intravenous Daily Herberth Leblanc DO        ferrous sulfate EC tablet 325 mg  325 mg Oral BID Ghassan Lindsay MD   325 mg at 10/29/19 0941    HYDROmorphone injection 0.2 mg  0.2 mg Intravenous Q6H PRN Ghassan Lindsay MD        loperamide capsule 4 mg  4 mg Oral BID PRN Og Carrera MD   4 mg at 10/29/19 0454    memantine tablet 10 mg  10 mg Oral BID Ghassan Lindsay MD   10 mg at 10/29/19 0941    methylPREDNISolone sodium succinate injection 20 mg  20 mg Intravenous BID Og Carrera MD   20 mg at 10/29/19 1041    ondansetron disintegrating tablet 4 mg  4 mg Oral Q6H PRN Ghassan Lindsay MD   4 mg at 10/17/19 2140    ondansetron injection 4 mg  4 mg Intravenous Q6H PRN Ghassan Lindsay MD        oxyCODONE immediate release tablet 5 mg  5 mg Oral Q3H PRN Ghassan Lindsay MD   5 mg at 10/25/19 1538    pantoprazole EC tablet 40 mg  40 mg Oral BID Og Carrera MD   40 mg at 10/29/19 0941    QUEtiapine tablet 100 mg  100 mg Oral QHS Ghassan Lindsay MD   100 mg at 10/28/19 2017    sodium chloride 0.9% flush 10 mL  10 mL Intravenous PRN Ghassan Lindsay MD           Indwelling Lines/Drains at time of discharge:   Lines/Drains/Airways     None                 Time spent on the discharge of patient: 45 minutes  Patient was seen and examined on the date of discharge and determined to be suitable for discharge.         Herberth Leblanc DO  Department of Hospital Medicine  [facility]

## 2019-10-29 NOTE — PLAN OF CARE
TN called patient son regarding patient accepted at [facility] and would be able to transfer there today. Per pt's son, he has spoken with  regarding LTAC placement. Pt's son in agreement with discharge plan to [facility] today.

## 2019-10-29 NOTE — PLAN OF CARE
Wheelchair van transportation ordered via [facility] for  with in the hour. Pt's son made aware pt discharge to LTAC today and TN to notify patient's son when transportation arrives to  patient from the hospital.       10/29/19 1318   Final Note   Assessment Type Final Discharge Note   Anticipated Discharge Disposition LTAC  ([facility])   What phone number can be called within the next 1-3 days to see how you are doing after discharge? [phone]   Hospital Follow Up  Appt(s) scheduled? Yes   Discharge plans and expectations educations in teach back method with documentation complete? Yes   Right Care Referral Info   Post Acute Recommendation Other   Referral Type LTAC   Facility Name [facility]    Giovani Rose RN-BC  Transitional Navigator  [phone]

## 2019-10-30 PROBLEM — R63.8 INADEQUATE ORAL INTAKE: Status: ACTIVE | Noted: 2019-10-30

## 2019-10-30 PROBLEM — R23.9 ALTERATION IN SKIN INTEGRITY: Status: ACTIVE | Noted: 2019-10-30

## 2019-10-31 ENCOUNTER — TELEPHONE (OUTPATIENT)
Dept: GASTROENTEROLOGY | Facility: CLINIC | Age: 74
End: 2019-10-31

## 2019-10-31 PROBLEM — R73.9 STEROID-INDUCED HYPERGLYCEMIA: Status: ACTIVE | Noted: 2019-10-31

## 2019-10-31 PROBLEM — T38.0X5A STEROID-INDUCED HYPERGLYCEMIA: Status: ACTIVE | Noted: 2019-10-31

## 2019-11-01 PROBLEM — R10.9 ABDOMINAL PAIN: Status: ACTIVE | Noted: 2019-11-01

## 2019-11-04 PROBLEM — R76.12 REACTION TO QUANTIFERON-TB TEST: Status: ACTIVE | Noted: 2019-11-04

## 2019-11-08 ENCOUNTER — TELEPHONE (OUTPATIENT)
Dept: GASTROENTEROLOGY | Facility: CLINIC | Age: 74
End: 2019-11-08

## 2019-11-08 ENCOUNTER — PATIENT OUTREACH (OUTPATIENT)
Dept: ADMINISTRATIVE | Facility: OTHER | Age: 74
End: 2019-11-08

## 2019-11-08 DIAGNOSIS — Z12.31 ENCOUNTER FOR SCREENING MAMMOGRAM FOR MALIGNANT NEOPLASM OF BREAST: Primary | ICD-10-CM

## 2019-11-08 NOTE — TELEPHONE ENCOUNTER
Spoke with patients kate Massey and was able to schedule a follow up appointment with Dr. Carrera on 11/11/19 at 9:30am. Verbal Understanding.

## 2019-11-11 ENCOUNTER — TELEPHONE (OUTPATIENT)
Dept: GASTROENTEROLOGY | Facility: CLINIC | Age: 74
End: 2019-11-11

## 2019-11-11 ENCOUNTER — HOSPITAL ENCOUNTER (OUTPATIENT)
Dept: WOUND CARE | Facility: HOSPITAL | Age: 74
Discharge: HOME | End: 2019-11-11
Attending: PREVENTIVE MEDICINE
Payer: MEDICARE

## 2019-11-11 ENCOUNTER — OFFICE VISIT (OUTPATIENT)
Dept: ORTHOPEDICS | Facility: CLINIC | Age: 74
End: 2019-11-11
Payer: MEDICARE

## 2019-11-11 ENCOUNTER — TELEPHONE (OUTPATIENT)
Dept: INFUSION THERAPY | Facility: HOSPITAL | Age: 74
End: 2019-11-11

## 2019-11-11 ENCOUNTER — OFFICE VISIT (OUTPATIENT)
Dept: GASTROENTEROLOGY | Facility: CLINIC | Age: 74
End: 2019-11-11
Payer: MEDICARE

## 2019-11-11 ENCOUNTER — TELEPHONE (OUTPATIENT)
Dept: ORTHOPEDICS | Facility: CLINIC | Age: 74
End: 2019-11-11

## 2019-11-11 VITALS
WEIGHT: 201.5 LBS | HEIGHT: 64 IN | SYSTOLIC BLOOD PRESSURE: 141 MMHG | DIASTOLIC BLOOD PRESSURE: 80 MMHG | BODY MASS INDEX: 34.4 KG/M2

## 2019-11-11 VITALS
TEMPERATURE: 98 F | SYSTOLIC BLOOD PRESSURE: 145 MMHG | DIASTOLIC BLOOD PRESSURE: 67 MMHG | HEIGHT: 64 IN | BODY MASS INDEX: 34.31 KG/M2 | HEART RATE: 101 BPM | WEIGHT: 201 LBS

## 2019-11-11 VITALS — HEIGHT: 64 IN | BODY MASS INDEX: 34.31 KG/M2 | WEIGHT: 201 LBS

## 2019-11-11 DIAGNOSIS — T81.31XS WOUND DEHISCENCE, SURGICAL, SEQUELA: ICD-10-CM

## 2019-11-11 DIAGNOSIS — M25.562 LEFT KNEE PAIN, UNSPECIFIED CHRONICITY: Primary | ICD-10-CM

## 2019-11-11 DIAGNOSIS — Z96.652 HISTORY OF LEFT KNEE REPLACEMENT: Primary | ICD-10-CM

## 2019-11-11 DIAGNOSIS — E66.01 MORBID OBESITY: Chronic | ICD-10-CM

## 2019-11-11 DIAGNOSIS — K50.819 CROHN'S DISEASE OF SMALL AND LARGE INTESTINES WITH COMPLICATION: Primary | ICD-10-CM

## 2019-11-11 DIAGNOSIS — N18.30 CHRONIC KIDNEY DISEASE, STAGE 3: Chronic | ICD-10-CM

## 2019-11-11 DIAGNOSIS — S81.002A OPEN WOUND OF LEFT KNEE, INITIAL ENCOUNTER: Primary | ICD-10-CM

## 2019-11-11 PROCEDURE — 99024 POSTOP FOLLOW-UP VISIT: CPT | Mod: POP,,, | Performed by: ORTHOPAEDIC SURGERY

## 2019-11-11 PROCEDURE — 99213 OFFICE O/P EST LOW 20 MIN: CPT | Mod: PBBFAC,27,PN | Performed by: ORTHOPAEDIC SURGERY

## 2019-11-11 PROCEDURE — 99999 PR PBB SHADOW E&M-EST. PATIENT-LVL III: ICD-10-PCS | Mod: PBBFAC,,, | Performed by: INTERNAL MEDICINE

## 2019-11-11 PROCEDURE — 99214 OFFICE O/P EST MOD 30 MIN: CPT | Mod: 27

## 2019-11-11 PROCEDURE — 99999 PR PBB SHADOW E&M-EST. PATIENT-LVL III: ICD-10-PCS | Mod: PBBFAC,,, | Performed by: ORTHOPAEDIC SURGERY

## 2019-11-11 PROCEDURE — 99024 PR POST-OP FOLLOW-UP VISIT: ICD-10-PCS | Mod: POP,,, | Performed by: ORTHOPAEDIC SURGERY

## 2019-11-11 PROCEDURE — 99213 OFFICE O/P EST LOW 20 MIN: CPT | Mod: PBBFAC,PO | Performed by: INTERNAL MEDICINE

## 2019-11-11 PROCEDURE — 99214 PR OFFICE/OUTPT VISIT, EST, LEVL IV, 30-39 MIN: ICD-10-PCS | Mod: S$PBB,,, | Performed by: INTERNAL MEDICINE

## 2019-11-11 PROCEDURE — 99214 OFFICE O/P EST MOD 30 MIN: CPT | Mod: S$PBB,,, | Performed by: INTERNAL MEDICINE

## 2019-11-11 PROCEDURE — 99999 PR PBB SHADOW E&M-EST. PATIENT-LVL III: CPT | Mod: PBBFAC,,, | Performed by: INTERNAL MEDICINE

## 2019-11-11 PROCEDURE — 99999 PR PBB SHADOW E&M-EST. PATIENT-LVL III: CPT | Mod: PBBFAC,,, | Performed by: ORTHOPAEDIC SURGERY

## 2019-11-11 NOTE — PROGRESS NOTES
Subjective:      Patient ID: Anabella Aranda is a 74 y.o. female.    Chief Complaint: Post-op Evaluation of the Left Knee      HPI:  Three weeks postop  The patient is seen for postop follow-up of left  TKA.  Pain control has been satisfactory.  Denies fever and chills  They feel that they are ambulating easily  Preoperative complaints include:  Failure of the proximal part of the wound to heal.      Current Outpatient Medications:     aspirin (ECOTRIN) 81 MG EC tablet, Take 1 tablet (81 mg total) by mouth once daily., Disp: , Rfl: 0    azaTHIOprine (IMURAN) 50 mg Tab, Take 2 tablets (100 mg total) by mouth once daily., Disp: 60 tablet, Rfl: 1    calcium carbonate (OS-NELSY) 600 mg (1,500 mg) Tab, Take 1 tablet (600 mg total) by mouth 2 (two) times daily with meals., Disp: 180 tablet, Rfl: 3    donepezil (ARICEPT) 10 MG tablet, Take 1 tablet (10 mg total) by mouth every evening., Disp: 90 tablet, Rfl: 3    ergocalciferol (ERGOCALCIFEROL) 50,000 unit Cap, Take 1 capsule (50,000 Units total) by mouth every 7 days., Disp: 12 capsule, Rfl: 3    ferrous sulfate 325 mg (65 mg iron) Tab tablet, Take 1 tablet (325 mg total) by mouth 2 (two) times daily., Disp: 180 tablet, Rfl: 3    loperamide (IMODIUM A-D) 2 mg Tab, Take 1 mg by mouth 2 (two) times daily before meals., Disp: , Rfl:     memantine (NAMENDA) 10 MG Tab, Take 1 tablet (10 mg total) by mouth once daily., Disp: 180 tablet, Rfl: 3    pantoprazole (PROTONIX) 40 MG tablet, Take 1 tablet (40 mg total) by mouth once daily., Disp: 30 tablet, Rfl: 11    predniSONE (DELTASONE) 10 MG tablet, Take 1 tablet (10 mg total) by mouth daily with dinner or evening meal., Disp: 14 tablet, Rfl: 0    predniSONE (DELTASONE) 20 MG tablet, Take 1 tablet (20 mg total) by mouth once daily., Disp: 14 tablet, Rfl: 0    QUEtiapine (SEROQUEL) 100 MG Tab, TAKE 1 2 TABLET BY MOUTH EVERY NIGHT AT BEDTIME, THEN TAKE ONE TABLET BY MOUTH EVERY NIGHT AT BEDTIME THEREAFTER, Disp: , Rfl:  "1  Review of patient's allergies indicates:   Allergen Reactions    Sulfa (sulfonamide antibiotics) Swelling    Tomato (solanum lycopersicum) Other (See Comments)       Ht 5' 4" (1.626 m)   Wt 91.2 kg (201 lb)   LMP  (LMP Unknown)   BMI 34.50 kg/m²     ROS        Objective:    Ortho Exam          Alert, oriented, no acute distress  Sclera-Normal  Respiratory distress-none  Gait mild limp  Incision:  The proximal 1/3 of the incision has  down into the subcutaneous tissue. There is fibrinous material.  Granulation tissue is present. No true pus  Range of motion: extension- 5 degrees; flexion- 85 degrees  Valgus/varus stability- stable  Swelling-moderate  Distal perfusion-intact  Distal neurologic-intact    Imaging:  Radiographs show well-positioned well-fixed left total knee.    Albumin noted to be 2.2    Assessment:             No diagnosis found.    There is poor wound healing likely due to protein malnutrition.  Although deep infection is certainly possible, the location and appearance of this suggest that it is probably confined to the deep dermal area      Plan:          No follow-ups on file.    I explained my diagnostic impression and the reasoning behind it in detail, using layman's terms.     The wound was cleansed and dressed in the office.    The patient was scheduled for wound clinic today for application of wound VAC.    I explained the expected natural history and that close follow-up to rule out deep infection is necessary.        "

## 2019-11-11 NOTE — PATIENT INSTRUCTIONS
Infliximab (Remicade): Biologics - Anti-TNF Agent    - Mechanism of action:  Remicade blocks TNF alpha which plays a role in the inflammatory process for inflammatory bowel disease    Remicade Dosage:  - Loading Dose: 5 mg/kg IV week 0, 2, 6 (infused over 2 hours)  - Maintenance Dose: 5 mg/kg mg IV every 8 weeks  - 2-3 hour infusion    Risks of Remicade:  Stop therapy due to adverse event=10% (10/100)    Please call us immediately if you develop any of the below problems    Allergic reactions  - <2% (2/100) develop infusion site reactions  - RARE- hives (rash), difficulty breathing, chest pain/tightness, high or low blood pressure, swelling of the face and hands, fever or chills    Serious Infections=3% (3/100)  fever, tiredness, flu, open sores, warm red painful skin    Blood Disorders  fever that doesn't go away, bruising, bleeding, severe paleness    Non-Hodgkins Lymphoma=0.06% (6/10,000)    Drug related lupus-like reaction=1% (1/100)  chest discomfort or pain that does not go away, shortness of breath, joint pain, rash on the cheeks or arms that gets worse in the sun    Psoriasis  new or worsening red scaly patches or raised bumps on the skin that are filled with pus     Case Reports Only: Multiple Sclerosis and Guillain [place] Syndrome  Numbness/weakness/tingling of your hands and feet, changes in your vision or seizures    Case Reports Only: New or worsening Congestive Heart Failure  shortness of breath, swelling in your ankles or feet, sudden weight gain    Case Reports Only: Serious Liver Injury  jaundice (yellow skin or eyes), dark brown urine, right-sided abdominal pain, fever, severe itchiness    Vaccine Counseling  NO LIVE VACCINES UP TO 8 WEEKS PRIOR TO STARTING THIS MEDICATION, DURING OR 8 WEEKS AFTER STOPPING THIS MEDICATION    - Live Vaccines Include:   --Intranasal Influenza A/B  --Measles, Mumps, Rubella (MMR)  --Rotavirus  --Typhoid (oral)  --Vaccina (Smallpox)  --Varicella (Chicken Pox)  --Yellow  Fever  --Zoster

## 2019-11-11 NOTE — TELEPHONE ENCOUNTER
Jarred Marin,      We saw Ms. Kyle in clinic today. She was cleared by the ID department (Dr. Corinne Echevarria).     Note by Dr. Bustillos:  Patient with Intermediate quantiferon; T-spot ordered but unable to do.  Previous skin PPD - negative,  Chest -ray - no findings suspicious for TB     Patient cleared from ID perspective to receive Ramicade.  For completion, should follow up with ID after discharge.     Above was communicated to primary service.         Patient and son Bryson has been informed of appointment for infusions on Wednesday.

## 2019-11-11 NOTE — PROGRESS NOTES
GASTROENTEROLOGY CLINIC NOTE    Reason for visit: The encounter diagnosis was Crohn's disease of small and large intestines with complication.  Referring provider/PCP: Shon Brambila MD      HPI:  Anabella Aranda is a 74 y.o. female here today for follow up. Add-on visit today post discharge from [facility].  Brought in by her son Bryson.    Recent very complicated medical course.  She had an upper endoscopy and a flex-sig with me about a month ago.  This revealed significant small-bowel ulcerations and changes as well as rectal stump severe ulcerative changes. The pathology was consistent with a diagnosis of Crohn's disease. Upon review of pathology from the colectomy specimen, there were features at that time potentially of Crohn's disease. I was able to communicate with the pathology team about these changes.  She was actually admitted inpatient because she underwent knee replacement. She was started on IV steroids as an inpatient after her biopsies did not show any infection.  She was transitioned to prednisone 40 mg p.o..  She is currently on prednisone 20 q.a.m. and 10 q.p.m.    She is having pain near her buttocks and bottom. She has burning with stool leakage. Otherwise she states her abd pain has improved and her stool is less frequent and she is not having as much diarrhea.   She is fatigued and wants to get better. She has HH nurse coming to the house.      I have reviewed her infectious serologies..  Hep B S Ag neg   Hep B S Ab positive   Hep B core Ab total positive   HBV DNA undetected.   - shows immunity to HepB    Her TB skin test was negative during last admission, however her quant gold was indeterminate.  ID team reviewed and stated ok to proceed with remicade from ID perspective. (note on 11/8/19)    Prior Endoscopy:  EGD:  10/2019 with me  Severe duodenitis    Flex sig 10/2019 with me  Impression:           - Mucosal ulceration. Biopsied.                        - Patent end-to-end ileo-rectal  anastomosis,                         characterized by an intact appearance.                        - Ileitis. Biopsied.    (Portions of this note were dictated using voice recognition software and may contain dictation related errors in spelling/grammar/syntax not found on text review)    Review of Systems   Constitutional: Positive for malaise/fatigue.   Gastrointestinal: Positive for diarrhea. Negative for abdominal pain, blood in stool and vomiting.       Past Medical History: has a past medical history of Arthritis, C. difficile colitis, Chronic kidney disease, stage 3, Dementia, Hepatitis B, Hypertension, Major depression with psychotic features, and Ulcerative colitis.    Past Surgical History: has a past surgical history that includes Total knee arthroplasty (Right, 04/07/2008); Nasal sinus surgery; Cholecystectomy; Tonsillectomy; emma filter placement (Right, 01/2014); Colonoscopy (N/A, 4/29/2016); Esophagogastroduodenoscopy (N/A, 1/25/2019); Flexible sigmoidoscopy (N/A, 1/25/2019); Laparoscopic total colectomy (06/24/2016); Total knee arthroplasty (Left, 10/16/2019); Knee Arthroplasty (Left, 10/16/2019); Esophagogastroduodenoscopy (N/A, 10/21/2019); Flexible sigmoidoscopy (N/A, 10/21/2019); and Joint replacement.    Family History:family history includes Colon cancer in her paternal grandmother; Throat cancer in her father.    Allergies:   Review of patient's allergies indicates:   Allergen Reactions    Sulfa (sulfonamide antibiotics) Swelling    Tomato (solanum lycopersicum) Other (See Comments)       Social History: reports that she quit smoking about 22 years ago. Her smoking use included cigarettes. She has quit using smokeless tobacco. She reports that she does not drink alcohol or use drugs.    Home medications:   Current Outpatient Medications on File Prior to Visit   Medication Sig Dispense Refill    aspirin (ECOTRIN) 81 MG EC tablet Take 1 tablet (81 mg total) by mouth once daily.  0     "azaTHIOprine (IMURAN) 50 mg Tab Take 2 tablets (100 mg total) by mouth once daily. 60 tablet 1    calcium carbonate (OS-NELSY) 600 mg (1,500 mg) Tab Take 1 tablet (600 mg total) by mouth 2 (two) times daily with meals. 180 tablet 3    donepezil (ARICEPT) 10 MG tablet Take 1 tablet (10 mg total) by mouth every evening. 90 tablet 3    ergocalciferol (ERGOCALCIFEROL) 50,000 unit Cap Take 1 capsule (50,000 Units total) by mouth every 7 days. 12 capsule 3    ferrous sulfate 325 mg (65 mg iron) Tab tablet Take 1 tablet (325 mg total) by mouth 2 (two) times daily. 180 tablet 3    loperamide (IMODIUM A-D) 2 mg Tab Take 1 mg by mouth 2 (two) times daily before meals.      memantine (NAMENDA) 10 MG Tab Take 1 tablet (10 mg total) by mouth once daily. 180 tablet 3    pantoprazole (PROTONIX) 40 MG tablet Take 1 tablet (40 mg total) by mouth once daily. 30 tablet 11    predniSONE (DELTASONE) 10 MG tablet Take 1 tablet (10 mg total) by mouth daily with dinner or evening meal. 14 tablet 0    predniSONE (DELTASONE) 20 MG tablet Take 1 tablet (20 mg total) by mouth once daily. 14 tablet 0    QUEtiapine (SEROQUEL) 100 MG Tab TAKE 1 2 TABLET BY MOUTH EVERY NIGHT AT BEDTIME, THEN TAKE ONE TABLET BY MOUTH EVERY NIGHT AT BEDTIME THEREAFTER  1     No current facility-administered medications on file prior to visit.        Vital signs:  BP (!) 141/80   Ht 5' 4" (1.626 m)   Wt 91.4 kg (201 lb 8 oz)   LMP  (LMP Unknown)   BMI 34.59 kg/m²     Physical Exam   Eyes: Conjunctivae are normal. No scleral icterus.   Pulmonary/Chest: Effort normal. No respiratory distress.   Abdominal: Soft. Bowel sounds are normal. There is no tenderness.   Genitourinary:   Genitourinary Comments: Rectal exam with chaperone in room:  There is soilage of liner with soft light brown stool and there is stool on the liner as well as coating the buttocks bilaterally. There is mild erythema of buttocks but no obvious abscess or drainage or tract , no ulcer or " skin breakdown.   Vitals reviewed.      Routine labs:  Lab Results   Component Value Date    WBC 7.40 11/05/2019    HGB 7.9 (L) 11/05/2019    HCT 23.9 (L) 11/05/2019    MCV 96 11/05/2019     (H) 11/05/2019     Lab Results   Component Value Date    INR 1.1 10/16/2019     Lab Results   Component Value Date    IRON 59 02/04/2019    FERRITIN 97 02/04/2019    TIBC 327 02/04/2019    FESATURATED 18 (L) 02/04/2019     Lab Results   Component Value Date     11/05/2019    K 4.9 11/05/2019     11/05/2019    CO2 28 11/05/2019    BUN 28 (H) 11/05/2019    CREATININE 0.9 11/05/2019     Lab Results   Component Value Date    ALBUMIN 2.2 (L) 11/05/2019    ALT 89 (H) 11/05/2019    AST 35 11/05/2019    ALKPHOS 201 (H) 11/05/2019    BILITOT 0.4 11/05/2019     No results found for: GLUCOSE    I have reviewed treatment course from hospital and IPR   I have reviewed recent labs including hep b and TB serologies as noted above.      Assessment:    1. Crohn's disease of small and large intestines with complication        Plan:    Orders Placed This Encounter    C-reactive protein    Comprehensive metabolic panel    CBC auto differential     Significant Crohn's enteritis as well as Crohn's of the retained rectum.  Had a dramatic response to IV steroids in the hospital, CRP went down from 170s to < 10. Now back uptrending since on PO steroids.    Her best option is Remicade at this point and we have her set up for infusion this Wednesday.  Explained risks / benefits and handout given on remicade detailing risks.    Continue PO prednisone 20 and 10 for now, likely will taper down to prednisone 20 daily after first infusion.  Will likely need proactive drug monitoring after initial infusions.     Recommend aggressive HH nursing care for buttock soreness with changing frequently and desitin to area and good skin care.    RTC in next few weeks.    A total of 35 minutes were spent face-to-face with the patient during this  encounter and over half of that time was spent on counseling and coordination of care.       Og Carrera MD  [facility]

## 2019-11-12 ENCOUNTER — TELEPHONE (OUTPATIENT)
Dept: FAMILY MEDICINE | Facility: CLINIC | Age: 74
End: 2019-11-12

## 2019-11-12 ENCOUNTER — TELEPHONE (OUTPATIENT)
Dept: ORTHOPEDICS | Facility: CLINIC | Age: 74
End: 2019-11-12

## 2019-11-12 DIAGNOSIS — K50.819 CROHN'S DISEASE OF SMALL AND LARGE INTESTINES WITH COMPLICATION: Chronic | ICD-10-CM

## 2019-11-12 DIAGNOSIS — I87.2 VENOUS INSUFFICIENCY OF BOTH LOWER EXTREMITIES: Primary | Chronic | ICD-10-CM

## 2019-11-12 DIAGNOSIS — M17.0 PRIMARY OSTEOARTHRITIS OF BOTH KNEES: Chronic | ICD-10-CM

## 2019-11-12 DIAGNOSIS — K51.219 ULCERATIVE PROCTITIS WITH COMPLICATION: Chronic | ICD-10-CM

## 2019-11-12 DIAGNOSIS — E66.01 MORBID OBESITY: Chronic | ICD-10-CM

## 2019-11-12 DIAGNOSIS — F32.3 MAJOR DEPRESSION WITH PSYCHOTIC FEATURES: Chronic | ICD-10-CM

## 2019-11-12 DIAGNOSIS — F02.81 ALZHEIMER'S DEMENTIA WITH BEHAVIORAL DISTURBANCE, UNSPECIFIED TIMING OF DEMENTIA ONSET: Chronic | ICD-10-CM

## 2019-11-12 DIAGNOSIS — G30.9 ALZHEIMER'S DEMENTIA WITH BEHAVIORAL DISTURBANCE, UNSPECIFIED TIMING OF DEMENTIA ONSET: Chronic | ICD-10-CM

## 2019-11-12 NOTE — TELEPHONE ENCOUNTER
Spoke to Shantell at [facility] and states that pt is requesting an 18-inch wheelchair with elevating leg rests to [facility]. Pls advise.

## 2019-11-12 NOTE — TELEPHONE ENCOUNTER
Spoke with patients son Bryson and patient has not received the RX Azathioprine. I was able to call the [facility] and they were waiting until the patient got out of the hospital.     Informed Bryson that they will be getting in contact with them today for .       Bryson also mentioned that they had labs done this morning.

## 2019-11-12 NOTE — TELEPHONE ENCOUNTER
----- Message from Heidi GERMAINMayte Hart sent at 11/12/2019 10:08 AM CST -----  Contact: [phone] Bryson Son  Pt's son is requesting to speak with you re: leg brace. He states that the brace has not been approved yet by the insurance and would like to know what he should until then. Please advise

## 2019-11-12 NOTE — PROGRESS NOTES
Subjective:       Patient ID: Anabella Aranda is a 74 y.o. female.    Chief Complaint: Non-healing Wound (dehised surgical wound left knee, total knee replacement)    Wound Center visit today for treatment of dehisced surgical wound left knee. Total knee replacement 3 weeks ago by Dr. Ghassan Lindsay. Dr. Lindsay examined wound today and saw this dehiscence. Patient then sent to Wound Center. Dr. Faith Uribe examined wound in Wound Clinic. Wound vac being ordered. Silver dressings used to dress wound today. Son given prescription for knee immobilizer for left knee area. [facility] will dress wound at home. Hold walking and PT for now. Ensure intake increased to 3 cans per day. Knee wound clean with 80% red tissue and 20% yellow  tissue. Absorbable suture seen deep in wound. Left leg with edema. Pulses left foot strong by doppler; skin normal warmth.     Review of Systems    Objective:      Physical Exam    Assessment:       1. History of left knee replacement    2. Chronic kidney disease, stage 3    3. Morbid obesity           Incision/Site 11/11/19 1603 Left Knee anterior midline;vertical (Active)   11/11/19 1603    Present Prior to Hospital Arrival?: Yes   Side: Left   Location: Knee   Orientation: anterior   Incision Type: midline;vertical   Closure Method:    Additional Comments: dorsal pedal and posterior tibial pulses by doppler strong left foot   Removal Indication and Assessment:    Wound Outcome:    Removal Indications:    Wound Image   11/11/2019  5:29 PM   Dressing Appearance Dry;Intact;Dried drainage 11/11/2019  5:29 PM   Drainage Amount Moderate 11/11/2019  5:29 PM   Drainage Characteristics/Odor Serosanguineous 11/11/2019  5:29 PM   Appearance Red;Yellow;Not granulating;Dry 11/11/2019  5:29 PM   Black (%), Wound Tissue Color 0 % 11/11/2019  5:29 PM   Red (%), Wound Tissue Color 80 % 11/11/2019  5:29 PM   Yellow (%), Wound Tissue Color 20 % 11/11/2019  5:29 PM   Periwound Area  "Intact;Warm;Dry;Edematous 11/11/2019  5:29 PM   Wound Edges Dehisced;Open 11/11/2019  5:29 PM   Wound Length (cm) 9.1 cm 11/11/2019  5:29 PM   Wound Width (cm) 3.5 cm 11/11/2019  5:29 PM   Wound Depth (cm) 4 cm 11/11/2019  5:29 PM   Wound Volume (cm^3) 127.4 cm^3 11/11/2019  5:29 PM   Wound Surface Area (cm^2) 31.85 cm^2 11/11/2019  5:29 PM   Tunneling (depth (cm)/location) 3.1cm depth at 6 o'clock 11/11/2019  5:29 PM   Care Cleansed with:;Sterile normal saline;Skin Barrier 11/11/2019  5:29 PM   Dressing Silver;Calcium alginate;Applied 11/11/2019  5:29 PM   Packing Incision packed with 11/11/2019  5:29 PM   Periwound Care Absorptive dressing applied;Skin barrier film applied;Dry periwound area maintained 11/11/2019  5:29 PM   Dressing Change Due 11/13/19 11/11/2019  5:29 PM   #1 Dehisced surgical wound left knee  Cleanse wound with: normal saline   Lidocaine:n/a   Silver nitrate:n/a  Periwound care: cavilon   Primary dressing: aquacell ag rope in tunnel at 6 o'clock and aquacell ag 4x4 to direct wound bed  Secondary dressing: drawtex, mextra or abds, conform 4" roll,  ace wrap 4" wide roll to secure, not too tight. Wide flexi net over ace wrap.  Offloading: bed rest for now. Hold walking and PT  for now. Mobility by wheelchair.  Edema control: Elevate feet and legs; pillow under calfs with heels off edge or heels off edge mattress to prevent pressure on heels.  Frequency: every other day per home health. Son can dress wound at home if drainage excessive. Please provide dressing supplies for son to change dressing if needed.  Follow-up: 11/18/19 with Dr. Willie Uribe  [facility]: [facility] phone [phone] and [phone]; fax [phone] and [phone]  Other Orders: Knee brace for left leg to help immobilize; son has prescription for this brace.  Ensure 3 cans per day by mouth.  Order: Wound Vac being ordered for left knee wound; assistance with granulation tissue dehisced surgical wound. Wound vac " "to begin as soon as obtained. Wound vac being sent to patient residence. Benzoin and vac drape at edges wound, black foam in tunnel at 6 o"clock and to direct wound, seal with vac drape;compression mm hg 100 continual. Dressing twice a week and  Prn need.    [facility] to change silver dressing every other day till wound vac arrives.    Plan:       Follow-up: 11/18/19 with Dr. Willie Uribe     Recommend straight leg knee immobilizer.  Hold PT.                  "

## 2019-11-12 NOTE — PATIENT INSTRUCTIONS
Elevate feet and legs to keep swelling left leg down. No walking or PT for now. Knee immobilizer to left knee area.

## 2019-11-12 NOTE — TELEPHONE ENCOUNTER
Spoke to Abundio at [facility] and wanted to inform Dr. Brambila that pt has a drug interaction between her Seroquel and Aricept. Informed Abundio that Dr. Brambila is aware per conversation with the prescribing Dr. Ware.

## 2019-11-12 NOTE — TELEPHONE ENCOUNTER
----- Message from Perri Rodriguez sent at 11/12/2019  4:06 PM CST -----  Contact: [facility]/Melany/[phone]  Home Health nurse is requesting orders for an 18 inch wheel chair with elevating leg rests sent to [facility]. Thanks

## 2019-11-12 NOTE — TELEPHONE ENCOUNTER
----- Message from Perri Rodriguez sent at 11/12/2019  8:49 AM CST -----  Contact: [phone]/kaet/Bryson  Type:  RX Refill Request    Who Called:  Lay  Refill or New Rx: NEW  RX Name and Strength: azaTHIOprine (IMURAN) 50 mg Tab  How is the patient currently taking it? (ex. 1XDay): Take 2 tablets (100 mg total) by mouth once daily. - Oral  Is this a 30 day or 90 day RX: 30  Preferred Pharmacy with phone number: [id number] [place], [place] - [address] AT [facility] [address]  Local or Mail Order: local  Ordering Provider:   Would the patient rather a call back or a response via [facility]?  call  Best Call Back Number:   Additional Information:  Pharmacy said the  Has to send in the rx

## 2019-11-12 NOTE — TELEPHONE ENCOUNTER
----- Message from Mercedez Oden sent at 11/12/2019 11:27 AM CST -----  Contact: Abundio from [facility]  Abundio called because she was wanting to notify that she was admitted to home health over weekend and was discharged but there was some discrepancy on medications.     She had an high interaction of 2 medications.     Please call back [phone] to discuss today.

## 2019-11-13 ENCOUNTER — INFUSION (OUTPATIENT)
Dept: INFUSION THERAPY | Facility: HOSPITAL | Age: 74
End: 2019-11-13
Attending: INTERNAL MEDICINE
Payer: MEDICARE

## 2019-11-13 ENCOUNTER — PATIENT MESSAGE (OUTPATIENT)
Dept: GASTROENTEROLOGY | Facility: CLINIC | Age: 74
End: 2019-11-13

## 2019-11-13 ENCOUNTER — TELEPHONE (OUTPATIENT)
Dept: INFUSION THERAPY | Facility: HOSPITAL | Age: 74
End: 2019-11-13

## 2019-11-13 ENCOUNTER — TELEPHONE (OUTPATIENT)
Dept: GASTROENTEROLOGY | Facility: CLINIC | Age: 74
End: 2019-11-13

## 2019-11-13 VITALS
HEART RATE: 108 BPM | RESPIRATION RATE: 19 BRPM | TEMPERATURE: 98 F | BODY MASS INDEX: 34.31 KG/M2 | WEIGHT: 201 LBS | DIASTOLIC BLOOD PRESSURE: 67 MMHG | SYSTOLIC BLOOD PRESSURE: 147 MMHG | HEIGHT: 64 IN

## 2019-11-13 DIAGNOSIS — K50.819 CROHN'S DISEASE OF SMALL AND LARGE INTESTINES WITH COMPLICATION: Primary | ICD-10-CM

## 2019-11-13 PROCEDURE — 63600175 PHARM REV CODE 636 W HCPCS: Performed by: INTERNAL MEDICINE

## 2019-11-13 PROCEDURE — 96415 CHEMO IV INFUSION ADDL HR: CPT

## 2019-11-13 PROCEDURE — A4216 STERILE WATER/SALINE, 10 ML: HCPCS | Performed by: INTERNAL MEDICINE

## 2019-11-13 PROCEDURE — 25000003 PHARM REV CODE 250: Performed by: INTERNAL MEDICINE

## 2019-11-13 PROCEDURE — 96413 CHEMO IV INFUSION 1 HR: CPT

## 2019-11-13 PROCEDURE — 96375 TX/PRO/DX INJ NEW DRUG ADDON: CPT

## 2019-11-13 RX ORDER — ACETAMINOPHEN 325 MG/1
650 TABLET ORAL
Status: COMPLETED | OUTPATIENT
Start: 2019-11-13 | End: 2019-11-13

## 2019-11-13 RX ORDER — HEPARIN 100 UNIT/ML
500 SYRINGE INTRAVENOUS
Status: CANCELLED | OUTPATIENT
Start: 2020-01-08

## 2019-11-13 RX ORDER — DIPHENHYDRAMINE HYDROCHLORIDE 50 MG/ML
25 INJECTION INTRAMUSCULAR; INTRAVENOUS
Status: CANCELLED | OUTPATIENT
Start: 2020-01-08

## 2019-11-13 RX ORDER — ACETAMINOPHEN 325 MG/1
650 TABLET ORAL
Status: CANCELLED | OUTPATIENT
Start: 2020-01-08

## 2019-11-13 RX ORDER — DIPHENHYDRAMINE HYDROCHLORIDE 50 MG/ML
25 INJECTION INTRAMUSCULAR; INTRAVENOUS
Status: COMPLETED | OUTPATIENT
Start: 2019-11-13 | End: 2019-11-13

## 2019-11-13 RX ORDER — IPRATROPIUM BROMIDE AND ALBUTEROL SULFATE 2.5; .5 MG/3ML; MG/3ML
3 SOLUTION RESPIRATORY (INHALATION)
Status: CANCELLED | OUTPATIENT
Start: 2020-01-08

## 2019-11-13 RX ORDER — HEPARIN 100 UNIT/ML
500 SYRINGE INTRAVENOUS
Status: DISCONTINUED | OUTPATIENT
Start: 2019-11-13 | End: 2019-11-13 | Stop reason: HOSPADM

## 2019-11-13 RX ORDER — SODIUM CHLORIDE 0.9 % (FLUSH) 0.9 %
10 SYRINGE (ML) INJECTION
Status: CANCELLED | OUTPATIENT
Start: 2020-01-08

## 2019-11-13 RX ORDER — SODIUM CHLORIDE 0.9 % (FLUSH) 0.9 %
10 SYRINGE (ML) INJECTION
Status: DISCONTINUED | OUTPATIENT
Start: 2019-11-13 | End: 2019-11-13 | Stop reason: HOSPADM

## 2019-11-13 RX ORDER — EPINEPHRINE 1 MG/ML
0.3 INJECTION, SOLUTION, CONCENTRATE INTRAVENOUS
Status: CANCELLED | OUTPATIENT
Start: 2020-01-08

## 2019-11-13 RX ORDER — METHYLPREDNISOLONE SOD SUCC 125 MG
40 VIAL (EA) INJECTION
Status: CANCELLED | OUTPATIENT
Start: 2020-01-08

## 2019-11-13 RX ADMIN — DIPHENHYDRAMINE HYDROCHLORIDE 25 MG: 50 INJECTION INTRAMUSCULAR; INTRAVENOUS at 12:11

## 2019-11-13 RX ADMIN — INFLIXIMAB 460 MG: 100 INJECTION, POWDER, LYOPHILIZED, FOR SOLUTION INTRAVENOUS at 12:11

## 2019-11-13 RX ADMIN — HYDROCORTISONE SODIUM SUCCINATE 200 MG: 100 INJECTION, POWDER, FOR SOLUTION INTRAMUSCULAR; INTRAVENOUS at 12:11

## 2019-11-13 RX ADMIN — Medication 10 ML: at 03:11

## 2019-11-13 RX ADMIN — ACETAMINOPHEN 650 MG: 325 TABLET ORAL at 12:11

## 2019-11-13 NOTE — TELEPHONE ENCOUNTER
Spoke with nursing in infusion.  Aware that patient has non-healing wound - scheduled for wound vac.    However, she is on high dose steroids because of severe inflammation and very active IBD which is creating significant malnutrition.  I wish to proceed with remicade, as this will be the best change to improve her nutritional status and also to wean her off the steroids, which I believe will be the best thing for her wound healing as well...

## 2019-11-13 NOTE — TELEPHONE ENCOUNTER
----- Message from Yumiko Land RN sent at 11/13/2019 11:43 AM CST -----  I have your patient Ms. Kyle here for her Remicade infusion.  She has an open seeping wound from her left knee replacement and is awaiting her wound vac placement in the next few days.  I was concerned with starting the Remicade due to the increased chance of infection and delayed wound healing with the open wound on her knee.  Also, we have immunocompromised patients that can't be around someone with an open, draining wound.  Would it be advisable to have her return when her wound vac is in place? Please advise.  Thank you.

## 2019-11-13 NOTE — NURSING
Pt tolerated Remicade infusion well.  No adverse reaction noted.  Pt education reinforced on potential side effects, what to expect, and when to call physician.  Pt verbalized understanding.  I reviewed pt's schedule with patient and understanding verbalized.    IV flushed with NS and D/C per protocol.

## 2019-11-13 NOTE — NURSING
During initial assessment of patient it came to light that she has dehiscence where she had her left knee replaced on 10/16/19 and is in need of a wound vac.  Due to nature of Remicade and potential side effects, spoke with Dr. Carrera about whether he wanted us to proceed with infusion.  He confirmed that he is aware of the wound and pt is on PO prednisone and can only D/C the prednisone once she is on the Remicade infusion and to proceed even though it can cause increased incidences of infection and delayed healing.

## 2019-11-14 ENCOUNTER — TELEPHONE (OUTPATIENT)
Dept: GASTROENTEROLOGY | Facility: CLINIC | Age: 74
End: 2019-11-14

## 2019-11-14 DIAGNOSIS — K50.819 CROHN'S DISEASE OF SMALL AND LARGE INTESTINES WITH COMPLICATION: Primary | ICD-10-CM

## 2019-11-14 NOTE — TELEPHONE ENCOUNTER
----- Message from Og Carrera MD sent at 11/14/2019 12:19 PM CST -----  Reviewed labs and discussed over phone with son chelsie.  Difficult situation as she has wound healing issues, likely related to malnutrition as well as steroid use and inflammation.  I am attempting to wean steroids. Instructed to taper now to only 20mg q am.  Will repeat labs in 1 week.  Ultimately I hope to get remicade onboard to taper off steroids in the hope of decreasing inflammation and healing her wound and improving nutrition.  She may need PICC line and TPN or Gtube in future if wound is not healing.    Sofia, please schedule labs for same day as her ortho visit next week.

## 2019-11-14 NOTE — TELEPHONE ENCOUNTER
Spoke with patients kate Massey and the [facility] Nurse. They took a verbal order for labs ordered by Dr. Carrera. Home health nurse also stated that patients left Leg is swelling. She took a measurement of it and will continue to measure it. I informed Nurse that they should inform wound care of leg swelling and they will contact her PCP also.

## 2019-11-18 ENCOUNTER — HOSPITAL ENCOUNTER (OUTPATIENT)
Dept: WOUND CARE | Facility: HOSPITAL | Age: 74
Discharge: HOME | End: 2019-11-18
Attending: PREVENTIVE MEDICINE
Payer: MEDICARE

## 2019-11-18 VITALS
DIASTOLIC BLOOD PRESSURE: 74 MMHG | SYSTOLIC BLOOD PRESSURE: 121 MMHG | WEIGHT: 201 LBS | HEIGHT: 64 IN | HEART RATE: 111 BPM | BODY MASS INDEX: 34.31 KG/M2 | TEMPERATURE: 98 F

## 2019-11-18 DIAGNOSIS — R23.9 ALTERATION IN SKIN INTEGRITY: ICD-10-CM

## 2019-11-18 DIAGNOSIS — E66.01 MORBID OBESITY: ICD-10-CM

## 2019-11-18 DIAGNOSIS — K50.819 CROHN'S DISEASE OF SMALL AND LARGE INTESTINES WITH COMPLICATION: Chronic | ICD-10-CM

## 2019-11-18 DIAGNOSIS — N18.30 CHRONIC KIDNEY DISEASE, STAGE 3: ICD-10-CM

## 2019-11-18 DIAGNOSIS — E43 SEVERE MALNUTRITION: ICD-10-CM

## 2019-11-18 DIAGNOSIS — Z96.652 HISTORY OF LEFT KNEE REPLACEMENT: Primary | ICD-10-CM

## 2019-11-18 PROCEDURE — 87077 CULTURE AEROBIC IDENTIFY: CPT

## 2019-11-18 PROCEDURE — 11046 DBRDMT MUSC&/FSCA EA ADDL: CPT

## 2019-11-18 PROCEDURE — 27201912 HC WOUND CARE DEBRIDEMENT SUPPLIES

## 2019-11-18 PROCEDURE — 11043 DBRDMT MUSC&/FSCA 1ST 20/<: CPT

## 2019-11-18 PROCEDURE — 97605 NEG PRS WND THER DME<=50SQCM: CPT

## 2019-11-18 PROCEDURE — 87075 CULTR BACTERIA EXCEPT BLOOD: CPT

## 2019-11-18 PROCEDURE — 87070 CULTURE OTHR SPECIMN AEROBIC: CPT

## 2019-11-18 PROCEDURE — 87186 SC STD MICRODIL/AGAR DIL: CPT | Mod: 59

## 2019-11-18 PROCEDURE — 11042 DBRDMT SUBQ TIS 1ST 20SQCM/<: CPT

## 2019-11-18 PROCEDURE — 11045 DBRDMT SUBQ TISS EACH ADDL: CPT

## 2019-11-18 NOTE — PROGRESS NOTES
[facility]                Progress Note    Subjective:       Patient ID: Anabella Aranda is a 74 y.o. female.    Chief Complaint: Non-healing Wound Follow Up    Chief Complaint: Non-healing Wound (dehised surgical wound left knee, total knee replacement)     Wound Center visit today for treatment of dehisced surgical wound left knee. Total knee replacement 3 weeks ago by Dr. Ghassan Lindsay. Dr. Lindsay examined wound today and saw this dehiscence. Patient then sent to Wound Center. Dr. Faith Uribe examined wound in Wound Clinic. Wound vac being ordered. Silver dressings used to dress wound today. Son given prescription for knee immobilizer for left knee area. [facility] will dress wound at home. Hold walking and PT for now. Ensure intake increased to 3 cans per day. Knee wound clean with 80% red tissue and 20% yellow  tissue. Absorbable suture seen deep in wound. Left leg with edema. Pulses left foot strong by doppler; skin normal warmth.   11/18/19:  Fu in clinic today with Dr. Uribe.  Wound VAC initiated by [facility] last week.  Approximately 30ml of ss drainage noted in canister.  Distal part of incision is dehisced.  Wounds connect.  Culture optained.  Sharps debridement of both wounds per Dr. Uribe.  Wound VAC applied @125mmgh continuous.  Buttock area is getting irritated with signs of break down from staying in bed and not off loading. Patient/cg instructed to turn or reposition every 2 hours, protect buttock/sacral skin with moisture barrier, do not sit for long periods of time, keep skin clean and dry. Okay to ambulate with walker in home.  Avoid knee flexion.  PT remains on hold.  Patient tolerated care well.  HH to change dressings and assess wound 2 x per week.  Fu with Dr. Uribe in 2 weeks.      Review of Systems   Constitutional: Negative.    HENT: Negative.    Eyes: Negative.    Respiratory: Negative.    Cardiovascular: Negative.     Gastrointestinal: Negative.    Musculoskeletal: Positive for arthralgias.   Skin: Positive for wound.   Neurological: Negative.    Psychiatric/Behavioral: Negative.          Objective:        Physical Exam   Constitutional: She is oriented to person, place, and time. She appears well-developed and well-nourished.   Eyes: Pupils are equal, round, and reactive to light.   Neck: Normal range of motion. Neck supple.   Cardiovascular: Normal rate.   Pulmonary/Chest: Effort normal.   Musculoskeletal: She exhibits edema and tenderness.   Neurological: She is alert and oriented to person, place, and time.   Skin: Skin is warm and dry. Capillary refill takes less than 2 seconds.   Psychiatric: She has a normal mood and affect. Her behavior is normal. Judgment and thought content normal.       Vitals:    11/18/19 1337   BP: 121/74   Pulse: (!) 111   Temp: 98.3 °F (36.8 °C)       Assessment:           ICD-10-CM ICD-9-CM   1. History of left knee replacement Z96.652 V43.65   2. Chronic kidney disease, stage 3 N18.3 585.3   3. Morbid obesity E66.01 278.01   4. Crohn's disease of small and large intestines with complication K50.819 555.2   5. Severe malnutrition E43 261   6. Alteration in skin integrity R23.9 782.9            Incision/Site 11/11/19 1603 Left Knee anterior midline;vertical (Active)   11/11/19 1603    Present Prior to Hospital Arrival?: Yes   Side: Left   Location: Knee   Orientation: anterior   Incision Type: midline;vertical   Closure Method:    Additional Comments: dorsal pedal and posterior tibial pulses by doppler strong left foot   Removal Indication and Assessment:    Wound Outcome:    Removal Indications:    Dressing Appearance Intact;Moist drainage 11/18/2019  1:00 PM   Drainage Amount Moderate 11/18/2019  1:00 PM   Drainage Characteristics/Odor Serosanguineous 11/18/2019  1:00 PM   Appearance Red;Yellow;Moist;Granulating 11/18/2019  1:00 PM   Red (%), Wound Tissue Color 90 % 11/18/2019  1:00 PM   Yellow  (%), Wound Tissue Color 10 % 11/18/2019  1:00 PM   Periwound Area Intact;Satellite lesion;Swelling 11/18/2019  1:00 PM   Wound Edges Dehisced 11/18/2019  1:00 PM   Wound Length (cm) 9 cm 11/18/2019  1:00 PM   Wound Width (cm) 3.5 cm 11/18/2019  1:00 PM   Wound Depth (cm) 1.6 cm 11/18/2019  1:00 PM   Wound Volume (cm^3) 50.4 cm^3 11/18/2019  1:00 PM   Wound Surface Area (cm^2) 31.5 cm^2 11/18/2019  1:00 PM   Tunneling (depth (cm)/location) 4.3cm at 6 oclock 11/18/2019  1:00 PM   Care Cleansed with:;Sterile normal saline 11/18/2019  1:00 PM   Dressing Applied;Changed;Foam;Transparent film 11/18/2019  1:00 PM   Packing Incision packed with 11/18/2019  1:00 PM   Periwound Care Skin barrier film applied 11/18/2019  1:00 PM   Dressing Change Due 11/20/19 11/18/2019  1:00 PM            Incision/Site 11/18/19 1327 Left Knee distal (Active)   11/18/19 1327    Present Prior to Hospital Arrival?: Yes   Side: Left   Location: Knee   Orientation: distal   Incision Type:    Closure Method:    Additional Comments:    Removal Indication and Assessment:    Wound Outcome:    Removal Indications:    Wound Image   11/18/2019  1:00 PM   Dressing Appearance Open to air 11/18/2019  1:00 PM   Drainage Amount Moderate 11/18/2019  1:00 PM   Drainage Characteristics/Odor Brown;No odor 11/18/2019  1:00 PM   Appearance Red;Yellow 11/18/2019  1:00 PM   Red (%), Wound Tissue Color 20 % 11/18/2019  1:00 PM   Yellow (%), Wound Tissue Color 80 % 11/18/2019  1:00 PM   Periwound Area Intact 11/18/2019  1:00 PM   Wound Edges Dehisced 11/18/2019  1:00 PM   Wound Length (cm) 0.9 cm 11/18/2019  1:00 PM   Wound Width (cm) 0.3 cm 11/18/2019  1:00 PM   Wound Depth (cm) 1.2 cm 11/18/2019  1:00 PM   Wound Volume (cm^3) 0.32 cm^3 11/18/2019  1:00 PM   Wound Surface Area (cm^2) 0.27 cm^2 11/18/2019  1:00 PM   Undermining (depth (cm)/location) 4.3cm @ 12 oclock 11/18/2019  1:00 PM   Care Cleansed with:;Sterile normal saline 11/18/2019  1:00 PM   Dressing  Applied;Changed;Foam;Transparent film;Silver 11/18/2019  1:00 PM   Packing Incision packed with;gauze, iodoform 11/18/2019  1:00 PM   Periwound Care Absorptive dressing applied;Skin barrier film applied 11/18/2019  1:00 PM   Dressing Change Due 11/20/19 11/18/2019  1:00 PM       #1 Dehisced surgical wound left knee, proximal  Cleanse wound with: normal saline   Lidocaine: 2% PRN pain/debriement  Periwound care: Benzoin  Primary dressing: KCI wound VAC @125mmgh continuous:  Protect intact skin with vac drape, black foam to wound bed, secure with vac drape  Edema control: Elevate feet and legs; float heels  Frequency: Mon, Thur    #2 Dehisced surgical wound left knee, Distal  Cleanse wound with: normal saline   Lidocaine: 2% PRN pain/debriement  Periwound care: Benzoin  Primary dressing: pack wound through to proximal wound with antimicrobial packing strip, cover with aquacel ag rope, secure with vac drape (wounds connect)  Edema control: Elevate feet and legs; float heels  Frequency: Mon, Thur    Off loading:  Turn or reposition every 2 hours, protect buttock/sacral skin with moisture barrier, do not sit for long periods of time, keep skin clean and dry. Okay to ambulate with walker in home.  Avoid knee flexion.  PT remains on hold.       Home Health: [facility] nurse to assess wound and perform wound VAC dressing change on Mon, Thur, and PRN when not in clinic  Ensure 3 cans per day by mouth.  Culture obtained     Sharps debridement to both wound per Dr. Uribe.  Culture obtained  Plan:            Follow-up: 12/2/19 with Dr. Uribe

## 2019-11-20 ENCOUNTER — TELEPHONE (OUTPATIENT)
Dept: HOME HEALTH SERVICES | Facility: HOSPITAL | Age: 74
End: 2019-11-20

## 2019-11-20 LAB
BACTERIA SPEC AEROBE CULT: ABNORMAL
BACTERIA SPEC AEROBE CULT: ABNORMAL

## 2019-11-21 ENCOUNTER — EXTERNAL HOME HEALTH (OUTPATIENT)
Dept: HOME HEALTH SERVICES | Facility: HOSPITAL | Age: 74
End: 2019-11-21
Payer: MEDICARE

## 2019-11-22 ENCOUNTER — OFFICE VISIT (OUTPATIENT)
Dept: ORTHOPEDICS | Facility: CLINIC | Age: 74
End: 2019-11-22
Payer: MEDICARE

## 2019-11-22 ENCOUNTER — TELEPHONE (OUTPATIENT)
Dept: GASTROENTEROLOGY | Facility: CLINIC | Age: 74
End: 2019-11-22

## 2019-11-22 VITALS — HEIGHT: 64 IN | BODY MASS INDEX: 34.31 KG/M2 | WEIGHT: 201 LBS

## 2019-11-22 DIAGNOSIS — S81.002A OPEN WOUND OF LEFT KNEE, INITIAL ENCOUNTER: Primary | ICD-10-CM

## 2019-11-22 DIAGNOSIS — M17.12 PRIMARY OSTEOARTHRITIS OF LEFT KNEE: ICD-10-CM

## 2019-11-22 LAB — BACTERIA SPEC ANAEROBE CULT: NORMAL

## 2019-11-22 PROCEDURE — 99999 PR PBB SHADOW E&M-EST. PATIENT-LVL II: CPT | Mod: PBBFAC,,, | Performed by: ORTHOPAEDIC SURGERY

## 2019-11-22 PROCEDURE — 99999 PR PBB SHADOW E&M-EST. PATIENT-LVL II: ICD-10-PCS | Mod: PBBFAC,,, | Performed by: ORTHOPAEDIC SURGERY

## 2019-11-22 PROCEDURE — 99024 POSTOP FOLLOW-UP VISIT: CPT | Mod: POP,,, | Performed by: ORTHOPAEDIC SURGERY

## 2019-11-22 PROCEDURE — 99024 PR POST-OP FOLLOW-UP VISIT: ICD-10-PCS | Mod: POP,,, | Performed by: ORTHOPAEDIC SURGERY

## 2019-11-22 PROCEDURE — 99212 OFFICE O/P EST SF 10 MIN: CPT | Mod: PBBFAC,PN | Performed by: ORTHOPAEDIC SURGERY

## 2019-11-22 NOTE — PROGRESS NOTES
Subjective:      Patient ID: Anabella Aranda is a 74 y.o. female.    Chief Complaint: Post-op Evaluation of the Left Knee      HPI:  Five weeks postop  The patient is seen for postop follow-up of right  TKA.  Pain control has been satisfactory  They feel that they are ambulating easily  Preoperative complaints include:  She still treated in wound clinic for the dehiscence of the proximal part of her incision. The patient her son feel that this is improving with the wound VAC.      Current Outpatient Medications:     aspirin (ECOTRIN) 81 MG EC tablet, Take 1 tablet (81 mg total) by mouth once daily., Disp: , Rfl: 0    azaTHIOprine (IMURAN) 50 mg Tab, Take 2 tablets (100 mg total) by mouth once daily., Disp: 60 tablet, Rfl: 1    calcium carbonate (OS-NELSY) 600 mg (1,500 mg) Tab, Take 1 tablet (600 mg total) by mouth 2 (two) times daily with meals., Disp: 180 tablet, Rfl: 3    donepezil (ARICEPT) 10 MG tablet, Take 1 tablet (10 mg total) by mouth every evening., Disp: 90 tablet, Rfl: 3    ergocalciferol (ERGOCALCIFEROL) 50,000 unit Cap, Take 1 capsule (50,000 Units total) by mouth every 7 days., Disp: 12 capsule, Rfl: 3    ferrous sulfate 325 mg (65 mg iron) Tab tablet, Take 1 tablet (325 mg total) by mouth 2 (two) times daily., Disp: 180 tablet, Rfl: 3    loperamide (IMODIUM A-D) 2 mg Tab, Take 1 mg by mouth 2 (two) times daily before meals., Disp: , Rfl:     memantine (NAMENDA) 10 MG Tab, Take 1 tablet (10 mg total) by mouth once daily., Disp: 180 tablet, Rfl: 3    pantoprazole (PROTONIX) 40 MG tablet, Take 1 tablet (40 mg total) by mouth once daily., Disp: 30 tablet, Rfl: 11    predniSONE (DELTASONE) 10 MG tablet, Take 1 tablet (10 mg total) by mouth daily with dinner or evening meal., Disp: 14 tablet, Rfl: 0    predniSONE (DELTASONE) 20 MG tablet, Take 1 tablet (20 mg total) by mouth once daily., Disp: 14 tablet, Rfl: 0    QUEtiapine (SEROQUEL) 100 MG Tab, TAKE 1 2 TABLET BY MOUTH EVERY NIGHT AT  "BEDTIME, THEN TAKE ONE TABLET BY MOUTH EVERY NIGHT AT BEDTIME THEREAFTER, Disp: , Rfl: 1  Review of patient's allergies indicates:   Allergen Reactions    Sulfa (sulfonamide antibiotics) Swelling    Tomato (solanum lycopersicum) Other (See Comments)       Ht 5' 4" (1.626 m)   Wt 91.2 kg (201 lb)   LMP  (LMP Unknown)   BMI 34.50 kg/m²     ROS        Objective:    Ortho Exam          Alert, oriented, no acute distress  Sclera-Normal  Respiratory distress-none  Gait mild  Incision:  See photograph in Media  Range of motion: extension- 5 degrees; flexion- 90 degrees  Valgus/varus stability- stable  Swelling-mild  Distal perfusion-intact  Distal neurologic-intact    Imaging:  None    Assessment:             1. Open wound of left knee, initial encounter    2. Primary osteoarthritis of left knee        The patient seems to be responding well to local wound care. The fluid in the wound VAC is scant and serous.  Although I cannot completely exclude the possibility of infection, the clinical course suggest that this remains a superficial process.        Plan:          No follow-ups on file.    I explained my diagnostic impression and the reasoning behind it in detail, using layman's terms.  Continue Wound Clinic care with wound VAC      "

## 2019-11-22 NOTE — TELEPHONE ENCOUNTER
Spoke with Bryson and informed him that we still do not have any lab results. Bryson would like to have them done at the next appointment. Labs scheduled on 11/27/19 at 8:00am.

## 2019-11-23 ENCOUNTER — PATIENT MESSAGE (OUTPATIENT)
Dept: GASTROENTEROLOGY | Facility: CLINIC | Age: 74
End: 2019-11-23

## 2019-11-23 NOTE — PROGRESS NOTES
[facility]  Progress Note    Patient Name: Anabella Aranda  MRN: [id number]  Patient Class: IP- Inpatient   Admission Date: 10/16/2019  Length of Stay: 13 days  Attending Physician: No att. providers found  Primary Care Provider: Shon Brambila MD        Subjective:     Principal Problem:Crohn's disease of small and large intestines with complication        HPI:  The patient is a 74 y.o. female with PMH significant for Dementia, h/o DVT status post IVC filter, ulcerative colitis, iron deficiency anemia, GERD,  Major depression with psychotic features, and history of hep B. She is here today for a pre-operative visit in preparation for a Left total knee arthroplasty to be performed by  Dr. Lindsay on 10/16/19.  Anabella Aranda has a >1 year history of Left knee pain.  Pain is worse with activity and weight bearing. Patient has experienced interference of ADLs due to increased pain and decreased range of motion. Patient has failed non-operative treatment including NSAIDs, activity modification, and bracing. Anabella Aranda ambulates Rolator walker.  There has been no significant change in medical status since last visit.  She was seen by PCP Dr. Brambila on 08/14/19 at which time chronic conditions were well controlled    The pt underwent a left TKA during this admission, and we were consulted for medical management.      Overview/Hospital Course:  The pt seen at [facility], she knows her name and age, but has some memory lapses to the events.  The pt had hypotension earlier and was given a bolus on 250ml NS x 1 dose.  GI is also on the case given her colitis.  Ordering c diff titers, and adjusting her immunosuppression meds as well.  10/19 pt still feeling weak and not at the level where she can participate in therapy, still having diarrhea. Will need to order labs to f/u on bmp  10/21 cr level is down to normal, pt is being also managed by GI for her diarrhea/colitis.  GI report  on EGD and Flex sif 10/21 with pathology report severe small bowel inflammation and severe retained rectum inflammation with deep heaped up ulcers. No evidence of infectious process, stains negative. However on review of colectomy specimen from years ago, there was evidence of Crohns disease at that time. Thus, this is likely severe crohns flare given the extent of small bowel involvement  Interval History: awake and alert, diarrhea is improving.      GI reported severe Crohn's disease and plan for IV steroid, Imuran daily         Review of Systems   Constitutional: Negative for activity change, fatigue and fever.   Respiratory: Negative for apnea, cough and shortness of breath.    Cardiovascular: Negative for chest pain and palpitations.   Gastrointestinal: Positive for diarrhea. Negative for abdominal distention, nausea and vomiting.   Genitourinary: Negative for difficulty urinating and hematuria.   Musculoskeletal: Positive for arthralgias.   Neurological: Negative for dizziness and numbness.   Psychiatric/Behavioral: Negative for agitation. The patient is not nervous/anxious.       Objective:      Vital Signs (Most Recent):  Temp: 97.9 °F (36.6 °C) (10/25/19 1125)  Pulse: 99 (10/25/19 1159)  Resp: 18 (10/25/19 1125)  BP: 130/63 (10/25/19 1125)  SpO2: 96 % (10/25/19 0034) Vital Signs (24h Range):  Temp:  [97.9 °F (36.6 °C)-99.2 °F (37.3 °C)] 97.9 °F (36.6 °C)  Pulse:  [] 99  Resp:  [16-20] 18  SpO2:  [96 %] 96 %  BP: (124-168)/(57-74) 130/63      Weight: 99 kg (218 lb 4.1 oz)  Body mass index is 37.46 kg/m².     Intake/Output Summary (Last 24 hours) at 10/25/2019 1457  Last data filed at 10/25/2019 1245      Gross per 24 hour   Intake 3575 ml   Output 300 ml   Net 3275 ml      Physical Exam   Constitutional: She appears well-nourished.   HENT:   Head: Normocephalic and atraumatic.   Pulmonary/Chest: No respiratory distress.   Neurological: She is alert.   Vitals reviewed.        Significant Labs:   CBC:        Recent Labs   Lab 10/25/19  0759   WBC 5.05   HGB 9.3*   HCT 27.6*   *      CMP:       Recent Labs   Lab 10/25/19  0758      K 4.1      CO2 22*   GLU 64*   BUN 8   CREATININE 0.7   CALCIUM 8.0*   PROT 6.3   ALBUMIN 1.7*   BILITOT 0.6   ALKPHOS 104   AST 15   ALT 8*   ANIONGAP 13   EGFRNONAA >60      TSH: No results for input(s): TSH in the last 4320 hours.  Urine Culture: No results for input(s): LABURIN in the last 48 hours.  Urine Studies: No results for input(s): COLORU, APPEARANCEUA, PHUR, SPECGRAV, PROTEINUA, GLUCUA, KETONESU, BILIRUBINUA, OCCULTUA, NITRITE, UROBILINOGEN, LEUKOCYTESUR, RBCUA, WBCUA, BACTERIA, SQUAMEPITHEL, HYALINECASTS in the last 48 hours.     Invalid input(s): WRIGHTSUR     Significant Imaging: none      Assessment/Plan:      * Crohn's disease of small and large intestines with complication  EGD and Flex sig 10/21    Severe small bowel inflammation and severe retained rectum inflammation with deep heaped up ulcers. No evidence of infectious process, stains negative. However on review of colectomy specimen from years ago, there was evidence of Crohns disease at that time. Thus, this is likely severe crohns flare given the extent of small bowel involvement  Appreciates GI rec's solumedrol 40mg IV stat then 20mg BID IV thereafter  Restart Imuran   FU hepatitis B serologies  PPI, lomotil with imodium.           Severe malnutrition  Due to decrease appetite and sever crohn;s disease  Start TPN  Weekly Prealmuni        Alzheimers disease  On oral meds  Continue current management      History of left knee replacement  S/p left knee replacement  Continue PT/OT   Pt accepted to [facility] but not ready for d/c due to UC complication    Major depression with psychotic features  On oral meds.  Continue current management      Ulcerative proctitis with complication  Hx of total colectomy  Still having diarrhea  C.diff neg on 10/18  GI on board- appreciates re'c Lomotil and  imodium.awaiting pathology report    Iron deficiency anemia  H/H is stable  GI is following      Venous insufficiency of both lower extremities        VTE Risk Mitigation (From admission, onward)    None                Maggie De León MD  Department of Hospital Medicine   [facility]

## 2019-11-23 NOTE — PROGRESS NOTES
[facility] Hospital Medicine  Progress Note    Patient Name: Anabella Aranda  MRN: [id number]  Patient Class: IP- Inpatient   Admission Date: 10/16/2019  Length of Stay: 13 days  Attending Physician: No att. providers found  Primary Care Provider: Shon Brambila MD        Subjective:     Principal Problem:Crohn's disease of small and large intestines with complication        HPI:  The patient is a 74 y.o. female with PMH significant for Dementia, h/o DVT status post IVC filter, ulcerative colitis, iron deficiency anemia, GERD,  Major depression with psychotic features, and history of hep B. She is here today for a pre-operative visit in preparation for a Left total knee arthroplasty to be performed by  Dr. Lindsay on 10/16/19.  Anabella Aranda has a >1 year history of Left knee pain.  Pain is worse with activity and weight bearing. Patient has experienced interference of ADLs due to increased pain and decreased range of motion. Patient has failed non-operative treatment including NSAIDs, activity modification, and bracing. Anabella Aranda ambulates Rolator walker.  There has been no significant change in medical status since last visit.  She was seen by PCP Dr. Brambila on 08/14/19 at which time chronic conditions were well controlled    The pt underwent a left TKA during this admission, and we were consulted for medical management.      Overview/Hospital Course:  The pt seen at [facility], she knows her name and age, but has some memory lapses to the events.  The pt had hypotension earlier and was given a bolus on 250ml NS x 1 dose.  GI is also on the case given her colitis.  Ordering c diff titers, and adjusting her immunosuppression meds as well.  10/19 pt still feeling weak and not at the level where she can participate in therapy, still having diarrhea. Will need to order labs to f/u on bmp  10/21 cr level is down to normal, pt is being also managed by GI for her diarrhea/colitis.  GI report  on EGD and Flex sif 10/21 with pathology report severe small bowel inflammation and severe retained rectum inflammation with deep heaped up ulcers. No evidence of infectious process, stains negative. However on review of colectomy specimen from years ago, there was evidence of Crohns disease at that time. Thus, this is likely severe crohns flare given the extent of small bowel involvement    Interval History: awake and alert, still having loose stool. Patient on liquid diet consisting of applesauce, milk and juice- switch diet      Review of Systems   Constitutional: Negative for activity change, fatigue and fever.   Respiratory: Negative for apnea, cough and shortness of breath.    Cardiovascular: Negative for chest pain and palpitations.   Gastrointestinal: Positive for diarrhea. Negative for abdominal distention, nausea and vomiting.   Genitourinary: Negative for difficulty urinating and hematuria.   Musculoskeletal: Positive for arthralgias.   Neurological: Negative for dizziness and numbness.   Psychiatric/Behavioral: Negative for agitation. The patient is not nervous/anxious.       Objective:      Vital Signs (Most Recent):  Temp: 98.6 °F (37 °C) (10/23/19 1543)  Pulse: (!) 113 (10/23/19 1600)  Resp: 18 (10/23/19 1543)  BP: (!) 143/64 (10/23/19 1543)  SpO2: 98 % (10/23/19 1615) Vital Signs (24h Range):  Temp:  [97.7 °F (36.5 °C)-98.6 °F (37 °C)] 98.6 °F (37 °C)  Pulse:  [101-129] 113  Resp:  [17-20] 18  SpO2:  [96 %-98 %] 98 %  BP: (113-143)/(60-87) 143/64      Weight: 98.1 kg (216 lb 4.3 oz)  Body mass index is 37.12 kg/m².     Intake/Output Summary (Last 24 hours) at 10/23/2019 1802  Last data filed at 10/23/2019 0600      Gross per 24 hour   Intake 1600 ml   Output 900 ml   Net 700 ml      Physical Exam   Constitutional: She appears well-nourished.   HENT:   Head: Normocephalic and atraumatic.   Pulmonary/Chest: No respiratory distress.   Neurological: She is alert.   Vitals reviewed.        Significant  Labs:   CBC:   No results for input(s): WBC, HGB, HCT, PLT in the last 48 hours.  CMP:       Recent Labs   Lab 10/22/19  0420      K 4.1      CO2 17*   GLU 54*   BUN 17   CREATININE 1.1   CALCIUM 8.2*   PROT 6.1   ALBUMIN 1.8*   BILITOT 0.6   ALKPHOS 99   AST 18   ALT 5*   ANIONGAP 17*   EGFRNONAA 50*      TSH: No results for input(s): TSH in the last 4320 hours.  Urine Culture: No results for input(s): LABURIN in the last 48 hours.  Urine Studies: No results for input(s): COLORU, APPEARANCEUA, PHUR, SPECGRAV, PROTEINUA, GLUCUA, KETONESU, BILIRUBINUA, OCCULTUA, NITRITE, UROBILINOGEN, LEUKOCYTESUR, RBCUA, WBCUA, BACTERIA, SQUAMEPITHEL, HYALINECASTS in the last 48 hours.     Invalid input(s): WRIGHTSUR     Significant Imaging: none           Assessment/Plan:      * Crohn's disease of small and large intestines with complication  EGD and Flex sig 10/21    Severe small bowel inflammation and severe retained rectum inflammation with deep heaped up ulcers. No evidence of infectious process, stains negative. However on review of colectomy specimen from years ago, there was evidence of Crohns disease at that time. Thus, this is likely severe crohns flare given the extent of small bowel involvement  Appreciates GI rec's solumedrol 40mg IV stat then 20mg BID IV thereafter  Restart Imuran   FU hepatitis B serologies  PPI, lomotil with imodium.           Severe malnutrition  Due to decrease appetite and sever crohn;s disease  Start TPN  Weekly Prealmuni        Alzheimers disease  On oral meds  Continue current management      History of left knee replacement  S/p left knee replacement  Continue PT/OT   Pt accepted to [facility] but not ready for d/c due to UC complication    Major depression with psychotic features  On oral meds.  Continue current management      Ulcerative proctitis with complication  Hx of total colectomy  Still having diarrhea  C.diff neg on 10/18  GI on board- appreciates re'c Lomotil and  imodium.awaiting pathology report    Iron deficiency anemia  H/H is stable  GI is following      Venous insufficiency of both lower extremities        VTE Risk Mitigation (From admission, onward)    None                Maggie De León MD  Department of Hospital Medicine   [facility]

## 2019-11-23 NOTE — CONSULTS
The patient was seen yesterday as a consult for medical management.  The pt 's labs and orders were reviewed,   We samaria re-assess the patient tomorrow 10/21/2019

## 2019-11-24 ENCOUNTER — PATIENT MESSAGE (OUTPATIENT)
Dept: GASTROENTEROLOGY | Facility: CLINIC | Age: 74
End: 2019-11-24

## 2019-11-25 ENCOUNTER — TELEPHONE (OUTPATIENT)
Dept: ORTHOPEDICS | Facility: CLINIC | Age: 74
End: 2019-11-25

## 2019-11-25 ENCOUNTER — PATIENT MESSAGE (OUTPATIENT)
Dept: ORTHOPEDICS | Facility: CLINIC | Age: 74
End: 2019-11-25

## 2019-11-25 ENCOUNTER — PATIENT MESSAGE (OUTPATIENT)
Dept: GASTROENTEROLOGY | Facility: CLINIC | Age: 74
End: 2019-11-25

## 2019-11-25 ENCOUNTER — PATIENT MESSAGE (OUTPATIENT)
Dept: FAMILY MEDICINE | Facility: CLINIC | Age: 74
End: 2019-11-25

## 2019-11-25 ENCOUNTER — HOSPITAL ENCOUNTER (INPATIENT)
Facility: HOSPITAL | Age: 74
LOS: 2 days | Discharge: NURSING, REHAB OR LONG-TERM CARE | DRG: 760 | End: 2019-11-29
Attending: EMERGENCY MEDICINE | Admitting: FAMILY MEDICINE
Payer: MEDICARE

## 2019-11-25 DIAGNOSIS — R19.7 DIARRHEA, UNSPECIFIED TYPE: Primary | ICD-10-CM

## 2019-11-25 DIAGNOSIS — N82.8 VAGINAL FISTULA: ICD-10-CM

## 2019-11-25 DIAGNOSIS — R00.0 TACHYCARDIA: ICD-10-CM

## 2019-11-25 DIAGNOSIS — R87.9 ABNORMAL FINDING IN SPECIMENS FROM FEMALE GENITAL ORGANS: ICD-10-CM

## 2019-11-25 DIAGNOSIS — S81.002D OPEN WOUND OF LEFT KNEE, SUBSEQUENT ENCOUNTER: ICD-10-CM

## 2019-11-25 DIAGNOSIS — Z96.652 HISTORY OF LEFT KNEE REPLACEMENT: ICD-10-CM

## 2019-11-25 LAB
ALBUMIN SERPL BCP-MCNC: 2.6 G/DL (ref 3.5–5.2)
ALP SERPL-CCNC: 150 U/L (ref 55–135)
ALT SERPL W/O P-5'-P-CCNC: 15 U/L (ref 10–44)
ANION GAP SERPL CALC-SCNC: 14 MMOL/L (ref 8–16)
AST SERPL-CCNC: 14 U/L (ref 10–40)
BASOPHILS # BLD AUTO: 0.01 K/UL (ref 0–0.2)
BASOPHILS NFR BLD: 0.2 % (ref 0–1.9)
BILIRUB SERPL-MCNC: 0.6 MG/DL (ref 0.1–1)
BILIRUB UR QL STRIP: NEGATIVE
BUN SERPL-MCNC: 10 MG/DL (ref 8–23)
CALCIUM SERPL-MCNC: 9.1 MG/DL (ref 8.7–10.5)
CHLORIDE SERPL-SCNC: 101 MMOL/L (ref 95–110)
CLARITY UR: CLEAR
CO2 SERPL-SCNC: 24 MMOL/L (ref 23–29)
COLOR UR: YELLOW
CREAT SERPL-MCNC: 1.1 MG/DL (ref 0.5–1.4)
CRP SERPL-MCNC: 86.3 MG/L (ref 0–8.2)
DIFFERENTIAL METHOD: ABNORMAL
EOSINOPHIL # BLD AUTO: 0 K/UL (ref 0–0.5)
EOSINOPHIL NFR BLD: 0.5 % (ref 0–8)
ERYTHROCYTE [DISTWIDTH] IN BLOOD BY AUTOMATED COUNT: 15.8 % (ref 11.5–14.5)
ERYTHROCYTE [SEDIMENTATION RATE] IN BLOOD BY WESTERGREN METHOD: 79 MM/HR (ref 0–20)
EST. GFR  (AFRICAN AMERICAN): 58 ML/MIN/1.73 M^2
EST. GFR  (NON AFRICAN AMERICAN): 50 ML/MIN/1.73 M^2
GLUCOSE SERPL-MCNC: 144 MG/DL (ref 70–110)
GLUCOSE UR QL STRIP: NEGATIVE
HCT VFR BLD AUTO: 31.2 % (ref 37–48.5)
HGB BLD-MCNC: 10 G/DL (ref 12–16)
HGB UR QL STRIP: NEGATIVE
KETONES UR QL STRIP: NEGATIVE
LACTATE SERPL-SCNC: 2 MMOL/L (ref 0.5–2.2)
LEUKOCYTE ESTERASE UR QL STRIP: NEGATIVE
LIPASE SERPL-CCNC: 64 U/L (ref 4–60)
LYMPHOCYTES # BLD AUTO: 1 K/UL (ref 1–4.8)
LYMPHOCYTES NFR BLD: 18 % (ref 18–48)
MCH RBC QN AUTO: 29.9 PG (ref 27–31)
MCHC RBC AUTO-ENTMCNC: 32.1 G/DL (ref 32–36)
MCV RBC AUTO: 93 FL (ref 82–98)
MONOCYTES # BLD AUTO: 0.3 K/UL (ref 0.3–1)
MONOCYTES NFR BLD: 5.5 % (ref 4–15)
NEUTROPHILS # BLD AUTO: 4.2 K/UL (ref 1.8–7.7)
NEUTROPHILS NFR BLD: 75.8 % (ref 38–73)
NITRITE UR QL STRIP: NEGATIVE
PH UR STRIP: 6 [PH] (ref 5–8)
PLATELET # BLD AUTO: 289 K/UL (ref 150–350)
PMV BLD AUTO: 8.2 FL (ref 9.2–12.9)
POTASSIUM SERPL-SCNC: 4.3 MMOL/L (ref 3.5–5.1)
PROT SERPL-MCNC: 6.8 G/DL (ref 6–8.4)
PROT UR QL STRIP: NEGATIVE
RBC # BLD AUTO: 3.34 M/UL (ref 4–5.4)
SODIUM SERPL-SCNC: 139 MMOL/L (ref 136–145)
SP GR UR STRIP: 1.02 (ref 1–1.03)
URN SPEC COLLECT METH UR: NORMAL
UROBILINOGEN UR STRIP-ACNC: NEGATIVE EU/DL
WBC # BLD AUTO: 5.49 K/UL (ref 3.9–12.7)

## 2019-11-25 PROCEDURE — 93010 EKG 12-LEAD: ICD-10-PCS | Mod: ,,, | Performed by: INTERNAL MEDICINE

## 2019-11-25 PROCEDURE — G0378 HOSPITAL OBSERVATION PER HR: HCPCS

## 2019-11-25 PROCEDURE — 86140 C-REACTIVE PROTEIN: CPT

## 2019-11-25 PROCEDURE — 96365 THER/PROPH/DIAG IV INF INIT: CPT

## 2019-11-25 PROCEDURE — 93005 ELECTROCARDIOGRAM TRACING: CPT

## 2019-11-25 PROCEDURE — 80053 COMPREHEN METABOLIC PANEL: CPT

## 2019-11-25 PROCEDURE — 25000003 PHARM REV CODE 250: Performed by: NURSE PRACTITIONER

## 2019-11-25 PROCEDURE — 63600175 PHARM REV CODE 636 W HCPCS: Performed by: ORTHOPAEDIC SURGERY

## 2019-11-25 PROCEDURE — 96372 THER/PROPH/DIAG INJ SC/IM: CPT | Mod: 59

## 2019-11-25 PROCEDURE — 96361 HYDRATE IV INFUSION ADD-ON: CPT

## 2019-11-25 PROCEDURE — 85652 RBC SED RATE AUTOMATED: CPT

## 2019-11-25 PROCEDURE — 81003 URINALYSIS AUTO W/O SCOPE: CPT

## 2019-11-25 PROCEDURE — 63600175 PHARM REV CODE 636 W HCPCS: Performed by: NURSE PRACTITIONER

## 2019-11-25 PROCEDURE — 93010 ELECTROCARDIOGRAM REPORT: CPT | Mod: ,,, | Performed by: INTERNAL MEDICINE

## 2019-11-25 PROCEDURE — 87040 BLOOD CULTURE FOR BACTERIA: CPT | Mod: 59

## 2019-11-25 PROCEDURE — 51702 INSERT TEMP BLADDER CATH: CPT

## 2019-11-25 PROCEDURE — 63600175 PHARM REV CODE 636 W HCPCS: Performed by: EMERGENCY MEDICINE

## 2019-11-25 PROCEDURE — 99285 EMERGENCY DEPT VISIT HI MDM: CPT | Mod: 25

## 2019-11-25 PROCEDURE — 85025 COMPLETE CBC W/AUTO DIFF WBC: CPT

## 2019-11-25 PROCEDURE — 83605 ASSAY OF LACTIC ACID: CPT

## 2019-11-25 PROCEDURE — 25500020 PHARM REV CODE 255: Performed by: EMERGENCY MEDICINE

## 2019-11-25 PROCEDURE — 96375 TX/PRO/DX INJ NEW DRUG ADDON: CPT

## 2019-11-25 PROCEDURE — 83690 ASSAY OF LIPASE: CPT

## 2019-11-25 RX ORDER — SODIUM CHLORIDE 9 MG/ML
INJECTION, SOLUTION INTRAVENOUS CONTINUOUS
Status: DISCONTINUED | OUTPATIENT
Start: 2019-11-25 | End: 2019-11-29 | Stop reason: HOSPADM

## 2019-11-25 RX ORDER — PREDNISONE 10 MG/1
10 TABLET ORAL
Status: DISCONTINUED | OUTPATIENT
Start: 2019-11-26 | End: 2019-11-26

## 2019-11-25 RX ORDER — ACETAMINOPHEN 325 MG/1
650 TABLET ORAL EVERY 4 HOURS PRN
Status: DISCONTINUED | OUTPATIENT
Start: 2019-11-25 | End: 2019-11-29 | Stop reason: HOSPADM

## 2019-11-25 RX ORDER — PREDNISONE 20 MG/1
20 TABLET ORAL DAILY
Status: DISCONTINUED | OUTPATIENT
Start: 2019-11-26 | End: 2019-11-26

## 2019-11-25 RX ORDER — ENOXAPARIN SODIUM 100 MG/ML
40 INJECTION SUBCUTANEOUS EVERY 24 HOURS
Status: DISCONTINUED | OUTPATIENT
Start: 2019-11-25 | End: 2019-11-29 | Stop reason: HOSPADM

## 2019-11-25 RX ORDER — ONDANSETRON 2 MG/ML
4 INJECTION INTRAMUSCULAR; INTRAVENOUS EVERY 8 HOURS PRN
Status: DISCONTINUED | OUTPATIENT
Start: 2019-11-25 | End: 2019-11-29 | Stop reason: HOSPADM

## 2019-11-25 RX ORDER — CEFAZOLIN SODIUM 2 G/50ML
2 SOLUTION INTRAVENOUS
Status: DISCONTINUED | OUTPATIENT
Start: 2019-11-25 | End: 2019-11-29 | Stop reason: HOSPADM

## 2019-11-25 RX ORDER — METOPROLOL TARTRATE 1 MG/ML
5 INJECTION, SOLUTION INTRAVENOUS EVERY 12 HOURS PRN
Status: DISCONTINUED | OUTPATIENT
Start: 2019-11-25 | End: 2019-11-29 | Stop reason: HOSPADM

## 2019-11-25 RX ORDER — ONDANSETRON 2 MG/ML
4 INJECTION INTRAMUSCULAR; INTRAVENOUS
Status: COMPLETED | OUTPATIENT
Start: 2019-11-25 | End: 2019-11-25

## 2019-11-25 RX ORDER — AZATHIOPRINE 50 MG/1
100 TABLET ORAL DAILY
Status: DISCONTINUED | OUTPATIENT
Start: 2019-11-26 | End: 2019-11-27

## 2019-11-25 RX ORDER — MEMANTINE HYDROCHLORIDE 5 MG/1
10 TABLET ORAL DAILY
Status: DISCONTINUED | OUTPATIENT
Start: 2019-11-26 | End: 2019-11-29 | Stop reason: HOSPADM

## 2019-11-25 RX ORDER — QUETIAPINE FUMARATE 25 MG/1
100 TABLET, FILM COATED ORAL NIGHTLY
Status: DISCONTINUED | OUTPATIENT
Start: 2019-11-25 | End: 2019-11-29 | Stop reason: HOSPADM

## 2019-11-25 RX ORDER — FERROUS SULFATE 325(65) MG
325 TABLET, DELAYED RELEASE (ENTERIC COATED) ORAL 2 TIMES DAILY
Status: DISCONTINUED | OUTPATIENT
Start: 2019-11-25 | End: 2019-11-29 | Stop reason: HOSPADM

## 2019-11-25 RX ORDER — ASPIRIN 81 MG/1
81 TABLET ORAL DAILY
Status: DISCONTINUED | OUTPATIENT
Start: 2019-11-26 | End: 2019-11-29 | Stop reason: HOSPADM

## 2019-11-25 RX ORDER — MORPHINE SULFATE 2 MG/ML
6 INJECTION, SOLUTION INTRAMUSCULAR; INTRAVENOUS
Status: COMPLETED | OUTPATIENT
Start: 2019-11-25 | End: 2019-11-25

## 2019-11-25 RX ORDER — SODIUM CHLORIDE 0.9 % (FLUSH) 0.9 %
10 SYRINGE (ML) INJECTION
Status: DISCONTINUED | OUTPATIENT
Start: 2019-11-25 | End: 2019-11-29 | Stop reason: HOSPADM

## 2019-11-25 RX ORDER — PANTOPRAZOLE SODIUM 40 MG/1
40 TABLET, DELAYED RELEASE ORAL DAILY
Status: DISCONTINUED | OUTPATIENT
Start: 2019-11-26 | End: 2019-11-29 | Stop reason: HOSPADM

## 2019-11-25 RX ORDER — DONEPEZIL HYDROCHLORIDE 5 MG/1
10 TABLET, FILM COATED ORAL NIGHTLY
Status: DISCONTINUED | OUTPATIENT
Start: 2019-11-25 | End: 2019-11-29 | Stop reason: HOSPADM

## 2019-11-25 RX ADMIN — ENOXAPARIN SODIUM 40 MG: 100 INJECTION SUBCUTANEOUS at 11:11

## 2019-11-25 RX ADMIN — SODIUM CHLORIDE: 0.9 INJECTION, SOLUTION INTRAVENOUS at 11:11

## 2019-11-25 RX ADMIN — DONEPEZIL HYDROCHLORIDE 10 MG: 5 TABLET, FILM COATED ORAL at 11:11

## 2019-11-25 RX ADMIN — IOHEXOL 30 ML: 350 INJECTION, SOLUTION INTRAVENOUS at 03:11

## 2019-11-25 RX ADMIN — FERROUS SULFATE TAB EC 325 MG (65 MG FE EQUIVALENT) 325 MG: 325 (65 FE) TABLET DELAYED RESPONSE at 11:11

## 2019-11-25 RX ADMIN — SODIUM CHLORIDE, SODIUM LACTATE, POTASSIUM CHLORIDE, AND CALCIUM CHLORIDE 1000 ML: .6; .31; .03; .02 INJECTION, SOLUTION INTRAVENOUS at 04:11

## 2019-11-25 RX ADMIN — IOHEXOL 60 ML: 350 INJECTION, SOLUTION INTRAVENOUS at 05:11

## 2019-11-25 RX ADMIN — ONDANSETRON 4 MG: 2 INJECTION INTRAMUSCULAR; INTRAVENOUS at 04:11

## 2019-11-25 RX ADMIN — CEFAZOLIN SODIUM 2 G: 2 SOLUTION INTRAVENOUS at 11:11

## 2019-11-25 RX ADMIN — QUETIAPINE FUMARATE 100 MG: 100 TABLET ORAL at 11:11

## 2019-11-25 RX ADMIN — MORPHINE SULFATE 6 MG: 2 INJECTION, SOLUTION INTRAMUSCULAR; INTRAVENOUS at 04:11

## 2019-11-25 NOTE — ED NOTES
APPEARANCE: Alert, oriented and lethargic  CARDIAC: Tachycardic rate and regular rhythm, no murmur heard.   PERIPHERAL VASCULAR: peripheral pulses present. Normal cap refill. Edema to bilateral knees. Warm to touch.    RESPIRATORY:Normal rate and effort, breath sounds clear bilaterally throughout chest. Respirations are equal and unlabored no obvious signs of distress.  GASTRO: Soft, bowel sounds normal, Tenderness to abdomen, dark liquid stool coming from vagina.   MUSC: Limited ROM due to weakness and left knee surgery with wound vac. Wound vac noted to left knee. Edema to bilateral knees.  SKIN: Skin is warm and dry, normal skin turgor, mucous membranes moist. Wound vac to left knee incision. Skin breakdown- small open sores to vagina and buttocks.   NEURO: 5/5 strength major flexors/extensors bilaterally. Sensory intact to light touch bilaterally. Julio coma scale: eyes open spontaneously-4, oriented & converses-5, obeys commands-6. No neurological abnormalities.   MENTAL STATUS: awake, alert and aware of environment.  EYE: PERRL, both eyes: pupils brisk and reactive to light. Normal size.  ENT: EARS: no obvious drainage. NOSE: no active bleeding.   BREAST: symmetrical. No masses. No tenderness.  GENITALIA: Pt and son report stool coming out of her vagina

## 2019-11-25 NOTE — TELEPHONE ENCOUNTER
----- Message from Aisha Roman sent at 11/25/2019 12:56 PM CST -----  Contact: Self [phone]  Patient is on her way to the ER and has a foul smell on her wound. Please advice

## 2019-11-25 NOTE — ED PROVIDER NOTES
Encounter Date: 11/25/2019    SCRIBE #1 NOTE: I, Maria Isabel Gilliam, am scribing for, and in the presence of,  Dr. Laguna. I have scribed the entire note.       History     Chief Complaint   Patient presents with    Abdominal Pain     Intermittent abdominal pain with reported fecal drainage from vagina.     Wound Check     s/p left knee surgery on 11/16 per Dr. Lindsay with post-op wound healing delay. Currently has wound vac. States wound vac is not functioning correctly - last dressing change 1 week ago (Monday).      Anabella Aranda is a 74 y.o. female who  has a past medical history of Arthritis, C. difficile colitis (10/13/2014), Chronic kidney disease, stage 3, Dementia, Hepatitis B, Hypertension, Major depression with psychotic features, and Ulcerative colitis (03/2014).    The patient presents to the ED due to feces from vagina. As per son the patient's symptoms began 3 days ago. He states the patient is having stool coming from the vagina. The family called the patient's GI specialist who recommended the patient come to the ED for evaluation. He notes the patient admitted to having abdominal pain. The patient denies any episodes of vomiting, fever or chills.   In addition the patient's wound vac is not functioning. He reports this morning he noted the vac is not draining. Last night the patient noticed the patient had knee swelling. The patient had left TKA with Dr. Lindsay on 11/16.     The history is provided by a relative and the patient.     Review of patient's allergies indicates:   Allergen Reactions    Sulfa (sulfonamide antibiotics) Swelling     Past Medical History:   Diagnosis Date    Arthritis     C. difficile colitis 10/13/2014    4/15/2015    Chronic kidney disease, stage 3     Dementia     Hepatitis B     Hypertension     Major depression with psychotic features     Ulcerative colitis 03/2014     Past Surgical History:   Procedure Laterality Date    CHOLECYSTECTOMY       COLONOSCOPY N/A 2016    Procedure: COLONOSCOPY;  Surgeon: Umm Arenas MD;  Location: [facility] ENDO;  Service: Endoscopy;  Laterality: N/A;    ESOPHAGOGASTRODUODENOSCOPY N/A 2019    Procedure: ESOPHAGOGASTRODUODENOSCOPY (EGD);  Surgeon: Jenise Hallman MD;  Location: [facility] ENDO;  Service: Endoscopy;  Laterality: N/A;    ESOPHAGOGASTRODUODENOSCOPY N/A 10/21/2019    Procedure: EGD (ESOPHAGOGASTRODUODENOSCOPY);  Surgeon: Og Carrera MD;  Location: [facility] ENDO;  Service: Colon and Rectal;  Laterality: N/A;    FLEXIBLE SIGMOIDOSCOPY N/A 2019    Procedure: SIGMOIDOSCOPY, FLEXIBLE;  Surgeon: Jenise Hallman MD;  Location: [facility] ENDO;  Service: Endoscopy;  Laterality: N/A;    FLEXIBLE SIGMOIDOSCOPY N/A 10/21/2019    Procedure: SIGMOIDOSCOPY, FLEXIBLE;  Surgeon: Og Carrera MD;  Location: [facility] ENDO;  Service: Colon and Rectal;  Laterality: N/A;    BETZY FILTER PLACEMENT Right 2014    JOINT REPLACEMENT      KNEE ARTHROPLASTY Left 10/16/2019    Procedure: ARTHROPLASTY, KNEE;  Surgeon: Ghassan Lindsay MD;  Location: [facility];  Service: Orthopedics;  Laterality: Left;  Franco notifiedconfirmed with Franco  [phone] 10/15/19 kb    LAPAROSCOPIC TOTAL COLECTOMY  2016    NASAL SINUS SURGERY      TONSILLECTOMY      TOTAL KNEE ARTHROPLASTY Right 2008    TOTAL KNEE ARTHROPLASTY Left 10/16/2019     Family History   Problem Relation Age of Onset    Throat cancer Father     Colon cancer Paternal Grandmother      Social History     Tobacco Use    Smoking status: Former Smoker     Types: Cigarettes     Last attempt to quit: 1997     Years since quittin.3    Smokeless tobacco: Former User   Substance Use Topics    Alcohol use: No    Drug use: No     Review of Systems   Constitutional: Negative for chills and fever.   HENT: Negative for sore throat.    Respiratory: Negative for cough and shortness of breath.    Cardiovascular: Negative for chest pain.    Gastrointestinal: Positive for abdominal pain. Negative for nausea and vomiting.   Genitourinary: Positive for vaginal discharge. Negative for dysuria, frequency and urgency.   Musculoskeletal: Negative for back pain, neck pain and neck stiffness.   Skin: Positive for wound (left knee). Negative for rash.   Neurological: Negative for syncope and weakness.   Hematological: Does not bruise/bleed easily.   Psychiatric/Behavioral: Negative for agitation, behavioral problems and confusion.       Physical Exam     Initial Vitals [11/25/19 1332]   BP Pulse Resp Temp SpO2   101/64 (!) 136 16 98.7 °F (37.1 °C) 98 %      MAP       --         Physical Exam    Nursing note and vitals reviewed.  Constitutional: She is not diaphoretic. She appears ill. No distress.   HENT:   Head: Normocephalic and atraumatic.   Mouth/Throat: Oropharynx is clear and moist.   Eyes: Conjunctivae and EOM are normal. Pupils are equal, round, and reactive to light.   Neck: Normal range of motion. Neck supple.   Cardiovascular: Normal rate, regular rhythm and normal heart sounds. Exam reveals no gallop and no friction rub.    No murmur heard.  Pulmonary/Chest: Breath sounds normal. She has no wheezes. She has no rhonchi. She has no rales.   Abdominal: Soft. Bowel sounds are normal. There is generalized tenderness. There is no rebound and no guarding.   Genitourinary:   Genitourinary Comments: Feculent material noted in vaginal vault    Musculoskeletal: Normal range of motion. She exhibits no edema or tenderness.   Wound vac to left leg knee. Foul odor. Swelling to knee. Slightly warm    Lymphadenopathy:     She has no cervical adenopathy.   Neurological: She is alert and oriented to person, place, and time. She has normal strength.   Skin: Skin is warm and dry. Capillary refill takes less than 2 seconds. No rash noted.         ED Course   Procedures  Labs Reviewed   CBC W/ AUTO DIFFERENTIAL - Abnormal; Notable for the following components:        Result Value    RBC 3.34 (*)     Hemoglobin 10.0 (*)     Hematocrit 31.2 (*)     RDW 15.8 (*)     MPV 8.2 (*)     Gran% 75.8 (*)     All other components within normal limits   COMPREHENSIVE METABOLIC PANEL - Abnormal; Notable for the following components:    Glucose 144 (*)     Albumin 2.6 (*)     Alkaline Phosphatase 150 (*)     eGFR if  58 (*)     eGFR if non  50 (*)     All other components within normal limits   LIPASE - Abnormal; Notable for the following components:    Lipase 64 (*)     All other components within normal limits   SEDIMENTATION RATE - Abnormal; Notable for the following components:    Sed Rate 79 (*)     All other components within normal limits   C-REACTIVE PROTEIN - Abnormal; Notable for the following components:    CRP 86.3 (*)     All other components within normal limits   CBC W/ AUTO DIFFERENTIAL - Abnormal; Notable for the following components:    WBC 3.56 (*)     RBC 2.94 (*)     Hemoglobin 8.7 (*)     Hematocrit 27.4 (*)     Mean Corpuscular Hemoglobin Conc 31.8 (*)     RDW 15.7 (*)     MPV 7.7 (*)     All other components within normal limits   MAGNESIUM - Abnormal; Notable for the following components:    Magnesium 1.5 (*)     All other components within normal limits   COMPREHENSIVE METABOLIC PANEL - Abnormal; Notable for the following components:    Glucose 146 (*)     Calcium 8.1 (*)     Total Protein 5.6 (*)     Albumin 2.1 (*)     All other components within normal limits   LACTIC ACID, PLASMA   URINALYSIS, REFLEX TO URINE CULTURE    Narrative:     Preferred Collection Type->Urine, Clean Catch   PHOSPHORUS   LIPASE          Imaging Results          CT Abdomen Pelvis  Without Contrast (Final result)  Result time 11/25/19 18:04:15    Final result by Jacqui Sherman MD (11/25/19 18:04:15)                 Impression:      No acute intra-abdominal abnormalities identified.    Postsurgical changes and additional stable findings as detailed  above.      Electronically signed by: Jacqui Sherman MD  Date:    11/25/2019  Time:    18:04             Narrative:    EXAMINATION:  CT ABDOMEN PELVIS WITHOUT CONTRAST    CLINICAL HISTORY:  Crohn dz suspected, non-acute abd pain/cramping;evaluate for fistula;    TECHNIQUE:  Low dose axial images, sagittal and coronal reformations were obtained from the lung bases to the pubic symphysis.  Oral contrast and rectal contrast was administered.    COMPARISON:  CT abdomen and pelvis from 10/03/2019.    FINDINGS:  The visualized portion of the heart is unremarkable.  Stable 4 mm right lower lobe pulmonary nodule is seen.  Visualized lung bases are otherwise clear.    No significant hepatic abnormality seen on this noncontrast exam.  There is stable prominence of the common bile duct status post cholecystectomy. The stomach, pancreas, spleen, and adrenal glands are unremarkable.    Kidneys show no evidence of stones or hydronephrosis. Ureters are unremarkable along their courses.  Urinary bladder and uterus are unremarkable.    Postsurgical changes of total colectomy with ileal rectal anastomosis are seen.  No evidence of bowel obstruction or inflammation.  Rectal tube is in place.  Appendix has also been removed.  No free air or free fluid.    Aorta tapers normally.  IVC filter is visualized.    No acute osseous abnormality identified.  Lower lumbar DJD and facet arthropathy are visualized with resultant grade 1 anterolisthesis of L4 on L5.  Subcutaneous soft tissue structures are unremarkable.                                 Medical Decision Making:   Differential Diagnosis:   Differential Diagnosis includes, but is not limited to:  AAA, aortic dissection, mesenteric ischemia, perforated viscous, MI/ACS, SBO/volvulus, incarcerated/strangulated hernia, intussusception, ileus, appendicitis, cholecystitis, cholangitis, diverticulitis, esophagitis, hepatitis, nephrolithiasis, pancreatitis, gastroenteritis, colitis, IBD/IBS,  biliary colic, GERD, PUD, constipation, UTI/pyelonephritis,  disorder. Cellulitis, abscess, necrotizing fasciitis, osteomyelitis, septic joint, MRSA, DVT, drug eruption, allergic/contact dermatitis, EM/SJS, viral exanthem, local trauma/contusion    Clinical Tests:   Lab Tests: Ordered and Reviewed  Radiological Study: Ordered and Reviewed  ED Management:  CT without findings of fistula  Labs w/o significant abnormalities  Given post op issues with knee and concern for occult fistula, patient to be admitted to [facility].                      ED Course as of Nov 27 0915   Mon Nov 25, 2019   1609 Case discussed with Dr. Linares, who recommends consulting wound care.     [SP]   1709 Case discussed with Dr. Waddell of GI, who recommends admission to the hospital.     [SP]   1832 Spoke with Dr. Christiansen of GI, concerning imaging, recommends admission for colonoscopy     [RN]   1943 EKG: rate 112 sinus tachycardia. No ectopy, normal intervals, no STEMI     [RN]   1954 Dr. Tucker recommends will evaluate tomorrow, no need for antibiotics from orthopedic perspective at this time.     [RN]      ED Course User Index  [RN] Trung Laguna Jr., MD  [SP] Maria Isabel Gilliam                Clinical Impression:     1. Diarrhea, unspecified type    2. Tachycardia    3. Abnormal finding in specimens from female genital organs    4. History of left knee replacement    5. Vaginal fistula    6. Open wound of left knee, subsequent encounter        Disposition:   Disposition: Placed in Observation  Condition: Fair    Scribe attestation I, Trung Laguna,  personally performed the services described in this documentation. All medical record entries made by the scribe were at my direction and in my presence.  I have reviewed the chart and agree that the record reflects my personal performance and is accurate and complete. Trung Laguna M.D. 9:17 AM11/27/2019    Portions of this note were dictated using voice recognition software and may  contain dictation related errors in spelling/grammar/syntax not found on text review                     Trung Laguna Jr., MD  11/27/19 0955

## 2019-11-25 NOTE — TREATMENT PLAN
Visited patient while in ER and spoke with son Bryson.    Concern is for rectovaginal fistula.  Awaiting CT scan.  Also with some warmth over left knee. Son is concerned about the pain with the knee on movement.    Await CT results - would favor admission to hospital.  If CT shows fistula, will likely need to discuss with surgery   Continue PO prednisone, would taper down to 10mg daily now  Need to have wound have see her if admitted and would inform Dr. Lindsay of her admission.  Need to consider nutritional measures pending clinical outcome.    Will follow her if she is admitted.  Discussed with patient and son

## 2019-11-25 NOTE — TELEPHONE ENCOUNTER
Spoke with son chelsie.    2 days ago he noted that she was having black / green liquid, he believes either in her urine or from vagina.    He isnt sure if this was occurring prior to that.    I recommend evaluation in the ER , she will need labs and CT scan (po contrast)  She will probably need admission to the hospital for what I suspect is either rectovaginal fistula or rectovesico fistula.    She will likely need nutrition optimization and possibly end ileostomy.

## 2019-11-25 NOTE — LETTER
11/27/2019    To whom it may concern:    Anabella Aranda is under my medical care. She underwent knee replacement on October 16, 2019.  Since that time, it is been necessary for her son, Bryson Cox, to act as the primary caregiver for his mother due to a slower recovery than expected from her surgery and other general medical problems.  Based on the patient's current situation, I expect that it will be necessary for Mr. Cox to continue in this status for at least 6 more weeks.  Please excuse his work absence during this time.    Yours truly,        Ghassan Lindsay MD

## 2019-11-25 NOTE — PROCEDURES
Debridement  Date/Time: 11/18/2019 11:35 AM  Performed by: Willie Uribe MD  Authorized by: Willie Uribe MD     Consent Done?:  Yes (Written)  Local anesthesia used?: Yes    Local anesthetic:  Lidocaine 2% with epinephrine    Wound Details:    Location:  Left knee       Length (cm):  9       Area (sq cm):  31.5       Width (cm):  3.5       Percent Debrided (%):  100       Depth (cm):  1.6       Total Area Debrided (sq cm):  31.5    Depth of debridement:  Muscle/fascia/tendon    Tissue debrided:  Muscle    Devitalized tissue debrided:  Biofilm, Exudate, Necrotic/Eschar and Slough    Instruments:  Curette    Additional wounds:  1    2nd Wound Details:     Location:  Left knee    Type of Debridement:  Non-excisional       Length (cm):  0.9       Area (sq cm):  0.27       Width (cm):  0.3       Percent Debrided (%):  100       Depth (cm):  1.2       Total Area Debrided (sq cm):  0.27    Depth of debridement:  Muscle/fascia/tendon    Devitalized tissue debrided:  Slough    Instruments:  Curette    Bleeding:  Minimal  Hemostasis Achieved: Yes    Method Used:  Pressure  Patient tolerance:  Patient tolerated the procedure well with no immediate complications

## 2019-11-25 NOTE — LETTER
11/27/2019    To whom it may concern:    Anabella Aranda is under my medical care. She underwent knee replacement on October 16, 2019.  Since that time, it is been necessary for her son, Bryson Cox, to act as the primary caregiver for her mother due to a slower recovery than expected from her surgery and other general medical problems.  Based on the patient's current situation, I expect that it will be necessary for Mr. Cox to continue in this status for at least 6 more weeks.  Please excuse his work absence during this time.    Yours truly,        Ghassan Lindsay MD

## 2019-11-26 ENCOUNTER — TELEPHONE (OUTPATIENT)
Dept: GASTROENTEROLOGY | Facility: CLINIC | Age: 74
End: 2019-11-26

## 2019-11-26 ENCOUNTER — ANESTHESIA EVENT (OUTPATIENT)
Dept: ENDOSCOPY | Facility: HOSPITAL | Age: 74
DRG: 760 | End: 2019-11-26
Payer: MEDICARE

## 2019-11-26 PROBLEM — S81.002A OPEN WOUND OF LEFT KNEE: Status: ACTIVE | Noted: 2019-11-26

## 2019-11-26 LAB
ALBUMIN SERPL BCP-MCNC: 2.1 G/DL (ref 3.5–5.2)
ALP SERPL-CCNC: 117 U/L (ref 55–135)
ALT SERPL W/O P-5'-P-CCNC: 12 U/L (ref 10–44)
ANION GAP SERPL CALC-SCNC: 10 MMOL/L (ref 8–16)
AST SERPL-CCNC: 11 U/L (ref 10–40)
BASOPHILS # BLD AUTO: 0 K/UL (ref 0–0.2)
BASOPHILS NFR BLD: 0 % (ref 0–1.9)
BILIRUB SERPL-MCNC: 0.4 MG/DL (ref 0.1–1)
BUN SERPL-MCNC: 8 MG/DL (ref 8–23)
CALCIUM SERPL-MCNC: 8.1 MG/DL (ref 8.7–10.5)
CHLORIDE SERPL-SCNC: 102 MMOL/L (ref 95–110)
CO2 SERPL-SCNC: 25 MMOL/L (ref 23–29)
CREAT SERPL-MCNC: 0.9 MG/DL (ref 0.5–1.4)
DIFFERENTIAL METHOD: ABNORMAL
EOSINOPHIL # BLD AUTO: 0 K/UL (ref 0–0.5)
EOSINOPHIL NFR BLD: 1.1 % (ref 0–8)
ERYTHROCYTE [DISTWIDTH] IN BLOOD BY AUTOMATED COUNT: 15.7 % (ref 11.5–14.5)
EST. GFR  (AFRICAN AMERICAN): >60 ML/MIN/1.73 M^2
EST. GFR  (NON AFRICAN AMERICAN): >60 ML/MIN/1.73 M^2
GLUCOSE SERPL-MCNC: 146 MG/DL (ref 70–110)
HCT VFR BLD AUTO: 27.4 % (ref 37–48.5)
HGB BLD-MCNC: 8.7 G/DL (ref 12–16)
LIPASE SERPL-CCNC: 35 U/L (ref 4–60)
LYMPHOCYTES # BLD AUTO: 1.2 K/UL (ref 1–4.8)
LYMPHOCYTES NFR BLD: 32.6 % (ref 18–48)
MAGNESIUM SERPL-MCNC: 1.5 MG/DL (ref 1.6–2.6)
MCH RBC QN AUTO: 29.6 PG (ref 27–31)
MCHC RBC AUTO-ENTMCNC: 31.8 G/DL (ref 32–36)
MCV RBC AUTO: 93 FL (ref 82–98)
MONOCYTES # BLD AUTO: 0.4 K/UL (ref 0.3–1)
MONOCYTES NFR BLD: 10.7 % (ref 4–15)
NEUTROPHILS # BLD AUTO: 2 K/UL (ref 1.8–7.7)
NEUTROPHILS NFR BLD: 55.6 % (ref 38–73)
PHOSPHATE SERPL-MCNC: 3.3 MG/DL (ref 2.7–4.5)
PLATELET # BLD AUTO: 260 K/UL (ref 150–350)
PMV BLD AUTO: 7.7 FL (ref 9.2–12.9)
POTASSIUM SERPL-SCNC: 3.9 MMOL/L (ref 3.5–5.1)
PROT SERPL-MCNC: 5.6 G/DL (ref 6–8.4)
RBC # BLD AUTO: 2.94 M/UL (ref 4–5.4)
SODIUM SERPL-SCNC: 137 MMOL/L (ref 136–145)
WBC # BLD AUTO: 3.56 K/UL (ref 3.9–12.7)

## 2019-11-26 PROCEDURE — 96375 TX/PRO/DX INJ NEW DRUG ADDON: CPT

## 2019-11-26 PROCEDURE — 99213 PR OFFICE/OUTPT VISIT, EST, LEVL III, 20-29 MIN: ICD-10-PCS | Mod: ,,, | Performed by: INTERNAL MEDICINE

## 2019-11-26 PROCEDURE — 99024 PR POST-OP FOLLOW-UP VISIT: ICD-10-PCS | Mod: ,,, | Performed by: ORTHOPAEDIC SURGERY

## 2019-11-26 PROCEDURE — 97165 OT EVAL LOW COMPLEX 30 MIN: CPT

## 2019-11-26 PROCEDURE — S0030 INJECTION, METRONIDAZOLE: HCPCS | Performed by: HOSPITALIST

## 2019-11-26 PROCEDURE — 63600175 PHARM REV CODE 636 W HCPCS: Performed by: NURSE PRACTITIONER

## 2019-11-26 PROCEDURE — G0378 HOSPITAL OBSERVATION PER HR: HCPCS

## 2019-11-26 PROCEDURE — 99900038 HC OT GENERIC THERAPY SCREENING (STAT)

## 2019-11-26 PROCEDURE — 96372 THER/PROPH/DIAG INJ SC/IM: CPT

## 2019-11-26 PROCEDURE — 99024 POSTOP FOLLOW-UP VISIT: CPT | Mod: ,,, | Performed by: ORTHOPAEDIC SURGERY

## 2019-11-26 PROCEDURE — 85025 COMPLETE CBC W/AUTO DIFF WBC: CPT

## 2019-11-26 PROCEDURE — 83735 ASSAY OF MAGNESIUM: CPT

## 2019-11-26 PROCEDURE — 63600175 PHARM REV CODE 636 W HCPCS: Performed by: ORTHOPAEDIC SURGERY

## 2019-11-26 PROCEDURE — 25000003 PHARM REV CODE 250: Performed by: NURSE PRACTITIONER

## 2019-11-26 PROCEDURE — A4216 STERILE WATER/SALINE, 10 ML: HCPCS | Performed by: HOSPITALIST

## 2019-11-26 PROCEDURE — 84100 ASSAY OF PHOSPHORUS: CPT

## 2019-11-26 PROCEDURE — 80053 COMPREHEN METABOLIC PANEL: CPT

## 2019-11-26 PROCEDURE — 25000003 PHARM REV CODE 250: Performed by: HOSPITALIST

## 2019-11-26 PROCEDURE — 63600175 PHARM REV CODE 636 W HCPCS: Performed by: HOSPITALIST

## 2019-11-26 PROCEDURE — 96376 TX/PRO/DX INJ SAME DRUG ADON: CPT

## 2019-11-26 PROCEDURE — 99213 OFFICE O/P EST LOW 20 MIN: CPT | Mod: ,,, | Performed by: INTERNAL MEDICINE

## 2019-11-26 PROCEDURE — 83690 ASSAY OF LIPASE: CPT

## 2019-11-26 RX ORDER — PREDNISONE 10 MG/1
10 TABLET ORAL DAILY
Status: DISCONTINUED | OUTPATIENT
Start: 2019-11-26 | End: 2019-11-29 | Stop reason: HOSPADM

## 2019-11-26 RX ORDER — SODIUM CHLORIDE 0.9 % (FLUSH) 0.9 %
10 SYRINGE (ML) INJECTION EVERY 6 HOURS
Status: DISCONTINUED | OUTPATIENT
Start: 2019-11-26 | End: 2019-11-29 | Stop reason: HOSPADM

## 2019-11-26 RX ORDER — METRONIDAZOLE 500 MG/100ML
500 INJECTION, SOLUTION INTRAVENOUS
Status: DISCONTINUED | OUTPATIENT
Start: 2019-11-26 | End: 2019-11-29

## 2019-11-26 RX ORDER — SODIUM CHLORIDE 0.9 % (FLUSH) 0.9 %
10 SYRINGE (ML) INJECTION
Status: DISCONTINUED | OUTPATIENT
Start: 2019-11-26 | End: 2019-11-29 | Stop reason: HOSPADM

## 2019-11-26 RX ADMIN — FERROUS SULFATE TAB EC 325 MG (65 MG FE EQUIVALENT) 325 MG: 325 (65 FE) TABLET DELAYED RESPONSE at 09:11

## 2019-11-26 RX ADMIN — PANTOPRAZOLE SODIUM 40 MG: 40 TABLET, DELAYED RELEASE ORAL at 09:11

## 2019-11-26 RX ADMIN — CEFAZOLIN SODIUM 2 G: 2 SOLUTION INTRAVENOUS at 11:11

## 2019-11-26 RX ADMIN — ENOXAPARIN SODIUM 40 MG: 100 INJECTION SUBCUTANEOUS at 05:11

## 2019-11-26 RX ADMIN — DONEPEZIL HYDROCHLORIDE 10 MG: 5 TABLET, FILM COATED ORAL at 09:11

## 2019-11-26 RX ADMIN — MEMANTINE HYDROCHLORIDE 10 MG: 5 TABLET ORAL at 09:11

## 2019-11-26 RX ADMIN — QUETIAPINE FUMARATE 100 MG: 100 TABLET ORAL at 09:11

## 2019-11-26 RX ADMIN — LORAZEPAM 0.5 MG: 2 INJECTION INTRAMUSCULAR; INTRAVENOUS at 05:11

## 2019-11-26 RX ADMIN — METOPROLOL TARTRATE 5 MG: 5 INJECTION, SOLUTION INTRAVENOUS at 09:11

## 2019-11-26 RX ADMIN — METRONIDAZOLE 500 MG: 500 INJECTION, SOLUTION INTRAVENOUS at 09:11

## 2019-11-26 RX ADMIN — Medication 10 ML: at 05:11

## 2019-11-26 RX ADMIN — ASPIRIN 81 MG: 81 TABLET, COATED ORAL at 09:11

## 2019-11-26 RX ADMIN — METRONIDAZOLE 500 MG: 500 INJECTION, SOLUTION INTRAVENOUS at 05:11

## 2019-11-26 RX ADMIN — CEFAZOLIN SODIUM 2 G: 2 SOLUTION INTRAVENOUS at 06:11

## 2019-11-26 NOTE — SUBJECTIVE & OBJECTIVE
Past Medical History:   Diagnosis Date    Arthritis     C. difficile colitis 10/13/2014    4/15/2015    Chronic kidney disease, stage 3     Dementia     Hepatitis B     Hypertension     Major depression with psychotic features     Ulcerative colitis 03/2014       Past Surgical History:   Procedure Laterality Date    CHOLECYSTECTOMY      COLONOSCOPY N/A 4/29/2016    Procedure: COLONOSCOPY;  Surgeon: Umm Arenas MD;  Location: [facility] ENDO;  Service: Endoscopy;  Laterality: N/A;    ESOPHAGOGASTRODUODENOSCOPY N/A 1/25/2019    Procedure: ESOPHAGOGASTRODUODENOSCOPY (EGD);  Surgeon: Jenise Hallman MD;  Location: [facility] ENDO;  Service: Endoscopy;  Laterality: N/A;    ESOPHAGOGASTRODUODENOSCOPY N/A 10/21/2019    Procedure: EGD (ESOPHAGOGASTRODUODENOSCOPY);  Surgeon: Og Carrera MD;  Location: [facility] ENDO;  Service: Colon and Rectal;  Laterality: N/A;    FLEXIBLE SIGMOIDOSCOPY N/A 1/25/2019    Procedure: SIGMOIDOSCOPY, FLEXIBLE;  Surgeon: Jenise Hallman MD;  Location: [facility] ENDO;  Service: Endoscopy;  Laterality: N/A;    FLEXIBLE SIGMOIDOSCOPY N/A 10/21/2019    Procedure: SIGMOIDOSCOPY, FLEXIBLE;  Surgeon: Og Carrera MD;  Location: [facility];  Service: Colon and Rectal;  Laterality: N/A;    BETZY FILTER PLACEMENT Right 01/2014    JOINT REPLACEMENT      KNEE ARTHROPLASTY Left 10/16/2019    Procedure: ARTHROPLASTY, KNEE;  Surgeon: Ghassan Lindsay MD;  Location: [facility];  Service: Orthopedics;  Laterality: Left;  Franco notifiedconfirmed with Franco  [phone] 10/15/19 kb    LAPAROSCOPIC TOTAL COLECTOMY  06/24/2016    NASAL SINUS SURGERY      TONSILLECTOMY      TOTAL KNEE ARTHROPLASTY Right 04/07/2008    TOTAL KNEE ARTHROPLASTY Left 10/16/2019       Review of patient's allergies indicates:   Allergen Reactions    Sulfa (sulfonamide antibiotics) Swelling       No current facility-administered medications on file prior to encounter.      Current Outpatient Medications on File  Prior to Encounter   Medication Sig    aspirin (ECOTRIN) 81 MG EC tablet Take 1 tablet (81 mg total) by mouth once daily.    azaTHIOprine (IMURAN) 50 mg Tab Take 2 tablets (100 mg total) by mouth once daily.    calcium carbonate (OS-NELSY) 600 mg (1,500 mg) Tab Take 1 tablet (600 mg total) by mouth 2 (two) times daily with meals.    donepezil (ARICEPT) 10 MG tablet Take 1 tablet (10 mg total) by mouth every evening.    ergocalciferol (ERGOCALCIFEROL) 50,000 unit Cap Take 1 capsule (50,000 Units total) by mouth every 7 days.    ferrous sulfate 325 mg (65 mg iron) Tab tablet Take 1 tablet (325 mg total) by mouth 2 (two) times daily.    loperamide (IMODIUM A-D) 2 mg Tab Take 1 mg by mouth 2 (two) times daily before meals.    memantine (NAMENDA) 10 MG Tab Take 1 tablet (10 mg total) by mouth once daily.    pantoprazole (PROTONIX) 40 MG tablet Take 1 tablet (40 mg total) by mouth once daily.    predniSONE (DELTASONE) 10 MG tablet Take 1 tablet (10 mg total) by mouth daily with dinner or evening meal.    predniSONE (DELTASONE) 20 MG tablet Take 1 tablet (20 mg total) by mouth once daily.    QUEtiapine (SEROQUEL) 100 MG Tab TAKE 1 2 TABLET BY MOUTH EVERY NIGHT AT BEDTIME, THEN TAKE ONE TABLET BY MOUTH EVERY NIGHT AT BEDTIME THEREAFTER     Family History     Problem Relation (Age of Onset)    Colon cancer Paternal Grandmother    Throat cancer Father        Tobacco Use    Smoking status: Former Smoker     Types: Cigarettes     Last attempt to quit: 1997     Years since quittin.3    Smokeless tobacco: Former User   Substance and Sexual Activity    Alcohol use: No    Drug use: No    Sexual activity: Not Currently     Review of Systems   Constitutional: Negative for chills and fever.   HENT: Positive for rhinorrhea. Negative for congestion and postnasal drip.    Eyes: Negative for visual disturbance.   Respiratory: Negative for chest tightness and shortness of breath.    Cardiovascular: Negative for  chest pain and palpitations.   Gastrointestinal: Positive for abdominal pain. Negative for blood in stool, diarrhea, nausea and vomiting.   Genitourinary: Positive for vaginal discharge.   Musculoskeletal: Negative for arthralgias and myalgias.   Skin: Positive for wound (left knee).   Neurological: Positive for weakness. Negative for dizziness and light-headedness.   Hematological: Does not bruise/bleed easily.   Psychiatric/Behavioral: Negative for agitation and behavioral problems.     Objective:     Vital Signs (Most Recent):  Temp: 98.7 °F (37.1 °C) (11/25/19 1332)  Pulse: (!) 111 (11/25/19 1932)  Resp: 16 (11/25/19 1332)  BP: 118/81 (11/25/19 1932)  SpO2: 100 % (11/25/19 1932) Vital Signs (24h Range):  Temp:  [98.7 °F (37.1 °C)] 98.7 °F (37.1 °C)  Pulse:  [108-136] 111  Resp:  [16] 16  SpO2:  [96 %-100 %] 100 %  BP: (101-127)/(53-92) 118/81     Weight: 86.2 kg (190 lb)  Body mass index is 32.61 kg/m².    Physical Exam   Constitutional: She appears well-developed and well-nourished.   HENT:   Head: Normocephalic and atraumatic.   Eyes: Pupils are equal, round, and reactive to light. EOM are normal.   Neck: Normal range of motion. Neck supple.   Cardiovascular: Tachycardia present.   No murmur heard.  Pulmonary/Chest: Effort normal and breath sounds normal.   Abdominal: Soft. Bowel sounds are normal. There is tenderness (generalized).   Genitourinary: Vaginal discharge (fecal appearing) found.   Musculoskeletal: Normal range of motion. She exhibits no deformity.   Neurological: She is alert.   Skin: Skin is warm and dry.   Psychiatric: She has a normal mood and affect. Her behavior is normal.         CRANIAL NERVES     CN III, IV, VI   Pupils are equal, round, and reactive to light.  Extraocular motions are normal.        Significant Labs:     BMP:   Recent Labs   Lab 11/25/19  1511   *      K 4.3      CO2 24   BUN 10   CREATININE 1.1   CALCIUM 9.1     CBC:   Recent Labs   Lab 11/25/19  1511    WBC 5.49   HGB 10.0*   HCT 31.2*        CMP:   Recent Labs   Lab 11/25/19  1511      K 4.3      CO2 24   *   BUN 10   CREATININE 1.1   CALCIUM 9.1   PROT 6.8   ALBUMIN 2.6*   BILITOT 0.6   ALKPHOS 150*   AST 14   ALT 15   ANIONGAP 14   EGFRNONAA 50*     Cardiac Markers: No results for input(s): CKMB, MYOGLOBIN, BNP, TROPISTAT in the last 48 hours.  Lipase:   Recent Labs   Lab 11/25/19  1511   LIPASE 64*       Urine Studies:   Recent Labs   Lab 11/25/19  1902   COLORU Yellow   APPEARANCEUA Clear   PHUR 6.0   SPECGRAV 1.020   PROTEINUA Negative   GLUCUA Negative   KETONESU Negative   BILIRUBINUA Negative   OCCULTUA Negative   NITRITE Negative   UROBILINOGEN Negative   LEUKOCYTESUR Negative       Significant Imaging: I have reviewed all pertinent imaging results/findings within the past 24 hours.

## 2019-11-26 NOTE — CONSULTS
"  [facility]  Adult Nutrition  Consult Note    SUMMARY     Recommendations  1. When medically able ADAT to Renal.   2. If TPN is needed recommend: Clinimix 5/20 @ 60 ml/hr - to provide 1267 kcals, 72 g AA, 288 g dextrose. + 20% lipids.      Goals:   1. Initiate nutrition with in 48 hours.   Nutrition Goal Status: new    Reason for Assessment    Reason For Assessment: consult  Diagnosis: (vaginal fistula)  Relevant Medical History: C. difficile colitis, chronic kidney disease, stage 3, dementia, hepatitis b, hypertension, major depression, chron's  Disease, s/p left TKA   Interdisciplinary Rounds: did not attend  General Information Comments: Pt admitted with vaginal fistula. Pt with stage 2 wound to buttock. Currently NPO. PICC placed.   NFPE- well nourished.   Nutrition Discharge Planning: To soon to determine.     Nutrition Risk Screen    Nutrition Risk Screen: no indicators present    Nutrition/Diet History    Spiritual, Cultural Beliefs, Hinduism Practices, Values that Affect Care: no    Anthropometrics    Temp: 98.8 °F (37.1 °C)  Height Method: Stated  Height: 5' 4" (162.6 cm)  Height (inches): 64 in  Weight Method: Stated  Weight: 86.2 kg (190 lb 0.6 oz)  Weight (lb): 190.04 lb  Ideal Body Weight (IBW), Female: 120 lb  % Ideal Body Weight, Female (lb): 158.37 lb  BMI (Calculated): 32.6  BMI Grade: 30 - 34.9- obesity - grade I     Lab/Procedures/Meds    Pertinent Labs Reviewed: reviewed  Pertinent Labs Comments: Mg 1.5, Glucose 146  Pertinent Medications Reviewed: reviewed  Pertinent Medications Comments: pantoprazole    Estimated/Assessed Needs    Weight Used For Calorie Calculations: 86.2 kg (190 lb 0.6 oz)  Energy Calorie Requirements (kcal): 2155 kcals/day(25 kcal/kg)  Energy Need Method: Kcal/kg  Protein Requirements: 69.1 g/day(0.8 g/kg)  Weight Used For Protein Calculations: 86.2 kg (190 lb 0.6 oz)  Fluid Requirements (mL): 1 mL/kcal or per MD.   Estimated Fluid Requirement Method: " RDA Method  RDA Method (mL): 2155     Nutrition Prescription Ordered    Current Diet Order: NPO    Evaluation of Received Nutrient/Fluid Intake    Energy Calories Required: not meeting needs  Protein Required: not meeting needs  Fluid Required: meeting needs  % Intake of Estimated Energy Needs: 0 - 25 %  % Meal Intake: NPO    Nutrition Risk    Level of Risk/Frequency of Follow-up: moderate     Assessment and Plan    Nutrition Problem  Inadequate energy intake    Related to (etiology):   Inability to consume sufficient nutrients    Signs and Symptoms (as evidenced by):   NPO status    Interventions/Recommendations (treatment strategy):  Collaboration with other providers    Nutrition Diagnosis Status:   New    Monitor and Evaluation    Food and Nutrient Intake: energy intake, food and beverage intake  Food and Nutrient Adminstration: diet order  Knowledge/Beliefs/Attitudes: food and nutrition knowledge/skill  Physical Activity and Function: nutrition-related ADLs and IADLs  Anthropometric Measurements: weight  Biochemical Data, Medical Tests and Procedures: electrolyte and renal panel, glucose/endocrine profile, lipid profile, inflammatory profile, gastrointestinal profile  Nutrition-Focused Physical Findings: overall appearance     Malnutrition Assessment     NFPE- well nourished.     Nutrition Follow-Up    RD Follow-up?: Yes

## 2019-11-26 NOTE — PROGRESS NOTES
[facility]  Orthopedics  Progress Note    Patient Name: Anabella Aranda  MRN: [id number]  Admission Date: 11/25/2019  Hospital Length of Stay: 0 days  Attending Provider: Maggie De León*  Primary Care Provider: Shon Brambila MD    Subjective:     Principal Problem:Vaginal fistula    Principal Orthopedic Problem:  Nonhealing incision after left knee replacement    Interval History:  The patient is just over 5 weeks postop.  She has been managed with a wound VAC.  The patient indicates that she feels unchanged from her last encounter with me.  Her son feels like she is not walking as well and that her wound looks worse.    Review of patient's allergies indicates:   Allergen Reactions    Sulfa (sulfonamide antibiotics) Swelling       Current Facility-Administered Medications   Medication    0.9%  NaCl infusion    acetaminophen tablet 650 mg    [START ON 11/26/2019] aspirin EC tablet 81 mg    [START ON 11/26/2019] azaTHIOprine tablet 100 mg    donepezil tablet 10 mg    enoxaparin injection 40 mg    ferrous sulfate EC tablet 325 mg    lactated ringers bolus 1,000 mL    [START ON 11/26/2019] memantine tablet 10 mg    metoprolol injection 5 mg    ondansetron injection 4 mg    [START ON 11/26/2019] pantoprazole EC tablet 40 mg    [START ON 11/26/2019] predniSONE tablet 10 mg    [START ON 11/26/2019] predniSONE tablet 20 mg    QUEtiapine tablet 100 mg    sodium chloride 0.9% flush 10 mL     Current Outpatient Medications   Medication Sig    aspirin (ECOTRIN) 81 MG EC tablet Take 1 tablet (81 mg total) by mouth once daily.    azaTHIOprine (IMURAN) 50 mg Tab Take 2 tablets (100 mg total) by mouth once daily.    calcium carbonate (OS-NELSY) 600 mg (1,500 mg) Tab Take 1 tablet (600 mg total) by mouth 2 (two) times daily with meals.    donepezil (ARICEPT) 10 MG tablet Take 1 tablet (10 mg total) by mouth every evening.    ergocalciferol (ERGOCALCIFEROL) 50,000 unit Cap Take 1  "capsule (50,000 Units total) by mouth every 7 days.    ferrous sulfate 325 mg (65 mg iron) Tab tablet Take 1 tablet (325 mg total) by mouth 2 (two) times daily.    loperamide (IMODIUM A-D) 2 mg Tab Take 1 mg by mouth 2 (two) times daily before meals.    memantine (NAMENDA) 10 MG Tab Take 1 tablet (10 mg total) by mouth once daily.    pantoprazole (PROTONIX) 40 MG tablet Take 1 tablet (40 mg total) by mouth once daily.    predniSONE (DELTASONE) 10 MG tablet Take 1 tablet (10 mg total) by mouth daily with dinner or evening meal.    predniSONE (DELTASONE) 20 MG tablet Take 1 tablet (20 mg total) by mouth once daily.    QUEtiapine (SEROQUEL) 100 MG Tab TAKE 1 2 TABLET BY MOUTH EVERY NIGHT AT BEDTIME, THEN TAKE ONE TABLET BY MOUTH EVERY NIGHT AT BEDTIME THEREAFTER     Objective:     Vital Signs (Most Recent):  Temp: 98.7 °F (37.1 °C) (11/25/19 1332)  Pulse: (!) 111 (11/25/19 1932)  Resp: 16 (11/25/19 1332)  BP: 118/81 (11/25/19 1932)  SpO2: 100 % (11/25/19 1932) Vital Signs (24h Range):  Temp:  [98.7 °F (37.1 °C)] 98.7 °F (37.1 °C)  Pulse:  [108-136] 111  Resp:  [16] 16  SpO2:  [96 %-100 %] 100 %  BP: (101-127)/(53-92) 118/81     Weight: 86.2 kg (190 lb)  Height: 5' 4" (162.6 cm)  Body mass index is 32.61 kg/m².    No intake or output data in the 24 hours ending 11/25/19 2137    Ortho/SPM Exam     Alert and appropriate    Left knee      The left knee incision has effect granulating area in the proximal 8 cm with healthy granulation tissue. There is a  5 mm opening in the middle 3rd with a tiny bit of purulence.  Compression of the suprapatellar pouch does not result in any purulence coming from the wound.  Gentle movement also does not result in any purulent drainage.  Significant Labs: All pertinent labs within the past 24 hours have been reviewed.    Significant Imaging: None    Assessment/Plan:     Active Diagnoses:    Diagnosis Date Noted POA    PRINCIPAL PROBLEM:  Vaginal fistula [N82.8] 11/25/2019 Yes    " Tachycardia [R00.0] 11/25/2019 Yes    Wound dehiscence, surgical, sequela [T81.31XS]  Not Applicable    Abdominal pain [R10.9] 11/01/2019 Yes    Crohn's disease of small and large intestines with complication [K50.819] 10/25/2019 Yes     Chronic    History of left knee replacement [Z96.652] 10/16/2019 Not Applicable    Alzheimers disease [G30.9, F02.80]  Yes     Chronic    Chronic kidney disease, stage 3 [N18.3]  Yes     Chronic    GERD (gastroesophageal reflux disease) [K21.9] 09/24/2019 Yes      Problems Resolved During this Admission:     Given the clinical course I am concerned about deep infection.  Explained this to the patient and her son.  They indicate that they hope to avoid a return to the operating room if at all possible.    I did discuss the possibility of aspirating the knee. Given the large open wound, it would probably not be possible to do this without contaminating the articular space.    Considering their wishes above, I recommend that we try antibiotics for 1-2 weeks to see if this results in improvement of the appearance of the incision. Although we can follow inflammatory markers, the patient's other illnesses are such that there likely remain elevated regardless.    Based on the patient's cultures are will initiate cephazolin.  I will initiate treatment at this time. I would recommend discharging are on a 2 week course of cephazolin intravenously with follow up my office thereafter    Ghassan Lindsay MD  Orthopedics  [facility]

## 2019-11-26 NOTE — PROGRESS NOTES
[facility] Hospital Medicine  Progress Note    Patient Name: Anabella Aranda  MRN: [id number]  Patient Class: OP- Observation   Admission Date: 11/25/2019  Length of Stay: 0 days  Attending Physician: Herberth Leblanc DO  Primary Care Provider: Shon Brambila MD        Subjective:     Principal Problem:Vaginal fistula        HPI:  74 y.o. female with past medical history of arthritis, C. difficile colitis, chronic kidney disease, stage 3, dementia, hepatitis b, hypertension, major depression, chron's  Disease, s/p left TKA with Dr. Lindsay on 11/16/19 with wound dehiscence complication.  The patient presents to ED complaining of intermittent, aching mid abdominal pain with associated feces from vagina.  Per patient's son reports symptoms started about three days ago.  Patient's GI physician, Dr. Carrera, was notified of patient symptoms and recommended patient report to ED.  Denies nausea, vomiting, fever, chills, cough, similar past episodes, aggravating or alleviating factors.  Patient's son also noticed that wound vac to left knee is not functioning properly.  ED findings: H/H 10.0/31.2, Sed rate 79, glucose 144, Alk Phos 150, Lipase 64, CRP 86.3, CT Abdomen showed no acute intra-abdominal abnormalities identified. On exam, patient AAOx3 with intermittent confusion, states she lives alone and her son lives close and helps with her care.  She does report having a home health nurse also. She currently reports no abdominal pain. Patient admitted to hospital medicine, under observation status, for further medical management.    Overview/Hospital Course:  11/26 pt seen, will place a picc line and consult nutrition for possible need for TPN, await GI input regarding scope to evaluate for possible fistula. Ortho on the case, will place a wound vac on the pt as well.. Adding flagyl to cover for intestinal micros    Interval History:       Review of Systems   Constitutional: Positive for activity change  and fatigue. Negative for fever.   HENT: Negative for congestion.    Respiratory: Negative for apnea and shortness of breath.    Cardiovascular: Negative for chest pain and palpitations.   Gastrointestinal: Negative for abdominal distention and abdominal pain.        Stool per vagina     Genitourinary: Negative for difficulty urinating and frequency.   Musculoskeletal: Positive for arthralgias.   Neurological: Negative for dizziness and numbness.   Psychiatric/Behavioral: Negative for agitation and confusion. The patient is not nervous/anxious.      Objective:     Vital Signs (Most Recent):  Temp: 99.6 °F (37.6 °C) (11/26/19 1652)  Pulse: (!) 118 (11/26/19 1652)  Resp: 18 (11/26/19 1652)  BP: (!) 105/49 (11/26/19 1652)  SpO2: 95 % (11/26/19 1652) Vital Signs (24h Range):  Temp:  [98.4 °F (36.9 °C)-99.6 °F (37.6 °C)] 99.6 °F (37.6 °C)  Pulse:  [] 118  Resp:  [10-25] 18  SpO2:  [95 %-100 %] 95 %  BP: (101-127)/(48-81) 105/49     Weight: 86.2 kg (190 lb 0.6 oz)  Body mass index is 32.62 kg/m².    Intake/Output Summary (Last 24 hours) at 11/26/2019 1742  Last data filed at 11/26/2019 0604  Gross per 24 hour   Intake --   Output 360 ml   Net -360 ml      Physical Exam   Constitutional: She is oriented to person, place, and time. She appears well-developed and well-nourished.   HENT:   Head: Normocephalic and atraumatic.   Eyes: EOM are normal.   Neck: Neck supple.   Cardiovascular: Normal rate.   Pulmonary/Chest: Effort normal. No respiratory distress.   Abdominal: Soft. She exhibits no distension.   Musculoskeletal: Normal range of motion.   Neurological: She is alert and oriented to person, place, and time.   Skin: Skin is warm and dry.   Psychiatric: She has a normal mood and affect. Her behavior is normal. Judgment and thought content normal.   Nursing note and vitals reviewed.      Significant Labs:   Recent Labs   Lab 11/25/19  1511 11/26/19  0317   WBC 5.49 3.56*   HGB 10.0* 8.7*   HCT 31.2* 27.4*     260     Recent Labs   Lab 11/25/19  1511 11/26/19  0317    137   K 4.3 3.9    102   CO2 24 25   BUN 10 8   CREATININE 1.1 0.9   * 146*   CALCIUM 9.1 8.1*   MG  --  1.5*   PHOS  --  3.3   LIPASE 64* 35     Recent Labs   Lab 11/25/19  1511 11/26/19  0317   ALKPHOS 150* 117   ALT 15 12   AST 14 11   ALBUMIN 2.6* 2.1*   PROT 6.8 5.6*   BILITOT 0.6 0.4      No results for input(s): CPK, CPKMB, MB, TROPONINI in the last 72 hours.  No results for input(s): POCTGLUCOSE in the last 168 hours.  Hemoglobin A1C   Date Value Ref Range Status   11/01/2019 5.7 (H) 4.0 - 5.6 % Final     Comment:     ADA Screening Guidelines:  5.7-6.4%  Consistent with prediabetes  >or=6.5%  Consistent with diabetes  High levels of fetal hemoglobin interfere with the HbA1C  assay. Heterozygous hemoglobin variants (HbS, HgC, etc)do  not significantly interfere with this assay.   However, presence of multiple variants may affect accuracy.     02/04/2019 5.0 4.0 - 5.6 % Final     Comment:     ADA Screening Guidelines:  5.7-6.4%  Consistent with prediabetes  >or=6.5%  Consistent with diabetes  High levels of fetal hemoglobin interfere with the HbA1C  assay. Heterozygous hemoglobin variants (HbS, HgC, etc)do  not significantly interfere with this assay.   However, presence of multiple variants may affect accuracy.     04/23/2018 5.0 4.0 - 5.6 % Final     Comment:     According to ADA guidelines, hemoglobin A1c <7.0% represents  optimal control in non-pregnant diabetic patients. Different  metrics may apply to specific patient populations.   Standards of Medical Care in Diabetes-2016.  For the purpose of screening for the presence of diabetes:  <5.7%     Consistent with the absence of diabetes  5.7-6.4%  Consistent with increasing risk for diabetes   (prediabetes)  >or=6.5%  Consistent with diabetes  Currently, no consensus exists for use of hemoglobin A1c  for diagnosis of diabetes for children.  This Hemoglobin A1c assay has  significant interference with fetal   hemoglobin   (HbF). The results are invalid for patients with abnormal amounts of   HbF,   including those with known Hereditary Persistence   of Fetal Hemoglobin. Heterozygous hemoglobin variants (HbAS, HbAC,   HbAD, HbAE, HbA2) do not significantly interfere with this assay;   however, presence of multiple variants in a sample may impact the %   interference.       Scheduled Meds:   aspirin  81 mg Oral Daily    azaTHIOprine  100 mg Oral Daily    ceFAZolin (ANCEF) IVPB  2 g Intravenous Q8H    donepezil  10 mg Oral QHS    enoxaparin  40 mg Subcutaneous Daily    ferrous sulfate  325 mg Oral BID    memantine  10 mg Oral Daily    metronidazole  500 mg Intravenous Q8H    pantoprazole  40 mg Oral Daily    predniSONE  10 mg Oral Daily    QUEtiapine  100 mg Oral QHS    sodium chloride 0.9%  10 mL Intravenous Q6H     Continuous Infusions:   sodium chloride 0.9% 75 mL/hr at 11/25/19 2343     As Needed: acetaminophen, lorazepam, metoprolol, ondansetron, sodium chloride 0.9%, Flushing PICC Protocol **AND** sodium chloride 0.9% **AND** sodium chloride 0.9%      Significant Imaging:   X-Ray Chest 1 View for PICC_Central line  Narrative: EXAMINATION:  XR CHEST 1 VIEW    CLINICAL HISTORY:  Evaluate PICC line placement;    TECHNIQUE:  Single frontal view of the chest was performed.    COMPARISON:  10/30/2019    FINDINGS:  Right PICC catheter tip projects over the proximal SVC.    The cardiomediastinal silhouette is not enlarged.  There is no pleural effusion.  The trachea is midline.  The lungs are symmetrically expanded bilaterally with mildly coarse interstitial attenuation noting left basilar subsegmental atelectasis or scarring..  No large focal consolidation seen.  There is no pneumothorax.  The osseous structures are remarkable for degenerative changes..  Impression: As above    Electronically signed by: Bharat Hsu  MD  Date:    11/26/2019  Time:    13:37          Assessment/Plan:      * Vaginal fistula  Abdominal pain  Crohn's disease of small and large intestines with complication    Clear liquids advance  NPO after midnight  Consult GI:  CT Abdomen: no acute intracranial processes  CBC, CMP, Lipase in am  Blood cultures x2 pending  Continue prednisone    Tachycardia  Continuous cardiac monitoring  Denies chest pain or SOB  continuous IVFs  Metoprolol prn        Alzheimers disease  Continue Donepezil, Namenda and Quetiapine      Chronic kidney disease, stage 3  Creatinine 1.1  Voiding well  Avoid nephrotoxic agents  Strict I/O's  Renally dose medications     History of left knee replacement  Wound dehiscence, surgical, sequela    s/p left TKA with Dr. Lindsay on 11/16/19 with wound dehiscence complication  Wound vac not functioning properly  Consult Wound Care  Consult Orthopedics Dr. Lindsay      GERD (gastroesophageal reflux disease)  Continue protonix        VTE Risk Mitigation (From admission, onward)         Ordered     enoxaparin injection 40 mg  Daily      11/25/19 2006     Place sequential compression device  Until discontinued      11/25/19 2006     IP VTE HIGH RISK PATIENT  Once      11/25/19 2006                      Herberth Leblanc DO  Department of Hospital Medicine   [facility]

## 2019-11-26 NOTE — CARE UPDATE
Please contact Gyn on call, Dr. Ramos, via the  when patient ready for evaluation during endoscopy on 11/27/2019.     Maame Carrillo MD  PGY-2  Ob/Gyn  11/26/2019

## 2019-11-26 NOTE — PT/OT/SLP PROGRESS
Physical Therapy      Patient Name:  Anabella Aranda   MRN:  [id number]    Patient found supine with son present. Patient and son refused PT session 2/2 pain and fatigue.   Assisted OT with repositioning patient with use of wedge onto L side lying.  Primary nurse aware.Will follow up as able    Mo Vera, PT

## 2019-11-26 NOTE — PLAN OF CARE
Recommendations  1. When medically able ADAT to Renal.   2. If TPN is needed recommend: Clinimix 5/20 @ 60 ml/hr   - to provide 1267 kcals, 72 g AA, 288 g dextrose. + 20% lipids.      Goals:   1. Initiate nutrition within 48 hours.   Nutrition Goal Status: new

## 2019-11-26 NOTE — PROGRESS NOTES
"[facility]  Orthopedics  Progress Note    Patient Name: Anabella Aranda  MRN: [id number]  Admission Date: 11/25/2019  Hospital Length of Stay: 0 days  Attending Provider: Herberth Leblanc DO  Primary Care Provider: Shon Brambila MD  Follow-up For: Procedure(s) (LRB):  EGD (ESOPHAGOGASTRODUODENOSCOPY) (N/A)  SIGMOIDOSCOPY, FLEXIBLE (N/A)    Post-Operative Day:    Subjective:     Principal Problem:Vaginal fistula    Principal Orthopedic Problem: S/p LEFT TKA with wound dehiscence    Interval History:  Patient resting comfortably in bed. Complains of left knee pain. Wound VAC was removed yesterday.  Son at bedside, reports wound care has not been by.  Patient is scheduled to have EGD tomorrow with GI.    Review of patient's allergies indicates:   Allergen Reactions    Sulfa (sulfonamide antibiotics) Swelling       Current Facility-Administered Medications   Medication    0.9%  NaCl infusion    acetaminophen tablet 650 mg    aspirin EC tablet 81 mg    azaTHIOprine tablet 100 mg    cefazolin (ANCEF) 2 gram in dextrose 5% 50 mL IVPB (premix)    donepezil tablet 10 mg    enoxaparin injection 40 mg    ferrous sulfate EC tablet 325 mg    memantine tablet 10 mg    metoprolol injection 5 mg    metronidazole IVPB 500 mg    ondansetron injection 4 mg    pantoprazole EC tablet 40 mg    predniSONE tablet 10 mg    QUEtiapine tablet 100 mg    sodium chloride 0.9% flush 10 mL    sodium chloride 0.9% flush 10 mL    And    sodium chloride 0.9% flush 10 mL     Objective:     Vital Signs (Most Recent):  Temp: 98.8 °F (37.1 °C) (11/26/19 1005)  Pulse: 95 (11/26/19 1159)  Resp: 18 (11/26/19 1005)  BP: 120/62 (11/26/19 1005)  SpO2: 100 % (11/26/19 1005) Vital Signs (24h Range):  Temp:  [98.4 °F (36.9 °C)-98.8 °F (37.1 °C)] 98.8 °F (37.1 °C)  Pulse:  [] 95  Resp:  [10-25] 18  SpO2:  [96 %-100 %] 100 %  BP: (101-127)/(48-92) 120/62     Weight: 86.2 kg (190 lb 0.6 oz)  Height: 5' 4" (162.6 cm)  Body " mass index is 32.62 kg/m².      Intake/Output Summary (Last 24 hours) at 11/26/2019 1340  Last data filed at 11/26/2019 0604  Gross per 24 hour   Intake --   Output 360 ml   Net -360 ml       General    Vitals reviewed.  Constitutional: She appears well-developed.   HENT:   Head: Atraumatic.   Pulmonary/Chest: Effort normal.   Neurological: She is alert.   Slightly confused                Left knee:  Significant swelling  Tenderness to palpation globally  Dressing intact with 4x4s and Tegaderm.  No significant drainage.  NVI  ROM limited and painful    Significant Labs: All pertinent labs within the past 24 hours have been reviewed.    Significant Imaging: None    Assessment/Plan:     Active Diagnoses:    Diagnosis Date Noted POA    PRINCIPAL PROBLEM:  Vaginal fistula [N82.8] 11/25/2019 Yes    Open wound of left knee [S81.002A] 11/26/2019 Yes    Tachycardia [R00.0] 11/25/2019 Yes    Wound dehiscence, surgical, sequela [T81.31XS]  Not Applicable    Abdominal pain [R10.9] 11/01/2019 Yes    Crohn's disease of small and large intestines with complication [K50.819] 10/25/2019 Yes     Chronic    History of left knee replacement [Z96.652] 10/16/2019 Not Applicable    Alzheimers disease [G30.9, F02.80]  Yes     Chronic    Chronic kidney disease, stage 3 [N18.3]  Yes     Chronic    GERD (gastroesophageal reflux disease) [K21.9] 09/24/2019 Yes      Problems Resolved During this Admission:     Spoke to wound care, wound care nurse is out of town.  I will replace wound VAC today.  Continue IV abx.    Umu Cuellar PA-C  Orthopedics  [facility]

## 2019-11-26 NOTE — ASSESSMENT & PLAN NOTE
Abdominal pain  Crohn's disease of small and large intestines with complication    Clear liquids advance  NPO after midnight  Consult GI:  CT Abdomen: no acute intracranial processes  CBC, CMP, Lipase in am  Blood cultures x2 pending  Continue prednisone

## 2019-11-26 NOTE — ASSESSMENT & PLAN NOTE
Complicated Crohns disease and very recent complicated medical course.  Admitted for concerns for fecal leakage from vagina and concern for possible rectovaginal fistula.  CT does not show fistula.   Also left knee swollen and warm, being followed by Dr. Lindsay in Ortho.    Recs:  MR pelvis with rectal contrast to eval fistula  Will plan Flex sig tomorrow to eval for possible fistula and assess disease activity   - I have spoken to GYN on call ,appreciate their assistance and will attempt to arrange for them to eval during endoscopy tomorrow  NPO past Mn ; hold anticoag overnight  PICC line for IV Abx per ortho  Consult nutrition and start TPN given poor nutritional status and may need to consider completion proctectomy in the future  Will cancel tomorrows 2 week remicade infusion given concern for infection over left knee   - will notify infusion center when we wish to resume  Taper down to prednisone 10mg daily  dvt ppx with lovenox daily

## 2019-11-26 NOTE — ANESTHESIA PREPROCEDURE EVALUATION
11/26/2019  Anabella Aranda is a 74 y.o., female hx Alzheimers dementia, CKD3, HTN, Crohn's disease now with rectovaginal fistula scheduled for EGD/sigmoidoscopy. Recent EGD/sigmoidoscopy without anesthetic complication.     Of note, patient underwent left TKA under spinal/MAC with precedex infusion on 10/16/19 with postoperative hypotension requiring prolonged phenylephrine gtt and ICU admission.     Past Medical History:   Diagnosis Date    Arthritis     C. difficile colitis 10/13/2014    4/15/2015    Chronic kidney disease, stage 3     Dementia     Hepatitis B     Hypertension     Major depression with psychotic features     Ulcerative colitis 03/2014     Past Surgical History:   Procedure Laterality Date    CHOLECYSTECTOMY      COLONOSCOPY N/A 4/29/2016    Procedure: COLONOSCOPY;  Surgeon: Umm Arenas MD;  Location: [facility];  Service: Endoscopy;  Laterality: N/A;    ESOPHAGOGASTRODUODENOSCOPY N/A 1/25/2019    Procedure: ESOPHAGOGASTRODUODENOSCOPY (EGD);  Surgeon: Jenise Hallman MD;  Location: [facility];  Service: Endoscopy;  Laterality: N/A;    ESOPHAGOGASTRODUODENOSCOPY N/A 10/21/2019    Procedure: EGD (ESOPHAGOGASTRODUODENOSCOPY);  Surgeon: Og Carrera MD;  Location: [facility];  Service: Colon and Rectal;  Laterality: N/A;    FLEXIBLE SIGMOIDOSCOPY N/A 1/25/2019    Procedure: SIGMOIDOSCOPY, FLEXIBLE;  Surgeon: Jenise Hallman MD;  Location: [facility];  Service: Endoscopy;  Laterality: N/A;    FLEXIBLE SIGMOIDOSCOPY N/A 10/21/2019    Procedure: SIGMOIDOSCOPY, FLEXIBLE;  Surgeon: Og Carrera MD;  Location: [facility];  Service: Colon and Rectal;  Laterality: N/A;    BETZY FILTER PLACEMENT Right 01/2014    JOINT REPLACEMENT      KNEE ARTHROPLASTY Left 10/16/2019    Procedure: ARTHROPLASTY, KNEE;  Surgeon: Ghassan Lindsay MD;  [facility];   Service: Orthopedics;  Laterality: Left;  Franco notifiedconfirmed with Franco  [phone] 10/15/19 kb    LAPAROSCOPIC TOTAL COLECTOMY  06/24/2016    NASAL SINUS SURGERY      TONSILLECTOMY      TOTAL KNEE ARTHROPLASTY Right 04/07/2008    TOTAL KNEE ARTHROPLASTY Left 10/16/2019     11/25/19 EKG   Sinus tachycardia  Nonspecific T wave abnormality  Abnormal ECG  When compared with ECG of 19-OCT-2019 16:24,  No significant change was found    Anesthesia Evaluation    I have reviewed the Patient Summary Reports.    I have reviewed the Nursing Notes.   I have reviewed the Medications.   Steroids Taken In Past Year: Prednisone    Review of Systems  Anesthesia Hx:  No problems with previous Anesthesia  Denies Family Hx of Anesthesia complications.   Denies Personal Hx of Anesthesia complications.   Social:  Non-Smoker, No Alcohol Use    Hematology/Oncology:         -- Anemia:   Cardiovascular:   Hypertension, well controlled  Deep Venous Thrombosis (DVT) (5 yrs ago s/p IVC filter)    Pulmonary:  Pulmonary Normal    Renal/:   Chronic Renal Disease (CKD)    Hepatic/GI:   Hiatal Hernia, GERD Crohn's disease  Rectovaginal fistula Bowel Conditions:  Inflammatory Bowel Disease, Ulcerative Colitis    Musculoskeletal:   Left TKA complicated with infection. Wound vac.   Neurological:  Dementia    Endocrine:  Endocrine Normal    Psych:   alzheimers dementia         Physical Exam  General:  Well nourished    Airway/Jaw/Neck:  Airway Findings: Mouth Opening: Normal Tongue: Normal  General Airway Assessment: Adult  Mallampati: III  Improves to II with phonation.      Dental:  Dental Findings:    Chest/Lungs:  Chest/Lungs Clear    Heart/Vascular:  Heart Findings: Normal     Musculoskeletal:  Musculoskeletal Findings: Swollen Joint, Tender Joint Left knee swelling with wound vac packings in place. Wound vac not connected.      Mental Status:  Mental Status Findings:  Cooperative, Alert and Oriented       Lab Results   Component  Value Date    WBC 3.56 (L) 11/26/2019    HGB 8.7 (L) 11/26/2019    HCT 27.4 (L) 11/26/2019     11/26/2019    CHOL 223 (H) 08/10/2019    TRIG 79 08/10/2019    HDL 62 08/10/2019    ALT 12 11/26/2019    AST 11 11/26/2019     11/26/2019    K 3.9 11/26/2019     11/26/2019    CREATININE 0.9 11/26/2019    BUN 8 11/26/2019    CO2 25 11/26/2019    TSH 1.941 02/04/2019    INR 1.1 10/16/2019    HGBA1C 5.7 (H) 11/01/2019         Anesthesia Plan  Type of Anesthesia, risks & benefits discussed:  Anesthesia Type:  MAC, general  Patient's Preference:   Intra-op Monitoring Plan: standard ASA monitors  Intra-op Monitoring Plan Comments:   Post Op Pain Control Plan: per primary service following discharge from PACU  Post Op Pain Control Plan Comments:   Induction:   IV  Beta Blocker:  Patient is not currently on a Beta-Blocker (No further documentation required).       Informed Consent: Patient representative understands risks and agrees with Anesthesia plan.  Questions answered. Anesthesia consent signed with patient representative.  ASA Score: 3     Day of Surgery Review of History & Physical: I have interviewed and examined the patient. I have reviewed the patient's H&P dated:  There are no significant changes.  H&P update referred to the surgeon.     Anesthesia Plan Notes: Obtained consent from kate Cox [phone]        Ready For Surgery From Anesthesia Perspective.

## 2019-11-26 NOTE — TELEPHONE ENCOUNTER
[11/26/2019 9:04 AM]  Tania Reddy:    Hi, I see Anabella Aranda is in the hospital with what appears to be a fistula. Cancelling her Remicade for tomorrow I assume     [11/26/2019 9:05 AM]    Hey, Ok let me ask Dr. Carrera     [11/26/2019 9:06 AM]  Tania Reddy:    She is admitted and her knee wound is not healing and if she has a fistula, which is what it seems, she cannot receive Remicade.     [11/26/2019 9:08 AM]    OK       [11/26/2019 9:41 AM]    Ok I spoke with Dr. Carrera we will postpone and let you know when we want to resume. Thanks.

## 2019-11-26 NOTE — PROCEDURES
"Anabella Aranda is a 74 y.o. female patient.    Temp: 98.8 °F (37.1 °C) (11/26/19 1005)  Pulse: 95 (11/26/19 1159)  Resp: 18 (11/26/19 1005)  BP: 120/62 (11/26/19 1005)  SpO2: 100 % (11/26/19 1005)  Weight: 86.2 kg (190 lb) (11/25/19 1332)  Height: 5' 4" (162.6 cm) (11/25/19 1332)    PICC  Date/Time: 11/26/2019 11:49 AM  Performed by: Jarred Haley RN  Consent Done: Yes  Time out: Immediately prior to procedure a time out was called to verify the correct patient, procedure, equipment, support staff and site/side marked as required  Indications: med administration and vascular access  Anesthesia: local infiltration  Local anesthetic: lidocaine 1% without epinephrine  Anesthetic Total (mL): 2  Preparation: skin prepped with chlorhexidine (without alcohol)  Skin prep agent dried: skin prep agent completely dried prior to procedure  Sterile barriers: all five maximum sterile barriers used - cap, mask, sterile gown, sterile gloves, and large sterile sheet  Hand hygiene: hand hygiene performed prior to central venous catheter insertion  Location details: right brachial  Catheter type: double lumen  Catheter size: 5 Fr  Catheter Length: 35cm    Ultrasound guidance: yes  Vessel Caliber: medium, compressibility normal  Vascular Doppler: not done  Needle advanced into vessel with real time Ultrasound guidance.  Guidewire confirmed in vessel.  Sterile sheath used.  no esophageal manometryNumber of attempts: 1  Post-procedure: blood return through all ports and sterile dressing applied            Jarred Haley  11/26/2019    "

## 2019-11-26 NOTE — SUBJECTIVE & OBJECTIVE
Interval History:       Review of Systems   Constitutional: Positive for activity change and fatigue. Negative for fever.   HENT: Negative for congestion.    Respiratory: Negative for apnea and shortness of breath.    Cardiovascular: Negative for chest pain and palpitations.   Gastrointestinal: Negative for abdominal distention and abdominal pain.        Stool per vagina     Genitourinary: Negative for difficulty urinating and frequency.   Musculoskeletal: Positive for arthralgias.   Neurological: Negative for dizziness and numbness.   Psychiatric/Behavioral: Negative for agitation and confusion. The patient is not nervous/anxious.      Objective:     Vital Signs (Most Recent):  Temp: 99.6 °F (37.6 °C) (11/26/19 1652)  Pulse: (!) 118 (11/26/19 1652)  Resp: 18 (11/26/19 1652)  BP: (!) 105/49 (11/26/19 1652)  SpO2: 95 % (11/26/19 1652) Vital Signs (24h Range):  Temp:  [98.4 °F (36.9 °C)-99.6 °F (37.6 °C)] 99.6 °F (37.6 °C)  Pulse:  [] 118  Resp:  [10-25] 18  SpO2:  [95 %-100 %] 95 %  BP: (101-127)/(48-81) 105/49     Weight: 86.2 kg (190 lb 0.6 oz)  Body mass index is 32.62 kg/m².    Intake/Output Summary (Last 24 hours) at 11/26/2019 1742  Last data filed at 11/26/2019 0604  Gross per 24 hour   Intake --   Output 360 ml   Net -360 ml      Physical Exam   Constitutional: She is oriented to person, place, and time. She appears well-developed and well-nourished.   HENT:   Head: Normocephalic and atraumatic.   Eyes: EOM are normal.   Neck: Neck supple.   Cardiovascular: Normal rate.   Pulmonary/Chest: Effort normal. No respiratory distress.   Abdominal: Soft. She exhibits no distension.   Musculoskeletal: Normal range of motion.   Neurological: She is alert and oriented to person, place, and time.   Skin: Skin is warm and dry.   Psychiatric: She has a normal mood and affect. Her behavior is normal. Judgment and thought content normal.   Nursing note and vitals reviewed.      Significant Labs:   Recent Labs   Lab  11/25/19  1511 11/26/19 0317   WBC 5.49 3.56*   HGB 10.0* 8.7*   HCT 31.2* 27.4*    260     Recent Labs   Lab 11/25/19  1511 11/26/19 0317    137   K 4.3 3.9    102   CO2 24 25   BUN 10 8   CREATININE 1.1 0.9   * 146*   CALCIUM 9.1 8.1*   MG  --  1.5*   PHOS  --  3.3   LIPASE 64* 35     Recent Labs   Lab 11/25/19  1511 11/26/19 0317   ALKPHOS 150* 117   ALT 15 12   AST 14 11   ALBUMIN 2.6* 2.1*   PROT 6.8 5.6*   BILITOT 0.6 0.4      No results for input(s): CPK, CPKMB, MB, TROPONINI in the last 72 hours.  No results for input(s): POCTGLUCOSE in the last 168 hours.  Hemoglobin A1C   Date Value Ref Range Status   11/01/2019 5.7 (H) 4.0 - 5.6 % Final     Comment:     ADA Screening Guidelines:  5.7-6.4%  Consistent with prediabetes  >or=6.5%  Consistent with diabetes  High levels of fetal hemoglobin interfere with the HbA1C  assay. Heterozygous hemoglobin variants (HbS, HgC, etc)do  not significantly interfere with this assay.   However, presence of multiple variants may affect accuracy.     02/04/2019 5.0 4.0 - 5.6 % Final     Comment:     ADA Screening Guidelines:  5.7-6.4%  Consistent with prediabetes  >or=6.5%  Consistent with diabetes  High levels of fetal hemoglobin interfere with the HbA1C  assay. Heterozygous hemoglobin variants (HbS, HgC, etc)do  not significantly interfere with this assay.   However, presence of multiple variants may affect accuracy.     04/23/2018 5.0 4.0 - 5.6 % Final     Comment:     According to ADA guidelines, hemoglobin A1c <7.0% represents  optimal control in non-pregnant diabetic patients. Different  metrics may apply to specific patient populations.   Standards of Medical Care in Diabetes-2016.  For the purpose of screening for the presence of diabetes:  <5.7%     Consistent with the absence of diabetes  5.7-6.4%  Consistent with increasing risk for diabetes   (prediabetes)  >or=6.5%  Consistent with diabetes  Currently, no consensus exists for use of  hemoglobin A1c  for diagnosis of diabetes for children.  This Hemoglobin A1c assay has significant interference with fetal   hemoglobin   (HbF). The results are invalid for patients with abnormal amounts of   HbF,   including those with known Hereditary Persistence   of Fetal Hemoglobin. Heterozygous hemoglobin variants (HbAS, HbAC,   HbAD, HbAE, HbA2) do not significantly interfere with this assay;   however, presence of multiple variants in a sample may impact the %   interference.       Scheduled Meds:   aspirin  81 mg Oral Daily    azaTHIOprine  100 mg Oral Daily    ceFAZolin (ANCEF) IVPB  2 g Intravenous Q8H    donepezil  10 mg Oral QHS    enoxaparin  40 mg Subcutaneous Daily    ferrous sulfate  325 mg Oral BID    memantine  10 mg Oral Daily    metronidazole  500 mg Intravenous Q8H    pantoprazole  40 mg Oral Daily    predniSONE  10 mg Oral Daily    QUEtiapine  100 mg Oral QHS    sodium chloride 0.9%  10 mL Intravenous Q6H     Continuous Infusions:   sodium chloride 0.9% 75 mL/hr at 11/25/19 2343     As Needed: acetaminophen, lorazepam, metoprolol, ondansetron, sodium chloride 0.9%, Flushing PICC Protocol **AND** sodium chloride 0.9% **AND** sodium chloride 0.9%      Significant Imaging:   X-Ray Chest 1 View for PICC_Central line  Narrative: EXAMINATION:  XR CHEST 1 VIEW    CLINICAL HISTORY:  Evaluate PICC line placement;    TECHNIQUE:  Single frontal view of the chest was performed.    COMPARISON:  10/30/2019    FINDINGS:  Right PICC catheter tip projects over the proximal SVC.    The cardiomediastinal silhouette is not enlarged.  There is no pleural effusion.  The trachea is midline.  The lungs are symmetrically expanded bilaterally with mildly coarse interstitial attenuation noting left basilar subsegmental atelectasis or scarring..  No large focal consolidation seen.  There is no pneumothorax.  The osseous structures are remarkable for degenerative changes..  Impression: As  above    Electronically signed by: Bharat Hsu MD  Date:    11/26/2019  Time:    13:37

## 2019-11-26 NOTE — ED NOTES
Pt cleaned and monahan placed. Stage 2 decubitus noted to right buttock. Covered with mepilex at this time.

## 2019-11-26 NOTE — HOSPITAL COURSE
11/26 pt seen, will place a picc line and consult nutrition for possible need for TPN, await GI input regarding scope to evaluate for possible fistula. Ortho on the case, will place a wound vac on the pt as well.. Adding flagyl to cover for intestinal micros  11/27 pt seen doing ok, got PRBC transfused, had EGD today, will f/u on official results  11/28 pt seen, she had an EGD that was normal and a sigmoidoscopy, that showed:\  Impression:           - Decubitus ulceration found on perianal exam.                        - Inflammation was found in the rectum. This was                         moderate in severity, improved compared to previous                         examinations. Biopsied.                        - The monie-terminal ileum appears endoscopically                         much less inflammed and much better. This was                         biopsied to assess disease activty.                        - The rectum is characterized by linear ulcers and                         inflammatory polyps, however the endoscopic                         appearance is much improved.                        - no evidence of fistula found despite instillation                         of methylene blue.  Recommendation:                             - Refer to a colo-rectal surgeon (Dr. Worley) at                         appointment to be scheduled for consideration of                         end ileostomy creation.    Will consult [facility] because pt is on tpn and wound vac, discussed with pt and will discuss also with family.  Replace pos and mg    Pt seen on day of discharge, doing well, wound vac being changed today by ortho.  Discussed case with the pt's son, he is on board with the plan to discharge his mother to [facility] for continuing care, TPN, and the GI service will arrange for colorectal F/U appt as discussed and recommended after her EGD and sigmoidoscopy.

## 2019-11-26 NOTE — SUBJECTIVE & OBJECTIVE
Subjective:     Interval History:   Seem this AM.  Son not at bedside.  Still awaiting floor bed, still in ER    Pain is improved. Had diarrhea all night.  Nursing unsure if having leakage from vagina.    Left knee with dressing in tact    Review of Systems   Constitutional: Positive for activity change, appetite change and fatigue.   Gastrointestinal: Positive for abdominal pain and diarrhea. Negative for vomiting.   Musculoskeletal: Positive for gait problem and joint swelling.   Skin: Positive for wound.     Objective:     Vital Signs (Most Recent):  Temp: 98.4 °F (36.9 °C) (11/26/19 0829)  Pulse: 104 (11/26/19 0802)  Resp: 19 (11/26/19 0802)  BP: (!) 108/53 (11/26/19 0802)  SpO2: 99 % (11/26/19 0802) Vital Signs (24h Range):  Temp:  [98.4 °F (36.9 °C)-98.7 °F (37.1 °C)] 98.4 °F (36.9 °C)  Pulse:  [104-136] 104  Resp:  [10-25] 19  SpO2:  [96 %-100 %] 99 %  BP: (101-127)/(48-92) 108/53     Weight: 86.2 kg (190 lb) (11/25/19 1332)  Body mass index is 32.61 kg/m².      Intake/Output Summary (Last 24 hours) at 11/26/2019 0944  Last data filed at 11/26/2019 0604  Gross per 24 hour   Intake --   Output 360 ml   Net -360 ml       Lines/Drains/Airways     Drain                 Urethral Catheter 11/25/19 1850 Non-latex;Straight-tip less than 1 day          Peripheral Intravenous Line                 Peripheral IV - Single Lumen 11/25/19 1512 22 G Left Antecubital less than 1 day                Physical Exam   Constitutional: No distress.   Nontoxic appearing   Pulmonary/Chest: Effort normal. No respiratory distress.   Abdominal: Soft. She exhibits no distension. There is no tenderness.   Musculoskeletal:   Left knee warm with bandage in place   Vitals reviewed.      Significant Labs:  Acute Hepatitis Panel: No results for input(s): HEPBSAG, HEPAIGM, HEPCAB in the last 48 hours.    Invalid input(s): HAPBIGM  Amylase: No results for input(s): AMYLASE in the last 48 hours.  Blood Culture:   Recent Labs   Lab 11/25/19  8310  11/25/19  1653   LABBLOO No Growth to date No Growth to date     CBC:   Recent Labs   Lab 11/25/19  1511 11/26/19  0317   WBC 5.49 3.56*   HGB 10.0* 8.7*   HCT 31.2* 27.4*    260     CMP:   Recent Labs   Lab 11/26/19 0317   *   CALCIUM 8.1*   ALBUMIN 2.1*   PROT 5.6*      K 3.9   CO2 25      BUN 8   CREATININE 0.9   ALKPHOS 117   ALT 12   AST 11   BILITOT 0.4     Coagulation: No results for input(s): PT, INR, APTT in the last 48 hours.  CRP:   Recent Labs   Lab 11/25/19  1511   CRP 86.3*     ESR:   Recent Labs   Lab 11/25/19  1511   SEDRATE 79*     Lipase:   Recent Labs   Lab 11/25/19  1511 11/26/19 0317   LIPASE 64* 35         Significant Imaging:  Imaging results within the past 24 hours have been reviewed.

## 2019-11-26 NOTE — CONSULTS
GYNECOLOGY CONSULT NOTE  2019    Reason for consult: Rectovaginal Fistula    History of present illness:  Anabella Aranda 74 y.o.  with PMHx of Alzheimer's Dementia, CKD3, GERD, and s/p Left  TKA on 19 with wound dehiscence who presented to the ED with 3 days of intermittent abdominal pain and fecal material coming from vagina. Patient states she does not have the pain now, but it was present in her lower abdomen. She denies bleeding or discharge from the vagina, but noticed when she wiped after using the restroom that there was black on the toilet paper. She could not tell if the material was coming from her vagina or rectum. She denies history of Crohn's, but her chart documents history of UC, and she denies any blood in the material. She denies urinary tract symptoms such as burning or frequency. She denies any history of gynecological cancers in herself or her family. She denies gynecological surgery.     ROS: Per HPI, otherwise negative.     History provided by patient and medical record. Patient's family not at bedside at evaluation.     Allergies:   Review of patient's allergies indicates:   Allergen Reactions    Sulfa (sulfonamide antibiotics) Swelling       Past Medical History:   Diagnosis Date    Arthritis     C. difficile colitis 10/13/2014    4/15/2015    Chronic kidney disease, stage 3     Dementia     Hepatitis B     Hypertension     Major depression with psychotic features     Ulcerative colitis 2014       Past Surgical History:   Procedure Laterality Date    CHOLECYSTECTOMY      COLONOSCOPY N/A 2016    Procedure: COLONOSCOPY;  Surgeon: Umm Arenas MD;  Location: [facility] ENDO;  Service: Endoscopy;  Laterality: N/A;    ESOPHAGOGASTRODUODENOSCOPY N/A 2019    Procedure: ESOPHAGOGASTRODUODENOSCOPY (EGD);  Surgeon: Jenise Hallman MD;  Location: [facility] ENDO;  Service: Endoscopy;  Laterality: N/A;    ESOPHAGOGASTRODUODENOSCOPY N/A 10/21/2019     Procedure: EGD (ESOPHAGOGASTRODUODENOSCOPY);  Surgeon: Og Carrera MD;  Location: [facility] ENDO;  Service: Colon and Rectal;  Laterality: N/A;    FLEXIBLE SIGMOIDOSCOPY N/A 1/25/2019    Procedure: SIGMOIDOSCOPY, FLEXIBLE;  Surgeon: Jenise Hallman MD;  Location: [facility] ENDO;  Service: Endoscopy;  Laterality: N/A;    FLEXIBLE SIGMOIDOSCOPY N/A 10/21/2019    Procedure: SIGMOIDOSCOPY, FLEXIBLE;  Surgeon: Og Carrera MD;  Location: [facility] ENDO;  Service: Colon and Rectal;  Laterality: N/A;    BETZY FILTER PLACEMENT Right 01/2014    JOINT REPLACEMENT      KNEE ARTHROPLASTY Left 10/16/2019    Procedure: ARTHROPLASTY, KNEE;  Surgeon: Ghassan Lindsay MD;  Location: [facility] OR;  Service: Orthopedics;  Laterality: Left;  Franco notifiedconfirmed with Franco  [phone] 10/15/19 kb    LAPAROSCOPIC TOTAL COLECTOMY  06/24/2016    NASAL SINUS SURGERY      TONSILLECTOMY      TOTAL KNEE ARTHROPLASTY Right 04/07/2008    TOTAL KNEE ARTHROPLASTY Left 10/16/2019       MEDS:   No current facility-administered medications on file prior to encounter.      Current Outpatient Medications on File Prior to Encounter   Medication Sig Dispense Refill    aspirin (ECOTRIN) 81 MG EC tablet Take 1 tablet (81 mg total) by mouth once daily.  0    azaTHIOprine (IMURAN) 50 mg Tab Take 2 tablets (100 mg total) by mouth once daily. 60 tablet 1    calcium carbonate (OS-NELSY) 600 mg (1,500 mg) Tab Take 1 tablet (600 mg total) by mouth 2 (two) times daily with meals. 180 tablet 3    donepezil (ARICEPT) 10 MG tablet Take 1 tablet (10 mg total) by mouth every evening. 90 tablet 3    ergocalciferol (ERGOCALCIFEROL) 50,000 unit Cap Take 1 capsule (50,000 Units total) by mouth every 7 days. 12 capsule 3    ferrous sulfate 325 mg (65 mg iron) Tab tablet Take 1 tablet (325 mg total) by mouth 2 (two) times daily. 180 tablet 3    loperamide (IMODIUM A-D) 2 mg Tab Take 1 mg by mouth 2 (two) times daily before meals.      memantine (NAMENDA)  10 MG Tab Take 1 tablet (10 mg total) by mouth once daily. 180 tablet 3    pantoprazole (PROTONIX) 40 MG tablet Take 1 tablet (40 mg total) by mouth once daily. 30 tablet 11    predniSONE (DELTASONE) 10 MG tablet Take 1 tablet (10 mg total) by mouth daily with dinner or evening meal. 14 tablet 0    predniSONE (DELTASONE) 20 MG tablet Take 1 tablet (20 mg total) by mouth once daily. 14 tablet 0    QUEtiapine (SEROQUEL) 100 MG Tab TAKE 1 2 TABLET BY MOUTH EVERY NIGHT AT BEDTIME, THEN TAKE ONE TABLET BY MOUTH EVERY NIGHT AT BEDTIME THEREAFTER  1       OB History        1    Para   1    Term   1            AB        Living           SAB        TAB        Ectopic        Multiple        Live Births                     Social History     Socioeconomic History    Marital status: Single     Spouse name: Not on file    Number of children: Not on file    Years of education: Not on file    Highest education level: Not on file   Occupational History    Not on file   Social Needs    Financial resource strain: Not on file    Food insecurity:     Worry: Not on file     Inability: Not on file    Transportation needs:     Medical: Not on file     Non-medical: Not on file   Tobacco Use    Smoking status: Former Smoker     Types: Cigarettes     Last attempt to quit: 1997     Years since quittin.3    Smokeless tobacco: Former User   Substance and Sexual Activity    Alcohol use: No    Drug use: No    Sexual activity: Not Currently   Lifestyle    Physical activity:     Days per week: Not on file     Minutes per session: Not on file    Stress: Not on file   Relationships    Social connections:     Talks on phone: Not on file     Gets together: Not on file     Attends [employer] service: Not on file     Active member of club or organization: Not on file     Attends meetings of clubs or organizations: Not on file     Relationship status: Not on file   Other Topics Concern    Not on file   Social  "History Narrative    Not on file       Family History   Problem Relation Age of Onset    Throat cancer Father     Colon cancer Paternal Grandmother        Vitals: BP (!) 108/53   Pulse 104   Temp 98.4 °F (36.9 °C) (Oral)   Resp 19   Ht 5' 4" (1.626 m)   Wt 86.2 kg (190 lb)   LMP  (LMP Unknown)   SpO2 99%   Breastfeeding? No   BMI 32.61 kg/m²     Physical Exam:  General: Resting comfortably, NAD  CV: RRR, no M/C/R  Pulm: CTAB  ABD: Soft, non-tender, no masses/swelling  Ext: Knee surgical scars noted, no edema, +pulses  Pelvic: Copious amounts of feculent material noted at vaginal opening and onto pad below patient. Benites in place. No blood noted. Mild skin breakdown at inguinal folds.     Assessment:  Anabella Aranda 74 y.o.  with PMHx of Alzheimer's Dementia, CKD3, GERD, and s/p Left  TKA on 19 with wound dehiscence who presented to the ED with possible rectovaginal fistula who is stable on exam currently.     Plan:  Rectovaginal Fistula  -Agree with plan for EGD/Colonoscopy tomorrow and MRI today for further evaluation  -Will plan to evaluate with GI tomorrow under anesthesia    Maame Carrillo MD  PGY-2  Ob/Gyn  2019        "

## 2019-11-26 NOTE — HPI
74 y.o. female with past medical history of arthritis, C. difficile colitis, chronic kidney disease, stage 3, dementia, hepatitis b, hypertension, major depression, chron's  Disease, s/p left TKA with Dr. Lindsay on 11/16/19 with wound dehiscence complication.  The patient presents to ED complaining of intermittent, aching mid abdominal pain with associated feces from vagina.  Per patient's son reports symptoms started about three days ago.  Patient's GI physician, Dr. Carrera, was notified of patient symptoms and recommended patient report to ED.  Denies nausea, vomiting, fever, chills, cough, similar past episodes, aggravating or alleviating factors.  Patient's son also noticed that wound vac to left knee is not functioning properly.  ED findings: H/H 10.0/31.2, Sed rate 79, glucose 144, Alk Phos 150, Lipase 64, CRP 86.3, CT Abdomen showed no acute intra-abdominal abnormalities identified. On exam, patient AAOx3 with intermittent confusion, states she lives alone and her son lives close and helps with her care.  She does report having a home health nurse also. She currently reports no abdominal pain. Patient admitted to hospital medicine, under observation status, for further medical management.

## 2019-11-26 NOTE — PLAN OF CARE
PICC line placement timeout performed with PICC line nurse Jarred Haley. Correct procedure and patient verified. PICC line placed to RUDDY. Sterile technique maintained. Pt tolerated well. CLABSI education provided.  X-ray orders released to verify placement. Will cont to be available as needed.        11/26/19 1140   Pre-Procedure Time-out   Procedure to be Performed PICC line placement   Correct Patient Yes   Correct Site Yes   Correct Procedure Yes   Correct Position Yes   Correct Laterality Right   H&P Note Verified Yes   Surgery consent verified N/A   Surgery consent procedure name N/A   Anesthesia consent verified N/A   Blood consent verified  N/A   Pre-Op/Pre-Procedure orders verified Yes   Other Documents Verified Yes   Radiology Studies Verified Yes   Relevant Lab Results Available Yes   Required Blood Products, Implants, Devices and /or Special Equipment Available N/A   Site Marked Yes   Site Marked by Whom Jarred Haley RN   Site Deep Visible Yes   Site Deep Location Right   Pre-Procedural Time-Out   Allergies Reviewed Done   Hold tube feeding N/A   Verify House Officer Privileges N/A   Verify consent form completed & in chart Done   Perform patient ID X's [id number] Done   Announce the procedure to be performed Done   Deep/Assess site Done   Position patient  Done   Confirm that all persons in room cleanse hands? (ASK, if unsure) Done   Prep Procedure site (N/A for intubations) Done   Use large drape to cover patient? (N/A for intubations) Done   Set pulse & oximetry alarms to audible; If on ventilator, place on 100% FiO2 N/A   Procedure Provider:Wear sterile gloves, hat, mask with eye shield and sterile gown Done   Procedure Assistant:Wear sterile gloves, hat, mask with eye shield and sterile gown N/A   All persons in the room wearing a mask and hat Done   Ultrasound guidance Done   Trendelenberg position for upper torso line Done   Sterile field maintained throughout procedure including dressing  application Done   Position confirmation (Fluoroscopy OR Chest X-ray ordered and verified) Done   Hand and Barrier   Hand Hygiene Performed   Barrier Precautions Performed   Skin Antisepsis ChloraPrep

## 2019-11-26 NOTE — H&P
[facility] Hospital Medicine  History & Physical    Patient Name: Anabella Aranda  MRN: [id number]  Admission Date: 11/25/2019  Attending Physician: Maggie De León*   Primary Care Provider: Shon Brambila MD         Patient information was obtained from patient, caregiver / friend, past medical records and ER records.     Subjective:     Principal Problem:Vaginal fistula    Chief Complaint:   Chief Complaint   Patient presents with    Abdominal Pain     Intermittent abdominal pain with reported fecal drainage from vagina.     Wound Check     s/p left knee surgery on 11/16 per Dr. Lindsay with post-op wound healing delay. Currently has wound vac. States wound vac is not functioning correctly - last dressing change 1 week ago (Monday).         HPI: 74 y.o. female with past medical history of arthritis, C. difficile colitis, chronic kidney disease, stage 3, dementia, hepatitis b, hypertension, major depression, chron's  Disease, s/p left TKA with Dr. Lindsay on 11/16/19 with wound dehiscence complication.  The patient presents to ED complaining of intermittent, aching mid abdominal pain with associated feces from vagina.  Per patient's son reports symptoms started about three days ago.  Patient's GI physician, Dr. Carrera, was notified of patient symptoms and recommended patient report to ED.  Denies nausea, vomiting, fever, chills, cough, similar past episodes, aggravating or alleviating factors.  Patient's son also noticed that wound vac to left knee is not functioning properly.  ED findings: H/H 10.0/31.2, Sed rate 79, glucose 144, Alk Phos 150, Lipase 64, CRP 86.3, CT Abdomen showed no acute intra-abdominal abnormalities identified. On exam, patient AAOx3 with intermittent confusion, states she lives alone and her son lives close and helps with her care.  She does report having a home health nurse also. She currently reports no abdominal pain. Patient admitted to hospital medicine,  under observation status, for further medical management.    Past Medical History:   Diagnosis Date    Arthritis     C. difficile colitis 10/13/2014    4/15/2015    Chronic kidney disease, stage 3     Dementia     Hepatitis B     Hypertension     Major depression with psychotic features     Ulcerative colitis 03/2014       Past Surgical History:   Procedure Laterality Date    CHOLECYSTECTOMY      COLONOSCOPY N/A 4/29/2016    Procedure: COLONOSCOPY;  Surgeon: Umm Arenas MD;  Location: [facility] ENDO;  Service: Endoscopy;  Laterality: N/A;    ESOPHAGOGASTRODUODENOSCOPY N/A 1/25/2019    Procedure: ESOPHAGOGASTRODUODENOSCOPY (EGD);  Surgeon: Jenise Hallman MD;  Location: [facility] ENDO;  Service: Endoscopy;  Laterality: N/A;    ESOPHAGOGASTRODUODENOSCOPY N/A 10/21/2019    Procedure: EGD (ESOPHAGOGASTRODUODENOSCOPY);  Surgeon: Og Carrera MD;  Location: [facility] ENDO;  Service: Colon and Rectal;  Laterality: N/A;    FLEXIBLE SIGMOIDOSCOPY N/A 1/25/2019    Procedure: SIGMOIDOSCOPY, FLEXIBLE;  Surgeon: Jenise Hallman MD;  Location: [facility] ENDO;  Service: Endoscopy;  Laterality: N/A;    FLEXIBLE SIGMOIDOSCOPY N/A 10/21/2019    Procedure: SIGMOIDOSCOPY, FLEXIBLE;  Surgeon: Og Carrera MD;  Location: [facility];  Service: Colon and Rectal;  Laterality: N/A;    BETZY FILTER PLACEMENT Right 01/2014    JOINT REPLACEMENT      KNEE ARTHROPLASTY Left 10/16/2019    Procedure: ARTHROPLASTY, KNEE;  Surgeon: Ghassan Lindsay MD;  Location: [facility];  Service: Orthopedics;  Laterality: Left;  Franco notifiedconfirmed with Franco  [phone] 10/15/19 kb    LAPAROSCOPIC TOTAL COLECTOMY  06/24/2016    NASAL SINUS SURGERY      TONSILLECTOMY      TOTAL KNEE ARTHROPLASTY Right 04/07/2008    TOTAL KNEE ARTHROPLASTY Left 10/16/2019       Review of patient's allergies indicates:   Allergen Reactions    Sulfa (sulfonamide antibiotics) Swelling       No current facility-administered medications on file  prior to encounter.      Current Outpatient Medications on File Prior to Encounter   Medication Sig    aspirin (ECOTRIN) 81 MG EC tablet Take 1 tablet (81 mg total) by mouth once daily.    azaTHIOprine (IMURAN) 50 mg Tab Take 2 tablets (100 mg total) by mouth once daily.    calcium carbonate (OS-NELSY) 600 mg (1,500 mg) Tab Take 1 tablet (600 mg total) by mouth 2 (two) times daily with meals.    donepezil (ARICEPT) 10 MG tablet Take 1 tablet (10 mg total) by mouth every evening.    ergocalciferol (ERGOCALCIFEROL) 50,000 unit Cap Take 1 capsule (50,000 Units total) by mouth every 7 days.    ferrous sulfate 325 mg (65 mg iron) Tab tablet Take 1 tablet (325 mg total) by mouth 2 (two) times daily.    loperamide (IMODIUM A-D) 2 mg Tab Take 1 mg by mouth 2 (two) times daily before meals.    memantine (NAMENDA) 10 MG Tab Take 1 tablet (10 mg total) by mouth once daily.    pantoprazole (PROTONIX) 40 MG tablet Take 1 tablet (40 mg total) by mouth once daily.    predniSONE (DELTASONE) 10 MG tablet Take 1 tablet (10 mg total) by mouth daily with dinner or evening meal.    predniSONE (DELTASONE) 20 MG tablet Take 1 tablet (20 mg total) by mouth once daily.    QUEtiapine (SEROQUEL) 100 MG Tab TAKE 1 2 TABLET BY MOUTH EVERY NIGHT AT BEDTIME, THEN TAKE ONE TABLET BY MOUTH EVERY NIGHT AT BEDTIME THEREAFTER     Family History     Problem Relation (Age of Onset)    Colon cancer Paternal Grandmother    Throat cancer Father        Tobacco Use    Smoking status: Former Smoker     Types: Cigarettes     Last attempt to quit: 1997     Years since quittin.3    Smokeless tobacco: Former User   Substance and Sexual Activity    Alcohol use: No    Drug use: No    Sexual activity: Not Currently     Review of Systems   Constitutional: Negative for chills and fever.   HENT: Positive for rhinorrhea. Negative for congestion and postnasal drip.    Eyes: Negative for visual disturbance.   Respiratory: Negative for chest  tightness and shortness of breath.    Cardiovascular: Negative for chest pain and palpitations.   Gastrointestinal: Positive for abdominal pain. Negative for blood in stool, diarrhea, nausea and vomiting.   Genitourinary: Positive for vaginal discharge.   Musculoskeletal: Negative for arthralgias and myalgias.   Skin: Positive for wound (left knee).   Neurological: Positive for weakness. Negative for dizziness and light-headedness.   Hematological: Does not bruise/bleed easily.   Psychiatric/Behavioral: Negative for agitation and behavioral problems.     Objective:     Vital Signs (Most Recent):  Temp: 98.7 °F (37.1 °C) (11/25/19 1332)  Pulse: (!) 111 (11/25/19 1932)  Resp: 16 (11/25/19 1332)  BP: 118/81 (11/25/19 1932)  SpO2: 100 % (11/25/19 1932) Vital Signs (24h Range):  Temp:  [98.7 °F (37.1 °C)] 98.7 °F (37.1 °C)  Pulse:  [108-136] 111  Resp:  [16] 16  SpO2:  [96 %-100 %] 100 %  BP: (101-127)/(53-92) 118/81     Weight: 86.2 kg (190 lb)  Body mass index is 32.61 kg/m².    Physical Exam   Constitutional: She appears well-developed and well-nourished.   HENT:   Head: Normocephalic and atraumatic.   Eyes: Pupils are equal, round, and reactive to light. EOM are normal.   Neck: Normal range of motion. Neck supple.   Cardiovascular: Tachycardia present.   No murmur heard.  Pulmonary/Chest: Effort normal and breath sounds normal.   Abdominal: Soft. Bowel sounds are normal. There is tenderness (generalized).   Genitourinary: Vaginal discharge (fecal appearing) found.   Musculoskeletal: Normal range of motion. She exhibits no deformity.   Neurological: She is alert.   Skin: Skin is warm and dry.   Psychiatric: She has a normal mood and affect. Her behavior is normal.         CRANIAL NERVES     CN III, IV, VI   Pupils are equal, round, and reactive to light.  Extraocular motions are normal.        Significant Labs:     BMP:   Recent Labs   Lab 11/25/19  1511   *      K 4.3      CO2 24   BUN 10    CREATININE 1.1   CALCIUM 9.1     CBC:   Recent Labs   Lab 11/25/19  1511   WBC 5.49   HGB 10.0*   HCT 31.2*        CMP:   Recent Labs   Lab 11/25/19  1511      K 4.3      CO2 24   *   BUN 10   CREATININE 1.1   CALCIUM 9.1   PROT 6.8   ALBUMIN 2.6*   BILITOT 0.6   ALKPHOS 150*   AST 14   ALT 15   ANIONGAP 14   EGFRNONAA 50*     Cardiac Markers: No results for input(s): CKMB, MYOGLOBIN, BNP, TROPISTAT in the last 48 hours.  Lipase:   Recent Labs   Lab 11/25/19  1511   LIPASE 64*       Urine Studies:   Recent Labs   Lab 11/25/19  1902   COLORU Yellow   APPEARANCEUA Clear   PHUR 6.0   SPECGRAV 1.020   PROTEINUA Negative   GLUCUA Negative   KETONESU Negative   BILIRUBINUA Negative   OCCULTUA Negative   NITRITE Negative   UROBILINOGEN Negative   LEUKOCYTESUR Negative       Significant Imaging: I have reviewed all pertinent imaging results/findings within the past 24 hours.    Assessment/Plan:     * Vaginal fistula  Abdominal pain  Crohn's disease of small and large intestines with complication    Clear liquids advance  NPO after midnight  Consult GI:  CT Abdomen: no acute intracranial processes  CBC, CMP, Lipase in am  Blood cultures x2 pending  Continue prednisone    Tachycardia  Continuous cardiac monitoring  Denies chest pain or SOB  continuous IVFs  Metoprolol prn        Alzheimers disease  Continue Donepezil, Namenda and Quetiapine      Chronic kidney disease, stage 3  Creatinine 1.1  Voiding well  Avoid nephrotoxic agents  Strict I/O's  Renally dose medications     History of left knee replacement  Wound dehiscence, surgical, sequela    s/p left TKA with Dr. Lindsay on 11/16/19 with wound dehiscence complication  Wound vac not functioning properly  Consult Wound Care  Consult Orthopedics Dr. Lindsay      GERD (gastroesophageal reflux disease)  Continue protonix        VTE Risk Mitigation (From admission, onward)         Ordered     enoxaparin injection 40 mg  Daily      11/25/19 2006      Place sequential compression device  Until discontinued      11/25/19 2006     IP VTE HIGH RISK PATIENT  Once      11/25/19 2006                   Leonila De La O NP  Department of Hospital Medicine   [facility]

## 2019-11-26 NOTE — PROGRESS NOTES
[facility]  Gastroenterology  Progress Note    Patient Name: Anabella Aranda  MRN: [id number]  Admission Date: 11/25/2019  Hospital Length of Stay: 0 days  Code Status: Full Code   Attending Provider: Maggie De León*  Consulting Provider: Og Carrera MD  Primary Care Physician: Shon Brambila MD  Principal Problem: Vaginal fistula      Subjective:     Interval History:   Seem this AM.  Son not at bedside.  Still awaiting floor bed, still in ER    Pain is improved. Had diarrhea all night.  Nursing unsure if having leakage from vagina.    Left knee with dressing in tact    Review of Systems   Constitutional: Positive for activity change, appetite change and fatigue.   Gastrointestinal: Positive for abdominal pain and diarrhea. Negative for vomiting.   Musculoskeletal: Positive for gait problem and joint swelling.   Skin: Positive for wound.     Objective:     Vital Signs (Most Recent):  Temp: 98.4 °F (36.9 °C) (11/26/19 0829)  Pulse: 104 (11/26/19 0802)  Resp: 19 (11/26/19 0802)  BP: (!) 108/53 (11/26/19 0802)  SpO2: 99 % (11/26/19 0802) Vital Signs (24h Range):  Temp:  [98.4 °F (36.9 °C)-98.7 °F (37.1 °C)] 98.4 °F (36.9 °C)  Pulse:  [104-136] 104  Resp:  [10-25] 19  SpO2:  [96 %-100 %] 99 %  BP: (101-127)/(48-92) 108/53     Weight: 86.2 kg (190 lb) (11/25/19 1332)  Body mass index is 32.61 kg/m².      Intake/Output Summary (Last 24 hours) at 11/26/2019 0944  Last data filed at 11/26/2019 0604  Gross per 24 hour   Intake --   Output 360 ml   Net -360 ml       Lines/Drains/Airways     Drain                 Urethral Catheter 11/25/19 1850 Non-latex;Straight-tip less than 1 day          Peripheral Intravenous Line                 Peripheral IV - Single Lumen 11/25/19 1512 22 G Left Antecubital less than 1 day                Physical Exam   Constitutional: No distress.   Nontoxic appearing   Pulmonary/Chest: Effort normal. No respiratory distress.   Abdominal: Soft. She exhibits no  distension. There is no tenderness.   Musculoskeletal:   Left knee warm with bandage in place   Vitals reviewed.      Significant Labs:  Acute Hepatitis Panel: No results for input(s): HEPBSAG, HEPAIGM, HEPCAB in the last 48 hours.    Invalid input(s): HAPBIGM  Amylase: No results for input(s): AMYLASE in the last 48 hours.  Blood Culture:   Recent Labs   Lab 11/25/19  1508 11/25/19  1653   LABBLOO No Growth to date No Growth to date     CBC:   Recent Labs   Lab 11/25/19  1511 11/26/19  0317   WBC 5.49 3.56*   HGB 10.0* 8.7*   HCT 31.2* 27.4*    260     CMP:   Recent Labs   Lab 11/26/19  0317   *   CALCIUM 8.1*   ALBUMIN 2.1*   PROT 5.6*      K 3.9   CO2 25      BUN 8   CREATININE 0.9   ALKPHOS 117   ALT 12   AST 11   BILITOT 0.4     Coagulation: No results for input(s): PT, INR, APTT in the last 48 hours.  CRP:   Recent Labs   Lab 11/25/19  1511   CRP 86.3*     ESR:   Recent Labs   Lab 11/25/19  1511   SEDRATE 79*     Lipase:   Recent Labs   Lab 11/25/19  1511 11/26/19  0317   LIPASE 64* 35         Significant Imaging:  Imaging results within the past 24 hours have been reviewed.    Assessment/Plan:     Crohn's disease of small and large intestines with complication  Complicated Crohns disease and very recent complicated medical course.  Admitted for concerns for fecal leakage from vagina and concern for possible rectovaginal fistula.  CT does not show fistula.   Also left knee swollen and warm, being followed by Dr. Lindsay in Ortho.    Recs:  MR pelvis with rectal contrast to eval fistula  Will plan Flex sig tomorrow to eval for possible fistula and assess disease activity   - I have spoken to GYN on call ,appreciate their assistance and will attempt to arrange for them to eval during endoscopy tomorrow  NPO past Mn ; hold anticoag overnight  PICC line for IV Abx per ortho  Consult nutrition and start TPN given poor nutritional status and may need to consider completion proctectomy in the  future  Will cancel tomorrows 2 week remicade infusion given concern for infection over left knee   - will notify infusion center when we wish to resume  Taper down to prednisone 10mg daily  dvt ppx with lovenox daily           Thank you for your consult. I will follow-up with patient. Please contact us if you have any additional questions.    Og Carrera MD  Gastroenterology  [facility]

## 2019-11-26 NOTE — ASSESSMENT & PLAN NOTE
Wound dehiscence, surgical, sequela    s/p left TKA with Dr. Lindsay on 11/16/19 with wound dehiscence complication  Wound vac not functioning properly  Consult Wound Care  Consult Orthopedics Dr. Lindsay

## 2019-11-27 ENCOUNTER — ANESTHESIA (OUTPATIENT)
Dept: ENDOSCOPY | Facility: HOSPITAL | Age: 74
DRG: 760 | End: 2019-11-27
Payer: MEDICARE

## 2019-11-27 LAB
ABO + RH BLD: NORMAL
BASOPHILS # BLD AUTO: 0.01 K/UL (ref 0–0.2)
BASOPHILS NFR BLD: 0.5 % (ref 0–1.9)
BLD GP AB SCN CELLS X3 SERPL QL: NORMAL
BLD PROD TYP BPU: NORMAL
BLD PROD TYP BPU: NORMAL
BLOOD UNIT EXPIRATION DATE: NORMAL
BLOOD UNIT EXPIRATION DATE: NORMAL
BLOOD UNIT TYPE CODE: 5100
BLOOD UNIT TYPE CODE: 9500
BLOOD UNIT TYPE: NORMAL
BLOOD UNIT TYPE: NORMAL
CODING SYSTEM: NORMAL
CODING SYSTEM: NORMAL
DIFFERENTIAL METHOD: ABNORMAL
DISPENSE STATUS: NORMAL
DISPENSE STATUS: NORMAL
EOSINOPHIL # BLD AUTO: 0 K/UL (ref 0–0.5)
EOSINOPHIL NFR BLD: 1.4 % (ref 0–8)
ERYTHROCYTE [DISTWIDTH] IN BLOOD BY AUTOMATED COUNT: 15.9 % (ref 11.5–14.5)
HCT VFR BLD AUTO: 20 % (ref 37–48.5)
HGB BLD-MCNC: 6.3 G/DL (ref 12–16)
LYMPHOCYTES # BLD AUTO: 0.7 K/UL (ref 1–4.8)
LYMPHOCYTES NFR BLD: 33 % (ref 18–48)
MAGNESIUM SERPL-MCNC: 1.2 MG/DL (ref 1.6–2.6)
MCH RBC QN AUTO: 29.7 PG (ref 27–31)
MCHC RBC AUTO-ENTMCNC: 31.7 G/DL (ref 32–36)
MCV RBC AUTO: 94 FL (ref 82–98)
MONOCYTES # BLD AUTO: 0.3 K/UL (ref 0.3–1)
MONOCYTES NFR BLD: 12.6 % (ref 4–15)
NEUTROPHILS # BLD AUTO: 1.1 K/UL (ref 1.8–7.7)
NEUTROPHILS NFR BLD: 52.5 % (ref 38–73)
PHOSPHATE SERPL-MCNC: 2.4 MG/DL (ref 2.7–4.5)
PLATELET # BLD AUTO: 209 K/UL (ref 150–350)
PMV BLD AUTO: 7.6 FL (ref 9.2–12.9)
RBC # BLD AUTO: 2.12 M/UL (ref 4–5.4)
TRANS ERYTHROCYTES VOL PATIENT: NORMAL ML
TRANS ERYTHROCYTES VOL PATIENT: NORMAL ML
WBC # BLD AUTO: 2.15 K/UL (ref 3.9–12.7)

## 2019-11-27 PROCEDURE — 97530 THERAPEUTIC ACTIVITIES: CPT

## 2019-11-27 PROCEDURE — 45331 PR SIGMOIDOSCOPY,BIOPSY: ICD-10-PCS | Mod: 51,,, | Performed by: INTERNAL MEDICINE

## 2019-11-27 PROCEDURE — 88305 TISSUE EXAM BY PATHOLOGIST: CPT | Mod: 26,,, | Performed by: PATHOLOGY

## 2019-11-27 PROCEDURE — 86850 RBC ANTIBODY SCREEN: CPT

## 2019-11-27 PROCEDURE — 43235 EGD DIAGNOSTIC BRUSH WASH: CPT | Performed by: INTERNAL MEDICINE

## 2019-11-27 PROCEDURE — 37000009 HC ANESTHESIA EA ADD 15 MINS: Performed by: INTERNAL MEDICINE

## 2019-11-27 PROCEDURE — 27201012 HC FORCEPS, HOT/COLD, DISP: Performed by: INTERNAL MEDICINE

## 2019-11-27 PROCEDURE — 85025 COMPLETE CBC W/AUTO DIFF WBC: CPT

## 2019-11-27 PROCEDURE — 86920 COMPATIBILITY TEST SPIN: CPT

## 2019-11-27 PROCEDURE — 88305 TISSUE EXAM BY PATHOLOGIST: CPT | Performed by: PATHOLOGY

## 2019-11-27 PROCEDURE — 43235 PR EGD, FLEX, DIAGNOSTIC: ICD-10-PCS | Mod: ,,, | Performed by: INTERNAL MEDICINE

## 2019-11-27 PROCEDURE — S0030 INJECTION, METRONIDAZOLE: HCPCS | Performed by: HOSPITALIST

## 2019-11-27 PROCEDURE — 63600175 PHARM REV CODE 636 W HCPCS: Performed by: ORTHOPAEDIC SURGERY

## 2019-11-27 PROCEDURE — 97165 OT EVAL LOW COMPLEX 30 MIN: CPT

## 2019-11-27 PROCEDURE — 25000003 PHARM REV CODE 250: Performed by: HOSPITALIST

## 2019-11-27 PROCEDURE — 83735 ASSAY OF MAGNESIUM: CPT

## 2019-11-27 PROCEDURE — 63600175 PHARM REV CODE 636 W HCPCS: Mod: JG | Performed by: NURSE ANESTHETIST, CERTIFIED REGISTERED

## 2019-11-27 PROCEDURE — 45331 SIGMOIDOSCOPY AND BIOPSY: CPT | Mod: 51,,, | Performed by: INTERNAL MEDICINE

## 2019-11-27 PROCEDURE — 96376 TX/PRO/DX INJ SAME DRUG ADON: CPT

## 2019-11-27 PROCEDURE — 97161 PT EVAL LOW COMPLEX 20 MIN: CPT

## 2019-11-27 PROCEDURE — 11000001 HC ACUTE MED/SURG PRIVATE ROOM

## 2019-11-27 PROCEDURE — 45380 COLONOSCOPY AND BIOPSY: CPT | Performed by: INTERNAL MEDICINE

## 2019-11-27 PROCEDURE — 84100 ASSAY OF PHOSPHORUS: CPT

## 2019-11-27 PROCEDURE — 88305 TISSUE EXAM BY PATHOLOGIST: ICD-10-PCS | Mod: 26,,, | Performed by: PATHOLOGY

## 2019-11-27 PROCEDURE — 43235 EGD DIAGNOSTIC BRUSH WASH: CPT | Mod: ,,, | Performed by: INTERNAL MEDICINE

## 2019-11-27 PROCEDURE — A4216 STERILE WATER/SALINE, 10 ML: HCPCS | Performed by: HOSPITALIST

## 2019-11-27 PROCEDURE — 63600175 PHARM REV CODE 636 W HCPCS: Performed by: NURSE PRACTITIONER

## 2019-11-27 PROCEDURE — 97535 SELF CARE MNGMENT TRAINING: CPT

## 2019-11-27 PROCEDURE — 25000003 PHARM REV CODE 250: Performed by: NURSE PRACTITIONER

## 2019-11-27 PROCEDURE — 63600175 PHARM REV CODE 636 W HCPCS: Performed by: INTERNAL MEDICINE

## 2019-11-27 PROCEDURE — 96361 HYDRATE IV INFUSION ADD-ON: CPT

## 2019-11-27 PROCEDURE — P9021 RED BLOOD CELLS UNIT: HCPCS

## 2019-11-27 PROCEDURE — 37000008 HC ANESTHESIA 1ST 15 MINUTES: Performed by: INTERNAL MEDICINE

## 2019-11-27 RX ORDER — PHENYLEPHRINE HYDROCHLORIDE 10 MG/ML
INJECTION INTRAVENOUS
Status: DISCONTINUED | OUTPATIENT
Start: 2019-11-27 | End: 2019-11-27

## 2019-11-27 RX ORDER — PROPOFOL 10 MG/ML
VIAL (ML) INTRAVENOUS
Status: DISCONTINUED | OUTPATIENT
Start: 2019-11-27 | End: 2019-11-27

## 2019-11-27 RX ORDER — LIDOCAINE HCL/PF 100 MG/5ML
SYRINGE (ML) INTRAVENOUS
Status: DISCONTINUED | OUTPATIENT
Start: 2019-11-27 | End: 2019-11-27

## 2019-11-27 RX ORDER — HYDROCODONE BITARTRATE AND ACETAMINOPHEN 500; 5 MG/1; MG/1
TABLET ORAL
Status: DISCONTINUED | OUTPATIENT
Start: 2019-11-27 | End: 2019-11-29 | Stop reason: HOSPADM

## 2019-11-27 RX ORDER — PROPOFOL 10 MG/ML
VIAL (ML) INTRAVENOUS CONTINUOUS PRN
Status: DISCONTINUED | OUTPATIENT
Start: 2019-11-27 | End: 2019-11-27

## 2019-11-27 RX ORDER — AZATHIOPRINE 50 MG/1
50 TABLET ORAL DAILY
Status: DISCONTINUED | OUTPATIENT
Start: 2019-11-28 | End: 2019-11-29 | Stop reason: HOSPADM

## 2019-11-27 RX ORDER — PREDNISONE 10 MG/1
10 TABLET ORAL DAILY
Status: DISCONTINUED | OUTPATIENT
Start: 2019-11-28 | End: 2019-11-29 | Stop reason: HOSPADM

## 2019-11-27 RX ORDER — SODIUM CHLORIDE 9 MG/ML
INJECTION, SOLUTION INTRAVENOUS CONTINUOUS PRN
Status: DISCONTINUED | OUTPATIENT
Start: 2019-11-27 | End: 2019-11-27

## 2019-11-27 RX ORDER — METHYLENE BLUE 5 MG/ML
INJECTION INTRAVENOUS
Status: DISCONTINUED | OUTPATIENT
Start: 2019-11-27 | End: 2019-11-27

## 2019-11-27 RX ORDER — PREDNISONE 5 MG/1
5 TABLET ORAL DAILY
Status: DISCONTINUED | OUTPATIENT
Start: 2019-12-03 | End: 2019-11-29 | Stop reason: HOSPADM

## 2019-11-27 RX ADMIN — METHYLENE BLUE 50 MG: 5 INJECTION INTRAVENOUS at 04:11

## 2019-11-27 RX ADMIN — Medication 10 ML: at 12:11

## 2019-11-27 RX ADMIN — Medication 10 ML: at 06:11

## 2019-11-27 RX ADMIN — PANTOPRAZOLE SODIUM 40 MG: 40 TABLET, DELAYED RELEASE ORAL at 09:11

## 2019-11-27 RX ADMIN — CEFAZOLIN SODIUM 2 G: 2 SOLUTION INTRAVENOUS at 05:11

## 2019-11-27 RX ADMIN — AZATHIOPRINE 100 MG: 50 TABLET ORAL at 05:11

## 2019-11-27 RX ADMIN — METRONIDAZOLE 500 MG: 500 INJECTION, SOLUTION INTRAVENOUS at 05:11

## 2019-11-27 RX ADMIN — METRONIDAZOLE 500 MG: 500 INJECTION, SOLUTION INTRAVENOUS at 09:11

## 2019-11-27 RX ADMIN — ENOXAPARIN SODIUM 40 MG: 100 INJECTION SUBCUTANEOUS at 05:11

## 2019-11-27 RX ADMIN — PROPOFOL 125 MCG/KG/MIN: 10 INJECTION, EMULSION INTRAVENOUS at 04:11

## 2019-11-27 RX ADMIN — DONEPEZIL HYDROCHLORIDE 10 MG: 5 TABLET, FILM COATED ORAL at 08:11

## 2019-11-27 RX ADMIN — ASPIRIN 81 MG: 81 TABLET, COATED ORAL at 09:11

## 2019-11-27 RX ADMIN — LIDOCAINE HYDROCHLORIDE 75 MG: 20 INJECTION, SOLUTION INTRAVENOUS at 04:11

## 2019-11-27 RX ADMIN — SODIUM CHLORIDE: 0.9 INJECTION, SOLUTION INTRAVENOUS at 06:11

## 2019-11-27 RX ADMIN — METRONIDAZOLE 500 MG: 500 INJECTION, SOLUTION INTRAVENOUS at 12:11

## 2019-11-27 RX ADMIN — SODIUM CHLORIDE: 0.9 INJECTION, SOLUTION INTRAVENOUS at 04:11

## 2019-11-27 RX ADMIN — PROPOFOL 60 MG: 10 INJECTION, EMULSION INTRAVENOUS at 04:11

## 2019-11-27 RX ADMIN — Medication 10 ML: at 11:11

## 2019-11-27 RX ADMIN — MEMANTINE HYDROCHLORIDE 10 MG: 5 TABLET ORAL at 09:11

## 2019-11-27 RX ADMIN — QUETIAPINE FUMARATE 100 MG: 100 TABLET ORAL at 08:11

## 2019-11-27 RX ADMIN — PHENYLEPHRINE HYDROCHLORIDE 100 MCG: 10 INJECTION INTRAVENOUS at 04:11

## 2019-11-27 RX ADMIN — CEFAZOLIN SODIUM 2 G: 2 SOLUTION INTRAVENOUS at 06:11

## 2019-11-27 RX ADMIN — SODIUM CHLORIDE: 0.9 INJECTION, SOLUTION INTRAVENOUS at 11:11

## 2019-11-27 RX ADMIN — FERROUS SULFATE TAB EC 325 MG (65 MG FE EQUIVALENT) 325 MG: 325 (65 FE) TABLET DELAYED RESPONSE at 08:11

## 2019-11-27 RX ADMIN — CEFAZOLIN SODIUM 2 G: 2 SOLUTION INTRAVENOUS at 11:11

## 2019-11-27 RX ADMIN — FERROUS SULFATE TAB EC 325 MG (65 MG FE EQUIVALENT) 325 MG: 325 (65 FE) TABLET DELAYED RESPONSE at 09:11

## 2019-11-27 RX ADMIN — PREDNISONE 10 MG: 10 TABLET ORAL at 09:11

## 2019-11-27 NOTE — PLAN OF CARE
Patient's HH down to 6.3/20.0 this morning and is suppose to have endo procedure. VN informed bedside nurse, Dr. Leblanc, and endo nurse of pt status. Pt to receive blood transfusion prior to procedure. Bedside nurse and Shira from endo notified. Will cont to be available as needed.

## 2019-11-27 NOTE — PLAN OF CARE
POC reviewed with patient; verbalizes understanding. Reinforcement needed. Intermittent periods of confusion. Disoriented to time and situation, intermittently. Compliant with IV medication administration. 0.5 mg Ativan administered IV before MRI; patient tolerated well. WoundVac placed by Physician and ON. Wound to buttocks cleansed and dressing changed; patient tolerated remarkably well. Turns with minimal assistance. Safety precautions in place; call light within reach, bed in low position, wheels locked, side rails up x2. Will continue to monitor.

## 2019-11-27 NOTE — PT/OT/SLP EVAL
"Occupational Therapy   Evaluation/Treatment     Name: Anabella Aranda  MRN: [id number]  Admitting Diagnosis:  Vaginal fistula Day of Surgery    Recommendations:     Discharge Recommendations: nursing facility, skilled  Discharge Equipment Recommendations:  wheelchair  Barriers to discharge:  Decreased caregiver support    Assessment:     Anabella Aranda is a 74 y.o. female with a medical diagnosis of Vaginal fistula.  She presents with open L knee wound with wound vac following TKA. Performance deficits affecting function: weakness, impaired self care skills, impaired balance, impaired endurance, impaired functional mobilty, gait instability, decreased lower extremity function, pain, edema, decreased safety awareness, impaired cognition, orthopedic precautions, impaired skin, decreased ROM.      Pt would benefit from cont OT services in order to maximize functional independence. Recommending SNF at this time. Limited evaluation also 2/2 low H&H and Pt kept NWB to LLE at this time until further clarification obtained from MD.     Rehab Prognosis: Good; patient would benefit from acute skilled OT services to address these deficits and reach maximum level of function.       Plan:     Patient to be seen 5 x/week to address the above listed problems via self-care/home management, therapeutic activities, therapeutic exercises  · Plan of Care Expires: 12/27/19  · Plan of Care Reviewed with: patient    Subjective     Chief Complaint: Pt states, " I need somebody to clean me up. I haven't been cleaned up."  Pt states that she was told she was NWB to LLE at [facility]   Patient/Family Comments/goals: none stated at this time     Occupational Profile:  Living Environment: son, SSH, no steps, t/s combo  Previous level of function: Son reports that HH therapy has stopped when wound was opened; states pt was requiring assist with ADLs, but able to ambulate with RW and supervision   Equipment Used at Home:  walker, rolling, " bath bench, bedside commode, cane, straight  Assistance upon Discharge: from son     Pain/Comfort:  · Pain Rating 1: 0/10    Patients cultural, spiritual, Christianity conflicts given the current situation:      Objective:     Communicated with: nsignacia prior to session.    General Precautions: Standard, fall   Orthopedic Precautions:(pt kept NWB to LLE 2/2 wound; need clarification from MD )   Braces: N/A     Occupational Performance:    Bed Mobility:    · Patient completed Rolling/Turning to Left with  contact guard assistance and minimum assistance  · Patient completed Rolling/Turning to Right with contact guard assistance and minimum assistance  · Patient completed Scooting/Bridging with contact guard assistance  · Patient completed Supine to Sit with minimum assistance  · Patient completed Sit to Supine with minimum assistance and moderate assistance    Functional Mobility/Transfers:  · Did not perform     Activities of Daily Living:  · Bathing: maximal assistance pt able to assist with UEs and chest   · Upper Body Dressing: supervision jannette down   · Lower Body Dressing: total assistance jannette socks   · Toileting: total assistance pt soiled with BM; unable to reach to assist with cleaning     Cognitive/Visual Perceptual:  Cognitive/Psychosocial Skills:     -       Oriented to: Person, Place and Situation   -       Follows Commands/attention:Follows multistep  commands  -       Communication: clear/fluent  -       Memory: Impaired STM, Impaired LTM and Poor immediate recall  -       Safety awareness/insight to disability: impaired   -       Mood/Affect/Coping skills/emotional control: Appropriate to situation    Physical Exam:  Balance:    -       SBA supervision sitting balance  Postural examination/scapula alignment:    -       Rounded shoulders  -       Forward head  Skin integrity: wound LLE   Edema:  surgical/wound LLE   Upper Extremity Range of Motion:   BUE ROM WFL for pt's needs   Upper Extremity Strength:  BUE  grossly 4-/5    Strength:  Good     AMPAC 6 Click ADL:  AMPAC Total Score: 17    Treatment & Education:  Pt found supine; soiled in BM   Assisted pt with bathing and changing of linens/gowns; rolled to R and L side multiple times with CGA/Min A; pt able to hold self in sidelying for ger cleaning   Pt assist with bathing; able to assist with trunk and BUEs   Sup<-->sit Min A/mod A for BLEs  Pt reporting she is NWB to LLE; Sat EOB supervision  Reviewed UE Therex for performance while supine and/or when therapy not in room   Lateral seated EOB scooting performed with LLE NWB x 4 trials with CGA     Education:    Patient left L sidelying with all lines intact, call button in reach, bed alarm on and nsg notified    GOALS:   Multidisciplinary Problems     Occupational Therapy Goals        Problem: Occupational Therapy Goal    Goal Priority Disciplines Outcome Interventions   Occupational Therapy Goal     OT, PT/OT Ongoing, Progressing    Description:  Goals to be met by: 12/27/19     Patient will increase functional independence with ADLs by performing:    LE Dressing with Minimal Assistance.  Toileting from bedside commode with Minimal Assistance for hygiene and clothing management.   Supine to sit with Modified [place].  Step transfer with Minimal Assistance  Toilet transfer to bedside commode with Minimal Assistance.  Increased functional strength to WFL for self care skills and functional mobility.  Upper extremity exercise program x10 reps per handout, with independence.                      History:     Past Medical History:   Diagnosis Date    Arthritis     C. difficile colitis 10/13/2014    4/15/2015    Chronic kidney disease, stage 3     Dementia     Hepatitis B     Hypertension     Major depression with psychotic features     Ulcerative colitis 03/2014       Past Surgical History:   Procedure Laterality Date    CHOLECYSTECTOMY      COLONOSCOPY N/A 4/29/2016    Procedure: COLONOSCOPY;   Surgeon: Umm Arenas MD;  Location: [facility] ENDO;  Service: Endoscopy;  Laterality: N/A;    ESOPHAGOGASTRODUODENOSCOPY N/A 1/25/2019    Procedure: ESOPHAGOGASTRODUODENOSCOPY (EGD);  Surgeon: Jenise Hallman MD;  Location: [facility] ENDO;  Service: Endoscopy;  Laterality: N/A;    ESOPHAGOGASTRODUODENOSCOPY N/A 10/21/2019    Procedure: EGD (ESOPHAGOGASTRODUODENOSCOPY);  Surgeon: Og Carrera MD;  Location: [facility] ENDO;  Service: Colon and Rectal;  Laterality: N/A;    FLEXIBLE SIGMOIDOSCOPY N/A 1/25/2019    Procedure: SIGMOIDOSCOPY, FLEXIBLE;  Surgeon: Jenise Hallman MD;  Location: [facility] ENDO;  Service: Endoscopy;  Laterality: N/A;    FLEXIBLE SIGMOIDOSCOPY N/A 10/21/2019    Procedure: SIGMOIDOSCOPY, FLEXIBLE;  Surgeon: Og Carrera MD;  Location: [facility] ENDO;  Service: Colon and Rectal;  Laterality: N/A;    BETZY FILTER PLACEMENT Right 01/2014    JOINT REPLACEMENT      KNEE ARTHROPLASTY Left 10/16/2019    Procedure: ARTHROPLASTY, KNEE;  Surgeon: Ghassan Lindsay MD;  Location: [facility] OR;  Service: Orthopedics;  Laterality: Left;  Franco notifiedconfirmed with Franco  [phone] 10/15/19 kb    LAPAROSCOPIC TOTAL COLECTOMY  06/24/2016    NASAL SINUS SURGERY      TONSILLECTOMY      TOTAL KNEE ARTHROPLASTY Right 04/07/2008    TOTAL KNEE ARTHROPLASTY Left 10/16/2019       Time Tracking:     OT Date of Treatment: 11/27/19  OT Start Time: 1032  OT Stop Time: 1111  OT Total Time (min): 39 min    Billable Minutes:Evaluation 10  Self Care/Home Management 15  Therapeutic Activity 14    Marlyn Galarza, OT  11/27/2019

## 2019-11-27 NOTE — PLAN OF CARE
Report taken from Maria Isabel (nurse on floor in c/o patient). NPO status appropriate for procedures, IV  Access available, no prep ordered. Patient unable to sign consents, contact info for son obtained.   19-Sep-2017 11:31

## 2019-11-27 NOTE — [employer] PATIENT INSTRUCTIONS
Discharge Summary/Instructions after an Endoscopic Procedure  Patient Name: Anabella Aranda  Patient MRN: [id number]  Patient YOB: 1945 Wednesday, November 27, 2019  Og Carrera MD  RESTRICTIONS:  During your procedure today, you received medications for sedation.  These   medications may affect your judgment, balance and coordination.  Therefore,   for 24 hours, you have the following restrictions:   - DO NOT drive a car, operate machinery, make legal/financial decisions,   sign important papers or drink alcohol.    ACTIVITY:  Today: no heavy lifting, straining or running due to procedural   sedation/anesthesia.  The following day: return to full activity including work.  DIET:  Eat and drink normally unless instructed otherwise.     TREATMENT FOR COMMON SIDE EFFECTS:  - Mild abdominal pain, nausea, belching, bloating or excessive gas:  rest,   eat lightly and use a heating pad.  - Sore Throat: treat with throat lozenges and/or gargle with warm salt   water.  - Because air was used during the procedure, expelling large amounts of air   from your rectum or belching is normal.  - If a bowel prep was taken, you may not have a bowel movement for 1-3 days.    This is normal.  SYMPTOMS TO WATCH FOR AND REPORT TO YOUR PHYSICIAN:  1. Abdominal pain or bloating, other than gas cramps.  2. Chest pain.  3. Back pain.  4. Signs of infection such as: chills or fever occurring within 24 hours   after the procedure.  5. Rectal bleeding, which would show as bright red, maroon, or black stools.   (A tablespoon of blood from the rectum is not serious, especially if   hemorrhoids are present.)  6. Vomiting.  7. Weakness or dizziness.  GO DIRECTLY TO THE NEAREST EMERGENCY ROOM IF YOU HAVE ANY OF THE FOLLOWING:      Difficulty breathing              Chills and/or fever over 101 F   Persistent vomiting and/or vomiting blood   Severe abdominal pain   Severe chest pain   Black, tarry stools   Bleeding- more than one  tablespoon   Any other symptom or condition that you feel may need urgent attention  Your doctor recommends these additional instructions:  If any biopsies were taken, your doctors clinic will contact you in 1 to 2   weeks with any results.  - [facility] patient to [facility] for [facility].   - Resume previous diet.   - Continue present medications.   - Perform a flexible sigmoidoscopy today.  For questions, problems or results please call your physician - Og Carrera MD at Work:  [phone].  EMERGENCY PHONE NUMBER: [phone],  LAB RESULTS: [phone]  IF A COMPLICATION OR EMERGENCY SITUATION ARISES AND YOU ARE UNABLE TO REACH   YOUR PHYSICIAN - GO DIRECTLY TO THE EMERGENCY ROOM.  Og Carrera MD  11/27/2019 4:25:24 PM  This report has been verified and signed electronically.  [employer]

## 2019-11-27 NOTE — PLAN OF CARE
Problem: Physical Therapy Goal  Goal: Physical Therapy Goal  Description  Goals to be met by: discharge date     Patient will increase functional independence with mobility by performin. Supine to sit with Stand-by Assistance  2. Sit to supine with Stand-by Assistance  3. Rolling to Left and Right with Modified [place].  4. Sit to stand transfer with Contact Guard Assistance  5. Bed to chair transfer with Contact Guard Assistance using Rolling Walker w appropriate WB status maintained.  6. Gait  x 10 feet with Contact Guard Assistance using Rolling Walker with appropriate WB status maintained.  7. Lower extremity exercise program x15 reps    Outcome: Ongoing, Progressing       PT Evaluation performed.  Pt currently with plans for blood transfusion and endo procedure per chart. Rec SNF at this time with ongoing assessment for appropriate recommendations dependent on medical progress.

## 2019-11-27 NOTE — ANESTHESIA POSTPROCEDURE EVALUATION
Anesthesia Post Evaluation    Patient: Anabella Aranda    Procedure(s) Performed: Procedure(s) (LRB):  EGD (ESOPHAGOGASTRODUODENOSCOPY) (N/A)  SIGMOIDOSCOPY, FLEXIBLE (N/A)    Final Anesthesia Type: MAC    Patient location during evaluation: GI PACU  Patient participation: Yes- Able to Participate  Level of consciousness: awake and alert  Post-procedure vital signs: reviewed and stable  Pain management: adequate  Airway patency: patent    PONV status at discharge: No PONV  Anesthetic complications: no      Cardiovascular status: blood pressure returned to baseline  Respiratory status: unassisted, spontaneous ventilation and room air  Hydration status: euvolemic  Follow-up not needed.          Vitals Value Taken Time   /58 11/27/2019  4:12 PM   Temp 36.6 °C (97.9 °F) 11/27/2019  4:12 PM   Pulse 123 11/27/2019  4:12 PM   Resp 18 11/27/2019  4:12 PM   SpO2 100 % 11/27/2019  4:12 PM         No case tracking events are documented in the log.      Pain/Dimitry Score: No data recorded

## 2019-11-27 NOTE — PROGRESS NOTES
Attempted MRI pelvis twice. Second time with sedation medication. Pt would not follow commands to hold still and was uncooperative. No diagnostic images were obtained.

## 2019-11-27 NOTE — TRANSFER OF CARE
"Anesthesia Transfer of Care Note    Patient: Anabella Aranda    Procedure(s) Performed: Procedure(s) (LRB):  EGD (ESOPHAGOGASTRODUODENOSCOPY) (N/A)  SIGMOIDOSCOPY, FLEXIBLE (N/A)    Patient location: GI    Anesthesia Type: MAC    Transport from OR: Transported from OR on room air with adequate spontaneous ventilation    Post pain: adequate analgesia    Post assessment: no apparent anesthetic complications    Post vital signs: stable    Level of consciousness: awake and alert    Nausea/Vomiting: no nausea/vomiting    Complications: none    Transfer of care protocol was followed      Last vitals:   Visit Vitals  BP (!) 122/58 (BP Location: Left arm, Patient Position: Lying)   Pulse (!) 123   Temp 36.6 °C (97.9 °F) (Temporal)   Resp 18   Ht 5' 4" (1.626 m)   Wt 86.2 kg (190 lb 0.6 oz)   LMP  (LMP Unknown)   SpO2 100%   Breastfeeding? No   BMI 32.62 kg/m²     "

## 2019-11-27 NOTE — OR NURSING
Methelyne Blue sprayed into rectum to assess for vaginal/rectal fistula. Dr Ramos at bedside. No evidence of vaginal fistula.

## 2019-11-27 NOTE — PLAN OF CARE
Report given to NAEL Izaguirre about the outcome of the patient's procedure. Patient's VSS and no complaints of pain or discomfort noted. Patient ready to be transferred back to patients room 459.

## 2019-11-27 NOTE — NURSING
Patient noncompliant with MRI after Ativan administration. SIVAKUMAR Corley and Dr. Leblanc notified. Will also report to oncoming nurse.

## 2019-11-27 NOTE — PROGRESS NOTES
"[facility]  Orthopedics  Progress Note    Patient Name: Anabella Aranda  MRN: [id number]  Admission Date: 11/25/2019  Hospital Length of Stay: 0 days  Attending Provider: Herberth Leblanc DO  Primary Care Provider: Shon Brambila MD  Follow-up For: Procedure(s) (LRB):  EGD (ESOPHAGOGASTRODUODENOSCOPY) (N/A)  SIGMOIDOSCOPY, FLEXIBLE (N/A)    Post-Operative Day: Day of Surgery  Subjective:     Principal Problem:Vaginal fistula    Principal Orthopedic Problem:  Open wound left total knee    Interval History:  The patient reports that her knee is comfortable.    Review of patient's allergies indicates:   Allergen Reactions    Sulfa (sulfonamide antibiotics) Swelling       Current Facility-Administered Medications   Medication    0.9%  NaCl infusion (for blood administration)    0.9%  NaCl infusion    acetaminophen tablet 650 mg    aspirin EC tablet 81 mg    azaTHIOprine tablet 100 mg    cefazolin (ANCEF) 2 gram in dextrose 5% 50 mL IVPB (premix)    donepezil tablet 10 mg    enoxaparin injection 40 mg    ferrous sulfate EC tablet 325 mg    lorazepam (ATIVAN) injection 0.5 mg    memantine tablet 10 mg    metoprolol injection 5 mg    metronidazole IVPB 500 mg    ondansetron injection 4 mg    pantoprazole EC tablet 40 mg    predniSONE tablet 10 mg    QUEtiapine tablet 100 mg    sodium chloride 0.9% flush 10 mL    sodium chloride 0.9% flush 10 mL    And    sodium chloride 0.9% flush 10 mL     Objective:     Vital Signs (Most Recent):  Temp: 99.2 °F (37.3 °C) (11/27/19 1253)  Pulse: (!) 119 (11/27/19 1253)  Resp: 18 (11/27/19 1253)  BP: (!) 95/55 (11/27/19 1253)  SpO2: 97 % (11/27/19 1253) Vital Signs (24h Range):  Temp:  [97.9 °F (36.6 °C)-99.6 °F (37.6 °C)] 99.2 °F (37.3 °C)  Pulse:  [104-132] 119  Resp:  [16-20] 18  SpO2:  [95 %-100 %] 97 %  BP: ()/(49-71) 95/55     Weight: 86.2 kg (190 lb 0.6 oz)  Height: 5' 4" (162.6 cm)  Body mass index is 32.62 kg/m².      Intake/Output " Summary (Last 24 hours) at 11/27/2019 1417  Last data filed at 11/27/2019 0441  Gross per 24 hour   Intake --   Output 600 ml   Net -600 ml       Ortho/SPM Exam     The patient is Wound VAC dressing appears intact.    Significant Labs: All pertinent labs within the past 24 hours have been reviewed.    Significant Imaging: None    Assessment/Plan:     Active Diagnoses:    Diagnosis Date Noted POA    PRINCIPAL PROBLEM:  Vaginal fistula [N82.8] 11/25/2019 Yes    Open wound of left knee [S81.002A] 11/26/2019 Yes    Tachycardia [R00.0] 11/25/2019 Yes    Wound dehiscence, surgical, sequela [T81.31XS]  Not Applicable    Abdominal pain [R10.9] 11/01/2019 Yes    Crohn's disease of small and large intestines with complication [K50.819] 10/25/2019 Yes     Chronic    History of left knee replacement [Z96.652] 10/16/2019 Not Applicable    Alzheimers disease [G30.9, F02.80]  Yes     Chronic    Chronic kidney disease, stage 3 [N18.3]  Yes     Chronic    GERD (gastroesophageal reflux disease) [K21.9] 09/24/2019 Yes      Problems Resolved During this Admission:     I discussed the patient's overall condition with her gastroenterologist.  We agreed that her intense flare of inflammatory bowel disease is compromising her ability rehabilitate from her knee surgery as well as heal her wound. Although further surgical care for the left knee may be needed, we agree that her gastrointestinal condition takes precedence.  We expect that by addressing the inflammatory bowel disease, patient's nutritional status will improve resulting in healing of her knee wound without further surgery.      Considering the above I recommend the patient continue with wound VAC management and have 2 weeks of intravenous cephazolin to control colonization of the superficial tissues.    She should follow up with me in 2 weeks.    Ghassan Lindsay MD  Orthopedics  [facility]

## 2019-11-27 NOTE — PLAN OF CARE
VN rounds: VN cued into pt's room. Pt currently being cleaned by staff. Will attempt rounds at a later time. Will cont to be available and intervene as needed.        11/27/19 1051   Type of Frequent Check   Type Patient Rounds;Other (see comments)  (VN rounds: pt being cleaned)

## 2019-11-27 NOTE — PROGRESS NOTES
[facility] Hospital Medicine  Progress Note    Patient Name: Anabella Aranda  MRN: [id number]  Patient Class: IP- Inpatient   Admission Date: 11/25/2019  Length of Stay: 0 days  Attending Physician: Herberth Leblanc DO  Primary Care Provider: Shon Brambila MD        Subjective:     Principal Problem:Vaginal fistula        HPI:  74 y.o. female with past medical history of arthritis, C. difficile colitis, chronic kidney disease, stage 3, dementia, hepatitis b, hypertension, major depression, chron's  Disease, s/p left TKA with Dr. Lindsay on 11/16/19 with wound dehiscence complication.  The patient presents to ED complaining of intermittent, aching mid abdominal pain with associated feces from vagina.  Per patient's son reports symptoms started about three days ago.  Patient's GI physician, Dr. Carrera, was notified of patient symptoms and recommended patient report to ED.  Denies nausea, vomiting, fever, chills, cough, similar past episodes, aggravating or alleviating factors.  Patient's son also noticed that wound vac to left knee is not functioning properly.  ED findings: H/H 10.0/31.2, Sed rate 79, glucose 144, Alk Phos 150, Lipase 64, CRP 86.3, CT Abdomen showed no acute intra-abdominal abnormalities identified. On exam, patient AAOx3 with intermittent confusion, states she lives alone and her son lives close and helps with her care.  She does report having a home health nurse also. She currently reports no abdominal pain. Patient admitted to hospital medicine, under observation status, for further medical management.    Overview/Hospital Course:  11/26 pt seen, will place a picc line and consult nutrition for possible need for TPN, await GI input regarding scope to evaluate for possible fistula. Ortho on the case, will place a wound vac on the pt as well.. Adding flagyl to cover for intestinal micros  11/27 pt seen doing ok, got PRBC transfused, had EGD today, will f/u on official  results    Interval History:       Review of Systems   Constitutional: Positive for activity change and fatigue. Negative for fever.   HENT: Negative for congestion.    Respiratory: Negative for apnea and shortness of breath.    Cardiovascular: Negative for chest pain and palpitations.   Gastrointestinal: Negative for abdominal distention and abdominal pain.        Stool per vagina     Genitourinary: Negative for difficulty urinating and frequency.   Musculoskeletal: Positive for arthralgias.   Neurological: Negative for dizziness and numbness.   Psychiatric/Behavioral: Negative for agitation and confusion. The patient is not nervous/anxious.      Objective:     Vital Signs (Most Recent):  Temp: 97.9 °F (36.6 °C) (11/27/19 1702)  Pulse: 110 (11/27/19 1717)  Resp: (!) 22 (11/27/19 1717)  BP: 121/62 (11/27/19 1717)  SpO2: 100 % (11/27/19 1717) Vital Signs (24h Range):  Temp:  [97.9 °F (36.6 °C)-99.6 °F (37.6 °C)] 97.9 °F (36.6 °C)  Pulse:  [104-132] 110  Resp:  [16-22] 22  SpO2:  [97 %-100 %] 100 %  BP: ()/(49-84) 121/62     Weight: 86.2 kg (190 lb 0.6 oz)  Body mass index is 32.62 kg/m².    Intake/Output Summary (Last 24 hours) at 11/27/2019 1730  Last data filed at 11/27/2019 1511  Gross per 24 hour   Intake 258.75 ml   Output 600 ml   Net -341.25 ml      Physical Exam   Constitutional: She is oriented to person, place, and time. She appears well-developed and well-nourished.   HENT:   Head: Normocephalic and atraumatic.   Eyes: EOM are normal.   Neck: Neck supple.   Cardiovascular: Normal rate.   Pulmonary/Chest: Effort normal. No respiratory distress.   Abdominal: Soft. She exhibits no distension.   Musculoskeletal: Normal range of motion.   Wound vac in place   Neurological: She is alert and oriented to person, place, and time.   Skin: Skin is warm and dry.   Psychiatric: She has a normal mood and affect. Her behavior is normal. Judgment and thought content normal.   Nursing note and vitals  reviewed.      Significant Labs:   Recent Labs   Lab 11/25/19  1511 11/26/19 0317 11/27/19  0652   WBC 5.49 3.56* 2.15*   HGB 10.0* 8.7* 6.3*   HCT 31.2* 27.4* 20.0*    260 209     Recent Labs   Lab 11/25/19  1511 11/26/19 0317 11/27/19  0652    137  --    K 4.3 3.9  --     102  --    CO2 24 25  --    BUN 10 8  --    CREATININE 1.1 0.9  --    * 146*  --    CALCIUM 9.1 8.1*  --    MG  --  1.5* 1.2*   PHOS  --  3.3 2.4*   LIPASE 64* 35  --      Recent Labs   Lab 11/25/19  1511 11/26/19 0317   ALKPHOS 150* 117   ALT 15 12   AST 14 11   ALBUMIN 2.6* 2.1*   PROT 6.8 5.6*   BILITOT 0.6 0.4      No results for input(s): CPK, CPKMB, MB, TROPONINI in the last 72 hours.  No results for input(s): POCTGLUCOSE in the last 168 hours.  Hemoglobin A1C   Date Value Ref Range Status   11/01/2019 5.7 (H) 4.0 - 5.6 % Final     Comment:     ADA Screening Guidelines:  5.7-6.4%  Consistent with prediabetes  >or=6.5%  Consistent with diabetes  High levels of fetal hemoglobin interfere with the HbA1C  assay. Heterozygous hemoglobin variants (HbS, HgC, etc)do  not significantly interfere with this assay.   However, presence of multiple variants may affect accuracy.     02/04/2019 5.0 4.0 - 5.6 % Final     Comment:     ADA Screening Guidelines:  5.7-6.4%  Consistent with prediabetes  >or=6.5%  Consistent with diabetes  High levels of fetal hemoglobin interfere with the HbA1C  assay. Heterozygous hemoglobin variants (HbS, HgC, etc)do  not significantly interfere with this assay.   However, presence of multiple variants may affect accuracy.     04/23/2018 5.0 4.0 - 5.6 % Final     Comment:     According to ADA guidelines, hemoglobin A1c <7.0% represents  optimal control in non-pregnant diabetic patients. Different  metrics may apply to specific patient populations.   Standards of Medical Care in Diabetes-2016.  For the purpose of screening for the presence of diabetes:  <5.7%     Consistent with the absence of  diabetes  5.7-6.4%  Consistent with increasing risk for diabetes   (prediabetes)  >or=6.5%  Consistent with diabetes  Currently, no consensus exists for use of hemoglobin A1c  for diagnosis of diabetes for children.  This Hemoglobin A1c assay has significant interference with fetal   hemoglobin   (HbF). The results are invalid for patients with abnormal amounts of   HbF,   including those with known Hereditary Persistence   of Fetal Hemoglobin. Heterozygous hemoglobin variants (HbAS, HbAC,   HbAD, HbAE, HbA2) do not significantly interfere with this assay;   however, presence of multiple variants in a sample may impact the %   interference.       Scheduled Meds:   aspirin  81 mg Oral Daily    [START ON 11/28/2019] azaTHIOprine  50 mg Oral Daily    ceFAZolin (ANCEF) IVPB  2 g Intravenous Q8H    donepezil  10 mg Oral QHS    enoxaparin  40 mg Subcutaneous Daily    ferrous sulfate  325 mg Oral BID    memantine  10 mg Oral Daily    metronidazole  500 mg Intravenous Q8H    pantoprazole  40 mg Oral Daily    predniSONE  10 mg Oral Daily    [START ON 11/28/2019] predniSONE  10 mg Oral Daily    [START ON 12/3/2019] predniSONE  5 mg Oral Daily    QUEtiapine  100 mg Oral QHS    sodium chloride 0.9%  10 mL Intravenous Q6H     Continuous Infusions:   sodium chloride 0.9% 75 mL/hr at 11/27/19 0635     As Needed: sodium chloride, acetaminophen, lorazepam, metoprolol, ondansetron, sodium chloride 0.9%, Flushing PICC Protocol **AND** sodium chloride 0.9% **AND** sodium chloride 0.9%      Significant Imaging:   X-Ray Chest 1 View for PICC_Central line  Narrative: EXAMINATION:  XR CHEST 1 VIEW    CLINICAL HISTORY:  Evaluate PICC line placement;    TECHNIQUE:  Single frontal view of the chest was performed.    COMPARISON:  10/30/2019    FINDINGS:  Right PICC catheter tip projects over the proximal SVC.    The cardiomediastinal silhouette is not enlarged.  There is no pleural effusion.  The trachea is midline.  The lungs  are symmetrically expanded bilaterally with mildly coarse interstitial attenuation noting left basilar subsegmental atelectasis or scarring..  No large focal consolidation seen.  There is no pneumothorax.  The osseous structures are remarkable for degenerative changes..  Impression: As above    Electronically signed by: Bharat Hsu MD  Date:    11/26/2019  Time:    13:37          Assessment/Plan:      * Vaginal fistula  Abdominal pain  Crohn's disease of small and large intestines with complication    Clear liquids advance  NPO after midnight  Consult GI:  CT Abdomen: no acute intracranial processes  CBC, CMP, Lipase in am  Blood cultures x2 pending  Continue prednisone    Tachycardia  Continuous cardiac monitoring  Denies chest pain or SOB  continuous IVFs  Metoprolol prn        Alzheimers disease  Continue Donepezil, Namenda and Quetiapine      Chronic kidney disease, stage 3  Creatinine 1.1  Voiding well  Avoid nephrotoxic agents  Strict I/O's  Renally dose medications     History of left knee replacement  Wound dehiscence, surgical, sequela    s/p left TKA with Dr. Lindsay on 11/16/19 with wound dehiscence complication  Wound vac not functioning properly  Consult Wound Care  Consult Orthopedics Dr. Lindsay      GERD (gastroesophageal reflux disease)  Continue protonix        VTE Risk Mitigation (From admission, onward)         Ordered     enoxaparin injection 40 mg  Daily      11/25/19 2006     Place sequential compression device  Until discontinued      11/25/19 2006     IP VTE HIGH RISK PATIENT  Once      11/25/19 2006                      Herberth Leblanc DO  Department of Hospital Medicine   [facility]

## 2019-11-27 NOTE — SUBJECTIVE & OBJECTIVE
Interval History:       Review of Systems   Constitutional: Positive for activity change and fatigue. Negative for fever.   HENT: Negative for congestion.    Respiratory: Negative for apnea and shortness of breath.    Cardiovascular: Negative for chest pain and palpitations.   Gastrointestinal: Negative for abdominal distention and abdominal pain.        Stool per vagina     Genitourinary: Negative for difficulty urinating and frequency.   Musculoskeletal: Positive for arthralgias.   Neurological: Negative for dizziness and numbness.   Psychiatric/Behavioral: Negative for agitation and confusion. The patient is not nervous/anxious.      Objective:     Vital Signs (Most Recent):  Temp: 97.9 °F (36.6 °C) (11/27/19 1702)  Pulse: 110 (11/27/19 1717)  Resp: (!) 22 (11/27/19 1717)  BP: 121/62 (11/27/19 1717)  SpO2: 100 % (11/27/19 1717) Vital Signs (24h Range):  Temp:  [97.9 °F (36.6 °C)-99.6 °F (37.6 °C)] 97.9 °F (36.6 °C)  Pulse:  [104-132] 110  Resp:  [16-22] 22  SpO2:  [97 %-100 %] 100 %  BP: ()/(49-84) 121/62     Weight: 86.2 kg (190 lb 0.6 oz)  Body mass index is 32.62 kg/m².    Intake/Output Summary (Last 24 hours) at 11/27/2019 1730  Last data filed at 11/27/2019 1511  Gross per 24 hour   Intake 258.75 ml   Output 600 ml   Net -341.25 ml      Physical Exam   Constitutional: She is oriented to person, place, and time. She appears well-developed and well-nourished.   HENT:   Head: Normocephalic and atraumatic.   Eyes: EOM are normal.   Neck: Neck supple.   Cardiovascular: Normal rate.   Pulmonary/Chest: Effort normal. No respiratory distress.   Abdominal: Soft. She exhibits no distension.   Musculoskeletal: Normal range of motion.   Wound vac in place   Neurological: She is alert and oriented to person, place, and time.   Skin: Skin is warm and dry.   Psychiatric: She has a normal mood and affect. Her behavior is normal. Judgment and thought content normal.   Nursing note and vitals reviewed.      Significant  Labs:   Recent Labs   Lab 11/25/19  1511 11/26/19 0317 11/27/19  0652   WBC 5.49 3.56* 2.15*   HGB 10.0* 8.7* 6.3*   HCT 31.2* 27.4* 20.0*    260 209     Recent Labs   Lab 11/25/19  1511 11/26/19 0317 11/27/19  0652    137  --    K 4.3 3.9  --     102  --    CO2 24 25  --    BUN 10 8  --    CREATININE 1.1 0.9  --    * 146*  --    CALCIUM 9.1 8.1*  --    MG  --  1.5* 1.2*   PHOS  --  3.3 2.4*   LIPASE 64* 35  --      Recent Labs   Lab 11/25/19  1511 11/26/19 0317   ALKPHOS 150* 117   ALT 15 12   AST 14 11   ALBUMIN 2.6* 2.1*   PROT 6.8 5.6*   BILITOT 0.6 0.4      No results for input(s): CPK, CPKMB, MB, TROPONINI in the last 72 hours.  No results for input(s): POCTGLUCOSE in the last 168 hours.  Hemoglobin A1C   Date Value Ref Range Status   11/01/2019 5.7 (H) 4.0 - 5.6 % Final     Comment:     ADA Screening Guidelines:  5.7-6.4%  Consistent with prediabetes  >or=6.5%  Consistent with diabetes  High levels of fetal hemoglobin interfere with the HbA1C  assay. Heterozygous hemoglobin variants (HbS, HgC, etc)do  not significantly interfere with this assay.   However, presence of multiple variants may affect accuracy.     02/04/2019 5.0 4.0 - 5.6 % Final     Comment:     ADA Screening Guidelines:  5.7-6.4%  Consistent with prediabetes  >or=6.5%  Consistent with diabetes  High levels of fetal hemoglobin interfere with the HbA1C  assay. Heterozygous hemoglobin variants (HbS, HgC, etc)do  not significantly interfere with this assay.   However, presence of multiple variants may affect accuracy.     04/23/2018 5.0 4.0 - 5.6 % Final     Comment:     According to ADA guidelines, hemoglobin A1c <7.0% represents  optimal control in non-pregnant diabetic patients. Different  metrics may apply to specific patient populations.   Standards of Medical Care in Diabetes-2016.  For the purpose of screening for the presence of diabetes:  <5.7%     Consistent with the absence of diabetes  5.7-6.4%  Consistent  with increasing risk for diabetes   (prediabetes)  >or=6.5%  Consistent with diabetes  Currently, no consensus exists for use of hemoglobin A1c  for diagnosis of diabetes for children.  This Hemoglobin A1c assay has significant interference with fetal   hemoglobin   (HbF). The results are invalid for patients with abnormal amounts of   HbF,   including those with known Hereditary Persistence   of Fetal Hemoglobin. Heterozygous hemoglobin variants (HbAS, HbAC,   HbAD, HbAE, HbA2) do not significantly interfere with this assay;   however, presence of multiple variants in a sample may impact the %   interference.       Scheduled Meds:   aspirin  81 mg Oral Daily    [START ON 11/28/2019] azaTHIOprine  50 mg Oral Daily    ceFAZolin (ANCEF) IVPB  2 g Intravenous Q8H    donepezil  10 mg Oral QHS    enoxaparin  40 mg Subcutaneous Daily    ferrous sulfate  325 mg Oral BID    memantine  10 mg Oral Daily    metronidazole  500 mg Intravenous Q8H    pantoprazole  40 mg Oral Daily    predniSONE  10 mg Oral Daily    [START ON 11/28/2019] predniSONE  10 mg Oral Daily    [START ON 12/3/2019] predniSONE  5 mg Oral Daily    QUEtiapine  100 mg Oral QHS    sodium chloride 0.9%  10 mL Intravenous Q6H     Continuous Infusions:   sodium chloride 0.9% 75 mL/hr at 11/27/19 0635     As Needed: sodium chloride, acetaminophen, lorazepam, metoprolol, ondansetron, sodium chloride 0.9%, Flushing PICC Protocol **AND** sodium chloride 0.9% **AND** sodium chloride 0.9%      Significant Imaging:   X-Ray Chest 1 View for PICC_Central line  Narrative: EXAMINATION:  XR CHEST 1 VIEW    CLINICAL HISTORY:  Evaluate PICC line placement;    TECHNIQUE:  Single frontal view of the chest was performed.    COMPARISON:  10/30/2019    FINDINGS:  Right PICC catheter tip projects over the proximal SVC.    The cardiomediastinal silhouette is not enlarged.  There is no pleural effusion.  The trachea is midline.  The lungs are symmetrically expanded  bilaterally with mildly coarse interstitial attenuation noting left basilar subsegmental atelectasis or scarring..  No large focal consolidation seen.  There is no pneumothorax.  The osseous structures are remarkable for degenerative changes..  Impression: As above    Electronically signed by: Bharat Hsu MD  Date:    11/26/2019  Time:    13:37

## 2019-11-27 NOTE — PLAN OF CARE
Problem: Occupational Therapy Goal  Goal: Occupational Therapy Goal  Description  Goals to be met by: 12/27/19     Patient will increase functional independence with ADLs by performing:    LE Dressing with Minimal Assistance.  Toileting from bedside commode with Minimal Assistance for hygiene and clothing management.   Supine to sit with Modified [place].  Step transfer with Minimal Assistance  Toilet transfer to bedside commode with Minimal Assistance.  Increased functional strength to WFL for self care skills and functional mobility.  Upper extremity exercise program x10 reps per handout, with independence.     Outcome: Ongoing, Progressing    Pt would benefit from cont OT services in order to maximize functional independence. Recommending SNF at this time. Limited evaluation also 2/2 low H&H and Pt kept NWB to LLE at this time until further clarification obtained from MD.

## 2019-11-27 NOTE — H&P
Endoscopy History and Physical    PCP - Shon Brambila MD    Procedure - Flex sig and EGD  ASA - per anesthesia  Mallampati - per anesthesia  History of Anesthesia problems - no  Family history Anesthesia problems - no   Plan of anesthesia - General    HPI:  This is a 74 y.o. female here for evaluation of :   crohns disease of small and large bowel.  Rule out rectovaginal fistula.  Assess disease activity of large and small bowel.      ROS:  Constitutional: No fevers, chills, No weight loss  CV: No chest pain  Pulm: No cough, No shortness of breath  GI: see HPI  Derm: No rash    Medical History:  has a past medical history of Arthritis, C. difficile colitis (10/13/2014), Chronic kidney disease, stage 3, Dementia, Hepatitis B, Hypertension, Major depression with psychotic features, and Ulcerative colitis (03/2014).    Surgical History:  has a past surgical history that includes Total knee arthroplasty (Right, 04/07/2008); Nasal sinus surgery; Cholecystectomy; Tonsillectomy; emma filter placement (Right, 01/2014); Colonoscopy (N/A, 4/29/2016); Esophagogastroduodenoscopy (N/A, 1/25/2019); Flexible sigmoidoscopy (N/A, 1/25/2019); Laparoscopic total colectomy (06/24/2016); Total knee arthroplasty (Left, 10/16/2019); Knee Arthroplasty (Left, 10/16/2019); Esophagogastroduodenoscopy (N/A, 10/21/2019); Flexible sigmoidoscopy (N/A, 10/21/2019); and Joint replacement.    Family History: family history includes Colon cancer in her paternal grandmother; Throat cancer in her father.. Otherwise no colon cancer, inflammatory bowel disease, or GI malignancies.    Social History:  reports that she quit smoking about 22 years ago. Her smoking use included cigarettes. She has quit using smokeless tobacco. She reports that she does not drink alcohol or use drugs.    Review of patient's allergies indicates:   Allergen Reactions    Sulfa (sulfonamide antibiotics) Swelling       Medications:   Medications Prior to Admission    Medication Sig Dispense Refill Last Dose    aspirin (ECOTRIN) 81 MG EC tablet Take 1 tablet (81 mg total) by mouth once daily.  0 Taking    azaTHIOprine (IMURAN) 50 mg Tab Take 2 tablets (100 mg total) by mouth once daily. 60 tablet 1 Taking    calcium carbonate (OS-NELSY) 600 mg (1,500 mg) Tab Take 1 tablet (600 mg total) by mouth 2 (two) times daily with meals. 180 tablet 3 Taking    donepezil (ARICEPT) 10 MG tablet Take 1 tablet (10 mg total) by mouth every evening. 90 tablet 3 Taking    ergocalciferol (ERGOCALCIFEROL) 50,000 unit Cap Take 1 capsule (50,000 Units total) by mouth every 7 days. 12 capsule 3 Taking    ferrous sulfate 325 mg (65 mg iron) Tab tablet Take 1 tablet (325 mg total) by mouth 2 (two) times daily. 180 tablet 3 Taking    loperamide (IMODIUM A-D) 2 mg Tab Take 1 mg by mouth 2 (two) times daily before meals.   Taking    memantine (NAMENDA) 10 MG Tab Take 1 tablet (10 mg total) by mouth once daily. 180 tablet 3 Taking    pantoprazole (PROTONIX) 40 MG tablet Take 1 tablet (40 mg total) by mouth once daily. 30 tablet 11 Taking    predniSONE (DELTASONE) 10 MG tablet Take 1 tablet (10 mg total) by mouth daily with dinner or evening meal. 14 tablet 0 Taking    predniSONE (DELTASONE) 20 MG tablet Take 1 tablet (20 mg total) by mouth once daily. 14 tablet 0 Taking    QUEtiapine (SEROQUEL) 100 MG Tab TAKE 1 2 TABLET BY MOUTH EVERY NIGHT AT BEDTIME, THEN TAKE ONE TABLET BY MOUTH EVERY NIGHT AT BEDTIME THEREAFTER  1 Taking         Physical Exam:    Vital Signs:   Vitals:    11/27/19 1253   BP: (!) 95/55   Pulse: (!) 119   Resp: 18   Temp: 99.2 °F (37.3 °C)       General Appearance: nontoxic but ill appearing  Eyes:    No scleral icterus  ENT: Neck supple, Lips, mucosa, and tongue normal  Lungs: nonlabored respriations.  Heart:  Regular rate distal pulse intact  Abdomen: Soft, non tender, non distended . No hepatosplenomegaly, ascites, or mass.  Extremities: 2+ pulses, no clubbing, cyanosis or  edema  Skin: No rash      Labs:  Lab Results   Component Value Date    WBC 2.15 (L) 11/27/2019    HGB 6.3 (L) 11/27/2019    HCT 20.0 (L) 11/27/2019     11/27/2019    CHOL 223 (H) 08/10/2019    TRIG 79 08/10/2019    HDL 62 08/10/2019    ALT 12 11/26/2019    AST 11 11/26/2019     11/26/2019    K 3.9 11/26/2019     11/26/2019    CREATININE 0.9 11/26/2019    BUN 8 11/26/2019    CO2 25 11/26/2019    TSH 1.941 02/04/2019    INR 1.1 10/16/2019    HGBA1C 5.7 (H) 11/01/2019     Son Bryson consented.    I have explained the risks and benefits of endoscopy procedures to the patient including but not limited to bleeding, perforation, infection, and death.  The patient was asked if they understand and allowed to ask any further questions to their satisfaction.    Og Carrera MD

## 2019-11-27 NOTE — [employer] PATIENT INSTRUCTIONS
Discharge Summary/Instructions after an Endoscopic Procedure  Patient Name: Anabella Aranda  Patient MRN: [id number]  Patient YOB: 1945 Wednesday, November 27, 2019  Og Carrera MD  RESTRICTIONS:  During your procedure today, you received medications for sedation.  These   medications may affect your judgment, balance and coordination.  Therefore,   for 24 hours, you have the following restrictions:   - DO NOT drive a car, operate machinery, make legal/financial decisions,   sign important papers or drink alcohol.    ACTIVITY:  Today: no heavy lifting, straining or running due to procedural   sedation/anesthesia.  The following day: return to full activity including work.  DIET:  Eat and drink normally unless instructed otherwise.     TREATMENT FOR COMMON SIDE EFFECTS:  - Mild abdominal pain, nausea, belching, bloating or excessive gas:  rest,   eat lightly and use a heating pad.  - Sore Throat: treat with throat lozenges and/or gargle with warm salt   water.  - Because air was used during the procedure, expelling large amounts of air   from your rectum or belching is normal.  - If a bowel prep was taken, you may not have a bowel movement for 1-3 days.    This is normal.  SYMPTOMS TO WATCH FOR AND REPORT TO YOUR PHYSICIAN:  1. Abdominal pain or bloating, other than gas cramps.  2. Chest pain.  3. Back pain.  4. Signs of infection such as: chills or fever occurring within 24 hours   after the procedure.  5. Rectal bleeding, which would show as bright red, maroon, or black stools.   (A tablespoon of blood from the rectum is not serious, especially if   hemorrhoids are present.)  6. Vomiting.  7. Weakness or dizziness.  GO DIRECTLY TO THE NEAREST EMERGENCY ROOM IF YOU HAVE ANY OF THE FOLLOWING:      Difficulty breathing              Chills and/or fever over 101 F   Persistent vomiting and/or vomiting blood   Severe abdominal pain   Severe chest pain   Black, tarry stools   Bleeding- more than one  tablespoon   Any other symptom or condition that you feel may need urgent attention  Your doctor recommends these additional instructions:  If any biopsies were taken, your doctors clinic will contact you in 1 to 2   weeks with any results.  - [facility] to [facility].   - Resume previous diet.   - Continue present medications.   - Return to my office at appointment to be scheduled   - Refer to a colo-rectal surgeon (Dr. Worley) at appointment to be   scheduled for consideration of end ileostomy creation.  For questions, problems or results please call your physician - Og Carrera MD at Work:  [phone].  EMERGENCY PHONE NUMBER: [phone],  LAB RESULTS: [phone]  IF A COMPLICATION OR EMERGENCY SITUATION ARISES AND YOU ARE UNABLE TO REACH   YOUR PHYSICIAN - GO DIRECTLY TO THE EMERGENCY ROOM.  Og Carrera MD  11/27/2019 5:02:32 PM  This report has been verified and signed electronically.  [employer]

## 2019-11-27 NOTE — PT/OT/SLP EVAL
Physical Therapy Evaluation    Patient Name:  Anabella Aranda   MRN:  [id number]    Recommendations:     Discharge Recommendations:  nursing facility, skilled   Discharge Equipment Recommendations: wheelchair   Barriers to discharge: Decreased caregiver support    Assessment:     Anabella Aranda is a 74 y.o. female admitted with a medical diagnosis of Vaginal fistula. She presents with open L knee wound with wound vac following TKA.   She presents with the following impairments/functional limitations:  weakness, impaired endurance, impaired sensation, impaired self care skills, gait instability, impaired functional mobilty, impaired balance, impaired cognition, decreased lower extremity function, decreased upper extremity function, decreased safety awareness, decreased ROM, pain, edema, impaired skin, impaired cardiopulmonary response to activity, impaired joint extensibility, orthopedic precautions.     Rehab Prognosis: Good; patient would benefit from acute skilled PT services to address these deficits and reach maximum level of function.    Recent Surgery: Procedure(s) (LRB):  EGD (ESOPHAGOGASTRODUODENOSCOPY) (N/A)  SIGMOIDOSCOPY, FLEXIBLE (N/A) Day of Surgery    Plan:     During this hospitalization, patient to be seen 6 x/week to address the identified rehab impairments via gait training, therapeutic activities, therapeutic exercises, neuromuscular re-education, therapeutic groups and progress toward the following goals:    · Plan of Care Expires:  12/27/19    Subjective     Chief Complaint: pt expressing some confusion to situation and dx but cooperative and reports no pain or dizziness or nausea at this time   Patient/Family Comments/goals: PLOF  Pain/Comfort:  · Pain Rating 1: 0/10  · Pain Rating Post-Intervention 1: 0/10    Patients cultural, spiritual, Jain conflicts given the current situation: no    Living Environment: Per chart pt lives w son in [facility] w no steps w t/s combo   Son reports that  HH therapy has stopped when wound was opened; states pt was requiring assist with ADLs, but able to ambulate with RW and supervision   Equipment Used at Home:  walker, rolling, bath bench, bedside commode, cane, straight   Equipment used at home: walker, rolling, bath bench, bedside commode, cane, straight.   Upon discharge, patient will have assistance from son.    Objective:     Communicated with nsg prior to session.  Patient found supine with bed alarm, monahan catheter, peripheral IV, telemetry, wound vac  upon PT entry to room.    General Precautions: Standard, fall   Orthopedic Precautions:(perf tasks NWB LLE s/p prior surgery pending clarification from MD )   Braces: N/A     Exams:  · Cognitive:  A & O x person      Pt followed single step commands;  Cooperative throughout  · AROM:  RLE  WFL            LLE   Functionally observed full extension to ~60* flexion seated EOB - did not test outside of resting position knee 2* open wound w wound vac intact s/p L TKR  · MMT:   RLE grossly 4-/5 and LLE grossly 3/5 - did not provide resistance 2* wound    Functional Mobility:  · Bed mobility:  B rolling min assist w LLE and line management;  Min assist positioning; sup scoot min assist and max VC technique  · Txfs:  Sup>sit min assist and Sit>sup min/mod assist;  Did not perf sit<>stand 2* low H/H and pending clarification of wt bearing status LLE  · Seated EOB act endurance and static bal act w SBA and dynamic bal act w min assist      Therapeutic Activities and Exercises:   Initial attempts pt found receiving hygiene care from staff and OT.  Pt soiled and did not appear to be aware of needs for cleaning.  Limited evaluation 2* low H&H and Pt kept NWB to LLE at this time until further clarification obtained from MD. (pt reports she believes she is NWB LLE at this time)   Provided safety education for mobility and PT POC and importance of positioning of LLE knee to achieve and maintain full extension ROM    AM-PAC 6  CLICK MOBILITY  Total Score:10     Patient left right sidelying with all lines intact, call button in reach, bed alarm on and nsg notified.    GOALS:   Multidisciplinary Problems     Physical Therapy Goals        Problem: Physical Therapy Goal    Goal Priority Disciplines Outcome Goal Variances Interventions   Physical Therapy Goal     PT, PT/OT Ongoing, Progressing     Description:  Goals to be met by: discharge date     Patient will increase functional independence with mobility by performin. Supine to sit with Stand-by Assistance  2. Sit to supine with Stand-by Assistance  3. Rolling to Left and Right with Modified [place].  4. Sit to stand transfer with Contact Guard Assistance  5. Bed to chair transfer with Contact Guard Assistance using Rolling Walker w appropriate WB status maintained.  6. Gait  x 10 feet with Contact Guard Assistance using Rolling Walker with appropriate WB status maintained.  7. Lower extremity exercise program x15 reps                     History:     Past Medical History:   Diagnosis Date    Arthritis     C. difficile colitis 10/13/2014    4/15/2015    Chronic kidney disease, stage 3     Dementia     Hepatitis B     Hypertension     Major depression with psychotic features     Ulcerative colitis 2014       Past Surgical History:   Procedure Laterality Date    CHOLECYSTECTOMY      COLONOSCOPY N/A 2016    Procedure: COLONOSCOPY;  Surgeon: Umm Arenas MD;  Location: [facility];  Service: Endoscopy;  Laterality: N/A;    ESOPHAGOGASTRODUODENOSCOPY N/A 2019    Procedure: ESOPHAGOGASTRODUODENOSCOPY (EGD);  Surgeon: Jenise Hallman MD;  Location: [facility];  Service: Endoscopy;  Laterality: N/A;    ESOPHAGOGASTRODUODENOSCOPY N/A 10/21/2019    Procedure: EGD (ESOPHAGOGASTRODUODENOSCOPY);  Surgeon: Og Carrera MD;  Location: [facility];  Service: Colon and Rectal;  Laterality: N/A;    FLEXIBLE SIGMOIDOSCOPY N/A 2019    Procedure:  SIGMOIDOSCOPY, FLEXIBLE;  Surgeon: Jenise Hallman MD;  Location: [facility] ENDO;  Service: Endoscopy;  Laterality: N/A;    FLEXIBLE SIGMOIDOSCOPY N/A 10/21/2019    Procedure: SIGMOIDOSCOPY, FLEXIBLE;  Surgeon: Og Carrera MD;  Location: [facility] ENDO;  Service: Colon and Rectal;  Laterality: N/A;    BETZY FILTER PLACEMENT Right 01/2014    JOINT REPLACEMENT      KNEE ARTHROPLASTY Left 10/16/2019    Procedure: ARTHROPLASTY, KNEE;  Surgeon: Ghassan Lindsay MD;  Location: [facility] OR;  Service: Orthopedics;  Laterality: Left;  Franco notifiedconfirmed with Franco  [phone] 10/15/19 kb    LAPAROSCOPIC TOTAL COLECTOMY  06/24/2016    NASAL SINUS SURGERY      TONSILLECTOMY      TOTAL KNEE ARTHROPLASTY Right 04/07/2008    TOTAL KNEE ARTHROPLASTY Left 10/16/2019       Time Tracking:     PT Received On: 11/27/19  PT Start Time: 1100     PT Stop Time: 1113  PT Total Time (min): 13 min   (+time w OT)    Billable Minutes: Evaluation 13      Meka Henriquez, PT  11/27/2019

## 2019-11-27 NOTE — PLAN OF CARE
Problem: Fall Injury Risk  Goal: Absence of Fall and Fall-Related Injury  Outcome: Ongoing, Progressing     Problem: Infection  Goal: Infection Symptom Resolution  Outcome: Ongoing, Progressing

## 2019-11-27 NOTE — TREATMENT PLAN
GI Brief  Follow-up Note    EGD and flex sig done.    Overall, her crohns disease is much better controlled now.   The small bowel looks much better.    Still deep ulcerations and pseudopolyps in the rectum  There is no evidence of rectovaginal fistula on exam (with instillation of methylene blue)  Biopsied..    OK to discharge from GI perspective tonight vs tomorrow  Would continue prednisone 10mg daily on discharge x 5 days then taper to prednisone 5mg daily x 5 more days then stop.  Continue TPN on discharge  Continue IV Abx per ortho on discharge  Reduce imuran to 50mg daily on discharge    I will arrange for her to see colorectal surgery after discharge  I will arrange follow up with me after discharge.      Please call with any additional concerns /questions    Og Carrera MD  [facility] [place]

## 2019-11-28 LAB
BASOPHILS # BLD AUTO: 0.01 K/UL (ref 0–0.2)
BASOPHILS NFR BLD: 0.3 % (ref 0–1.9)
DIFFERENTIAL METHOD: ABNORMAL
EOSINOPHIL # BLD AUTO: 0.1 K/UL (ref 0–0.5)
EOSINOPHIL NFR BLD: 1.4 % (ref 0–8)
ERYTHROCYTE [DISTWIDTH] IN BLOOD BY AUTOMATED COUNT: 15.1 % (ref 11.5–14.5)
HCT VFR BLD AUTO: 29.9 % (ref 37–48.5)
HGB BLD-MCNC: 9.5 G/DL (ref 12–16)
LYMPHOCYTES # BLD AUTO: 1 K/UL (ref 1–4.8)
LYMPHOCYTES NFR BLD: 28.2 % (ref 18–48)
MAGNESIUM SERPL-MCNC: 1.5 MG/DL (ref 1.6–2.6)
MCH RBC QN AUTO: 29.5 PG (ref 27–31)
MCHC RBC AUTO-ENTMCNC: 31.8 G/DL (ref 32–36)
MCV RBC AUTO: 93 FL (ref 82–98)
MONOCYTES # BLD AUTO: 0.4 K/UL (ref 0.3–1)
MONOCYTES NFR BLD: 11 % (ref 4–15)
NEUTROPHILS # BLD AUTO: 2.1 K/UL (ref 1.8–7.7)
NEUTROPHILS NFR BLD: 59.1 % (ref 38–73)
PHOSPHATE SERPL-MCNC: 1.9 MG/DL (ref 2.7–4.5)
PLATELET # BLD AUTO: 251 K/UL (ref 150–350)
PMV BLD AUTO: 8.1 FL (ref 9.2–12.9)
RBC # BLD AUTO: 3.22 M/UL (ref 4–5.4)
WBC # BLD AUTO: 3.62 K/UL (ref 3.9–12.7)

## 2019-11-28 PROCEDURE — 11000001 HC ACUTE MED/SURG PRIVATE ROOM

## 2019-11-28 PROCEDURE — 63600175 PHARM REV CODE 636 W HCPCS: Performed by: HOSPITALIST

## 2019-11-28 PROCEDURE — 85025 COMPLETE CBC W/AUTO DIFF WBC: CPT

## 2019-11-28 PROCEDURE — 25000003 PHARM REV CODE 250: Performed by: HOSPITALIST

## 2019-11-28 PROCEDURE — 63600175 PHARM REV CODE 636 W HCPCS: Performed by: NURSE PRACTITIONER

## 2019-11-28 PROCEDURE — 83735 ASSAY OF MAGNESIUM: CPT

## 2019-11-28 PROCEDURE — 25000003 PHARM REV CODE 250: Performed by: NURSE PRACTITIONER

## 2019-11-28 PROCEDURE — 63600175 PHARM REV CODE 636 W HCPCS: Performed by: INTERNAL MEDICINE

## 2019-11-28 PROCEDURE — 63600175 PHARM REV CODE 636 W HCPCS: Performed by: ORTHOPAEDIC SURGERY

## 2019-11-28 PROCEDURE — 84100 ASSAY OF PHOSPHORUS: CPT

## 2019-11-28 PROCEDURE — A4216 STERILE WATER/SALINE, 10 ML: HCPCS | Performed by: HOSPITALIST

## 2019-11-28 PROCEDURE — S0030 INJECTION, METRONIDAZOLE: HCPCS | Performed by: HOSPITALIST

## 2019-11-28 RX ORDER — MAGNESIUM SULFATE HEPTAHYDRATE 40 MG/ML
2 INJECTION, SOLUTION INTRAVENOUS ONCE
Status: COMPLETED | OUTPATIENT
Start: 2019-11-28 | End: 2019-11-28

## 2019-11-28 RX ADMIN — Medication 10 ML: at 02:11

## 2019-11-28 RX ADMIN — Medication 10 ML: at 12:11

## 2019-11-28 RX ADMIN — FERROUS SULFATE TAB EC 325 MG (65 MG FE EQUIVALENT) 325 MG: 325 (65 FE) TABLET DELAYED RESPONSE at 09:11

## 2019-11-28 RX ADMIN — ASPIRIN 81 MG: 81 TABLET, COATED ORAL at 09:11

## 2019-11-28 RX ADMIN — FERROUS SULFATE TAB EC 325 MG (65 MG FE EQUIVALENT) 325 MG: 325 (65 FE) TABLET DELAYED RESPONSE at 08:11

## 2019-11-28 RX ADMIN — SODIUM CHLORIDE: 0.9 INJECTION, SOLUTION INTRAVENOUS at 07:11

## 2019-11-28 RX ADMIN — Medication 10 ML: at 06:11

## 2019-11-28 RX ADMIN — MEMANTINE HYDROCHLORIDE 10 MG: 5 TABLET ORAL at 09:11

## 2019-11-28 RX ADMIN — ENOXAPARIN SODIUM 40 MG: 100 INJECTION SUBCUTANEOUS at 05:11

## 2019-11-28 RX ADMIN — CEFAZOLIN SODIUM 2 G: 2 SOLUTION INTRAVENOUS at 02:11

## 2019-11-28 RX ADMIN — PREDNISONE 10 MG: 10 TABLET ORAL at 09:11

## 2019-11-28 RX ADMIN — MAGNESIUM SULFATE IN WATER 2 G: 40 INJECTION, SOLUTION INTRAVENOUS at 09:11

## 2019-11-28 RX ADMIN — Medication 10 ML: at 11:11

## 2019-11-28 RX ADMIN — DONEPEZIL HYDROCHLORIDE 10 MG: 5 TABLET, FILM COATED ORAL at 08:11

## 2019-11-28 RX ADMIN — AZATHIOPRINE 50 MG: 50 TABLET ORAL at 09:11

## 2019-11-28 RX ADMIN — METRONIDAZOLE 500 MG: 500 INJECTION, SOLUTION INTRAVENOUS at 12:11

## 2019-11-28 RX ADMIN — Medication 10 ML: at 05:11

## 2019-11-28 RX ADMIN — METRONIDAZOLE 500 MG: 500 INJECTION, SOLUTION INTRAVENOUS at 05:11

## 2019-11-28 RX ADMIN — PANTOPRAZOLE SODIUM 40 MG: 40 TABLET, DELAYED RELEASE ORAL at 09:11

## 2019-11-28 RX ADMIN — CEFAZOLIN SODIUM 2 G: 2 SOLUTION INTRAVENOUS at 06:11

## 2019-11-28 RX ADMIN — POTASSIUM PHOSPHATE, MONOBASIC AND POTASSIUM PHOSPHATE, DIBASIC 15 MMOL: 224; 236 INJECTION, SOLUTION, CONCENTRATE INTRAVENOUS at 11:11

## 2019-11-28 RX ADMIN — METRONIDAZOLE 500 MG: 500 INJECTION, SOLUTION INTRAVENOUS at 09:11

## 2019-11-28 RX ADMIN — QUETIAPINE FUMARATE 100 MG: 100 TABLET ORAL at 08:11

## 2019-11-28 RX ADMIN — CEFAZOLIN SODIUM 2 G: 2 SOLUTION INTRAVENOUS at 11:11

## 2019-11-28 NOTE — PROGRESS NOTES
[facility] Hospital Medicine  Progress Note    Patient Name: Anabella Aranda  MRN: [id number]  Patient Class: IP- Inpatient   Admission Date: 11/25/2019  Length of Stay: 1 days  Attending Physician: Herberth Leblanc DO  Primary Care Provider: Shon Brambila MD        Subjective:     Principal Problem:Vaginal fistula        HPI:  74 y.o. female with past medical history of arthritis, C. difficile colitis, chronic kidney disease, stage 3, dementia, hepatitis b, hypertension, major depression, chron's  Disease, s/p left TKA with Dr. Lindsay on 11/16/19 with wound dehiscence complication.  The patient presents to ED complaining of intermittent, aching mid abdominal pain with associated feces from vagina.  Per patient's son reports symptoms started about three days ago.  Patient's GI physician, Dr. Carrera, was notified of patient symptoms and recommended patient report to ED.  Denies nausea, vomiting, fever, chills, cough, similar past episodes, aggravating or alleviating factors.  Patient's son also noticed that wound vac to left knee is not functioning properly.  ED findings: H/H 10.0/31.2, Sed rate 79, glucose 144, Alk Phos 150, Lipase 64, CRP 86.3, CT Abdomen showed no acute intra-abdominal abnormalities identified. On exam, patient AAOx3 with intermittent confusion, states she lives alone and her son lives close and helps with her care.  She does report having a home health nurse also. She currently reports no abdominal pain. Patient admitted to hospital medicine, under observation status, for further medical management.    Overview/Hospital Course:  11/26 pt seen, will place a picc line and consult nutrition for possible need for TPN, await GI input regarding scope to evaluate for possible fistula. Ortho on the case, will place a wound vac on the pt as well.. Adding flagyl to cover for intestinal micros  11/27 pt seen doing ok, got PRBC transfused, had EGD today, will f/u on official results  11/28  pt seen, she had an EGD that was normal and a sigmoidoscopy, that showed:\  Impression:           - Decubitus ulceration found on perianal exam.                        - Inflammation was found in the rectum. This was                         moderate in severity, improved compared to previous                         examinations. Biopsied.                        - The monie-terminal ileum appears endoscopically                         much less inflammed and much better. This was                         biopsied to assess disease activty.                        - The rectum is characterized by linear ulcers and                         inflammatory polyps, however the endoscopic                         appearance is much improved.                        - no evidence of fistula found despite instillation                         of methylene blue.  Recommendation:                             - Refer to a colo-rectal surgeon (Dr. Worley) at                         appointment to be scheduled for consideration of                         end ileostomy creation.    Will consult ltac because pt is on tpn and wound vac, discussed with pt and will discuss also with family.  Replace pos and mg    Interval History:       Review of Systems   Constitutional: Positive for activity change and fatigue. Negative for fever.   HENT: Negative for congestion.    Respiratory: Negative for apnea and shortness of breath.    Cardiovascular: Negative for chest pain and palpitations.   Gastrointestinal: Negative for abdominal distention and abdominal pain.        Stool per vagina     Genitourinary: Negative for difficulty urinating and frequency.   Musculoskeletal: Negative for arthralgias.   Neurological: Negative for dizziness and numbness.   Psychiatric/Behavioral: Negative for agitation and confusion. The patient is not nervous/anxious.      Objective:     Vital Signs (Most Recent):  Temp: 98 °F (36.7 °C) (11/28/19 0758)  Pulse: 107  (11/28/19 0758)  Resp: 17 (11/28/19 0758)  BP: (!) 122/58 (11/28/19 0758)  SpO2: 97 % (11/28/19 0758) Vital Signs (24h Range):  Temp:  [96.9 °F (36.1 °C)-99.2 °F (37.3 °C)] 98 °F (36.7 °C)  Pulse:  [] 107  Resp:  [14-22] 17  SpO2:  [97 %-100 %] 97 %  BP: ()/(51-84) 122/58     Weight: 86.2 kg (190 lb 0.6 oz)  Body mass index is 32.62 kg/m².    Intake/Output Summary (Last 24 hours) at 11/28/2019 1200  Last data filed at 11/28/2019 0500  Gross per 24 hour   Intake 4805 ml   Output 1000 ml   Net 3805 ml      Physical Exam   Constitutional: She is oriented to person, place, and time. She appears well-developed and well-nourished.   HENT:   Head: Normocephalic and atraumatic.   Eyes: EOM are normal.   Neck: Neck supple.   Cardiovascular: Normal rate.   Pulmonary/Chest: Effort normal. No respiratory distress.   Abdominal: Soft. She exhibits no distension.   Musculoskeletal: Normal range of motion.   Wound vac in place   Neurological: She is alert and oriented to person, place, and time.   Skin: Skin is warm and dry.   Psychiatric: She has a normal mood and affect. Her behavior is normal. Judgment and thought content normal.   Nursing note and vitals reviewed.      Significant Labs:   Recent Labs   Lab 11/26/19 0317 11/27/19  0652 11/28/19  0530   WBC 3.56* 2.15* 3.62*   HGB 8.7* 6.3* 9.5*   HCT 27.4* 20.0* 29.9*    209 251     Recent Labs   Lab 11/25/19  1511 11/26/19 0317 11/27/19  0652 11/28/19  0530    137  --   --    K 4.3 3.9  --   --     102  --   --    CO2 24 25  --   --    BUN 10 8  --   --    CREATININE 1.1 0.9  --   --    * 146*  --   --    CALCIUM 9.1 8.1*  --   --    MG  --  1.5* 1.2* 1.5*   PHOS  --  3.3 2.4* 1.9*   LIPASE 64* 35  --   --      Recent Labs   Lab 11/25/19  1511 11/26/19  0317   ALKPHOS 150* 117   ALT 15 12   AST 14 11   ALBUMIN 2.6* 2.1*   PROT 6.8 5.6*   BILITOT 0.6 0.4      No results for input(s): CPK, CPKMB, MB, TROPONINI in the last 72 hours.  No  results for input(s): POCTGLUCOSE in the last 168 hours.  Hemoglobin A1C   Date Value Ref Range Status   11/01/2019 5.7 (H) 4.0 - 5.6 % Final     Comment:     ADA Screening Guidelines:  5.7-6.4%  Consistent with prediabetes  >or=6.5%  Consistent with diabetes  High levels of fetal hemoglobin interfere with the HbA1C  assay. Heterozygous hemoglobin variants (HbS, HgC, etc)do  not significantly interfere with this assay.   However, presence of multiple variants may affect accuracy.     02/04/2019 5.0 4.0 - 5.6 % Final     Comment:     ADA Screening Guidelines:  5.7-6.4%  Consistent with prediabetes  >or=6.5%  Consistent with diabetes  High levels of fetal hemoglobin interfere with the HbA1C  assay. Heterozygous hemoglobin variants (HbS, HgC, etc)do  not significantly interfere with this assay.   However, presence of multiple variants may affect accuracy.     04/23/2018 5.0 4.0 - 5.6 % Final     Comment:     According to ADA guidelines, hemoglobin A1c <7.0% represents  optimal control in non-pregnant diabetic patients. Different  metrics may apply to specific patient populations.   Standards of Medical Care in Diabetes-2016.  For the purpose of screening for the presence of diabetes:  <5.7%     Consistent with the absence of diabetes  5.7-6.4%  Consistent with increasing risk for diabetes   (prediabetes)  >or=6.5%  Consistent with diabetes  Currently, no consensus exists for use of hemoglobin A1c  for diagnosis of diabetes for children.  This Hemoglobin A1c assay has significant interference with fetal   hemoglobin   (HbF). The results are invalid for patients with abnormal amounts of   HbF,   including those with known Hereditary Persistence   of Fetal Hemoglobin. Heterozygous hemoglobin variants (HbAS, HbAC,   HbAD, HbAE, HbA2) do not significantly interfere with this assay;   however, presence of multiple variants in a sample may impact the %   interference.       Scheduled Meds:   aspirin  81 mg Oral Daily     azaTHIOprine  50 mg Oral Daily    ceFAZolin (ANCEF) IVPB  2 g Intravenous Q8H    donepezil  10 mg Oral QHS    enoxaparin  40 mg Subcutaneous Daily    ferrous sulfate  325 mg Oral BID    memantine  10 mg Oral Daily    metronidazole  500 mg Intravenous Q8H    pantoprazole  40 mg Oral Daily    potassium phosphate IVPB  15 mmol Intravenous Once    predniSONE  10 mg Oral Daily    predniSONE  10 mg Oral Daily    [START ON 12/3/2019] predniSONE  5 mg Oral Daily    QUEtiapine  100 mg Oral QHS    sodium chloride 0.9%  10 mL Intravenous Q6H     Continuous Infusions:   sodium chloride 0.9% 75 mL/hr at 11/27/19 2311     As Needed: sodium chloride, acetaminophen, lorazepam, metoprolol, ondansetron, sodium chloride 0.9%, Flushing PICC Protocol **AND** sodium chloride 0.9% **AND** sodium chloride 0.9%      Significant Imaging:   X-Ray Chest 1 View for PICC_Central line  Narrative: EXAMINATION:  XR CHEST 1 VIEW    CLINICAL HISTORY:  Evaluate PICC line placement;    TECHNIQUE:  Single frontal view of the chest was performed.    COMPARISON:  10/30/2019    FINDINGS:  Right PICC catheter tip projects over the proximal SVC.    The cardiomediastinal silhouette is not enlarged.  There is no pleural effusion.  The trachea is midline.  The lungs are symmetrically expanded bilaterally with mildly coarse interstitial attenuation noting left basilar subsegmental atelectasis or scarring..  No large focal consolidation seen.  There is no pneumothorax.  The osseous structures are remarkable for degenerative changes..  Impression: As above    Electronically signed by: Bharat Hsu MD  Date:    11/26/2019  Time:    13:37          Assessment/Plan:      * Vaginal fistula  Abdominal pain  Crohn's disease of small and large intestines with complication    Clear liquids advance  NPO after midnight  Consult GI:  CT Abdomen: no acute intracranial processes  CBC, CMP, Lipase in am  Blood cultures x2 pending  Continue prednisone    Crohn's  disease of small and large intestines with complication    Impression:           - Decubitus ulceration found on perianal exam.                        - Inflammation was found in the rectum. This was                         moderate in severity, improved compared to previous                         examinations. Biopsied.                        - The monie-terminal ileum appears endoscopically                         much less inflammed and much better. This was                         biopsied to assess disease activty.                        - The rectum is characterized by linear ulcers and                         inflammatory polyps, however the endoscopic                         appearance is much improved.                        - no evidence of fistula found despite instillation                         of methylene blue.  Recommendation:                             - Refer to a colo-rectal surgeon (Dr. Worley) at                         appointment to be scheduled for consideration of                         end ileostomy creation.    Open wound of left knee  Wound vac in place      Tachycardia  Continuous cardiac monitoring  Denies chest pain or SOB  continuous IVFs  Metoprolol prn        Alzheimers disease  Continue Donepezil, Namenda and Quetiapine      Chronic kidney disease, stage 3  Creatinine 1.1  Voiding well  Avoid nephrotoxic agents  Strict I/O's  Renally dose medications     History of left knee replacement  Wound dehiscence, surgical, sequela    s/p left TKA with Dr. Lindsay on 11/16/19 with wound dehiscence complication  Wound vac not functioning properly  Consult Wound Care  Consult Orthopedics Dr. Lindsay      GERD (gastroesophageal reflux disease)  Continue protonix        VTE Risk Mitigation (From admission, onward)         Ordered     enoxaparin injection 40 mg  Daily      11/25/19 2006     Place sequential compression device  Until discontinued      11/25/19 2006     IP VTE HIGH RISK PATIENT   Once      11/25/19 2006                      Herberth Leblanc DO  Department of Hospital Medicine   [facility]

## 2019-11-28 NOTE — ASSESSMENT & PLAN NOTE
Impression:           - Decubitus ulceration found on perianal exam.                        - Inflammation was found in the rectum. This was                         moderate in severity, improved compared to previous                         examinations. Biopsied.                        - The monie-terminal ileum appears endoscopically                         much less inflammed and much better. This was                         biopsied to assess disease activty.                        - The rectum is characterized by linear ulcers and                         inflammatory polyps, however the endoscopic                         appearance is much improved.                        - no evidence of fistula found despite instillation                         of methylene blue.  Recommendation:                             - Refer to a colo-rectal surgeon (Dr. Worley) at                         appointment to be scheduled for consideration of                         end ileostomy creation.

## 2019-11-28 NOTE — PLAN OF CARE
POC reviewed with the pt, verbalized understanding.  VSS.  Disoriented to time/situation, reoriented pt during shift.  No complaint of pain or any other distress noted throughout night.  The 2 units of blood finished in the evening.  PICC dressing remained dry/intact/clean, morning lab drawn by RN.  Infusing NS @ 75mL/hr.  On continuous telemetry monitor, SR.  No ectopy noted.  BM x2 overnight.  Wound vac on left knee and monahan in place.  IV antibiotics given.  Pressure injury and excoriation noted.  Turned pt with pillow frequently.  Safety maintained, free of falls throughout shift.  Instructed to call for any assistance.  Will continue to monitor.

## 2019-11-28 NOTE — PLAN OF CARE
VN cued into patients room for rounding. VN role explained and informed pt that VN will be working alongside the bedside care team throughout the day. Pt verbalized understanding that VN is available for any questions and education, and nurse and PCT will continue hourly rounding at bedside. IV fluids infusing. Fall education provided. Allotted time given for questions - all questions answered. Will continue to monitor and intervene PRN.       11/28/19 1313   Type of Frequent Check   Type Other (see comments)  (VN rounds )   Safety/Activity   Patient Rounds bed in low position;bed wheels locked;visualized patient   Safety Promotion/Fall Prevention side rails raised x 2   Positioning   Body Position neutral body alignment   Head of Bed (HOB) HOB elevated   Pain/Comfort/Sleep   Pain Rating (0-10): Rest 0   Pain Rating (0-10): Activity 0   Sleep/Rest/Relaxation awake        Cardiac/Telemetry Details / Alarms   Cardiac/Telemetry Monitor On Yes   Assessments (Pre/Post)   Level of Consciousness (AVPU) alert

## 2019-11-28 NOTE — PLAN OF CARE
Virtual Nurse cued into patient's room with permission. Patient lying in bed, oriented except to time. Care plan explained and needs reinforcement. 1 unit of PRBC running, almost done. Nurse Jim at the bedside. Allowed time for questions. Will continue to monitor.

## 2019-11-28 NOTE — SUBJECTIVE & OBJECTIVE
Interval History:       Review of Systems   Constitutional: Positive for activity change and fatigue. Negative for fever.   HENT: Negative for congestion.    Respiratory: Negative for apnea and shortness of breath.    Cardiovascular: Negative for chest pain and palpitations.   Gastrointestinal: Negative for abdominal distention and abdominal pain.        Stool per vagina     Genitourinary: Negative for difficulty urinating and frequency.   Musculoskeletal: Negative for arthralgias.   Neurological: Negative for dizziness and numbness.   Psychiatric/Behavioral: Negative for agitation and confusion. The patient is not nervous/anxious.      Objective:     Vital Signs (Most Recent):  Temp: 98 °F (36.7 °C) (11/28/19 0758)  Pulse: 107 (11/28/19 0758)  Resp: 17 (11/28/19 0758)  BP: (!) 122/58 (11/28/19 0758)  SpO2: 97 % (11/28/19 0758) Vital Signs (24h Range):  Temp:  [96.9 °F (36.1 °C)-99.2 °F (37.3 °C)] 98 °F (36.7 °C)  Pulse:  [] 107  Resp:  [14-22] 17  SpO2:  [97 %-100 %] 97 %  BP: ()/(51-84) 122/58     Weight: 86.2 kg (190 lb 0.6 oz)  Body mass index is 32.62 kg/m².    Intake/Output Summary (Last 24 hours) at 11/28/2019 1200  Last data filed at 11/28/2019 0500  Gross per 24 hour   Intake 4805 ml   Output 1000 ml   Net 3805 ml      Physical Exam   Constitutional: She is oriented to person, place, and time. She appears well-developed and well-nourished.   HENT:   Head: Normocephalic and atraumatic.   Eyes: EOM are normal.   Neck: Neck supple.   Cardiovascular: Normal rate.   Pulmonary/Chest: Effort normal. No respiratory distress.   Abdominal: Soft. She exhibits no distension.   Musculoskeletal: Normal range of motion.   Wound vac in place   Neurological: She is alert and oriented to person, place, and time.   Skin: Skin is warm and dry.   Psychiatric: She has a normal mood and affect. Her behavior is normal. Judgment and thought content normal.   Nursing note and vitals reviewed.      Significant Labs:    Recent Labs   Lab 11/26/19 0317 11/27/19  0652 11/28/19  0530   WBC 3.56* 2.15* 3.62*   HGB 8.7* 6.3* 9.5*   HCT 27.4* 20.0* 29.9*    209 251     Recent Labs   Lab 11/25/19  1511 11/26/19 0317 11/27/19  0652 11/28/19  0530    137  --   --    K 4.3 3.9  --   --     102  --   --    CO2 24 25  --   --    BUN 10 8  --   --    CREATININE 1.1 0.9  --   --    * 146*  --   --    CALCIUM 9.1 8.1*  --   --    MG  --  1.5* 1.2* 1.5*   PHOS  --  3.3 2.4* 1.9*   LIPASE 64* 35  --   --      Recent Labs   Lab 11/25/19  1511 11/26/19 0317   ALKPHOS 150* 117   ALT 15 12   AST 14 11   ALBUMIN 2.6* 2.1*   PROT 6.8 5.6*   BILITOT 0.6 0.4      No results for input(s): CPK, CPKMB, MB, TROPONINI in the last 72 hours.  No results for input(s): POCTGLUCOSE in the last 168 hours.  Hemoglobin A1C   Date Value Ref Range Status   11/01/2019 5.7 (H) 4.0 - 5.6 % Final     Comment:     ADA Screening Guidelines:  5.7-6.4%  Consistent with prediabetes  >or=6.5%  Consistent with diabetes  High levels of fetal hemoglobin interfere with the HbA1C  assay. Heterozygous hemoglobin variants (HbS, HgC, etc)do  not significantly interfere with this assay.   However, presence of multiple variants may affect accuracy.     02/04/2019 5.0 4.0 - 5.6 % Final     Comment:     ADA Screening Guidelines:  5.7-6.4%  Consistent with prediabetes  >or=6.5%  Consistent with diabetes  High levels of fetal hemoglobin interfere with the HbA1C  assay. Heterozygous hemoglobin variants (HbS, HgC, etc)do  not significantly interfere with this assay.   However, presence of multiple variants may affect accuracy.     04/23/2018 5.0 4.0 - 5.6 % Final     Comment:     According to ADA guidelines, hemoglobin A1c <7.0% represents  optimal control in non-pregnant diabetic patients. Different  metrics may apply to specific patient populations.   Standards of Medical Care in Diabetes-2016.  For the purpose of screening for the presence of diabetes:  <5.7%      Consistent with the absence of diabetes  5.7-6.4%  Consistent with increasing risk for diabetes   (prediabetes)  >or=6.5%  Consistent with diabetes  Currently, no consensus exists for use of hemoglobin A1c  for diagnosis of diabetes for children.  This Hemoglobin A1c assay has significant interference with fetal   hemoglobin   (HbF). The results are invalid for patients with abnormal amounts of   HbF,   including those with known Hereditary Persistence   of Fetal Hemoglobin. Heterozygous hemoglobin variants (HbAS, HbAC,   HbAD, HbAE, HbA2) do not significantly interfere with this assay;   however, presence of multiple variants in a sample may impact the %   interference.       Scheduled Meds:   aspirin  81 mg Oral Daily    azaTHIOprine  50 mg Oral Daily    ceFAZolin (ANCEF) IVPB  2 g Intravenous Q8H    donepezil  10 mg Oral QHS    enoxaparin  40 mg Subcutaneous Daily    ferrous sulfate  325 mg Oral BID    memantine  10 mg Oral Daily    metronidazole  500 mg Intravenous Q8H    pantoprazole  40 mg Oral Daily    potassium phosphate IVPB  15 mmol Intravenous Once    predniSONE  10 mg Oral Daily    predniSONE  10 mg Oral Daily    [START ON 12/3/2019] predniSONE  5 mg Oral Daily    QUEtiapine  100 mg Oral QHS    sodium chloride 0.9%  10 mL Intravenous Q6H     Continuous Infusions:   sodium chloride 0.9% 75 mL/hr at 11/27/19 2311     As Needed: sodium chloride, acetaminophen, lorazepam, metoprolol, ondansetron, sodium chloride 0.9%, Flushing PICC Protocol **AND** sodium chloride 0.9% **AND** sodium chloride 0.9%      Significant Imaging:   X-Ray Chest 1 View for PICC_Central line  Narrative: EXAMINATION:  XR CHEST 1 VIEW    CLINICAL HISTORY:  Evaluate PICC line placement;    TECHNIQUE:  Single frontal view of the chest was performed.    COMPARISON:  10/30/2019    FINDINGS:  Right PICC catheter tip projects over the proximal SVC.    The cardiomediastinal silhouette is not enlarged.  There is no pleural  effusion.  The trachea is midline.  The lungs are symmetrically expanded bilaterally with mildly coarse interstitial attenuation noting left basilar subsegmental atelectasis or scarring..  No large focal consolidation seen.  There is no pneumothorax.  The osseous structures are remarkable for degenerative changes..  Impression: As above    Electronically signed by: Bharat Hsu MD  Date:    11/26/2019  Time:    13:37

## 2019-11-29 VITALS
RESPIRATION RATE: 16 BRPM | OXYGEN SATURATION: 97 % | HEIGHT: 64 IN | DIASTOLIC BLOOD PRESSURE: 68 MMHG | HEART RATE: 96 BPM | TEMPERATURE: 99 F | SYSTOLIC BLOOD PRESSURE: 121 MMHG | WEIGHT: 190.06 LBS | BODY MASS INDEX: 32.45 KG/M2

## 2019-11-29 LAB
ANION GAP SERPL CALC-SCNC: 9 MMOL/L (ref 8–16)
BASOPHILS # BLD AUTO: 0.01 K/UL (ref 0–0.2)
BASOPHILS NFR BLD: 0.3 % (ref 0–1.9)
BUN SERPL-MCNC: 4 MG/DL (ref 8–23)
CALCIUM SERPL-MCNC: 6.9 MG/DL (ref 8.7–10.5)
CHLORIDE SERPL-SCNC: 114 MMOL/L (ref 95–110)
CO2 SERPL-SCNC: 20 MMOL/L (ref 23–29)
CREAT SERPL-MCNC: 0.7 MG/DL (ref 0.5–1.4)
DIFFERENTIAL METHOD: ABNORMAL
EOSINOPHIL # BLD AUTO: 0 K/UL (ref 0–0.5)
EOSINOPHIL NFR BLD: 0.9 % (ref 0–8)
ERYTHROCYTE [DISTWIDTH] IN BLOOD BY AUTOMATED COUNT: 15.2 % (ref 11.5–14.5)
EST. GFR  (AFRICAN AMERICAN): >60 ML/MIN/1.73 M^2
EST. GFR  (NON AFRICAN AMERICAN): >60 ML/MIN/1.73 M^2
GLUCOSE SERPL-MCNC: 71 MG/DL (ref 70–110)
HCT VFR BLD AUTO: 29.7 % (ref 37–48.5)
HGB BLD-MCNC: 9.5 G/DL (ref 12–16)
LYMPHOCYTES # BLD AUTO: 0.9 K/UL (ref 1–4.8)
LYMPHOCYTES NFR BLD: 28.7 % (ref 18–48)
MAGNESIUM SERPL-MCNC: 2 MG/DL (ref 1.6–2.6)
MCH RBC QN AUTO: 29.9 PG (ref 27–31)
MCHC RBC AUTO-ENTMCNC: 32 G/DL (ref 32–36)
MCV RBC AUTO: 93 FL (ref 82–98)
MONOCYTES # BLD AUTO: 0.3 K/UL (ref 0.3–1)
MONOCYTES NFR BLD: 10.7 % (ref 4–15)
NEUTROPHILS # BLD AUTO: 1.9 K/UL (ref 1.8–7.7)
NEUTROPHILS NFR BLD: 59.4 % (ref 38–73)
PHOSPHATE SERPL-MCNC: 1.5 MG/DL (ref 2.7–4.5)
PLATELET # BLD AUTO: 273 K/UL (ref 150–350)
PMV BLD AUTO: 7.6 FL (ref 9.2–12.9)
POTASSIUM SERPL-SCNC: 3.1 MMOL/L (ref 3.5–5.1)
RBC # BLD AUTO: 3.18 M/UL (ref 4–5.4)
SODIUM SERPL-SCNC: 143 MMOL/L (ref 136–145)
WBC # BLD AUTO: 3.17 K/UL (ref 3.9–12.7)

## 2019-11-29 PROCEDURE — 63600175 PHARM REV CODE 636 W HCPCS: Performed by: INTERNAL MEDICINE

## 2019-11-29 PROCEDURE — 83735 ASSAY OF MAGNESIUM: CPT

## 2019-11-29 PROCEDURE — 84100 ASSAY OF PHOSPHORUS: CPT

## 2019-11-29 PROCEDURE — A4216 STERILE WATER/SALINE, 10 ML: HCPCS | Performed by: HOSPITALIST

## 2019-11-29 PROCEDURE — S0030 INJECTION, METRONIDAZOLE: HCPCS | Performed by: HOSPITALIST

## 2019-11-29 PROCEDURE — 63600175 PHARM REV CODE 636 W HCPCS: Performed by: ORTHOPAEDIC SURGERY

## 2019-11-29 PROCEDURE — 25000003 PHARM REV CODE 250: Performed by: NURSE PRACTITIONER

## 2019-11-29 PROCEDURE — 97802 MEDICAL NUTRITION INDIV IN: CPT

## 2019-11-29 PROCEDURE — 85025 COMPLETE CBC W/AUTO DIFF WBC: CPT

## 2019-11-29 PROCEDURE — 25000003 PHARM REV CODE 250: Performed by: HOSPITALIST

## 2019-11-29 PROCEDURE — 97530 THERAPEUTIC ACTIVITIES: CPT

## 2019-11-29 PROCEDURE — 80048 BASIC METABOLIC PNL TOTAL CA: CPT

## 2019-11-29 PROCEDURE — 63600175 PHARM REV CODE 636 W HCPCS: Performed by: HOSPITALIST

## 2019-11-29 PROCEDURE — 97116 GAIT TRAINING THERAPY: CPT

## 2019-11-29 RX ORDER — HYDROCODONE BITARTRATE AND ACETAMINOPHEN 500; 5 MG/1; MG/1
TABLET ORAL
Status: CANCELLED | OUTPATIENT
Start: 2019-11-29

## 2019-11-29 RX ORDER — MAGNESIUM SULFATE HEPTAHYDRATE 40 MG/ML
2 INJECTION, SOLUTION INTRAVENOUS ONCE
Status: COMPLETED | OUTPATIENT
Start: 2019-11-29 | End: 2019-11-29

## 2019-11-29 RX ORDER — ASPIRIN 81 MG/1
81 TABLET ORAL DAILY
Status: CANCELLED | OUTPATIENT
Start: 2019-11-30

## 2019-11-29 RX ORDER — PREDNISONE 10 MG/1
10 TABLET ORAL DAILY
Status: CANCELLED | OUTPATIENT
Start: 2019-11-30 | End: 2019-12-03

## 2019-11-29 RX ORDER — ACETAMINOPHEN 325 MG/1
650 TABLET ORAL EVERY 4 HOURS PRN
Status: CANCELLED | OUTPATIENT
Start: 2019-11-29

## 2019-11-29 RX ORDER — METRONIDAZOLE 500 MG/1
500 TABLET ORAL EVERY 8 HOURS
Status: CANCELLED | OUTPATIENT
Start: 2019-11-29

## 2019-11-29 RX ORDER — ONDANSETRON 2 MG/ML
4 INJECTION INTRAMUSCULAR; INTRAVENOUS EVERY 8 HOURS PRN
Status: CANCELLED | OUTPATIENT
Start: 2019-11-29

## 2019-11-29 RX ORDER — SODIUM CHLORIDE 0.9 % (FLUSH) 0.9 %
10 SYRINGE (ML) INJECTION
Status: CANCELLED | OUTPATIENT
Start: 2019-11-29

## 2019-11-29 RX ORDER — METOPROLOL TARTRATE 1 MG/ML
5 INJECTION, SOLUTION INTRAVENOUS EVERY 12 HOURS PRN
Status: CANCELLED | OUTPATIENT
Start: 2019-11-29

## 2019-11-29 RX ORDER — ENOXAPARIN SODIUM 100 MG/ML
40 INJECTION SUBCUTANEOUS EVERY 24 HOURS
Status: CANCELLED | OUTPATIENT
Start: 2019-11-29

## 2019-11-29 RX ORDER — SODIUM CHLORIDE 0.9 % (FLUSH) 0.9 %
10 SYRINGE (ML) INJECTION EVERY 6 HOURS
Status: CANCELLED | OUTPATIENT
Start: 2019-11-29

## 2019-11-29 RX ORDER — DONEPEZIL HYDROCHLORIDE 5 MG/1
10 TABLET, FILM COATED ORAL NIGHTLY
Status: CANCELLED | OUTPATIENT
Start: 2019-11-29

## 2019-11-29 RX ORDER — FERROUS SULFATE 325(65) MG
325 TABLET, DELAYED RELEASE (ENTERIC COATED) ORAL 2 TIMES DAILY
Status: CANCELLED | OUTPATIENT
Start: 2019-11-29

## 2019-11-29 RX ORDER — CEFAZOLIN SODIUM 2 G/50ML
2 SOLUTION INTRAVENOUS
Status: CANCELLED | OUTPATIENT
Start: 2019-11-29 | End: 2019-12-09

## 2019-11-29 RX ORDER — METRONIDAZOLE 500 MG/1
500 TABLET ORAL EVERY 8 HOURS
Status: DISCONTINUED | OUTPATIENT
Start: 2019-11-29 | End: 2019-11-29 | Stop reason: HOSPADM

## 2019-11-29 RX ORDER — PREDNISONE 10 MG/1
10 TABLET ORAL DAILY
Status: CANCELLED | OUTPATIENT
Start: 2019-11-30

## 2019-11-29 RX ORDER — QUETIAPINE FUMARATE 25 MG/1
100 TABLET, FILM COATED ORAL NIGHTLY
Status: CANCELLED | OUTPATIENT
Start: 2019-11-29

## 2019-11-29 RX ORDER — FAMOTIDINE 10 MG/ML
20 INJECTION INTRAVENOUS 2 TIMES DAILY
Status: CANCELLED | OUTPATIENT
Start: 2019-11-29

## 2019-11-29 RX ORDER — AZATHIOPRINE 50 MG/1
50 TABLET ORAL DAILY
Status: CANCELLED | OUTPATIENT
Start: 2019-11-30

## 2019-11-29 RX ORDER — PANTOPRAZOLE SODIUM 40 MG/1
40 TABLET, DELAYED RELEASE ORAL DAILY
Status: CANCELLED | OUTPATIENT
Start: 2019-11-30

## 2019-11-29 RX ORDER — MEMANTINE HYDROCHLORIDE 5 MG/1
10 TABLET ORAL DAILY
Status: CANCELLED | OUTPATIENT
Start: 2019-11-30

## 2019-11-29 RX ORDER — PREDNISONE 5 MG/1
5 TABLET ORAL DAILY
Status: CANCELLED | OUTPATIENT
Start: 2019-12-03 | End: 2019-12-08

## 2019-11-29 RX ADMIN — ASPIRIN 81 MG: 81 TABLET, COATED ORAL at 09:11

## 2019-11-29 RX ADMIN — MAGNESIUM SULFATE IN WATER 2 G: 40 INJECTION, SOLUTION INTRAVENOUS at 12:11

## 2019-11-29 RX ADMIN — CEFAZOLIN SODIUM 2 G: 2 SOLUTION INTRAVENOUS at 06:11

## 2019-11-29 RX ADMIN — POTASSIUM PHOSPHATE, MONOBASIC AND POTASSIUM PHOSPHATE, DIBASIC 15 MMOL: 224; 236 INJECTION, SOLUTION, CONCENTRATE INTRAVENOUS at 12:11

## 2019-11-29 RX ADMIN — MEMANTINE HYDROCHLORIDE 10 MG: 5 TABLET ORAL at 09:11

## 2019-11-29 RX ADMIN — METRONIDAZOLE 500 MG: 500 TABLET, FILM COATED ORAL at 09:11

## 2019-11-29 RX ADMIN — AZATHIOPRINE 50 MG: 50 TABLET ORAL at 09:11

## 2019-11-29 RX ADMIN — FERROUS SULFATE TAB EC 325 MG (65 MG FE EQUIVALENT) 325 MG: 325 (65 FE) TABLET DELAYED RESPONSE at 09:11

## 2019-11-29 RX ADMIN — PREDNISONE 10 MG: 10 TABLET ORAL at 09:11

## 2019-11-29 RX ADMIN — METRONIDAZOLE 500 MG: 500 INJECTION, SOLUTION INTRAVENOUS at 12:11

## 2019-11-29 RX ADMIN — PANTOPRAZOLE SODIUM 40 MG: 40 TABLET, DELAYED RELEASE ORAL at 09:11

## 2019-11-29 RX ADMIN — Medication 10 ML: at 12:11

## 2019-11-29 RX ADMIN — Medication 10 ML: at 06:11

## 2019-11-29 NOTE — PLAN OF CARE
VN note: VN completed AVS and attachments and notified bedside nurseJennifer. Waiting for family to arrive to admin discharge education. Will cont to be available and intervene prn.

## 2019-11-29 NOTE — PLAN OF CARE
Pt has been accepted to [facility] for admission today.  Nurse to call report to to [phone] room [phone].  Ambulance transport arranged for 4pm.  Son and pt aware and in agreement.         11/29/19 1352   Final Note   Assessment Type Final Discharge Note   Anticipated Discharge Disposition Home   Hospital Follow Up  Appt(s) scheduled? No   Discharge plans and expectations educations in teach back method with documentation complete? No       Bing Mckeon RN    [phone]

## 2019-11-29 NOTE — PLAN OF CARE
Recommendation:   1. Consider appetite stimulant.   2. Add Novasource Renal bid.   3. Encourage intake at meals as tolerated     Goals:   Pt will consume at least 50% intake at meals  Nutrition Goal Status: new  Communication of RD Recs: reviewed with RN

## 2019-11-29 NOTE — PLAN OF CARE
Pt has been accepted to [facility] for admission today.  Pt and son are in agreement with plan, son states he would not be able to assist pt at home and is pleased with care there on previous admission.      Primary team to work on D/C Readmit Orders.      Bing Mckeon, RN    [phone]

## 2019-11-29 NOTE — PT/OT/SLP PROGRESS
Physical Therapy Treatment    Patient Name:  Anabella Aranda   MRN:  [id number]    Recommendations:     Discharge Recommendations:  nursing facility, skilled   Discharge Equipment Recommendations: wheelchair   Barriers to discharge: None    Assessment:     Anabella Aranda is a 74 y.o. female admitted with a medical diagnosis of Vaginal fistula.  She presents with the following impairments/functional limitations:  weakness, gait instability, decreased lower extremity function, decreased ROM, impaired balance, impaired endurance, impaired functional mobilty, impaired self care skills, pain, impaired skin, edema . Patient able to ambulate with RW and verbal cue for RW management and step lenghts needing only SBA to CG assist. .    Rehab Prognosis: Good; patient would benefit from acute skilled PT services to address these deficits and reach maximum level of function.    Recent Surgery: Procedure(s) (LRB):  EGD (ESOPHAGOGASTRODUODENOSCOPY) (N/A)  SIGMOIDOSCOPY, FLEXIBLE (N/A) 2 Days Post-Op    Plan:     During this hospitalization, patient to be seen 6 x/week to address the identified rehab impairments via gait training, therapeutic activities, therapeutic exercises and progress toward the following goals:    · Plan of Care Expires:  12/27/19    Subjective     Chief Complaint: pain  Patient/Family Comments/goals: go home  Pain/Comfort:  · Pain Rating 1: 4/10  · Location - Side 1: Left  · Location - Orientation 1: generalized  · Location 1: knee  · Pain Addressed 1: Reposition, Distraction      Objective:     Communicated with primary nurse prior to session.  Patient found HOB elevated with bed alarm, wound vac, monahan catheter, peripheral IV upon PT entry to room.     General Precautions: Standard, fall   Orthopedic Precautions:LLE weight bearing as tolerated   Braces: N/A     Functional Mobility:  · Bed Mobility:     · Supine to Sit: minimum assistance and moderate assistance  · Transfers:     · Sit to Stand:   contact guard assistance and minimum assistance with rolling walker  · Gait: 30 ft with RW CG assist  · Balance: fair to fair +      AM-PAC 6 CLICK MOBILITY  Turning over in bed (including adjusting bedclothes, sheets and blankets)?: 3  Sitting down on and standing up from a chair with arms (e.g., wheelchair, bedside commode, etc.): 3  Moving from lying on back to sitting on the side of the bed?: 2  Moving to and from a bed to a chair (including a wheelchair)?: 3  Need to walk in hospital room?: 3  Climbing 3-5 steps with a railing?: 2  Basic Mobility Total Score: 16       Therapeutic Activities and Exercises:   na    Patient left up in chair with all lines intact, call button in reach and chair alarm on..    GOALS:   Multidisciplinary Problems     Physical Therapy Goals        Problem: Physical Therapy Goal    Goal Priority Disciplines Outcome Goal Variances Interventions   Physical Therapy Goal     PT, PT/OT Ongoing, Progressing     Description:  Goals to be met by: discharge date     Patient will increase functional independence with mobility by performin. Supine to sit with Stand-by Assistance  2. Sit to supine with Stand-by Assistance  3. Rolling to Left and Right with Modified [place].  4. Sit to stand transfer with Contact Guard Assistance  5. Bed to chair transfer with Contact Guard Assistance using Rolling Walker w appropriate WB status maintained.  6. Gait  x 10 feet with Contact Guard Assistance using Rolling Walker with appropriate WB status maintained.  7. Lower extremity exercise program x15 reps                     Time Tracking:     PT Received On: 19  PT Start Time: 911     PT Stop Time: 936  PT Total Time (min): 25 min     Billable Minutes: Gait Training 12    Treatment Type: Treatment  PT/PTA: PT     PTA Visit Number: 0     Mo Vera, PT  2019

## 2019-11-29 NOTE — PROGRESS NOTES
Pharmacist Intervention IV to PO Note    Anabella Aranda is a 74 y.o. female being treated with IV medication metronidazole    Patient Data:    Vital Signs (Most Recent):  Temp: 97.6 °F (36.4 °C) (11/29/19 0753)  Pulse: 95 (11/29/19 0753)  Resp: 15 (11/29/19 0753)  BP: 115/68 (11/29/19 0753)  SpO2: 97 % (11/29/19 0753) Vital Signs (72h Range):  Temp:  [96.9 °F (36.1 °C)-99.6 °F (37.6 °C)]   Pulse:  []   Resp:  [14-22]   BP: ()/(49-84)   SpO2:  [95 %-100 %]      CBC:  Recent Labs   Lab 11/27/19  0652 11/28/19  0530 11/29/19  0540   WBC 2.15* 3.62* 3.17*   RBC 2.12* 3.22* 3.18*   HGB 6.3* 9.5* 9.5*   HCT 20.0* 29.9* 29.7*    251 273   MCV 94 93 93   MCH 29.7 29.5 29.9   MCHC 31.7* 31.8* 32.0     CMP:     Recent Labs   Lab 11/25/19  1511 11/26/19  0317 11/29/19  0540   * 146* 71   CALCIUM 9.1 8.1* 6.9*   ALBUMIN 2.6* 2.1*  --    PROT 6.8 5.6*  --     137 143   K 4.3 3.9 3.1*   CO2 24 25 20*    102 114*   BUN 10 8 4*   CREATININE 1.1 0.9 0.7   ALKPHOS 150* 117  --    ALT 15 12  --    AST 14 11  --    BILITOT 0.6 0.4  --        Dietary Orders:  Diet Orders            Diet Cardiac: Cardiac starting at 11/29 0710            Based on the following criteria, this patient qualifies for intravenous to oral conversion:  [x] The patients gastrointestinal tract is functioning (tolerating medications via oral or enteral route for 24 hours and tolerating food or enteral feeds for 24 hours).  [x] The patient is hemodynamically stable for 24 hours (heart rate <100 beats per minute, systolic blood pressure >99 mm Hg, and respiratory rate <20 breaths per minute).  [x] The patient shows clinical improvement (afebrile for at least 24 hours and white blood cell count downtrending or normalized). Additionally, the patient must be non-neutropenic (absolute neutrophil count >500 cells/mm3).  [x] For antimicrobials, the patient has received IV therapy for at least 24 hours.    IV medication  metronidazole will be changed to oral medication metronidazole    Pharmacist's Name: Luis Arevalo  Pharmacist's Extension: [phone]

## 2019-11-29 NOTE — PROGRESS NOTES
[facility]  Orthopedics  Progress Note    Patient Name: Anabella Aranda  MRN: [id number]  Admission Date: 11/25/2019  Hospital Length of Stay: 2 days  Attending Provider: Herberth Leblanc DO  Primary Care Provider: Shon Brambila MD  Follow-up For: Procedure(s) (LRB):  EGD (ESOPHAGOGASTRODUODENOSCOPY) (N/A)  SIGMOIDOSCOPY, FLEXIBLE (N/A)    Post-Operative Day: 2 Days Post-Op  Subjective:     Principal Problem:Vaginal fistula    Principal Orthopedic Problem: s/p TKA with wound dehiscence     Interval History: Pt laying comfortably in bed. No complaints at this time. Nurse present who states pt will be c/d to SNF this afternoon.    Review of patient's allergies indicates:   Allergen Reactions    Sulfa (sulfonamide antibiotics) Swelling       Current Facility-Administered Medications   Medication    0.9%  NaCl infusion (for blood administration)    0.9%  NaCl infusion    acetaminophen tablet 650 mg    Amino acid 4.25% - dextrose 10% (CLINIMIX-E) solution with additives (1L provides 42.5 gm AA, 100 gm CHO (340 kcal/L dextrose), Na 35, K 30, Mg 5, Ca 4.5, Acetate 70, Cl 39, Phos 15)    aspirin EC tablet 81 mg    azaTHIOprine tablet 50 mg    cefazolin (ANCEF) 2 gram in dextrose 5% 50 mL IVPB (premix)    donepezil tablet 10 mg    enoxaparin injection 40 mg    fat emulsion 20% infusion 250 mL    ferrous sulfate EC tablet 325 mg    lorazepam (ATIVAN) injection 0.5 mg    memantine tablet 10 mg    metoprolol injection 5 mg    metroNIDAZOLE tablet 500 mg    ondansetron injection 4 mg    pantoprazole EC tablet 40 mg    potassium phosphate 15 mmol in dextrose 5 % 250 mL infusion    predniSONE tablet 10 mg    predniSONE tablet 10 mg    [START ON 12/3/2019] predniSONE tablet 5 mg    QUEtiapine tablet 100 mg    sodium chloride 0.9% flush 10 mL    sodium chloride 0.9% flush 10 mL    And    sodium chloride 0.9% flush 10 mL     Objective:     Vital Signs (Most Recent):  Temp: 98.6 °F (37 °C)  "(11/29/19 1142)  Pulse: 96 (11/29/19 1200)  Resp: 16 (11/29/19 1142)  BP: 121/68 (11/29/19 1142)  SpO2: 97 % (11/29/19 1142) Vital Signs (24h Range):  Temp:  [97.6 °F (36.4 °C)-98.9 °F (37.2 °C)] 98.6 °F (37 °C)  Pulse:  [] 96  Resp:  [15-16] 16  SpO2:  [97 %-98 %] 97 %  BP: (110-127)/(56-68) 121/68     Weight: 86.2 kg (190 lb 0.6 oz)  Height: 5' 4" (162.6 cm)  Body mass index is 32.62 kg/m².      Intake/Output Summary (Last 24 hours) at 11/29/2019 1533  Last data filed at 11/29/2019 0410  Gross per 24 hour   Intake 2775 ml   Output 1200 ml   Net 1575 ml       General    Nursing note and vitals reviewed.  Constitutional: She appears well-developed.   HENT:   Head: Atraumatic.   Pulmonary/Chest: Effort normal.   Neurological: She is alert.   Confused     Psychiatric: She has a normal mood and affect.           LEFT KNEE: With wound vac in place. Wound vac working properly.   NVI  Wound vac removed. Wound bed with good/red granulation tissue. No obvious sign of infection.   There is also a small .5x.5 cm round opening at the distal incision with serous drainage.   Negative Sukhwinder's     Significant Labs: All pertinent labs within the past 24 hours have been reviewed.    Significant Imaging: I have reviewed all pertinent imaging results/findings.    Assessment/Plan:     Active Diagnoses:    Diagnosis Date Noted POA    PRINCIPAL PROBLEM:  Vaginal fistula [N82.8] 11/25/2019 Yes    Open wound of left knee [S81.002A] 11/26/2019 Yes    Tachycardia [R00.0] 11/25/2019 Yes    Wound dehiscence, surgical, sequela [T81.31XS]  Not Applicable    Crohn's disease of small and large intestines with complication [K50.819] 10/25/2019 Yes     Chronic    History of left knee replacement [Z96.652] 10/16/2019 Not Applicable    Alzheimers disease [G30.9, F02.80]  Yes     Chronic    Chronic kidney disease, stage 3 [N18.3]  Yes     Chronic    GERD (gastroesophageal reflux disease) [K21.9] 09/24/2019 Yes      Problems Resolved " During this Admission:     Wound vac changed today.   Continue wound vac changes approximately every 3 days or continue f/u with wound clinic.  Continue IV abx.  Continue PT/OT WBAT.   Pt is stable for d/c from ortho standpoint at this time.     Umu Cuellar PA-C  Orthopedics  [facility]

## 2019-11-29 NOTE — NURSING
Report called to NAEL Lopez at [facility]. Transportation scheduled for 1600. Patient will be discharged with personal wound vac, monahan, and PICC line.

## 2019-11-29 NOTE — PLAN OF CARE
Problem: Physical Therapy Goal  Goal: Physical Therapy Goal  Description  Goals to be met by: discharge date     Patient will increase functional independence with mobility by performin. Supine to sit with Stand-by Assistance  2. Sit to supine with Stand-by Assistance  3. Rolling to Left and Right with Modified [place].  4. Sit to stand transfer with Contact Guard Assistance  5. Bed to chair transfer with Contact Guard Assistance using Rolling Walker w appropriate WB status maintained.  6. Gait  x 10 feet with Contact Guard Assistance using Rolling Walker with appropriate WB status maintained.  7. Lower extremity exercise program x15 reps    Outcome: Ongoing, Progressing   PAC clarified wt bearing status WBAT L   Recommend SNF

## 2019-11-29 NOTE — PLAN OF CARE
Problem: Occupational Therapy Goal  Goal: Occupational Therapy Goal  Description  Goals to be met by: 12/27/19     Patient will increase functional independence with ADLs by performing:    LE Dressing with Minimal Assistance.  Toileting from bedside commode with Minimal Assistance for hygiene and clothing management.   Supine to sit with Modified [place].  Step transfer with Minimal Assistance  Toilet transfer to bedside commode with Minimal Assistance.  Increased functional strength to WFL for self care skills and functional mobility.  Upper extremity exercise program x10 reps per handout, with independence.     Outcome: Ongoing, Progressing       PA-C clarifying WB status over secure chat for pt today's date- WBAT orders to be placed in chart. Pt with increased pain, however, able to tolerate t/fs and functional mobility this date. Cont OT POC

## 2019-11-29 NOTE — PROGRESS NOTES
"[facility]  Adult Nutrition  Progress Note    SUMMARY       Recommendations    Recommendation:   1. Consider appetite stimulant.   2. Add Novasource Renal bid.   3. Encourage intake at meals as tolerated    Goals:   Pt will consume at least 50% intake at meals  Nutrition Goal Status: new  Communication of RD Recs: reviewed with RN    Reason for Assessment  Reason For Assessment: RD follow-up  Diagnosis: (vaginal fistula)  Relevant Medical History: C. difficile colitis, chronic kidney disease, stage 3, dementia, hepatitis b, hypertension, major depression, chron's  Disease, s/p left TKA   Interdisciplinary Rounds: did not attend  General Information Comments: Pt on Cardiac diet.Reports only ~25% intake 2/2 poor appetite. s/p sigmoidoscopy 11/27. NFPE completed 11/26- pt nourished  Nutrition Discharge Planning: pt to d/c on Cardiac diet    Nutrition Risk Screen  Nutrition Risk Screen: no indicators present    Nutrition/Diet History  Food Preferences: no Worship or cultural food prefs identified  Spiritual, Cultural Beliefs, Confucianist Practices, Values that Affect Care: no  Factors Affecting Nutritional Intake: decreased appetite    Anthropometrics  Temp: 97.6 °F (36.4 °C)  Height Method: Stated  Height: 5' 4" (162.6 cm)  Height (inches): 64 in  Weight Method: Bed Scale  Weight: 86.2 kg (190 lb 0.6 oz)  Weight (lb): 190.04 lb  Ideal Body Weight (IBW), Female: 120 lb  % Ideal Body Weight, Female (lb): 158.37 lb  BMI (Calculated): 32.6  BMI Grade: 30 - 34.9- obesity - grade I     Lab/Procedures/Meds  Pertinent Labs Reviewed: reviewed  Pertinent Labs Comments: K 3.1L, BUN 4L, Phos 1.5L, Ca 6.9L, Alb 2.1L  Pertinent Medications Reviewed: reviewed  Pertinent Medications Comments: aspirin, prednisone    Estimated/Assessed Needs  Weight Used For Calorie Calculations: 86.2 kg (190 lb 0.6 oz)  Energy Calorie Requirements (kcal): 2155 kcals/day(25 kcal/kg)  Energy Need Method: Kcal/kg  Protein Requirements: " 69.1 g/day(0.8 g/kg)  Weight Used For Protein Calculations: 86.2 kg (190 lb 0.6 oz)  Fluid Requirements (mL): 1 mL/kcal or per MD.   Estimated Fluid Requirement Method: RDA Method  RDA Method (mL): 2155     Nutrition Prescription Ordered  Current Diet Order: Cardiac    Evaluation of Received Nutrient/Fluid Intake  I/O: 2775/1200  Energy Calories Required: not meeting needs  Protein Required: not meeting needs  Fluid Required: not meeting needs  Comments: LBM 11/29  % Intake of Estimated Energy Needs: 0 - 25 %  % Meal Intake: 0 - 25 %    Nutrition Risk  Level of Risk/Frequency of Follow-up: (1xweekly)     Assessment and Plan   Nutrition Problem  Inadequate energy intake     Related to (etiology):   Decreased appetite     Signs and Symptoms (as evidenced by):   25% intake at meals     Interventions:  Collaboration with other providers     Nutrition Diagnosis Status:   Continues    Monitor and Evaluation  Food and Nutrient Intake: energy intake, food and beverage intake  Food and Nutrient Adminstration: diet order  Knowledge/Beliefs/Attitudes: food and nutrition knowledge/skill  Physical Activity and Function: nutrition-related ADLs and IADLs  Anthropometric Measurements: weight  Biochemical Data, Medical Tests and Procedures: electrolyte and renal panel, glucose/endocrine profile, lipid profile, inflammatory profile, gastrointestinal profile  Nutrition-Focused Physical Findings: overall appearance     Malnutrition Assessment  Subcutaneous Fat Loss (Final Summary): well nourished  Muscle Loss Evaluation (Final Summary): well nourished       Nutrition Follow-Up  RD Follow-up?: Yes

## 2019-11-29 NOTE — PLAN OF CARE
POC reviewed with the pt, verbalized understanding.  VSS.  Disoriented to time/situation, reoriented pt during shift.  No complaint of pain or any other distress noted throughout night.  PICC dressing remained dry/intact/clean, morning lab drawn by RN.  Infusing NS @ 75mL/hr.  On continuous telemetry monitor, SR.  No ectopy noted.  BM x1 overnight.  Wound vac on left knee and monahan in place.  IV antibiotics given.  Pressure injury and excoriation noted.  Turned pt with pillow frequently.  Safety maintained, free of falls throughout shift.  Instructed to call for any assistance.  Will continue to monitor.

## 2019-11-29 NOTE — PLAN OF CARE
VN cued into pt's room for introduction. VN informed pt that VN would be working along side bedside nurse and PCT throughout shift. Level of present pain assessed. At present no distress noted. Discussed with patient High fall risk protocol and interventions that have been initiated and cont be in place for safety. Thoroughly discussed with patient today's plan of care. Patient verbalized clear understanding and cooperation using teach back method.Chair alarm presently activated and in use. Will cont to be available to patient and intervene prn.

## 2019-11-29 NOTE — PT/OT/SLP PROGRESS
Occupational Therapy   Treatment    Name: Anabella Aranda  MRN: [id number]  Admitting Diagnosis:  Vaginal fistula  2 Days Post-Op    Recommendations:     Discharge Recommendations: nursing facility, skilled  Discharge Equipment Recommendations:  wheelchair  Barriers to discharge:  Decreased caregiver support    Assessment:     Anabella Aranda is a 74 y.o. female with a medical diagnosis of Vaginal fistula.  She presents with s/p L TKA with wound dehisence . Performance deficits affecting function are weakness, impaired self care skills, impaired balance, impaired functional mobilty, impaired endurance, gait instability, impaired cognition, decreased lower extremity function, pain, decreased safety awareness, decreased ROM, impaired skin, edema, orthopedic precautions.     PA-C clarifying WB status over secure chat for pt today's date- WBAT orders to be placed in chart. Pt with increased pain, however, able to tolerate t/fs and functional mobility this date. Cont OT POC     Rehab Prognosis:  Good; patient would benefit from acute skilled OT services to address these deficits and reach maximum level of function.       Plan:     Patient to be seen 5 x/week to address the above listed problems via self-care/home management, therapeutic activities, therapeutic exercises  · Plan of Care Expires: 12/27/19  · Plan of Care Reviewed with: patient    Subjective     Pain/Comfort:  · Pain Rating 1: 4/10  · Location - Side 1: Left  · Location - Orientation 1: generalized  · Location 1: knee  · Pain Addressed 1: Reposition, Distraction, Cessation of Activity    Objective:     Communicated with: michele prior to session.   General Precautions: Standard, fall   Orthopedic Precautions:LLE weight bearing as tolerated   Braces: N/A     Occupational Performance:     Bed Mobility:    · Patient completed Scooting/Bridging with moderate assistance and maximal assistance  · Patient completed Supine to Sit with moderate assistance and  maximal assistance     Functional Mobility/Transfers:  · Patient completed Sit <> Stand Transfer with minimum assistance  with  rolling walker   · Patient completed Bed <> Chair Transfer using Step Transfer technique with contact guard assistance and minimum assistance with rolling walker  · Functional Mobility: CGA- Min A with RW; cues for safety      Activities of Daily Living:  · Lower Body Dressing: total assistance    · Toileting: total assistance        AMPAC 6 Click ADL: 17    Treatment & Education:  Pt reporting pain this date   Mod/max A for supine/sit 2/2 increased pain- assist with LLE as well as trunk to sit EOB   Mod/Max A seated scooting to come to EOB   Stood x 2 trials this date- EOB and b/s chair with Min A /CGA; cue for hand placement, extending LLE, and safety  T/f to b/s chair CGA  Functional mobility CGA- Min A with RW  Returned to b/s chair following ambulation trial; re-educated on correct elevation of LLE with pillow prop; UE therex to perform while seated and ankle pumps     Patient left up in chair with chair alarm on and nsg notifiedEducation:      GOALS:   Multidisciplinary Problems     Occupational Therapy Goals        Problem: Occupational Therapy Goal    Goal Priority Disciplines Outcome Interventions   Occupational Therapy Goal     OT, PT/OT Ongoing, Progressing    Description:  Goals to be met by: 12/27/19     Patient will increase functional independence with ADLs by performing:    LE Dressing with Minimal Assistance.  Toileting from bedside commode with Minimal Assistance for hygiene and clothing management.   Supine to sit with Modified [place].  Step transfer with Minimal Assistance  Toilet transfer to bedside commode with Minimal Assistance.  Increased functional strength to WFL for self care skills and functional mobility.  Upper extremity exercise program x10 reps per handout, with independence.                      Time Tracking:     OT Date of Treatment: 11/29/19  OT  Start Time: 0911  OT Stop Time: 0936  OT Total Time (min): 25 min    Billable Minutes:Therapeutic Activity 15 co treatment with PT     Marlyn Galarza OT  11/29/2019

## 2019-11-29 NOTE — PLAN OF CARE
11/29/19 1023   Post-Acute Status   Post-Acute Authorization Placement   Post-Acute Placement Status Referrals Sent

## 2019-11-30 PROBLEM — R29.898 WEAKNESS OF BOTH LOWER EXTREMITIES: Status: ACTIVE | Noted: 2019-11-30

## 2019-11-30 PROBLEM — R29.898 UPPER EXTREMITY WEAKNESS: Status: ACTIVE | Noted: 2019-11-30

## 2019-11-30 PROBLEM — L89.891: Status: ACTIVE | Noted: 2019-11-30

## 2019-11-30 LAB
BACTERIA BLD CULT: NORMAL
BACTERIA BLD CULT: NORMAL

## 2019-12-02 ENCOUNTER — PATIENT OUTREACH (OUTPATIENT)
Dept: ADMINISTRATIVE | Facility: OTHER | Age: 74
End: 2019-12-02

## 2019-12-02 ENCOUNTER — TELEPHONE (OUTPATIENT)
Dept: HOME HEALTH SERVICES | Facility: HOSPITAL | Age: 74
End: 2019-12-02

## 2019-12-09 LAB
FINAL PATHOLOGIC DIAGNOSIS: NORMAL
GROSS: NORMAL

## 2019-12-12 ENCOUNTER — TELEPHONE (OUTPATIENT)
Dept: PHARMACY | Facility: CLINIC | Age: 74
End: 2019-12-12

## 2019-12-12 ENCOUNTER — TELEPHONE (OUTPATIENT)
Dept: GASTROENTEROLOGY | Facility: CLINIC | Age: 74
End: 2019-12-12

## 2019-12-12 NOTE — TELEPHONE ENCOUNTER
Spoke with patient's son. He confirmed that she has been getting Azathioprine inpatient and expects his mom to be discharged tomorrow. He stated that she probably has about a month supply on hand because that's how long she's been in the hospital. Will attempt refill in 3 weeks.     Jovani Moss, Pharm.D.  Pharmacy Resident, PGY-1   [facility]

## 2019-12-12 NOTE — TELEPHONE ENCOUNTER
----- Message from Og Carrera MD sent at 12/12/2019  2:57 PM CST -----  Regarding: can we schedule her for next dose of remicade.  Should be discharged to home from inpatient rehab today or tomorrow?  Can we get her scheduled next week for her next dose of remicade  This will be dose 2. Can we move her dose after that to 4 weeks after this upcoming dose.  So dose next week then dose 4 weeks after, then routine q 8 weeks    Also, she needs an appt with Dr. Benjie Worley in [facility] dept, she missed her last one because she was at inpatient rehab.

## 2019-12-16 ENCOUNTER — TELEPHONE (OUTPATIENT)
Dept: GASTROENTEROLOGY | Facility: CLINIC | Age: 74
End: 2019-12-16

## 2019-12-16 ENCOUNTER — TELEPHONE (OUTPATIENT)
Dept: ORTHOPEDICS | Facility: CLINIC | Age: 74
End: 2019-12-16

## 2019-12-16 ENCOUNTER — TELEPHONE (OUTPATIENT)
Dept: FAMILY MEDICINE | Facility: CLINIC | Age: 74
End: 2019-12-16

## 2019-12-16 DIAGNOSIS — S81.002A OPEN WOUND OF LEFT KNEE, INITIAL ENCOUNTER: Primary | ICD-10-CM

## 2019-12-16 DIAGNOSIS — M17.12 PRIMARY OSTEOARTHRITIS OF LEFT KNEE: ICD-10-CM

## 2019-12-16 NOTE — TELEPHONE ENCOUNTER
----- Message from Jj Henderson sent at 12/16/2019  9:35 AM CST -----  Contact: Pt son Bryson  Pt son Bryson called and would like for someone to call him ASAP    Pt son would like to find out about the surgery  and the infusions    Bryson can be reached at [phone]

## 2019-12-16 NOTE — TELEPHONE ENCOUNTER
----- Message from Jj Henderson sent at 12/16/2019 12:00 PM CST -----  Contact: Carolann with [facility]  Carolann with [facility] called and Pt was discharged with a pick line with no order for home health to maintain it.    Carolann mondragon be reached at [phone]

## 2019-12-16 NOTE — TELEPHONE ENCOUNTER
Spoke with patients son Bryson and he is concerned about his mothers consistent watery Bowel Movements and Urinating. He said that she has a bowel movement as soon as she eats. He would also like a sooner appointment with the Surgeon. Appointment rescheduled to 12/20/19 at 2:45pm.

## 2019-12-16 NOTE — TELEPHONE ENCOUNTER
----- Message from Umu Cuellar PA-C sent at 12/16/2019 10:40 AM CST -----  Contact: Pt son Bryson  She should already have HH orders placed bc it will only let me order subsequent HH orders. So they should call the agency directly.   ----- Message -----  From: Rhiannon Parkinson MA  Sent: 12/16/2019  10:30 AM CST  To: Umu Cuellar PA-C    Can you put in orders for them to continue Home Health PT please. She hasn't had PT since she got out of the hospital on Friday.   ----- Message -----  From: Jj Henderson  Sent: 12/16/2019   9:38 AM CST  To: Neft[facility] Shipley Staff    Pt kate Massey called and said that home health does not have orders and her knee is stuck and she does not want to put pressure on it.    Pt can be reached at [phone]

## 2019-12-16 NOTE — TELEPHONE ENCOUNTER
Ilya De La Paz,  Dr. Carrera is trying to get this patient remicade infusions.  Dr. Carrera spoke with NAEL Marin who is concerned due to patient having a wound vac and some other medical issues.  Patient missed last infusion due to hospitalization.  Tania did offer to try to get patient on home infusions.  Since this can take a few weeks (or months) to get authorized, it is any possibility that the patient can have infusions in infusion suite.  Patient's health status  is declining due to missing the infusion.  Dr. Carrera fears increased delay will impair drug response and the possibly needing to find a replacement medication.

## 2019-12-17 ENCOUNTER — TELEPHONE (OUTPATIENT)
Dept: ORTHOPEDICS | Facility: CLINIC | Age: 74
End: 2019-12-17

## 2019-12-17 ENCOUNTER — TELEPHONE (OUTPATIENT)
Dept: INFUSION THERAPY | Facility: HOSPITAL | Age: 74
End: 2019-12-17

## 2019-12-17 ENCOUNTER — TELEPHONE (OUTPATIENT)
Dept: SURGERY | Facility: HOSPITAL | Age: 74
End: 2019-12-17

## 2019-12-17 NOTE — TELEPHONE ENCOUNTER
I have no objection to her getting therapy.  I was trying to give her a break.  If she would be willing to walk a few minutes a day on her own, that would be sufficient.  If she would like to resume therapy, I am fine with that.

## 2019-12-17 NOTE — TELEPHONE ENCOUNTER
Called Ana Laura with [facility] told her to do weight bearing as tolerated, ambulation , & range of motion per Dr Lindsay.

## 2019-12-17 NOTE — TELEPHONE ENCOUNTER
Returned call to Ana Laura with [facility] PT told her per Dr Lindsay no more PT for Mrs Kyle.

## 2019-12-17 NOTE — TELEPHONE ENCOUNTER
----- Message from Cassy Montalvo RN sent at 12/16/2019  2:35 PM CST -----  Contact: [facility] [place]      ----- Message -----  From: Diandra Campbell  Sent: 12/16/2019   2:31 PM CST  To: Meir Waldrop Staff    [facility]  PT currently has Picc Line     Callback: [phone]

## 2019-12-17 NOTE — TELEPHONE ENCOUNTER
----- Message from Cassy Montalvo RN sent at 12/16/2019  2:35 PM CST -----  Contact: [facility] [place]      ----- Message -----  From: Diandra Campbell  Sent: 12/16/2019   2:31 PM CST  To: Meir Waldrop Staff    [facility]  PT currently has Picc Line     Callback: [phone]

## 2019-12-17 NOTE — TELEPHONE ENCOUNTER
Spoke with patients son to schedule Remicade per Dr. Carrera's order/request. Her son was concerned because he was told today by home health that Dr. Lindsay had discontinued her PT and she is currently not getting out of bed. He wanted to know why this was done. Informed him I will send a message to Dr. Lindsay office to have them contact him to clarify why is was stopped. Tentative appointment made for Remicade for Monday 12/23. Message sent to Dr. Lindsay office re:PT.

## 2019-12-17 NOTE — TELEPHONE ENCOUNTER
----- Message from Manas Antony sent at 12/17/2019 10:53 AM CST -----  Contact: [phone]/ Ana [facility]   Ana Laura with [facility] would like to speak with you in regards to getting better clarification on orders. Please call.

## 2019-12-17 NOTE — TELEPHONE ENCOUNTER
----- Message from Ana Cristina King RN sent at 12/17/2019  3:09 PM CST -----  Jarred Jurado, I just spoke to Ms. Anabella Aranda's son and he said the doctor canceled PT for her. It looks like you spoke with [facility] about it but I could not answer why it was being canceled. Can someone please reach out to him to let him know why?     Thanks,   Ana Cristina

## 2019-12-18 ENCOUNTER — PATIENT OUTREACH (OUTPATIENT)
Dept: ADMINISTRATIVE | Facility: OTHER | Age: 74
End: 2019-12-18

## 2019-12-20 ENCOUNTER — OFFICE VISIT (OUTPATIENT)
Dept: SURGERY | Facility: CLINIC | Age: 74
End: 2019-12-20
Attending: HOSPITALIST
Payer: MEDICARE

## 2019-12-20 ENCOUNTER — OFFICE VISIT (OUTPATIENT)
Dept: WOUND CARE | Facility: CLINIC | Age: 74
End: 2019-12-20
Payer: MEDICARE

## 2019-12-20 VITALS
BODY MASS INDEX: 37.2 KG/M2 | HEART RATE: 121 BPM | SYSTOLIC BLOOD PRESSURE: 137 MMHG | HEIGHT: 64 IN | DIASTOLIC BLOOD PRESSURE: 79 MMHG

## 2019-12-20 DIAGNOSIS — L89.891: ICD-10-CM

## 2019-12-20 DIAGNOSIS — K50.819 CROHN'S DISEASE OF SMALL AND LARGE INTESTINES WITH COMPLICATION: Primary | Chronic | ICD-10-CM

## 2019-12-20 DIAGNOSIS — Z43.2 ATTENTION TO ILEOSTOMY: ICD-10-CM

## 2019-12-20 DIAGNOSIS — Z71.89 ENCOUNTER FOR OSTOMY CARE EDUCATION: Primary | ICD-10-CM

## 2019-12-20 PROCEDURE — 1126F AMNT PAIN NOTED NONE PRSNT: CPT | Mod: ,,, | Performed by: COLON & RECTAL SURGERY

## 2019-12-20 PROCEDURE — 99999 PR PBB SHADOW E&M-EST. PATIENT-LVL II: ICD-10-PCS | Mod: PBBFAC,,, | Performed by: CLINICAL NURSE SPECIALIST

## 2019-12-20 PROCEDURE — 99213 OFFICE O/P EST LOW 20 MIN: CPT | Mod: PBBFAC | Performed by: COLON & RECTAL SURGERY

## 2019-12-20 PROCEDURE — 99999 PR PBB SHADOW E&M-EST. PATIENT-LVL II: CPT | Mod: PBBFAC,,, | Performed by: CLINICAL NURSE SPECIALIST

## 2019-12-20 PROCEDURE — 99215 OFFICE O/P EST HI 40 MIN: CPT | Mod: S$PBB,,, | Performed by: COLON & RECTAL SURGERY

## 2019-12-20 PROCEDURE — 99999 PR PBB SHADOW E&M-EST. PATIENT-LVL III: ICD-10-PCS | Mod: PBBFAC,,, | Performed by: COLON & RECTAL SURGERY

## 2019-12-20 PROCEDURE — 99024 POSTOP FOLLOW-UP VISIT: CPT | Mod: POP,,, | Performed by: CLINICAL NURSE SPECIALIST

## 2019-12-20 PROCEDURE — 99999 PR PBB SHADOW E&M-EST. PATIENT-LVL III: CPT | Mod: PBBFAC,,, | Performed by: COLON & RECTAL SURGERY

## 2019-12-20 PROCEDURE — 1159F PR MEDICATION LIST DOCUMENTED IN MEDICAL RECORD: ICD-10-PCS | Mod: ,,, | Performed by: COLON & RECTAL SURGERY

## 2019-12-20 PROCEDURE — 1126F PR PAIN SEVERITY QUANTIFIED, NO PAIN PRESENT: ICD-10-PCS | Mod: ,,, | Performed by: COLON & RECTAL SURGERY

## 2019-12-20 PROCEDURE — 99212 OFFICE O/P EST SF 10 MIN: CPT | Mod: PBBFAC | Performed by: CLINICAL NURSE SPECIALIST

## 2019-12-20 PROCEDURE — 1159F MED LIST DOCD IN RCRD: CPT | Mod: ,,, | Performed by: COLON & RECTAL SURGERY

## 2019-12-20 PROCEDURE — 99215 PR OFFICE/OUTPT VISIT, EST, LEVL V, 40-54 MIN: ICD-10-PCS | Mod: S$PBB,,, | Performed by: COLON & RECTAL SURGERY

## 2019-12-20 PROCEDURE — 99024 PR POST-OP FOLLOW-UP VISIT: ICD-10-PCS | Mod: POP,,, | Performed by: CLINICAL NURSE SPECIALIST

## 2019-12-20 NOTE — PROGRESS NOTES
CRS Office Visit Follow-up  Referring Md:   No referring provider defined for this encounter.    SUBJECTIVE:     Chief Complaint:  Ulcerative colitis    History of Present Illness:  Patient is a 74 y.o. female presents with ulcerative colitis. The patient is a established patient to this practice.     Course is as follows:  6/24/16:  Underwent laparoscopic total abdominal colectomy with ileorectal anastomosis for sigmoid colon stricture in the setting of ulcerative colitis.  Pathology:   - chronic active inflammation, pseudopolyps etc. are consistent with the patient's prior history of inflammatory bowel disease.  Difficult to decipher Crohn's versus ulcerative colitis given the presence concomitant diverticulitis  past medical history of arthritis, C. difficile colitis, chronic kidney disease, stage 3, dementia, alzheimer's disease, hepatitis b, hypertension, major depression, chron's  Disease, s/p left TKA with Dr. Lindsay on 10/16/19.  This did not heal well and has since required debridement in November 2019   The patient presented to [facility] complaining of intermittent, aching mid abdominal pain with associated feces from vagina.  She underwent flexible sigmoidoscopy that demonstrated linear ulcerations with no evidence of fistula.  She was admitted to the hospital for prolonged recovery.  She was recently discharged on 12/13/19.  She presents today with her son for evaluation.  Currently, she is mostly wheelchair bound.  Previously, she was functionally independent.  However, since her recent hospitalization, she is wheelchair-bound.  She has a decubitus ulcer that is slowly healing.  Her son states that she frequently wakes up at night in a pool of stool secondary to incontinence overnight.  She is currently taking Remicade and is off of steroids.    Last Colonoscopy: 11/27/19:  Impression:           - Decubitus ulceration found on perianal exam.                        - Inflammation was found in the  "rectum. This was moderate in severity, improved compared to previous examinations. Biopsied.                        - The monie-terminal ileum appears endoscopically much less inflammed and much better. This was biopsied to assess disease activty.                        - The rectum is characterized by linear ulcers and inflammatory polyps, however the endoscopic  appearance is much improved.                        - no evidence of fistula found despite instillation of methylene blue.    Review of Systems:  Review of Systems   Constitutional: Positive for malaise/fatigue. Negative for chills, diaphoresis, fever and weight loss.   HENT: Negative for congestion.    Respiratory: Negative for shortness of breath.    Cardiovascular: Positive for leg swelling. Negative for chest pain.   Gastrointestinal: Positive for diarrhea. Negative for abdominal pain, blood in stool, constipation, nausea and vomiting.   Genitourinary: Negative for dysuria.   Musculoskeletal: Negative for back pain and myalgias.   Skin: Positive for rash.   Neurological: Negative for dizziness and weakness.        Alzheimer's   Endo/Heme/Allergies: Does not bruise/bleed easily.   Psychiatric/Behavioral: Positive for memory loss. Negative for depression.       OBJECTIVE:     Vital Signs (Most Recent)  /79 (BP Location: Left arm, Patient Position: Sitting, BP Method: Large (Automatic))   Pulse (!) 121   Ht 5' 4" (1.626 m)   LMP  (LMP Unknown)   BMI 37.20 kg/m²     Physical Exam:  General: Black or  female in no distress   Neuro: alert and oriented x 4.  Moves all extremities.     HEENT: no icterus.  Trachea midline  Respiratory: respirations are even and unlabored  Cardiac: regular rate  Abdomen:  Obese, right upper quadrant open cholecystectomy scar seen.  Nontender.  No masses  Extremities: Warm dry and intact.  Wound VAC on left knee  Skin: no rashes  Anorectal:  Deferred as the patient was in a wheelchair and unable to " stand    Labs:  H&H 11 and 35.  Low albumin with albumin of 2.5.  Normal renal function.    Imaging:  CT abdomen and pelvis from 11/25/2019 with rectal contrast was personally reviewed.  Patent ileorectal anastomosis.  Few small air bubbles at the very top of the vagina.  No obvious fistula.      ASSESSMENT/PLAN:     Diagnoses and all orders for this visit:    Crohn's disease of small and large intestines with complication  -     Case Request Operating Room: CREATION, ILEOSTOMY, LAPAROSCOPIC, ERAS low, possible hand port, lithotomy    Decubitus ulcer of perianal region, stage 1  -     Case Request Operating Room: CREATION, ILEOSTOMY, LAPAROSCOPIC, ERAS low, possible hand port, lithotomy        74-year-old woman with multiple medical comorbidities who was previously believed to have ulcerative colitis underwent total abdominal colectomy with ileorectal anastomosis.  She has since been diagnosed with Crohn's.  This fits with her original indication for surgery with a colonic stricture.  With her Crohn's disease, she is having significant perianal drainage and diarrhea.  This is resulting in worsening of her perianal healing and skin secondary to her limited mobility.  We therefore discussed ileostomy.  I recommended laparoscopic end ileostomy.  The terminal ileum could be divided where it attached his to the rectal stump.  The rectal stump would be left in place as a completion proctectomy in this patient would be very high risk and for limited benefit.  She understands that she may still have continued mucus and bloody drainage from the anal canal.  However, with the end ileostomy, she would be completely diverted.  This would also allow the possibility of repeat anastomosis should she improve and become more mobile.  I discussed that this would likely be a permanent end ileostomy.  We discussed the risks of dehydration, damage to surrounding structures, bleeding, complications associated with anesthesia, and the  need for open surgery. We will plan to start laparoscopically with entry into the abdomen using a Veress needle.  Ideally, we will be able to transect below the ileorectal anastomosis with a endoscopic TERA stapler and pull up the terminal ileum through the site previously chosen by the enterostomal therapist in the right upper quadrant. If this is unsuccessful, we will plan to incorporate a Pfannenstiel incision to assist with the ileorectal dissection.    CEDRIC Worley MD, FACS  Staff Surgeon  Colon & Rectal Surgery

## 2019-12-20 NOTE — PROGRESS NOTES
Pre op Ostomy marking:    This patient was seen today per request  in preparation for upcoming ostomy surgery on January       Stoma marking/siting was performed per protocol and patient marked with a permanent marker and skin staining with silver nitrate.         The proposed surgery was discussed and patient educated on expected postoperative course and expectations. The patient has dementia and lives with her son, who is caregiver ( solo) , she goes to adult day care or did before her knee surgery . He is hoping to find help in care giving for her .

## 2019-12-23 ENCOUNTER — INFUSION (OUTPATIENT)
Dept: INFUSION THERAPY | Facility: HOSPITAL | Age: 74
End: 2019-12-23
Attending: INTERNAL MEDICINE
Payer: MEDICARE

## 2019-12-23 ENCOUNTER — EXTERNAL HOME HEALTH (OUTPATIENT)
Dept: HOME HEALTH SERVICES | Facility: HOSPITAL | Age: 74
End: 2019-12-23
Payer: MEDICARE

## 2019-12-23 VITALS
SYSTOLIC BLOOD PRESSURE: 130 MMHG | WEIGHT: 216.69 LBS | TEMPERATURE: 99 F | BODY MASS INDEX: 36.99 KG/M2 | DIASTOLIC BLOOD PRESSURE: 68 MMHG | OXYGEN SATURATION: 98 % | HEIGHT: 64 IN | RESPIRATION RATE: 20 BRPM | HEART RATE: 110 BPM

## 2019-12-23 DIAGNOSIS — K50.819 CROHN'S DISEASE OF SMALL AND LARGE INTESTINES WITH COMPLICATION: Primary | ICD-10-CM

## 2019-12-23 PROCEDURE — 96413 CHEMO IV INFUSION 1 HR: CPT

## 2019-12-23 PROCEDURE — A4216 STERILE WATER/SALINE, 10 ML: HCPCS | Performed by: INTERNAL MEDICINE

## 2019-12-23 PROCEDURE — 96375 TX/PRO/DX INJ NEW DRUG ADDON: CPT

## 2019-12-23 PROCEDURE — 63600175 PHARM REV CODE 636 W HCPCS: Performed by: INTERNAL MEDICINE

## 2019-12-23 PROCEDURE — 25000003 PHARM REV CODE 250: Performed by: INTERNAL MEDICINE

## 2019-12-23 PROCEDURE — 96415 CHEMO IV INFUSION ADDL HR: CPT

## 2019-12-23 RX ORDER — IPRATROPIUM BROMIDE AND ALBUTEROL SULFATE 2.5; .5 MG/3ML; MG/3ML
3 SOLUTION RESPIRATORY (INHALATION)
Status: CANCELLED | OUTPATIENT
Start: 2020-01-08

## 2019-12-23 RX ORDER — ACETAMINOPHEN 325 MG/1
650 TABLET ORAL
Status: CANCELLED | OUTPATIENT
Start: 2020-01-08

## 2019-12-23 RX ORDER — ACETAMINOPHEN 325 MG/1
650 TABLET ORAL
Status: COMPLETED | OUTPATIENT
Start: 2019-12-23 | End: 2019-12-23

## 2019-12-23 RX ORDER — DIPHENHYDRAMINE HYDROCHLORIDE 50 MG/ML
25 INJECTION INTRAMUSCULAR; INTRAVENOUS
Status: COMPLETED | OUTPATIENT
Start: 2019-12-23 | End: 2019-12-23

## 2019-12-23 RX ORDER — SODIUM CHLORIDE 0.9 % (FLUSH) 0.9 %
10 SYRINGE (ML) INJECTION
Status: CANCELLED | OUTPATIENT
Start: 2020-01-08

## 2019-12-23 RX ORDER — EPINEPHRINE 1 MG/ML
0.3 INJECTION, SOLUTION, CONCENTRATE INTRAVENOUS
Status: CANCELLED | OUTPATIENT
Start: 2020-01-08

## 2019-12-23 RX ORDER — SODIUM CHLORIDE 0.9 % (FLUSH) 0.9 %
10 SYRINGE (ML) INJECTION
Status: DISCONTINUED | OUTPATIENT
Start: 2019-12-23 | End: 2019-12-23 | Stop reason: HOSPADM

## 2019-12-23 RX ORDER — METHYLPREDNISOLONE SOD SUCC 125 MG
40 VIAL (EA) INJECTION
Status: CANCELLED | OUTPATIENT
Start: 2020-01-08

## 2019-12-23 RX ORDER — DIPHENHYDRAMINE HYDROCHLORIDE 50 MG/ML
25 INJECTION INTRAMUSCULAR; INTRAVENOUS
Status: CANCELLED | OUTPATIENT
Start: 2020-01-08

## 2019-12-23 RX ORDER — HEPARIN 100 UNIT/ML
500 SYRINGE INTRAVENOUS
Status: CANCELLED | OUTPATIENT
Start: 2020-01-08

## 2019-12-23 RX ADMIN — ACETAMINOPHEN 650 MG: 325 TABLET ORAL at 12:12

## 2019-12-23 RX ADMIN — INFLIXIMAB 490 MG: 100 INJECTION, POWDER, LYOPHILIZED, FOR SOLUTION INTRAVENOUS at 12:12

## 2019-12-23 RX ADMIN — HYDROCORTISONE SODIUM SUCCINATE 200 MG: 100 INJECTION, POWDER, FOR SOLUTION INTRAMUSCULAR; INTRAVENOUS at 12:12

## 2019-12-23 RX ADMIN — DIPHENHYDRAMINE HYDROCHLORIDE 25 MG: 50 INJECTION INTRAMUSCULAR; INTRAVENOUS at 12:12

## 2019-12-23 RX ADMIN — Medication 10 ML: at 02:12

## 2019-12-23 NOTE — NURSING
Pt tolerated infusion well. No adverse reaction noted. Pt education reinforced on _Remicade__ , side effects, what to expect, and when to call _Deandre_. Pt verbalized understanding. I reviewed pt calendar w/ pt and understanding verbalized.PICC flushed with NS per protocol.

## 2019-12-24 ENCOUNTER — HOSPITAL ENCOUNTER (OUTPATIENT)
Dept: RADIOLOGY | Facility: HOSPITAL | Age: 74
Discharge: HOME | End: 2019-12-24
Attending: FAMILY MEDICINE
Payer: MEDICARE

## 2019-12-24 DIAGNOSIS — Z12.31 ENCOUNTER FOR SCREENING MAMMOGRAM FOR MALIGNANT NEOPLASM OF BREAST: ICD-10-CM

## 2019-12-24 PROCEDURE — 77063 BREAST TOMOSYNTHESIS BI: CPT | Mod: TC

## 2019-12-24 PROCEDURE — 77067 MAMMO DIGITAL SCREENING BILAT WITH TOMOSYNTHESIS_CAD: ICD-10-PCS | Mod: 26,,, | Performed by: RADIOLOGY

## 2019-12-24 PROCEDURE — 77067 SCR MAMMO BI INCL CAD: CPT | Mod: 26,,, | Performed by: RADIOLOGY

## 2019-12-24 PROCEDURE — 77063 BREAST TOMOSYNTHESIS BI: CPT | Mod: 26,,, | Performed by: RADIOLOGY

## 2019-12-24 PROCEDURE — 77063 MAMMO DIGITAL SCREENING BILAT WITH TOMOSYNTHESIS_CAD: ICD-10-PCS | Mod: 26,,, | Performed by: RADIOLOGY

## 2019-12-26 ENCOUNTER — TELEPHONE (OUTPATIENT)
Dept: FAMILY MEDICINE | Facility: CLINIC | Age: 74
End: 2019-12-26

## 2019-12-26 ENCOUNTER — TELEPHONE (OUTPATIENT)
Dept: GASTROENTEROLOGY | Facility: CLINIC | Age: 74
End: 2019-12-26

## 2019-12-26 NOTE — TELEPHONE ENCOUNTER
----- Message from Fernanda Guzman PA-C sent at 12/26/2019  8:16 AM CST -----  Please call patient regarding her normal mammogram result.

## 2019-12-26 NOTE — TELEPHONE ENCOUNTER
Spoke with nurse Fonatine and she states that pt has a picc line that needs to be flushed weekly so that infection does not set it. At last infusion Picc line was flushed. Patient will need an order placed from the ordering provider to [employer] for a kit in order to flush the picc line.     [facility] main number [phone]

## 2019-12-26 NOTE — TELEPHONE ENCOUNTER
----- Message from Margaret Del Castillo sent at 12/26/2019  3:51 PM CST -----  Contact: [facility]  Needs orders to infusion company to flush pick line daily.     She can be reached at    [phone]      Thanks  KB

## 2019-12-27 ENCOUNTER — TELEPHONE (OUTPATIENT)
Dept: PODIATRY | Facility: CLINIC | Age: 74
End: 2019-12-27

## 2019-12-27 ENCOUNTER — TELEPHONE (OUTPATIENT)
Dept: GASTROENTEROLOGY | Facility: CLINIC | Age: 74
End: 2019-12-27

## 2019-12-27 NOTE — TELEPHONE ENCOUNTER
Informed Bryson that the NP from [facility] will submit another order for the Picc line to be flushed. Bryson is concerned that it has not been flushed since her last infusion. Informed Bryson that the MD may want to keep it in for the surgery, but it can be removed after if it is not used.

## 2019-12-27 NOTE — TELEPHONE ENCOUNTER
----- Message from Sabino Castellano sent at 12/27/2019 10:04 AM CST -----  Contact: Peter Massey [phone]  Nils states he does not have supplies to take care of patient's picc line, and none of the home health agencies have orders to take care of the picc line. Please Advise.

## 2019-12-27 NOTE — TELEPHONE ENCOUNTER
----- Message from Nichelle Weiss sent at 12/27/2019  1:30 PM CST -----  Contact: [phone]  Pt is requesting a call back,Pt need's to know when will she be done with the wound clinic for Insurance purposes.  Please call and advise          Thank you

## 2019-12-27 NOTE — TELEPHONE ENCOUNTER
Attempted calling home health and they informed us [facility] will need orders.  Tried getting order set from [employer] but unable to get the correct order form.    Contacted ange john NP from [facility] and she is going to attempt to re-send the orders for picc flushes.    MA will update patients family.

## 2020-01-02 NOTE — TELEPHONE ENCOUNTER
Confirmed 2 patient identifiers - name and . Therapy appropriate.     Spoke with patient's son Bryson, per son patient is going to fill azathioprine at the [facility] where she fills her other medications. Son informed [facility] will no longer follow-up, but he can use us as a medication resource in the future. All questions answered and addressed to satisfaction. Advised to call [facility] and provider if any issues arise. Verbalized understanding.

## 2020-01-03 ENCOUNTER — TELEPHONE (OUTPATIENT)
Dept: ORTHOPEDICS | Facility: CLINIC | Age: 75
End: 2020-01-03

## 2020-01-03 ENCOUNTER — HOSPITAL ENCOUNTER (OUTPATIENT)
Dept: RADIOLOGY | Facility: HOSPITAL | Age: 75
Discharge: HOME | End: 2020-01-03
Attending: ORTHOPAEDIC SURGERY
Payer: MEDICARE

## 2020-01-03 ENCOUNTER — OFFICE VISIT (OUTPATIENT)
Dept: ORTHOPEDICS | Facility: CLINIC | Age: 75
End: 2020-01-03
Payer: MEDICARE

## 2020-01-03 DIAGNOSIS — R52 PAIN: ICD-10-CM

## 2020-01-03 DIAGNOSIS — S81.002A OPEN WOUND OF LEFT KNEE, INITIAL ENCOUNTER: Primary | ICD-10-CM

## 2020-01-03 DIAGNOSIS — R52 PAIN: Primary | ICD-10-CM

## 2020-01-03 PROCEDURE — 99212 OFFICE O/P EST SF 10 MIN: CPT | Mod: PBBFAC,25,PN | Performed by: ORTHOPAEDIC SURGERY

## 2020-01-03 PROCEDURE — 73560 XR KNEE 1 OR 2 VIEW LEFT: ICD-10-PCS | Mod: 26,LT,, | Performed by: RADIOLOGY

## 2020-01-03 PROCEDURE — 73560 X-RAY EXAM OF KNEE 1 OR 2: CPT | Mod: TC,PN,LT

## 2020-01-03 PROCEDURE — 99024 PR POST-OP FOLLOW-UP VISIT: ICD-10-PCS | Mod: S$PBB,,, | Performed by: ORTHOPAEDIC SURGERY

## 2020-01-03 PROCEDURE — 73560 X-RAY EXAM OF KNEE 1 OR 2: CPT | Mod: 26,LT,, | Performed by: RADIOLOGY

## 2020-01-03 PROCEDURE — 99999 PR PBB SHADOW E&M-EST. PATIENT-LVL II: CPT | Mod: PBBFAC,,, | Performed by: ORTHOPAEDIC SURGERY

## 2020-01-03 PROCEDURE — 1159F PR MEDICATION LIST DOCUMENTED IN MEDICAL RECORD: ICD-10-PCS | Mod: ,,, | Performed by: ORTHOPAEDIC SURGERY

## 2020-01-03 PROCEDURE — 99024 POSTOP FOLLOW-UP VISIT: CPT | Mod: S$PBB,,, | Performed by: ORTHOPAEDIC SURGERY

## 2020-01-03 PROCEDURE — 1125F AMNT PAIN NOTED PAIN PRSNT: CPT | Mod: ,,, | Performed by: ORTHOPAEDIC SURGERY

## 2020-01-03 PROCEDURE — 1125F PR PAIN SEVERITY QUANTIFIED, PAIN PRESENT: ICD-10-PCS | Mod: ,,, | Performed by: ORTHOPAEDIC SURGERY

## 2020-01-03 PROCEDURE — 1159F MED LIST DOCD IN RCRD: CPT | Mod: ,,, | Performed by: ORTHOPAEDIC SURGERY

## 2020-01-03 PROCEDURE — 99999 PR PBB SHADOW E&M-EST. PATIENT-LVL II: ICD-10-PCS | Mod: PBBFAC,,, | Performed by: ORTHOPAEDIC SURGERY

## 2020-01-03 NOTE — PROGRESS NOTES
Subjective:      Patient ID: Anabella Aranda is a 74 y.o. female.    Chief Complaint: Pain and Post-op Evaluation of the Left Knee      HPI:  Ten weeks postop  The patient is seen for postop follow-up of left  TKA.  Pain control has been satisfactory  They feel that they are ambulating with difficulty  Preoperative complaints include:  Difficulty with IV access at home.      Current Outpatient Medications:     azaTHIOprine (IMURAN) 50 mg Tab, Take 1 tablet (50 mg total) by mouth once daily., Disp: 30 tablet, Rfl: 1    donepezil (ARICEPT) 10 MG tablet, Take 1 tablet (10 mg total) by mouth every evening., Disp: 90 tablet, Rfl: 1    ferrous sulfate (FEOSOL) 325 mg (65 mg iron) Tab tablet, Take 1 tablet (325 mg total) by mouth 2 (two) times daily., Disp: 180 tablet, Rfl: 1    magnesium oxide (MAG-OX) 400 mg (241.3 mg magnesium) tablet, Take 1 tablet (400 mg total) by mouth 2 (two) times daily., Disp: , Rfl: 0    memantine (NAMENDA) 10 MG Tab, Take 1 tablet (10 mg total) by mouth once daily., Disp: 180 tablet, Rfl: 1    pantoprazole (PROTONIX) 40 MG tablet, Take 1 tablet (40 mg total) by mouth once daily., Disp: 30 tablet, Rfl: 1    QUEtiapine (SEROQUEL) 100 MG Tab, Take 1 tablet (100 mg total) by mouth every evening., Disp: 30 tablet, Rfl: 1    aspirin (ECOTRIN) 81 MG EC tablet, Take 1 tablet (81 mg total) by mouth once daily. (Patient not taking: Reported on 1/3/2020), Disp: 30 tablet, Rfl: 0  Review of patient's allergies indicates:   Allergen Reactions    Sulfa (sulfonamide antibiotics) Swelling       LMP  (LMP Unknown)     ROS        Objective:    Ortho Exam          Alert, oriented, no acute distress  Sclera-Normal  Respiratory distress-none  Gait not observed  Incision:  The proximal part of the incision is granulating normally.  The open area is about 2 x 8 cm.  There is no drainage or surrounding erythema.  Range of motion: extension- 5 degrees; flexion- 85 degrees  Valgus/varus stability-  stable  Swelling-mild  Distal perfusion-intact  Distal neurologic-intact    Imaging:  The implant appears to be in satisfactory position    Assessment:             No diagnosis found.    The wound appears to be responding well to care. I suspect that controlled ulcerative colitis is improving the patient's nutritional status which is helping greatly.        Plan:          No follow-ups on file.    I explained my assessment    Continue wound VAC for 1 month    I spoke to Anastacio at [facility].  I requested continuation of wound VAC care and management of IV access.

## 2020-01-03 NOTE — TELEPHONE ENCOUNTER
Spoke to Anastacio the home health nurse regarding wound vac orders. He has orders to change the wound vac today, but the patient stated they would not be home due to appointment with Dr Lindsay. Anastacio would like to have new order after you see the patient. Also Dr Vazquez was having weekly CBC and CMP done and has stopped this. If you would like to continue this please sent the home health order for this as well. Anastacio's number is [phone].

## 2020-01-06 ENCOUNTER — TELEPHONE (OUTPATIENT)
Dept: GASTROENTEROLOGY | Facility: CLINIC | Age: 75
End: 2020-01-06

## 2020-01-06 DIAGNOSIS — Z45.2 PICC (PERIPHERALLY INSERTED CENTRAL CATHETER) FLUSH: Primary | ICD-10-CM

## 2020-01-06 RX ORDER — SODIUM CHLORIDE 0.9 % (FLUSH) 0.9 %
10 SYRINGE (ML) INJECTION WEEKLY
Qty: 80 ML | Refills: 3 | Status: ON HOLD | OUTPATIENT
Start: 2020-01-06 | End: 2020-01-14 | Stop reason: HOSPADM

## 2020-01-06 NOTE — TELEPHONE ENCOUNTER
----- Message from Sofia Etienne sent at 1/6/2020 10:03 AM CST -----  Contact: [facility])  Requesting orders to be faxed to [employer] at [phone]    Orders requested for Picc, Prn soiling, and for Saline and Heprin flushing to maintain line      Please advise, can be reached at [phone] [facility])

## 2020-01-06 NOTE — TELEPHONE ENCOUNTER
Fax received from [facility] for Dr. Carrera to sign an order to Flush picc line weekly per hospitals protocol and provide weekly picc line dressing changes starting on Friday 1/3/20.     Fax sent to [facility] ([phone])  Also [employer] at ([phone])

## 2020-01-07 ENCOUNTER — TELEPHONE (OUTPATIENT)
Dept: SURGERY | Facility: CLINIC | Age: 75
End: 2020-01-07

## 2020-01-07 ENCOUNTER — TELEPHONE (OUTPATIENT)
Dept: GASTROENTEROLOGY | Facility: CLINIC | Age: 75
End: 2020-01-07

## 2020-01-07 ENCOUNTER — ANESTHESIA EVENT (OUTPATIENT)
Dept: SURGERY | Facility: HOSPITAL | Age: 75
DRG: 331 | End: 2020-01-07
Payer: MEDICARE

## 2020-01-07 NOTE — ANESTHESIA PREPROCEDURE EVALUATION
[facility]  Anesthesia Pre-Operative Evaluation         Patient Name: Anabella Aranda  YOB: 1945  MRN: [id number]    SUBJECTIVE:     Pre-operative evaluation for Procedure(s) (LRB):  CREATION, ILEOSTOMY, LAPAROSCOPIC, ERAS low, possible hand port, lithotomy (N/A)     01/07/2020    Anabella Aranda is a 74 y.o. female w/ a significant PMHx of arthritis, C. difficile colitis, chronic kidney disease, stage 3, dementia, hepatitis b, hypertension, major depression, chron's  Disease, s/p left TKA. Underwent total colectomy 6/24/16. She has since been diagnosed with Crohn's.    Patient now presents for the above procedure(s).    LDA:        PICC Double Lumen 11/26/19 1149 right brachial (Active)   Number of days: 42       Prev airway:   Placement Date: 06/24/16; Placement Time: 0712; Method of Intubation: Direct laryngoscopy; Inserted by: CRNA; Airway Device: Endotracheal Tube; Mask Ventilation: Easy; Intubated: Postinduction; Blade: Valente #2; Airway Device Size: 7.0; Style: Cuffed; Cuff Inflation: Minimal occlusive pressure; Inflation Amount: 6; Placement Verified By: Auscultation, Capnometry; Grade: Grade I; Complicating Factors: None; Intubation Findings: Positive EtCO2, Bilateral breath sounds, Atraumatic/Condition of teeth unchanged;  Depth of Insertion: 21; Securment: Lips; Complications: None; Breath Sounds: Equal Bilateral; Insertion Attempts: 1; Removal Date: 06/24/16;  Removal Time: 1021    Drips: None documented.    Patient Active Problem List   Diagnosis    Diarrhea    Debility    S/P IVC filter    Chronic deep vein thrombosis of right lower extremity    Venous insufficiency of both lower extremities    Iron deficiency anemia    Ulcerative proctitis with complication    Morbid obesity    Glucose intolerance (impaired glucose tolerance)    Major depression with psychotic features    Osteoporosis    Primary osteoarthritis of both knees    GERD (gastroesophageal  reflux disease)    History of left knee replacement    History of total colectomy    Chronic kidney disease, stage 3    Alzheimers disease    Crohn's disease of small and large intestines with complication    Severe malnutrition    Alteration in skin integrity    Inadequate oral intake    Steroid-induced hyperglycemia    Abdominal pain    Reaction to QuantiFERON-TB test    Wound dehiscence, surgical, sequela    Vaginal fistula    Tachycardia    Open wound of left knee    Decubitus ulcer of perianal region, stage 1    Weakness of both lower extremities    Upper extremity weakness       Review of patient's allergies indicates:   Allergen Reactions    Sulfa (sulfonamide antibiotics) Swelling       Current Inpatient Medications:    No current facility-administered medications on file prior to encounter.      Current Outpatient Medications on File Prior to Encounter   Medication Sig Dispense Refill    azaTHIOprine (IMURAN) 50 mg Tab Take 1 tablet (50 mg total) by mouth once daily. 30 tablet 1    donepezil (ARICEPT) 10 MG tablet Take 1 tablet (10 mg total) by mouth every evening. (Patient taking differently: Take 10 mg by mouth once daily. ) 90 tablet 1    ferrous sulfate (FEOSOL) 325 mg (65 mg iron) Tab tablet Take 1 tablet (325 mg total) by mouth 2 (two) times daily. 180 tablet 1    magnesium oxide (MAG-OX) 400 mg (241.3 mg magnesium) tablet Take 1 tablet (400 mg total) by mouth 2 (two) times daily.  0    memantine (NAMENDA) 10 MG Tab Take 1 tablet (10 mg total) by mouth once daily. 180 tablet 1    pantoprazole (PROTONIX) 40 MG tablet Take 1 tablet (40 mg total) by mouth once daily. 30 tablet 1    QUEtiapine (SEROQUEL) 100 MG Tab Take 1 tablet (100 mg total) by mouth every evening. 30 tablet 1    aspirin (ECOTRIN) 81 MG EC tablet Take 1 tablet (81 mg total) by mouth once daily. (Patient not taking: Reported on 1/3/2020) 30 tablet 0       Past Surgical History:   Procedure Laterality Date     CHOLECYSTECTOMY      COLONOSCOPY N/A 4/29/2016    Procedure: COLONOSCOPY;  Surgeon: Umm Arenas MD;  Location: [facility] ENDO;  Service: Endoscopy;  Laterality: N/A;    ESOPHAGOGASTRODUODENOSCOPY N/A 1/25/2019    Procedure: ESOPHAGOGASTRODUODENOSCOPY (EGD);  Surgeon: Jenise Hallman MD;  Location: [facility] ENDO;  Service: Endoscopy;  Laterality: N/A;    ESOPHAGOGASTRODUODENOSCOPY N/A 10/21/2019    Procedure: EGD (ESOPHAGOGASTRODUODENOSCOPY);  Surgeon: Og Carrera MD;  Location: [facility] ENDO;  Service: Colon and Rectal;  Laterality: N/A;    ESOPHAGOGASTRODUODENOSCOPY N/A 11/27/2019    Procedure: EGD (ESOPHAGOGASTRODUODENOSCOPY);  Surgeon: Og Carrera MD;  Location: [facility] ENDO;  Service: Colon and Rectal;  Laterality: N/A;    FLEXIBLE SIGMOIDOSCOPY N/A 1/25/2019    Procedure: SIGMOIDOSCOPY, FLEXIBLE;  Surgeon: Jenise Hallman MD;  Location: [facility] ENDO;  Service: Endoscopy;  Laterality: N/A;    FLEXIBLE SIGMOIDOSCOPY N/A 10/21/2019    Procedure: SIGMOIDOSCOPY, FLEXIBLE;  Surgeon: Og Carrera MD;  Location: [facility] ENDO;  Service: Colon and Rectal;  Laterality: N/A;    FLEXIBLE SIGMOIDOSCOPY N/A 11/27/2019    Procedure: SIGMOIDOSCOPY, FLEXIBLE;  Surgeon: Og Carrera MD;  Location: [facility] ENDO;  Service: Colon and Rectal;  Laterality: N/A;  with methylene blue dye    BETZY FILTER PLACEMENT Right 01/2014    JOINT REPLACEMENT      KNEE ARTHROPLASTY Left 10/16/2019    Procedure: ARTHROPLASTY, KNEE;  Surgeon: Ghassan Lindsay MD;  Location: [facility] OR;  Service: Orthopedics;  Laterality: Left;  Franco notifiedconfirmed with Franco  [phone] 10/15/19 kb    LAPAROSCOPIC TOTAL COLECTOMY  06/24/2016    NASAL SINUS SURGERY      TONSILLECTOMY      TOTAL KNEE ARTHROPLASTY Right 04/07/2008    TOTAL KNEE ARTHROPLASTY Left 10/16/2019       Social History     Socioeconomic History    Marital status: Single     Spouse name: Not on file    Number of children: Not on file    Years of education:  Not on file    Highest education level: Not on file   Occupational History    Not on file   Social Needs    Financial resource strain: Not on file    Food insecurity:     Worry: Not on file     Inability: Not on file    Transportation needs:     Medical: Not on file     Non-medical: Not on file   Tobacco Use    Smoking status: Former Smoker     Types: Cigarettes     Last attempt to quit: 1997     Years since quittin.4    Smokeless tobacco: Former User   Substance and Sexual Activity    Alcohol use: No    Drug use: No    Sexual activity: Not Currently   Lifestyle    Physical activity:     Days per week: Not on file     Minutes per session: Not on file    Stress: Not on file   Relationships    Social connections:     Talks on phone: Not on file     Gets together: Not on file     Attends [employer] service: Not on file     Active member of club or organization: Not on file     Attends meetings of clubs or organizations: Not on file     Relationship status: Not on file   Other Topics Concern    Not on file   Social History Narrative    Not on file       OBJECTIVE:     Vital Signs Range (Last 24H):         Significant Labs:  Lab Results   Component Value Date    WBC 6.42 2019    HGB 10.9 (L) 2019    HCT 34.9 (L) 2019     (H) 2019    CHOL 223 (H) 08/10/2019    TRIG 79 08/10/2019    HDL 62 08/10/2019    ALT 8 (L) 2019    AST 22 2019     2019    K 4.2 2019     2019    CREATININE 0.9 2019    BUN 9 2019    CO2 26 2019    TSH 1.941 2019    INR 1.1 10/16/2019    HGBA1C 5.7 (H) 2019       Diagnostic Studies: No relevant studies.    EKG:   Results for orders placed or performed during the hospital encounter of 19   EKG 12-lead    Collection Time: 19  7:41 PM    Narrative    Test Reason : R00.0,    Vent. Rate : 112 BPM     Atrial Rate : 112 BPM     P-R Int : 128 ms          QRS Dur : 070 ms       QT Int : 336 ms       P-R-T Axes : 087 069 030 degrees     QTc Int : 458 ms    Sinus tachycardia  Nonspecific T wave abnormality  Abnormal ECG  When compared with ECG of 19-OCT-2019 16:24,  No significant change was found  Confirmed by Benjie Robledo MD ([id number]) on 11/26/2019 10:46:21 AM    Referred By: LISA   SELF           Confirmed By:Benjie Robledo MD       ECHOCARDIOGRAM:  No results found for this or any previous visit.        ASSESSMENT/PLAN:         Anesthesia Evaluation    I have reviewed the Patient Summary Reports.    I have reviewed the Nursing Notes.   I have reviewed the Medications.   Steroids Taken In Past Year: Prednisone    Review of Systems  Anesthesia Hx:  No problems with previous Anesthesia  History of prior surgery of interest to airway management or planning: Denies Family Hx of Anesthesia complications.   Denies Personal Hx of Anesthesia complications.   Social:  Non-Smoker, No Alcohol Use    Hematology/Oncology:         -- Anemia:   Cardiovascular:   Hypertension, well controlled  Deep Venous Thrombosis (DVT) (5 yrs ago s/p IVC filter)    Pulmonary:  Pulmonary Normal    Renal/:   Chronic Renal Disease (CKD)    Hepatic/GI:   Hiatal Hernia, GERD Crohn's disease  Rectovaginal fistula Bowel Conditions:  Inflammatory Bowel Disease, Ulcerative Colitis    Musculoskeletal:   Left TKA complicated with infection. Wound vac.   Neurological:  Dementia    Endocrine:  Endocrine Normal    Psych:   alzheimers dementia         Physical Exam  General:  Well nourished    Airway/Jaw/Neck:  Airway Findings: Mouth Opening: Normal Tongue: Normal  General Airway Assessment: Adult  Mallampati: III  Improves to II with phonation.      Dental:  Dental Findings:    Chest/Lungs:  Chest/Lungs Clear    Heart/Vascular:  Heart Findings: Normal     Musculoskeletal:  Musculoskeletal Findings: Swollen Joint, Tender Joint     Mental Status:  Mental Status Findings:  Cooperative, Alert and Oriented         Anesthesia  Plan  Type of Anesthesia, risks & benefits discussed:  Anesthesia Type:  general  Patient's Preference:   Intra-op Monitoring Plan: standard ASA monitors  Intra-op Monitoring Plan Comments:   Post Op Pain Control Plan: per primary service following discharge from PACU and multimodal analgesia  Post Op Pain Control Plan Comments:   Induction:   IV  Beta Blocker:  Patient is not currently on a Beta-Blocker (No further documentation required).       Informed Consent: Patient representative understands risks and agrees with Anesthesia plan.  Questions answered. Anesthesia consent signed with patient representative.  ASA Score: 3     Day of Surgery Review of History & Physical: I have interviewed and examined the patient. I have reviewed the patient's H&P dated:  There are no significant changes.  H&P update referred to the surgeon.         Ready For Surgery From Anesthesia Perspective.

## 2020-01-07 NOTE — TELEPHONE ENCOUNTER
Call received from Cecilia at [facility]. Cecilia stated that they had the supplies and everything ready to flush the Picc Line and patient front door was locked (3:30pm). They called patients son to unlock door but he left for work he refused there service because patient has surgery tomorrow. Cecilia stated that she will put patient on a do not re-admit list (non compliant list) because Bryson has refused service multiple times and is very aggressive towards them.  Cecilia stated that pt will have to go through another [facility] company in the future.

## 2020-01-07 NOTE — PRE-PROCEDURE INSTRUCTIONS
Preop instructions given to pt's kate Massey:     NPO instructions: given per the CRS Dept.      If you have received specific instructions from your Surgeon/Surgeon's staff, please follow their instructions.     Showering instructions, directions, leave all valuables at home, medication instructions for PM prior & am of procedure explained. pt's son Margaret Massey stated an understanding.      pt's son Margaret Massey denies any side effects or issues with anesthesia or sedation.                                                                                                                                    Arrival to [place] [address]  Pt'gal Massey WILL BE PROVIDING TRANSPORTATION HOME UPON DISCHARGE.    PICC to [facility] - mary's son Margaret Massey states that line has not been flushed since 12/21/2019. Reports he's contacted  and Infusion company multiple times and his calls have not been returned.    In Basket msg sent to Dr. Og Carrera regarding status of pt's PICC.

## 2020-01-07 NOTE — TELEPHONE ENCOUNTER
----- Message from Sabino Castellano sent at 1/7/2020 12:05 PM CST -----  Contact: Cecilia kumar [facility] [phone]   Cecilia states she needs to speak with a nurse as soon as possible about the patient's Picc  . Please Advise.

## 2020-01-07 NOTE — TELEPHONE ENCOUNTER
Spoke with aj sams ([phone])at [facility].  He has orders for picc flushes etc.  She is having surgery scheduled tomorrow.    He will attempt to get the line flushed today.    I am ok with removing the picc line after surgery at this point.  No further need for picc line

## 2020-01-07 NOTE — TELEPHONE ENCOUNTER
----- Message from Ana Laura Rasheed RN sent at 1/7/2020  1:45 PM CST -----  Contact: Ana Laura Rasheed RN [facility] Pre-OP  Dr. Carrera,    I just spoke with the above named patient's son, Bryson, regarding Pre-Operative instructions for Ms. Aranda's Surgery in the morning.  Prior to calling, I perused Ms. Aranda's chart and noted multiple entries regarding the status of her PICC line. Bryson reports the last time the PICC was flushed was 12/21/2019 after a Remicade infusion.   He reports calling about the PICC line since 12/21/2019, but no one seems to know what to do.    It seems  and the [facility] are going back and forth stating orders have not been received in regards to management of patient's PICC line.    NAEL Brooks -  [phone]  [facility] - [phone]    Patient has an arrival time of 0720 for Surgery in the morning at [facility].    GINNY    Thank you in advance for your anticipated cooperation.    Regards,    Ana Laura Rasheed, BSN, RNC  [facility]  [phone]

## 2020-01-07 NOTE — TELEPHONE ENCOUNTER
----- Message from Og Carrera MD sent at 1/7/2020  2:07 PM CST -----  Contact: Ana Laura Rasheed RN [facility] Pre-OP  My MA and myself have work on this for the past 2 weeks.  No one in home health nor infusion company could give us clear instructions how to get these orders over to them, moreover, I was not the provider who discharged the patient from the LTAC with the PICC in place.  I finally got in touch over the phone with Serg at [facility] and he has orders to flush the line today...  I figured if we could keep the line until surgery, that would be beneficial pre-op, but I had no idea that no one would be able to assist me in getting these orders to the correct home health agency. I have filled out every single document that was provided to me and myself and my MA contact multiple different people.  Quite frankly, this has been very frustrating.    If the line doesn't flush, she will obviously need whatever the surgeon and anethestist want for access, but she does NOT need a picc line going forward after the surgery..    Thank you and let me know if we can help in any other way.  My MA Sofia has been CCed to this.    ----- Message -----  From: Ana Laura Rasheed RN  Sent: 1/7/2020   1:45 PM CST  To: Og Carrera MD, Deandre NAVARRO Staff    Dr. Carrera,    I just spoke with the above named patient's son, Bryson, regarding Pre-Operative instructions for Ms. Aranda's Surgery in the morning.  Prior to calling, I perused Ms. Aranda's chart and noted multiple entries regarding the status of her PICC line. Bryson reports the last time the PICC was flushed was 12/21/2019 after a Remicade infusion.   He reports calling about the PICC line since 12/21/2019, but no one seems to know what to do.    It seems  and the [facility] are going back and forth stating orders have not been received in regards to management of patient's PICC line.    NAEL Brooks -  [phone]  [facility] -  [phone]    Patient has an arrival time of 0720 for Surgery in the morning at [facility].    FYI    Thank you in advance for your anticipated cooperation.    Regards,    Ana Laura Rasheed, VICTOR HUGON, RNC  [facility] [place]  [phone]

## 2020-01-07 NOTE — TELEPHONE ENCOUNTER
Spoke with cecilia at [facility] regarding patients Picc Line. Cecilia has questions regarding home health infusions. Number provided for Infusion center.

## 2020-01-08 ENCOUNTER — ANESTHESIA (OUTPATIENT)
Dept: SURGERY | Facility: HOSPITAL | Age: 75
DRG: 331 | End: 2020-01-08
Payer: MEDICARE

## 2020-01-08 ENCOUNTER — HOSPITAL ENCOUNTER (INPATIENT)
Facility: HOSPITAL | Age: 75
LOS: 6 days | Discharge: HOME HEALTH | DRG: 331 | End: 2020-01-14
Attending: COLON & RECTAL SURGERY | Admitting: COLON & RECTAL SURGERY
Payer: MEDICARE

## 2020-01-08 ENCOUNTER — TELEPHONE (OUTPATIENT)
Dept: GASTROENTEROLOGY | Facility: CLINIC | Age: 75
End: 2020-01-08

## 2020-01-08 ENCOUNTER — TELEPHONE (OUTPATIENT)
Dept: ORTHOPEDICS | Facility: CLINIC | Age: 75
End: 2020-01-08

## 2020-01-08 DIAGNOSIS — Z96.652 HISTORY OF LEFT KNEE REPLACEMENT: ICD-10-CM

## 2020-01-08 DIAGNOSIS — F02.81 ALZHEIMER'S DEMENTIA WITH BEHAVIORAL DISTURBANCE, UNSPECIFIED TIMING OF DEMENTIA ONSET: Chronic | ICD-10-CM

## 2020-01-08 DIAGNOSIS — G30.9 ALZHEIMER'S DEMENTIA WITH BEHAVIORAL DISTURBANCE, UNSPECIFIED TIMING OF DEMENTIA ONSET: Chronic | ICD-10-CM

## 2020-01-08 DIAGNOSIS — E66.01 MORBID OBESITY: Chronic | ICD-10-CM

## 2020-01-08 DIAGNOSIS — T81.31XS WOUND DEHISCENCE, SURGICAL, SEQUELA: ICD-10-CM

## 2020-01-08 DIAGNOSIS — R53.81 DEBILITY: ICD-10-CM

## 2020-01-08 DIAGNOSIS — K50.10 CROHN'S COLITIS: Primary | ICD-10-CM

## 2020-01-08 DIAGNOSIS — R23.9 ALTERATION IN SKIN INTEGRITY: ICD-10-CM

## 2020-01-08 DIAGNOSIS — L89.891: ICD-10-CM

## 2020-01-08 DIAGNOSIS — N18.30 CHRONIC KIDNEY DISEASE, STAGE 3: Chronic | ICD-10-CM

## 2020-01-08 LAB
ABO + RH BLD: NORMAL
BLD GP AB SCN CELLS X3 SERPL QL: NORMAL

## 2020-01-08 PROCEDURE — 25000003 PHARM REV CODE 250: Performed by: SURGERY

## 2020-01-08 PROCEDURE — 63600175 PHARM REV CODE 636 W HCPCS: Performed by: SURGERY

## 2020-01-08 PROCEDURE — 37000008 HC ANESTHESIA 1ST 15 MINUTES: Performed by: COLON & RECTAL SURGERY

## 2020-01-08 PROCEDURE — 71000033 HC RECOVERY, INTIAL HOUR: Performed by: COLON & RECTAL SURGERY

## 2020-01-08 PROCEDURE — 36000710: Performed by: COLON & RECTAL SURGERY

## 2020-01-08 PROCEDURE — 63600175 PHARM REV CODE 636 W HCPCS: Performed by: STUDENT IN AN ORGANIZED HEALTH CARE EDUCATION/TRAINING PROGRAM

## 2020-01-08 PROCEDURE — D9220A PRA ANESTHESIA: Mod: ,,, | Performed by: ANESTHESIOLOGY

## 2020-01-08 PROCEDURE — 27201423 OPTIME MED/SURG SUP & DEVICES STERILE SUPPLY: Performed by: COLON & RECTAL SURGERY

## 2020-01-08 PROCEDURE — 44187 PR LAP, SURG ILEO/JEJUNO-STOMY: ICD-10-PCS | Mod: 22,GC,, | Performed by: COLON & RECTAL SURGERY

## 2020-01-08 PROCEDURE — 20600001 HC STEP DOWN PRIVATE ROOM

## 2020-01-08 PROCEDURE — C9290 INJ, BUPIVACAINE LIPOSOME: HCPCS | Performed by: COLON & RECTAL SURGERY

## 2020-01-08 PROCEDURE — 25000003 PHARM REV CODE 250: Performed by: NURSE PRACTITIONER

## 2020-01-08 PROCEDURE — C1729 CATH, DRAINAGE: HCPCS | Performed by: COLON & RECTAL SURGERY

## 2020-01-08 PROCEDURE — 25000003 PHARM REV CODE 250: Performed by: STUDENT IN AN ORGANIZED HEALTH CARE EDUCATION/TRAINING PROGRAM

## 2020-01-08 PROCEDURE — 63600175 PHARM REV CODE 636 W HCPCS: Performed by: COLON & RECTAL SURGERY

## 2020-01-08 PROCEDURE — 63600175 PHARM REV CODE 636 W HCPCS: Performed by: NURSE PRACTITIONER

## 2020-01-08 PROCEDURE — 86850 RBC ANTIBODY SCREEN: CPT

## 2020-01-08 PROCEDURE — D9220A PRA ANESTHESIA: ICD-10-PCS | Mod: ,,, | Performed by: ANESTHESIOLOGY

## 2020-01-08 PROCEDURE — 27100025 HC TUBING, SET FLUID WARMER: Performed by: ANESTHESIOLOGY

## 2020-01-08 PROCEDURE — 37000009 HC ANESTHESIA EA ADD 15 MINS: Performed by: COLON & RECTAL SURGERY

## 2020-01-08 PROCEDURE — 94761 N-INVAS EAR/PLS OXIMETRY MLT: CPT

## 2020-01-08 PROCEDURE — S0030 INJECTION, METRONIDAZOLE: HCPCS | Performed by: NURSE PRACTITIONER

## 2020-01-08 PROCEDURE — 44187 LAP ILEO/JEJUNO-STOMY: CPT | Mod: 22,GC,, | Performed by: COLON & RECTAL SURGERY

## 2020-01-08 PROCEDURE — 36000711: Performed by: COLON & RECTAL SURGERY

## 2020-01-08 RX ORDER — PHENYLEPHRINE HYDROCHLORIDE 10 MG/ML
INJECTION INTRAVENOUS
Status: DISCONTINUED | OUTPATIENT
Start: 2020-01-08 | End: 2020-01-08

## 2020-01-08 RX ORDER — LIDOCAINE HYDROCHLORIDE ANHYDROUS AND DEXTROSE MONOHYDRATE .8; 5 G/100ML; G/100ML
INJECTION, SOLUTION INTRAVENOUS CONTINUOUS PRN
Status: DISCONTINUED | OUTPATIENT
Start: 2020-01-08 | End: 2020-01-08

## 2020-01-08 RX ORDER — TRAMADOL HYDROCHLORIDE 50 MG/1
50 TABLET ORAL EVERY 6 HOURS PRN
Status: DISCONTINUED | OUTPATIENT
Start: 2020-01-08 | End: 2020-01-14 | Stop reason: HOSPADM

## 2020-01-08 RX ORDER — FERROUS SULFATE 300 MG/5ML
300 LIQUID (ML) ORAL DAILY
Status: DISCONTINUED | OUTPATIENT
Start: 2020-01-08 | End: 2020-01-14 | Stop reason: HOSPADM

## 2020-01-08 RX ORDER — PANTOPRAZOLE SODIUM 40 MG/1
40 TABLET, DELAYED RELEASE ORAL DAILY
Status: DISCONTINUED | OUTPATIENT
Start: 2020-01-08 | End: 2020-01-14 | Stop reason: HOSPADM

## 2020-01-08 RX ORDER — MUPIROCIN 20 MG/G
1 OINTMENT TOPICAL 2 TIMES DAILY
Status: COMPLETED | OUTPATIENT
Start: 2020-01-08 | End: 2020-01-13

## 2020-01-08 RX ORDER — GABAPENTIN 100 MG/1
200 CAPSULE ORAL
Status: COMPLETED | OUTPATIENT
Start: 2020-01-08 | End: 2020-01-08

## 2020-01-08 RX ORDER — ACETAMINOPHEN 500 MG
1000 TABLET ORAL EVERY 8 HOURS
Status: DISCONTINUED | OUTPATIENT
Start: 2020-01-09 | End: 2020-01-14 | Stop reason: HOSPADM

## 2020-01-08 RX ORDER — SODIUM CHLORIDE 9 MG/ML
INJECTION, SOLUTION INTRAVENOUS CONTINUOUS
Status: DISCONTINUED | OUTPATIENT
Start: 2020-01-08 | End: 2020-01-11

## 2020-01-08 RX ORDER — VASOPRESSIN 20 [USP'U]/ML
INJECTION, SOLUTION INTRAMUSCULAR; SUBCUTANEOUS
Status: DISCONTINUED | OUTPATIENT
Start: 2020-01-08 | End: 2020-01-08

## 2020-01-08 RX ORDER — SODIUM CHLORIDE 9 MG/ML
INJECTION, SOLUTION INTRAVENOUS CONTINUOUS
Status: DISCONTINUED | OUTPATIENT
Start: 2020-01-08 | End: 2020-01-08

## 2020-01-08 RX ORDER — LANOLIN ALCOHOL/MO/W.PET/CERES
400 CREAM (GRAM) TOPICAL 2 TIMES DAILY
Status: DISCONTINUED | OUTPATIENT
Start: 2020-01-08 | End: 2020-01-14 | Stop reason: HOSPADM

## 2020-01-08 RX ORDER — LIDOCAINE HYDROCHLORIDE 10 MG/ML
1 INJECTION, SOLUTION EPIDURAL; INFILTRATION; INTRACAUDAL; PERINEURAL ONCE
Status: DISCONTINUED | OUTPATIENT
Start: 2020-01-08 | End: 2020-01-08

## 2020-01-08 RX ORDER — HEPARIN SODIUM 5000 [USP'U]/ML
5000 INJECTION, SOLUTION INTRAVENOUS; SUBCUTANEOUS EVERY 8 HOURS
Status: DISCONTINUED | OUTPATIENT
Start: 2020-01-08 | End: 2020-01-14 | Stop reason: HOSPADM

## 2020-01-08 RX ORDER — PROCHLORPERAZINE EDISYLATE 5 MG/ML
2.5 INJECTION INTRAMUSCULAR; INTRAVENOUS EVERY 6 HOURS PRN
Status: DISCONTINUED | OUTPATIENT
Start: 2020-01-08 | End: 2020-01-08 | Stop reason: HOSPADM

## 2020-01-08 RX ORDER — OXYCODONE HYDROCHLORIDE 5 MG/1
5 TABLET ORAL EVERY 6 HOURS PRN
Status: DISCONTINUED | OUTPATIENT
Start: 2020-01-08 | End: 2020-01-14 | Stop reason: HOSPADM

## 2020-01-08 RX ORDER — AZATHIOPRINE 50 MG/1
50 TABLET ORAL DAILY
Status: DISCONTINUED | OUTPATIENT
Start: 2020-01-08 | End: 2020-01-09

## 2020-01-08 RX ORDER — ACETAMINOPHEN 10 MG/ML
1000 INJECTION, SOLUTION INTRAVENOUS EVERY 8 HOURS
Status: COMPLETED | OUTPATIENT
Start: 2020-01-08 | End: 2020-01-09

## 2020-01-08 RX ORDER — LIDOCAINE HCL/PF 100 MG/5ML
SYRINGE (ML) INTRAVENOUS
Status: DISCONTINUED | OUTPATIENT
Start: 2020-01-08 | End: 2020-01-08

## 2020-01-08 RX ORDER — ROCURONIUM BROMIDE 10 MG/ML
INJECTION, SOLUTION INTRAVENOUS
Status: DISCONTINUED | OUTPATIENT
Start: 2020-01-08 | End: 2020-01-08

## 2020-01-08 RX ORDER — ESMOLOL HYDROCHLORIDE 10 MG/ML
INJECTION INTRAVENOUS
Status: DISCONTINUED | OUTPATIENT
Start: 2020-01-08 | End: 2020-01-08

## 2020-01-08 RX ORDER — ONDANSETRON 2 MG/ML
4 INJECTION INTRAMUSCULAR; INTRAVENOUS EVERY 12 HOURS PRN
Status: DISCONTINUED | OUTPATIENT
Start: 2020-01-08 | End: 2020-01-14 | Stop reason: HOSPADM

## 2020-01-08 RX ORDER — ACETAMINOPHEN 500 MG
1000 TABLET ORAL
Status: COMPLETED | OUTPATIENT
Start: 2020-01-08 | End: 2020-01-08

## 2020-01-08 RX ORDER — DEXAMETHASONE SODIUM PHOSPHATE 4 MG/ML
INJECTION, SOLUTION INTRA-ARTICULAR; INTRALESIONAL; INTRAMUSCULAR; INTRAVENOUS; SOFT TISSUE
Status: DISCONTINUED | OUTPATIENT
Start: 2020-01-08 | End: 2020-01-08

## 2020-01-08 RX ORDER — FENTANYL CITRATE 50 UG/ML
INJECTION, SOLUTION INTRAMUSCULAR; INTRAVENOUS
Status: DISCONTINUED | OUTPATIENT
Start: 2020-01-08 | End: 2020-01-08

## 2020-01-08 RX ORDER — DONEPEZIL HYDROCHLORIDE 5 MG/1
10 TABLET, FILM COATED ORAL NIGHTLY
Status: DISCONTINUED | OUTPATIENT
Start: 2020-01-08 | End: 2020-01-14 | Stop reason: HOSPADM

## 2020-01-08 RX ORDER — QUETIAPINE FUMARATE 25 MG/1
100 TABLET, FILM COATED ORAL NIGHTLY
Status: DISCONTINUED | OUTPATIENT
Start: 2020-01-08 | End: 2020-01-14 | Stop reason: HOSPADM

## 2020-01-08 RX ORDER — SODIUM CHLORIDE 0.9 % (FLUSH) 0.9 %
10 SYRINGE (ML) INJECTION
Status: DISCONTINUED | OUTPATIENT
Start: 2020-01-08 | End: 2020-01-14 | Stop reason: HOSPADM

## 2020-01-08 RX ORDER — HEPARIN SODIUM 5000 [USP'U]/ML
5000 INJECTION, SOLUTION INTRAVENOUS; SUBCUTANEOUS
Status: COMPLETED | OUTPATIENT
Start: 2020-01-08 | End: 2020-01-08

## 2020-01-08 RX ORDER — OXYCODONE HYDROCHLORIDE 10 MG/1
10 TABLET ORAL EVERY 4 HOURS PRN
Status: DISCONTINUED | OUTPATIENT
Start: 2020-01-08 | End: 2020-01-14 | Stop reason: HOSPADM

## 2020-01-08 RX ORDER — MEMANTINE HYDROCHLORIDE 5 MG/1
10 TABLET ORAL DAILY
Status: DISCONTINUED | OUTPATIENT
Start: 2020-01-08 | End: 2020-01-14 | Stop reason: HOSPADM

## 2020-01-08 RX ORDER — PROPOFOL 10 MG/ML
VIAL (ML) INTRAVENOUS
Status: DISCONTINUED | OUTPATIENT
Start: 2020-01-08 | End: 2020-01-08

## 2020-01-08 RX ORDER — HYDROMORPHONE HYDROCHLORIDE 1 MG/ML
0.2 INJECTION, SOLUTION INTRAMUSCULAR; INTRAVENOUS; SUBCUTANEOUS EVERY 5 MIN PRN
Status: DISCONTINUED | OUTPATIENT
Start: 2020-01-08 | End: 2020-01-08 | Stop reason: HOSPADM

## 2020-01-08 RX ORDER — SODIUM CHLORIDE 0.9 % (FLUSH) 0.9 %
10 SYRINGE (ML) INJECTION
Status: DISCONTINUED | OUTPATIENT
Start: 2020-01-08 | End: 2020-01-08 | Stop reason: HOSPADM

## 2020-01-08 RX ORDER — MIDAZOLAM HYDROCHLORIDE 1 MG/ML
INJECTION, SOLUTION INTRAMUSCULAR; INTRAVENOUS
Status: DISCONTINUED | OUTPATIENT
Start: 2020-01-08 | End: 2020-01-08

## 2020-01-08 RX ORDER — METRONIDAZOLE 500 MG/100ML
500 INJECTION, SOLUTION INTRAVENOUS
Status: COMPLETED | OUTPATIENT
Start: 2020-01-08 | End: 2020-01-08

## 2020-01-08 RX ORDER — ASPIRIN 81 MG/1
81 TABLET ORAL DAILY
Status: DISCONTINUED | OUTPATIENT
Start: 2020-01-08 | End: 2020-01-14 | Stop reason: HOSPADM

## 2020-01-08 RX ORDER — KETAMINE HCL IN 0.9 % NACL 50 MG/5 ML
SYRINGE (ML) INTRAVENOUS
Status: DISCONTINUED | OUTPATIENT
Start: 2020-01-08 | End: 2020-01-08

## 2020-01-08 RX ORDER — MUPIROCIN 20 MG/G
OINTMENT TOPICAL
Status: DISCONTINUED | OUTPATIENT
Start: 2020-01-08 | End: 2020-01-08

## 2020-01-08 RX ADMIN — VASOPRESSIN 1 UNITS: 20 INJECTION INTRAVENOUS at 12:01

## 2020-01-08 RX ADMIN — PHENYLEPHRINE HYDROCHLORIDE 100 MCG: 10 INJECTION INTRAVENOUS at 12:01

## 2020-01-08 RX ADMIN — PHENYLEPHRINE HYDROCHLORIDE 100 MCG: 10 INJECTION INTRAVENOUS at 11:01

## 2020-01-08 RX ADMIN — PHENYLEPHRINE HYDROCHLORIDE 200 MCG: 10 INJECTION INTRAVENOUS at 12:01

## 2020-01-08 RX ADMIN — LIDOCAINE HYDROCHLORIDE 60 MG: 20 INJECTION, SOLUTION INTRAVENOUS at 10:01

## 2020-01-08 RX ADMIN — ACETAMINOPHEN 1000 MG: 10 INJECTION, SOLUTION INTRAVENOUS at 09:01

## 2020-01-08 RX ADMIN — FENTANYL CITRATE 50 MCG: 50 INJECTION, SOLUTION INTRAMUSCULAR; INTRAVENOUS at 10:01

## 2020-01-08 RX ADMIN — CEFTRIAXONE 2 G: 2 INJECTION, SOLUTION INTRAVENOUS at 09:01

## 2020-01-08 RX ADMIN — DONEPEZIL HYDROCHLORIDE 10 MG: 5 TABLET, FILM COATED ORAL at 09:01

## 2020-01-08 RX ADMIN — QUETIAPINE FUMARATE 100 MG: 25 TABLET ORAL at 09:01

## 2020-01-08 RX ADMIN — ROCURONIUM BROMIDE 30 MG: 10 INJECTION, SOLUTION INTRAVENOUS at 11:01

## 2020-01-08 RX ADMIN — SODIUM CHLORIDE: 0.9 INJECTION, SOLUTION INTRAVENOUS at 02:01

## 2020-01-08 RX ADMIN — MINERAL SUPPLEMENT IRON 300 MG / 5 ML STRENGTH LIQUID 100 PER BOX UNFLAVORED 300 MG: at 09:01

## 2020-01-08 RX ADMIN — METRONIDAZOLE 500 MG: 500 SOLUTION INTRAVENOUS at 10:01

## 2020-01-08 RX ADMIN — Medication 400 MG: at 09:01

## 2020-01-08 RX ADMIN — PHENYLEPHRINE HYDROCHLORIDE 150 MCG: 10 INJECTION INTRAVENOUS at 01:01

## 2020-01-08 RX ADMIN — MEMANTINE HYDROCHLORIDE 10 MG: 10 TABLET ORAL at 03:01

## 2020-01-08 RX ADMIN — FENTANYL CITRATE 50 MCG: 50 INJECTION, SOLUTION INTRAMUSCULAR; INTRAVENOUS at 01:01

## 2020-01-08 RX ADMIN — SODIUM CHLORIDE, SODIUM GLUCONATE, SODIUM ACETATE, POTASSIUM CHLORIDE, MAGNESIUM CHLORIDE, SODIUM PHOSPHATE, DIBASIC, AND POTASSIUM PHOSPHATE: .53; .5; .37; .037; .03; .012; .00082 INJECTION, SOLUTION INTRAVENOUS at 12:01

## 2020-01-08 RX ADMIN — SODIUM CHLORIDE, SODIUM GLUCONATE, SODIUM ACETATE, POTASSIUM CHLORIDE, MAGNESIUM CHLORIDE, SODIUM PHOSPHATE, DIBASIC, AND POTASSIUM PHOSPHATE: .53; .5; .37; .037; .03; .012; .00082 INJECTION, SOLUTION INTRAVENOUS at 10:01

## 2020-01-08 RX ADMIN — GABAPENTIN 200 MG: 100 CAPSULE ORAL at 09:01

## 2020-01-08 RX ADMIN — MIDAZOLAM HYDROCHLORIDE 1 MG: 1 INJECTION, SOLUTION INTRAMUSCULAR; INTRAVENOUS at 10:01

## 2020-01-08 RX ADMIN — PHENYLEPHRINE HYDROCHLORIDE 100 MCG: 10 INJECTION INTRAVENOUS at 10:01

## 2020-01-08 RX ADMIN — ROCURONIUM BROMIDE 20 MG: 10 INJECTION, SOLUTION INTRAVENOUS at 12:01

## 2020-01-08 RX ADMIN — Medication 20 MG: at 10:01

## 2020-01-08 RX ADMIN — ESMOLOL HYDROCHLORIDE 20 MG: 10 INJECTION INTRAVENOUS at 11:01

## 2020-01-08 RX ADMIN — AZATHIOPRINE 50 MG: 50 TABLET ORAL at 04:01

## 2020-01-08 RX ADMIN — FENTANYL CITRATE 50 MCG: 50 INJECTION, SOLUTION INTRAMUSCULAR; INTRAVENOUS at 11:01

## 2020-01-08 RX ADMIN — HEPARIN SODIUM 5000 UNITS: 5000 INJECTION, SOLUTION INTRAVENOUS; SUBCUTANEOUS at 09:01

## 2020-01-08 RX ADMIN — ESMOLOL HYDROCHLORIDE 20 MG: 10 INJECTION INTRAVENOUS at 12:01

## 2020-01-08 RX ADMIN — ESMOLOL HYDROCHLORIDE 10 MG: 10 INJECTION INTRAVENOUS at 11:01

## 2020-01-08 RX ADMIN — DEXAMETHASONE SODIUM PHOSPHATE 8 MG: 4 INJECTION, SOLUTION INTRAMUSCULAR; INTRAVENOUS at 10:01

## 2020-01-08 RX ADMIN — IBUPROFEN 800 MG: 800 INJECTION INTRAVENOUS at 09:01

## 2020-01-08 RX ADMIN — ROCURONIUM BROMIDE 40 MG: 10 INJECTION, SOLUTION INTRAVENOUS at 10:01

## 2020-01-08 RX ADMIN — ACETAMINOPHEN 1000 MG: 500 TABLET ORAL at 09:01

## 2020-01-08 RX ADMIN — PHENYLEPHRINE HYDROCHLORIDE 100 MCG: 10 INJECTION INTRAVENOUS at 01:01

## 2020-01-08 RX ADMIN — PANTOPRAZOLE SODIUM 40 MG: 40 TABLET, DELAYED RELEASE ORAL at 03:01

## 2020-01-08 RX ADMIN — LIDOCAINE HYDROCHLORIDE 0.02 MG/KG/MIN: 8 INJECTION, SOLUTION INTRAVENOUS at 10:01

## 2020-01-08 RX ADMIN — Medication 10 MG: at 11:01

## 2020-01-08 RX ADMIN — PROPOFOL 200 MG: 10 INJECTION, EMULSION INTRAVENOUS at 10:01

## 2020-01-08 RX ADMIN — MUPIROCIN: 20 OINTMENT TOPICAL at 09:01

## 2020-01-08 RX ADMIN — SODIUM CHLORIDE: 0.9 INJECTION, SOLUTION INTRAVENOUS at 09:01

## 2020-01-08 RX ADMIN — MUPIROCIN 1 G: 20 OINTMENT TOPICAL at 09:01

## 2020-01-08 RX ADMIN — ASPIRIN 81 MG: 81 TABLET, COATED ORAL at 03:01

## 2020-01-08 NOTE — INTERVAL H&P NOTE
The patient has been examined and the H&P has been reviewed:    I concur with the findings and no changes have occurred since H&P was written.    Anesthesia/Surgery risks, benefits and alternative options discussed and understood by patient/family.          Active Hospital Problems    Diagnosis  POA    Crohn's colitis [K50.10]  Yes      Resolved Hospital Problems   No resolved problems to display.

## 2020-01-08 NOTE — OP NOTE
ANABELLA PEREZ  [id number]  [id number]    OPERATIVE NOTE:    DATE OF OPERATION: 01/08/2020    PREOPERATIVE DIAGNOSIS:  Crohn's disease.  Fecal incontinence. Immobility.  Nonhealing wounds    POSTOPERATIVE DIAGNOSIS: Crohn's disease.  Fecal incontinence. Immobility.  Nonhealing wounds    PROCEDURE PERFORMED:  Laparoscopic lysis of adhesions and creation end ileostomy    ATTENDING SURGEON: CEDRIC Worley MD    RESIDENT/FELLOW SURGEON: Crow    ANESTHESIA:  General    ESTIMATED BLOOD LOSS:  20 mL    FINDINGS:  1.  Moderate adhesions of the small bowel down to prior lower midline incision and ileorectal anastomosis. Two serosal tears were made that were recognized oversewn with 3 0 Vicryl in Lembert fashion. End-to-end ileorectal anastomosis seen.  Rectum was divided just distal to anastomosis.  End ileostomy made in the right upper quadrant.  2.  The small bowel was run from the ligament of Trietz distally.  There were no abnormalities or signs of small bowel Crohn's disease.      SPECIMENS:  None    COMPLICATIONS:  None apparent    DISPOSITION: PACU    CONDITION: good    INDICATION FOR PROCEDURE:  Anabella Perez is a 74 y.o. female with a history of inflammatory bowel disease.  She had previously undergone total abdominal colectomy by Dr. Barry with stapled end-to-end ileorectal anastomosis. She was recently hospitalized and her mobility is decreased.  She had increased perianal drainage and incontinence.  Endoscopy was performed demonstrating evidence of Crohn's proctitis.  Due to her immobility, incontinence, and nonhealing wounds.  I recommended end ileostomy.    DESCRIPTION OF PROCEDURE:   After informed consent was reviewed, the patient was taken to the operating room and transferred to the OR table.  she was placed under general anesthesia.  A monahan catheter was sterilely inserted.  Ceftriaxone and flagyl were given for preoperative antibiotics.     She was placed in a supine position.  The arms  were appropriately padded and tucked.  The anterior abdominal wall was then prepped and draped in the usual sterile fashion and a time-out was performed.  The abdomen is entered through a 5 mm incision in left upper quadrant.  A Veress needle was inserted and pneumoperitoneum was achieved.  A 5 mm port and camera were then inserted in the underlying bowel or vasculature were inspected and found to be free of any iatrogenic injury. 5 mm trocar was then placed at the umbilicus.  A 12 mm trocar was placed in the right upper quadrant at the site of future ileostomy formation.  A 3rd 5 mm trocar was placed in the left lower quadrant.  Lysis of adhesions was then performed.  Lysis of adhesions took approximately 1 hr.  There were multiple small bowel adhesions up to the anterior abdominal wall.  Adhesions were all taken down sharply.  There were also multiple interloop adhesions down in her pelvis.  Adhesions were all freed.  There was 1 serosal tear made. This was recognized and oversewn with 3 0 Vicryl laparoscopic fashion using Lembert sutures. As we entered the pelvis, there were multiple loops of small bowel densely adherent to the sacral promontory.  Therefore, decision was made to place a hand port for assistance.  A 8 cm Pfannenstiel incision was made 2 fingerbreadths above the pubic symphysis.  Dissection continued down to the anterior rectus fascia which was incised in transverse fashion.  Flaps were made up to the umbilicus and down to the pubic symphysis.  The middle of the rectus muscles was then split and the abdomen was entered sharply.  A sleeve of the GelPort was placed.  Through the sleeve of the GelPort, the small bowel was elevated.  There were multiple adhesions of the small bowel out laterally. These were tedious lead taken down sharply.  Another serosal tear was made was recognized.  This was oversewn with multiple 3 0 Vicryl sutures in Lembert fashion. Dissection then proceeded around the  ileorectal anastomosis.  A window was made in the mesorectum.  A contour stapler with a green load was then inserted and fired distal to the ileorectal anastomosis.  The staple line was oversewn with a 3 0 PDS in running fashion for reinforcement.  The remaining mesorectum was divided with the ligature.  Attachments of the small bowel mesentery down to the sacral promontory were divided. The last 5 cm of the small intestine had were isolated and devascularized as a did not have enough laxity to reach the anterior abdominal wall in the right upper quadrant. The cap of the GelPort was then replaced.  In the right upper quadrant where the 12 mm trocar port was, circular incision was made in the skin.  The rectus fascia was incised in longitudinal fashion.  The posterior rectus fascia was incised under direct visualization using the ligature.  The end of the stapled ileum was then grasped and pulled through the defect in the abdominal wall. More mobilization was required laparoscopically in order to free up the mesentery to allow to reach into the right upper quadrant. Due to her abdominal habitus, reach was moderately difficult but was able to be achieved.   With the abdomen still insufflated, bilateral tap blocks were performed with 40 mL of dilute Exparel.  The 5 mm trocars laterally removed under direct visualization.  The abdomen is desufflated.  All members of the team then changed gowns and gloves for a clean closure.  The Pfannenstiel incision was closed with layers.  The posterior peritoneum and fascia were closed with a #1 running PDS suture.  Rectus muscles were loosely approximated with interrupted #1 PDS suture.  The anterior rectus fascia was also closed with a running #1  PDS suture.  All skin and subcutaneous incisions were irrigated.  The skin incisions were closed with running 4-0 vicryl and steri-strips were applied.     Lastly, the ileostomy was matured.  The distal 6cm was resected.  The stoma was  matured with 4 interrupted 3-0 Vircyl sutures in Nathalie fashion to sit above the level of the skin with no tension.   A stoma appliance was brought onto the field, cut, and applied.     The patient tolerated the procedure well.  There were no apparent complications.  All sponge, needle, and instruments counts were correct x 2.  she was then extubated and taken to PACU in stable condition.        CEDRIC Worley MD, FACS  Staff Surgeon  Colon & Rectal Surgery

## 2020-01-08 NOTE — TRANSFER OF CARE
"Anesthesia Transfer of Care Note    Patient: Anabella Aranda    Procedure(s) Performed: Procedure(s) (LRB):  CREATION, ILEOSTOMY, LAPAROSCOPIC, ERAS low, possible hand port, lithotomy (N/A)  LYSIS, ADHESIONS    Patient location: PACU    Anesthesia Type: general    Transport from OR: Transported from OR on 6-10 L/min O2 by face mask with adequate spontaneous ventilation    Post pain: adequate analgesia    Post assessment: no apparent anesthetic complications    Post vital signs: stable    Level of consciousness: awake and alert    Nausea/Vomiting: no nausea/vomiting    Complications: none    Transfer of care protocol was followed      Last vitals:   Visit Vitals  BP (!) 149/67 (BP Location: Left arm, Patient Position: Lying)   Pulse 97   Temp 36.5 °C (97.7 °F) (Temporal)   Resp 12   Ht 5' 4" (1.626 m)   Wt 99.8 kg (220 lb)   LMP  (LMP Unknown)   SpO2 100%   Breastfeeding? No   BMI 37.76 kg/m²     "

## 2020-01-08 NOTE — TELEPHONE ENCOUNTER
Returned call to pt son lm on vm to let home health know to so they can contact the wound vac company.

## 2020-01-08 NOTE — ANESTHESIA PROCEDURE NOTES
Intubation  Performed by: Mg Boyd MD  Authorized by: Briana Summers MD     Intubation:     Induction:  Intravenous    Intubated:  Postinduction    Mask Ventilation:  Easy mask    Attempts:  1    Attempted By:  Resident anesthesiologist    Method of Intubation:  Direct    Blade:  Valente 1    Laryngeal View Grade: Grade I - full view of chords      Difficult Airway Encountered?: No      Complications:  Soft tissue trauma (Small cut on upper lip)    Airway Device:  Oral endotracheal tube    Airway Device Size:  7.0    Style/Cuff Inflation:  Cuffed    Tube secured:  21    Secured at:  The lips    Placement Verified By:  Capnometry    Complicating Factors:  None    Findings Post-Intubation:  BS equal bilateral

## 2020-01-08 NOTE — CONSULTS
Received call from PACU nurse regarding patient's wound vac. She presented today with wound vac in place from home vac intact but no power cord and the battery is now low.   Dicussed with C & D and wound vac to be delivered to patients new room 1044 in anticipation of her transfer in the next hour or so.   Discussed with nurse of room 1044, explained situation and nurse to connect patient to hospital wound vac when she arrives to the room.   Wound and ostomy team to follow patient during her stay. Nursing to continue care.   Kisha GAY, BSN, RN, COCN, [facility]  [id number]

## 2020-01-08 NOTE — TELEPHONE ENCOUNTER
Spoke with patients kate Massey and he requests that an order be placed to remove a Picc Line today during patients surgery. The order has to come from either Dr. Carrera or Dr. Worley to remove the Picc Line today.

## 2020-01-08 NOTE — PROGRESS NOTES
Wound care RN to order hospital vac for right leg. Pt currently using home vac and does not have cord

## 2020-01-08 NOTE — BRIEF OP NOTE
[facility]  Brief Operative Note    SUMMARY     Surgery Date: 1/8/2020     Surgeon(s) and Role:     * Benjie Worley MD - Primary     * Tobias Maria MD - Fellow    Assisting Surgeon: None    Pre-op Diagnosis:  Decubitus ulcer of perianal region, stage 1 [L89.891]  Crohn's disease of small and large intestines with complication [K50.819]    Post-op Diagnosis:  Post-Op Diagnosis Codes:     * Decubitus ulcer of perianal region, stage 1 [L89.891]     * Crohn's disease of small and large intestines with complication [K50.819]    Procedure(s) (LRB):  CREATION, ILEOSTOMY, LAPAROSCOPIC, ERAS low, possible hand port, lithotomy (N/A)  LYSIS, ADHESIONS    Anesthesia: General    Description of Procedure: laparoscopic lysis of adhesions, open takedown of ileorectal anastomosis, end ileostomy    Description of the findings of the procedure: pelvic small bowel adhesions    Estimated Blood Loss: * No values recorded between 1/8/2020 10:53 AM and 1/8/2020  1:46 PM *         Specimens:   Specimen (12h ago, onward)    None

## 2020-01-08 NOTE — TELEPHONE ENCOUNTER
----- Message from Sabino Castellano sent at 1/8/2020 11:02 AM CST -----  Contact: Bryson , Peter / [phone]   Patient's son states that your office needs to contact wound vac company and inform them that the patient has not been discharged. Please Advise

## 2020-01-08 NOTE — CONSULTS
Patient seen for ileostomy marking. Dr. Worley recently marked. Assessed patient laying and sitting. Unable to assess standing. Agree with Dr. Worley's jeri to RUQ.  Ostomy dept to follow prn.

## 2020-01-08 NOTE — PLAN OF CARE
Vital signs stable. Afebrile. Drowsy but oriented to all but date. Denies pain/nausea. Ostomy remains pink and moist with bowel sweat present. Wound vac to left leg remains intact.  Benites in place with clear/yellow urine.  IVF per MD orders. Tolerating sips of water. Lap sites remain CDI. Small amount of serosanguinous drainage noted from rectal tube. Son at bedside and updated regarding POC. Understanding verbalized. Awaiting clean, ready room.

## 2020-01-09 LAB
ANION GAP SERPL CALC-SCNC: 10 MMOL/L (ref 8–16)
BASOPHILS # BLD AUTO: 0.02 K/UL (ref 0–0.2)
BASOPHILS NFR BLD: 0.2 % (ref 0–1.9)
BUN SERPL-MCNC: 7 MG/DL (ref 8–23)
CALCIUM SERPL-MCNC: 8.1 MG/DL (ref 8.7–10.5)
CHLORIDE SERPL-SCNC: 111 MMOL/L (ref 95–110)
CO2 SERPL-SCNC: 20 MMOL/L (ref 23–29)
CREAT SERPL-MCNC: 0.7 MG/DL (ref 0.5–1.4)
DIFFERENTIAL METHOD: ABNORMAL
EOSINOPHIL # BLD AUTO: 0 K/UL (ref 0–0.5)
EOSINOPHIL NFR BLD: 0 % (ref 0–8)
ERYTHROCYTE [DISTWIDTH] IN BLOOD BY AUTOMATED COUNT: 14.5 % (ref 11.5–14.5)
EST. GFR  (AFRICAN AMERICAN): >60 ML/MIN/1.73 M^2
EST. GFR  (NON AFRICAN AMERICAN): >60 ML/MIN/1.73 M^2
GLUCOSE SERPL-MCNC: 70 MG/DL (ref 70–110)
HCT VFR BLD AUTO: 29.4 % (ref 37–48.5)
HGB BLD-MCNC: 9.1 G/DL (ref 12–16)
IMM GRANULOCYTES # BLD AUTO: 0.03 K/UL (ref 0–0.04)
IMM GRANULOCYTES NFR BLD AUTO: 0.3 % (ref 0–0.5)
LYMPHOCYTES # BLD AUTO: 1.2 K/UL (ref 1–4.8)
LYMPHOCYTES NFR BLD: 12.8 % (ref 18–48)
MAGNESIUM SERPL-MCNC: 1.7 MG/DL (ref 1.6–2.6)
MCH RBC QN AUTO: 29.9 PG (ref 27–31)
MCHC RBC AUTO-ENTMCNC: 31 G/DL (ref 32–36)
MCV RBC AUTO: 97 FL (ref 82–98)
MONOCYTES # BLD AUTO: 0.6 K/UL (ref 0.3–1)
MONOCYTES NFR BLD: 7.1 % (ref 4–15)
NEUTROPHILS # BLD AUTO: 7.2 K/UL (ref 1.8–7.7)
NEUTROPHILS NFR BLD: 79.6 % (ref 38–73)
NRBC BLD-RTO: 0 /100 WBC
PHOSPHATE SERPL-MCNC: 3.9 MG/DL (ref 2.7–4.5)
PLATELET # BLD AUTO: 397 K/UL (ref 150–350)
PMV BLD AUTO: 8.5 FL (ref 9.2–12.9)
POTASSIUM SERPL-SCNC: 3.8 MMOL/L (ref 3.5–5.1)
RBC # BLD AUTO: 3.04 M/UL (ref 4–5.4)
SODIUM SERPL-SCNC: 141 MMOL/L (ref 136–145)
WBC # BLD AUTO: 9.03 K/UL (ref 3.9–12.7)

## 2020-01-09 PROCEDURE — 83735 ASSAY OF MAGNESIUM: CPT

## 2020-01-09 PROCEDURE — G0179 MD RECERTIFICATION HHA PT: HCPCS | Mod: ,,, | Performed by: HOSPITALIST

## 2020-01-09 PROCEDURE — 97530 THERAPEUTIC ACTIVITIES: CPT

## 2020-01-09 PROCEDURE — 97165 OT EVAL LOW COMPLEX 30 MIN: CPT

## 2020-01-09 PROCEDURE — G0179 PR HOME HEALTH MD RECERTIFICATION: ICD-10-PCS | Mod: ,,, | Performed by: HOSPITALIST

## 2020-01-09 PROCEDURE — 94761 N-INVAS EAR/PLS OXIMETRY MLT: CPT

## 2020-01-09 PROCEDURE — 80048 BASIC METABOLIC PNL TOTAL CA: CPT

## 2020-01-09 PROCEDURE — 97162 PT EVAL MOD COMPLEX 30 MIN: CPT

## 2020-01-09 PROCEDURE — 20600001 HC STEP DOWN PRIVATE ROOM

## 2020-01-09 PROCEDURE — 84100 ASSAY OF PHOSPHORUS: CPT

## 2020-01-09 PROCEDURE — 63600175 PHARM REV CODE 636 W HCPCS: Performed by: SURGERY

## 2020-01-09 PROCEDURE — 25000003 PHARM REV CODE 250: Performed by: SURGERY

## 2020-01-09 PROCEDURE — 85025 COMPLETE CBC W/AUTO DIFF WBC: CPT

## 2020-01-09 PROCEDURE — 97535 SELF CARE MNGMENT TRAINING: CPT

## 2020-01-09 RX ORDER — AZATHIOPRINE 50 MG/1
50 TABLET ORAL DAILY
Status: DISCONTINUED | OUTPATIENT
Start: 2020-01-09 | End: 2020-01-14 | Stop reason: HOSPADM

## 2020-01-09 RX ADMIN — SODIUM CHLORIDE: 0.9 INJECTION, SOLUTION INTRAVENOUS at 10:01

## 2020-01-09 RX ADMIN — ACETAMINOPHEN 1000 MG: 10 INJECTION, SOLUTION INTRAVENOUS at 02:01

## 2020-01-09 RX ADMIN — DONEPEZIL HYDROCHLORIDE 10 MG: 5 TABLET, FILM COATED ORAL at 09:01

## 2020-01-09 RX ADMIN — ASPIRIN 81 MG: 81 TABLET, COATED ORAL at 10:01

## 2020-01-09 RX ADMIN — HEPARIN SODIUM 5000 UNITS: 5000 INJECTION, SOLUTION INTRAVENOUS; SUBCUTANEOUS at 09:01

## 2020-01-09 RX ADMIN — MEMANTINE HYDROCHLORIDE 10 MG: 10 TABLET ORAL at 10:01

## 2020-01-09 RX ADMIN — Medication 400 MG: at 10:01

## 2020-01-09 RX ADMIN — AZATHIOPRINE 50 MG: 50 TABLET ORAL at 10:01

## 2020-01-09 RX ADMIN — HEPARIN SODIUM 5000 UNITS: 5000 INJECTION, SOLUTION INTRAVENOUS; SUBCUTANEOUS at 02:01

## 2020-01-09 RX ADMIN — ACETAMINOPHEN 1000 MG: 10 INJECTION, SOLUTION INTRAVENOUS at 07:01

## 2020-01-09 RX ADMIN — MINERAL SUPPLEMENT IRON 300 MG / 5 ML STRENGTH LIQUID 100 PER BOX UNFLAVORED 300 MG: at 10:01

## 2020-01-09 RX ADMIN — Medication 400 MG: at 09:01

## 2020-01-09 RX ADMIN — MUPIROCIN 1 G: 20 OINTMENT TOPICAL at 10:01

## 2020-01-09 RX ADMIN — PANTOPRAZOLE SODIUM 40 MG: 40 TABLET, DELAYED RELEASE ORAL at 10:01

## 2020-01-09 RX ADMIN — QUETIAPINE FUMARATE 100 MG: 25 TABLET ORAL at 09:01

## 2020-01-09 RX ADMIN — MUPIROCIN 1 G: 20 OINTMENT TOPICAL at 09:01

## 2020-01-09 RX ADMIN — HEPARIN SODIUM 5000 UNITS: 5000 INJECTION, SOLUTION INTRAVENOUS; SUBCUTANEOUS at 07:01

## 2020-01-09 NOTE — ASSESSMENT & PLAN NOTE
Creatine stable for now. BMP reviewed- noted Estimated Creatinine Clearance: 83.4 mL/min (based on SCr of 0.7 mg/dL). according to latest data. Monitor UOP and serial BMP and adjust therapy as needed. Renally dose meds.

## 2020-01-09 NOTE — ASSESSMENT & PLAN NOTE
Body mass index is 37.76 kg/m². Morbid obesity complicates all aspects of disease management from diagnostic modalities to treatment. Weight loss encouraged and health benefits explained to patient.

## 2020-01-09 NOTE — SUBJECTIVE & OBJECTIVE
Subjective:     Interval History: no acute events overnite, ileostomy functional, no nausea    Post-Op Info:  Procedure(s) (LRB):  CREATION, ILEOSTOMY, LAPAROSCOPIC, ERAS low, possible hand port, lithotomy (N/A)  LYSIS, ADHESIONS   1 Day Post-Op      Medications:  Continuous Infusions:   sodium chloride 0.9% 40 mL/hr at 01/09/20 1041     Scheduled Meds:   acetaminophen  1,000 mg Oral Q8H    alteplase 0.5 mg/ml  500 mcg Intra-Catheter Once    aspirin  81 mg Oral Daily    azaTHIOprine  50 mg Oral Daily    donepezil  10 mg Oral QHS    ferrous sulfate  300 mg Oral Daily    heparin (porcine)  5,000 Units Subcutaneous Q8H    magnesium oxide  400 mg Oral BID    memantine  10 mg Oral Daily    mupirocin  1 g Nasal BID    pantoprazole  40 mg Oral Daily    QUEtiapine  100 mg Oral QHS     PRN Meds:   ondansetron    oxyCODONE    oxyCODONE    sodium chloride 0.9%    traMADol        Objective:     Vital Signs (Most Recent):  Temp: 97.7 °F (36.5 °C) (01/09/20 1227)  Pulse: 90 (01/09/20 1227)  Resp: 17 (01/09/20 1227)  BP: 139/65 (01/09/20 1227)  SpO2: 97 % (01/09/20 1227) Vital Signs (24h Range):  Temp:  [96.5 °F (35.8 °C)-98.2 °F (36.8 °C)] 97.7 °F (36.5 °C)  Pulse:  [] 90  Resp:  [10-18] 17  SpO2:  [94 %-99 %] 97 %  BP: (108-159)/(56-85) 139/65     Intake/Output - Last 3 Shifts       01/07 0700 - 01/08 0659 01/08 0700 - 01/09 0659 01/09 0700 - 01/10 0659    I.V. (mL/kg)  1760 (17.6)     Total Intake(mL/kg)  1760 (17.6)     Urine (mL/kg/hr)  875     Other  0     Stool  20     Total Output  895     Net  +865            Stool Occurrence  1 x           Physical Exam   Constitutional: She is oriented to person, place, and time. She appears well-developed and well-nourished.   HENT:   Head: Normocephalic.   Eyes: Pupils are equal, round, and reactive to light.   Cardiovascular: Normal rate, regular rhythm and normal heart sounds.   Pulmonary/Chest: Effort normal and breath sounds normal. No respiratory  distress. She has no wheezes. She has no rales.   Abdominal: Soft. She exhibits no mass. There is no rebound and no guarding.   abd inc lione healing well  Ileostomy functional  pessar cath in rectum   Musculoskeletal:   Non healing wound from knee replacement, wound vac removed and wound appears to be healing well, clean base, dsg applied   Neurological: She is alert and oriented to person, place, and time.   Skin: Skin is warm and dry.   Healed stage 1 dec ulcer   Psychiatric: She has a normal mood and affect. Her behavior is normal. Judgment and thought content normal.   Nursing note and vitals reviewed.      Significant Labs:  BMP (Last 3 Results):   Recent Labs   Lab 01/09/20  0616   GLU 70      K 3.8   *   CO2 20*   BUN 7*   CREATININE 0.7   CALCIUM 8.1*   MG 1.7     CBC (Last 3 Results):   Recent Labs   Lab 01/09/20  0616   WBC 9.03   RBC 3.04*   HGB 9.1*   HCT 29.4*   *   MCV 97   MCH 29.9   MCHC 31.0*       Significant Diagnostics:  None

## 2020-01-09 NOTE — PLAN OF CARE
Problem: Physical Therapy Goal  Goal: Physical Therapy Goal  Description  Goals to be met by: 2020     Patient will increase functional independence with mobility by performin. Supine to sit with Set-up Tate  2. Sit to supine with Set-up Tate  3. Sit to stand transfer with Stand-by Assistance  4. Bed to chair transfer with Stand-by Assistance using Rolling Walker  5. Gait  x 50 feet with Stand-by Assistance using Rolling Walker.      Outcome: Ongoing, Progressing   Eval completed and POC established    Mo Mason PT,DPT  2020

## 2020-01-09 NOTE — PT/OT/SLP EVAL
"Physical Therapy Evaluation    Patient Name:  Anabella Aranda   MRN:  [id number]    Recommendations:     Discharge Recommendations:  home with home health   Discharge Equipment Recommendations: none   Barriers to discharge: None    Assessment:     Anabella Aranda is a 74 y.o. female admitted with a medical diagnosis of Crohn's colitis.  She presents with the following impairments/functional limitations:  weakness, impaired functional mobilty, impaired self care skills, impaired endurance, gait instability, impaired balance, decreased lower extremity function, pain, impaired skin, impaired cognition, decreased safety awareness.  Tolerated session c c/o fatigue and LLE pain.  Performed mobility c mod A-CGA.  Pt able to take few steps to bedside chair and demo decreased gait speed, FFP, unsteadiness, limited stance on LLE and c/o pain.  Pt safe to transfer to/from bedside chair c assistance of 1x person (stand pivot).  Pt would benefit from continued skilled acute PT 3x/wk to improve functional mobility.  Recommending pt receive PT services in  setting following d/c from hospital once medically cleared.      Rehab Prognosis: Good; patient would benefit from acute skilled PT services to address these deficits and reach maximum level of function.    Recent Surgery: Procedure(s) (LRB):  CREATION, ILEOSTOMY, LAPAROSCOPIC, ERAS low, possible hand port, lithotomy (N/A)  LYSIS, ADHESIONS 1 Day Post-Op    Plan:     During this hospitalization, patient to be seen 2 x/week to address the identified rehab impairments via gait training, therapeutic activities, therapeutic exercises, neuromuscular re-education and progress toward the following goals:    · Plan of Care Expires:  02/03/20    Subjective     Chief Complaint: fatigue; pain  Patient/Family Comments/goals: "I use them when I have a crisis." - re to using DME (RW/WC)  Pain/Comfort:  · Pain Rating 1: (reports LLE pain)    Patients cultural, spiritual, Jainism " conflicts given the current situation: no    Living Environment:  Pt questionable historian - per chart review pt lives c son in [facility] [place].  Pt attending adult  while son works.    Pt states living alone in [facility] [place].  Pt states son visits often.   Prior to admission, patients level of function was independent c ADLs and using RW/WC as needed (chart review states pt mainly WC bound).  Equipment used at home: bath bench, walker, rolling, wheelchair.  DME owned (not currently used): none.  Upon discharge, patient will have assistance from family.    Objective:     Communicated with RN and OT prior to session.  Patient found HOB elevated with telemetry, PICC line, colostomy, wound vac  upon PT entry to room.    General Precautions: Standard, fall   Orthopedic Precautions:N/A   Braces: N/A     Exams:  · Cognitive Exam:  Patient is oriented to Person, Place, Time and Situation  · RLE ROM: WFL  · RLE Strength: WFL  · LLE ROM: WFL  · LLE Strength: grossly 3+/5    Functional Mobility:  · Bed Mobility:     · Rolling Left:  contact guard assistance  · Scooting: moderate assistance  · Supine to Sit: moderate assistance  · Transfers:     · Sit to Stand:  minimum assistance with hand-held assist  · Bed to Chair: moderate assistance with  hand-held assist  using  Step Transfer  · Gait: 4 steps to bedside chair c HHAx2 mod A   · demo decreased gait speed, FFP, unsteadiness, limited stance on LLE and c/o pain  · Balance: sitting (S); standing (CGA-mod A)      Therapeutic Activities and Exercises:  Pt educated on: PT role/POC; safety c mobility; benefits of OOB activities; performing therex; d/c recs - v/u  -sat EOB x7mins  -sit<>stand 3x  -bedding changed d/t soiled  -repositioned in chair for comfort    AM-PAC 6 CLICK MOBILITY  Total Score:15     Patient left up in chair with all lines intact, call button in reach and RN notified.    GOALS:   Multidisciplinary Problems     Physical Therapy Goals        Problem: Physical  Therapy Goal    Goal Priority Disciplines Outcome Goal Variances Interventions   Physical Therapy Goal     PT, PT/OT Ongoing, Progressing     Description:  Goals to be met by: 2020     Patient will increase functional independence with mobility by performin. Supine to sit with Set-up Rhine  2. Sit to supine with Set-up Rhine  3. Sit to stand transfer with Stand-by Assistance  4. Bed to chair transfer with Stand-by Assistance using Rolling Walker  5. Gait  x 50 feet with Stand-by Assistance using Rolling Walker.                       History:     Past Medical History:   Diagnosis Date    Arthritis     C. difficile colitis 10/13/2014    4/15/2015    Chronic kidney disease, stage 3     Dementia     Hepatitis B     Hypertension     Major depression with psychotic features     Ulcerative colitis 2014       Past Surgical History:   Procedure Laterality Date    CHOLECYSTECTOMY      COLONOSCOPY N/A 2016    Procedure: COLONOSCOPY;  Surgeon: Umm Arenas MD;  Location: [facility];  Service: Endoscopy;  Laterality: N/A;    ESOPHAGOGASTRODUODENOSCOPY N/A 2019    Procedure: ESOPHAGOGASTRODUODENOSCOPY (EGD);  Surgeon: Jenise Hallman MD;  Location: [facility];  Service: Endoscopy;  Laterality: N/A;    ESOPHAGOGASTRODUODENOSCOPY N/A 10/21/2019    Procedure: EGD (ESOPHAGOGASTRODUODENOSCOPY);  Surgeon: Og Carrera MD;  Location: [facility];  Service: Colon and Rectal;  Laterality: N/A;    ESOPHAGOGASTRODUODENOSCOPY N/A 2019    Procedure: EGD (ESOPHAGOGASTRODUODENOSCOPY);  Surgeon: Og Carrera MD;  Location: [facility];  Service: Colon and Rectal;  Laterality: N/A;    FLEXIBLE SIGMOIDOSCOPY N/A 2019    Procedure: SIGMOIDOSCOPY, FLEXIBLE;  Surgeon: Jenise Hallman MD;  Location: [facility];  Service: Endoscopy;  Laterality: N/A;    FLEXIBLE SIGMOIDOSCOPY N/A 10/21/2019    Procedure: SIGMOIDOSCOPY, FLEXIBLE;  Surgeon: Og Carrera MD;  Location:  [facility] ENDO;  Service: Colon and Rectal;  Laterality: N/A;    FLEXIBLE SIGMOIDOSCOPY N/A 11/27/2019    Procedure: SIGMOIDOSCOPY, FLEXIBLE;  Surgeon: Og Carrera MD;  Location: [facility] ENDO;  Service: Colon and Rectal;  Laterality: N/A;  with methylene blue dye    BETZY FILTER PLACEMENT Right 01/2014    JOINT REPLACEMENT      KNEE ARTHROPLASTY Left 10/16/2019    Procedure: ARTHROPLASTY, KNEE;  Surgeon: Ghassan Lindsay MD;  Location: [facility] OR;  Service: Orthopedics;  Laterality: Left;  Franco notifiedconfirmed with Franco  [phone] 10/15/19 kb    LAPAROSCOPIC ILEOSTOMY N/A 1/8/2020    Procedure: CREATION, ILEOSTOMY, LAPAROSCOPIC, ERAS low, possible hand port, lithotomy;  Surgeon: Benjie Worley MD;  [facility];  Service: Colon and Rectal;  Laterality: N/A;    LAPAROSCOPIC TOTAL COLECTOMY  06/24/2016    LYSIS OF ADHESIONS  1/8/2020    Procedure: LYSIS, ADHESIONS;  Surgeon: Benjie Worley MD;  Location: [facility] OR 2ND FLR;  Service: Colon and Rectal;;    NASAL SINUS SURGERY      TONSILLECTOMY      TOTAL KNEE ARTHROPLASTY Right 04/07/2008    TOTAL KNEE ARTHROPLASTY Left 10/16/2019       Time Tracking:     PT Received On: 01/09/20  PT Start Time: 0911     PT Stop Time: 0932  PT Total Time (min): 21 min     Billable Minutes: Evaluation 10 min and Therapeutic Activity 8 min      Mo Mason, PT  01/09/2020

## 2020-01-09 NOTE — PLAN OF CARE
Problem: Occupational Therapy Goal  Goal: Occupational Therapy Goal  Description  Goals to be met by: 1/19/2020     Patient will increase functional independence with ADLs by performing:    UE Dressing with Minimal Assistance.  LE Dressing with Minimal Assistance.  Grooming while seated with Set-up Assistance.  Toileting from bedside commode with Minimal Assistance for hygiene and clothing management.   Toilet transfer to bedside commode with Contact Guard Assistance.     Outcome: Ongoing, Progressing     Evaluation complete-see note for details. Goals and POC established    MICHELLE Martin  1/9/2020   Pager #: [phone]

## 2020-01-09 NOTE — PROGRESS NOTES
Wound consult received on patient's ileostomy. Patient POD 1. Patient with history of dementia, appears patient may not be teachable. Pouch appears to be slightly leaking. Pouch changed, stoma red well budded, approx 40mm, located slightly in crease, bowel sweat noted. Attempted to explain the stoma/ileostomy with patient, appears patient may have somewhat comprehended but unable to verify. Barrier ring and coloplast dae pouch applied. Patient may need convexity due to body habitus.    Healed pressure injury noted to right buttocks, scar tissue approx 0.5cm x 0.5cm, skin intact to sacrum/buttocks, drain noted to rectum. Waffle overlay in use on bed.    Pre-existing wound vac noted to left knee, Libby Lara NP with CRS approved changing dressing today. Wound granulating/healing well 6cm x 2cm x 0.1cm. Recommend d/cing wound vac therapy, recommend dressing with hydrofera blue ready foam, secure with aquacel foam, change twice a week. Marco LEIGH approved recommendations. NP messaging Orthopedics MD on behalf of patient's son prior to son returning home wound vac machine. (Dr. Lindsay with orthopedics approved recommendations)    Plan on further ileostomy lesson with patient's son. Wound/ostomy dept to follow.       01/09/20 1142        Ileostomy 01/08/20 1336 Loop RLQ   Placement Date/Time: 01/08/20 1336   Present Prior to Hospital Arrival?: No  Inserted by: MD  Ileostomy Type: Loop  Location: RLQ   Stoma Appearance rosebud appearance;round;moist   Stoma Size (in)   (approx 40mm)   Appliance changed   Accessories/Skin Care cleansed w/ sterile normal saline;skin sealant;skin barrier ring   Treatment Bag change  (barrier ring, coloplast dae pouch [id number])   Stoma Function bowel sweat   Tolerance no signs/symptoms of discomfort        Incision/Site Left Leg   No Date First Assessed or Time First Assessed found.   Side: Left  Location: Leg   Wound Image    Incision WDL ex   Drainage Amount None   Drainage  Characteristics/Odor No odor   Appearance Moist;Granulating;Epithelialization   Periwound Area Macerated   Wound Edges Open   Wound Length (cm) 6 cm   Wound Width (cm) 2 cm   Wound Depth (cm) 0.1 cm   Wound Volume (cm^3) 1.2 cm^3   Wound Surface Area (cm^2) 12 cm^2   Care Cleansed with:;Sterile normal saline   Dressing Changed;Applied  (hydrafera blue ready foam, aquacel foam)   Dressing Change Due 01/13/20        Pressure Injury 11/26/19 0435 Right Buttocks Stage 2   Date First Assessed/Time First Assessed: 11/26/19 0435   Pressure Injury Present on Admission: yes  Side: Right  Location: Buttocks  Staging: Stage 2   Staging   (healed scar tissue 0.5cm x 0.5cm)   Periwound Area Intact

## 2020-01-09 NOTE — PT/OT/SLP EVAL
Occupational Therapy   Evaluation and Treatment    Name: Anabella Aranda  MRN: [id number]  Admitting Diagnosis:  Crohn's colitis 1 Day Post-Op    Recommendations:     Discharge Recommendations: home health OT  Discharge Equipment Recommendations:  (TBD)  Barriers to discharge:  None    Assessment:     Anabella Aranda is a 74 y.o. female with a medical diagnosis of Crohn's colitis.  She presents with good participation. Pt oriented to self and place only. No family or friends  present during evaluation/treatment. Performance deficits affecting function: weakness, impaired endurance, impaired self care skills, impaired functional mobilty, gait instability, impaired balance, impaired cognition, impaired skin.  Pt would benefit from skilled OT services in order to maximize independence with ADLs and facilitate safe discharge.      Rehab Prognosis: Fair; patient would benefit from acute skilled OT services to address these deficits and reach maximum level of function.       Plan:     Patient to be seen 2 x/week to address the above listed problems via self-care/home management, therapeutic activities, therapeutic exercises  · Plan of Care Expires: 02/08/20  · Plan of Care Reviewed with: patient    Subjective     Chief Complaint: pt c/o of back pain during movement  Patient/Family Comments/goals: to get better and go home    Occupational Profile: pt is a poor historian, no family or friends present to confirm history  Living Environment: Pt reports living with her son in a [facility] with no GAURANG. Pt has a tub/shower combo  Previous level of function: PTA, pt reports independence in self-care . Per chart review, pt has been w/c bound since last admission.   Roles and Routines: Pt reports staying in bed watching tv during the day while son is at work. Per chart review, pt attends adult day care.  Equipment Used at Home:  bath bench, walker, rolling, wheelchair  Assistance upon Discharge: Upon discharge, pt will have  assistance from her son    Pain/Comfort:  · Pain Rating 1: 0/10  · Pain Rating 2: 0/10    Patients cultural, spiritual, Temple conflicts given the current situation: no    Objective:     Communicated with: RN and PT prior to session.  Patient found HOB elevated with wound vac, SCD, PICC line, colostomy upon OT entry to room.    General Precautions: Standard, fall   Orthopedic Precautions:N/A   Braces: N/A     Occupational Performance:    Bed Mobility:    · Patient completed Rolling/Turning to Right with minimum assistance  · Patient completed Scooting/Bridging with moderate assistance  · Patient completed Supine to Sit with minimum assistance    Functional Mobility/Transfers:  · Patient completed Sit <> Stand Transfer with minimum assistance  with  hand-held assist   · Patient completed Bed <> Chair Transfer using Step Transfer technique with minimum assistance with hand-held assist    Activities of Daily Living:  · Grooming: set-up A  to wash face seated in bedside chair    Cognitive/Visual Perceptual:  Cognitive/Psychosocial Skills:     -       Oriented to: Person and Place   -       Follows Commands/attention:Follows one-step commands  -       Communication: clear/fluent  -       Memory: Impaired STM and Impaired LTM  -       Safety awareness/insight to disability: impaired   -       Mood/Affect/Coping skills/emotional control: Appropriate to situation    Physical Exam:  Upper Extremity Range of Motion:     -       Right Upper Extremity: WFL  -       Left Upper Extremity: WFL  Upper Extremity Strength:    -       Right Upper Extremity: WFL  -       Left Upper Extremity: WFL   Strength:    -       Right Upper Extremity: WFL  -       Left Upper Extremity: WFL  Fine Motor Coordination:    -       Intact    AMPAC 6 Click ADL:  AMPAC Total Score: 15    Treatment & Education:  -Therapist provided facilitation and instruction of proper body mechanics, energy conservation, and fall prevention strategies during  tasks listed above.  -Pt educated on role of OT, POC and goals for therapy  -Pt educated on importance of OOB activities with staff member assistance and sitting OOB majority of the day.   -Pt verbalized understanding. Pt expressed no further concerns/questions  -Whiteboard updated    Education:    Patient left up in chair with all lines intact and call button in reach    GOALS:   Multidisciplinary Problems     Occupational Therapy Goals        Problem: Occupational Therapy Goal    Goal Priority Disciplines Outcome Interventions   Occupational Therapy Goal     OT, PT/OT Ongoing, Progressing    Description:  Goals to be met by: 1/19/2020     Patient will increase functional independence with ADLs by performing:    UE Dressing with Minimal Assistance.  LE Dressing with Minimal Assistance.  Grooming while seated with Set-up Assistance.  Toileting from bedside commode with Minimal Assistance for hygiene and clothing management.   Toilet transfer to bedside commode with Contact Guard Assistance.                      History:     Past Medical History:   Diagnosis Date    Arthritis     C. difficile colitis 10/13/2014    4/15/2015    Chronic kidney disease, stage 3     Dementia     Hepatitis B     Hypertension     Major depression with psychotic features     Ulcerative colitis 03/2014       Past Surgical History:   Procedure Laterality Date    CHOLECYSTECTOMY      COLONOSCOPY N/A 4/29/2016    Procedure: COLONOSCOPY;  Surgeon: Umm Arenas MD;  Location: [facility];  Service: Endoscopy;  Laterality: N/A;    ESOPHAGOGASTRODUODENOSCOPY N/A 1/25/2019    Procedure: ESOPHAGOGASTRODUODENOSCOPY (EGD);  Surgeon: Jenise Hallman MD;  Location: [facility];  Service: Endoscopy;  Laterality: N/A;    ESOPHAGOGASTRODUODENOSCOPY N/A 10/21/2019    Procedure: EGD (ESOPHAGOGASTRODUODENOSCOPY);  Surgeon: Og Carrera MD;  Location: [facility];  Service: Colon and Rectal;  Laterality: N/A;     ESOPHAGOGASTRODUODENOSCOPY N/A 11/27/2019    Procedure: EGD (ESOPHAGOGASTRODUODENOSCOPY);  Surgeon: Og Carrera MD;  Location: [facility] ENDO;  Service: Colon and Rectal;  Laterality: N/A;    FLEXIBLE SIGMOIDOSCOPY N/A 1/25/2019    Procedure: SIGMOIDOSCOPY, FLEXIBLE;  Surgeon: Jenise Hallman MD;  Location: [facility] ENDO;  Service: Endoscopy;  Laterality: N/A;    FLEXIBLE SIGMOIDOSCOPY N/A 10/21/2019    Procedure: SIGMOIDOSCOPY, FLEXIBLE;  Surgeon: Og Carrera MD;  Location: [facility] ENDO;  Service: Colon and Rectal;  Laterality: N/A;    FLEXIBLE SIGMOIDOSCOPY N/A 11/27/2019    Procedure: SIGMOIDOSCOPY, FLEXIBLE;  Surgeon: Og Carrera MD;  Location: [facility] ENDO;  Service: Colon and Rectal;  Laterality: N/A;  with methylene blue dye    BETZY FILTER PLACEMENT Right 01/2014    JOINT REPLACEMENT      KNEE ARTHROPLASTY Left 10/16/2019    Procedure: ARTHROPLASTY, KNEE;  Surgeon: Ghassan Lindsay MD;  Location: [facility];  Service: Orthopedics;  Laterality: Left;  Franco notifiedconfirmed with Franco  [phone] 10/15/19 kb    LAPAROSCOPIC ILEOSTOMY N/A 1/8/2020    Procedure: CREATION, ILEOSTOMY, LAPAROSCOPIC, ERAS low, possible hand port, lithotomy;  Surgeon: Benjie Worley MD;  Location: [facility] OR [address] [place] [address];  Service: Colon and Rectal;  Laterality: N/A;    LAPAROSCOPIC TOTAL COLECTOMY  06/24/2016    LYSIS OF ADHESIONS  1/8/2020    Procedure: LYSIS, ADHESIONS;  Surgeon: Benjie Worley MD;  [facility];  Service: Colon and Rectal;;    NASAL SINUS SURGERY      TONSILLECTOMY      TOTAL KNEE ARTHROPLASTY Right 04/07/2008    TOTAL KNEE ARTHROPLASTY Left 10/16/2019       Time Tracking:     OT Date of Treatment: 01/09/20  OT Start Time: 0911  OT Stop Time: 0932  OT Total Time (min): 21 min    Billable Minutes:Evaluation 13  Self Care/Home Management 8    Tami Bucio, OT  1/9/2020

## 2020-01-09 NOTE — PLAN OF CARE
Patient is a 74 year old female admitted from home 1/8/2020 and underwent Laparoscopic KENDALL, Pein Takedown Ileorectal Anastomosis, End Ileostomy.  Patient is expected to discharge home with resumption of [facility] vs Rehab +/- 1/13/2020.    Patient with some confusion during visit.  Called patient's son, Bryson, to verify information.  Patient lives in a one story home with her son, Bryson, who will drive her home and obtain anything she needs after discharge.  Son stated he took her wound vac home that was on her knee.  He stated she has not had the proper physical therapy since her knee replacement.  Discussed the possible need for Rehab prior to discharge.  [facility] given to patient with understanding verbalized.  CM name and number written on white board in patient's room with request to call for any questions and concerns, informed patient's son of this also.  Will continue to follow for needs.    PCP  Shon Brambila MD  [address] / [place] [id number] [phone]      [facility] [employer] [id number] [address] AT [facility]  [address]  [facility] [address]  Phone: [phone] Fax: [phone]      Extended Emergency Contact Information  Primary Emergency Contact: Bryson Cox  Address: [address] [place] [address] [facility]  Home Phone: [phone]  Mobile Phone: [phone]  Relation: Son       01/09/20 1448   Discharge Assessment   Assessment Type Discharge Planning Assessment   Confirmed/corrected address and phone number on facesheet? Yes   Assessment information obtained from? Patient   Expected Length of Stay (days) 5   Communicated expected length of stay with patient/caregiver yes   Prior to hospitilization cognitive status: Alert/Oriented   Prior to hospitalization functional status: Independent   Current cognitive status: Alert/Oriented   Current Functional Status:  Independent   Lives With alone   Able to Return to Prior Arrangements yes   Is patient able to care for self after discharge? Yes   Who are your caregiver(s) and their phone number(s)? son   Patient's perception of discharge disposition home or selfcare   Equipment Currently Used at Home bedside commode;cane, quad;walker, rolling;wound care supplies;wheelchair   Do you have any problems affording any of your prescribed medications? No   Is the patient taking medications as prescribed? yes   Does the patient have transportation home? Yes   Transportation Anticipated family or friend will provide   Discharge Plan A Home   Discharge Plan B Home;Home Health   DME Needed Upon Discharge  none   Patient/Family in Agreement with Plan yes

## 2020-01-09 NOTE — PLAN OF CARE
Plan of care reviewed with pt: awake, alert, oriented to self, place but not time( this is her baseline, she has hx of dementia). Transferred here from PACU around 1800. 2 lap sites incision with Steri-strip DAVID CDI. Ileostomy intact with scan amount of bowel sweat. Benites intact with clear yellow urine. Serous drainage noticed leaking from rectum, pt has pad and panty on. Left leg home Woundvac intact, still holding suction , but battery about to run out, waiting for hospital woundvac to get here to switch over per Wound care nurse order, notified night shift nurse. NS at 40.SCD on. Tele-sitter ordered to maintain safety. Waffle mattress applied. Call light in reach. WCTM.

## 2020-01-09 NOTE — ASSESSMENT & PLAN NOTE
Acute/chronic due to recent surgery/illnesses/prolonged hospitalization and nutritional status   - hx of frequent falls  - high risk of fall / injury utilize all safety measures and fall precautions   - may need stay in SNF vs placement  -consult

## 2020-01-09 NOTE — ASSESSMENT & PLAN NOTE
Consult wound care  Sacrum with healed stage one dec ulcer  Enc turn every 2 hours  Overlay mattress

## 2020-01-09 NOTE — PROGRESS NOTES
[facility]  Colorectal Surgery  Progress Note    Patient Name: Anabella Aranda  MRN: [id number]  Admission Date: 1/8/2020  Hospital Length of Stay: 1 days  Attending Physician: Benjie Worley MD    Subjective:     Interval History: no acute events overnite, ileostomy functional, no nausea    Post-Op Info:  Procedure(s) (LRB):  CREATION, ILEOSTOMY, LAPAROSCOPIC, ERAS low, possible hand port, lithotomy (N/A)  LYSIS, ADHESIONS   1 Day Post-Op      Medications:  Continuous Infusions:   sodium chloride 0.9% 40 mL/hr at 01/09/20 1041     Scheduled Meds:   acetaminophen  1,000 mg Oral Q8H    alteplase 0.5 mg/ml  500 mcg Intra-Catheter Once    aspirin  81 mg Oral Daily    azaTHIOprine  50 mg Oral Daily    donepezil  10 mg Oral QHS    ferrous sulfate  300 mg Oral Daily    heparin (porcine)  5,000 Units Subcutaneous Q8H    magnesium oxide  400 mg Oral BID    memantine  10 mg Oral Daily    mupirocin  1 g Nasal BID    pantoprazole  40 mg Oral Daily    QUEtiapine  100 mg Oral QHS     PRN Meds:   ondansetron    oxyCODONE    oxyCODONE    sodium chloride 0.9%    traMADol        Objective:     Vital Signs (Most Recent):  Temp: 97.7 °F (36.5 °C) (01/09/20 1227)  Pulse: 90 (01/09/20 1227)  Resp: 17 (01/09/20 1227)  BP: 139/65 (01/09/20 1227)  SpO2: 97 % (01/09/20 1227) Vital Signs (24h Range):  Temp:  [96.5 °F (35.8 °C)-98.2 °F (36.8 °C)] 97.7 °F (36.5 °C)  Pulse:  [] 90  Resp:  [10-18] 17  SpO2:  [94 %-99 %] 97 %  BP: (108-159)/(56-85) 139/65     Intake/Output - Last 3 Shifts       01/07 0700 - 01/08 0659 01/08 0700 - 01/09 0659 01/09 0700 - 01/10 0659    I.V. (mL/kg)  1760 (17.6)     Total Intake(mL/kg)  1760 (17.6)     Urine (mL/kg/hr)  875     Other  0     Stool  20     Total Output  895     Net  +865            Stool Occurrence  1 x           Physical Exam   Constitutional: She is oriented to person, place, and time. She appears well-developed and well-nourished.   HENT:   Head:  Normocephalic.   Eyes: Pupils are equal, round, and reactive to light.   Cardiovascular: Normal rate, regular rhythm and normal heart sounds.   Pulmonary/Chest: Effort normal and breath sounds normal. No respiratory distress. She has no wheezes. She has no rales.   Abdominal: Soft. She exhibits no mass. There is no rebound and no guarding.   abd inc lione healing well  Ileostomy functional  pessar cath in rectum   Musculoskeletal:   Non healing wound from knee replacement, wound vac removed and wound appears to be healing well, clean base, dsg applied   Neurological: She is alert and oriented to person, place, and time.   Skin: Skin is warm and dry.   Healed stage 1 dec ulcer   Psychiatric: She has a normal mood and affect. Her behavior is normal. Judgment and thought content normal.   Nursing note and vitals reviewed.      Significant Labs:  BMP (Last 3 Results):   Recent Labs   Lab 01/09/20  0616   GLU 70      K 3.8   *   CO2 20*   BUN 7*   CREATININE 0.7   CALCIUM 8.1*   MG 1.7     CBC (Last 3 Results):   Recent Labs   Lab 01/09/20  0616   WBC 9.03   RBC 3.04*   HGB 9.1*   HCT 29.4*   *   MCV 97   MCH 29.9   MCHC 31.0*       Significant Diagnostics:  None    Assessment/Plan:     * Crohn's colitis  OR 1/8/2020  Laparoscopic lysis of adhesions and creation end ileostomy    -await return of bowel function, cld  -continue multi modality for pain control  -encourage ambulation and use of IS  -beni hose and SCD  -prophalactic lovenox and PPI  -keep pessar drain in rectum    Decubitus ulcer of perianal region, stage 1  Appear to have healed  Enc diet  Turn jacek 2 hours  Overlay mattress    Wound dehiscence, surgical, sequela  Pt suffered wound dehiscence after total knee replacement    -wound vac removed, bed of wound clean  -consuilt wound care  -change dsg daily  -cont PT/OT    Alteration in skin integrity  Consult wound care  Sacrum with healed stage one dec ulcer  Enc turn every 2 hours  Overlay  mattress    Alzheimers disease  Pt with hx of dementia, per family appears to be at baseline  Cont h ome meds    Chronic kidney disease, stage 3  Creatine stable for now. BMP reviewed- noted Estimated Creatinine Clearance: 83.4 mL/min (based on SCr of 0.7 mg/dL). according to latest data. Monitor UOP and serial BMP and adjust therapy as needed. Renally dose meds.        History of left knee replacement  PT/OT    Morbid obesity  Body mass index is 37.76 kg/m². Morbid obesity complicates all aspects of disease management from diagnostic modalities to treatment. Weight loss encouraged and health benefits explained to patient.    Debility  Acute/chronic due to recent surgery/illnesses/prolonged hospitalization and nutritional status   - hx of frequent falls  - high risk of fall / injury utilize all safety measures and fall precautions   - may need stay in SNF vs placement  -consult           Libby Lara NP  Colorectal Surgery  [facility]

## 2020-01-09 NOTE — ASSESSMENT & PLAN NOTE
Pt suffered wound dehiscence after total knee replacement    -wound vac removed, bed of wound clean  -consuilt wound care  -change dsg daily  -cont PT/OT

## 2020-01-10 LAB
ANION GAP SERPL CALC-SCNC: 6 MMOL/L (ref 8–16)
BASOPHILS # BLD AUTO: 0.03 K/UL (ref 0–0.2)
BASOPHILS # BLD AUTO: 0.04 K/UL (ref 0–0.2)
BASOPHILS NFR BLD: 0.4 % (ref 0–1.9)
BASOPHILS NFR BLD: 0.6 % (ref 0–1.9)
BUN SERPL-MCNC: 6 MG/DL (ref 8–23)
CALCIUM SERPL-MCNC: 7.6 MG/DL (ref 8.7–10.5)
CHLORIDE SERPL-SCNC: 112 MMOL/L (ref 95–110)
CO2 SERPL-SCNC: 23 MMOL/L (ref 23–29)
CREAT SERPL-MCNC: 0.7 MG/DL (ref 0.5–1.4)
DIFFERENTIAL METHOD: ABNORMAL
DIFFERENTIAL METHOD: ABNORMAL
EOSINOPHIL # BLD AUTO: 0.1 K/UL (ref 0–0.5)
EOSINOPHIL # BLD AUTO: 0.1 K/UL (ref 0–0.5)
EOSINOPHIL NFR BLD: 1.5 % (ref 0–8)
EOSINOPHIL NFR BLD: 1.7 % (ref 0–8)
ERYTHROCYTE [DISTWIDTH] IN BLOOD BY AUTOMATED COUNT: 14.4 % (ref 11.5–14.5)
ERYTHROCYTE [DISTWIDTH] IN BLOOD BY AUTOMATED COUNT: 14.6 % (ref 11.5–14.5)
EST. GFR  (AFRICAN AMERICAN): >60 ML/MIN/1.73 M^2
EST. GFR  (NON AFRICAN AMERICAN): >60 ML/MIN/1.73 M^2
GLUCOSE SERPL-MCNC: 73 MG/DL (ref 70–110)
HCT VFR BLD AUTO: 29.2 % (ref 37–48.5)
HCT VFR BLD AUTO: 29.5 % (ref 37–48.5)
HGB BLD-MCNC: 8.8 G/DL (ref 12–16)
HGB BLD-MCNC: 9 G/DL (ref 12–16)
IMM GRANULOCYTES # BLD AUTO: 0.02 K/UL (ref 0–0.04)
IMM GRANULOCYTES # BLD AUTO: 0.02 K/UL (ref 0–0.04)
IMM GRANULOCYTES NFR BLD AUTO: 0.3 % (ref 0–0.5)
IMM GRANULOCYTES NFR BLD AUTO: 0.3 % (ref 0–0.5)
LYMPHOCYTES # BLD AUTO: 1.6 K/UL (ref 1–4.8)
LYMPHOCYTES # BLD AUTO: 1.6 K/UL (ref 1–4.8)
LYMPHOCYTES NFR BLD: 21.7 % (ref 18–48)
LYMPHOCYTES NFR BLD: 22.2 % (ref 18–48)
MCH RBC QN AUTO: 29.4 PG (ref 27–31)
MCH RBC QN AUTO: 29.6 PG (ref 27–31)
MCHC RBC AUTO-ENTMCNC: 30.1 G/DL (ref 32–36)
MCHC RBC AUTO-ENTMCNC: 30.5 G/DL (ref 32–36)
MCV RBC AUTO: 97 FL (ref 82–98)
MCV RBC AUTO: 98 FL (ref 82–98)
MONOCYTES # BLD AUTO: 0.4 K/UL (ref 0.3–1)
MONOCYTES # BLD AUTO: 0.4 K/UL (ref 0.3–1)
MONOCYTES NFR BLD: 5.5 % (ref 4–15)
MONOCYTES NFR BLD: 5.9 % (ref 4–15)
NEUTROPHILS # BLD AUTO: 5.1 K/UL (ref 1.8–7.7)
NEUTROPHILS # BLD AUTO: 5.1 K/UL (ref 1.8–7.7)
NEUTROPHILS NFR BLD: 69.8 % (ref 38–73)
NEUTROPHILS NFR BLD: 70.1 % (ref 38–73)
NRBC BLD-RTO: 0 /100 WBC
NRBC BLD-RTO: 0 /100 WBC
PLATELET # BLD AUTO: 353 K/UL (ref 150–350)
PLATELET # BLD AUTO: 362 K/UL (ref 150–350)
PMV BLD AUTO: 8.3 FL (ref 9.2–12.9)
PMV BLD AUTO: 8.3 FL (ref 9.2–12.9)
POTASSIUM SERPL-SCNC: 3.4 MMOL/L (ref 3.5–5.1)
RBC # BLD AUTO: 2.99 M/UL (ref 4–5.4)
RBC # BLD AUTO: 3.04 M/UL (ref 4–5.4)
SODIUM SERPL-SCNC: 141 MMOL/L (ref 136–145)
WBC # BLD AUTO: 7.26 K/UL (ref 3.9–12.7)
WBC # BLD AUTO: 7.27 K/UL (ref 3.9–12.7)

## 2020-01-10 PROCEDURE — 25000003 PHARM REV CODE 250: Performed by: SURGERY

## 2020-01-10 PROCEDURE — 63600175 PHARM REV CODE 636 W HCPCS: Performed by: SURGERY

## 2020-01-10 PROCEDURE — 85025 COMPLETE CBC W/AUTO DIFF WBC: CPT

## 2020-01-10 PROCEDURE — 94761 N-INVAS EAR/PLS OXIMETRY MLT: CPT

## 2020-01-10 PROCEDURE — 94799 UNLISTED PULMONARY SVC/PX: CPT

## 2020-01-10 PROCEDURE — 97530 THERAPEUTIC ACTIVITIES: CPT

## 2020-01-10 PROCEDURE — 20600001 HC STEP DOWN PRIVATE ROOM

## 2020-01-10 PROCEDURE — 80048 BASIC METABOLIC PNL TOTAL CA: CPT

## 2020-01-10 PROCEDURE — 36415 COLL VENOUS BLD VENIPUNCTURE: CPT

## 2020-01-10 RX ADMIN — PANTOPRAZOLE SODIUM 40 MG: 40 TABLET, DELAYED RELEASE ORAL at 09:01

## 2020-01-10 RX ADMIN — DONEPEZIL HYDROCHLORIDE 10 MG: 5 TABLET, FILM COATED ORAL at 09:01

## 2020-01-10 RX ADMIN — AZATHIOPRINE 50 MG: 50 TABLET ORAL at 09:01

## 2020-01-10 RX ADMIN — HEPARIN SODIUM 5000 UNITS: 5000 INJECTION, SOLUTION INTRAVENOUS; SUBCUTANEOUS at 09:01

## 2020-01-10 RX ADMIN — ACETAMINOPHEN 1000 MG: 500 TABLET ORAL at 09:01

## 2020-01-10 RX ADMIN — MUPIROCIN 1 G: 20 OINTMENT TOPICAL at 09:01

## 2020-01-10 RX ADMIN — SODIUM CHLORIDE: 0.9 INJECTION, SOLUTION INTRAVENOUS at 01:01

## 2020-01-10 RX ADMIN — HEPARIN SODIUM 5000 UNITS: 5000 INJECTION, SOLUTION INTRAVENOUS; SUBCUTANEOUS at 05:01

## 2020-01-10 RX ADMIN — Medication 400 MG: at 09:01

## 2020-01-10 RX ADMIN — ACETAMINOPHEN 1000 MG: 500 TABLET ORAL at 02:01

## 2020-01-10 RX ADMIN — ACETAMINOPHEN 1000 MG: 500 TABLET ORAL at 05:01

## 2020-01-10 RX ADMIN — MEMANTINE HYDROCHLORIDE 10 MG: 10 TABLET ORAL at 09:01

## 2020-01-10 RX ADMIN — MINERAL SUPPLEMENT IRON 300 MG / 5 ML STRENGTH LIQUID 100 PER BOX UNFLAVORED 300 MG: at 09:01

## 2020-01-10 RX ADMIN — QUETIAPINE FUMARATE 100 MG: 25 TABLET ORAL at 09:01

## 2020-01-10 RX ADMIN — HEPARIN SODIUM 5000 UNITS: 5000 INJECTION, SOLUTION INTRAVENOUS; SUBCUTANEOUS at 02:01

## 2020-01-10 RX ADMIN — ASPIRIN 81 MG: 81 TABLET, COATED ORAL at 09:01

## 2020-01-10 NOTE — ANESTHESIA RELEASE NOTE
"Anesthesia Release from PACU Note    Patient: Anabella Aranda    Procedure(s) Performed: Procedure(s) (LRB):  CREATION, ILEOSTOMY, LAPAROSCOPIC, ERAS low, possible hand port, lithotomy (N/A)  LYSIS, ADHESIONS    Anesthesia type: general    Post pain: Adequate analgesia    Post assessment: no apparent anesthetic complications and tolerated procedure well    Last Vitals:   Visit Vitals  BP (!) 138/90 (BP Location: Left arm, Patient Position: Lying)   Pulse 97   Temp 36.9 °C (98.4 °F) (Oral)   Resp 16   Ht 5' 4" (1.626 m)   Wt 99.8 kg (220 lb)   LMP  (LMP Unknown)   SpO2 95%   Breastfeeding? No   BMI 37.76 kg/m²       Post vital signs: stable    Level of consciousness: awake and alert     Nausea/Vomiting: no nausea/no vomiting    Complications: none    Airway Patency: patent    Respiratory: unassisted, spontaneous ventilation, room air    Cardiovascular: stable and blood pressure at baseline    Hydration: euvolemic  "

## 2020-01-10 NOTE — PROGRESS NOTES
[facility]  Colorectal Surgery  Progress Note    Patient Name: Anabella Aranda  MRN: [id number]  Admission Date: 1/8/2020  Hospital Length of Stay: 2 days  Attending Physician: Benjie Worley MD    Subjective:     Interval History: Decreased appetite. Pain appears controlled. Confused, which is baseline per family.    Post-Op Info:  Procedure(s) (LRB):  CREATION, ILEOSTOMY, LAPAROSCOPIC, ERAS low, possible hand port, lithotomy (N/A)  LYSIS, ADHESIONS   2 Days Post-Op      Medications:  Continuous Infusions:   sodium chloride 0.9% 40 mL/hr at 01/09/20 1041     Scheduled Meds:   acetaminophen  1,000 mg Oral Q8H    alteplase 0.5 mg/ml  500 mcg Intra-Catheter Once    aspirin  81 mg Oral Daily    azaTHIOprine  50 mg Oral Daily    donepezil  10 mg Oral QHS    ferrous sulfate  300 mg Oral Daily    heparin (porcine)  5,000 Units Subcutaneous Q8H    magnesium oxide  400 mg Oral BID    memantine  10 mg Oral Daily    mupirocin  1 g Nasal BID    pantoprazole  40 mg Oral Daily    QUEtiapine  100 mg Oral QHS     PRN Meds:   ondansetron    oxyCODONE    oxyCODONE    sodium chloride 0.9%    traMADol        Objective:     Vital Signs (Most Recent):  Temp: 98.2 °F (36.8 °C) (01/10/20 0742)  Pulse: 95 (01/10/20 0742)  Resp: 20 (01/10/20 0742)  BP: 138/61 (01/10/20 0742)  SpO2: 97 % (01/10/20 0742) Vital Signs (24h Range):  Temp:  [97.7 °F (36.5 °C)-98.7 °F (37.1 °C)] 98.2 °F (36.8 °C)  Pulse:  [] 95  Resp:  [14-20] 20  SpO2:  [95 %-97 %] 97 %  BP: (126-141)/(59-65) 138/61     Intake/Output - Last 3 Shifts       01/08 0700 - 01/09 0659 01/09 0700 - 01/10 0659 01/10 0700 - 01/11 0659    P.O.  480     I.V. (mL/kg) 1760 (17.6)      Total Intake(mL/kg) 1760 (17.6) 480 (4.8)     Urine (mL/kg/hr) 875 800 (0.3)     Other 0      Stool 20 0     Total Output 895 800     Net +865 -320            Urine Occurrence  1 x     Stool Occurrence 1 x 0 x           Physical Exam   Constitutional: She is  oriented to person, place, and time. She appears well-developed and well-nourished.   HENT:   Head: Normocephalic.   Eyes: Pupils are equal, round, and reactive to light.   Cardiovascular: Normal rate, regular rhythm and normal heart sounds.   Pulmonary/Chest: Effort normal and breath sounds normal. No respiratory distress. She has no wheezes. She has no rales.   Abdominal: Soft. She exhibits no mass. There is no rebound and no guarding.   abd inc lione healing well  Ileostomy functional  pessar cath in rectum   Musculoskeletal:   Non healing wound from knee replacement, wound vac removed and wound appears to be healing well, clean base, dsg applied   Neurological: She is alert and oriented to person, place, and time.   Skin: Skin is warm and dry.   Healed stage 1 dec ulcer   Psychiatric: She has a normal mood and affect. Her behavior is normal. Judgment and thought content normal.   Nursing note and vitals reviewed.      Significant Labs:  BMP (Last 3 Results):   Recent Labs   Lab 01/09/20  0616 01/10/20  0832   GLU 70 73    141   K 3.8 3.4*   * 112*   CO2 20* 23   BUN 7* 6*   CREATININE 0.7 0.7   CALCIUM 8.1* 7.6*   MG 1.7  --      CBC (Last 3 Results):   Recent Labs   Lab 01/09/20  0616 01/10/20  0831 01/10/20  0832   WBC 9.03 7.26 7.27   RBC 3.04* 3.04* 2.99*   HGB 9.1* 9.0* 8.8*   HCT 29.4* 29.5* 29.2*   * 362* 353*   MCV 97 97 98   MCH 29.9 29.6 29.4   MCHC 31.0* 30.5* 30.1*       Significant Diagnostics:  None    Assessment/Plan:     * Crohn's colitis  OR 1/8/2020  Laparoscopic lysis of adhesions and creation end ileostomy    -Advance to LRD  -continue multi modality for pain control  -encourage ambulation and use of IS  -beni hose and SCD  -prophalactic lovenox and PPI  -keep pessar drain in rectum    Decubitus ulcer of perianal region, stage 1  Appear to have healed  Enc diet  Turn jacek 2 hours  Overlay mattress    Wound dehiscence, surgical, sequela  Pt suffered wound dehiscence after  total knee replacement    -wound vac removed, bed of wound clean  -consuilt wound care  -change dsg daily  -cont PT/OT    Alteration in skin integrity  Consult wound care  Sacrum with healed stage one dec ulcer  Enc turn every 2 hours  Overlay mattress    Alzheimers disease  Pt with hx of dementia, per family appears to be at baseline  Cont home meds    Chronic kidney disease, stage 3  Creatine stable for now. BMP reviewed- noted Estimated Creatinine Clearance: 83.4 mL/min (based on SCr of 0.7 mg/dL). according to latest data. Monitor UOP and serial BMP and adjust therapy as needed. Renally dose meds.        History of left knee replacement  PT/OT    Morbid obesity  Body mass index is 37.76 kg/m². Morbid obesity complicates all aspects of disease management from diagnostic modalities to treatment. Weight loss encouraged and health benefits explained to patient.    Debility  Acute/chronic due to recent surgery/illnesses/prolonged hospitalization and nutritional status   - hx of frequent falls  - high risk of fall / injury utilize all safety measures and fall precautions   - may need stay in SNF vs placement   -consult           Aretha Mason NP  Colorectal Surgery  [facility]

## 2020-01-10 NOTE — PLAN OF CARE
POC reviewed with patient. Pt is AAOX'4 and room air. Pt is on low fiber/residue. No stool noted to right ileostomy bag, only bowel sweat.Picc line intact and patent to right upper arm. No acute events.Bed in lowest position with call light within reach. Will continue to monitor.

## 2020-01-10 NOTE — PROGRESS NOTES
Patient seen for ileostomy education with patient's son. Patient not teachable. Educated patient son on pouch care: how often, removing ostomy appliance, cleaning peristomal skin, measuring stoma, cutting/applying pouch and emptying pouch. Education provided on diet, hydration and monitoring I&O. Pouch lesson performed, stoma red, budded, 38mm, peristomal skin intact. Clear liquid output. Peristomal skin cleaned, barrier ring and coloplast convexity pouch [id number] applied. Patient tolerated well.   Answered son's questions. Supplies and educational material left at bedside. coloplast contacted for samples/starter kit.  Ostomy dept to follow prn.

## 2020-01-10 NOTE — ANESTHESIA POSTPROCEDURE EVALUATION
Anesthesia Post Evaluation    Patient: Anabella Aranda    Procedure(s) Performed: Procedure(s) (LRB):  CREATION, ILEOSTOMY, LAPAROSCOPIC, ERAS low, possible hand port, lithotomy (N/A)  LYSIS, ADHESIONS    Final Anesthesia Type: general    Patient location during evaluation: PACU  Patient participation: Yes- Able to Participate  Level of consciousness: awake and alert  Post-procedure vital signs: reviewed and stable  Pain management: adequate  Airway patency: patent    PONV status at discharge: No PONV  Anesthetic complications: no      Cardiovascular status: hemodynamically stable  Respiratory status: unassisted, spontaneous ventilation and room air  Hydration status: euvolemic  Follow-up not needed.          Vitals Value Taken Time   /90 1/10/2020 12:42 PM   Temp 36.9 °C (98.4 °F) 1/10/2020 12:42 PM   Pulse 97 1/10/2020 12:42 PM   Resp 16 1/10/2020 12:42 PM   SpO2 95 % 1/10/2020 12:42 PM         Event Time     Out of Recovery 14:30:00          Pain/Dimitry Score: Pain Rating Prior to Med Admin: 0 (1/10/2020  5:19 AM)

## 2020-01-10 NOTE — CHAPLAIN
In response to Spiritual Care Consult put in  Rogelio visited patient but found her in bed sleeping with no family member present.  did not wake patient up but spent some compassionately supportive and healing time in silent and vociferated prayer and blessings.  spoke with Nurse Fannie assigned to patient and found out that there were no urgent and critically expedient issues at the moment.     Chaplain Mercado

## 2020-01-10 NOTE — PLAN OF CARE
POC reviewed with patient and son. Patient disoriented to time but easily reoriented. Benites discontinued today, up to [place] to void x2. No stool noted to right ileostomy bag, only bowel sweat. Patient worked with therapy today and sat up in chair for good amount of time today. Wound vac discontinued to left knee, foam dressing in place. Denies any pain. Picc line intact and patent to right upper arm. Bed in low and locked position, call light in reach. Will continue to monitor.

## 2020-01-10 NOTE — PLAN OF CARE
Problem: Physical Therapy Goal  Goal: Physical Therapy Goal  Description  Goals to be met by: 2020     Patient will increase functional independence with mobility by performin. Supine to sit with Set-up Arthur  2. Sit to supine with Set-up Arthur  3. Sit to stand transfer with Stand-by Assistance  4. Bed to chair transfer with Stand-by Assistance using Rolling Walker  5. Gait  x 50 feet with Stand-by Assistance using Rolling Walker.      Outcome: Ongoing, Progressing   Mo Mason PT,DPT  1/10/2020

## 2020-01-10 NOTE — SUBJECTIVE & OBJECTIVE
Subjective:     Interval History: Decreased appetite. Pain appears controlled. Confused, which is baseline per family.    Post-Op Info:  Procedure(s) (LRB):  CREATION, ILEOSTOMY, LAPAROSCOPIC, ERAS low, possible hand port, lithotomy (N/A)  LYSIS, ADHESIONS   2 Days Post-Op      Medications:  Continuous Infusions:   sodium chloride 0.9% 40 mL/hr at 01/09/20 1041     Scheduled Meds:   acetaminophen  1,000 mg Oral Q8H    alteplase 0.5 mg/ml  500 mcg Intra-Catheter Once    aspirin  81 mg Oral Daily    azaTHIOprine  50 mg Oral Daily    donepezil  10 mg Oral QHS    ferrous sulfate  300 mg Oral Daily    heparin (porcine)  5,000 Units Subcutaneous Q8H    magnesium oxide  400 mg Oral BID    memantine  10 mg Oral Daily    mupirocin  1 g Nasal BID    pantoprazole  40 mg Oral Daily    QUEtiapine  100 mg Oral QHS     PRN Meds:   ondansetron    oxyCODONE    oxyCODONE    sodium chloride 0.9%    traMADol        Objective:     Vital Signs (Most Recent):  Temp: 98.2 °F (36.8 °C) (01/10/20 0742)  Pulse: 95 (01/10/20 0742)  Resp: 20 (01/10/20 0742)  BP: 138/61 (01/10/20 0742)  SpO2: 97 % (01/10/20 0742) Vital Signs (24h Range):  Temp:  [97.7 °F (36.5 °C)-98.7 °F (37.1 °C)] 98.2 °F (36.8 °C)  Pulse:  [] 95  Resp:  [14-20] 20  SpO2:  [95 %-97 %] 97 %  BP: (126-141)/(59-65) 138/61     Intake/Output - Last 3 Shifts       01/08 0700 - 01/09 0659 01/09 0700 - 01/10 0659 01/10 0700 - 01/11 0659    P.O.  480     I.V. (mL/kg) 1760 (17.6)      Total Intake(mL/kg) 1760 (17.6) 480 (4.8)     Urine (mL/kg/hr) 875 800 (0.3)     Other 0      Stool 20 0     Total Output 895 800     Net +865 -320            Urine Occurrence  1 x     Stool Occurrence 1 x 0 x           Physical Exam   Constitutional: She is oriented to person, place, and time. She appears well-developed and well-nourished.   HENT:   Head: Normocephalic.   Eyes: Pupils are equal, round, and reactive to light.   Cardiovascular: Normal rate, regular rhythm and  normal heart sounds.   Pulmonary/Chest: Effort normal and breath sounds normal. No respiratory distress. She has no wheezes. She has no rales.   Abdominal: Soft. She exhibits no mass. There is no rebound and no guarding.   abd inc lione healing well  Ileostomy functional  pessar cath in rectum   Musculoskeletal:   Non healing wound from knee replacement, wound vac removed and wound appears to be healing well, clean base, dsg applied   Neurological: She is alert and oriented to person, place, and time.   Skin: Skin is warm and dry.   Healed stage 1 dec ulcer   Psychiatric: She has a normal mood and affect. Her behavior is normal. Judgment and thought content normal.   Nursing note and vitals reviewed.      Significant Labs:  BMP (Last 3 Results):   Recent Labs   Lab 01/09/20  0616 01/10/20  0832   GLU 70 73    141   K 3.8 3.4*   * 112*   CO2 20* 23   BUN 7* 6*   CREATININE 0.7 0.7   CALCIUM 8.1* 7.6*   MG 1.7  --      CBC (Last 3 Results):   Recent Labs   Lab 01/09/20  0616 01/10/20  0831 01/10/20  0832   WBC 9.03 7.26 7.27   RBC 3.04* 3.04* 2.99*   HGB 9.1* 9.0* 8.8*   HCT 29.4* 29.5* 29.2*   * 362* 353*   MCV 97 97 98   MCH 29.9 29.6 29.4   MCHC 31.0* 30.5* 30.1*       Significant Diagnostics:  None

## 2020-01-10 NOTE — PT/OT/SLP PROGRESS
"Physical Therapy Treatment    Patient Name:  Anabella Aranda   MRN:  [id number]    Recommendations:     Discharge Recommendations:  home with home health   Discharge Equipment Recommendations: none   Barriers to discharge: None    Assessment:     Anabella Aranda is a 74 y.o. female admitted with a medical diagnosis of Crohn's colitis.  She presents with the following impairments/functional limitations:  weakness, impaired self care skills, impaired balance, impaired functional mobilty, impaired endurance, gait instability, impaired cognition, decreased safety awareness, pain, decreased lower extremity function.  Tolerated session c c/o fatigue and pain.  Pt demo mild improvement in LLE active ROM c knee ext.  Performed mobility c CGA-mod A.  Gait training deferred on this date as pt requesting to return to supine and resistant against continued therapy session.  Pt continues to require mod A for transfer d/t unsteadiness.  Pt safe to transfer to/from bedside chair c assistance of 1x person    Rehab Prognosis: Good; patient would benefit from acute skilled PT services to address these deficits and reach maximum level of function.    Recent Surgery: Procedure(s) (LRB):  CREATION, ILEOSTOMY, LAPAROSCOPIC, ERAS low, possible hand port, lithotomy (N/A)  LYSIS, ADHESIONS 2 Days Post-Op    Plan:     During this hospitalization, patient to be seen 2 x/week to address the identified rehab impairments via gait training, therapeutic activities, therapeutic exercises, neuromuscular re-education and progress toward the following goals:    · Plan of Care Expires:  02/03/20    Subjective     Chief Complaint: fatigue  Patient/Family Comments/goals: "I'm tired"  Pain/Comfort:  · Pain Rating 1: (reports LLE pain)      Objective:     Communicated with RN prior to session.  Patient found HOB elevated with PICC line, colostomy upon PT entry to room.     General Precautions: Standard, fall   Orthopedic Precautions:N/A   Braces: N/A "     Functional Mobility:  · Bed Mobility:     · Rolling Left:  contact guard assistance  · Scooting: contact guard assistance  · Supine to Sit: moderate assistance  · Sit to Supine: minimum assistance  · Transfers:     · Sit to Stand:  moderate assistance with hand-held assist  · Gait: deferred as pt unwilling to continue c session and actively returning self to supine  · Balance: sitting (SBA-CGA); standing (mod A)      AM-PAC 6 CLICK MOBILITY  Turning over in bed (including adjusting bedclothes, sheets and blankets)?: 3  Sitting down on and standing up from a chair with arms (e.g., wheelchair, bedside commode, etc.): 3  Moving from lying on back to sitting on the side of the bed?: 3  Moving to and from a bed to a chair (including a wheelchair)?: 3  Need to walk in hospital room?: 3  Climbing 3-5 steps with a railing?: 2  Basic Mobility Total Score: 17       Therapeutic Activities and Exercises:  Pt educated on: PT role/POC; safety c mobility; benefits of OOB activities; performing therex; d/c recs - v/u  -sat EOB x4mins  -sit<>stand 3x    Patient left HOB elevated with all lines intact, call button in reach, bed alarm on and RN notified..    GOALS:   Multidisciplinary Problems     Physical Therapy Goals        Problem: Physical Therapy Goal    Goal Priority Disciplines Outcome Goal Variances Interventions   Physical Therapy Goal     PT, PT/OT Ongoing, Progressing     Description:  Goals to be met by: 2020     Patient will increase functional independence with mobility by performin. Supine to sit with Set-up [place]  2. Sit to supine with Set-up [place]  3. Sit to stand transfer with Stand-by Assistance  4. Bed to chair transfer with Stand-by Assistance using Rolling Walker  5. Gait  x 50 feet with Stand-by Assistance using Rolling Walker.                       Time Tracking:     PT Received On: 01/10/20  PT Start Time: 1106     PT Stop Time: 1116  PT Total Time (min): 10 min     Billable  Minutes: Therapeutic Activity 10 min    Treatment Type: Treatment  PT/PTA: PT           Mo Mason, PT  01/10/2020

## 2020-01-10 NOTE — ASSESSMENT & PLAN NOTE
OR 1/8/2020  Laparoscopic lysis of adhesions and creation end ileostomy    -Advance to LRD  -continue multi modality for pain control  -encourage ambulation and use of IS  -beni hose and SCD  -prophalactic lovenox and PPI  -keep pessar drain in rectum

## 2020-01-11 LAB
CRP SERPL-MCNC: 88.9 MG/L (ref 0–8.2)
POCT GLUCOSE: 70 MG/DL (ref 70–110)

## 2020-01-11 PROCEDURE — 36415 COLL VENOUS BLD VENIPUNCTURE: CPT

## 2020-01-11 PROCEDURE — 86140 C-REACTIVE PROTEIN: CPT

## 2020-01-11 PROCEDURE — 25000003 PHARM REV CODE 250: Performed by: SURGERY

## 2020-01-11 PROCEDURE — 63600175 PHARM REV CODE 636 W HCPCS: Performed by: SURGERY

## 2020-01-11 PROCEDURE — 20600001 HC STEP DOWN PRIVATE ROOM

## 2020-01-11 RX ADMIN — DONEPEZIL HYDROCHLORIDE 10 MG: 5 TABLET, FILM COATED ORAL at 10:01

## 2020-01-11 RX ADMIN — ASPIRIN 81 MG: 81 TABLET, COATED ORAL at 09:01

## 2020-01-11 RX ADMIN — AZATHIOPRINE 50 MG: 50 TABLET ORAL at 09:01

## 2020-01-11 RX ADMIN — ACETAMINOPHEN 1000 MG: 500 TABLET ORAL at 10:01

## 2020-01-11 RX ADMIN — Medication 400 MG: at 10:01

## 2020-01-11 RX ADMIN — HEPARIN SODIUM 5000 UNITS: 5000 INJECTION, SOLUTION INTRAVENOUS; SUBCUTANEOUS at 07:01

## 2020-01-11 RX ADMIN — ACETAMINOPHEN 1000 MG: 500 TABLET ORAL at 01:01

## 2020-01-11 RX ADMIN — ACETAMINOPHEN 1000 MG: 500 TABLET ORAL at 07:01

## 2020-01-11 RX ADMIN — MUPIROCIN 1 G: 20 OINTMENT TOPICAL at 10:01

## 2020-01-11 RX ADMIN — PANTOPRAZOLE SODIUM 40 MG: 40 TABLET, DELAYED RELEASE ORAL at 09:01

## 2020-01-11 RX ADMIN — HEPARIN SODIUM 5000 UNITS: 5000 INJECTION, SOLUTION INTRAVENOUS; SUBCUTANEOUS at 02:01

## 2020-01-11 RX ADMIN — Medication 400 MG: at 09:01

## 2020-01-11 RX ADMIN — MINERAL SUPPLEMENT IRON 300 MG / 5 ML STRENGTH LIQUID 100 PER BOX UNFLAVORED 300 MG: at 09:01

## 2020-01-11 RX ADMIN — MEMANTINE HYDROCHLORIDE 10 MG: 10 TABLET ORAL at 09:01

## 2020-01-11 RX ADMIN — MUPIROCIN 1 G: 20 OINTMENT TOPICAL at 09:01

## 2020-01-11 RX ADMIN — QUETIAPINE FUMARATE 100 MG: 25 TABLET ORAL at 10:01

## 2020-01-11 RX ADMIN — HEPARIN SODIUM 5000 UNITS: 5000 INJECTION, SOLUTION INTRAVENOUS; SUBCUTANEOUS at 10:01

## 2020-01-11 NOTE — PLAN OF CARE
POC reviewed with patient and son. Patient disoriented to time and situation. No stool noted to right ileostomy bag, still  bowel sweat only. Patient  sat up in chair for a short time today. Denies any pain. Eating small amounts of diet. Picc line intact and patent to right upper arm. Bed in low and locked position, call light in reach. Will continue to monitor.

## 2020-01-11 NOTE — SUBJECTIVE & OBJECTIVE
Subjective:     Interval History: No acute events overnight. Reports pain is adequately controlled and denies nausea. Tolerating a regular diet although did not eat dinner. Ostomy with liquidy yellow/brown stool. Cannot remember if she ambulated.     Post-Op Info:  Procedure(s) (LRB):  CREATION, ILEOSTOMY, LAPAROSCOPIC, ERAS low, possible hand port, lithotomy (N/A)  LYSIS, ADHESIONS   3 Days Post-Op      Medications:  Continuous Infusions:  Scheduled Meds:   acetaminophen  1,000 mg Oral Q8H    alteplase 0.5 mg/ml  500 mcg Intra-Catheter Once    aspirin  81 mg Oral Daily    azaTHIOprine  50 mg Oral Daily    donepezil  10 mg Oral QHS    ferrous sulfate  300 mg Oral Daily    heparin (porcine)  5,000 Units Subcutaneous Q8H    magnesium oxide  400 mg Oral BID    memantine  10 mg Oral Daily    mupirocin  1 g Nasal BID    pantoprazole  40 mg Oral Daily    QUEtiapine  100 mg Oral QHS     PRN Meds:   ondansetron    oxyCODONE    oxyCODONE    sodium chloride 0.9%    traMADol        Objective:     Vital Signs (Most Recent):  Temp: 97.9 °F (36.6 °C) (01/11/20 0735)  Pulse: 101 (01/11/20 0735)  Resp: 16 (01/11/20 0735)  BP: (!) 144/67 (01/11/20 0735)  SpO2: 97 % (01/11/20 0735) Vital Signs (24h Range):  Temp:  [97.9 °F (36.6 °C)-98.4 °F (36.9 °C)] 97.9 °F (36.6 °C)  Pulse:  [] 101  Resp:  [16-17] 16  SpO2:  [95 %-97 %] 97 %  BP: (138-156)/(65-90) 144/67     Intake/Output - Last 3 Shifts       01/09 0700 - 01/10 0659 01/10 0700 - 01/11 0659 01/11 0700 - 01/12 0659    P.O. 480      I.V. (mL/kg)  1973.3 (19.8)     Total Intake(mL/kg) 480 (4.8) 1973.3 (19.8)     Urine (mL/kg/hr) 800 (0.3) 0 (0)     Emesis/NG output  0     Other  0     Stool 0 850     Total Output 800 850     Net -320 +1123.3            Urine Occurrence 1 x 7 x     Stool Occurrence 0 x 0 x     Emesis Occurrence  0 x           Physical Exam  Constitutional: She is oriented to person, place, and time. She appears well-developed and  well-nourished.   Head: Normocephalic.   Eyes: Pupils are equal, round, and reactive to light.   Cardiovascular: Normal rate, regular rhythm and normal heart sounds.   Pulmonary/Chest: Effort normal and breath sounds normal. No respiratory distress. She has no wheezes. She has no rales.   Abdominal: Soft. She exhibits no mass. There is no rebound and no guarding. abd inc line healing well. Ileostomy functional. Pessar cath in rectum   Musculoskeletal: Knee with dressing in pace  Neurological: She is alert. No FND  Skin: Skin is warm and dry.  Psychiatric: She has a normal mood and affect.  Nursing note and vitals reviewed.    Significant Labs:  CBC (Last 3 Results):   Recent Labs   Lab 01/09/20  0616 01/10/20  0831 01/10/20  0832   WBC 9.03 7.26 7.27   RBC 3.04* 3.04* 2.99*   HGB 9.1* 9.0* 8.8*   HCT 29.4* 29.5* 29.2*   * 362* 353*   MCV 97 97 98   MCH 29.9 29.6 29.4   MCHC 31.0* 30.5* 30.1*     CMP (Last 3 Results):   Recent Labs   Lab 01/09/20  0616 01/10/20  0832   GLU 70 73   CALCIUM 8.1* 7.6*    141   K 3.8 3.4*   CO2 20* 23   * 112*   BUN 7* 6*   CREATININE 0.7 0.7     All pertinent lab results within the last 24 hours have been reviewed.     Significant Diagnostics:  I have reviewed all pertinent imaging results/findings within the past 24 hours.

## 2020-01-11 NOTE — ASSESSMENT & PLAN NOTE
OR 1/8/2020  Laparoscopic lysis of adhesions and creation end ileostomy    - Continue low res diet  - continue multi modality for pain control  - encourage ambulation and use of IS  - beni hose and SCD  - prophalactic lovenox and PPI  - keep pessar drain in rectum

## 2020-01-11 NOTE — PLAN OF CARE
POC reviewed with patient. Patient disoriented to time and situation. No stool noted to right ileostomy bag, still  bowel sweat only. Patient denies any pain. Eating small amounts of diet. Picc line intact and patent to right upper arm. Bed in low and locked position, call light in reach. Pt q2 turn. Will continue to monitor

## 2020-01-11 NOTE — PROGRESS NOTES
Ostomy follow-up  The ileostomy pouch is intact, stoma red/moist with green thin liquid/flatus in pouch. Patient is unaware of what pouch is for or if it should be emptied- diagnosis of Alzheimer.  Son was instructed in ostomy care per ostomy nurse. No complaints.   Nursing to continue care, wound care will follow-up prn.   B. Heidi Harris RN, CN  [id number]

## 2020-01-11 NOTE — PLAN OF CARE
Referral placed in [facility] to resume home health services with [facility].   Possible discharge home on Monday.

## 2020-01-11 NOTE — PLAN OF CARE
[facility]    HOME HEALTH ORDERS  FACE TO FACE ENCOUNTER    Patient Name: Anabella Aranda  YOB: 1945    PCP: Shon Brambila MD   PCP Address: [address] / [place] [address]  PCP Phone Number: [phone]  PCP Fax: [phone]    Encounter Date: 01/11/2020    Admit to Home Health    Diagnoses:  Active Hospital Problems    Diagnosis  POA    *Crohn's colitis [K50.10]  Yes    Decubitus ulcer of perianal region, stage 1 [L89.891]  Yes    Wound dehiscence, surgical, sequela [T81.31XS]  Not Applicable    Alteration in skin integrity [R23.9]  Yes    History of left knee replacement [Z96.652]  Not Applicable    Chronic kidney disease, stage 3 [N18.3]  Yes     Chronic    Alzheimers disease [G30.9, F02.80]  Yes     Chronic    Morbid obesity [E66.01]  Yes     Chronic    Debility [R53.81]  Yes      Resolved Hospital Problems   No resolved problems to display.       Future Appointments   Date Time Provider Department Center   1/24/2020 11:00 AM CHAIR 02 [facility] CHEMO [facility]   2/4/2020  9:20 AM Shon Brambila MD [facility] FAM MED [facility]   2/14/2020 11:00 AM Ghassan Lindsay MD [facility] ORTHO [facility]           I have seen and examined this patient face to face today. My clinical findings that support the need for the home health skilled services and home bound status are the following:  Medical restrictions requiring assistance of another human to leave home due to Wound care needs.  Weakness/numbness causing balance and gait disturbance due to Surgery making it taxing to leave home.    Allergies:  Review of patient's allergies indicates:   Allergen Reactions    Sulfa (sulfonamide antibiotics) Swelling       Diet: regular diet    Activities: no heavy lifting over 10 lbs. for 6 weeks    Nursing:   SN to complete comprehensive assessment including routine vital signs. Instruct on disease process and s/s of complications to report to MD. Review/verify  medication list sent home with the patient at time of discharge  and instruct patient/caregiver as needed. Frequency may be adjusted depending on start of care date. If patient has enteral feeding tube (NG, PEG, J-tube, G-tube), flush tube before and after feeding and/or medication administration with 20-30 mL of water.    Notify MD if SBP > 160 or < 90; DBP > 90 or < 50; HR > 120 or < 50; Temp > 101; Other: increased ileostomy output       CONSULTS:    Physical Therapy to evaluate and treat. Evaluate for home safety and equipment needs; Establish/upgrade home exercise program. Perform / instruct on therapeutic exercises, gait training, transfer training, and Range of Motion.  Occupational Therapy to evaluate and treat. Evaluate home environment for safety and equipment needs. Perform/Instruct on transfers, ADL training, ROM, and therapeutic exercises.  Aide to provide assistance with personal care, ADLs, and vital signs.    MISCELLANEOUS CARE:  N/A    WOUND CARE ORDERS  yes:  If drain exists assess drain site for signs and symptoms of infection. Empty drain and record output daily and PRN. Monitor for signs and symptoms of infection. Report as necessary. For peripheral wounds, wound spray or saline for wound(s) cleaning with all dressing changes. SN to assess wound(s) with each visit.  Instruct/teach patient/caregiver on wound care protocol.      Consult ET nurse    LEFT KNEE CARE:  Left knee wound from orthopedic surgery. Please change dressing with hydrofera blue ready foam and secure with aquacel foam. Dressing to be changed twice a week. Currently healthy granulation tissue 6 cm x 2 cm x 0.1 cm.     OSTOMY CARE:  General Instructions:  - Instruct patient/caregiver to empty bag when full and PRN.   - Change and clean site every 48 hours.  - Monitor skin integrity.  - Monitor the appearance of the stoma. The tissue should be moist and pink or red in color.  - Increased risk of dehydration for patients with an  ileostomy. Recommended daily fluid intake is 8 to 10 eight-ounce drinks or enough to match ostomy losses.  - Encourage the patient to monitor bowel function every day by recording outputs.     [facility] if:  - Purple, black, or white stoma   - Severe cramps lasting more than 6 hours  - Severe watery discharge from the stoma that does not resovle  - No output or markedly decreased output from the ostomy for an extended period of time  - Excessive bleeding from stoma  - Swelling of stoma to more than 1/2-inch larger than usual  - Inward retraction of stoma to below skin level  - Severe skin irritation or deep ulcers  - Bulging or other changes in the abdomen    Notify ostomy care nurse if:  - Frequent leaking of pouching system  - Change in size or appearance of stoma, causing discomfort or problems with pouch  - Skin rash or rawness  - Weight gain or loss that causes problems with pouch      Medications: Review discharge medications with patient and family and provide education.      Current Discharge Medication List      CONTINUE these medications which have NOT CHANGED    Details   azaTHIOprine (IMURAN) 50 mg Tab Take 1 tablet (50 mg total) by mouth once daily.  Qty: 30 tablet, Refills: 1      donepezil (ARICEPT) 10 MG tablet Take 1 tablet (10 mg total) by mouth every evening.  Qty: 90 tablet, Refills: 1    Associated Diagnoses: Late onset Alzheimer's disease without behavioral disturbance      ferrous sulfate (FEOSOL) 325 mg (65 mg iron) Tab tablet Take 1 tablet (325 mg total) by mouth 2 (two) times daily.  Qty: 180 tablet, Refills: 1    Associated Diagnoses: Iron deficiency anemia; Anemia due to acute blood loss      magnesium oxide (MAG-OX) 400 mg (241.3 mg magnesium) tablet Take 1 tablet (400 mg total) by mouth 2 (two) times daily.  Refills: 0      memantine (NAMENDA) 10 MG Tab Take 1 tablet (10 mg total) by mouth once daily.  Qty: 180 tablet, Refills: 1    Associated Diagnoses: Late onset Alzheimer's disease  without behavioral disturbance      pantoprazole (PROTONIX) 40 MG tablet Take 1 tablet (40 mg total) by mouth once daily.  Qty: 30 tablet, Refills: 1      QUEtiapine (SEROQUEL) 100 MG Tab Take 1 tablet (100 mg total) by mouth every evening.  Qty: 30 tablet, Refills: 1      aspirin (ECOTRIN) 81 MG EC tablet Take 1 tablet (81 mg total) by mouth once daily.  Qty: 30 tablet, Refills: 0      sodium chloride 0.9% (NORMAL SALINE FLUSH) injection Inject 10 mLs into the vein once a week. Inject 10mls into both PICC line lumens weekly.  Qty: 80 mL, Refills: 3             I certify that this patient is confined to her home and needs intermittent skilled nursing care, physical therapy and occupational therapy.

## 2020-01-11 NOTE — ASSESSMENT & PLAN NOTE
Acute/chronic due to recent surgery/illnesses/prolonged hospitalization and nutritional status     - Hx of frequent falls  - High risk of fall / injury utilize all safety measures and fall precautions   - PT recommends home health, will place orders today  - Consult

## 2020-01-11 NOTE — PROGRESS NOTES
[facility]  Colorectal Surgery  Progress Note    Patient Name: Anabella Aranda  MRN: [id number]  Admission Date: 1/8/2020  Hospital Length of Stay: 3 days  Attending Physician: Benjie Worley MD    Subjective:     Interval History: No acute events overnight. Reports pain is adequately controlled and denies nausea. Tolerating a regular diet although did not eat dinner. Ostomy with liquidy yellow/brown stool. Cannot remember if she ambulated.     Post-Op Info:  Procedure(s) (LRB):  CREATION, ILEOSTOMY, LAPAROSCOPIC, ERAS low, possible hand port, lithotomy (N/A)  LYSIS, ADHESIONS   3 Days Post-Op      Medications:  Continuous Infusions:  Scheduled Meds:   acetaminophen  1,000 mg Oral Q8H    alteplase 0.5 mg/ml  500 mcg Intra-Catheter Once    aspirin  81 mg Oral Daily    azaTHIOprine  50 mg Oral Daily    donepezil  10 mg Oral QHS    ferrous sulfate  300 mg Oral Daily    heparin (porcine)  5,000 Units Subcutaneous Q8H    magnesium oxide  400 mg Oral BID    memantine  10 mg Oral Daily    mupirocin  1 g Nasal BID    pantoprazole  40 mg Oral Daily    QUEtiapine  100 mg Oral QHS     PRN Meds:   ondansetron    oxyCODONE    oxyCODONE    sodium chloride 0.9%    traMADol        Objective:     Vital Signs (Most Recent):  Temp: 97.9 °F (36.6 °C) (01/11/20 0735)  Pulse: 101 (01/11/20 0735)  Resp: 16 (01/11/20 0735)  BP: (!) 144/67 (01/11/20 0735)  SpO2: 97 % (01/11/20 0735) Vital Signs (24h Range):  Temp:  [97.9 °F (36.6 °C)-98.4 °F (36.9 °C)] 97.9 °F (36.6 °C)  Pulse:  [] 101  Resp:  [16-17] 16  SpO2:  [95 %-97 %] 97 %  BP: (138-156)/(65-90) 144/67     Intake/Output - Last 3 Shifts       01/09 0700 - 01/10 0659 01/10 0700 - 01/11 0659 01/11 0700 - 01/12 0659    P.O. 480      I.V. (mL/kg)  1973.3 (19.8)     Total Intake(mL/kg) 480 (4.8) 1973.3 (19.8)     Urine (mL/kg/hr) 800 (0.3) 0 (0)     Emesis/NG output  0     Other  0     Stool 0 850     Total Output 800 850     Net -320  +1123.3            Urine Occurrence 1 x 7 x     Stool Occurrence 0 x 0 x     Emesis Occurrence  0 x           Physical Exam  Constitutional: She is oriented to person, place, and time. She appears well-developed and well-nourished.   Head: Normocephalic.   Eyes: Pupils are equal, round, and reactive to light.   Cardiovascular: Normal rate, regular rhythm and normal heart sounds.   Pulmonary/Chest: Effort normal and breath sounds normal. No respiratory distress. She has no wheezes. She has no rales.   Abdominal: Soft. She exhibits no mass. There is no rebound and no guarding. abd inc line healing well. Ileostomy functional. Pessar cath in rectum   Musculoskeletal: Knee with dressing in pace  Neurological: She is alert. No FND  Skin: Skin is warm and dry.  Psychiatric: She has a normal mood and affect.  Nursing note and vitals reviewed.    Significant Labs:  CBC (Last 3 Results):   Recent Labs   Lab 01/09/20  0616 01/10/20  0831 01/10/20  0832   WBC 9.03 7.26 7.27   RBC 3.04* 3.04* 2.99*   HGB 9.1* 9.0* 8.8*   HCT 29.4* 29.5* 29.2*   * 362* 353*   MCV 97 97 98   MCH 29.9 29.6 29.4   MCHC 31.0* 30.5* 30.1*     CMP (Last 3 Results):   Recent Labs   Lab 01/09/20  0616 01/10/20  0832   GLU 70 73   CALCIUM 8.1* 7.6*    141   K 3.8 3.4*   CO2 20* 23   * 112*   BUN 7* 6*   CREATININE 0.7 0.7     All pertinent lab results within the last 24 hours have been reviewed.     Significant Diagnostics:  I have reviewed all pertinent imaging results/findings within the past 24 hours.    Assessment/Plan:     * Crohn's colitis  OR 1/8/2020  Laparoscopic lysis of adhesions and creation end ileostomy    - Continue low res diet  - continue multi modality for pain control  - encourage ambulation and use of IS  - beni hose and SCD  - prophalactic lovenox and PPI  - keep pessar drain in rectum    Decubitus ulcer of perianal region, stage 1  - Appear to have healed  - Enc diet  - Turn jacek 2 hours  - Overlay mattress    Wound  dehiscence, surgical, sequela  Pt suffered wound dehiscence after total knee replacement    - consult wound care  - daily dressing changes  - cont PT/OT    Alteration in skin integrity  Consult wound care  Sacrum with healed stage one dec ulcer  Enc turn every 2 hours  Overlay mattress    Alzheimers disease  Pt with hx of dementia, per family appears to be at baseline  Cont home meds    Chronic kidney disease, stage 3  Creatine stable for now. BMP reviewed- noted Estimated Creatinine Clearance: 83.4 mL/min (based on SCr of 0.7 mg/dL). according to latest data. Monitor UOP and serial BMP and adjust therapy as needed. Renally dose meds.        History of left knee replacement  - PT/OT    Morbid obesity  Body mass index is 37.76 kg/m². Morbid obesity complicates all aspects of disease management from diagnostic modalities to treatment. Weight loss encouraged and health benefits explained to patient.    Debility  Acute/chronic due to recent surgery/illnesses/prolonged hospitalization and nutritional status     - Hx of frequent falls  - High risk of fall / injury utilize all safety measures and fall precautions   - PT recommends home health, will place orders today  - Consult           Diandra Major MD  Colorectal Surgery  [facility]

## 2020-01-11 NOTE — PLAN OF CARE
Patient is alert to self and some short term and some long term memory.  She knows her name and her family's name,  but doesn't remember her son's age, date of birth, who's the president.  Continued reorientation and reminders during the shift regarding care.  Up in the chair during the shift.  Fair appetite with meals as I had to encourage and assist a little. Ostomy in place draining dark brown liquid.  Denied any pain during the shift. Dressing to the left knee remains in place.  Will continue to monitor.

## 2020-01-11 NOTE — ASSESSMENT & PLAN NOTE
Pt suffered wound dehiscence after total knee replacement    - consult wound care  - daily dressing changes  - cont PT/OT

## 2020-01-12 LAB — CRP SERPL-MCNC: 52.5 MG/L (ref 0–8.2)

## 2020-01-12 PROCEDURE — 20600001 HC STEP DOWN PRIVATE ROOM

## 2020-01-12 PROCEDURE — 25000003 PHARM REV CODE 250: Performed by: SURGERY

## 2020-01-12 PROCEDURE — 63600175 PHARM REV CODE 636 W HCPCS: Performed by: SURGERY

## 2020-01-12 PROCEDURE — 36415 COLL VENOUS BLD VENIPUNCTURE: CPT

## 2020-01-12 PROCEDURE — 86140 C-REACTIVE PROTEIN: CPT

## 2020-01-12 RX ADMIN — HEPARIN SODIUM 5000 UNITS: 5000 INJECTION, SOLUTION INTRAVENOUS; SUBCUTANEOUS at 02:01

## 2020-01-12 RX ADMIN — Medication 400 MG: at 10:01

## 2020-01-12 RX ADMIN — MEMANTINE HYDROCHLORIDE 10 MG: 10 TABLET ORAL at 08:01

## 2020-01-12 RX ADMIN — MUPIROCIN 1 G: 20 OINTMENT TOPICAL at 10:01

## 2020-01-12 RX ADMIN — ACETAMINOPHEN 1000 MG: 500 TABLET ORAL at 10:01

## 2020-01-12 RX ADMIN — AZATHIOPRINE 50 MG: 50 TABLET ORAL at 08:01

## 2020-01-12 RX ADMIN — Medication 400 MG: at 08:01

## 2020-01-12 RX ADMIN — DONEPEZIL HYDROCHLORIDE 10 MG: 5 TABLET, FILM COATED ORAL at 10:01

## 2020-01-12 RX ADMIN — PANTOPRAZOLE SODIUM 40 MG: 40 TABLET, DELAYED RELEASE ORAL at 09:01

## 2020-01-12 RX ADMIN — QUETIAPINE FUMARATE 100 MG: 25 TABLET ORAL at 10:01

## 2020-01-12 RX ADMIN — HEPARIN SODIUM 5000 UNITS: 5000 INJECTION, SOLUTION INTRAVENOUS; SUBCUTANEOUS at 06:01

## 2020-01-12 RX ADMIN — OXYCODONE HYDROCHLORIDE 5 MG: 5 TABLET ORAL at 10:01

## 2020-01-12 RX ADMIN — MINERAL SUPPLEMENT IRON 300 MG / 5 ML STRENGTH LIQUID 100 PER BOX UNFLAVORED 300 MG: at 08:01

## 2020-01-12 RX ADMIN — HEPARIN SODIUM 5000 UNITS: 5000 INJECTION, SOLUTION INTRAVENOUS; SUBCUTANEOUS at 10:01

## 2020-01-12 RX ADMIN — MUPIROCIN 1 G: 20 OINTMENT TOPICAL at 08:01

## 2020-01-12 RX ADMIN — ACETAMINOPHEN 1000 MG: 500 TABLET ORAL at 02:01

## 2020-01-12 RX ADMIN — ASPIRIN 81 MG: 81 TABLET, COATED ORAL at 08:01

## 2020-01-12 NOTE — PROGRESS NOTES
[facility]  Colorectal Surgery  Progress Note    Patient Name: Anabella Aranda  MRN: [id number]  Admission Date: 1/8/2020  Hospital Length of Stay: 4 days  Attending Physician: Benjie Worley MD    Subjective:     Interval History: No acute events. Reports adequate pain control and is tolerating a diet. Does not think she has been out of bed.     Post-Op Info:  Procedure(s) (LRB):  CREATION, ILEOSTOMY, LAPAROSCOPIC, ERAS low, possible hand port, lithotomy (N/A)  LYSIS, ADHESIONS   4 Days Post-Op      Medications:  Continuous Infusions:  Scheduled Meds:   acetaminophen  1,000 mg Oral Q8H    alteplase 0.5 mg/ml  500 mcg Intra-Catheter Once    aspirin  81 mg Oral Daily    azaTHIOprine  50 mg Oral Daily    donepezil  10 mg Oral QHS    ferrous sulfate  300 mg Oral Daily    heparin (porcine)  5,000 Units Subcutaneous Q8H    magnesium oxide  400 mg Oral BID    memantine  10 mg Oral Daily    mupirocin  1 g Nasal BID    pantoprazole  40 mg Oral Daily    QUEtiapine  100 mg Oral QHS     PRN Meds:   ondansetron    oxyCODONE    oxyCODONE    sodium chloride 0.9%    traMADol        Objective:     Vital Signs (Most Recent):  Temp: 97.5 °F (36.4 °C) (01/12/20 0757)  Pulse: 94 (01/12/20 0757)  Resp: 18 (01/12/20 0757)  BP: (!) 141/64 (01/12/20 0757)  SpO2: 95 % (01/12/20 0757) Vital Signs (24h Range):  Temp:  [97.5 °F (36.4 °C)-98.8 °F (37.1 °C)] 97.5 °F (36.4 °C)  Pulse:  [] 94  Resp:  [14-18] 18  SpO2:  [95 %-97 %] 95 %  BP: (125-141)/(61-68) 141/64     Intake/Output - Last 3 Shifts       01/10 0700 - 01/11 0659 01/11 0700 - 01/12 0659 01/12 0700 - 01/13 0659    P.O.  120     I.V. (mL/kg) 1973.3 (19.8)      Total Intake(mL/kg) 1973.3 (19.8) 120 (1.2)     Urine (mL/kg/hr) 0 (0) 0 (0)     Emesis/NG output 0 0     Other 0 0     Stool 850 750     Total Output 850 750     Net +1123.3 -630            Urine Occurrence 7 x 3 x     Stool Occurrence 0 x 0 x     Emesis Occurrence 0 x 0 x            Physical Exam  Constitutional: She is oriented to person, place, and time. She appears well-developed and well-nourished.   Head: Normocephalic.   Eyes: Pupils are equal, round, and reactive to light.   Cardiovascular: Normal rate, regular rhythm and normal heart sounds.   Pulmonary/Chest: Effort normal and breath sounds normal. No respiratory distress. She has no wheezes. She has no rales.   Abdominal: Soft. She exhibits no mass. There is no rebound and no guarding. abd inc line healing well. Ileostomy functional. Pessar cath in rectum   Musculoskeletal: Knee with dressing in pace  Neurological: She is alert. No FND  Skin: Skin is warm and dry.  Psychiatric: She has a normal mood and affect.  Nursing note and vitals reviewed.    Significant Labs:  CBC (Last 3 Results):   Recent Labs   Lab 01/09/20  0616 01/10/20  0831 01/10/20  0832   WBC 9.03 7.26 7.27   RBC 3.04* 3.04* 2.99*   HGB 9.1* 9.0* 8.8*   HCT 29.4* 29.5* 29.2*   * 362* 353*   MCV 97 97 98   MCH 29.9 29.6 29.4   MCHC 31.0* 30.5* 30.1*     CMP (Last 3 Results):   Recent Labs   Lab 01/09/20  0616 01/10/20  0832   GLU 70 73   CALCIUM 8.1* 7.6*    141   K 3.8 3.4*   CO2 20* 23   * 112*   BUN 7* 6*   CREATININE 0.7 0.7     CRP (Last 3 Results):   Recent Labs   Lab 01/11/20  0601 01/12/20  0405   CRP 88.9* 52.5*     All pertinent lab results within the last 24 hours have been reviewed.     Significant Diagnostics:  I have reviewed all pertinent imaging results/findings within the past 24 hours.    Assessment/Plan:     * Crohn's colitis  OR 1/8/2020  Laparoscopic lysis of adhesions and creation end ileostomy    - Continue low res diet  - continue multi modality for pain control  - encourage ambulation and use of IS  - beni hose and SCD  - prophalactic lovenox and PPI  - keep pessar drain in rectum    Decubitus ulcer of perianal region, stage 1  - Appear to have healed  - Enc diet  - Turn jacek 2 hours  - Overlay mattress    Wound  dehiscence, surgical, sequela  Pt suffered wound dehiscence after total knee replacement    - daily dressing changes per wound care recommendations  - cont PT/OT    Alteration in skin integrity  Consult wound care  Sacrum with healed stage one dec ulcer  Enc turn every 2 hours  Overlay mattress    Alzheimers disease  Pt with hx of dementia, per family appears to be at baseline  Cont home meds    Chronic kidney disease, stage 3  Creatine stable for now. BMP reviewed- noted Estimated Creatinine Clearance: 83.4 mL/min (based on SCr of 0.7 mg/dL). according to latest data. Monitor UOP and serial BMP and adjust therapy as needed. Renally dose meds.        History of left knee replacement  - PT/OT    Morbid obesity  Body mass index is 37.76 kg/m². Morbid obesity complicates all aspects of disease management from diagnostic modalities to treatment. Weight loss encouraged and health benefits explained to patient.    Debility  Acute/chronic due to recent surgery/illnesses/prolonged hospitalization and nutritional status     - Hx of frequent falls  - High risk of fall / injury utilize all safety measures and fall precautions   - social work consulted  - PT recommends home health, referrals sent yesterday for HH PT/OT, and wound care        Diandra Major MD  Colorectal Surgery  [facility]

## 2020-01-12 NOTE — ASSESSMENT & PLAN NOTE
Acute/chronic due to recent surgery/illnesses/prolonged hospitalization and nutritional status     - Hx of frequent falls  - High risk of fall / injury utilize all safety measures and fall precautions   - social work consulted  - PT recommends home health, referrals sent yesterday for HH PT/OT, and wound care

## 2020-01-12 NOTE — ASSESSMENT & PLAN NOTE
Pt suffered wound dehiscence after total knee replacement    - daily dressing changes per wound care recommendations  - cont PT/OT

## 2020-01-12 NOTE — SUBJECTIVE & OBJECTIVE
Subjective:     Interval History: No acute events. Reports adequate pain control and is tolerating a diet. Does not think she has been out of bed.     Post-Op Info:  Procedure(s) (LRB):  CREATION, ILEOSTOMY, LAPAROSCOPIC, ERAS low, possible hand port, lithotomy (N/A)  LYSIS, ADHESIONS   4 Days Post-Op      Medications:  Continuous Infusions:  Scheduled Meds:   acetaminophen  1,000 mg Oral Q8H    alteplase 0.5 mg/ml  500 mcg Intra-Catheter Once    aspirin  81 mg Oral Daily    azaTHIOprine  50 mg Oral Daily    donepezil  10 mg Oral QHS    ferrous sulfate  300 mg Oral Daily    heparin (porcine)  5,000 Units Subcutaneous Q8H    magnesium oxide  400 mg Oral BID    memantine  10 mg Oral Daily    mupirocin  1 g Nasal BID    pantoprazole  40 mg Oral Daily    QUEtiapine  100 mg Oral QHS     PRN Meds:   ondansetron    oxyCODONE    oxyCODONE    sodium chloride 0.9%    traMADol        Objective:     Vital Signs (Most Recent):  Temp: 97.5 °F (36.4 °C) (01/12/20 0757)  Pulse: 94 (01/12/20 0757)  Resp: 18 (01/12/20 0757)  BP: (!) 141/64 (01/12/20 0757)  SpO2: 95 % (01/12/20 0757) Vital Signs (24h Range):  Temp:  [97.5 °F (36.4 °C)-98.8 °F (37.1 °C)] 97.5 °F (36.4 °C)  Pulse:  [] 94  Resp:  [14-18] 18  SpO2:  [95 %-97 %] 95 %  BP: (125-141)/(61-68) 141/64     Intake/Output - Last 3 Shifts       01/10 0700 - 01/11 0659 01/11 0700 - 01/12 0659 01/12 0700 - 01/13 0659    P.O.  120     I.V. (mL/kg) 1973.3 (19.8)      Total Intake(mL/kg) 1973.3 (19.8) 120 (1.2)     Urine (mL/kg/hr) 0 (0) 0 (0)     Emesis/NG output 0 0     Other 0 0     Stool 850 750     Total Output 850 750     Net +1123.3 -630            Urine Occurrence 7 x 3 x     Stool Occurrence 0 x 0 x     Emesis Occurrence 0 x 0 x           Physical Exam  Constitutional: She is oriented to person, place, and time. She appears well-developed and well-nourished.   Head: Normocephalic.   Eyes: Pupils are equal, round, and reactive to light.    Cardiovascular: Normal rate, regular rhythm and normal heart sounds.   Pulmonary/Chest: Effort normal and breath sounds normal. No respiratory distress. She has no wheezes. She has no rales.   Abdominal: Soft. She exhibits no mass. There is no rebound and no guarding. abd inc line healing well. Ileostomy functional. Pessar cath in rectum   Musculoskeletal: Knee with dressing in pace  Neurological: She is alert. No FND  Skin: Skin is warm and dry.  Psychiatric: She has a normal mood and affect.  Nursing note and vitals reviewed.    Significant Labs:  CBC (Last 3 Results):   Recent Labs   Lab 01/09/20  0616 01/10/20  0831 01/10/20  0832   WBC 9.03 7.26 7.27   RBC 3.04* 3.04* 2.99*   HGB 9.1* 9.0* 8.8*   HCT 29.4* 29.5* 29.2*   * 362* 353*   MCV 97 97 98   MCH 29.9 29.6 29.4   MCHC 31.0* 30.5* 30.1*     CMP (Last 3 Results):   Recent Labs   Lab 01/09/20  0616 01/10/20  0832   GLU 70 73   CALCIUM 8.1* 7.6*    141   K 3.8 3.4*   CO2 20* 23   * 112*   BUN 7* 6*   CREATININE 0.7 0.7     CRP (Last 3 Results):   Recent Labs   Lab 01/11/20  0601 01/12/20  0405   CRP 88.9* 52.5*     All pertinent lab results within the last 24 hours have been reviewed.     Significant Diagnostics:  I have reviewed all pertinent imaging results/findings within the past 24 hours.

## 2020-01-12 NOTE — PLAN OF CARE
POC reviewed with patient. Patient disoriented to time and situation. No stool noted to right ileostomy bag, still  bowel sweat only. Patient denies any pain. Eating small amounts of diet. Picc line intact and patent to right upper arm. Bed in low and locked position, call light in reach. Pt q2 turn. Will continue to monitor. No s/s of distress noted

## 2020-01-13 ENCOUNTER — TELEPHONE (OUTPATIENT)
Dept: GASTROENTEROLOGY | Facility: CLINIC | Age: 75
End: 2020-01-13

## 2020-01-13 LAB — CRP SERPL-MCNC: 30.6 MG/L (ref 0–8.2)

## 2020-01-13 PROCEDURE — 20600001 HC STEP DOWN PRIVATE ROOM

## 2020-01-13 PROCEDURE — 25000003 PHARM REV CODE 250: Performed by: SURGERY

## 2020-01-13 PROCEDURE — 86140 C-REACTIVE PROTEIN: CPT

## 2020-01-13 PROCEDURE — 36415 COLL VENOUS BLD VENIPUNCTURE: CPT

## 2020-01-13 PROCEDURE — 63600175 PHARM REV CODE 636 W HCPCS: Performed by: SURGERY

## 2020-01-13 RX ORDER — LOPERAMIDE HYDROCHLORIDE 2 MG/1
2 CAPSULE ORAL 2 TIMES DAILY
Status: DISCONTINUED | OUTPATIENT
Start: 2020-01-13 | End: 2020-01-13

## 2020-01-13 RX ADMIN — HEPARIN SODIUM 5000 UNITS: 5000 INJECTION, SOLUTION INTRAVENOUS; SUBCUTANEOUS at 05:01

## 2020-01-13 RX ADMIN — DONEPEZIL HYDROCHLORIDE 10 MG: 5 TABLET, FILM COATED ORAL at 08:01

## 2020-01-13 RX ADMIN — MUPIROCIN 1 G: 20 OINTMENT TOPICAL at 09:01

## 2020-01-13 RX ADMIN — PANTOPRAZOLE SODIUM 40 MG: 40 TABLET, DELAYED RELEASE ORAL at 09:01

## 2020-01-13 RX ADMIN — MINERAL SUPPLEMENT IRON 300 MG / 5 ML STRENGTH LIQUID 100 PER BOX UNFLAVORED 300 MG: at 09:01

## 2020-01-13 RX ADMIN — ACETAMINOPHEN 1000 MG: 500 TABLET ORAL at 05:01

## 2020-01-13 RX ADMIN — AZATHIOPRINE 50 MG: 50 TABLET ORAL at 09:01

## 2020-01-13 RX ADMIN — Medication 400 MG: at 08:01

## 2020-01-13 RX ADMIN — QUETIAPINE FUMARATE 100 MG: 25 TABLET ORAL at 08:01

## 2020-01-13 RX ADMIN — ASPIRIN 81 MG: 81 TABLET, COATED ORAL at 09:01

## 2020-01-13 RX ADMIN — ACETAMINOPHEN 1000 MG: 500 TABLET ORAL at 10:01

## 2020-01-13 RX ADMIN — ACETAMINOPHEN 1000 MG: 500 TABLET ORAL at 08:01

## 2020-01-13 RX ADMIN — HEPARIN SODIUM 5000 UNITS: 5000 INJECTION, SOLUTION INTRAVENOUS; SUBCUTANEOUS at 02:01

## 2020-01-13 RX ADMIN — Medication 400 MG: at 09:01

## 2020-01-13 RX ADMIN — HEPARIN SODIUM 5000 UNITS: 5000 INJECTION, SOLUTION INTRAVENOUS; SUBCUTANEOUS at 09:01

## 2020-01-13 RX ADMIN — ACETAMINOPHEN 1000 MG: 500 TABLET ORAL at 02:01

## 2020-01-13 RX ADMIN — MEMANTINE HYDROCHLORIDE 10 MG: 10 TABLET ORAL at 09:01

## 2020-01-13 NOTE — ASSESSMENT & PLAN NOTE
Acute/chronic due to recent surgery/illnesses/prolonged hospitalization and nutritional status     - Hx of frequent falls  - High risk of fall / injury utilize all safety measures and fall precautions   - social work consulted  - PT recommends home health but only taking 4 steps, to re-evaluate today for poss snf, referrals sent yesterday for HH PT/OT, and wound care

## 2020-01-13 NOTE — PROGRESS NOTES
[facility]  Colorectal Surgery  Progress Note    Patient Name: Anabella Aranda  MRN: [id number]  Admission Date: 1/8/2020  Hospital Length of Stay: 5 days  Attending Physician: Benjie Worley MD    Subjective:     Interval History: no acute events overnite, not eating much, little output from ileosotmy    Post-Op Info:  Procedure(s) (LRB):  CREATION, ILEOSTOMY, LAPAROSCOPIC, ERAS low, possible hand port, lithotomy (N/A)  LYSIS, ADHESIONS   5 Days Post-Op      Medications:  Continuous Infusions:  Scheduled Meds:   acetaminophen  1,000 mg Oral Q8H    alteplase 0.5 mg/ml  500 mcg Intra-Catheter Once    aspirin  81 mg Oral Daily    azaTHIOprine  50 mg Oral Daily    donepezil  10 mg Oral QHS    ferrous sulfate  300 mg Oral Daily    heparin (porcine)  5,000 Units Subcutaneous Q8H    magnesium oxide  400 mg Oral BID    memantine  10 mg Oral Daily    pantoprazole  40 mg Oral Daily    QUEtiapine  100 mg Oral QHS     PRN Meds:   ondansetron    oxyCODONE    oxyCODONE    sodium chloride 0.9%    traMADol        Objective:     Vital Signs (Most Recent):  Temp: 97.8 °F (36.6 °C) (01/13/20 0744)  Pulse: 99 (01/13/20 1119)  Resp: 20 (01/13/20 0744)  BP: 134/66 (01/13/20 0744)  SpO2: 97 % (01/13/20 1026) Vital Signs (24h Range):  Temp:  [97.6 °F (36.4 °C)-98 °F (36.7 °C)] 97.8 °F (36.6 °C)  Pulse:  [] 99  Resp:  [12-20] 20  SpO2:  [95 %-98 %] 97 %  BP: (134-144)/(65-71) 134/66     Intake/Output - Last 3 Shifts       01/11 0700 - 01/12 0659 01/12 0700 - 01/13 0659 01/13 0700 - 01/14 0659    P.O. 120 60     I.V. (mL/kg)       Other  350     Total Intake(mL/kg) 120 (1.2) 410 (4.1)     Urine (mL/kg/hr) 0 (0) 0 (0)     Emesis/NG output 0      Other 0      Stool 750 75     Total Output 750 75     Net -630 +335            Urine Occurrence 3 x 5 x     Stool Occurrence 0 x 1 x 0 x    Emesis Occurrence 0 x 0 x           Physical Exam   Constitutional: She is oriented to person, place, and time.  She appears well-developed and well-nourished.   HENT:   Head: Normocephalic.   Eyes: Pupils are equal, round, and reactive to light.   Cardiovascular: Normal rate, regular rhythm and normal heart sounds.   Pulmonary/Chest: Effort normal and breath sounds normal. No respiratory distress. She has no wheezes. She has no rales.   Abdominal: Soft. She exhibits no mass. There is no rebound and no guarding.   abd inc line healing well  Ileostomy functional   Musculoskeletal:   Wound on knee healing well from knee replacement, continue daily wound care    Neurological: She is alert and oriented to person, place, and time.   Skin: Skin is warm and dry.   Psychiatric: She has a normal mood and affect. Her behavior is normal. Judgment and thought content normal.   Confused but at baseline   Nursing note and vitals reviewed.        Significant Labs:  BMP (Last 3 Results):   Recent Labs   Lab 01/09/20  0616 01/10/20  0832   GLU 70 73    141   K 3.8 3.4*   * 112*   CO2 20* 23   BUN 7* 6*   CREATININE 0.7 0.7   CALCIUM 8.1* 7.6*   MG 1.7  --      BMP:   Recent Labs   Lab 01/09/20  0616 01/10/20  0832   GLU 70 73    141   K 3.8 3.4*   * 112*   CO2 20* 23   BUN 7* 6*   CREATININE 0.7 0.7   CALCIUM 8.1* 7.6*   MG 1.7  --      CBC (Last 3 Results):   Recent Labs   Lab 01/09/20  0616 01/10/20  0831 01/10/20  0832   WBC 9.03 7.26 7.27   RBC 3.04* 3.04* 2.99*   HGB 9.1* 9.0* 8.8*   HCT 29.4* 29.5* 29.2*   * 362* 353*   MCV 97 97 98   MCH 29.9 29.6 29.4   MCHC 31.0* 30.5* 30.1*       Significant Diagnostics:  None    Assessment/Plan:     * Crohn's colitis  OR 1/8/2020  Laparoscopic lysis of adhesions and creation end ileostomy    - Continue low res diet  - continue multi modality for pain control  - encourage ambulation and use of IS  - beni hose and SCD  - prophalactic lovenox and PPI  - keep pessar drain in rectum    Decubitus ulcer of perianal region, stage 1  - Appear to have healed  - Enc diet  - Turn  jacek 2 hours  - Overlay mattress    Wound dehiscence, surgical, sequela  Pt suffered wound dehiscence after total knee replacement    - daily dressing changes per wound care recommendations  - cont PT/OT    Alteration in skin integrity  Consult wound care  Sacrum with healed stage one dec ulcer  Enc turn every 2 hours  Overlay mattress    Alzheimers disease  Pt with hx of dementia, per family appears to be at baseline  Cont home meds    Chronic kidney disease, stage 3  Creatine stable for now. BMP reviewed- noted Estimated Creatinine Clearance: 83.4 mL/min (based on SCr of 0.7 mg/dL). according to latest data. Monitor UOP and serial BMP and adjust therapy as needed. Renally dose meds.        History of left knee replacement  - PT/OT  -continue wound care    Morbid obesity  Body mass index is 37.76 kg/m². Morbid obesity complicates all aspects of disease management from diagnostic modalities to treatment. Weight loss encouraged and health benefits explained to patient.    Debility  Acute/chronic due to recent surgery/illnesses/prolonged hospitalization and nutritional status     - Hx of frequent falls  - High risk of fall / injury utilize all safety measures and fall precautions   - social work consulted  - PT recommends home health but only taking 4 steps, to re-evaluate today for poss snf, referrals sent yesterday for HH PT/OT, and wound care            Libby Lara NP  Colorectal Surgery  [facility]

## 2020-01-13 NOTE — PLAN OF CARE
01/13/20 1544   Discharge Reassessment   Assessment Type Discharge Planning Reassessment   Provided patient/caregiver education on the expected discharge date and the discharge plan Yes   Do you have any problems affording any of your prescribed medications? No   Discharge Plan A Home Health   Discharge Plan B Skilled Nursing Facility   DME Needed Upon Discharge  none   Anticipated Discharge Disposition Home-Health   Post-Acute Status   Post-Acute Authorization Home Health/Hospice   Home Health/Hospice Status Awaiting Internal Medical Clearance

## 2020-01-13 NOTE — PROGRESS NOTES
Patient seen for ileostomy and wound care follow up to left knee.  Left knee moist granular, recommend continuing hydrofera blue ready foam twice a week. Supplies at bedside.  Ileostomy pouch intact with flatus and scant output noted. Patient not teachable. Full lesson with son was performed on Friday. Son not at bedside.   No needs noted at this time. Ostomy supplies at bedside for discharge.   Nursing to continue care. Wound care to follow prn.     01/13/20 1353        Incision/Site Left Leg   No Date First Assessed or Time First Assessed found.   Side: Left  Location: Leg   Wound Image    Incision WDL ex   Drainage Amount None   Drainage Characteristics/Odor No odor   Appearance Moist;Granulating   Periwound Area Intact   Wound Length (cm) 6 cm   Wound Width (cm) 2 cm   Wound Depth (cm) 0.1 cm   Wound Volume (cm^3) 1.2 cm^3   Wound Surface Area (cm^2) 12 cm^2   Care Cleansed with:;Sterile normal saline   Dressing Changed  (hydrofera blue ready foam, foam dressing)   Dressing Change Due 01/16/20

## 2020-01-13 NOTE — PLAN OF CARE
Plan of care reviewed with patient at 1400. Patient verbalized understanding, but reinforcement needed throughout shift due to orientation. All questions and concerns addressed today. Patient remains free from injury and falls. No acute events today. Patient is progressing towards goals. Will continue to monitor. See flowsheets for full assessments and vitals throughout shift.

## 2020-01-13 NOTE — PROGRESS NOTES
IS teaching provided at bedside. Patient performed independently x14 reps with teachback reaching goal of 750.

## 2020-01-13 NOTE — PLAN OF CARE
POC reviewed with patient. Patient disoriented to situation. No stool noted to right ileostomy bag, still  bowel sweat only. Patient denies any pain. Eating small amounts of diet. Picc line intact and patent to right upper arm. Bed in low and locked position, call light in reach. Pt q2 turn. Will continue to monitor. No s/s of distress noted

## 2020-01-13 NOTE — PLAN OF CARE
Patient A/O x2. PICC line in place and flushing. Ileostomy in place. Patient C/o abdominal pain. Pain med given. Denies nausea. Call light placed in reach. Hourly rounding done will continue to monitor.

## 2020-01-13 NOTE — TELEPHONE ENCOUNTER
----- Message from Natalie Cruz sent at 1/13/2020 11:27 AM CST -----  Contact: Duane  Infusion Provider     Duane Hunts is requesting a callback from Sofia at [phone]     Duane was not able to verify pt address and asked if Sofia please give him a call regarding last week discussion

## 2020-01-13 NOTE — SUBJECTIVE & OBJECTIVE
Subjective:     Interval History: no acute events overnite, not eating much, little output from ileosotmy    Post-Op Info:  Procedure(s) (LRB):  CREATION, ILEOSTOMY, LAPAROSCOPIC, ERAS low, possible hand port, lithotomy (N/A)  LYSIS, ADHESIONS   5 Days Post-Op      Medications:  Continuous Infusions:  Scheduled Meds:   acetaminophen  1,000 mg Oral Q8H    alteplase 0.5 mg/ml  500 mcg Intra-Catheter Once    aspirin  81 mg Oral Daily    azaTHIOprine  50 mg Oral Daily    donepezil  10 mg Oral QHS    ferrous sulfate  300 mg Oral Daily    heparin (porcine)  5,000 Units Subcutaneous Q8H    magnesium oxide  400 mg Oral BID    memantine  10 mg Oral Daily    pantoprazole  40 mg Oral Daily    QUEtiapine  100 mg Oral QHS     PRN Meds:   ondansetron    oxyCODONE    oxyCODONE    sodium chloride 0.9%    traMADol        Objective:     Vital Signs (Most Recent):  Temp: 97.8 °F (36.6 °C) (01/13/20 0744)  Pulse: 99 (01/13/20 1119)  Resp: 20 (01/13/20 0744)  BP: 134/66 (01/13/20 0744)  SpO2: 97 % (01/13/20 1026) Vital Signs (24h Range):  Temp:  [97.6 °F (36.4 °C)-98 °F (36.7 °C)] 97.8 °F (36.6 °C)  Pulse:  [] 99  Resp:  [12-20] 20  SpO2:  [95 %-98 %] 97 %  BP: (134-144)/(65-71) 134/66     Intake/Output - Last 3 Shifts       01/11 0700 - 01/12 0659 01/12 0700 - 01/13 0659 01/13 0700 - 01/14 0659    P.O. 120 60     I.V. (mL/kg)       Other  350     Total Intake(mL/kg) 120 (1.2) 410 (4.1)     Urine (mL/kg/hr) 0 (0) 0 (0)     Emesis/NG output 0      Other 0      Stool 750 75     Total Output 750 75     Net -630 +335            Urine Occurrence 3 x 5 x     Stool Occurrence 0 x 1 x 0 x    Emesis Occurrence 0 x 0 x           Physical Exam   Constitutional: She is oriented to person, place, and time. She appears well-developed and well-nourished.   HENT:   Head: Normocephalic.   Eyes: Pupils are equal, round, and reactive to light.   Cardiovascular: Normal rate, regular rhythm and normal heart sounds.   Pulmonary/Chest:  Effort normal and breath sounds normal. No respiratory distress. She has no wheezes. She has no rales.   Abdominal: Soft. She exhibits no mass. There is no rebound and no guarding.   abd inc line healing well  Ileostomy functional   Musculoskeletal:   Wound on knee healing well from knee replacement, continue daily wound care    Neurological: She is alert and oriented to person, place, and time.   Skin: Skin is warm and dry.   Psychiatric: She has a normal mood and affect. Her behavior is normal. Judgment and thought content normal.   Confused but at baseline   Nursing note and vitals reviewed.        Significant Labs:  BMP (Last 3 Results):   Recent Labs   Lab 01/09/20  0616 01/10/20  0832   GLU 70 73    141   K 3.8 3.4*   * 112*   CO2 20* 23   BUN 7* 6*   CREATININE 0.7 0.7   CALCIUM 8.1* 7.6*   MG 1.7  --      BMP:   Recent Labs   Lab 01/09/20  0616 01/10/20  0832   GLU 70 73    141   K 3.8 3.4*   * 112*   CO2 20* 23   BUN 7* 6*   CREATININE 0.7 0.7   CALCIUM 8.1* 7.6*   MG 1.7  --      CBC (Last 3 Results):   Recent Labs   Lab 01/09/20  0616 01/10/20  0831 01/10/20  0832   WBC 9.03 7.26 7.27   RBC 3.04* 3.04* 2.99*   HGB 9.1* 9.0* 8.8*   HCT 29.4* 29.5* 29.2*   * 362* 353*   MCV 97 97 98   MCH 29.9 29.6 29.4   MCHC 31.0* 30.5* 30.1*       Significant Diagnostics:  None

## 2020-01-14 VITALS
OXYGEN SATURATION: 97 % | HEIGHT: 64 IN | HEART RATE: 96 BPM | SYSTOLIC BLOOD PRESSURE: 121 MMHG | WEIGHT: 220 LBS | DIASTOLIC BLOOD PRESSURE: 61 MMHG | TEMPERATURE: 99 F | BODY MASS INDEX: 37.56 KG/M2 | RESPIRATION RATE: 16 BRPM

## 2020-01-14 LAB — CRP SERPL-MCNC: 21.8 MG/L (ref 0–8.2)

## 2020-01-14 PROCEDURE — 63600175 PHARM REV CODE 636 W HCPCS: Performed by: SURGERY

## 2020-01-14 PROCEDURE — 86140 C-REACTIVE PROTEIN: CPT

## 2020-01-14 PROCEDURE — 36415 COLL VENOUS BLD VENIPUNCTURE: CPT

## 2020-01-14 PROCEDURE — 25000003 PHARM REV CODE 250: Performed by: SURGERY

## 2020-01-14 RX ORDER — OXYCODONE HYDROCHLORIDE 5 MG/1
5 TABLET ORAL EVERY 6 HOURS PRN
Qty: 30 TABLET | Refills: 0 | Status: SHIPPED | OUTPATIENT
Start: 2020-01-14 | End: 2020-09-17

## 2020-01-14 RX ADMIN — MEMANTINE HYDROCHLORIDE 10 MG: 10 TABLET ORAL at 08:01

## 2020-01-14 RX ADMIN — AZATHIOPRINE 50 MG: 50 TABLET ORAL at 08:01

## 2020-01-14 RX ADMIN — Medication 400 MG: at 08:01

## 2020-01-14 RX ADMIN — OXYCODONE HYDROCHLORIDE 5 MG: 5 TABLET ORAL at 02:01

## 2020-01-14 RX ADMIN — HEPARIN SODIUM 5000 UNITS: 5000 INJECTION, SOLUTION INTRAVENOUS; SUBCUTANEOUS at 05:01

## 2020-01-14 RX ADMIN — PANTOPRAZOLE SODIUM 40 MG: 40 TABLET, DELAYED RELEASE ORAL at 08:01

## 2020-01-14 RX ADMIN — MINERAL SUPPLEMENT IRON 300 MG / 5 ML STRENGTH LIQUID 100 PER BOX UNFLAVORED 300 MG: at 08:01

## 2020-01-14 RX ADMIN — ASPIRIN 81 MG: 81 TABLET, COATED ORAL at 08:01

## 2020-01-14 RX ADMIN — ACETAMINOPHEN 1000 MG: 500 TABLET ORAL at 05:01

## 2020-01-14 NOTE — PLAN OF CARE
POC reviewed with PT, stated understanding, AOX4 mostly, some confusion with TIME at HS. All drains, DSG and lines WDL. Skin status unchanged this shift, turned per order, incont care provided PRN.  Voids adequate via blue pad, bowel sweat noted per ileostomy.  R PICC CD&I.  Denies pain or discomfort, no reports of nausea. NO adverse events this shift, bed low, call light in reach, will cont to manage POC.

## 2020-01-14 NOTE — NURSING
PICC line left intact, dressing changed. Pain medicine delivered to bedside, education with WOCN before patient discharged, family at bedside, currently waiting on transport to leave unit.

## 2020-01-14 NOTE — PLAN OF CARE
Patient discharged home today with [facility].    Future Appointments   Date Time Provider Department Center   1/24/2020 11:00 AM CHAIR 02 [facility]   1/31/2020  1:00 PM WHITNEY Lopez [facility] ENTERO [facility]   1/31/2020  2:20 PM Benjie Worley MD [facility] COLON [facility]   2/4/2020  9:20 AM Shon Brambila MD [facility] FAM MED [facility]   2/14/2020 11:00 AM Ghassan Lindsay MD [facility] ORTHO [facility]          01/14/20 1625   Final Note   Assessment Type Final Discharge Note   Anticipated Discharge Disposition Home-Health   Hospital Follow Up  Appt(s) scheduled? Yes   Right Care Referral Info   Post Acute Recommendation Home-care

## 2020-01-14 NOTE — PLAN OF CARE
01/14/20 1032   Post-Acute Status   Post-Acute Authorization Home Health/Hospice   Home Health/Hospice Status Set-up Complete   SW spoke with pt's son, Bryson. He is on board with taking pt home with home health.  Pt accepted by [facility].    Sarah Silva LMSW  [facility]  [phone]

## 2020-01-14 NOTE — HOSPITAL COURSE
Pt underwent above procedure PT OT consulted due to hx of immobility, wound care consulted for open wound to  Left knee from knee replacement surgery and to begin ostomy education.  Left knee wound vac removed and local wound care begun.  Once bowel function resumed diet was slowly advanced.  Pt suffers from dementia but she remained at baseline during hospital stay.  PT/OT saw pt and she able to take a few steps, their recommendation was home with home health.  Picc line in place for monthly remicade infusion.  On day of discharge pt is elpidio regular diet, inc line healing well, ileostomy functional, knee wound healing slowly, adequate pain control with oral medication, VS stable and afebrile. FU 2 weeks with Dr. Worley and also fu with Dr. Lindsay (ortho) for knee wound.  Son was willing to take her home with home health

## 2020-01-14 NOTE — PLAN OF CARE
[facility]    HOME HEALTH ORDERS  FACE TO FACE ENCOUNTER    Patient Name: Anabella Aranda  YOB: 1945    PCP: Shon Brambila MD   PCP Address: [address] / [place] [address]  PCP Phone Number: [phone]  PCP Fax: [phone]    Encounter Date: 01/14/2020    Admit to Home Health    Diagnoses:  Active Hospital Problems    Diagnosis  POA    *Crohn's colitis [K50.10]  Yes    Decubitus ulcer of perianal region, stage 1 [L89.891]  Yes    Wound dehiscence, surgical, sequela [T81.31XS]  Not Applicable    Alteration in skin integrity [R23.9]  Yes    History of left knee replacement [Z96.652]  Not Applicable    Chronic kidney disease, stage 3 [N18.3]  Yes     Chronic    Alzheimers disease [G30.9, F02.80]  Yes     Chronic    Morbid obesity [E66.01]  Yes     Chronic    Debility [R53.81]  Yes      Resolved Hospital Problems   No resolved problems to display.       Future Appointments   Date Time Provider Department Center   1/24/2020 11:00 AM CHAIR 02 [facility] CHEMO [facility]   2/4/2020  9:20 AM Shon Brambila MD [facility] FAM MED [facility]   2/14/2020 11:00 AM Ghassan Lindsay MD [facility] ORTHO [facility]           I have seen and examined this patient face to face today. My clinical findings that support the need for the home health skilled services and home bound status are the following:  Weakness/numbness causing balance and gait disturbance due to Surgery making it taxing to leave home.    Allergies:  Review of patient's allergies indicates:   Allergen Reactions    Sulfa (sulfonamide antibiotics) Swelling       Diet: regular diet    Activities: activity as tolerated    Nursing:   SN to complete comprehensive assessment including routine vital signs. Instruct on disease process and s/s of complications to report to MD. Review/verify medication list sent home with the patient at time of discharge  and instruct patient/caregiver as needed. Frequency may be  adjusted depending on start of care date.    Notify MD if SBP > 160 or < 90; DBP > 90 or < 50; HR > 120 or < 50; Temp > 101; Other:     Resume prior home health orders    Routine PICC line care, needs picc for remicade infusions    CONSULTS:    Physical Therapy to evaluate and treat. Evaluate for home safety and equipment needs; Establish/upgrade home exercise program. Perform / instruct on therapeutic exercises, gait training, transfer training, and Range of Motion.  Occupational Therapy to evaluate and treat. Evaluate home environment for safety and equipment needs. Perform/Instruct on transfers, ADL training, ROM, and therapeutic exercises.   to evaluate for community resources/long-range planning.  Aide to provide assistance with personal care, ADLs, and vital signs.    MISCELLANEOUS CARE:  Colostomy Care:  Instruct patient/caregiver to empty bag when full and PRN., Change and clean site every 48 hours and Monitor skin integrity.  Routine Skin for Bedridden Patients: Instruct patient/caregiver to apply moisture barrier cream to all skin folds and wet areas in perineal area daily and after baths and all bowel movements.    WOUND CARE ORDERS  yes:  Surgical Wound:  Location: knee  Cleanse left knee wound with wound cleanse and apply hydrofera blue ready foam twice a week  Consult ET nurse        Continue ostomy care and education    Medications: Review discharge medications with patient and family and provide education.      Current Discharge Medication List      CONTINUE these medications which have NOT CHANGED    Details   azaTHIOprine (IMURAN) 50 mg Tab Take 1 tablet (50 mg total) by mouth once daily.  Qty: 30 tablet, Refills: 1      donepezil (ARICEPT) 10 MG tablet Take 1 tablet (10 mg total) by mouth every evening.  Qty: 90 tablet, Refills: 1    Associated Diagnoses: Late onset Alzheimer's disease without behavioral disturbance      ferrous sulfate (FEOSOL) 325 mg (65 mg iron) Tab tablet Take 1  tablet (325 mg total) by mouth 2 (two) times daily.  Qty: 180 tablet, Refills: 1    Associated Diagnoses: Iron deficiency anemia; Anemia due to acute blood loss      magnesium oxide (MAG-OX) 400 mg (241.3 mg magnesium) tablet Take 1 tablet (400 mg total) by mouth 2 (two) times daily.  Refills: 0      memantine (NAMENDA) 10 MG Tab Take 1 tablet (10 mg total) by mouth once daily.  Qty: 180 tablet, Refills: 1    Associated Diagnoses: Late onset Alzheimer's disease without behavioral disturbance      pantoprazole (PROTONIX) 40 MG tablet Take 1 tablet (40 mg total) by mouth once daily.  Qty: 30 tablet, Refills: 1      QUEtiapine (SEROQUEL) 100 MG Tab Take 1 tablet (100 mg total) by mouth every evening.  Qty: 30 tablet, Refills: 1      aspirin (ECOTRIN) 81 MG EC tablet Take 1 tablet (81 mg total) by mouth once daily.  Qty: 30 tablet, Refills: 0      sodium chloride 0.9% (NORMAL SALINE FLUSH) injection Inject 10 mLs into the vein once a week. Inject 10mls into both PICC line lumens weekly.  Qty: 80 mL, Refills: 3             I certify that this patient is confined to her home and needs intermittent skilled nursing care, physical therapy and occupational therapy.

## 2020-01-14 NOTE — DISCHARGE SUMMARY
[facility]  Colorectal Surgery  Discharge Summary      Patient Name: Anabella Aranda  MRN: [id number]  Admission Date: 1/8/2020  Hospital Length of Stay: 6 days  Discharge Date and Time: 1/14/2020  2:44 PM  Attending Physician: Kelsey att. providers found   Discharging Provider: Libby Lara NP  Primary Care Provider: Shon Brambila MD     HPI:  Anabella Aranda is a 74 y.o. female with a history of inflammatory bowel disease.  She had previously undergone total abdominal colectomy by Dr. Barry with stapled end-to-end ileorectal anastomosis. She was recently hospitalized and her mobility is decreased.  She had increased perianal drainage and incontinence.  Endoscopy was performed demonstrating evidence of Crohn's proctitis.  Due to her immobility, incontinence, and nonhealing wounds.  I recommended end ileostomy.    Procedure(s) (LRB):  CREATION, ILEOSTOMY, LAPAROSCOPIC, ERAS low, possible hand port, lithotomy (N/A)  LYSIS, ADHESIONS     Hospital Course:  Pt underwent above procedure PT OT consulted due to hx of immobility, wound care consulted for open wound to  Left knee from knee replacement surgery and to begin ostomy education.  Left knee wound vac removed and local wound care begun.  Once bowel function resumed diet was slowly advanced.  Pt suffers from dementia but she remained at baseline during hospital stay.  PT/OT saw pt and she able to take a few steps, their recommendation was home with home health.  Picc line in place for monthly remicade infusion.  On day of discharge pt is elpidio regular diet, inc line healing well, ileostomy functional, knee wound healing slowly, adequate pain control with oral medication, VS stable and afebrile. FU 2 weeks with Dr. Worley and also fu with Dr. Lindsay (ortho) for knee wound.  Son was willing to take her home with home health         Consults (From admission, onward)        Status Ordering Provider     Inpatient consult to Social Work/Case  Management  Once     Provider:  (Not yet assigned)    Completed FLORINDA MAHAJAN     Inpatient consult to Spiritual Care  Once     Provider:  (Not yet assigned)    Completed FLORINDA MAHAJAN          Significant Diagnostic Studies: Labs: BMP: No results for input(s): GLU, NA, K, CL, CO2, BUN, CREATININE, CALCIUM, MG in the last 48 hours. and CBC No results for input(s): WBC, HGB, HCT, PLT in the last 48 hours.    Pending Diagnostic Studies:     None        Final Active Diagnoses:    Diagnosis Date Noted POA    PRINCIPAL PROBLEM:  Crohn's colitis [K50.10] 01/08/2020 Yes    Decubitus ulcer of perianal region, stage 1 [L89.891] 11/30/2019 Yes    Wound dehiscence, surgical, sequela [T81.31XS]  Not Applicable    Alteration in skin integrity [R23.9] 10/30/2019 Yes    History of left knee replacement [Z96.652] 10/16/2019 Not Applicable    Chronic kidney disease, stage 3 [N18.3]  Yes     Chronic    Alzheimers disease [G30.9, F02.80]  Yes     Chronic    Morbid obesity [E66.01] 10/17/2017 Yes     Chronic    Debility [R53.81] 08/19/2014 Yes      Problems Resolved During this Admission:      Discharged Condition: good    Disposition: Home-Health Care [facility]    Follow Up:  Follow-up Information     Benjie Worley MD In 2 weeks.    Specialty:  Colon and Rectal Surgery  Contact information:  [address] [facility] [address] [phone]             [facility].    Specialty:  Home Health Services  Why:  Home health  Contact information:  [address] [address] [phone]                 Patient Instructions:      Lifting restrictions   Order Comments: No lifting anything greater than 5 pounds     Call MD for:  persistent nausea and vomiting or diarrhea     Call MD for:  severe uncontrolled pain     Call MD for:  redness, tenderness, or signs of infection (pain, swelling, redness, odor or green/yellow discharge around incision site)     Call MD for:  difficulty  breathing or increased cough     Call MD for:  severe persistent headache     Call MD for:  worsening rash     Call MD for:  persistent dizziness, light-headedness, or visual disturbances     Call MD for:  increased confusion or weakness     Call MD for:   Order Comments: Call for temp above 101     Medications:  Reconciled Home Medications:      Medication List      START taking these medications    oxyCODONE 5 MG immediate release tablet  Commonly known as:  ROXICODONE  Take 1 tablet (5 mg total) by mouth every 6 (six) hours as needed.        CHANGE how you take these medications    donepezil 10 MG tablet  Commonly known as:  ARICEPT  Take 1 tablet (10 mg total) by mouth every evening.  What changed:  when to take this        CONTINUE taking these medications    aspirin 81 MG EC tablet  Commonly known as:  ECOTRIN  Take 1 tablet (81 mg total) by mouth once daily.     azaTHIOprine 50 mg Tab  Commonly known as:  IMURAN  Take 1 tablet (50 mg total) by mouth once daily.     ferrous sulfate 325 mg (65 mg iron) Tab tablet  Commonly known as:  FEOSOL  Take 1 tablet (325 mg total) by mouth 2 (two) times daily.     magnesium oxide 400 mg (241.3 mg magnesium) tablet  Commonly known as:  MAG-OX  Take 1 tablet (400 mg total) by mouth 2 (two) times daily.     memantine 10 MG Tab  Commonly known as:  NAMENDA  Take 1 tablet (10 mg total) by mouth once daily.     pantoprazole 40 MG tablet  Commonly known as:  PROTONIX  Take 1 tablet (40 mg total) by mouth once daily.     QUEtiapine 100 MG Tab  Commonly known as:  SEROQUEL  Take 1 tablet (100 mg total) by mouth every evening.        STOP taking these medications    sodium chloride 0.9% injection  Commonly known as:  NORMAL SALINE FLUSH            Libby Lara NP  Colorectal Surgery  [facility]

## 2020-01-14 NOTE — HPI
Anabella Aranda is a 74 y.o. female with a history of inflammatory bowel disease.  She had previously undergone total abdominal colectomy by Dr. Barry with stapled end-to-end ileorectal anastomosis. She was recently hospitalized and her mobility is decreased.  She had increased perianal drainage and incontinence.  Endoscopy was performed demonstrating evidence of Crohn's proctitis.  Due to her immobility, incontinence, and nonhealing wounds.  I recommended end ileostomy.

## 2020-01-14 NOTE — PROGRESS NOTES
Patient seen for follow up ileostomy lesson prior to discharge with patient's son. Patient's son independently changed ostomy appliance.   New coloplast convexity pouch [id number] applied with barrier ring.  Stoma red well budded, 38mm, peristomal skin intact.  Son denied any questions regarding diet,hydration, or monitoring output.  Extra ostomy and wound care supplies at bedside for discharge.   [employer] previously contacted for starter kit.  No additional needs noted at this time.  Nursing to continue care.

## 2020-01-15 PROCEDURE — G0180 MD CERTIFICATION HHA PATIENT: HCPCS | Mod: ,,, | Performed by: COLON & RECTAL SURGERY

## 2020-01-15 PROCEDURE — G0180 PR HOME HEALTH MD CERTIFICATION: ICD-10-PCS | Mod: ,,, | Performed by: COLON & RECTAL SURGERY

## 2020-01-16 ENCOUNTER — EXTERNAL HOME HEALTH (OUTPATIENT)
Dept: HOME HEALTH SERVICES | Facility: HOSPITAL | Age: 75
End: 2020-01-16
Payer: MEDICARE

## 2020-01-24 ENCOUNTER — TELEPHONE (OUTPATIENT)
Dept: GASTROENTEROLOGY | Facility: CLINIC | Age: 75
End: 2020-01-24

## 2020-01-24 ENCOUNTER — INFUSION (OUTPATIENT)
Dept: INFUSION THERAPY | Facility: HOSPITAL | Age: 75
End: 2020-01-24
Attending: INTERNAL MEDICINE
Payer: MEDICARE

## 2020-01-24 VITALS
BODY MASS INDEX: 37.56 KG/M2 | OXYGEN SATURATION: 97 % | RESPIRATION RATE: 16 BRPM | HEIGHT: 64 IN | TEMPERATURE: 98 F | SYSTOLIC BLOOD PRESSURE: 134 MMHG | DIASTOLIC BLOOD PRESSURE: 69 MMHG | HEART RATE: 113 BPM | WEIGHT: 220 LBS

## 2020-01-24 DIAGNOSIS — K50.819 CROHN'S DISEASE OF SMALL AND LARGE INTESTINES WITH COMPLICATION: Primary | ICD-10-CM

## 2020-01-24 PROCEDURE — 63600175 PHARM REV CODE 636 W HCPCS: Mod: JG | Performed by: INTERNAL MEDICINE

## 2020-01-24 PROCEDURE — 96365 THER/PROPH/DIAG IV INF INIT: CPT

## 2020-01-24 PROCEDURE — A4216 STERILE WATER/SALINE, 10 ML: HCPCS | Performed by: INTERNAL MEDICINE

## 2020-01-24 PROCEDURE — 96375 TX/PRO/DX INJ NEW DRUG ADDON: CPT

## 2020-01-24 PROCEDURE — 25000003 PHARM REV CODE 250: Performed by: INTERNAL MEDICINE

## 2020-01-24 PROCEDURE — 96366 THER/PROPH/DIAG IV INF ADDON: CPT

## 2020-01-24 RX ORDER — ACETAMINOPHEN 325 MG/1
650 TABLET ORAL
Status: CANCELLED | OUTPATIENT
Start: 2020-03-06

## 2020-01-24 RX ORDER — SODIUM CHLORIDE 0.9 % (FLUSH) 0.9 %
10 SYRINGE (ML) INJECTION
Status: CANCELLED | OUTPATIENT
Start: 2020-03-06

## 2020-01-24 RX ORDER — ACETAMINOPHEN 325 MG/1
650 TABLET ORAL
Status: COMPLETED | OUTPATIENT
Start: 2020-01-24 | End: 2020-01-24

## 2020-01-24 RX ORDER — HEPARIN 100 UNIT/ML
500 SYRINGE INTRAVENOUS
Status: DISCONTINUED | OUTPATIENT
Start: 2020-01-24 | End: 2020-01-24 | Stop reason: HOSPADM

## 2020-01-24 RX ORDER — DIPHENHYDRAMINE HYDROCHLORIDE 50 MG/ML
25 INJECTION INTRAMUSCULAR; INTRAVENOUS
Status: CANCELLED | OUTPATIENT
Start: 2020-03-06

## 2020-01-24 RX ORDER — SODIUM CHLORIDE 0.9 % (FLUSH) 0.9 %
10 SYRINGE (ML) INJECTION
Status: DISCONTINUED | OUTPATIENT
Start: 2020-01-24 | End: 2020-01-24 | Stop reason: HOSPADM

## 2020-01-24 RX ORDER — METHYLPREDNISOLONE SOD SUCC 125 MG
40 VIAL (EA) INJECTION
Status: CANCELLED | OUTPATIENT
Start: 2020-03-06

## 2020-01-24 RX ORDER — HEPARIN 100 UNIT/ML
500 SYRINGE INTRAVENOUS
Status: CANCELLED | OUTPATIENT
Start: 2020-03-06

## 2020-01-24 RX ORDER — EPINEPHRINE 1 MG/ML
0.3 INJECTION, SOLUTION, CONCENTRATE INTRAVENOUS
Status: CANCELLED | OUTPATIENT
Start: 2020-03-06

## 2020-01-24 RX ORDER — DIPHENHYDRAMINE HYDROCHLORIDE 50 MG/ML
25 INJECTION INTRAMUSCULAR; INTRAVENOUS
Status: COMPLETED | OUTPATIENT
Start: 2020-01-24 | End: 2020-01-24

## 2020-01-24 RX ORDER — IPRATROPIUM BROMIDE AND ALBUTEROL SULFATE 2.5; .5 MG/3ML; MG/3ML
3 SOLUTION RESPIRATORY (INHALATION)
Status: CANCELLED | OUTPATIENT
Start: 2020-03-06

## 2020-01-24 RX ADMIN — HYDROCORTISONE SODIUM SUCCINATE 200 MG: 100 INJECTION, POWDER, FOR SOLUTION INTRAMUSCULAR; INTRAVENOUS at 11:01

## 2020-01-24 RX ADMIN — DIPHENHYDRAMINE HYDROCHLORIDE 25 MG: 50 INJECTION INTRAMUSCULAR; INTRAVENOUS at 10:01

## 2020-01-24 RX ADMIN — INFLIXIMAB 500 MG: 100 INJECTION, POWDER, LYOPHILIZED, FOR SOLUTION INTRAVENOUS at 11:01

## 2020-01-24 RX ADMIN — ACETAMINOPHEN 650 MG: 325 TABLET ORAL at 10:01

## 2020-01-24 RX ADMIN — Medication 10 ML: at 01:01

## 2020-01-24 NOTE — NURSING
Pt tolerated Remicade infusion well.  No adverse reaction noted.  Pt education reinforced on potential side effects, what to expect, and when to call physician; we also reviewed patient's schedule with pt and son and understanding verbalized. Right upper arm double lumen PICC line   flushed with NS. Pt DC from infusion clinic, left clinic in no acute distress via WC with son.

## 2020-01-24 NOTE — TELEPHONE ENCOUNTER
----- Message from Og Carrera MD sent at 1/24/2020 11:37 AM CST -----  Contact: Bryson (son)  They need to contact the ostomy clinic. I dont have those supplies.  They were seen in clinic before surgery by Tila Hermosillo  ----- Message -----  From: Sofia Bennett MA  Sent: 1/24/2020  11:17 AM CST  To: Og Carrera MD      ----- Message -----  From: Cecil Beltran  Sent: 1/24/2020  11:07 AM CST  To: Deandre NAVARRO Staff    Patient's son called to advise she is currently in the hospital and he needs the wafers that go between her skin and the colostomy bag.    Please call [phone] to discuss today per patient.

## 2020-01-30 NOTE — PROGRESS NOTES
CRS Office Visit Follow-up  Referring Md:   No referring provider defined for this encounter.    SUBJECTIVE:     Chief Complaint:  Ulcerative colitis    History of Present Illness:  Patient is a 74 y.o. female presents with ulcerative colitis. The patient is a established patient to this practice.     Course is as follows:  6/24/16:  Underwent laparoscopic total abdominal colectomy with ileorectal anastomosis for sigmoid colon stricture in the setting of ulcerative colitis.  Pathology:   - chronic active inflammation, pseudopolyps etc. are consistent with the patient's prior history of inflammatory bowel disease.  Difficult to decipher Crohn's versus ulcerative colitis given the presence concomitant diverticulitis  past medical history of arthritis, C. difficile colitis, chronic kidney disease, stage 3, dementia, alzheimer's disease, hepatitis b, hypertension, major depression, chron's  Disease, s/p left TKA with Dr. Lindsay on 10/16/19.  This did not heal well and has since required debridement in November 2019,  Since her recent hospitalization, she is wheelchair-bound.  She has a decubitus ulcer that is slowly healing.  Her son states that she frequently wakes up at night in a pool of stool secondary to incontinence overnight.    1/8/20: Laparoscopic lysis of adhesions and creation end ileostomy    Current status: PICC line replaced in hospital for her remicade infusions.  She is overall doing well.  She is starting to gain her mobility.  No issues with ileostomy.  Recurrent back is last 3 days.  No dehydration.  No issues with her incision.  Overall pleased with her result.    Last Colonoscopy: 11/27/19:  Impression:           - Decubitus ulceration found on perianal exam.                        - Inflammation was found in the rectum. This was moderate in severity, improved compared to previous examinations. Biopsied.                        - The monie-terminal ileum appears endoscopically much less inflammed and  "much better. This was biopsied to assess disease activty.                        - The rectum is characterized by linear ulcers and inflammatory polyps, however the endoscopic  appearance is much improved.                        - no evidence of fistula found despite instillation of methylene blue.    Review of Systems:  Review of Systems   Constitutional: Negative for chills, diaphoresis, fever, malaise/fatigue and weight loss.   HENT: Negative for congestion.    Respiratory: Negative for shortness of breath.    Cardiovascular: Negative for chest pain and leg swelling.   Gastrointestinal: Positive for diarrhea. Negative for abdominal pain, blood in stool, constipation, nausea and vomiting.   Genitourinary: Negative for dysuria.   Musculoskeletal: Negative for back pain and myalgias.   Skin: Positive for rash.   Neurological: Negative for dizziness and weakness.        Alzheimer's   Endo/Heme/Allergies: Does not bruise/bleed easily.   Psychiatric/Behavioral: Positive for memory loss. Negative for depression.       OBJECTIVE:     Vital Signs (Most Recent)  /73 (BP Location: Left arm, Patient Position: Sitting, BP Method: Large (Automatic))   Pulse 105   Ht 5' 4" (1.626 m)   Wt 89 kg (196 lb 3.4 oz)   LMP  (LMP Unknown)   BMI 33.68 kg/m²     Physical Exam:  General: Black or  female in no distress   Neuro: alert and oriented x 4.  Moves all extremities.     HEENT: no icterus.  Trachea midline  Respiratory: respirations are even and unlabored  Cardiac: regular rate  Abdomen:  Ileostomy in right upper quadrant is pink and healthy.  Pfannenstiel incision is healed well.  Extremities: Warm dry and intact.  Wound VAC on left knee  Skin: no rashes  Anorectal:  Deferred     Labs:  H&H 11 and 35.  Low albumin with albumin of 2.5.  Normal renal function.    Imaging:  CT abdomen and pelvis from 11/25/2019 with rectal contrast was personally reviewed.  Patent ileorectal anastomosis.  Few small air " bubbles at the very top of the vagina.  No obvious fistula.      ASSESSMENT/PLAN:     Anabella was seen today for post-op evaluation.    Diagnoses and all orders for this visit:    Crohn's disease of small and large intestines with complication    Ileostomy in place        74-year-old woman with multiple medical comorbidities who was previously believed to have ulcerative colitis underwent total abdominal colectomy with ileorectal anastomosis.  She has since been diagnosed with Crohn's.  This fits with her original indication for surgery with a colonic stricture.  With her Crohn's disease, she is having significant perianal drainage and diarrhea.  This is resulting in worsening of her perianal healing and skin secondary to her limited mobility.  She therefore underwent laparoscopic lysis of adhesions and creation of an end ileostomy on 01/08/2020.    She is overall doing very well.  She can follow up with me as needed.    CEDRIC Worley MD, FACS  Staff Surgeon  Colon & Rectal Surgery

## 2020-01-31 ENCOUNTER — OFFICE VISIT (OUTPATIENT)
Dept: WOUND CARE | Facility: CLINIC | Age: 75
End: 2020-01-31
Payer: MEDICARE

## 2020-01-31 ENCOUNTER — OFFICE VISIT (OUTPATIENT)
Dept: SURGERY | Facility: CLINIC | Age: 75
End: 2020-01-31
Payer: MEDICARE

## 2020-01-31 VITALS
HEIGHT: 64 IN | HEART RATE: 105 BPM | BODY MASS INDEX: 33.49 KG/M2 | SYSTOLIC BLOOD PRESSURE: 136 MMHG | WEIGHT: 196.19 LBS | DIASTOLIC BLOOD PRESSURE: 73 MMHG

## 2020-01-31 DIAGNOSIS — Z93.2 ILEOSTOMY IN PLACE: ICD-10-CM

## 2020-01-31 DIAGNOSIS — K50.819 CROHN'S DISEASE OF SMALL AND LARGE INTESTINES WITH COMPLICATION: Primary | ICD-10-CM

## 2020-01-31 DIAGNOSIS — Z71.89 ENCOUNTER FOR OSTOMY CARE EDUCATION: ICD-10-CM

## 2020-01-31 DIAGNOSIS — Z43.2 ATTENTION TO ILEOSTOMY: Primary | ICD-10-CM

## 2020-01-31 PROCEDURE — 99024 PR POST-OP FOLLOW-UP VISIT: ICD-10-PCS | Mod: POP,,, | Performed by: CLINICAL NURSE SPECIALIST

## 2020-01-31 PROCEDURE — 99024 PR POST-OP FOLLOW-UP VISIT: ICD-10-PCS | Mod: POP,,, | Performed by: COLON & RECTAL SURGERY

## 2020-01-31 PROCEDURE — 99999 PR PBB SHADOW E&M-EST. PATIENT-LVL III: ICD-10-PCS | Mod: PBBFAC,,, | Performed by: COLON & RECTAL SURGERY

## 2020-01-31 PROCEDURE — 99999 PR PBB SHADOW E&M-EST. PATIENT-LVL II: ICD-10-PCS | Mod: PBBFAC,,, | Performed by: CLINICAL NURSE SPECIALIST

## 2020-01-31 PROCEDURE — 99999 PR PBB SHADOW E&M-EST. PATIENT-LVL II: CPT | Mod: PBBFAC,,, | Performed by: CLINICAL NURSE SPECIALIST

## 2020-01-31 PROCEDURE — 99024 POSTOP FOLLOW-UP VISIT: CPT | Mod: POP,,, | Performed by: COLON & RECTAL SURGERY

## 2020-01-31 PROCEDURE — 99212 OFFICE O/P EST SF 10 MIN: CPT | Mod: PBBFAC,27 | Performed by: CLINICAL NURSE SPECIALIST

## 2020-01-31 PROCEDURE — 99024 POSTOP FOLLOW-UP VISIT: CPT | Mod: POP,,, | Performed by: CLINICAL NURSE SPECIALIST

## 2020-01-31 PROCEDURE — 99999 PR PBB SHADOW E&M-EST. PATIENT-LVL III: CPT | Mod: PBBFAC,,, | Performed by: COLON & RECTAL SURGERY

## 2020-01-31 PROCEDURE — 99213 OFFICE O/P EST LOW 20 MIN: CPT | Mod: PBBFAC | Performed by: COLON & RECTAL SURGERY

## 2020-01-31 RX ORDER — QUETIAPINE FUMARATE 200 MG/1
TABLET, FILM COATED ORAL
COMMUNITY
Start: 2019-12-15 | End: 2023-01-11 | Stop reason: SDUPTHER

## 2020-01-31 NOTE — PROGRESS NOTES
This patient is known to me and is here in clinic today for first post op evaluation related to ileostomy. Surgery done 1/8 by Dr. Worley. The patient reports no  problems with  new ostomy. The patient is receiving home health care with [facility].   Pain level today is reported as  0. Son does all her care due to dementia    The related to is 28mm mike medium budded stoma.  The patient is currently  wearing a 1piece pouching system by [employer].   Average wear time is 3 days.   Peristomal skin is clear    There is no  mucocutaneous separation.  Pt is coping well with the new ostomy.  SUPPLIES/DME: will send to [facility] today      Pouching concerns include:      Stoma a bit oval so emphasized the rounding out of stoma and how important it is, also that he (son) can try convex with belt and see if he likes this better than flat with ring ( using at present )    SPECIAL NEEDS:  Pt counseled on skin care, nutrition, hydration as well as  how to order ostomy supplies.  I spent greater than 50% of this 45 minute visit in face to face counseling.

## 2020-02-04 ENCOUNTER — PATIENT MESSAGE (OUTPATIENT)
Dept: WOUND CARE | Facility: CLINIC | Age: 75
End: 2020-02-04

## 2020-02-07 ENCOUNTER — TELEPHONE (OUTPATIENT)
Dept: ORTHOPEDICS | Facility: CLINIC | Age: 75
End: 2020-02-07

## 2020-02-07 NOTE — TELEPHONE ENCOUNTER
I do not agree with antibiotics for this patient. She should have an appointment in the office relatively soon for me to check her incision. Antibiotics are likely to make her digestion worse.  Patient should see me for her regular appointment.

## 2020-02-07 NOTE — TELEPHONE ENCOUNTER
----- Message from Tracy Denton sent at 2/7/2020  1:24 PM CST -----  Contact: Love Frazier  [phone] or [phone]   Type:  Needs Medical Advice    Who Called:Love Frazier   Symptoms (please be specific):Drainage from pt's upper left thigh   How long has patient had these symptoms: 2 day   Would the patient rather a call back or a response via [facility]?callback  Best Call Back Number: [phone]  Additional Information:Nurse stated that wound is experiencing some weird smell and drainage.

## 2020-02-09 ENCOUNTER — PATIENT OUTREACH (OUTPATIENT)
Dept: ADMINISTRATIVE | Facility: OTHER | Age: 75
End: 2020-02-09

## 2020-02-10 ENCOUNTER — TELEPHONE (OUTPATIENT)
Dept: HOME HEALTH SERVICES | Facility: HOSPITAL | Age: 75
End: 2020-02-10

## 2020-02-11 ENCOUNTER — OFFICE VISIT (OUTPATIENT)
Dept: ORTHOPEDICS | Facility: CLINIC | Age: 75
End: 2020-02-11
Payer: MEDICARE

## 2020-02-11 ENCOUNTER — TELEPHONE (OUTPATIENT)
Dept: WOUND CARE | Facility: CLINIC | Age: 75
End: 2020-02-11

## 2020-02-11 ENCOUNTER — PATIENT MESSAGE (OUTPATIENT)
Dept: WOUND CARE | Facility: CLINIC | Age: 75
End: 2020-02-11

## 2020-02-11 VITALS
RESPIRATION RATE: 16 BRPM | DIASTOLIC BLOOD PRESSURE: 68 MMHG | HEIGHT: 64 IN | TEMPERATURE: 99 F | HEART RATE: 98 BPM | WEIGHT: 196.81 LBS | BODY MASS INDEX: 33.6 KG/M2 | SYSTOLIC BLOOD PRESSURE: 120 MMHG

## 2020-02-11 DIAGNOSIS — M25.562 LEFT KNEE PAIN, UNSPECIFIED CHRONICITY: Primary | ICD-10-CM

## 2020-02-11 DIAGNOSIS — M17.12 PRIMARY OSTEOARTHRITIS OF LEFT KNEE: ICD-10-CM

## 2020-02-11 DIAGNOSIS — T81.31XS WOUND DEHISCENCE, SURGICAL, SEQUELA: ICD-10-CM

## 2020-02-11 PROCEDURE — 99213 PR OFFICE/OUTPT VISIT, EST, LEVL III, 20-29 MIN: ICD-10-PCS | Mod: S$PBB,,, | Performed by: PHYSICIAN ASSISTANT

## 2020-02-11 PROCEDURE — 99213 OFFICE O/P EST LOW 20 MIN: CPT | Mod: S$PBB,,, | Performed by: PHYSICIAN ASSISTANT

## 2020-02-11 PROCEDURE — 99999 PR PBB SHADOW E&M-EST. PATIENT-LVL III: CPT | Mod: PBBFAC,,, | Performed by: PHYSICIAN ASSISTANT

## 2020-02-11 PROCEDURE — 99213 OFFICE O/P EST LOW 20 MIN: CPT | Mod: PBBFAC,PN | Performed by: PHYSICIAN ASSISTANT

## 2020-02-11 PROCEDURE — 99999 PR PBB SHADOW E&M-EST. PATIENT-LVL III: ICD-10-PCS | Mod: PBBFAC,,, | Performed by: PHYSICIAN ASSISTANT

## 2020-02-11 NOTE — TELEPHONE ENCOUNTER
Spoke with patients son and he stated that they wanted to leave the PICC line in for the infusions so that they would not have to search for her veins.

## 2020-02-11 NOTE — PROGRESS NOTES
POSTOP VISIT FOR LEFT TKA:  (Neftali)    Anabella Aranda is here today for wound check visit. She is s/p left TKA by Dr. Lindsay on 10/16/19 with wound dehiscence. Home health was worried about drainage and odor of wound and wanted her seen today.     Overall, she is doing okay. She has expected pain in the knee. She is done with HHPT. She does not complain of any fevers, chills, or drainage to wound.       EXAM:  A well developed female in no distress.  Alert and oriented. Breathing in unlabored. Mood and affect are appropriate.     Left knee incision is shows superior aspect of incision is open and healing by secondary intention. Tissue is beefy red and looks healthy. No surrounding erythema. No purulence noted. No odor noted. She lacks full extension and has flexion to almost 90 degrees. The extremity is neurovascularly intact. Calf is supple and non tender.     Impression: No signs of infection with wound/incision healing by secondary intention.     Dr. Lindsay was in to see patient today and he made following plan with her.     Plan:  - Stop HH wound care. Can clean wound daily with water and apply dry dressing. Change this daily. Okay to shower.   - Continue working on HEP. Does not need to go to PT.   - Call if she develops any signs of infection. These were reviewed.     F/u with Dr. Lindsay in 4 weeks in [place] and prn.

## 2020-02-11 NOTE — TELEPHONE ENCOUNTER
She does not need PICC line anymore from my perspective. She can have regular IV for her routine remicade infusions. This does not require PICC.   Thus I recommend removal of picc.

## 2020-02-11 NOTE — TELEPHONE ENCOUNTER
Called the pt son, her caregiver and we discussed how to mange the skin issues she is now having around her stoma. Son says the problem started when HH ordered supplies and they were a different pouch.  He is now out and buying his own supplies , he has picked up a box of convex coloplast cut to fit, does not know which product number as he is in restaurant with his mom.     Discussed skin regimin,    Cut pch 1-2 mm larger than stoma for now   Clean skin with water   Dry   Sprinkle light dusting of powder  Whisk away excess  Then spray with CAVILON   Apply pouch and belt snuggly    Her stool is like water today so I have told him to give her imodium 1 tab 4 x today

## 2020-02-12 ENCOUNTER — TELEPHONE (OUTPATIENT)
Dept: GASTROENTEROLOGY | Facility: CLINIC | Age: 75
End: 2020-02-12

## 2020-02-18 ENCOUNTER — TELEPHONE (OUTPATIENT)
Dept: HOME HEALTH SERVICES | Facility: HOSPITAL | Age: 75
End: 2020-02-18

## 2020-02-19 ENCOUNTER — EXTERNAL HOME HEALTH (OUTPATIENT)
Dept: HOME HEALTH SERVICES | Facility: HOSPITAL | Age: 75
End: 2020-02-19
Payer: MEDICARE

## 2020-02-20 DIAGNOSIS — F02.80 LATE ONSET ALZHEIMER'S DISEASE WITHOUT BEHAVIORAL DISTURBANCE: ICD-10-CM

## 2020-02-20 DIAGNOSIS — G30.1 LATE ONSET ALZHEIMER'S DISEASE WITHOUT BEHAVIORAL DISTURBANCE: ICD-10-CM

## 2020-02-20 RX ORDER — PANTOPRAZOLE SODIUM 40 MG/1
40 TABLET, DELAYED RELEASE ORAL DAILY
Qty: 30 TABLET | Refills: 1 | Status: SHIPPED | OUTPATIENT
Start: 2020-02-20 | End: 2020-03-20 | Stop reason: SDUPTHER

## 2020-02-20 RX ORDER — AZATHIOPRINE 50 MG/1
50 TABLET ORAL DAILY
Qty: 30 TABLET | Refills: 1 | Status: SHIPPED | OUTPATIENT
Start: 2020-02-20 | End: 2020-02-20

## 2020-02-20 RX ORDER — AZATHIOPRINE 50 MG/1
TABLET ORAL
Qty: 90 TABLET | Refills: 3 | Status: SHIPPED | OUTPATIENT
Start: 2020-02-20 | End: 2020-12-23 | Stop reason: SDUPTHER

## 2020-02-20 NOTE — TELEPHONE ENCOUNTER
Spoke to pt's son, Bryson and stated that the insurance company need paperwork filled out regarding pt's Alzheimer. Informed pt's son that the insurance company can fax the paperwork and I will have Dr. Brambila review the paperwork and see if he can fill it out or if pt's Alzheimer doctor need to fill out.

## 2020-02-20 NOTE — TELEPHONE ENCOUNTER
----- Message from Sabino Castellano sent at 2/20/2020 11:10 AM CST -----  Contact: Bryson , Peter / [phone]   Bryson would like a call back from your office regarding refill request for the patient. Please Advise.

## 2020-02-20 NOTE — TELEPHONE ENCOUNTER
----- Message from Roopa Carpenter sent at 2/20/2020  1:16 PM CST -----  Contact: Bryson (son)-[phone]  Bryson (son)-[phone] is requesting a call back regarding paperwork for her insurance company for the pt having Alzheimer, and pt would also like to know the fax number as well. Please call

## 2020-02-24 ENCOUNTER — TELEPHONE (OUTPATIENT)
Dept: HOME HEALTH SERVICES | Facility: HOSPITAL | Age: 75
End: 2020-02-24

## 2020-02-24 ENCOUNTER — TELEPHONE (OUTPATIENT)
Dept: FAMILY MEDICINE | Facility: CLINIC | Age: 75
End: 2020-02-24

## 2020-02-24 NOTE — TELEPHONE ENCOUNTER
----- Message from Veronica Doimnguez sent at 2/24/2020  1:58 PM CST -----  Contact: Peter Massey  [phone]  Patient's son is calling to ask if office received plan of care paperwork he faxed over last week. Please advise.

## 2020-02-27 ENCOUNTER — TELEPHONE (OUTPATIENT)
Dept: FAMILY MEDICINE | Facility: CLINIC | Age: 75
End: 2020-02-27

## 2020-02-27 ENCOUNTER — INFUSION (OUTPATIENT)
Dept: INFUSION THERAPY | Facility: HOSPITAL | Age: 75
End: 2020-02-27
Attending: INTERNAL MEDICINE
Payer: MEDICARE

## 2020-02-27 VITALS
RESPIRATION RATE: 18 BRPM | WEIGHT: 196.81 LBS | TEMPERATURE: 98 F | SYSTOLIC BLOOD PRESSURE: 118 MMHG | DIASTOLIC BLOOD PRESSURE: 58 MMHG | HEART RATE: 89 BPM | OXYGEN SATURATION: 97 % | HEIGHT: 64 IN | BODY MASS INDEX: 33.6 KG/M2

## 2020-02-27 DIAGNOSIS — K50.819 CROHN'S DISEASE OF SMALL AND LARGE INTESTINES WITH COMPLICATION: Primary | ICD-10-CM

## 2020-02-27 PROCEDURE — 63600175 PHARM REV CODE 636 W HCPCS: Performed by: INTERNAL MEDICINE

## 2020-02-27 PROCEDURE — 96415 CHEMO IV INFUSION ADDL HR: CPT

## 2020-02-27 PROCEDURE — 96413 CHEMO IV INFUSION 1 HR: CPT

## 2020-02-27 PROCEDURE — 25000003 PHARM REV CODE 250: Performed by: INTERNAL MEDICINE

## 2020-02-27 RX ORDER — EPINEPHRINE 0.3 MG/.3ML
0.3 INJECTION SUBCUTANEOUS
Status: ACTIVE | OUTPATIENT
Start: 2020-02-27 | End: 2020-02-27

## 2020-02-27 RX ORDER — DIPHENHYDRAMINE HYDROCHLORIDE 50 MG/ML
25 INJECTION INTRAMUSCULAR; INTRAVENOUS
Status: CANCELLED | OUTPATIENT
Start: 2020-03-05

## 2020-02-27 RX ORDER — DIPHENHYDRAMINE HYDROCHLORIDE 50 MG/ML
25 INJECTION INTRAMUSCULAR; INTRAVENOUS
Status: ACTIVE | OUTPATIENT
Start: 2020-02-27 | End: 2020-02-27

## 2020-02-27 RX ORDER — HEPARIN 100 UNIT/ML
500 SYRINGE INTRAVENOUS
Status: DISCONTINUED | OUTPATIENT
Start: 2020-02-27 | End: 2020-02-27 | Stop reason: HOSPADM

## 2020-02-27 RX ORDER — IPRATROPIUM BROMIDE AND ALBUTEROL SULFATE 2.5; .5 MG/3ML; MG/3ML
3 SOLUTION RESPIRATORY (INHALATION)
Status: CANCELLED | OUTPATIENT
Start: 2020-03-05

## 2020-02-27 RX ORDER — ACETAMINOPHEN 325 MG/1
650 TABLET ORAL
Status: ACTIVE | OUTPATIENT
Start: 2020-02-27 | End: 2020-02-27

## 2020-02-27 RX ORDER — ACETAMINOPHEN 325 MG/1
650 TABLET ORAL
Status: CANCELLED | OUTPATIENT
Start: 2020-03-05

## 2020-02-27 RX ORDER — HEPARIN 100 UNIT/ML
500 SYRINGE INTRAVENOUS
Status: CANCELLED | OUTPATIENT
Start: 2020-03-05

## 2020-02-27 RX ORDER — METHYLPREDNISOLONE SOD SUCC 125 MG
40 VIAL (EA) INJECTION
Status: CANCELLED | OUTPATIENT
Start: 2020-03-05

## 2020-02-27 RX ORDER — IPRATROPIUM BROMIDE AND ALBUTEROL SULFATE 2.5; .5 MG/3ML; MG/3ML
3 SOLUTION RESPIRATORY (INHALATION)
Status: DISPENSED | OUTPATIENT
Start: 2020-02-27 | End: 2020-02-27

## 2020-02-27 RX ORDER — SODIUM CHLORIDE 0.9 % (FLUSH) 0.9 %
10 SYRINGE (ML) INJECTION
Status: CANCELLED | OUTPATIENT
Start: 2020-03-05

## 2020-02-27 RX ORDER — METHYLPREDNISOLONE SOD SUCC 125 MG
40 VIAL (EA) INJECTION
Status: ACTIVE | OUTPATIENT
Start: 2020-02-27 | End: 2020-02-27

## 2020-02-27 RX ORDER — ACETAMINOPHEN 325 MG/1
650 TABLET ORAL
Status: COMPLETED | OUTPATIENT
Start: 2020-02-27 | End: 2020-02-27

## 2020-02-27 RX ORDER — EPINEPHRINE 1 MG/ML
0.3 INJECTION, SOLUTION, CONCENTRATE INTRAVENOUS
Status: CANCELLED | OUTPATIENT
Start: 2020-03-05

## 2020-02-27 RX ORDER — SODIUM CHLORIDE 0.9 % (FLUSH) 0.9 %
10 SYRINGE (ML) INJECTION
Status: DISCONTINUED | OUTPATIENT
Start: 2020-02-27 | End: 2020-02-27 | Stop reason: HOSPADM

## 2020-02-27 RX ORDER — DIPHENHYDRAMINE HYDROCHLORIDE 50 MG/ML
25 INJECTION INTRAMUSCULAR; INTRAVENOUS
Status: COMPLETED | OUTPATIENT
Start: 2020-02-27 | End: 2020-02-27

## 2020-02-27 RX ADMIN — DIPHENHYDRAMINE HYDROCHLORIDE 25 MG: 50 INJECTION INTRAMUSCULAR; INTRAVENOUS at 11:02

## 2020-02-27 RX ADMIN — ACETAMINOPHEN 650 MG: 325 TABLET ORAL at 11:02

## 2020-02-27 RX ADMIN — HYDROCORTISONE SODIUM SUCCINATE 200 MG: 100 INJECTION, POWDER, FOR SOLUTION INTRAMUSCULAR; INTRAVENOUS at 11:02

## 2020-02-27 RX ADMIN — INFLIXIMAB 450 MG: 100 INJECTION, POWDER, LYOPHILIZED, FOR SOLUTION INTRAVENOUS at 11:02

## 2020-02-27 NOTE — NURSING
Flushed both port sites for PICC line with 20cc NS each. +blood return. Pt tolerated remicade well. VS stable.

## 2020-02-27 NOTE — NURSING
Pt here for remicade with son. Ambulatory with steady gait noted with walker. Skin b/w/d. Resp's even and unlabored. No distress noted. AAO x 3. ESCALONA x 4. VS stable.

## 2020-03-02 ENCOUNTER — TELEPHONE (OUTPATIENT)
Dept: FAMILY MEDICINE | Facility: CLINIC | Age: 75
End: 2020-03-02

## 2020-03-02 NOTE — TELEPHONE ENCOUNTER
----- Message from Heidi Hart sent at 3/2/2020 11:17 AM CST -----  Contact: [phone] Bryson love   Bryson is calling to get a update on the plan of care forms that he dropped off in your office on 2/26/2020 and would like to know if you can fax the forms to the pt's insurance company [employer].  Please advise

## 2020-03-04 ENCOUNTER — TELEPHONE (OUTPATIENT)
Dept: HOME HEALTH SERVICES | Facility: HOSPITAL | Age: 75
End: 2020-03-04

## 2020-03-05 ENCOUNTER — TELEPHONE (OUTPATIENT)
Dept: FAMILY MEDICINE | Facility: CLINIC | Age: 75
End: 2020-03-05

## 2020-03-05 NOTE — TELEPHONE ENCOUNTER
----- Message from Cecil Beltran sent at 3/5/2020  2:39 PM CST -----  Contact: Bryson Porter (son)/[phone]  Type:  Needs Medical Advice    Who Called: Bryson  Would the patient rather a call back or a response via [facility]?  Call back  Best Call Back Number:  [phone]  Additional Information:  Needs to speak with your office regarding medical records for the sitting company

## 2020-03-05 NOTE — TELEPHONE ENCOUNTER
Spoke to pt's son, Bryson and was requesting medical records. Informed Bryson that he has to order his mom's Medical Records from our Medical Records dept.

## 2020-03-06 ENCOUNTER — TELEPHONE (OUTPATIENT)
Dept: FAMILY MEDICINE | Facility: CLINIC | Age: 75
End: 2020-03-06

## 2020-03-11 ENCOUNTER — PATIENT OUTREACH (OUTPATIENT)
Dept: ADMINISTRATIVE | Facility: OTHER | Age: 75
End: 2020-03-11

## 2020-03-12 ENCOUNTER — PATIENT OUTREACH (OUTPATIENT)
Dept: ADMINISTRATIVE | Facility: HOSPITAL | Age: 75
End: 2020-03-12

## 2020-03-15 PROCEDURE — G0179 MD RECERTIFICATION HHA PT: HCPCS | Mod: ,,, | Performed by: COLON & RECTAL SURGERY

## 2020-03-15 PROCEDURE — G0179 PR HOME HEALTH MD RECERTIFICATION: ICD-10-PCS | Mod: ,,, | Performed by: COLON & RECTAL SURGERY

## 2020-03-17 ENCOUNTER — PATIENT OUTREACH (OUTPATIENT)
Dept: ADMINISTRATIVE | Facility: OTHER | Age: 75
End: 2020-03-17

## 2020-03-19 ENCOUNTER — TELEPHONE (OUTPATIENT)
Dept: GASTROENTEROLOGY | Facility: CLINIC | Age: 75
End: 2020-03-19

## 2020-03-19 NOTE — TELEPHONE ENCOUNTER
I  Called patients son about his mothers appointment tomorrow. We are trying to do the virtual visit or reschedule her appointment.

## 2020-03-20 ENCOUNTER — OFFICE VISIT (OUTPATIENT)
Dept: GASTROENTEROLOGY | Facility: CLINIC | Age: 75
End: 2020-03-20
Payer: MEDICARE

## 2020-03-20 DIAGNOSIS — K21.9 GASTROESOPHAGEAL REFLUX DISEASE, ESOPHAGITIS PRESENCE NOT SPECIFIED: ICD-10-CM

## 2020-03-20 DIAGNOSIS — Z95.828 S/P PICC CENTRAL LINE PLACEMENT: ICD-10-CM

## 2020-03-20 DIAGNOSIS — K50.819 CROHN'S DISEASE OF SMALL AND LARGE INTESTINES WITH COMPLICATION: Primary | Chronic | ICD-10-CM

## 2020-03-20 PROCEDURE — 99214 OFFICE O/P EST MOD 30 MIN: CPT | Mod: 95,,, | Performed by: INTERNAL MEDICINE

## 2020-03-20 PROCEDURE — 99214 PR OFFICE/OUTPT VISIT, EST, LEVL IV, 30-39 MIN: ICD-10-PCS | Mod: 95,,, | Performed by: INTERNAL MEDICINE

## 2020-03-20 RX ORDER — PANTOPRAZOLE SODIUM 40 MG/1
40 TABLET, DELAYED RELEASE ORAL DAILY
Qty: 30 TABLET | Refills: 11 | Status: SHIPPED | OUTPATIENT
Start: 2020-03-20 | End: 2021-03-24

## 2020-03-20 NOTE — PROGRESS NOTES
The patient location is: home  The chief complaint leading to consultation is: follow up crohns disease  Visit type: Virtual visit with synchronous audio and video  Total time spent with patient: 30 minutes  Each patient to whom he or she provides medical services by telemedicine is:  (1) informed of the relationship between the physician and patient and the respective role of any other health care provider with respect to management of the patient; and (2) notified that he or she may decline to receive medical services by telemedicine and may withdraw from such care at any time.    Notes: son was available and provided updates as well.              GASTROENTEROLOGY CLINIC NOTE  Reason for visit: The primary encounter diagnosis was Crohn's disease of small and large intestines with complication. Diagnoses of Gastroesophageal reflux disease, esophagitis presence not specified and S/P PICC central line placement were also pertinent to this visit.  Referring provider/PCP: Shon Brambila MD        HPI:  Anabella Aranda is a 74 y.o. female here today for follow up. Virtual visit due to COVID19 pandemic    Interval history:  - current IBD meds: remicade 5mg/kg q 8 weeks (last dose 2/27 -final loading dose, next dose 4/23);  Imuran 50mg daily  - other GI meds: protonix  - BM/ day - 5 empties of bag daily ; no blood  - constitutional/GI symptoms: no fevers/chills, weight loss, dysphagia  - extraintestinal manifestations: no eye pain/redness, skin lesions/rashes  - narcotics / tobacco: none    She has done well post op. Creation of end -ileostomy with dr. Worley on 1/8/20  Has had follow up with him and follows with maria easton with ostomy clinic.  Son states she is actually doing very well. She empties her bag about 5 times a day. No recent fevers or chills or nausea or vomiting.   She still has PICC line and I recommend that we remove that given the risk of infection.      I have reviewed her infectious  serologies..  Hep B S Ag neg   Hep B S Ab positive   Hep B core Ab total positive   HBV DNA undetected.   - shows immunity to HepB    Her TB skin test was negative during last admission, however her quant gold was indeterminate.  ID team reviewed and stated ok to proceed with remicade from ID perspective. (note on 11/8/19)    Prior Endoscopy:  EGD:  10/2019 with me  Severe duodenitis    11/27/19 with me  Impression:           - Normal esophagus.                        - No gross lesions in the stomach.                        - Small hiatal hernia.                        - No gross lesions in the duodenal bulb, in the                         first portion of the duodenum, in the second                         portion of the duodenum and in the third portion of                         the duodenum.                        - No specimens collected.      Flex sig 10/2019 with me  Impression:           - Mucosal ulceration. Biopsied.                        - Patent end-to-end ileo-rectal anastomosis,                         characterized by an intact appearance.                        - Ileitis. Biopsied.    11/27/19 with me  Impression:           - Decubitus ulceration found on perianal exam.                        - Inflammation was found in the rectum. This was                         moderate in severity, improved compared to previous                         examinations. Biopsied.                        - The monie-terminal ileum appears endoscopically                         much less inflammed and much better. This was                         biopsied to assess disease activty.                        - The rectum is characterized by linear ulcers and                         inflammatory polyps, however the endoscopic                         appearance is much improved.                        - no evidence of fistula found despite instillation                         of methylene blue.    PATH:  1.  BIOPSIES OF  NEOTERMINAL ILEUM:   NON ULCERATED MUCOSA WITH MODEST NONSPECIFIC MUCOSAL CHRONIC REACTIVE CHANGES     2.  RECTAL BIOPSIES:   AREAS OF INTENSE CHRONIC ACTIVE ULCERATION IN A BACKGROUND OF MUCOSA WITH   MODEST NONSPECIFIC CHRONIC INFLAMMATION   THE PICTURE IN THIS BIOPSY IS NOT THAT OF PSEUDOMEMBRANOUS COLITIS, BUT   CLOSTRIDIUM INFECTION CANNOT BE EXCLUDED.    (Portions of this note were dictated using voice recognition software and may contain dictation related errors in spelling/grammar/syntax not found on text review)    Review of Systems   Constitutional: Negative for chills, fever and malaise/fatigue.   Gastrointestinal: Negative for abdominal pain, blood in stool, diarrhea, heartburn, nausea and vomiting.       Past Medical History: has a past medical history of Arthritis, C. difficile colitis, Chronic kidney disease, stage 3, Dementia, Hepatitis B, Hypertension, Major depression with psychotic features, and Ulcerative colitis.    Past Surgical History: has a past surgical history that includes Total knee arthroplasty (Right, 04/07/2008); Nasal sinus surgery; Cholecystectomy; Tonsillectomy; emma filter placement (Right, 01/2014); Colonoscopy (N/A, 4/29/2016); Esophagogastroduodenoscopy (N/A, 1/25/2019); Flexible sigmoidoscopy (N/A, 1/25/2019); Laparoscopic total colectomy (06/24/2016); Total knee arthroplasty (Left, 10/16/2019); Knee Arthroplasty (Left, 10/16/2019); Esophagogastroduodenoscopy (N/A, 10/21/2019); Flexible sigmoidoscopy (N/A, 10/21/2019); Joint replacement; Esophagogastroduodenoscopy (N/A, 11/27/2019); Flexible sigmoidoscopy (N/A, 11/27/2019); Laparoscopic ileostomy (N/A, 1/8/2020); and Lysis of adhesions (1/8/2020).    Family History:family history includes Colon cancer in her paternal grandmother; Throat cancer in her father.    Allergies:   Review of patient's allergies indicates:   Allergen Reactions    Sulfa (sulfonamide antibiotics) Swelling       Social History: reports that she  quit smoking about 22 years ago. Her smoking use included cigarettes. She has quit using smokeless tobacco. She reports that she does not drink alcohol or use drugs.    Home medications:   Current Outpatient Medications on File Prior to Visit   Medication Sig Dispense Refill    aspirin (ECOTRIN) 81 MG EC tablet Take 1 tablet (81 mg total) by mouth once daily. 30 tablet 0    azaTHIOprine (IMURAN) 50 mg Tab TAKE 1 TABLET(50 MG) BY MOUTH EVERY DAY 90 tablet 3    donepezil (ARICEPT) 10 MG tablet Take 1 tablet (10 mg total) by mouth every evening. (Patient taking differently: Take 10 mg by mouth once daily. ) 90 tablet 1    ferrous sulfate (FEOSOL) 325 mg (65 mg iron) Tab tablet Take 1 tablet (325 mg total) by mouth 2 (two) times daily. (Patient not taking: Reported on 2/11/2020) 180 tablet 1    magnesium oxide (MAG-OX) 400 mg (241.3 mg magnesium) tablet Take 1 tablet (400 mg total) by mouth 2 (two) times daily.  0    memantine (NAMENDA) 10 MG Tab Take 1 tablet (10 mg total) by mouth once daily. 180 tablet 1    oxyCODONE (ROXICODONE) 5 MG immediate release tablet Take 1 tablet (5 mg total) by mouth every 6 (six) hours as needed. (Patient not taking: Reported on 2/11/2020) 30 tablet 0    QUEtiapine (SEROQUEL) 100 MG Tab Take 1 tablet (100 mg total) by mouth every evening. (Patient not taking: Reported on 2/11/2020) 30 tablet 1    QUEtiapine (SEROQUEL) 200 MG Tab       [DISCONTINUED] pantoprazole (PROTONIX) 40 MG tablet Take 1 tablet (40 mg total) by mouth once daily. 30 tablet 1     No current facility-administered medications on file prior to visit.        Vital signs:  LMP  (LMP Unknown)     Physical Exam    NO PHYSICAL EXAM PERFORMED GIVEN VIRTUAL VISIT    Routine labs:  Lab Results   Component Value Date    WBC 7.27 01/10/2020    HGB 8.8 (L) 01/10/2020    HCT 29.2 (L) 01/10/2020    MCV 98 01/10/2020     (H) 01/10/2020     Lab Results   Component Value Date    INR 1.1 10/16/2019     Lab Results    Component Value Date    IRON 59 02/04/2019    FERRITIN 97 02/04/2019    TIBC 327 02/04/2019    FESATURATED 18 (L) 02/04/2019     Lab Results   Component Value Date     01/10/2020    K 3.4 (L) 01/10/2020     (H) 01/10/2020    CO2 23 01/10/2020    BUN 6 (L) 01/10/2020    CREATININE 0.7 01/10/2020     Lab Results   Component Value Date    ALBUMIN 2.5 (L) 12/18/2019    ALT 8 (L) 12/18/2019    AST 22 12/18/2019    ALKPHOS 118 12/18/2019    BILITOT 0.4 12/18/2019     No results found for: GLUCOSE    I have reviewed recent labs including hep b and TB serologies as noted above.      Assessment:  1. Crohn's disease of small and large intestines with complication    2. Gastroesophageal reflux disease, esophagitis presence not specified    3. S/P PICC central line placement      Now s/p end ileostomy for significant disease of the retained rectum and for diversion.  She is on maintenance remicade 5 q 8 weeks. alos on imuran 50mg daily  She is currently doing well.    Plan:  Orders Placed This Encounter    pantoprazole (PROTONIX) 40 MG tablet     Continue remicade and imuran  Discussed that we recommend continuing immunosuppression even in the setting of ongoing COVID19 given the risk of flare with stopping medication is greater than risk of infection.    Will plan flex sig every  1 -2 years given   I advised removal of PICC line given infectious concerns and will attempt to contact [facility] nursing. I have spoken with Love moreno and will send orders over to remove picc (see letter).    RTC around 3 months  Will need routine labs at that time.      Og Carrera MD  [facility]    A total of 30 minutes were spent face-to-face with the patient during this encounter and over half of that time was spent on counseling and coordination of care.

## 2020-03-20 NOTE — LETTER
[facility]  [address], [address]  [place] [address]  Phone: [phone]       To home health nursing.    Patient does not require PICC line.  Please discontinue PICC line.    Dr. Carrera

## 2020-03-23 ENCOUNTER — EXTERNAL HOME HEALTH (OUTPATIENT)
Dept: HOME HEALTH SERVICES | Facility: HOSPITAL | Age: 75
End: 2020-03-23
Payer: MEDICARE

## 2020-04-16 ENCOUNTER — TELEPHONE (OUTPATIENT)
Dept: GASTROENTEROLOGY | Facility: CLINIC | Age: 75
End: 2020-04-16

## 2020-04-16 DIAGNOSIS — K50.819 CROHN'S DISEASE OF SMALL AND LARGE INTESTINES WITH COMPLICATION: Primary | ICD-10-CM

## 2020-04-16 NOTE — TELEPHONE ENCOUNTER
Please inform chelsie.  I recommend that, assuming she has no symptoms of COVID, she proceed with infusion.    We are testing all infusion patients prior, and they will need to go to drive thru testing Monday or Tuesday ([facility] or main [place]) to get tested... This is required before her infusion on Thursday.    This is infusion center protocol.  Please arrange this.    Will cc MA and Tania in infusion center.

## 2020-04-20 ENCOUNTER — LAB VISIT (OUTPATIENT)
Dept: INTERNAL MEDICINE | Facility: CLINIC | Age: 75
End: 2020-04-20
Payer: MEDICARE

## 2020-04-20 ENCOUNTER — TELEPHONE (OUTPATIENT)
Dept: GASTROENTEROLOGY | Facility: CLINIC | Age: 75
End: 2020-04-20

## 2020-04-20 DIAGNOSIS — K50.819 CROHN'S DISEASE OF SMALL AND LARGE INTESTINES WITH COMPLICATION: ICD-10-CM

## 2020-04-20 LAB — SARS-COV-2 RNA RESP QL NAA+PROBE: NOT DETECTED

## 2020-04-20 PROCEDURE — U0002 COVID-19 LAB TEST NON-CDC: HCPCS

## 2020-04-20 NOTE — TELEPHONE ENCOUNTER
----- Message from Og Carrera MD sent at 4/20/2020  3:44 PM CDT -----  cOVID is negative and ok to proceed with routine infusion.

## 2020-04-23 ENCOUNTER — INFUSION (OUTPATIENT)
Dept: INFUSION THERAPY | Facility: HOSPITAL | Age: 75
End: 2020-04-23
Attending: INTERNAL MEDICINE
Payer: MEDICARE

## 2020-04-23 ENCOUNTER — DOCUMENT SCAN (OUTPATIENT)
Dept: HOME HEALTH SERVICES | Facility: HOSPITAL | Age: 75
End: 2020-04-23
Payer: MEDICARE

## 2020-04-23 VITALS
DIASTOLIC BLOOD PRESSURE: 64 MMHG | RESPIRATION RATE: 18 BRPM | WEIGHT: 196.19 LBS | HEART RATE: 111 BPM | OXYGEN SATURATION: 97 % | SYSTOLIC BLOOD PRESSURE: 136 MMHG | HEIGHT: 64 IN | BODY MASS INDEX: 33.49 KG/M2 | TEMPERATURE: 99 F

## 2020-04-23 DIAGNOSIS — K50.819 CROHN'S DISEASE OF SMALL AND LARGE INTESTINES WITH COMPLICATION: Primary | ICD-10-CM

## 2020-04-23 PROCEDURE — 96413 CHEMO IV INFUSION 1 HR: CPT

## 2020-04-23 PROCEDURE — A4216 STERILE WATER/SALINE, 10 ML: HCPCS | Performed by: INTERNAL MEDICINE

## 2020-04-23 PROCEDURE — 63600175 PHARM REV CODE 636 W HCPCS: Performed by: INTERNAL MEDICINE

## 2020-04-23 PROCEDURE — 25000003 PHARM REV CODE 250: Performed by: INTERNAL MEDICINE

## 2020-04-23 RX ORDER — HEPARIN 100 UNIT/ML
500 SYRINGE INTRAVENOUS
Status: CANCELLED | OUTPATIENT
Start: 2020-06-18

## 2020-04-23 RX ORDER — DIPHENHYDRAMINE HYDROCHLORIDE 50 MG/ML
25 INJECTION INTRAMUSCULAR; INTRAVENOUS
Status: CANCELLED | OUTPATIENT
Start: 2020-06-18

## 2020-04-23 RX ORDER — DIPHENHYDRAMINE HYDROCHLORIDE 50 MG/ML
25 INJECTION INTRAMUSCULAR; INTRAVENOUS
Status: COMPLETED | OUTPATIENT
Start: 2020-04-23 | End: 2020-04-23

## 2020-04-23 RX ORDER — ACETAMINOPHEN 325 MG/1
650 TABLET ORAL
Status: COMPLETED | OUTPATIENT
Start: 2020-04-23 | End: 2020-04-23

## 2020-04-23 RX ORDER — ACETAMINOPHEN 325 MG/1
650 TABLET ORAL
Status: CANCELLED | OUTPATIENT
Start: 2020-06-18

## 2020-04-23 RX ORDER — METHYLPREDNISOLONE SOD SUCC 125 MG
40 VIAL (EA) INJECTION
Status: CANCELLED | OUTPATIENT
Start: 2020-06-18

## 2020-04-23 RX ORDER — SODIUM CHLORIDE 0.9 % (FLUSH) 0.9 %
10 SYRINGE (ML) INJECTION
Status: CANCELLED | OUTPATIENT
Start: 2020-06-18

## 2020-04-23 RX ORDER — SODIUM CHLORIDE 0.9 % (FLUSH) 0.9 %
10 SYRINGE (ML) INJECTION
Status: DISCONTINUED | OUTPATIENT
Start: 2020-04-23 | End: 2020-04-23 | Stop reason: HOSPADM

## 2020-04-23 RX ORDER — IPRATROPIUM BROMIDE AND ALBUTEROL SULFATE 2.5; .5 MG/3ML; MG/3ML
3 SOLUTION RESPIRATORY (INHALATION)
Status: CANCELLED | OUTPATIENT
Start: 2020-06-18

## 2020-04-23 RX ORDER — HEPARIN 100 UNIT/ML
500 SYRINGE INTRAVENOUS
Status: DISCONTINUED | OUTPATIENT
Start: 2020-04-23 | End: 2020-04-23 | Stop reason: HOSPADM

## 2020-04-23 RX ORDER — EPINEPHRINE 1 MG/ML
0.3 INJECTION, SOLUTION, CONCENTRATE INTRAVENOUS
Status: CANCELLED | OUTPATIENT
Start: 2020-06-18

## 2020-04-23 RX ADMIN — ACETAMINOPHEN 650 MG: 325 TABLET ORAL at 10:04

## 2020-04-23 RX ADMIN — HYDROCORTISONE SODIUM SUCCINATE 200 MG: 100 INJECTION, POWDER, FOR SOLUTION INTRAMUSCULAR; INTRAVENOUS at 10:04

## 2020-04-23 RX ADMIN — SODIUM CHLORIDE 250 ML: 0.9 INJECTION, SOLUTION INTRAVENOUS at 11:04

## 2020-04-23 RX ADMIN — DIPHENHYDRAMINE HYDROCHLORIDE 25 MG: 50 INJECTION INTRAMUSCULAR; INTRAVENOUS at 10:04

## 2020-04-23 RX ADMIN — Medication 10 ML: at 01:04

## 2020-04-23 RX ADMIN — INFLIXIMAB 450 MG: 100 INJECTION, POWDER, LYOPHILIZED, FOR SOLUTION INTRAVENOUS at 11:04

## 2020-04-24 ENCOUNTER — PATIENT MESSAGE (OUTPATIENT)
Dept: WOUND CARE | Facility: CLINIC | Age: 75
End: 2020-04-24

## 2020-05-06 PROBLEM — Z43.2 ATTENTION TO ILEOSTOMY: Status: ACTIVE | Noted: 2020-05-06

## 2020-05-18 ENCOUNTER — TELEPHONE (OUTPATIENT)
Dept: FAMILY MEDICINE | Facility: CLINIC | Age: 75
End: 2020-05-18

## 2020-05-18 NOTE — TELEPHONE ENCOUNTER
Informed pt's son, Bryson that Dr. Brambila does not do long term care. Son will call [facility] and let them know.

## 2020-05-18 NOTE — TELEPHONE ENCOUNTER
----- Message from Florence Camilo sent at 5/18/2020  3:37 PM CDT -----  Contact: Bryson(son) [phone]  [facility] is asking for a long term patient care form. Please call and advise

## 2020-06-16 ENCOUNTER — TELEPHONE (OUTPATIENT)
Dept: FAMILY MEDICINE | Facility: CLINIC | Age: 75
End: 2020-06-16

## 2020-06-16 NOTE — TELEPHONE ENCOUNTER
----- Message from Sabino Castellano sent at 6/16/2020  1:13 PM CDT -----  Contact: Bryson, son // [phone]  Patient's son would like to speak with your office regarding a waiver form he needs filled out for his mother. Please Advise.

## 2020-06-16 NOTE — TELEPHONE ENCOUNTER
Spoke to pt's son, Bryson and stated that he has paperwork that needs to be filled out. Informed Bryson to bring or fax the paperwork to the office so that it can be reviewed by Dr. Brambila.

## 2020-06-18 ENCOUNTER — TELEPHONE (OUTPATIENT)
Dept: FAMILY MEDICINE | Facility: CLINIC | Age: 75
End: 2020-06-18

## 2020-06-18 ENCOUNTER — INFUSION (OUTPATIENT)
Dept: INFUSION THERAPY | Facility: HOSPITAL | Age: 75
End: 2020-06-18
Attending: INTERNAL MEDICINE
Payer: MEDICARE

## 2020-06-18 VITALS
RESPIRATION RATE: 18 BRPM | TEMPERATURE: 98 F | WEIGHT: 196.19 LBS | HEIGHT: 64 IN | DIASTOLIC BLOOD PRESSURE: 81 MMHG | HEART RATE: 87 BPM | SYSTOLIC BLOOD PRESSURE: 131 MMHG | OXYGEN SATURATION: 98 % | BODY MASS INDEX: 33.49 KG/M2

## 2020-06-18 DIAGNOSIS — K50.819 CROHN'S DISEASE OF SMALL AND LARGE INTESTINES WITH COMPLICATION: Primary | ICD-10-CM

## 2020-06-18 PROCEDURE — 63600175 PHARM REV CODE 636 W HCPCS: Performed by: INTERNAL MEDICINE

## 2020-06-18 PROCEDURE — A4216 STERILE WATER/SALINE, 10 ML: HCPCS | Performed by: INTERNAL MEDICINE

## 2020-06-18 PROCEDURE — 25000003 PHARM REV CODE 250: Performed by: INTERNAL MEDICINE

## 2020-06-18 PROCEDURE — 96413 CHEMO IV INFUSION 1 HR: CPT

## 2020-06-18 PROCEDURE — 96415 CHEMO IV INFUSION ADDL HR: CPT

## 2020-06-18 RX ORDER — SODIUM CHLORIDE 0.9 % (FLUSH) 0.9 %
10 SYRINGE (ML) INJECTION
Status: CANCELLED | OUTPATIENT
Start: 2020-08-13

## 2020-06-18 RX ORDER — METHYLPREDNISOLONE SOD SUCC 125 MG
40 VIAL (EA) INJECTION
Status: CANCELLED | OUTPATIENT
Start: 2020-08-13

## 2020-06-18 RX ORDER — DIPHENHYDRAMINE HYDROCHLORIDE 50 MG/ML
25 INJECTION INTRAMUSCULAR; INTRAVENOUS
Status: CANCELLED | OUTPATIENT
Start: 2020-08-13

## 2020-06-18 RX ORDER — EPINEPHRINE 1 MG/ML
0.3 INJECTION, SOLUTION, CONCENTRATE INTRAVENOUS
Status: CANCELLED | OUTPATIENT
Start: 2020-08-13

## 2020-06-18 RX ORDER — ACETAMINOPHEN 325 MG/1
650 TABLET ORAL
Status: CANCELLED | OUTPATIENT
Start: 2020-08-13

## 2020-06-18 RX ORDER — IPRATROPIUM BROMIDE AND ALBUTEROL SULFATE 2.5; .5 MG/3ML; MG/3ML
3 SOLUTION RESPIRATORY (INHALATION)
Status: CANCELLED | OUTPATIENT
Start: 2020-08-13

## 2020-06-18 RX ORDER — ACETAMINOPHEN 325 MG/1
650 TABLET ORAL
Status: COMPLETED | OUTPATIENT
Start: 2020-06-18 | End: 2020-06-18

## 2020-06-18 RX ORDER — DIPHENHYDRAMINE HYDROCHLORIDE 50 MG/ML
25 INJECTION INTRAMUSCULAR; INTRAVENOUS
Status: COMPLETED | OUTPATIENT
Start: 2020-06-18 | End: 2020-06-18

## 2020-06-18 RX ORDER — HEPARIN 100 UNIT/ML
500 SYRINGE INTRAVENOUS
Status: CANCELLED | OUTPATIENT
Start: 2020-08-13

## 2020-06-18 RX ORDER — SODIUM CHLORIDE 0.9 % (FLUSH) 0.9 %
10 SYRINGE (ML) INJECTION
Status: DISCONTINUED | OUTPATIENT
Start: 2020-06-18 | End: 2020-06-18 | Stop reason: HOSPADM

## 2020-06-18 RX ORDER — HEPARIN 100 UNIT/ML
500 SYRINGE INTRAVENOUS
Status: DISCONTINUED | OUTPATIENT
Start: 2020-06-18 | End: 2020-06-18 | Stop reason: HOSPADM

## 2020-06-18 RX ADMIN — ACETAMINOPHEN 650 MG: 325 TABLET ORAL at 12:06

## 2020-06-18 RX ADMIN — INFLIXIMAB 450 MG: 100 INJECTION, POWDER, LYOPHILIZED, FOR SOLUTION INTRAVENOUS at 12:06

## 2020-06-18 RX ADMIN — DIPHENHYDRAMINE HYDROCHLORIDE 25 MG: 50 INJECTION INTRAMUSCULAR; INTRAVENOUS at 12:06

## 2020-06-18 RX ADMIN — HYDROCORTISONE SODIUM SUCCINATE 200 MG: 100 INJECTION, POWDER, FOR SOLUTION INTRAMUSCULAR; INTRAVENOUS at 12:06

## 2020-06-18 RX ADMIN — Medication 10 ML: at 02:06

## 2020-06-18 NOTE — TELEPHONE ENCOUNTER
----- Message from Marion Rodriguez sent at 6/18/2020  1:02 PM CDT -----  Regarding: Form completed  Pt son came in today to drop off form to get completed by Dr Brambila. Form is from [employer] [facility]. Please call kate Massey at [phone] when form is done.

## 2020-06-19 ENCOUNTER — LAB VISIT (OUTPATIENT)
Dept: LAB | Facility: HOSPITAL | Age: 75
End: 2020-06-19
Attending: INTERNAL MEDICINE
Payer: MEDICARE

## 2020-06-19 ENCOUNTER — OFFICE VISIT (OUTPATIENT)
Dept: GASTROENTEROLOGY | Facility: CLINIC | Age: 75
End: 2020-06-19
Payer: MEDICARE

## 2020-06-19 VITALS — WEIGHT: 205 LBS | HEIGHT: 64 IN | BODY MASS INDEX: 35 KG/M2

## 2020-06-19 DIAGNOSIS — K50.819 CROHN'S DISEASE OF SMALL AND LARGE INTESTINES WITH COMPLICATION: Primary | ICD-10-CM

## 2020-06-19 DIAGNOSIS — K50.819 CROHN'S DISEASE OF SMALL AND LARGE INTESTINES WITH COMPLICATION: ICD-10-CM

## 2020-06-19 DIAGNOSIS — Z79.899 HIGH RISK MEDICATION USE: ICD-10-CM

## 2020-06-19 LAB
ALBUMIN SERPL BCP-MCNC: 3.7 G/DL (ref 3.5–5.2)
ALP SERPL-CCNC: 93 U/L (ref 55–135)
ALT SERPL W/O P-5'-P-CCNC: 10 U/L (ref 10–44)
ANION GAP SERPL CALC-SCNC: 6 MMOL/L (ref 8–16)
AST SERPL-CCNC: 20 U/L (ref 10–40)
BASOPHILS # BLD AUTO: 0.02 K/UL (ref 0–0.2)
BASOPHILS NFR BLD: 0.4 % (ref 0–1.9)
BILIRUB SERPL-MCNC: 0.4 MG/DL (ref 0.1–1)
BUN SERPL-MCNC: 19 MG/DL (ref 8–23)
CALCIUM SERPL-MCNC: 9.2 MG/DL (ref 8.7–10.5)
CHLORIDE SERPL-SCNC: 109 MMOL/L (ref 95–110)
CO2 SERPL-SCNC: 25 MMOL/L (ref 23–29)
CREAT SERPL-MCNC: 1.2 MG/DL (ref 0.5–1.4)
CRP SERPL-MCNC: 1.2 MG/L (ref 0–8.2)
DIFFERENTIAL METHOD: ABNORMAL
EOSINOPHIL # BLD AUTO: 0 K/UL (ref 0–0.5)
EOSINOPHIL NFR BLD: 0.8 % (ref 0–8)
ERYTHROCYTE [DISTWIDTH] IN BLOOD BY AUTOMATED COUNT: 13.2 % (ref 11.5–14.5)
EST. GFR  (AFRICAN AMERICAN): 52 ML/MIN/1.73 M^2
EST. GFR  (NON AFRICAN AMERICAN): 45 ML/MIN/1.73 M^2
GLUCOSE SERPL-MCNC: 56 MG/DL (ref 70–110)
HCT VFR BLD AUTO: 36.9 % (ref 37–48.5)
HGB BLD-MCNC: 12.3 G/DL (ref 12–16)
IMM GRANULOCYTES # BLD AUTO: 0 K/UL (ref 0–0.04)
IMM GRANULOCYTES NFR BLD AUTO: 0 % (ref 0–0.5)
LYMPHOCYTES # BLD AUTO: 2.6 K/UL (ref 1–4.8)
LYMPHOCYTES NFR BLD: 49.6 % (ref 18–48)
MCH RBC QN AUTO: 31.4 PG (ref 27–31)
MCHC RBC AUTO-ENTMCNC: 33.3 G/DL (ref 32–36)
MCV RBC AUTO: 94 FL (ref 82–98)
MONOCYTES # BLD AUTO: 0.5 K/UL (ref 0.3–1)
MONOCYTES NFR BLD: 8.7 % (ref 4–15)
NEUTROPHILS # BLD AUTO: 2.1 K/UL (ref 1.8–7.7)
NEUTROPHILS NFR BLD: 40.5 % (ref 38–73)
NRBC BLD-RTO: 0 /100 WBC
PLATELET # BLD AUTO: 264 K/UL (ref 150–350)
PMV BLD AUTO: 8.6 FL (ref 9.2–12.9)
POTASSIUM SERPL-SCNC: 4 MMOL/L (ref 3.5–5.1)
PROT SERPL-MCNC: 7.8 G/DL (ref 6–8.4)
RBC # BLD AUTO: 3.92 M/UL (ref 4–5.4)
SODIUM SERPL-SCNC: 140 MMOL/L (ref 136–145)
WBC # BLD AUTO: 5.18 K/UL (ref 3.9–12.7)

## 2020-06-19 PROCEDURE — 99999 PR PBB SHADOW E&M-EST. PATIENT-LVL III: ICD-10-PCS | Mod: PBBFAC,,, | Performed by: INTERNAL MEDICINE

## 2020-06-19 PROCEDURE — 87340 HEPATITIS B SURFACE AG IA: CPT

## 2020-06-19 PROCEDURE — 36415 COLL VENOUS BLD VENIPUNCTURE: CPT

## 2020-06-19 PROCEDURE — 99999 PR PBB SHADOW E&M-EST. PATIENT-LVL III: CPT | Mod: PBBFAC,,, | Performed by: INTERNAL MEDICINE

## 2020-06-19 PROCEDURE — 99214 PR OFFICE/OUTPT VISIT, EST, LEVL IV, 30-39 MIN: ICD-10-PCS | Mod: S$PBB,,, | Performed by: INTERNAL MEDICINE

## 2020-06-19 PROCEDURE — 99214 OFFICE O/P EST MOD 30 MIN: CPT | Mod: S$PBB,,, | Performed by: INTERNAL MEDICINE

## 2020-06-19 PROCEDURE — 85025 COMPLETE CBC W/AUTO DIFF WBC: CPT

## 2020-06-19 PROCEDURE — 86140 C-REACTIVE PROTEIN: CPT

## 2020-06-19 PROCEDURE — 80053 COMPREHEN METABOLIC PANEL: CPT

## 2020-06-19 PROCEDURE — 99213 OFFICE O/P EST LOW 20 MIN: CPT | Mod: PBBFAC,PO | Performed by: INTERNAL MEDICINE

## 2020-06-19 NOTE — PROGRESS NOTES
GASTROENTEROLOGY CLINIC NOTE  Reason for visit: The primary encounter diagnosis was Crohn's disease of small and large intestines with complication. A diagnosis of High risk medication use was also pertinent to this visit.  Referring provider/PCP: Shon Brambila MD        HPI:  Anabella Aranda is a 75 y.o. female here today for follow up. crohns.    Interval history:  - current IBD meds: remicade 5mg/kg q 8 weeks (last dose 6/19);  Imuran 50mg daily  - other GI meds: protonix  - BM/ day - 5 -6 empties a day   - he mentions it seems a little more loose but today is more formed, no increase in empties or stool amount.  - constitutional/GI symptoms: no fevers/chills, weight loss, dysphagia  - extraintestinal manifestations: no eye pain/redness, skin lesions/rashes  - narcotics / tobacco: none    Continues to do well, weight is up about 10 lbs. Great appetite. No fevers or chills.  Mentions stool is slightly more loose but no increase in amount, no blood.  Creation of end -ileostomy with dr. Worley on 1/8/20      I have reviewed her infectious serologies..  Hep B S Ag neg   Hep B S Ab positive   Hep B core Ab total positive   HBV DNA undetected.   - shows immunity to HepB    Her TB skin test was negative during last admission, however her quant gold was indeterminate.  ID team reviewed and stated ok to proceed with remicade from ID perspective. (note on 11/8/19)    Prior Endoscopy:  EGD:  10/2019 with me  Severe duodenitis    11/27/19 with me  Impression:           - Normal esophagus.                        - No gross lesions in the stomach.                        - Small hiatal hernia.                        - No gross lesions in the duodenal bulb, in the                         first portion of the duodenum, in the second                         portion of the duodenum and in the third portion of                         the duodenum.                        - No specimens collected.      Flex sig 10/2019  with me  Impression:           - Mucosal ulceration. Biopsied.                        - Patent end-to-end ileo-rectal anastomosis,                         characterized by an intact appearance.                        - Ileitis. Biopsied.    11/27/19 with me  Impression:           - Decubitus ulceration found on perianal exam.                        - Inflammation was found in the rectum. This was                         moderate in severity, improved compared to previous                         examinations. Biopsied.                        - The monie-terminal ileum appears endoscopically                         much less inflammed and much better. This was                         biopsied to assess disease activty.                        - The rectum is characterized by linear ulcers and                         inflammatory polyps, however the endoscopic                         appearance is much improved.                        - no evidence of fistula found despite instillation                         of methylene blue.    PATH:  1.  BIOPSIES OF NEOTERMINAL ILEUM:   NON ULCERATED MUCOSA WITH MODEST NONSPECIFIC MUCOSAL CHRONIC REACTIVE CHANGES     2.  RECTAL BIOPSIES:   AREAS OF INTENSE CHRONIC ACTIVE ULCERATION IN A BACKGROUND OF MUCOSA WITH   MODEST NONSPECIFIC CHRONIC INFLAMMATION   THE PICTURE IN THIS BIOPSY IS NOT THAT OF PSEUDOMEMBRANOUS COLITIS, BUT   CLOSTRIDIUM INFECTION CANNOT BE EXCLUDED.    (Portions of this note were dictated using voice recognition software and may contain dictation related errors in spelling/grammar/syntax not found on text review)    Review of Systems   Constitutional: Negative for chills, fever and malaise/fatigue.   Gastrointestinal: Negative for abdominal pain, blood in stool, diarrhea, heartburn, nausea and vomiting.   Skin: Negative for itching and rash.       Past Medical History: has a past medical history of Arthritis, C. difficile colitis, Chronic kidney disease, stage 3,  Dementia, Hepatitis B, Hypertension, Major depression with psychotic features, and Ulcerative colitis.    Past Surgical History: has a past surgical history that includes Total knee arthroplasty (Right, 04/07/2008); Nasal sinus surgery; Cholecystectomy; Tonsillectomy; emma filter placement (Right, 01/2014); Colonoscopy (N/A, 4/29/2016); Esophagogastroduodenoscopy (N/A, 1/25/2019); Flexible sigmoidoscopy (N/A, 1/25/2019); Laparoscopic total colectomy (06/24/2016); Total knee arthroplasty (Left, 10/16/2019); Knee Arthroplasty (Left, 10/16/2019); Esophagogastroduodenoscopy (N/A, 10/21/2019); Flexible sigmoidoscopy (N/A, 10/21/2019); Joint replacement; Esophagogastroduodenoscopy (N/A, 11/27/2019); Flexible sigmoidoscopy (N/A, 11/27/2019); Laparoscopic ileostomy (N/A, 1/8/2020); and Lysis of adhesions (1/8/2020).    Family History:family history includes Colon cancer in her paternal grandmother; Throat cancer in her father.    Allergies:   Review of patient's allergies indicates:   Allergen Reactions    Sulfa (sulfonamide antibiotics) Swelling       Social History: reports that she quit smoking about 22 years ago. Her smoking use included cigarettes. She has quit using smokeless tobacco. She reports that she does not drink alcohol or use drugs.    Home medications:   Current Outpatient Medications on File Prior to Visit   Medication Sig Dispense Refill    aspirin (ECOTRIN) 81 MG EC tablet Take 1 tablet (81 mg total) by mouth once daily. 30 tablet 0    azaTHIOprine (IMURAN) 50 mg Tab TAKE 1 TABLET(50 MG) BY MOUTH EVERY DAY 90 tablet 3    donepezil (ARICEPT) 10 MG tablet Take 1 tablet (10 mg total) by mouth every evening. (Patient taking differently: Take 10 mg by mouth once daily. ) 90 tablet 1    ferrous sulfate (FEOSOL) 325 mg (65 mg iron) Tab tablet Take 1 tablet (325 mg total) by mouth 2 (two) times daily. 180 tablet 1    magnesium oxide (MAG-OX) 400 mg (241.3 mg magnesium) tablet Take 1 tablet (400 mg  "total) by mouth 2 (two) times daily.  0    memantine (NAMENDA) 10 MG Tab Take 1 tablet (10 mg total) by mouth once daily. 180 tablet 1    oxyCODONE (ROXICODONE) 5 MG immediate release tablet Take 1 tablet (5 mg total) by mouth every 6 (six) hours as needed. 30 tablet 0    pantoprazole (PROTONIX) 40 MG tablet Take 1 tablet (40 mg total) by mouth once daily. 30 tablet 11    QUEtiapine (SEROQUEL) 100 MG Tab Take 1 tablet (100 mg total) by mouth every evening. 30 tablet 1    QUEtiapine (SEROQUEL) 200 MG Tab        Current Facility-Administered Medications on File Prior to Visit   Medication Dose Route Frequency Provider Last Rate Last Dose    [COMPLETED] acetaminophen tablet 650 mg  650 mg Oral 1 time in Clinic/HOD Og Carrera MD   650 mg at 06/18/20 1233    [COMPLETED] diphenhydrAMINE injection 25 mg  25 mg Intravenous 1 time in Clinic/HOD Og Carrera MD   25 mg at 06/18/20 1234    [COMPLETED] hydrocortisone sodium succinate injection 200 mg  200 mg Intravenous 1 time in Clinic/HOD Og Carrera MD   200 mg at 06/18/20 1234    [COMPLETED] inFLIXimab (REMICADE) 450 mg in sodium chloride 0.9% 250 mL IVPB  5 mg/kg Intravenous 1 time in Clinic/HOD Og Carrera MD   Stopped at 06/18/20 1455    [DISCONTINUED] heparin, porcine (PF) 100 unit/mL injection flush 500 Units  500 Units Intravenous PRN Og Carrera MD        [DISCONTINUED] sodium chloride 0.9% flush 10 mL  10 mL Intravenous PRN Og Carrera MD   10 mL at 06/18/20 1455       Vital signs:  Ht 5' 4" (1.626 m)   Wt 93 kg (205 lb)   LMP  (LMP Unknown)   BMI 35.19 kg/m²     Physical Exam  Vitals signs reviewed.   Constitutional:       Appearance: She is not toxic-appearing.      Comments: Appears improved   HENT:      Head: Normocephalic and atraumatic.   Eyes:      General: No scleral icterus.     Conjunctiva/sclera: Conjunctivae normal.   Abdominal:      General: Abdomen is flat.      Palpations: Abdomen is soft.      Comments: rlq " ostomy bag with semi thick brown stool   Neurological:      Mental Status: She is alert.   Psychiatric:         Mood and Affect: Mood normal.         Behavior: Behavior normal.         NO PHYSICAL EXAM PERFORMED GIVEN VIRTUAL VISIT    Routine labs:  Lab Results   Component Value Date    WBC 7.27 01/10/2020    HGB 8.8 (L) 01/10/2020    HCT 29.2 (L) 01/10/2020    MCV 98 01/10/2020     (H) 01/10/2020     Lab Results   Component Value Date    INR 1.1 10/16/2019     Lab Results   Component Value Date    IRON 59 02/04/2019    FERRITIN 97 02/04/2019    TIBC 327 02/04/2019    FESATURATED 18 (L) 02/04/2019     Lab Results   Component Value Date     01/10/2020    K 3.4 (L) 01/10/2020     (H) 01/10/2020    CO2 23 01/10/2020    BUN 6 (L) 01/10/2020    CREATININE 0.7 01/10/2020     Lab Results   Component Value Date    ALBUMIN 2.5 (L) 12/18/2019    ALT 8 (L) 12/18/2019    AST 22 12/18/2019    ALKPHOS 118 12/18/2019    BILITOT 0.4 12/18/2019     No results found for: GLUCOSE    I have reviewed recent labs including hep b and TB serologies as noted above.      Assessment:  1. Crohn's disease of small and large intestines with complication    2. High risk medication use      Now s/p end ileostomy for significant disease of the retained rectum and for diversion 1/2020  She is on maintenance remicade 5 q 8 weeks. alos on imuran 50mg daily  She is currently doing well.    Plan:  Orders Placed This Encounter    C-Reactive Protein    CBC auto differential    Comprehensive metabolic panel    Hepatitis B Surface Antigen     Continue remicade and imuran  Use metamucil slurry to thicken stool.    Basic labs    Son will notify us if any changes in stool or symptoms occur, and we can plan ileoscopy + EGD at that time.  Can consider ileoscopy about 1 year after surgery.    RTC around 3 months      Og Carrera MD  [facility] [place]

## 2020-06-22 LAB — HBV SURFACE AG SERPL QL IA: NEGATIVE

## 2020-06-23 ENCOUNTER — PATIENT MESSAGE (OUTPATIENT)
Dept: GASTROENTEROLOGY | Facility: CLINIC | Age: 75
End: 2020-06-23

## 2020-08-13 ENCOUNTER — INFUSION (OUTPATIENT)
Dept: INFUSION THERAPY | Facility: HOSPITAL | Age: 75
End: 2020-08-13
Attending: INTERNAL MEDICINE
Payer: MEDICARE

## 2020-08-13 VITALS
TEMPERATURE: 98 F | BODY MASS INDEX: 35 KG/M2 | HEIGHT: 64 IN | DIASTOLIC BLOOD PRESSURE: 73 MMHG | SYSTOLIC BLOOD PRESSURE: 126 MMHG | RESPIRATION RATE: 18 BRPM | WEIGHT: 205 LBS | OXYGEN SATURATION: 100 % | HEART RATE: 96 BPM

## 2020-08-13 DIAGNOSIS — K50.819 CROHN'S DISEASE OF SMALL AND LARGE INTESTINES WITH COMPLICATION: Primary | ICD-10-CM

## 2020-08-13 PROCEDURE — 25000003 PHARM REV CODE 250: Performed by: INTERNAL MEDICINE

## 2020-08-13 PROCEDURE — 96415 CHEMO IV INFUSION ADDL HR: CPT

## 2020-08-13 PROCEDURE — 63600175 PHARM REV CODE 636 W HCPCS: Performed by: INTERNAL MEDICINE

## 2020-08-13 PROCEDURE — 96375 TX/PRO/DX INJ NEW DRUG ADDON: CPT

## 2020-08-13 PROCEDURE — A4216 STERILE WATER/SALINE, 10 ML: HCPCS | Performed by: INTERNAL MEDICINE

## 2020-08-13 PROCEDURE — 96413 CHEMO IV INFUSION 1 HR: CPT

## 2020-08-13 RX ORDER — ACETAMINOPHEN 325 MG/1
650 TABLET ORAL
Status: CANCELLED | OUTPATIENT
Start: 2020-10-08

## 2020-08-13 RX ORDER — IPRATROPIUM BROMIDE AND ALBUTEROL SULFATE 2.5; .5 MG/3ML; MG/3ML
3 SOLUTION RESPIRATORY (INHALATION)
Status: DISPENSED | OUTPATIENT
Start: 2020-08-13 | End: 2020-08-13

## 2020-08-13 RX ORDER — DIPHENHYDRAMINE HYDROCHLORIDE 50 MG/ML
25 INJECTION INTRAMUSCULAR; INTRAVENOUS
Status: CANCELLED | OUTPATIENT
Start: 2020-10-08

## 2020-08-13 RX ORDER — EPINEPHRINE 1 MG/ML
0.3 INJECTION, SOLUTION, CONCENTRATE INTRAVENOUS
Status: CANCELLED | OUTPATIENT
Start: 2020-10-08

## 2020-08-13 RX ORDER — ACETAMINOPHEN 325 MG/1
650 TABLET ORAL
Status: ACTIVE | OUTPATIENT
Start: 2020-08-13 | End: 2020-08-13

## 2020-08-13 RX ORDER — DIPHENHYDRAMINE HYDROCHLORIDE 50 MG/ML
25 INJECTION INTRAMUSCULAR; INTRAVENOUS
Status: COMPLETED | OUTPATIENT
Start: 2020-08-13 | End: 2020-08-13

## 2020-08-13 RX ORDER — ACETAMINOPHEN 325 MG/1
650 TABLET ORAL
Status: COMPLETED | OUTPATIENT
Start: 2020-08-13 | End: 2020-08-13

## 2020-08-13 RX ORDER — EPINEPHRINE 0.3 MG/.3ML
0.3 INJECTION SUBCUTANEOUS
Status: ACTIVE | OUTPATIENT
Start: 2020-08-13 | End: 2020-08-13

## 2020-08-13 RX ORDER — HEPARIN 100 UNIT/ML
500 SYRINGE INTRAVENOUS
Status: CANCELLED | OUTPATIENT
Start: 2020-10-08

## 2020-08-13 RX ORDER — METHYLPREDNISOLONE SOD SUCC 125 MG
40 VIAL (EA) INJECTION
Status: ACTIVE | OUTPATIENT
Start: 2020-08-13 | End: 2020-08-13

## 2020-08-13 RX ORDER — METHYLPREDNISOLONE SOD SUCC 125 MG
40 VIAL (EA) INJECTION
Status: CANCELLED | OUTPATIENT
Start: 2020-10-08

## 2020-08-13 RX ORDER — HEPARIN 100 UNIT/ML
500 SYRINGE INTRAVENOUS
Status: DISCONTINUED | OUTPATIENT
Start: 2020-08-13 | End: 2020-08-13 | Stop reason: HOSPADM

## 2020-08-13 RX ORDER — IPRATROPIUM BROMIDE AND ALBUTEROL SULFATE 2.5; .5 MG/3ML; MG/3ML
3 SOLUTION RESPIRATORY (INHALATION)
Status: CANCELLED | OUTPATIENT
Start: 2020-10-08

## 2020-08-13 RX ORDER — DIPHENHYDRAMINE HYDROCHLORIDE 50 MG/ML
25 INJECTION INTRAMUSCULAR; INTRAVENOUS
Status: ACTIVE | OUTPATIENT
Start: 2020-08-13 | End: 2020-08-13

## 2020-08-13 RX ORDER — SODIUM CHLORIDE 0.9 % (FLUSH) 0.9 %
10 SYRINGE (ML) INJECTION
Status: DISCONTINUED | OUTPATIENT
Start: 2020-08-13 | End: 2020-08-13 | Stop reason: HOSPADM

## 2020-08-13 RX ORDER — SODIUM CHLORIDE 0.9 % (FLUSH) 0.9 %
10 SYRINGE (ML) INJECTION
Status: CANCELLED | OUTPATIENT
Start: 2020-10-08

## 2020-08-13 RX ADMIN — ACETAMINOPHEN 650 MG: 325 TABLET ORAL at 09:08

## 2020-08-13 RX ADMIN — SODIUM CHLORIDE: 0.9 INJECTION, SOLUTION INTRAVENOUS at 09:08

## 2020-08-13 RX ADMIN — DIPHENHYDRAMINE HYDROCHLORIDE 25 MG: 50 INJECTION INTRAMUSCULAR; INTRAVENOUS at 10:08

## 2020-08-13 RX ADMIN — INFLIXIMAB 470 MG: 100 INJECTION, POWDER, LYOPHILIZED, FOR SOLUTION INTRAVENOUS at 10:08

## 2020-08-13 RX ADMIN — Medication 10 ML: at 12:08

## 2020-08-13 RX ADMIN — HYDROCORTISONE SODIUM SUCCINATE 200 MG: 100 INJECTION, POWDER, FOR SOLUTION INTRAMUSCULAR; INTRAVENOUS at 10:08

## 2020-08-13 NOTE — NURSING
Pt tolerated infusion well. No adverse reaction noted. Pt education reinforced on _Remicade__ , side effects, what to expect, and when to call _[facility]_. Pt verbalized understanding. I reviewed pt calendar w/ pt and understanding verbalized. IV flushed w/ NS and D/C per protocol.

## 2020-08-21 ENCOUNTER — PATIENT OUTREACH (OUTPATIENT)
Dept: ADMINISTRATIVE | Facility: HOSPITAL | Age: 75
End: 2020-08-21

## 2020-09-17 ENCOUNTER — OFFICE VISIT (OUTPATIENT)
Dept: FAMILY MEDICINE | Facility: CLINIC | Age: 75
End: 2020-09-17
Attending: FAMILY MEDICINE
Payer: MEDICARE

## 2020-09-17 ENCOUNTER — LAB VISIT (OUTPATIENT)
Dept: LAB | Facility: HOSPITAL | Age: 75
End: 2020-09-17
Attending: FAMILY MEDICINE
Payer: MEDICARE

## 2020-09-17 VITALS
HEIGHT: 64 IN | SYSTOLIC BLOOD PRESSURE: 112 MMHG | BODY MASS INDEX: 35.04 KG/M2 | HEART RATE: 87 BPM | WEIGHT: 205.25 LBS | TEMPERATURE: 99 F | OXYGEN SATURATION: 95 % | DIASTOLIC BLOOD PRESSURE: 70 MMHG

## 2020-09-17 DIAGNOSIS — E78.2 MIXED HYPERLIPIDEMIA: ICD-10-CM

## 2020-09-17 DIAGNOSIS — E66.01 MORBID OBESITY: ICD-10-CM

## 2020-09-17 DIAGNOSIS — R73.02 GLUCOSE INTOLERANCE (IMPAIRED GLUCOSE TOLERANCE): ICD-10-CM

## 2020-09-17 DIAGNOSIS — G30.9 ALZHEIMER'S DEMENTIA WITH BEHAVIORAL DISTURBANCE, UNSPECIFIED TIMING OF DEMENTIA ONSET: ICD-10-CM

## 2020-09-17 DIAGNOSIS — E43 SEVERE MALNUTRITION: ICD-10-CM

## 2020-09-17 DIAGNOSIS — F32.3 MAJOR DEPRESSION WITH PSYCHOTIC FEATURES: ICD-10-CM

## 2020-09-17 DIAGNOSIS — F02.81 ALZHEIMER'S DEMENTIA WITH BEHAVIORAL DISTURBANCE, UNSPECIFIED TIMING OF DEMENTIA ONSET: ICD-10-CM

## 2020-09-17 DIAGNOSIS — I82.5Y1 CHRONIC DEEP VEIN THROMBOSIS (DVT) OF PROXIMAL VEIN OF RIGHT LOWER EXTREMITY: ICD-10-CM

## 2020-09-17 DIAGNOSIS — Z23 IMMUNIZATION DUE: ICD-10-CM

## 2020-09-17 DIAGNOSIS — Z00.00 ROUTINE GENERAL MEDICAL EXAMINATION AT A HEALTH CARE FACILITY: Primary | ICD-10-CM

## 2020-09-17 DIAGNOSIS — N18.30 CHRONIC KIDNEY DISEASE, STAGE 3: ICD-10-CM

## 2020-09-17 LAB
CHOLEST SERPL-MCNC: 234 MG/DL (ref 120–199)
CHOLEST/HDLC SERPL: 3.1 {RATIO} (ref 2–5)
ESTIMATED AVG GLUCOSE: 94 MG/DL (ref 68–131)
HBA1C MFR BLD HPLC: 4.9 % (ref 4–5.6)
HDLC SERPL-MCNC: 75 MG/DL (ref 40–75)
HDLC SERPL: 32.1 % (ref 20–50)
LDLC SERPL CALC-MCNC: 142.2 MG/DL (ref 63–159)
NONHDLC SERPL-MCNC: 159 MG/DL
TRIGL SERPL-MCNC: 84 MG/DL (ref 30–150)

## 2020-09-17 PROCEDURE — 80061 LIPID PANEL: CPT

## 2020-09-17 PROCEDURE — 99214 OFFICE O/P EST MOD 30 MIN: CPT | Mod: S$PBB,,, | Performed by: FAMILY MEDICINE

## 2020-09-17 PROCEDURE — 99999 PR PBB SHADOW E&M-EST. PATIENT-LVL III: ICD-10-PCS | Mod: PBBFAC,,, | Performed by: FAMILY MEDICINE

## 2020-09-17 PROCEDURE — 36415 COLL VENOUS BLD VENIPUNCTURE: CPT

## 2020-09-17 PROCEDURE — 83036 HEMOGLOBIN GLYCOSYLATED A1C: CPT

## 2020-09-17 PROCEDURE — 99213 OFFICE O/P EST LOW 20 MIN: CPT | Mod: PBBFAC,PO,25 | Performed by: FAMILY MEDICINE

## 2020-09-17 PROCEDURE — 90694 VACC AIIV4 NO PRSRV 0.5ML IM: CPT | Mod: PBBFAC,PO

## 2020-09-17 PROCEDURE — 99214 PR OFFICE/OUTPT VISIT, EST, LEVL IV, 30-39 MIN: ICD-10-PCS | Mod: S$PBB,,, | Performed by: FAMILY MEDICINE

## 2020-09-17 PROCEDURE — G0008 ADMIN INFLUENZA VIRUS VAC: HCPCS | Mod: PBBFAC

## 2020-09-17 PROCEDURE — 99999 PR PBB SHADOW E&M-EST. PATIENT-LVL III: CPT | Mod: PBBFAC,,, | Performed by: FAMILY MEDICINE

## 2020-09-17 NOTE — PROGRESS NOTES
Subjective:       Patient ID: Anabella Aranda is a 75 y.o. female.    Chief Complaint: Annual Exam    75 yr old pleasant black female with HLD,  chronic iron deficiency anemia ulcerative colitis, Chronic venous insufficiency, s/o IVC filter, presents today for her 3 month check. C/O KNEE PAIN AND ALSO WORSENING GERD SYMPTOMS.       KNEE PAIN BILATERAL - CHRONIC AND GETTING WORSE - SHE HAS OA AND SHE USES WALKER FOR THAT -     GERD - ON ZANTAC AS NEEDED HOWEVER IT IS NOT HELPING - SEEN GI BEFORE - DENIES ANY N/V/C/D/FEVER     Obesity - she gained a lot since last year - she is mostly staying home and eating a lot and  has no activity outside of house - she is lonely in a big house and sometimes hearing voices - they ar good voices and she is not hearing when she is out of house - she denies any depression or anxiety - she does have issues with sleeping at night - she denies any personal or family h/o psychiatry disorders - no SI/HI/delusions    Ulcerative colitis - she is on Imuran and follows GI - no side effects    Depression with dementia - follows Psych and neuro - pt grand son reports she is still same but not worse - she still has issues with sleeping - caregiver also applied for home health      Anemia - chronic - due to IBD - she is on diet alone and takes MVI -      HLD - need to b on low fat diet - LDLCALC                  133.2               02/04/2019                  LE edema/lymphedema - had h/o DVT and she also had [place] filter - she was on anticoagulation till hospital and stopped after surgery - seen vascular medicine and stable since then - she denies any heart problems, liver disorder - she also denies any chest pain, SOB, ROBLES      History as below - reviewed         HM  -labs UTD  -vaccines defer to next visit    Review of Systems   Constitutional: Negative.  Negative for activity change, diaphoresis and unexpected weight change.   HENT: Negative.  Negative for nasal congestion, ear  discharge, hearing loss, rhinorrhea, sore throat and voice change.    Eyes: Negative.  Negative for pain, discharge and visual disturbance.   Respiratory: Negative.  Negative for chest tightness, shortness of breath and wheezing.    Cardiovascular: Negative.  Negative for chest pain.   Gastrointestinal: Negative.  Negative for abdominal distention, anal bleeding, constipation and nausea.   Endocrine: Negative.  Negative for cold intolerance, polydipsia and polyuria.   Genitourinary: Negative.  Negative for decreased urine volume, difficulty urinating, dysuria, frequency, menstrual problem and vaginal pain.   Musculoskeletal: Negative.  Negative for arthralgias, gait problem and myalgias.   Integumentary:  Negative for color change, pallor and wound. Negative.   Allergic/Immunologic: Negative.  Negative for environmental allergies and immunocompromised state.   Neurological: Negative.  Negative for dizziness, tremors, seizures, speech difficulty and headaches.   Hematological: Negative.  Negative for adenopathy. Does not bruise/bleed easily.   Psychiatric/Behavioral: Negative.  Negative for agitation, confusion, decreased concentration, hallucinations, self-injury and suicidal ideas. The patient is not nervous/anxious.      PMH/PSH/FH/SH/MED/ALLERGY reviewed        Objective:       Vitals:    09/17/20 0950   BP: 112/70   Pulse: 87   Temp: 98.8 °F (37.1 °C)       Physical Exam  Constitutional:       General: She is not in acute distress.     Appearance: She is well-developed. She is not diaphoretic.   HENT:      Head: Normocephalic and atraumatic.      Right Ear: External ear normal.      Left Ear: External ear normal.      Nose: Nose normal.      Mouth/Throat:      Pharynx: No oropharyngeal exudate.   Eyes:      General: No scleral icterus.        Right eye: No discharge.         Left eye: No discharge.      Conjunctiva/sclera: Conjunctivae normal.      Pupils: Pupils are equal, round, and reactive to light.   Neck:       Musculoskeletal: Normal range of motion and neck supple.      Thyroid: No thyromegaly.      Vascular: No JVD.      Trachea: No tracheal deviation.   Cardiovascular:      Rate and Rhythm: Normal rate and regular rhythm.      Heart sounds: Normal heart sounds. No murmur. No friction rub. No gallop.    Pulmonary:      Effort: Pulmonary effort is normal.      Breath sounds: Normal breath sounds. No stridor. No wheezing or rales.   Chest:      Chest wall: No tenderness.   Abdominal:      General: Bowel sounds are normal. There is no distension.      Palpations: Abdomen is soft. There is no mass.      Tenderness: There is no abdominal tenderness. There is no guarding or rebound.      Hernia: No hernia is present.   Musculoskeletal: Normal range of motion.         General: Tenderness present.      Comments: B/l knee edematous, restricted rom and impingement+   Lymphadenopathy:      Cervical: No cervical adenopathy.   Skin:     General: Skin is warm and dry.      Coloration: Skin is not pale.      Findings: No erythema or rash.   Neurological:      Mental Status: She is alert and oriented to person, place, and time.      Cranial Nerves: No cranial nerve deficit.      Motor: No abnormal muscle tone.      Coordination: Coordination normal.      Deep Tendon Reflexes: Reflexes are normal and symmetric. Reflexes normal.   Psychiatric:         Behavior: Behavior normal.         Thought Content: Thought content normal.         Judgment: Judgment normal.         Assessment:       1. Routine general medical examination at a health care facility    2. Severe malnutrition    3. Major depression with psychotic features    4. Chronic deep vein thrombosis (DVT) of proximal vein of right lower extremity    5. Chronic kidney disease, stage 3    6. Alzheimer's dementia with behavioral disturbance, unspecified timing of dementia onset    7. Morbid obesity    8. Glucose intolerance (impaired glucose tolerance)    9. Mixed hyperlipidemia     10. Immunization due        Plan:           Anabella was seen today for annual exam.    Diagnoses and all orders for this visit:    Routine general medical examination at a health care facility    Severe malnutrition    Major depression with psychotic features    Chronic deep vein thrombosis (DVT) of proximal vein of right lower extremity    Chronic kidney disease, stage 3    Alzheimer's dementia with behavioral disturbance, unspecified timing of dementia onset    Morbid obesity    Glucose intolerance (impaired glucose tolerance)  -     Urinalysis; Future  -     Hemoglobin A1C; Future    Mixed hyperlipidemia  -     Lipid Panel; Future    Immunization due  -     Influenza (FLUAD) - Quadrivalent (Adjuvanted) *Preferred* (65+) (PF)    WELLNESS CHECK  -NORMAL EXAM  -LABS     B/L knee pain/OA  -voltaren gel prn  -heat/ice  -refer HH for PT    GERD  -worsening  -refer GI    ANXIETY/DEPRESSION/dementia  -FOLLOW outside PSYCH  -on seroquel and aricept and namenda    GLUC INTOLERANCE  -LABS reassuring    S/P IVC filter and chronic venous insufficiency  -not on anticoagulant due to UC and rectal bleed/anemia  -stable    US/anemia  -refilled iron  - GI for follow up  -on Imuran daily    CHRONIC DVT  -HEM/ONC follow up    CKD III  -no worsening  -labs    HLD  -low fat diet    Spent adequate time in obtaining history and explaining differentials        RTC 6 M OR PRN

## 2020-10-07 ENCOUNTER — TELEPHONE (OUTPATIENT)
Dept: FAMILY MEDICINE | Facility: CLINIC | Age: 75
End: 2020-10-07

## 2020-10-07 NOTE — TELEPHONE ENCOUNTER
----- Message from Belen Bran sent at 10/7/2020  8:17 AM CDT -----  Type:  Needs Medical Advice    Who Called: sonBryson  Symptoms (please be specific): irritated dry eye  How long has patient had these symptoms:  2 days  Pharmacy name and phone #:    Would the patient rather a call back or a response via [facility]?   Best Call Back Number: [phone]  Additional Information: wants to know if something can be called in or if she needs an appointment

## 2020-10-07 NOTE — TELEPHONE ENCOUNTER
----- Message from Berthamiracle Lang sent at 10/7/2020  3:45 PM CDT -----  Contact: Bryson, son, [phone]  [facility] called and asked that pt sees you for pink eye before she can go to her infusion appt tomorrow at 10 am. Please advise.

## 2020-10-07 NOTE — TELEPHONE ENCOUNTER
Spoke to pt's sonBryson and stated that pt's eye has been red for the last 2 days. Son thinks that pt has [facility]. Pt was scheduled for an appt with Dr. Brambila.

## 2020-10-08 ENCOUNTER — OFFICE VISIT (OUTPATIENT)
Dept: FAMILY MEDICINE | Facility: CLINIC | Age: 75
End: 2020-10-08
Attending: FAMILY MEDICINE
Payer: MEDICARE

## 2020-10-08 VITALS
HEART RATE: 101 BPM | OXYGEN SATURATION: 98 % | HEIGHT: 64 IN | DIASTOLIC BLOOD PRESSURE: 62 MMHG | SYSTOLIC BLOOD PRESSURE: 114 MMHG | WEIGHT: 211.63 LBS | TEMPERATURE: 99 F | BODY MASS INDEX: 36.13 KG/M2

## 2020-10-08 DIAGNOSIS — H10.31 ACUTE BACTERIAL CONJUNCTIVITIS OF RIGHT EYE: Primary | ICD-10-CM

## 2020-10-08 PROCEDURE — 99214 OFFICE O/P EST MOD 30 MIN: CPT | Mod: PBBFAC,PO | Performed by: FAMILY MEDICINE

## 2020-10-08 PROCEDURE — 99999 PR PBB SHADOW E&M-EST. PATIENT-LVL IV: CPT | Mod: PBBFAC,,, | Performed by: FAMILY MEDICINE

## 2020-10-08 PROCEDURE — 99999 PR PBB SHADOW E&M-EST. PATIENT-LVL IV: ICD-10-PCS | Mod: PBBFAC,,, | Performed by: FAMILY MEDICINE

## 2020-10-08 PROCEDURE — 99214 OFFICE O/P EST MOD 30 MIN: CPT | Mod: S$PBB,,, | Performed by: FAMILY MEDICINE

## 2020-10-08 PROCEDURE — 99214 PR OFFICE/OUTPT VISIT, EST, LEVL IV, 30-39 MIN: ICD-10-PCS | Mod: S$PBB,,, | Performed by: FAMILY MEDICINE

## 2020-10-08 RX ORDER — OFLOXACIN 3 MG/ML
2 SOLUTION/ DROPS OPHTHALMIC 4 TIMES DAILY
Qty: 5 ML | Refills: 0 | Status: SHIPPED | OUTPATIENT
Start: 2020-10-08 | End: 2022-07-18

## 2020-10-08 NOTE — PROGRESS NOTES
Subjective:       Patient ID: Anabella Aranda is a 75 y.o. female.    Chief Complaint: Possible Pink Eye (Right Eye)    75 yr old pleasant black female with HLD,  chronic iron deficiency anemia ulcerative colitis, Chronic venous insufficiency, s/o IVC filter, presents today for evaluation of right eye redness n discharge and pain. Onset 2 days ago and gradually worsening. It was stuck in Am. No sick contacts/recent travel. Details as follows -        Conjunctivitis  This is a new problem. The current episode started in the past 7 days. The problem occurs constantly. The problem has been gradually worsening. Associated symptoms include a rash. Pertinent negatives include no abdominal pain, arthralgias, chest pain, congestion, diaphoresis, fatigue, headaches, myalgias, nausea, neck pain, sore throat, urinary symptoms or vomiting. Nothing aggravates the symptoms. She has tried nothing for the symptoms. The treatment provided no relief.     Review of Systems   Constitutional: Negative.  Negative for activity change, diaphoresis, fatigue and unexpected weight change.   HENT: Negative.  Negative for nasal congestion, ear discharge, hearing loss, rhinorrhea, sore throat and voice change.    Eyes: Positive for pain, discharge and redness. Negative for visual disturbance.   Respiratory: Negative.  Negative for chest tightness, shortness of breath and wheezing.    Cardiovascular: Negative.  Negative for chest pain.   Gastrointestinal: Negative.  Negative for abdominal distention, abdominal pain, anal bleeding, constipation, nausea and vomiting.   Endocrine: Negative.  Negative for cold intolerance, polydipsia and polyuria.   Genitourinary: Negative.  Negative for decreased urine volume, difficulty urinating, dysuria, frequency, menstrual problem and vaginal pain.   Musculoskeletal: Negative.  Negative for arthralgias, gait problem, myalgias and neck pain.   Integumentary:  Positive for rash. Negative for color change,  pallor and wound.   Allergic/Immunologic: Negative.  Negative for environmental allergies and immunocompromised state.   Neurological: Negative.  Negative for dizziness, tremors, seizures, speech difficulty and headaches.   Hematological: Negative.  Negative for adenopathy. Does not bruise/bleed easily.   Psychiatric/Behavioral: Negative.  Negative for agitation, confusion, decreased concentration, hallucinations, self-injury and suicidal ideas. The patient is not nervous/anxious.          PMH/PSH/FH/SH/MED/ALLERGY reviewed    Objective:       Vitals:    10/08/20 1505   BP: 114/62   Pulse: 101   Temp: 98.5 °F (36.9 °C)       Physical Exam  Constitutional:       General: She is not in acute distress.     Appearance: She is well-developed. She is not diaphoretic.   HENT:      Head: Normocephalic and atraumatic.      Right Ear: External ear normal.      Left Ear: External ear normal.      Nose: Nose normal.      Mouth/Throat:      Pharynx: No oropharyngeal exudate.   Eyes:      General: No scleral icterus.        Right eye: No discharge.         Left eye: No discharge.      Conjunctiva/sclera:      Right eye: Right conjunctiva is injected. Chemosis and exudate present.      Pupils: Pupils are equal, round, and reactive to light.     Neck:      Musculoskeletal: Normal range of motion and neck supple.      Thyroid: No thyromegaly.      Vascular: No JVD.      Trachea: No tracheal deviation.   Cardiovascular:      Rate and Rhythm: Normal rate and regular rhythm.      Heart sounds: Normal heart sounds. No murmur. No friction rub. No gallop.    Pulmonary:      Effort: Pulmonary effort is normal.      Breath sounds: Normal breath sounds. No stridor. No wheezing or rales.   Chest:      Chest wall: No tenderness.   Abdominal:      General: Bowel sounds are normal. There is no distension.      Palpations: Abdomen is soft. There is no mass.      Tenderness: There is no abdominal tenderness. There is no guarding or rebound.       Hernia: No hernia is present.   Musculoskeletal: Normal range of motion.         General: No tenderness.   Lymphadenopathy:      Cervical: No cervical adenopathy.   Skin:     General: Skin is warm and dry.      Coloration: Skin is not pale.      Findings: No erythema or rash.   Neurological:      Mental Status: She is alert and oriented to person, place, and time.      Cranial Nerves: No cranial nerve deficit.      Motor: No abnormal muscle tone.      Coordination: Coordination normal.      Deep Tendon Reflexes: Reflexes are normal and symmetric. Reflexes normal.   Psychiatric:         Behavior: Behavior normal.         Thought Content: Thought content normal.         Judgment: Judgment normal.         Assessment:       1. Acute bacterial conjunctivitis of right eye        Plan:           Anabella was seen today for possible pink eye.    Diagnoses and all orders for this visit:    Acute bacterial conjunctivitis of right eye  -     ofloxacin (OCUFLOX) 0.3 % ophthalmic solution; Place 2 drops into the right eye 4 (four) times daily.      Eye drops   Eye care and handout given    Spent adequate time in obtaining history and explaining differentials    25 minutes spent during this visit of which greater than 50% devoted to face-face counseling and coordination of care regarding diagnosis and management plan    Follow up if symptoms worsen or fail to improve.

## 2020-10-08 NOTE — PATIENT INSTRUCTIONS
Conjunctivitis, Nonspecific    The membrane that covers the white part of your eye (the conjunctiva) is inflamed. Inflammation happens when your body responds to an injury, allergic reaction, infection, or illness. Symptoms of inflammation in the eye may include redness, irritation, itching, swelling, or burning. These symptoms should go away within the next 24 hours. Conjunctivitis may be related to a particle that was in your eye. If so, it may wash out with your tears or irrigation treatment. Being exposed to liquid chemicals or fumes may also cause this reaction.   Home care  · Apply a cold pack (ice in a plastic bag, wrapped in a towel) over the eye for 20 minutes at a time. This will reduce pain.  · Artificial tears may be prescribed to reduce irritation or redness.  These should be used 3 to 4 times a day.  · You may use acetaminophen or ibuprofen to control pain, unless another medicine was prescribed.(Note: If you have chronic liver or kidney disease, or if you have ever had a stomach ulcer or gastrointestinal bleeding, talk with your healthcare provider before using these medicines.)  Follow-up care  Follow up with your healthcare provider, or as advised.  When to seek medical advice  Call your healthcare provider right away if any of these occur:  · Increased eyelid swelling  · Increased eye pain  · Increased redness or drainage from the eye  · Increased blurry vision or increased sensitivity to light  · Failure of normal vision to return within 24 to 48 hours  Date Last Reviewed: 6/14/2015  © 5044-7759 "[employer]. [address] [place] [address], [place] [address]. All rights reserved. This information is not intended as a substitute for professional medical care. Always follow your healthcare professional's instructions.

## 2020-10-12 NOTE — TELEPHONE ENCOUNTER
Informed pt's son, Bryson that Dr. Brambila restarted the Aricept.[employer] Bryson that Dr. Brambila received the FMLA paperwork will call when it's ready. Pt son's verbalized understanding.    0 = independent

## 2020-10-16 ENCOUNTER — INFUSION (OUTPATIENT)
Dept: INFUSION THERAPY | Facility: HOSPITAL | Age: 75
End: 2020-10-16
Attending: INTERNAL MEDICINE
Payer: MEDICARE

## 2020-10-16 VITALS
BODY MASS INDEX: 36.13 KG/M2 | OXYGEN SATURATION: 96 % | WEIGHT: 211.63 LBS | HEIGHT: 64 IN | RESPIRATION RATE: 18 BRPM | HEART RATE: 91 BPM | SYSTOLIC BLOOD PRESSURE: 143 MMHG | DIASTOLIC BLOOD PRESSURE: 76 MMHG | TEMPERATURE: 98 F

## 2020-10-16 DIAGNOSIS — K50.819 CROHN'S DISEASE OF SMALL AND LARGE INTESTINES WITH COMPLICATION: Primary | ICD-10-CM

## 2020-10-16 PROCEDURE — 96413 CHEMO IV INFUSION 1 HR: CPT

## 2020-10-16 PROCEDURE — 96415 CHEMO IV INFUSION ADDL HR: CPT

## 2020-10-16 PROCEDURE — A4216 STERILE WATER/SALINE, 10 ML: HCPCS | Performed by: INTERNAL MEDICINE

## 2020-10-16 PROCEDURE — 63600175 PHARM REV CODE 636 W HCPCS: Mod: JG | Performed by: INTERNAL MEDICINE

## 2020-10-16 PROCEDURE — 25000003 PHARM REV CODE 250: Performed by: INTERNAL MEDICINE

## 2020-10-16 RX ORDER — SODIUM CHLORIDE 0.9 % (FLUSH) 0.9 %
10 SYRINGE (ML) INJECTION
Status: CANCELLED | OUTPATIENT
Start: 2020-12-04

## 2020-10-16 RX ORDER — SODIUM CHLORIDE 0.9 % (FLUSH) 0.9 %
10 SYRINGE (ML) INJECTION
Status: DISCONTINUED | OUTPATIENT
Start: 2020-10-16 | End: 2020-10-16 | Stop reason: HOSPADM

## 2020-10-16 RX ORDER — IPRATROPIUM BROMIDE AND ALBUTEROL SULFATE 2.5; .5 MG/3ML; MG/3ML
3 SOLUTION RESPIRATORY (INHALATION)
Status: CANCELLED | OUTPATIENT
Start: 2020-12-04

## 2020-10-16 RX ORDER — ACETAMINOPHEN 325 MG/1
650 TABLET ORAL
Status: CANCELLED | OUTPATIENT
Start: 2020-12-04

## 2020-10-16 RX ORDER — EPINEPHRINE 1 MG/ML
0.3 INJECTION, SOLUTION, CONCENTRATE INTRAVENOUS
Status: CANCELLED | OUTPATIENT
Start: 2020-12-04

## 2020-10-16 RX ORDER — HEPARIN 100 UNIT/ML
500 SYRINGE INTRAVENOUS
Status: DISCONTINUED | OUTPATIENT
Start: 2020-10-16 | End: 2020-10-16 | Stop reason: HOSPADM

## 2020-10-16 RX ORDER — ACETAMINOPHEN 325 MG/1
650 TABLET ORAL
Status: COMPLETED | OUTPATIENT
Start: 2020-10-16 | End: 2020-10-16

## 2020-10-16 RX ORDER — METHYLPREDNISOLONE SOD SUCC 125 MG
40 VIAL (EA) INJECTION
Status: CANCELLED | OUTPATIENT
Start: 2020-12-04

## 2020-10-16 RX ORDER — IPRATROPIUM BROMIDE AND ALBUTEROL SULFATE 2.5; .5 MG/3ML; MG/3ML
3 SOLUTION RESPIRATORY (INHALATION)
Status: DISPENSED | OUTPATIENT
Start: 2020-10-16 | End: 2020-10-16

## 2020-10-16 RX ORDER — METHYLPREDNISOLONE SOD SUCC 125 MG
40 VIAL (EA) INJECTION
Status: ACTIVE | OUTPATIENT
Start: 2020-10-16 | End: 2020-10-16

## 2020-10-16 RX ORDER — DIPHENHYDRAMINE HYDROCHLORIDE 50 MG/ML
25 INJECTION INTRAMUSCULAR; INTRAVENOUS
Status: CANCELLED | OUTPATIENT
Start: 2020-12-04

## 2020-10-16 RX ORDER — DIPHENHYDRAMINE HYDROCHLORIDE 50 MG/ML
25 INJECTION INTRAMUSCULAR; INTRAVENOUS
Status: COMPLETED | OUTPATIENT
Start: 2020-10-16 | End: 2020-10-16

## 2020-10-16 RX ORDER — HEPARIN 100 UNIT/ML
500 SYRINGE INTRAVENOUS
Status: CANCELLED | OUTPATIENT
Start: 2020-12-04

## 2020-10-16 RX ORDER — DIPHENHYDRAMINE HYDROCHLORIDE 50 MG/ML
25 INJECTION INTRAMUSCULAR; INTRAVENOUS
Status: ACTIVE | OUTPATIENT
Start: 2020-10-16 | End: 2020-10-16

## 2020-10-16 RX ORDER — ACETAMINOPHEN 325 MG/1
650 TABLET ORAL
Status: ACTIVE | OUTPATIENT
Start: 2020-10-16 | End: 2020-10-16

## 2020-10-16 RX ORDER — EPINEPHRINE 0.3 MG/.3ML
0.3 INJECTION SUBCUTANEOUS
Status: ACTIVE | OUTPATIENT
Start: 2020-10-16 | End: 2020-10-16

## 2020-10-16 RX ADMIN — DIPHENHYDRAMINE HYDROCHLORIDE 25 MG: 50 INJECTION INTRAMUSCULAR; INTRAVENOUS at 12:10

## 2020-10-16 RX ADMIN — SODIUM CHLORIDE: 0.9 INJECTION, SOLUTION INTRAVENOUS at 12:10

## 2020-10-16 RX ADMIN — ACETAMINOPHEN 650 MG: 325 TABLET ORAL at 12:10

## 2020-10-16 RX ADMIN — INFLIXIMAB 480 MG: 100 INJECTION, POWDER, LYOPHILIZED, FOR SOLUTION INTRAVENOUS at 12:10

## 2020-10-16 RX ADMIN — Medication 10 ML: at 02:10

## 2020-12-16 ENCOUNTER — INFUSION (OUTPATIENT)
Dept: INFUSION THERAPY | Facility: HOSPITAL | Age: 75
End: 2020-12-16
Attending: INTERNAL MEDICINE
Payer: MEDICARE

## 2020-12-16 VITALS
DIASTOLIC BLOOD PRESSURE: 61 MMHG | SYSTOLIC BLOOD PRESSURE: 108 MMHG | TEMPERATURE: 98 F | WEIGHT: 211.63 LBS | BODY MASS INDEX: 36.13 KG/M2 | HEART RATE: 94 BPM | RESPIRATION RATE: 18 BRPM | HEIGHT: 64 IN | OXYGEN SATURATION: 97 %

## 2020-12-16 DIAGNOSIS — K50.819 CROHN'S DISEASE OF SMALL AND LARGE INTESTINES WITH COMPLICATION: Primary | ICD-10-CM

## 2020-12-16 PROCEDURE — A4216 STERILE WATER/SALINE, 10 ML: HCPCS | Performed by: INTERNAL MEDICINE

## 2020-12-16 PROCEDURE — 25000003 PHARM REV CODE 250: Performed by: INTERNAL MEDICINE

## 2020-12-16 PROCEDURE — 96375 TX/PRO/DX INJ NEW DRUG ADDON: CPT

## 2020-12-16 PROCEDURE — 96415 CHEMO IV INFUSION ADDL HR: CPT

## 2020-12-16 PROCEDURE — 96413 CHEMO IV INFUSION 1 HR: CPT

## 2020-12-16 PROCEDURE — 63600175 PHARM REV CODE 636 W HCPCS: Performed by: INTERNAL MEDICINE

## 2020-12-16 RX ORDER — ACETAMINOPHEN 325 MG/1
650 TABLET ORAL
Status: COMPLETED | OUTPATIENT
Start: 2020-12-16 | End: 2020-12-16

## 2020-12-16 RX ORDER — METHYLPREDNISOLONE SOD SUCC 125 MG
40 VIAL (EA) INJECTION
Status: ACTIVE | OUTPATIENT
Start: 2020-12-16 | End: 2020-12-16

## 2020-12-16 RX ORDER — DIPHENHYDRAMINE HYDROCHLORIDE 50 MG/ML
25 INJECTION INTRAMUSCULAR; INTRAVENOUS
Status: ACTIVE | OUTPATIENT
Start: 2020-12-16 | End: 2020-12-16

## 2020-12-16 RX ORDER — SODIUM CHLORIDE 0.9 % (FLUSH) 0.9 %
10 SYRINGE (ML) INJECTION
Status: DISCONTINUED | OUTPATIENT
Start: 2020-12-16 | End: 2020-12-16 | Stop reason: HOSPADM

## 2020-12-16 RX ORDER — DIPHENHYDRAMINE HYDROCHLORIDE 50 MG/ML
25 INJECTION INTRAMUSCULAR; INTRAVENOUS
Status: COMPLETED | OUTPATIENT
Start: 2020-12-16 | End: 2020-12-16

## 2020-12-16 RX ORDER — EPINEPHRINE 0.3 MG/.3ML
0.3 INJECTION SUBCUTANEOUS
Status: ACTIVE | OUTPATIENT
Start: 2020-12-16 | End: 2020-12-16

## 2020-12-16 RX ORDER — ACETAMINOPHEN 325 MG/1
650 TABLET ORAL
Status: ACTIVE | OUTPATIENT
Start: 2020-12-16 | End: 2020-12-16

## 2020-12-16 RX ORDER — IPRATROPIUM BROMIDE AND ALBUTEROL SULFATE 2.5; .5 MG/3ML; MG/3ML
3 SOLUTION RESPIRATORY (INHALATION)
Status: ACTIVE | OUTPATIENT
Start: 2020-12-16 | End: 2020-12-16

## 2020-12-16 RX ORDER — HEPARIN 100 UNIT/ML
500 SYRINGE INTRAVENOUS
Status: DISCONTINUED | OUTPATIENT
Start: 2020-12-16 | End: 2020-12-16 | Stop reason: HOSPADM

## 2020-12-16 RX ADMIN — ACETAMINOPHEN 650 MG: 325 TABLET ORAL at 09:12

## 2020-12-16 RX ADMIN — INFLIXIMAB 480 MG: 100 INJECTION, POWDER, LYOPHILIZED, FOR SOLUTION INTRAVENOUS at 10:12

## 2020-12-16 RX ADMIN — DIPHENHYDRAMINE HYDROCHLORIDE 25 MG: 50 INJECTION INTRAMUSCULAR; INTRAVENOUS at 09:12

## 2020-12-16 RX ADMIN — Medication 10 ML: at 12:12

## 2020-12-16 RX ADMIN — SODIUM CHLORIDE: 0.9 INJECTION, SOLUTION INTRAVENOUS at 09:12

## 2020-12-23 RX ORDER — AZATHIOPRINE 50 MG/1
50 TABLET ORAL DAILY
Qty: 90 TABLET | Refills: 3 | Status: SHIPPED | OUTPATIENT
Start: 2020-12-23 | End: 2021-12-06 | Stop reason: SDUPTHER

## 2021-01-07 ENCOUNTER — TELEPHONE (OUTPATIENT)
Dept: FAMILY MEDICINE | Facility: CLINIC | Age: 76
End: 2021-01-07

## 2021-01-11 ENCOUNTER — TELEPHONE (OUTPATIENT)
Dept: FAMILY MEDICINE | Facility: CLINIC | Age: 76
End: 2021-01-11

## 2021-02-10 ENCOUNTER — INFUSION (OUTPATIENT)
Dept: INFUSION THERAPY | Facility: HOSPITAL | Age: 76
End: 2021-02-10
Attending: SURGERY
Payer: MEDICARE

## 2021-02-10 VITALS
HEART RATE: 77 BPM | BODY MASS INDEX: 36.13 KG/M2 | SYSTOLIC BLOOD PRESSURE: 137 MMHG | DIASTOLIC BLOOD PRESSURE: 60 MMHG | OXYGEN SATURATION: 98 % | RESPIRATION RATE: 18 BRPM | HEIGHT: 64 IN | TEMPERATURE: 98 F | WEIGHT: 211.63 LBS

## 2021-02-10 DIAGNOSIS — K50.819 CROHN'S DISEASE OF SMALL AND LARGE INTESTINES WITH COMPLICATION: Primary | ICD-10-CM

## 2021-02-10 PROCEDURE — 96375 TX/PRO/DX INJ NEW DRUG ADDON: CPT

## 2021-02-10 PROCEDURE — A4216 STERILE WATER/SALINE, 10 ML: HCPCS | Performed by: INTERNAL MEDICINE

## 2021-02-10 PROCEDURE — 25000003 PHARM REV CODE 250: Performed by: INTERNAL MEDICINE

## 2021-02-10 PROCEDURE — 63600175 PHARM REV CODE 636 W HCPCS: Performed by: INTERNAL MEDICINE

## 2021-02-10 PROCEDURE — 96415 CHEMO IV INFUSION ADDL HR: CPT

## 2021-02-10 PROCEDURE — 96413 CHEMO IV INFUSION 1 HR: CPT

## 2021-02-10 RX ORDER — METHYLPREDNISOLONE SOD SUCC 125 MG
40 VIAL (EA) INJECTION
Status: ACTIVE | OUTPATIENT
Start: 2021-02-10 | End: 2021-02-10

## 2021-02-10 RX ORDER — DIPHENHYDRAMINE HYDROCHLORIDE 50 MG/ML
25 INJECTION INTRAMUSCULAR; INTRAVENOUS
Status: COMPLETED | OUTPATIENT
Start: 2021-02-10 | End: 2021-02-10

## 2021-02-10 RX ORDER — HEPARIN 100 UNIT/ML
500 SYRINGE INTRAVENOUS
Status: DISCONTINUED | OUTPATIENT
Start: 2021-02-10 | End: 2021-02-10 | Stop reason: HOSPADM

## 2021-02-10 RX ORDER — ACETAMINOPHEN 325 MG/1
650 TABLET ORAL
Status: COMPLETED | OUTPATIENT
Start: 2021-02-10 | End: 2021-02-10

## 2021-02-10 RX ORDER — EPINEPHRINE 0.3 MG/.3ML
0.3 INJECTION SUBCUTANEOUS
Status: ACTIVE | OUTPATIENT
Start: 2021-02-10 | End: 2021-02-10

## 2021-02-10 RX ORDER — IPRATROPIUM BROMIDE AND ALBUTEROL SULFATE 2.5; .5 MG/3ML; MG/3ML
3 SOLUTION RESPIRATORY (INHALATION)
Status: ACTIVE | OUTPATIENT
Start: 2021-02-10 | End: 2021-02-10

## 2021-02-10 RX ORDER — DIPHENHYDRAMINE HYDROCHLORIDE 50 MG/ML
25 INJECTION INTRAMUSCULAR; INTRAVENOUS
Status: ACTIVE | OUTPATIENT
Start: 2021-02-10 | End: 2021-02-10

## 2021-02-10 RX ORDER — SODIUM CHLORIDE 0.9 % (FLUSH) 0.9 %
10 SYRINGE (ML) INJECTION
Status: DISCONTINUED | OUTPATIENT
Start: 2021-02-10 | End: 2021-02-10 | Stop reason: HOSPADM

## 2021-02-10 RX ORDER — ACETAMINOPHEN 325 MG/1
650 TABLET ORAL
Status: ACTIVE | OUTPATIENT
Start: 2021-02-10 | End: 2021-02-10

## 2021-02-10 RX ADMIN — Medication 10 ML: at 02:02

## 2021-02-10 RX ADMIN — DIPHENHYDRAMINE HYDROCHLORIDE 25 MG: 50 INJECTION INTRAMUSCULAR; INTRAVENOUS at 11:02

## 2021-02-10 RX ADMIN — SODIUM CHLORIDE: 0.9 INJECTION, SOLUTION INTRAVENOUS at 11:02

## 2021-02-10 RX ADMIN — ACETAMINOPHEN 650 MG: 325 TABLET ORAL at 11:02

## 2021-02-10 RX ADMIN — INFLIXIMAB 480 MG: 100 INJECTION, POWDER, LYOPHILIZED, FOR SOLUTION INTRAVENOUS at 11:02

## 2021-02-15 ENCOUNTER — PES CALL (OUTPATIENT)
Dept: ADMINISTRATIVE | Facility: CLINIC | Age: 76
End: 2021-02-15

## 2021-03-08 ENCOUNTER — TELEPHONE (OUTPATIENT)
Dept: FAMILY MEDICINE | Facility: CLINIC | Age: 76
End: 2021-03-08

## 2021-04-12 ENCOUNTER — INFUSION (OUTPATIENT)
Dept: INFUSION THERAPY | Facility: HOSPITAL | Age: 76
End: 2021-04-12
Attending: INTERNAL MEDICINE
Payer: MEDICARE

## 2021-04-12 VITALS
WEIGHT: 211.63 LBS | HEART RATE: 97 BPM | OXYGEN SATURATION: 97 % | BODY MASS INDEX: 36.13 KG/M2 | RESPIRATION RATE: 18 BRPM | HEIGHT: 64 IN | TEMPERATURE: 99 F | DIASTOLIC BLOOD PRESSURE: 64 MMHG | SYSTOLIC BLOOD PRESSURE: 142 MMHG

## 2021-04-12 DIAGNOSIS — K50.819 CROHN'S DISEASE OF SMALL AND LARGE INTESTINES WITH COMPLICATION: Primary | ICD-10-CM

## 2021-04-12 PROCEDURE — 63600175 PHARM REV CODE 636 W HCPCS: Performed by: INTERNAL MEDICINE

## 2021-04-12 PROCEDURE — 96375 TX/PRO/DX INJ NEW DRUG ADDON: CPT

## 2021-04-12 PROCEDURE — 96413 CHEMO IV INFUSION 1 HR: CPT

## 2021-04-12 PROCEDURE — A4216 STERILE WATER/SALINE, 10 ML: HCPCS | Performed by: INTERNAL MEDICINE

## 2021-04-12 PROCEDURE — 96415 CHEMO IV INFUSION ADDL HR: CPT

## 2021-04-12 PROCEDURE — 25000003 PHARM REV CODE 250: Performed by: INTERNAL MEDICINE

## 2021-04-12 RX ORDER — EPINEPHRINE 0.3 MG/.3ML
0.3 INJECTION SUBCUTANEOUS
Status: ACTIVE | OUTPATIENT
Start: 2021-04-12 | End: 2021-04-12

## 2021-04-12 RX ORDER — SODIUM CHLORIDE 0.9 % (FLUSH) 0.9 %
10 SYRINGE (ML) INJECTION
Status: DISCONTINUED | OUTPATIENT
Start: 2021-04-12 | End: 2021-04-12 | Stop reason: HOSPADM

## 2021-04-12 RX ORDER — DIPHENHYDRAMINE HYDROCHLORIDE 50 MG/ML
25 INJECTION INTRAMUSCULAR; INTRAVENOUS
Status: ACTIVE | OUTPATIENT
Start: 2021-04-12 | End: 2021-04-12

## 2021-04-12 RX ORDER — DIPHENHYDRAMINE HYDROCHLORIDE 50 MG/ML
25 INJECTION INTRAMUSCULAR; INTRAVENOUS
Status: COMPLETED | OUTPATIENT
Start: 2021-04-12 | End: 2021-04-12

## 2021-04-12 RX ORDER — METHYLPREDNISOLONE SOD SUCC 125 MG
40 VIAL (EA) INJECTION
Status: ACTIVE | OUTPATIENT
Start: 2021-04-12 | End: 2021-04-12

## 2021-04-12 RX ORDER — IPRATROPIUM BROMIDE AND ALBUTEROL SULFATE 2.5; .5 MG/3ML; MG/3ML
3 SOLUTION RESPIRATORY (INHALATION)
Status: ACTIVE | OUTPATIENT
Start: 2021-04-12 | End: 2021-04-12

## 2021-04-12 RX ORDER — ACETAMINOPHEN 325 MG/1
650 TABLET ORAL
Status: COMPLETED | OUTPATIENT
Start: 2021-04-12 | End: 2021-04-12

## 2021-04-12 RX ORDER — HEPARIN 100 UNIT/ML
500 SYRINGE INTRAVENOUS
Status: DISCONTINUED | OUTPATIENT
Start: 2021-04-12 | End: 2021-04-12 | Stop reason: HOSPADM

## 2021-04-12 RX ORDER — ACETAMINOPHEN 325 MG/1
650 TABLET ORAL
Status: ACTIVE | OUTPATIENT
Start: 2021-04-12 | End: 2021-04-12

## 2021-04-12 RX ADMIN — Medication 10 ML: at 02:04

## 2021-04-12 RX ADMIN — INFLIXIMAB 480 MG: 100 INJECTION, POWDER, LYOPHILIZED, FOR SOLUTION INTRAVENOUS at 12:04

## 2021-04-12 RX ADMIN — DIPHENHYDRAMINE HYDROCHLORIDE 25 MG: 50 INJECTION INTRAMUSCULAR; INTRAVENOUS at 12:04

## 2021-04-12 RX ADMIN — SODIUM CHLORIDE: 0.9 INJECTION, SOLUTION INTRAVENOUS at 12:04

## 2021-04-12 RX ADMIN — ACETAMINOPHEN 650 MG: 325 TABLET ORAL at 12:04

## 2021-05-25 ENCOUNTER — TELEPHONE (OUTPATIENT)
Dept: FAMILY MEDICINE | Facility: CLINIC | Age: 76
End: 2021-05-25

## 2021-06-01 ENCOUNTER — PES CALL (OUTPATIENT)
Dept: ADMINISTRATIVE | Facility: CLINIC | Age: 76
End: 2021-06-01

## 2021-06-02 ENCOUNTER — TELEPHONE (OUTPATIENT)
Dept: GASTROENTEROLOGY | Facility: HOSPITAL | Age: 76
End: 2021-06-02

## 2021-06-02 DIAGNOSIS — Z79.899 HIGH RISK MEDICATION USE: Primary | ICD-10-CM

## 2021-06-07 ENCOUNTER — CLINICAL SUPPORT (OUTPATIENT)
Dept: FAMILY MEDICINE | Facility: CLINIC | Age: 76
End: 2021-06-07
Payer: MEDICARE

## 2021-06-07 ENCOUNTER — INFUSION (OUTPATIENT)
Dept: INFUSION THERAPY | Facility: HOSPITAL | Age: 76
End: 2021-06-07
Attending: INTERNAL MEDICINE
Payer: MEDICARE

## 2021-06-07 VITALS
TEMPERATURE: 98 F | DIASTOLIC BLOOD PRESSURE: 98 MMHG | SYSTOLIC BLOOD PRESSURE: 138 MMHG | OXYGEN SATURATION: 97 % | BODY MASS INDEX: 36.13 KG/M2 | HEIGHT: 64 IN | RESPIRATION RATE: 18 BRPM | HEART RATE: 84 BPM | WEIGHT: 211.63 LBS

## 2021-06-07 DIAGNOSIS — K50.819 CROHN'S DISEASE OF SMALL AND LARGE INTESTINES WITH COMPLICATION: Primary | ICD-10-CM

## 2021-06-07 DIAGNOSIS — Z00.00 ROUTINE HEALTH MAINTENANCE: Primary | ICD-10-CM

## 2021-06-07 PROCEDURE — 25000003 PHARM REV CODE 250: Performed by: INTERNAL MEDICINE

## 2021-06-07 PROCEDURE — 86580 TB INTRADERMAL TEST: CPT | Mod: PBBFAC,PO

## 2021-06-07 PROCEDURE — 96413 CHEMO IV INFUSION 1 HR: CPT

## 2021-06-07 PROCEDURE — 63600175 PHARM REV CODE 636 W HCPCS: Mod: JG | Performed by: INTERNAL MEDICINE

## 2021-06-07 PROCEDURE — 96415 CHEMO IV INFUSION ADDL HR: CPT

## 2021-06-07 PROCEDURE — 96375 TX/PRO/DX INJ NEW DRUG ADDON: CPT

## 2021-06-07 PROCEDURE — A4216 STERILE WATER/SALINE, 10 ML: HCPCS | Performed by: INTERNAL MEDICINE

## 2021-06-07 RX ORDER — SODIUM CHLORIDE 0.9 % (FLUSH) 0.9 %
10 SYRINGE (ML) INJECTION
Status: DISCONTINUED | OUTPATIENT
Start: 2021-06-07 | End: 2021-06-07 | Stop reason: HOSPADM

## 2021-06-07 RX ORDER — DIPHENHYDRAMINE HYDROCHLORIDE 50 MG/ML
25 INJECTION INTRAMUSCULAR; INTRAVENOUS
Status: COMPLETED | OUTPATIENT
Start: 2021-06-07 | End: 2021-06-07

## 2021-06-07 RX ORDER — EPINEPHRINE 0.3 MG/.3ML
0.3 INJECTION SUBCUTANEOUS
Status: ACTIVE | OUTPATIENT
Start: 2021-06-07 | End: 2021-06-07

## 2021-06-07 RX ORDER — DIPHENHYDRAMINE HYDROCHLORIDE 50 MG/ML
25 INJECTION INTRAMUSCULAR; INTRAVENOUS
Status: ACTIVE | OUTPATIENT
Start: 2021-06-07 | End: 2021-06-07

## 2021-06-07 RX ORDER — IPRATROPIUM BROMIDE AND ALBUTEROL SULFATE 2.5; .5 MG/3ML; MG/3ML
3 SOLUTION RESPIRATORY (INHALATION)
Status: ACTIVE | OUTPATIENT
Start: 2021-06-07 | End: 2021-06-07

## 2021-06-07 RX ORDER — HEPARIN 100 UNIT/ML
500 SYRINGE INTRAVENOUS
Status: DISCONTINUED | OUTPATIENT
Start: 2021-06-07 | End: 2021-06-07 | Stop reason: HOSPADM

## 2021-06-07 RX ORDER — ACETAMINOPHEN 325 MG/1
650 TABLET ORAL
Status: COMPLETED | OUTPATIENT
Start: 2021-06-07 | End: 2021-06-07

## 2021-06-07 RX ORDER — ACETAMINOPHEN 325 MG/1
650 TABLET ORAL
Status: ACTIVE | OUTPATIENT
Start: 2021-06-07 | End: 2021-06-07

## 2021-06-07 RX ADMIN — ACETAMINOPHEN 650 MG: 325 TABLET ORAL at 12:06

## 2021-06-07 RX ADMIN — DIPHENHYDRAMINE HYDROCHLORIDE 25 MG: 50 INJECTION INTRAMUSCULAR; INTRAVENOUS at 12:06

## 2021-06-07 RX ADMIN — INFLIXIMAB 480 MG: 100 INJECTION, POWDER, LYOPHILIZED, FOR SOLUTION INTRAVENOUS at 12:06

## 2021-06-07 RX ADMIN — SODIUM CHLORIDE: 0.9 INJECTION, SOLUTION INTRAVENOUS at 12:06

## 2021-06-07 RX ADMIN — Medication 10 ML: at 02:06

## 2021-06-09 ENCOUNTER — CLINICAL SUPPORT (OUTPATIENT)
Dept: FAMILY MEDICINE | Facility: CLINIC | Age: 76
End: 2021-06-09
Payer: MEDICARE

## 2021-06-18 ENCOUNTER — LAB VISIT (OUTPATIENT)
Dept: LAB | Facility: OTHER | Age: 76
End: 2021-06-18
Attending: INTERNAL MEDICINE
Payer: MEDICARE

## 2021-06-18 DIAGNOSIS — Z79.899 HIGH RISK MEDICATION USE: ICD-10-CM

## 2021-06-18 PROCEDURE — 36415 COLL VENOUS BLD VENIPUNCTURE: CPT | Performed by: INTERNAL MEDICINE

## 2021-06-18 PROCEDURE — 87340 HEPATITIS B SURFACE AG IA: CPT | Performed by: INTERNAL MEDICINE

## 2021-06-21 LAB — HBV SURFACE AG SERPL QL IA: NEGATIVE

## 2021-07-06 ENCOUNTER — TELEPHONE (OUTPATIENT)
Dept: FAMILY MEDICINE | Facility: CLINIC | Age: 76
End: 2021-07-06

## 2021-08-13 ENCOUNTER — INFUSION (OUTPATIENT)
Dept: INFUSION THERAPY | Facility: HOSPITAL | Age: 76
End: 2021-08-13
Attending: INTERNAL MEDICINE
Payer: MEDICARE

## 2021-08-13 VITALS
HEART RATE: 99 BPM | SYSTOLIC BLOOD PRESSURE: 144 MMHG | DIASTOLIC BLOOD PRESSURE: 67 MMHG | RESPIRATION RATE: 18 BRPM | OXYGEN SATURATION: 98 % | TEMPERATURE: 99 F | BODY MASS INDEX: 36.13 KG/M2 | WEIGHT: 211.63 LBS | HEIGHT: 64 IN

## 2021-08-13 DIAGNOSIS — K50.819 CROHN'S DISEASE OF SMALL AND LARGE INTESTINES WITH COMPLICATION: Primary | ICD-10-CM

## 2021-08-13 PROCEDURE — 96413 CHEMO IV INFUSION 1 HR: CPT

## 2021-08-13 PROCEDURE — 63600175 PHARM REV CODE 636 W HCPCS: Mod: JG | Performed by: INTERNAL MEDICINE

## 2021-08-13 PROCEDURE — 96415 CHEMO IV INFUSION ADDL HR: CPT

## 2021-08-13 PROCEDURE — 25000003 PHARM REV CODE 250: Performed by: INTERNAL MEDICINE

## 2021-08-13 PROCEDURE — 96375 TX/PRO/DX INJ NEW DRUG ADDON: CPT

## 2021-08-13 PROCEDURE — A4216 STERILE WATER/SALINE, 10 ML: HCPCS | Performed by: INTERNAL MEDICINE

## 2021-08-13 RX ORDER — ACETAMINOPHEN 325 MG/1
650 TABLET ORAL
Status: ACTIVE | OUTPATIENT
Start: 2021-08-13 | End: 2021-08-13

## 2021-08-13 RX ORDER — IPRATROPIUM BROMIDE AND ALBUTEROL SULFATE 2.5; .5 MG/3ML; MG/3ML
3 SOLUTION RESPIRATORY (INHALATION)
Status: ACTIVE | OUTPATIENT
Start: 2021-08-13 | End: 2021-08-13

## 2021-08-13 RX ORDER — SODIUM CHLORIDE 0.9 % (FLUSH) 0.9 %
10 SYRINGE (ML) INJECTION
Status: DISCONTINUED | OUTPATIENT
Start: 2021-08-13 | End: 2021-08-13 | Stop reason: HOSPADM

## 2021-08-13 RX ORDER — ACETAMINOPHEN 325 MG/1
650 TABLET ORAL
Status: COMPLETED | OUTPATIENT
Start: 2021-08-13 | End: 2021-08-13

## 2021-08-13 RX ORDER — DIPHENHYDRAMINE HYDROCHLORIDE 50 MG/ML
25 INJECTION INTRAMUSCULAR; INTRAVENOUS
Status: ACTIVE | OUTPATIENT
Start: 2021-08-13 | End: 2021-08-13

## 2021-08-13 RX ORDER — EPINEPHRINE 0.3 MG/.3ML
0.3 INJECTION SUBCUTANEOUS
Status: ACTIVE | OUTPATIENT
Start: 2021-08-13 | End: 2021-08-13

## 2021-08-13 RX ORDER — HEPARIN 100 UNIT/ML
500 SYRINGE INTRAVENOUS
Status: DISCONTINUED | OUTPATIENT
Start: 2021-08-13 | End: 2021-08-13 | Stop reason: HOSPADM

## 2021-08-13 RX ORDER — DIPHENHYDRAMINE HYDROCHLORIDE 50 MG/ML
25 INJECTION INTRAMUSCULAR; INTRAVENOUS
Status: COMPLETED | OUTPATIENT
Start: 2021-08-13 | End: 2021-08-13

## 2021-08-13 RX ADMIN — ACETAMINOPHEN 650 MG: 325 TABLET ORAL at 10:08

## 2021-08-13 RX ADMIN — SODIUM CHLORIDE: 0.9 INJECTION, SOLUTION INTRAVENOUS at 10:08

## 2021-08-13 RX ADMIN — INFLIXIMAB 480 MG: 100 INJECTION, POWDER, LYOPHILIZED, FOR SOLUTION INTRAVENOUS at 10:08

## 2021-08-13 RX ADMIN — Medication 10 ML: at 12:08

## 2021-08-13 RX ADMIN — DIPHENHYDRAMINE HYDROCHLORIDE 25 MG: 50 INJECTION INTRAMUSCULAR; INTRAVENOUS at 10:08

## 2021-09-03 NOTE — ASSESSMENT & PLAN NOTE
OR 1/8/2020  Laparoscopic lysis of adhesions and creation end ileostomy    -await return of bowel function, cld  -continue multi modality for pain control  -encourage ambulation and use of IS  -beni hose and SCD  -prophalactic lovenox and PPI  -keep pessar drain in rectum   Sepsis Criteria were met:

## 2021-09-17 ENCOUNTER — PES CALL (OUTPATIENT)
Dept: ADMINISTRATIVE | Facility: CLINIC | Age: 76
End: 2021-09-17

## 2021-10-01 ENCOUNTER — TELEPHONE (OUTPATIENT)
Dept: FAMILY MEDICINE | Facility: CLINIC | Age: 76
End: 2021-10-01

## 2021-10-08 ENCOUNTER — INFUSION (OUTPATIENT)
Dept: INFUSION THERAPY | Facility: HOSPITAL | Age: 76
End: 2021-10-08
Attending: FAMILY MEDICINE
Payer: MEDICARE

## 2021-10-08 VITALS
RESPIRATION RATE: 18 BRPM | TEMPERATURE: 98 F | DIASTOLIC BLOOD PRESSURE: 72 MMHG | SYSTOLIC BLOOD PRESSURE: 156 MMHG | HEART RATE: 93 BPM | BODY MASS INDEX: 36.13 KG/M2 | WEIGHT: 211.63 LBS | OXYGEN SATURATION: 98 % | HEIGHT: 64 IN

## 2021-10-08 DIAGNOSIS — K50.819 CROHN'S DISEASE OF SMALL AND LARGE INTESTINES WITH COMPLICATION: Primary | ICD-10-CM

## 2021-10-08 PROCEDURE — 63600175 PHARM REV CODE 636 W HCPCS: Mod: JW,JG | Performed by: INTERNAL MEDICINE

## 2021-10-08 PROCEDURE — 96375 TX/PRO/DX INJ NEW DRUG ADDON: CPT

## 2021-10-08 PROCEDURE — 96367 TX/PROPH/DG ADDL SEQ IV INF: CPT

## 2021-10-08 PROCEDURE — A4216 STERILE WATER/SALINE, 10 ML: HCPCS | Performed by: INTERNAL MEDICINE

## 2021-10-08 PROCEDURE — 25000003 PHARM REV CODE 250: Performed by: INTERNAL MEDICINE

## 2021-10-08 PROCEDURE — 96413 CHEMO IV INFUSION 1 HR: CPT

## 2021-10-08 PROCEDURE — 96415 CHEMO IV INFUSION ADDL HR: CPT

## 2021-10-08 RX ORDER — ACETAMINOPHEN 325 MG/1
650 TABLET ORAL
Status: ACTIVE | OUTPATIENT
Start: 2021-10-08 | End: 2021-10-08

## 2021-10-08 RX ORDER — HEPARIN 100 UNIT/ML
500 SYRINGE INTRAVENOUS
Status: DISCONTINUED | OUTPATIENT
Start: 2021-10-08 | End: 2021-10-08 | Stop reason: HOSPADM

## 2021-10-08 RX ORDER — DIPHENHYDRAMINE HYDROCHLORIDE 50 MG/ML
25 INJECTION INTRAMUSCULAR; INTRAVENOUS
Status: ACTIVE | OUTPATIENT
Start: 2021-10-08 | End: 2021-10-08

## 2021-10-08 RX ORDER — SODIUM CHLORIDE 0.9 % (FLUSH) 0.9 %
10 SYRINGE (ML) INJECTION
Status: DISCONTINUED | OUTPATIENT
Start: 2021-10-08 | End: 2021-10-08 | Stop reason: HOSPADM

## 2021-10-08 RX ORDER — ACETAMINOPHEN 325 MG/1
650 TABLET ORAL
Status: COMPLETED | OUTPATIENT
Start: 2021-10-08 | End: 2021-10-08

## 2021-10-08 RX ORDER — IPRATROPIUM BROMIDE AND ALBUTEROL SULFATE 2.5; .5 MG/3ML; MG/3ML
3 SOLUTION RESPIRATORY (INHALATION)
Status: ACTIVE | OUTPATIENT
Start: 2021-10-08 | End: 2021-10-08

## 2021-10-08 RX ORDER — DIPHENHYDRAMINE HYDROCHLORIDE 50 MG/ML
25 INJECTION INTRAMUSCULAR; INTRAVENOUS
Status: COMPLETED | OUTPATIENT
Start: 2021-10-08 | End: 2021-10-08

## 2021-10-08 RX ORDER — EPINEPHRINE 0.3 MG/.3ML
0.3 INJECTION SUBCUTANEOUS
Status: ACTIVE | OUTPATIENT
Start: 2021-10-08 | End: 2021-10-08

## 2021-10-08 RX ADMIN — ACETAMINOPHEN 650 MG: 325 TABLET ORAL at 10:10

## 2021-10-08 RX ADMIN — INFLIXIMAB 480 MG: 100 INJECTION, POWDER, LYOPHILIZED, FOR SOLUTION INTRAVENOUS at 10:10

## 2021-10-08 RX ADMIN — SODIUM CHLORIDE: 0.9 INJECTION, SOLUTION INTRAVENOUS at 10:10

## 2021-10-08 RX ADMIN — DIPHENHYDRAMINE HYDROCHLORIDE 25 MG: 50 INJECTION INTRAMUSCULAR; INTRAVENOUS at 10:10

## 2021-10-08 RX ADMIN — Medication 10 ML: at 01:10

## 2021-12-08 ENCOUNTER — INFUSION (OUTPATIENT)
Dept: INFUSION THERAPY | Facility: HOSPITAL | Age: 76
End: 2021-12-08
Attending: INTERNAL MEDICINE
Payer: MEDICARE

## 2021-12-08 VITALS
BODY MASS INDEX: 36.13 KG/M2 | HEIGHT: 64 IN | DIASTOLIC BLOOD PRESSURE: 58 MMHG | SYSTOLIC BLOOD PRESSURE: 138 MMHG | RESPIRATION RATE: 18 BRPM | HEART RATE: 91 BPM | OXYGEN SATURATION: 98 % | WEIGHT: 211.63 LBS | TEMPERATURE: 98 F

## 2021-12-08 DIAGNOSIS — K50.819 CROHN'S DISEASE OF SMALL AND LARGE INTESTINES WITH COMPLICATION: Primary | ICD-10-CM

## 2021-12-08 PROCEDURE — 25000003 PHARM REV CODE 250: Performed by: INTERNAL MEDICINE

## 2021-12-08 PROCEDURE — 96413 CHEMO IV INFUSION 1 HR: CPT

## 2021-12-08 PROCEDURE — A4216 STERILE WATER/SALINE, 10 ML: HCPCS | Performed by: INTERNAL MEDICINE

## 2021-12-08 PROCEDURE — 96415 CHEMO IV INFUSION ADDL HR: CPT

## 2021-12-08 PROCEDURE — 63600175 PHARM REV CODE 636 W HCPCS: Mod: JG | Performed by: INTERNAL MEDICINE

## 2021-12-08 PROCEDURE — 96375 TX/PRO/DX INJ NEW DRUG ADDON: CPT

## 2021-12-08 RX ORDER — IPRATROPIUM BROMIDE AND ALBUTEROL SULFATE 2.5; .5 MG/3ML; MG/3ML
3 SOLUTION RESPIRATORY (INHALATION)
Status: ACTIVE | OUTPATIENT
Start: 2021-12-08 | End: 2021-12-08

## 2021-12-08 RX ORDER — DIPHENHYDRAMINE HYDROCHLORIDE 50 MG/ML
25 INJECTION INTRAMUSCULAR; INTRAVENOUS
Status: COMPLETED | OUTPATIENT
Start: 2021-12-08 | End: 2021-12-08

## 2021-12-08 RX ORDER — SODIUM CHLORIDE 0.9 % (FLUSH) 0.9 %
10 SYRINGE (ML) INJECTION
Status: DISCONTINUED | OUTPATIENT
Start: 2021-12-08 | End: 2021-12-08 | Stop reason: HOSPADM

## 2021-12-08 RX ORDER — ACETAMINOPHEN 325 MG/1
650 TABLET ORAL
Status: ACTIVE | OUTPATIENT
Start: 2021-12-08 | End: 2021-12-08

## 2021-12-08 RX ORDER — HEPARIN 100 UNIT/ML
500 SYRINGE INTRAVENOUS
Status: DISCONTINUED | OUTPATIENT
Start: 2021-12-08 | End: 2021-12-08 | Stop reason: HOSPADM

## 2021-12-08 RX ORDER — EPINEPHRINE 0.3 MG/.3ML
0.3 INJECTION SUBCUTANEOUS
Status: ACTIVE | OUTPATIENT
Start: 2021-12-08 | End: 2021-12-08

## 2021-12-08 RX ORDER — ACETAMINOPHEN 325 MG/1
650 TABLET ORAL
Status: COMPLETED | OUTPATIENT
Start: 2021-12-08 | End: 2021-12-08

## 2021-12-08 RX ORDER — DIPHENHYDRAMINE HYDROCHLORIDE 50 MG/ML
25 INJECTION INTRAMUSCULAR; INTRAVENOUS
Status: ACTIVE | OUTPATIENT
Start: 2021-12-08 | End: 2021-12-08

## 2021-12-08 RX ADMIN — SODIUM CHLORIDE: 0.9 INJECTION, SOLUTION INTRAVENOUS at 10:12

## 2021-12-08 RX ADMIN — DIPHENHYDRAMINE HYDROCHLORIDE 25 MG: 50 INJECTION INTRAMUSCULAR; INTRAVENOUS at 10:12

## 2021-12-08 RX ADMIN — Medication 10 ML: at 12:12

## 2021-12-08 RX ADMIN — INFLIXIMAB 480 MG: 100 INJECTION, POWDER, LYOPHILIZED, FOR SOLUTION INTRAVENOUS at 10:12

## 2021-12-08 RX ADMIN — ACETAMINOPHEN 650 MG: 325 TABLET ORAL at 10:12

## 2021-12-22 NOTE — PROGRESS NOTES
[facility] Hospital Medicine  Progress Note    Patient Name: Anabella Aranda  MRN: [id number]  Patient Class: IP- Inpatient   Admission Date: 10/16/2019  Length of Stay: 12 days  Attending Physician: Maggie De León*  Primary Care Provider: Shon Brambila MD        Subjective:     Principal Problem:Crohn's disease of small and large intestines with complication        HPI:  The patient is a 74 y.o. female with PMH significant for Dementia, h/o DVT status post IVC filter, ulcerative colitis, iron deficiency anemia, GERD,  Major depression with psychotic features, and history of hep B. She is here today for a pre-operative visit in preparation for a Left total knee arthroplasty to be performed by  Dr. Lindsay on 10/16/19.  Anabella Aranda has a >1 year history of Left knee pain.  Pain is worse with activity and weight bearing. Patient has experienced interference of ADLs due to increased pain and decreased range of motion. Patient has failed non-operative treatment including NSAIDs, activity modification, and bracing. Anabella Aranda ambulates Rolator walker.  There has been no significant change in medical status since last visit.  She was seen by PCP Dr. Brambila on 08/14/19 at which time chronic conditions were well controlled    The pt underwent a left TKA during this admission, and we were consulted for medical management.      Overview/Hospital Course:  The pt seen at [facility], she knows her name and age, but has some memory lapses to the events.  The pt had hypotension earlier and was given a bolus on 250ml NS x 1 dose.  GI is also on the case given her colitis.  Ordering c diff titers, and adjusting her immunosuppression meds as well.  10/19 pt still feeling weak and not at the level where she can participate in therapy, still having diarrhea. Will need to order labs to f/u on bmp  10/21 cr level is down to normal, pt is being also managed by GI for her diarrhea/colitis.  GI  report on EGD and Flex sif 10/21 with pathology report severe small bowel inflammation and severe retained rectum inflammation with deep heaped up ulcers. No evidence of infectious process, stains negative. However on review of colectomy specimen from years ago, there was evidence of Crohns disease at that time. Thus, this is likely severe crohns flare given the extent of small bowel involvement    Interval History: awake and alert, diarrhea is improving,sitting upin bed with PT.patient ate < 20% her breakfast but stated I am still eating     Appreciates GI rec's, CRP improved with steroid. Continue IV steroid, Imuran daily   Will started on TPN given low Albumin and monitor weakly pre albumin.       Review of Systems   Constitutional: Negative for activity change, fatigue and fever.   Respiratory: Negative for apnea, cough and shortness of breath.    Cardiovascular: Negative for chest pain and palpitations.   Gastrointestinal: Positive for diarrhea. Negative for abdominal distention, nausea and vomiting.   Genitourinary: Negative for difficulty urinating and hematuria.   Musculoskeletal: Positive for arthralgias.   Neurological: Negative for dizziness and numbness.   Psychiatric/Behavioral: Negative for agitation. The patient is not nervous/anxious.      Objective:     Vital Signs (Most Recent):  Temp: 97.8 °F (36.6 °C) (10/28/19 1213)  Pulse: 84 (10/28/19 1213)  Resp: 18 (10/28/19 1213)  BP: 138/64 (10/28/19 1213)  SpO2: 98 % (10/28/19 0316) Vital Signs (24h Range):  Temp:  [97.8 °F (36.6 °C)-98.5 °F (36.9 °C)] 97.8 °F (36.6 °C)  Pulse:  [] 84  Resp:  [17-20] 18  SpO2:  [98 %-99 %] 98 %  BP: (125-145)/(56-94) 138/64     Weight: 106 kg (233 lb 11 oz)  Body mass index is 40.11 kg/m².    Intake/Output Summary (Last 24 hours) at 10/28/2019 1225  Last data filed at 10/28/2019 1000  Gross per 24 hour   Intake 1975 ml   Output 593 ml   Net 1382 ml      Physical Exam   Constitutional: She appears well-nourished.    HENT:   Head: Normocephalic and atraumatic.   Pulmonary/Chest: No respiratory distress.   Neurological: She is alert.   Vitals reviewed.      Significant Labs:   CBC:   Recent Labs   Lab 10/27/19  1922 10/28/19  0007 10/28/19  0631   WBC 6.44 4.33 4.36   HGB 8.8* 8.4* 8.2*   HCT 26.6* 25.7* 25.1*   * 554* 503*     CMP:   Recent Labs   Lab 10/27/19  0438 10/28/19  0631    138   K 4.9 4.8    103   CO2 22* 24   * 235*   BUN 14 18   CREATININE 0.8 0.8   CALCIUM 8.2* 8.1*   PROT 6.3 6.0   ALBUMIN 1.8* 1.8*   BILITOT 0.5 0.3   ALKPHOS 107 108   AST 15 11   ALT 8* 8*   ANIONGAP 13 11   EGFRNONAA >60 >60     TSH: No results for input(s): TSH in the last 4320 hours.  Urine Culture: No results for input(s): LABURIN in the last 48 hours.  Urine Studies: No results for input(s): COLORU, APPEARANCEUA, PHUR, SPECGRAV, PROTEINUA, GLUCUA, KETONESU, BILIRUBINUA, OCCULTUA, NITRITE, UROBILINOGEN, LEUKOCYTESUR, RBCUA, WBCUA, BACTERIA, SQUAMEPITHEL, HYALINECASTS in the last 48 hours.    Invalid input(s): WRIGHTSUR    Significant Imaging: none      Assessment/Plan:      * Crohn's disease of small and large intestines with complication  EGD and Flex sig 10/21    Severe small bowel inflammation and severe retained rectum inflammation with deep heaped up ulcers. No evidence of infectious process, stains negative. However on review of colectomy specimen from years ago, there was evidence of Crohns disease at that time. Thus, this is likely severe crohns flare given the extent of small bowel involvement  Appreciates GI rec's solumedrol 40mg IV stat then 20mg BID IV thereafter  Restart Imuran   FU hepatitis B serologies  PPI, lomotil with imodium.           Severe malnutrition  Due to decrease appetite and sever crohn;s disease  Start TPN  Weekly Prealmuni        Dementia  On oral meds  Continue current management      History of left knee replacement  S/p left knee replacement  Continue PT/OT   Pt accepted to  [facility] but not ready for d/c due to UC complication    Major depression with psychotic features  On oral meds.  Continue current management      Ulcerative proctitis with complication  Hx of total colectomy  Still having diarrhea  C.diff neg on 10/18  GI on board- appreciates re'c Lomotil and imodium.awaiting pathology report    Iron deficiency anemia  H/H is stable  GI is following      Venous insufficiency of both lower extremities          VTE Risk Mitigation (From admission, onward)         Ordered     enoxaparin injection 40 mg  Daily      10/25/19 1406     IP VTE HIGH RISK PATIENT  Once      10/16/19 1816     Place VIVIANA hose  Until discontinued      10/16/19 1816     Place sequential compression device  Until discontinued      10/16/19 1816                      Maggie De León MD  Department of Hospital Medicine   [facility]   Never smoker

## 2022-01-10 ENCOUNTER — PATIENT MESSAGE (OUTPATIENT)
Dept: ADMINISTRATIVE | Facility: HOSPITAL | Age: 77
End: 2022-01-10
Payer: MEDICARE

## 2022-01-28 ENCOUNTER — PES CALL (OUTPATIENT)
Dept: ADMINISTRATIVE | Facility: CLINIC | Age: 77
End: 2022-01-28
Payer: MEDICARE

## 2022-02-02 ENCOUNTER — INFUSION (OUTPATIENT)
Dept: INFUSION THERAPY | Facility: OTHER | Age: 77
End: 2022-02-02
Attending: STUDENT IN AN ORGANIZED HEALTH CARE EDUCATION/TRAINING PROGRAM
Payer: MEDICARE

## 2022-02-02 ENCOUNTER — TELEPHONE (OUTPATIENT)
Dept: INFUSION THERAPY | Facility: OTHER | Age: 77
End: 2022-02-02
Payer: MEDICARE

## 2022-02-02 VITALS
HEART RATE: 97 BPM | WEIGHT: 219.38 LBS | SYSTOLIC BLOOD PRESSURE: 141 MMHG | DIASTOLIC BLOOD PRESSURE: 77 MMHG | RESPIRATION RATE: 89 BRPM | OXYGEN SATURATION: 97 % | BODY MASS INDEX: 37.45 KG/M2 | HEIGHT: 64 IN | TEMPERATURE: 99 F

## 2022-02-02 DIAGNOSIS — K50.819 CROHN'S DISEASE OF SMALL AND LARGE INTESTINES WITH COMPLICATION: Primary | ICD-10-CM

## 2022-02-02 PROCEDURE — 96375 TX/PRO/DX INJ NEW DRUG ADDON: CPT

## 2022-02-02 PROCEDURE — 96367 TX/PROPH/DG ADDL SEQ IV INF: CPT

## 2022-02-02 PROCEDURE — 63600175 PHARM REV CODE 636 W HCPCS: Performed by: INTERNAL MEDICINE

## 2022-02-02 PROCEDURE — 25000003 PHARM REV CODE 250: Performed by: INTERNAL MEDICINE

## 2022-02-02 PROCEDURE — 96365 THER/PROPH/DIAG IV INF INIT: CPT

## 2022-02-02 PROCEDURE — 96366 THER/PROPH/DIAG IV INF ADDON: CPT

## 2022-02-02 RX ORDER — IPRATROPIUM BROMIDE AND ALBUTEROL SULFATE 2.5; .5 MG/3ML; MG/3ML
3 SOLUTION RESPIRATORY (INHALATION)
Status: ACTIVE | OUTPATIENT
Start: 2022-02-02 | End: 2022-02-02

## 2022-02-02 RX ORDER — METHYLPREDNISOLONE SOD SUCC 125 MG
40 VIAL (EA) INJECTION
Status: ACTIVE | OUTPATIENT
Start: 2022-02-02 | End: 2022-02-02

## 2022-02-02 RX ORDER — DIPHENHYDRAMINE HYDROCHLORIDE 50 MG/ML
25 INJECTION INTRAMUSCULAR; INTRAVENOUS
Status: COMPLETED | OUTPATIENT
Start: 2022-02-02 | End: 2022-02-02

## 2022-02-02 RX ORDER — ACETAMINOPHEN 325 MG/1
650 TABLET ORAL
Status: COMPLETED | OUTPATIENT
Start: 2022-02-02 | End: 2022-02-02

## 2022-02-02 RX ORDER — SODIUM CHLORIDE 0.9 % (FLUSH) 0.9 %
10 SYRINGE (ML) INJECTION
Status: DISCONTINUED | OUTPATIENT
Start: 2022-02-02 | End: 2022-02-02 | Stop reason: HOSPADM

## 2022-02-02 RX ORDER — DIPHENHYDRAMINE HYDROCHLORIDE 50 MG/ML
25 INJECTION INTRAMUSCULAR; INTRAVENOUS
Status: ACTIVE | OUTPATIENT
Start: 2022-02-02 | End: 2022-02-02

## 2022-02-02 RX ORDER — ACETAMINOPHEN 325 MG/1
650 TABLET ORAL
Status: ACTIVE | OUTPATIENT
Start: 2022-02-02 | End: 2022-02-02

## 2022-02-02 RX ORDER — EPINEPHRINE 1 MG/ML
0.3 INJECTION, SOLUTION, CONCENTRATE INTRAVENOUS
Status: DISPENSED | OUTPATIENT
Start: 2022-02-02 | End: 2022-02-02

## 2022-02-02 RX ORDER — HEPARIN 100 UNIT/ML
500 SYRINGE INTRAVENOUS
Status: DISCONTINUED | OUTPATIENT
Start: 2022-02-02 | End: 2022-02-02 | Stop reason: HOSPADM

## 2022-02-02 RX ADMIN — ACETAMINOPHEN 650 MG: 325 TABLET ORAL at 10:02

## 2022-02-02 RX ADMIN — SODIUM CHLORIDE: 0.9 INJECTION, SOLUTION INTRAVENOUS at 10:02

## 2022-02-02 RX ADMIN — DIPHENHYDRAMINE HYDROCHLORIDE 25 MG: 50 INJECTION INTRAMUSCULAR; INTRAVENOUS at 10:02

## 2022-02-02 RX ADMIN — INFLIXIMAB 500 MG: 100 INJECTION, POWDER, LYOPHILIZED, FOR SOLUTION INTRAVENOUS at 10:02

## 2022-02-02 NOTE — TELEPHONE ENCOUNTER
Called and spoke to Krystal. Letting her know that Ms. Aranda will be done within 30 mins. No other questions asked.

## 2022-02-02 NOTE — PLAN OF CARE
Patient reports no discomfort. Tolerated her infusion of Remicade. VSS. NAD.  Discussed discharge instruction and handout given to patient. Patient discharged to home with family. Ambulates with rolling walker.

## 2022-04-21 ENCOUNTER — PATIENT OUTREACH (OUTPATIENT)
Dept: ADMINISTRATIVE | Facility: HOSPITAL | Age: 77
End: 2022-04-21
Payer: MEDICARE

## 2022-04-29 ENCOUNTER — TELEPHONE (OUTPATIENT)
Dept: FAMILY MEDICINE | Facility: CLINIC | Age: 77
End: 2022-04-29
Payer: MEDICARE

## 2022-04-29 NOTE — TELEPHONE ENCOUNTER
----- Message from Mary Leavitt sent at 4/29/2022  9:51 AM CDT -----  Contact: sheila  Type:  Patient Returning Call    Who Called:manuel kumar/ [employer]   Would the patient rather a call back or a response via [facility]? Call   Best Call Back Number fax #[phone] phone # [phone] ext [phone]  Additional Information:     .checking on a fax that was sent on 04/18 and 04/28

## 2022-05-18 ENCOUNTER — TELEPHONE (OUTPATIENT)
Dept: GASTROENTEROLOGY | Facility: CLINIC | Age: 77
End: 2022-05-18
Payer: MEDICARE

## 2022-05-18 NOTE — TELEPHONE ENCOUNTER
Patients son called and made an appointment on 7/18/22 for a follow up. Patients last infusion was on 2/2/22. Is she finished with infusions?

## 2022-05-18 NOTE — TELEPHONE ENCOUNTER
----- Message from Jerome Magana sent at 5/18/2022 10:45 AM CDT -----  Pt's Son Bryson  would like to be called back regarding questions concerning her future infusion appts.  Son can be reached at [phone].    Thanks

## 2022-05-19 ENCOUNTER — TELEPHONE (OUTPATIENT)
Dept: GASTROENTEROLOGY | Facility: CLINIC | Age: 77
End: 2022-05-19
Payer: MEDICARE

## 2022-05-19 NOTE — TELEPHONE ENCOUNTER
----- Message from Jennifer Perez RN sent at 5/19/2022 11:48 AM CDT -----  Regarding: FW: concerns  Hey! Spoke with the son and got next 2 infusions scheduled.   ----- Message -----  From: Annmarie Cano  Sent: 5/19/2022  11:04 AM CDT  To: [facility]  Subject: concerns                                         Name of Who is Calling: Sofia Hull , Dr. Christiansen Office           What is the request in detail: Sofia is requesting a call back to find out why patient treatment was stopped.            Can the clinic reply by [facility]: No           What Number to Call Back if not in [facility]: ext [phone]

## 2022-05-19 NOTE — TELEPHONE ENCOUNTER
I called [facility] infusion center and left a message for the nurse to call back.

## 2022-05-27 ENCOUNTER — INFUSION (OUTPATIENT)
Dept: INFUSION THERAPY | Facility: OTHER | Age: 77
End: 2022-05-27
Attending: INTERNAL MEDICINE
Payer: MEDICARE

## 2022-05-27 VITALS
BODY MASS INDEX: 37.2 KG/M2 | SYSTOLIC BLOOD PRESSURE: 133 MMHG | OXYGEN SATURATION: 97 % | WEIGHT: 216.69 LBS | DIASTOLIC BLOOD PRESSURE: 63 MMHG | RESPIRATION RATE: 17 BRPM | TEMPERATURE: 98 F | HEART RATE: 91 BPM

## 2022-05-27 DIAGNOSIS — K50.819 CROHN'S DISEASE OF SMALL AND LARGE INTESTINES WITH COMPLICATION: Primary | ICD-10-CM

## 2022-05-27 PROCEDURE — 96375 TX/PRO/DX INJ NEW DRUG ADDON: CPT

## 2022-05-27 PROCEDURE — 25000003 PHARM REV CODE 250: Performed by: INTERNAL MEDICINE

## 2022-05-27 PROCEDURE — 63600175 PHARM REV CODE 636 W HCPCS: Mod: JG | Performed by: INTERNAL MEDICINE

## 2022-05-27 PROCEDURE — 96413 CHEMO IV INFUSION 1 HR: CPT

## 2022-05-27 PROCEDURE — 96415 CHEMO IV INFUSION ADDL HR: CPT

## 2022-05-27 RX ORDER — HEPARIN 100 UNIT/ML
500 SYRINGE INTRAVENOUS
Status: DISCONTINUED | OUTPATIENT
Start: 2022-05-27 | End: 2022-05-27 | Stop reason: HOSPADM

## 2022-05-27 RX ORDER — ACETAMINOPHEN 325 MG/1
650 TABLET ORAL
Status: CANCELLED | OUTPATIENT
Start: 2022-07-22

## 2022-05-27 RX ORDER — DIPHENHYDRAMINE HYDROCHLORIDE 50 MG/ML
25 INJECTION INTRAMUSCULAR; INTRAVENOUS
Status: CANCELLED | OUTPATIENT
Start: 2022-07-22

## 2022-05-27 RX ORDER — HEPARIN 100 UNIT/ML
500 SYRINGE INTRAVENOUS
Status: CANCELLED | OUTPATIENT
Start: 2022-07-22

## 2022-05-27 RX ORDER — IPRATROPIUM BROMIDE AND ALBUTEROL SULFATE 2.5; .5 MG/3ML; MG/3ML
3 SOLUTION RESPIRATORY (INHALATION)
Status: CANCELLED | OUTPATIENT
Start: 2022-07-22

## 2022-05-27 RX ORDER — EPINEPHRINE 1 MG/ML
0.3 INJECTION, SOLUTION, CONCENTRATE INTRAVENOUS
Status: DISPENSED | OUTPATIENT
Start: 2022-05-27 | End: 2022-05-27

## 2022-05-27 RX ORDER — DIPHENHYDRAMINE HYDROCHLORIDE 50 MG/ML
25 INJECTION INTRAMUSCULAR; INTRAVENOUS
Status: ACTIVE | OUTPATIENT
Start: 2022-05-27 | End: 2022-05-27

## 2022-05-27 RX ORDER — SODIUM CHLORIDE 0.9 % (FLUSH) 0.9 %
10 SYRINGE (ML) INJECTION
Status: DISCONTINUED | OUTPATIENT
Start: 2022-05-27 | End: 2022-05-27 | Stop reason: HOSPADM

## 2022-05-27 RX ORDER — IPRATROPIUM BROMIDE AND ALBUTEROL SULFATE 2.5; .5 MG/3ML; MG/3ML
3 SOLUTION RESPIRATORY (INHALATION)
Status: ACTIVE | OUTPATIENT
Start: 2022-05-27 | End: 2022-05-27

## 2022-05-27 RX ORDER — METHYLPREDNISOLONE SOD SUCC 125 MG
40 VIAL (EA) INJECTION
Status: ACTIVE | OUTPATIENT
Start: 2022-05-27 | End: 2022-05-27

## 2022-05-27 RX ORDER — SODIUM CHLORIDE 0.9 % (FLUSH) 0.9 %
10 SYRINGE (ML) INJECTION
Status: CANCELLED | OUTPATIENT
Start: 2022-07-22

## 2022-05-27 RX ORDER — DIPHENHYDRAMINE HYDROCHLORIDE 50 MG/ML
25 INJECTION INTRAMUSCULAR; INTRAVENOUS
Status: COMPLETED | OUTPATIENT
Start: 2022-05-27 | End: 2022-05-27

## 2022-05-27 RX ORDER — ACETAMINOPHEN 325 MG/1
650 TABLET ORAL
Status: COMPLETED | OUTPATIENT
Start: 2022-05-27 | End: 2022-05-27

## 2022-05-27 RX ORDER — EPINEPHRINE 1 MG/ML
0.3 INJECTION, SOLUTION, CONCENTRATE INTRAVENOUS
Status: CANCELLED | OUTPATIENT
Start: 2022-07-22

## 2022-05-27 RX ORDER — METHYLPREDNISOLONE SOD SUCC 125 MG
40 VIAL (EA) INJECTION
Status: CANCELLED | OUTPATIENT
Start: 2022-07-22

## 2022-05-27 RX ORDER — ACETAMINOPHEN 325 MG/1
650 TABLET ORAL
Status: ACTIVE | OUTPATIENT
Start: 2022-05-27 | End: 2022-05-27

## 2022-05-27 RX ADMIN — ACETAMINOPHEN 650 MG: 325 TABLET ORAL at 10:05

## 2022-05-27 RX ADMIN — INFLIXIMAB 490 MG: 100 INJECTION, POWDER, LYOPHILIZED, FOR SOLUTION INTRAVENOUS at 10:05

## 2022-05-27 RX ADMIN — DIPHENHYDRAMINE HYDROCHLORIDE 25 MG: 50 INJECTION, SOLUTION INTRAMUSCULAR; INTRAVENOUS at 10:05

## 2022-05-27 RX ADMIN — SODIUM CHLORIDE: 0.9 INJECTION, SOLUTION INTRAVENOUS at 10:05

## 2022-05-27 NOTE — PLAN OF CARE
Vital Signs Stable, No apparent distress noted; Pt tolerated _Remicade____ infusion w/o difficulty.  24g piv removed  No bleeding,swelling or drainage noted to the site.  Discharge teaching provided; instructions given;Pt verbalizes understanding. Next appointments scheduled  Discharged home w/Krsytal NAYAK

## 2022-05-31 ENCOUNTER — PATIENT MESSAGE (OUTPATIENT)
Dept: ADMINISTRATIVE | Facility: HOSPITAL | Age: 77
End: 2022-05-31
Payer: MEDICARE

## 2022-06-27 ENCOUNTER — PES CALL (OUTPATIENT)
Dept: ADMINISTRATIVE | Facility: CLINIC | Age: 77
End: 2022-06-27
Payer: MEDICARE

## 2022-07-18 ENCOUNTER — LAB VISIT (OUTPATIENT)
Dept: LAB | Facility: HOSPITAL | Age: 77
End: 2022-07-18
Attending: INTERNAL MEDICINE
Payer: MEDICARE

## 2022-07-18 ENCOUNTER — OFFICE VISIT (OUTPATIENT)
Dept: GASTROENTEROLOGY | Facility: CLINIC | Age: 77
End: 2022-07-18
Payer: MEDICARE

## 2022-07-18 VITALS — WEIGHT: 224.63 LBS | HEIGHT: 64 IN | BODY MASS INDEX: 38.35 KG/M2

## 2022-07-18 DIAGNOSIS — Z11.1 SCREENING-PULMONARY TB: ICD-10-CM

## 2022-07-18 DIAGNOSIS — Z79.899 HIGH RISK MEDICATION USE: ICD-10-CM

## 2022-07-18 DIAGNOSIS — K50.819 CROHN'S DISEASE OF SMALL AND LARGE INTESTINES WITH COMPLICATION: Primary | ICD-10-CM

## 2022-07-18 DIAGNOSIS — K50.819 CROHN'S DISEASE OF SMALL AND LARGE INTESTINES WITH COMPLICATION: ICD-10-CM

## 2022-07-18 LAB
ALBUMIN SERPL BCP-MCNC: 3.7 G/DL (ref 3.5–5.2)
ALP SERPL-CCNC: 92 U/L (ref 55–135)
ALT SERPL W/O P-5'-P-CCNC: 14 U/L (ref 10–44)
ANION GAP SERPL CALC-SCNC: 9 MMOL/L (ref 8–16)
AST SERPL-CCNC: 19 U/L (ref 10–40)
BASOPHILS # BLD AUTO: 0.03 K/UL (ref 0–0.2)
BASOPHILS NFR BLD: 0.6 % (ref 0–1.9)
BILIRUB SERPL-MCNC: 0.5 MG/DL (ref 0.1–1)
BUN SERPL-MCNC: 14 MG/DL (ref 8–23)
CALCIUM SERPL-MCNC: 9.1 MG/DL (ref 8.7–10.5)
CHLORIDE SERPL-SCNC: 106 MMOL/L (ref 95–110)
CO2 SERPL-SCNC: 28 MMOL/L (ref 23–29)
CREAT SERPL-MCNC: 1.1 MG/DL (ref 0.5–1.4)
CRP SERPL-MCNC: 1 MG/L (ref 0–8.2)
DIFFERENTIAL METHOD: ABNORMAL
EOSINOPHIL # BLD AUTO: 0 K/UL (ref 0–0.5)
EOSINOPHIL NFR BLD: 0.6 % (ref 0–8)
ERYTHROCYTE [DISTWIDTH] IN BLOOD BY AUTOMATED COUNT: 13.2 % (ref 11.5–14.5)
EST. GFR  (AFRICAN AMERICAN): 57 ML/MIN/1.73 M^2
EST. GFR  (NON AFRICAN AMERICAN): 49 ML/MIN/1.73 M^2
GLUCOSE SERPL-MCNC: 103 MG/DL (ref 70–110)
HCT VFR BLD AUTO: 40 % (ref 37–48.5)
HGB BLD-MCNC: 13.3 G/DL (ref 12–16)
IMM GRANULOCYTES # BLD AUTO: 0.02 K/UL (ref 0–0.04)
IMM GRANULOCYTES NFR BLD AUTO: 0.4 % (ref 0–0.5)
LYMPHOCYTES # BLD AUTO: 1.3 K/UL (ref 1–4.8)
LYMPHOCYTES NFR BLD: 28.3 % (ref 18–48)
MCH RBC QN AUTO: 31.6 PG (ref 27–31)
MCHC RBC AUTO-ENTMCNC: 33.3 G/DL (ref 32–36)
MCV RBC AUTO: 95 FL (ref 82–98)
MONOCYTES # BLD AUTO: 0.4 K/UL (ref 0.3–1)
MONOCYTES NFR BLD: 8.2 % (ref 4–15)
NEUTROPHILS # BLD AUTO: 2.9 K/UL (ref 1.8–7.7)
NEUTROPHILS NFR BLD: 61.9 % (ref 38–73)
NRBC BLD-RTO: 0 /100 WBC
PLATELET # BLD AUTO: 245 K/UL (ref 150–450)
PMV BLD AUTO: 8.5 FL (ref 9.2–12.9)
POTASSIUM SERPL-SCNC: 3.9 MMOL/L (ref 3.5–5.1)
PROT SERPL-MCNC: 8 G/DL (ref 6–8.4)
RBC # BLD AUTO: 4.21 M/UL (ref 4–5.4)
SODIUM SERPL-SCNC: 143 MMOL/L (ref 136–145)
WBC # BLD AUTO: 4.73 K/UL (ref 3.9–12.7)

## 2022-07-18 PROCEDURE — 99999 PR PBB SHADOW E&M-EST. PATIENT-LVL III: CPT | Mod: PBBFAC,,, | Performed by: INTERNAL MEDICINE

## 2022-07-18 PROCEDURE — 99215 OFFICE O/P EST HI 40 MIN: CPT | Mod: S$PBB,,, | Performed by: INTERNAL MEDICINE

## 2022-07-18 PROCEDURE — 85025 COMPLETE CBC W/AUTO DIFF WBC: CPT | Performed by: INTERNAL MEDICINE

## 2022-07-18 PROCEDURE — 80053 COMPREHEN METABOLIC PANEL: CPT | Performed by: INTERNAL MEDICINE

## 2022-07-18 PROCEDURE — 87340 HEPATITIS B SURFACE AG IA: CPT | Performed by: INTERNAL MEDICINE

## 2022-07-18 PROCEDURE — 99213 OFFICE O/P EST LOW 20 MIN: CPT | Mod: PBBFAC,PO | Performed by: INTERNAL MEDICINE

## 2022-07-18 PROCEDURE — 99215 PR OFFICE/OUTPT VISIT, EST, LEVL V, 40-54 MIN: ICD-10-PCS | Mod: S$PBB,,, | Performed by: INTERNAL MEDICINE

## 2022-07-18 PROCEDURE — 86140 C-REACTIVE PROTEIN: CPT | Performed by: INTERNAL MEDICINE

## 2022-07-18 PROCEDURE — 99999 PR PBB SHADOW E&M-EST. PATIENT-LVL III: ICD-10-PCS | Mod: PBBFAC,,, | Performed by: INTERNAL MEDICINE

## 2022-07-18 PROCEDURE — 36415 COLL VENOUS BLD VENIPUNCTURE: CPT | Performed by: INTERNAL MEDICINE

## 2022-07-18 NOTE — PROGRESS NOTES
GASTROENTEROLOGY CLINIC NOTE  Reason for visit: The primary encounter diagnosis was Crohn's disease of small and large intestines with complication. A diagnosis of High risk medication use was also pertinent to this visit.  Referring provider/PCP: Shon Brambila MD    HPI:  Anabella Aranda is a 77 y.o. female here today for follow up. crohns.    Interval history:  - current IBD meds: remicade 5mg/kg q 8 weeks;  Imuran 50mg daily   - she missed a dose because of scheduling errors around 4/2022 but now is back on schedule  - other GI meds: protonix  - BM/ day -3-5 empties a day  - constitutional/GI symptoms: no fevers/chills, weight loss, dysphagia  - extraintestinal manifestations: no eye pain/redness, skin lesions/rashes  - narcotics / tobacco: none    Doing well, no abd symptoms, no complaints.  Creation of end -ileostomy with dr. Worley on 1/8/20      I have reviewed her infectious serologies..  Hep B S Ag neg   Hep B S Ab positive   Hep B core Ab total positive   HBV DNA undetected.   - shows immunity to HepB    Her TB skin test was negative during last admission, however her quant gold was indeterminate.  ID team reviewed and stated ok to proceed with remicade from ID perspective. (note on 11/8/19)    Prior Endoscopy:  EGD:  10/2019 with me  Severe duodenitis    11/27/19 with me  Impression:           - Normal esophagus.                        - No gross lesions in the stomach.                        - Small hiatal hernia.                        - No gross lesions in the duodenal bulb, in the                         first portion of the duodenum, in the second                         portion of the duodenum and in the third portion of                         the duodenum.                        - No specimens collected.      Flex sig 10/2019 with me  Impression:           - Mucosal ulceration. Biopsied.                        - Patent end-to-end ileo-rectal anastomosis,                          characterized by an intact appearance.                        - Ileitis. Biopsied.    11/27/19 with me  Impression:           - Decubitus ulceration found on perianal exam.                        - Inflammation was found in the rectum. This was                         moderate in severity, improved compared to previous                         examinations. Biopsied.                        - The monie-terminal ileum appears endoscopically                         much less inflammed and much better. This was                         biopsied to assess disease activty.                        - The rectum is characterized by linear ulcers and                         inflammatory polyps, however the endoscopic                         appearance is much improved.                        - no evidence of fistula found despite instillation                         of methylene blue.    PATH:  1.  BIOPSIES OF NEOTERMINAL ILEUM:   NON ULCERATED MUCOSA WITH MODEST NONSPECIFIC MUCOSAL CHRONIC REACTIVE CHANGES     2.  RECTAL BIOPSIES:   AREAS OF INTENSE CHRONIC ACTIVE ULCERATION IN A BACKGROUND OF MUCOSA WITH   MODEST NONSPECIFIC CHRONIC INFLAMMATION   THE PICTURE IN THIS BIOPSY IS NOT THAT OF PSEUDOMEMBRANOUS COLITIS, BUT   CLOSTRIDIUM INFECTION CANNOT BE EXCLUDED.    (Portions of this note were dictated using voice recognition software and may contain dictation related errors in spelling/grammar/syntax not found on text review)    Review of Systems   Constitutional: Negative for chills, fever and malaise/fatigue.   Gastrointestinal: Negative for abdominal pain, blood in stool, diarrhea, heartburn, nausea and vomiting.   Skin: Negative for itching and rash.       Past Medical History: has a past medical history of Arthritis, C. difficile colitis, Chronic kidney disease, stage 3, Dementia, Hepatitis B, Hypertension, Major depression with psychotic features, and Ulcerative colitis.    Past Surgical History: has a past surgical  history that includes Total knee arthroplasty (Right, 04/07/2008); Nasal sinus surgery; Cholecystectomy; Tonsillectomy; emma filter placement (Right, 01/2014); Colonoscopy (N/A, 4/29/2016); Esophagogastroduodenoscopy (N/A, 1/25/2019); Flexible sigmoidoscopy (N/A, 1/25/2019); Laparoscopic total colectomy (06/24/2016); Total knee arthroplasty (Left, 10/16/2019); Knee Arthroplasty (Left, 10/16/2019); Esophagogastroduodenoscopy (N/A, 10/21/2019); Flexible sigmoidoscopy (N/A, 10/21/2019); Joint replacement; Esophagogastroduodenoscopy (N/A, 11/27/2019); Flexible sigmoidoscopy (N/A, 11/27/2019); Laparoscopic ileostomy (N/A, 1/8/2020); and Lysis of adhesions (1/8/2020).    Family History:family history includes Colon cancer in her paternal grandmother; Throat cancer in her father.    Allergies:   Review of patient's allergies indicates:   Allergen Reactions    Sulfa (sulfonamide antibiotics) Swelling       Social History: reports that she quit smoking about 24 years ago. Her smoking use included cigarettes. She has quit using smokeless tobacco. She reports that she does not drink alcohol and does not use drugs.    Home medications:   Current Outpatient Medications on File Prior to Visit   Medication Sig Dispense Refill    aspirin (ECOTRIN) 81 MG EC tablet Take 1 tablet (81 mg total) by mouth once daily. 30 tablet 0    azaTHIOprine (IMURAN) 50 mg Tab TAKE 1 TABLET(50 MG) BY MOUTH EVERY DAY 90 tablet 3    donepezil (ARICEPT) 10 MG tablet Take 1 tablet (10 mg total) by mouth every evening. (Patient taking differently: Take 10 mg by mouth once daily.) 90 tablet 1    ferrous sulfate (FEOSOL) 325 mg (65 mg iron) Tab tablet Take 1 tablet (325 mg total) by mouth 2 (two) times daily. 180 tablet 1    memantine (NAMENDA) 10 MG Tab Take 1 tablet (10 mg total) by mouth once daily. 180 tablet 1    QUEtiapine (SEROQUEL) 200 MG Tab       [DISCONTINUED] magnesium oxide (MAG-OX) 400 mg (241.3 mg magnesium) tablet Take 1 tablet  "(400 mg total) by mouth 2 (two) times daily. (Patient not taking: Reported on 7/18/2022)  0    [DISCONTINUED] ofloxacin (OCUFLOX) 0.3 % ophthalmic solution Place 2 drops into the right eye 4 (four) times daily. (Patient not taking: Reported on 7/18/2022) 5 mL 0    [DISCONTINUED] pantoprazole (PROTONIX) 40 MG tablet TAKE 1 TABLET(40 MG) BY MOUTH EVERY DAY (Patient not taking: Reported on 7/18/2022) 30 tablet 11    [DISCONTINUED] pantoprazole (PROTONIX) 40 MG tablet TAKE 1 TABLET(40 MG) BY MOUTH EVERY DAY 30 tablet 11     No current facility-administered medications on file prior to visit.       Vital signs:  Ht 5' 4" (1.626 m)   Wt 101.9 kg (224 lb 10.4 oz)   LMP  (LMP Unknown)   BMI 38.56 kg/m²     Physical Exam  Vitals reviewed.   Constitutional:       Appearance: Normal appearance. She is not toxic-appearing.   HENT:      Head: Normocephalic and atraumatic.   Eyes:      General: No scleral icterus.     Conjunctiva/sclera: Conjunctivae normal.   Abdominal:      General: Abdomen is flat.      Palpations: Abdomen is soft.      Comments: rlq ostomy bag wth semi formed brown stool   Neurological:      Mental Status: She is alert.   Psychiatric:         Mood and Affect: Mood normal.         Behavior: Behavior normal.         NO PHYSICAL EXAM PERFORMED GIVEN VIRTUAL VISIT    Routine labs:  Lab Results   Component Value Date    WBC 5.18 06/19/2020    HGB 12.3 06/19/2020    HCT 36.9 (L) 06/19/2020    MCV 94 06/19/2020     06/19/2020     Lab Results   Component Value Date    INR 1.1 10/16/2019     Lab Results   Component Value Date    IRON 59 02/04/2019    FERRITIN 97 02/04/2019    TIBC 327 02/04/2019    FESATURATED 18 (L) 02/04/2019     Lab Results   Component Value Date     06/19/2020    K 4.0 06/19/2020     06/19/2020    CO2 25 06/19/2020    BUN 19 06/19/2020    CREATININE 1.2 06/19/2020     Lab Results   Component Value Date    ALBUMIN 3.7 06/19/2020    ALT 10 06/19/2020    AST 20 06/19/2020    " ALKPHOS 93 06/19/2020    BILITOT 0.4 06/19/2020     No results found for: GLUCOSE    I have reviewed recent labs including hep b and TB serologies as noted above.      Assessment:  1. Crohn's disease of small and large intestines with complication    2. High risk medication use      Now s/p end ileostomy for significant disease of the retained rectum and for diversion 1/2020  She is on maintenance remicade 5 q 8 weeks. alos on imuran 50mg daily    Plan:  Orders Placed This Encounter    Comprehensive Metabolic Panel    CBC Auto Differential    C-Reactive Protein    Hepatitis B Surface Antigen    POCT TB Skin Test     Continue remicade and imuran    Check routine labs including tb skin and hepB    RTC annually       Og Carrera MD  [facility]

## 2022-07-18 NOTE — Clinical Note
Jennifer Could you help here? Pt gets infusions at [facility]... she needs updated TB skin test... I have ordered but I dont know how to schedule Can sandy do that at [facility]?\

## 2022-07-19 LAB — HBV SURFACE AG SERPL QL IA: NEGATIVE

## 2022-07-22 ENCOUNTER — INFUSION (OUTPATIENT)
Dept: INFUSION THERAPY | Facility: OTHER | Age: 77
End: 2022-07-22
Attending: INTERNAL MEDICINE
Payer: MEDICARE

## 2022-07-22 VITALS
TEMPERATURE: 98 F | HEART RATE: 89 BPM | OXYGEN SATURATION: 97 % | SYSTOLIC BLOOD PRESSURE: 124 MMHG | BODY MASS INDEX: 38.39 KG/M2 | HEIGHT: 64 IN | RESPIRATION RATE: 14 BRPM | WEIGHT: 224.88 LBS | DIASTOLIC BLOOD PRESSURE: 81 MMHG

## 2022-07-22 DIAGNOSIS — K50.819 CROHN'S DISEASE OF SMALL AND LARGE INTESTINES WITH COMPLICATION: Primary | ICD-10-CM

## 2022-07-22 PROCEDURE — 96366 THER/PROPH/DIAG IV INF ADDON: CPT

## 2022-07-22 PROCEDURE — 96365 THER/PROPH/DIAG IV INF INIT: CPT

## 2022-07-22 PROCEDURE — 63600175 PHARM REV CODE 636 W HCPCS: Mod: JG | Performed by: INTERNAL MEDICINE

## 2022-07-22 PROCEDURE — 25000003 PHARM REV CODE 250: Performed by: INTERNAL MEDICINE

## 2022-07-22 PROCEDURE — 96375 TX/PRO/DX INJ NEW DRUG ADDON: CPT

## 2022-07-22 RX ORDER — EPINEPHRINE 1 MG/ML
0.3 INJECTION, SOLUTION, CONCENTRATE INTRAVENOUS
Status: DISPENSED | OUTPATIENT
Start: 2022-07-22 | End: 2022-07-22

## 2022-07-22 RX ORDER — IPRATROPIUM BROMIDE AND ALBUTEROL SULFATE 2.5; .5 MG/3ML; MG/3ML
3 SOLUTION RESPIRATORY (INHALATION)
Status: CANCELLED | OUTPATIENT
Start: 2022-09-16

## 2022-07-22 RX ORDER — ACETAMINOPHEN 325 MG/1
650 TABLET ORAL
Status: CANCELLED | OUTPATIENT
Start: 2022-09-16

## 2022-07-22 RX ORDER — DIPHENHYDRAMINE HYDROCHLORIDE 50 MG/ML
25 INJECTION INTRAMUSCULAR; INTRAVENOUS
Status: COMPLETED | OUTPATIENT
Start: 2022-07-22 | End: 2022-07-22

## 2022-07-22 RX ORDER — HEPARIN 100 UNIT/ML
500 SYRINGE INTRAVENOUS
Status: CANCELLED | OUTPATIENT
Start: 2022-09-16

## 2022-07-22 RX ORDER — DIPHENHYDRAMINE HYDROCHLORIDE 50 MG/ML
25 INJECTION INTRAMUSCULAR; INTRAVENOUS
Status: CANCELLED | OUTPATIENT
Start: 2022-09-16

## 2022-07-22 RX ORDER — IPRATROPIUM BROMIDE AND ALBUTEROL SULFATE 2.5; .5 MG/3ML; MG/3ML
3 SOLUTION RESPIRATORY (INHALATION)
Status: ACTIVE | OUTPATIENT
Start: 2022-07-22 | End: 2022-07-22

## 2022-07-22 RX ORDER — METHYLPREDNISOLONE SOD SUCC 125 MG
40 VIAL (EA) INJECTION
Status: CANCELLED | OUTPATIENT
Start: 2022-09-16

## 2022-07-22 RX ORDER — ACETAMINOPHEN 325 MG/1
650 TABLET ORAL
Status: COMPLETED | OUTPATIENT
Start: 2022-07-22 | End: 2022-07-22

## 2022-07-22 RX ORDER — EPINEPHRINE 1 MG/ML
0.3 INJECTION, SOLUTION, CONCENTRATE INTRAVENOUS
Status: CANCELLED | OUTPATIENT
Start: 2022-09-16

## 2022-07-22 RX ORDER — METHYLPREDNISOLONE SOD SUCC 125 MG
40 VIAL (EA) INJECTION
Status: ACTIVE | OUTPATIENT
Start: 2022-07-22 | End: 2022-07-22

## 2022-07-22 RX ORDER — DIPHENHYDRAMINE HYDROCHLORIDE 50 MG/ML
25 INJECTION INTRAMUSCULAR; INTRAVENOUS
Status: ACTIVE | OUTPATIENT
Start: 2022-07-22 | End: 2022-07-22

## 2022-07-22 RX ORDER — SODIUM CHLORIDE 0.9 % (FLUSH) 0.9 %
10 SYRINGE (ML) INJECTION
Status: CANCELLED | OUTPATIENT
Start: 2022-09-16

## 2022-07-22 RX ORDER — HEPARIN 100 UNIT/ML
500 SYRINGE INTRAVENOUS
Status: DISCONTINUED | OUTPATIENT
Start: 2022-07-22 | End: 2022-07-22 | Stop reason: HOSPADM

## 2022-07-22 RX ORDER — SODIUM CHLORIDE 0.9 % (FLUSH) 0.9 %
10 SYRINGE (ML) INJECTION
Status: DISCONTINUED | OUTPATIENT
Start: 2022-07-22 | End: 2022-07-22 | Stop reason: HOSPADM

## 2022-07-22 RX ORDER — ACETAMINOPHEN 325 MG/1
650 TABLET ORAL
Status: ACTIVE | OUTPATIENT
Start: 2022-07-22 | End: 2022-07-22

## 2022-07-22 RX ADMIN — ACETAMINOPHEN 650 MG: 325 TABLET ORAL at 10:07

## 2022-07-22 RX ADMIN — DIPHENHYDRAMINE HYDROCHLORIDE 25 MG: 50 INJECTION, SOLUTION INTRAMUSCULAR; INTRAVENOUS at 10:07

## 2022-07-22 RX ADMIN — SODIUM CHLORIDE: 0.9 INJECTION, SOLUTION INTRAVENOUS at 10:07

## 2022-07-22 RX ADMIN — INFLIXIMAB 500 MG: 100 INJECTION, POWDER, LYOPHILIZED, FOR SOLUTION INTRAVENOUS at 11:07

## 2022-07-22 NOTE — PLAN OF CARE
Remicade infusion complete. Pt tolerated well. VSS. NAD. IV DC'd. Pt verbalized understanding of discharge instructions before leaving.

## 2022-08-17 ENCOUNTER — PES CALL (OUTPATIENT)
Dept: ADMINISTRATIVE | Facility: OTHER | Age: 77
End: 2022-08-17
Payer: MEDICARE

## 2022-08-29 ENCOUNTER — TELEPHONE (OUTPATIENT)
Dept: FAMILY MEDICINE | Facility: CLINIC | Age: 77
End: 2022-08-29
Payer: MEDICARE

## 2022-08-29 DIAGNOSIS — Z11.1 SCREENING-PULMONARY TB: Primary | ICD-10-CM

## 2022-08-30 NOTE — TELEPHONE ENCOUNTER
Spoke to pt's son, Bryson and would like to do the lab instead of the skin test for Thursday at [facility]. Pls advise.

## 2022-08-31 DIAGNOSIS — Z78.0 MENOPAUSE: ICD-10-CM

## 2022-09-07 ENCOUNTER — PATIENT MESSAGE (OUTPATIENT)
Dept: ADMINISTRATIVE | Facility: HOSPITAL | Age: 77
End: 2022-09-07
Payer: MEDICARE

## 2022-09-13 ENCOUNTER — TELEPHONE (OUTPATIENT)
Dept: FAMILY MEDICINE | Facility: CLINIC | Age: 77
End: 2022-09-13
Payer: MEDICARE

## 2022-09-13 NOTE — TELEPHONE ENCOUNTER
----- Message from Sayda Fuller sent at 9/13/2022  2:22 PM CDT -----  Regarding: Schedule Appt  Contact: Patient  Patient is requesting a call back to schedule an appt   Patient stated she would like to come in and have a TB test completed   Please Assist     Patient can be reached at [phone]   Please use number provided

## 2022-09-13 NOTE — TELEPHONE ENCOUNTER
Spoke to pt's sonByrson and scheduled pt's Quantiferon Gold for tomorrow morning at [facility]. Bryson is aware that pt has to be there before 10am.

## 2022-09-14 ENCOUNTER — LAB VISIT (OUTPATIENT)
Dept: LAB | Facility: OTHER | Age: 77
End: 2022-09-14
Attending: FAMILY MEDICINE
Payer: MEDICARE

## 2022-09-14 DIAGNOSIS — Z11.1 SCREENING-PULMONARY TB: ICD-10-CM

## 2022-09-14 PROCEDURE — 86480 TB TEST CELL IMMUN MEASURE: CPT | Performed by: FAMILY MEDICINE

## 2022-09-15 LAB
GAMMA INTERFERON BACKGROUND BLD IA-ACNC: 0.05 IU/ML
M TB IFN-G CD4+ BCKGRND COR BLD-ACNC: -0 IU/ML
MITOGEN IGNF BCKGRD COR BLD-ACNC: 9.95 IU/ML
TB GOLD PLUS: NEGATIVE
TB2 - NIL: -0 IU/ML

## 2022-10-21 ENCOUNTER — TELEPHONE (OUTPATIENT)
Dept: GASTROENTEROLOGY | Facility: CLINIC | Age: 77
End: 2022-10-21
Payer: MEDICARE

## 2022-10-21 NOTE — TELEPHONE ENCOUNTER
Spoke with pt's son regarding protonix. Advised medication was discontinued in July. He denies pt having any problems with reflux or heartburn at this time and thanked me for the call. Advised to contact us if she does have any GI related problems to schedule an appt.

## 2022-10-27 NOTE — PROGRESS NOTES
Discussed with patient, states he will cancel surgery as it is not bothering him that much. Patient states will call back if he would like to discuss surgical options again. Discussed with surgery scheduling who cancelled surgery. No further questions from patient   [facility] Hospital Medicine  Progress Note    Patient Name: Anabella Aranda  MRN: [id number]  Patient Class: IP- Inpatient   Admission Date: 10/16/2019  Length of Stay: 6 days  Attending Physician: Ghassan Lindsay MD  Primary Care Provider: Shon Brambila MD        Subjective:     Principal Problem:History of left knee replacement        HPI:  The patient is a 74 y.o. female with PMH significant for Dementia, h/o DVT status post IVC filter, ulcerative colitis, iron deficiency anemia, GERD,  Major depression with psychotic features, and history of hep B. She is here today for a pre-operative visit in preparation for a Left total knee arthroplasty to be performed by  Dr. Lindsay on 10/16/19.  Anabella Aranda has a >1 year history of Left knee pain.  Pain is worse with activity and weight bearing. Patient has experienced interference of ADLs due to increased pain and decreased range of motion. Patient has failed non-operative treatment including NSAIDs, activity modification, and bracing. Anabella Aranda ambulates Rolator walker.  There has been no significant change in medical status since last visit.  She was seen by PCP Dr. Brambila on 08/14/19 at which time chronic conditions were well controlled    The pt underwent a left TKA during this admission, and we were consulted for medical management.      Overview/Hospital Course:  The pt seen at [facility], she knows her name and age, but has some memory lapses to the events.  The pt had hypotension earlier and was given a bolus on 250ml NS x 1 dose.  GI is also on the case given her colitis.  Ordering c diff titers, and adjusting her immunosuppression meds as well.      10/19 pt still feeling weak and not at the level where she can participate in therapy, still having diarrhea. Will need to order labs to f/u on bmp    10/21 cr level is down to normal, pt is being also managed by GI for her diarrhea/colitis.  From the hospital medicine point of  view, the pt is ready for the next level of care at rehab once it is ok with GI who are also consultant on this case    Interval History: awake and alert, still having loose stool.   Stool test for C.diff neg on 10/18    Review of Systems   Constitutional: Negative for activity change, fatigue and fever.   Respiratory: Negative for apnea, cough and shortness of breath.    Cardiovascular: Negative for chest pain and palpitations.   Gastrointestinal: Positive for diarrhea. Negative for abdominal distention, nausea and vomiting.   Genitourinary: Negative for difficulty urinating and hematuria.   Musculoskeletal: Positive for arthralgias.   Neurological: Negative for dizziness and numbness.   Psychiatric/Behavioral: Negative for agitation. The patient is not nervous/anxious.      Objective:     Vital Signs (Most Recent):  Temp: 97 °F (36.1 °C) (10/22/19 0900)  Pulse: (!) 119 (10/22/19 1141)  Resp: 16 (10/22/19 0900)  BP: 123/62 (10/22/19 0900)  SpO2: (!) 94 % (10/22/19 1513) Vital Signs (24h Range):  Temp:  [96.8 °F (36 °C)-99.1 °F (37.3 °C)] 97 °F (36.1 °C)  Pulse:  [109-128] 119  Resp:  [16-17] 16  SpO2:  [94 %-97 %] 94 %  BP: (107-123)/(57-70) 123/62     Weight: 98.5 kg (217 lb 2.5 oz)  Body mass index is 37.27 kg/m².    Intake/Output Summary (Last 24 hours) at 10/22/2019 1855  Last data filed at 10/22/2019 1712  Gross per 24 hour   Intake 3122.5 ml   Output 250 ml   Net 2872.5 ml      Physical Exam   Constitutional: She appears well-nourished.   HENT:   Head: Normocephalic and atraumatic.   Pulmonary/Chest: No respiratory distress.   Neurological: She is alert.   Vitals reviewed.      Significant Labs:   CBC:   Recent Labs   Lab 10/21/19  1209   WBC 7.81   HGB 10.7*   HCT 32.0*   *     CMP:   Recent Labs   Lab 10/21/19  1209 10/22/19  0420    137   K 4.2 4.1    103   CO2 18* 17*   GLU 55* 54*   BUN 18 17   CREATININE 1.0 1.1   CALCIUM 8.6* 8.2*   PROT  --  6.1   ALBUMIN  --  1.8*   BILITOT  --   0.6   ALKPHOS  --  99   AST  --  18   ALT  --  5*   ANIONGAP 17* 17*   EGFRNONAA 56* 50*     TSH: No results for input(s): TSH in the last 4320 hours.  Urine Culture: No results for input(s): LABURIN in the last 48 hours.  Urine Studies: No results for input(s): COLORU, APPEARANCEUA, PHUR, SPECGRAV, PROTEINUA, GLUCUA, KETONESU, BILIRUBINUA, OCCULTUA, NITRITE, UROBILINOGEN, LEUKOCYTESUR, RBCUA, WBCUA, BACTERIA, SQUAMEPITHEL, HYALINECASTS in the last 48 hours.    Invalid input(s): WRIGHTSUR    Significant Imaging: none      Assessment/Plan:      * History of left knee replacement  S/p left knee replacement  Continue PT/OT   Pt accepted to [facility] but not ready for d/c due to UC complication    Acute renal failure superimposed on stage 3 chronic kidney disease  stable      Dementia  On oral meds  Continue current management      Major depression with psychotic features  On oral meds.  Continue current management      Ulcerative proctitis with complication  Hx of total collection  Still having diarrhea  C.diff neg on 10/18  GI on board    Iron deficiency anemia  H/H is stable  GI is following      Venous insufficiency of both lower extremities          VTE Risk Mitigation (From admission, onward)         Ordered     IP VTE HIGH RISK PATIENT  Once      10/16/19 1816     Place VIVIANA hose  Until discontinued      10/16/19 1816     Place sequential compression device  Until discontinued      10/16/19 1816                      Maggie De León MD  Department of Hospital Medicine   [facility]

## 2022-11-03 ENCOUNTER — TELEPHONE (OUTPATIENT)
Dept: FAMILY MEDICINE | Facility: CLINIC | Age: 77
End: 2022-11-03
Payer: MEDICARE

## 2022-11-03 DIAGNOSIS — Z12.31 ENCOUNTER FOR SCREENING MAMMOGRAM FOR BREAST CANCER: Primary | ICD-10-CM

## 2022-11-03 NOTE — TELEPHONE ENCOUNTER
----- Message from Claude Bond sent at 11/3/2022  3:49 PM CDT -----  Type:  Needs Medical Advice    Who Called: son  Reason:mammogram orders  Would the patient rather a call back or a response via [facility]?call  Best Call Back Number: [phone]  Additional Information: none

## 2022-11-12 ENCOUNTER — PATIENT MESSAGE (OUTPATIENT)
Dept: GASTROENTEROLOGY | Facility: CLINIC | Age: 77
End: 2022-11-12
Payer: MEDICARE

## 2022-11-12 DIAGNOSIS — K21.9 GASTROESOPHAGEAL REFLUX DISEASE WITHOUT ESOPHAGITIS: Primary | ICD-10-CM

## 2022-11-14 RX ORDER — PANTOPRAZOLE SODIUM 40 MG/1
40 TABLET, DELAYED RELEASE ORAL DAILY
Qty: 90 TABLET | Refills: 3 | Status: SHIPPED | OUTPATIENT
Start: 2022-11-14 | End: 2023-01-11 | Stop reason: SDUPTHER

## 2022-12-12 ENCOUNTER — TELEPHONE (OUTPATIENT)
Dept: FAMILY MEDICINE | Facility: CLINIC | Age: 77
End: 2022-12-12
Payer: MEDICARE

## 2022-12-12 NOTE — TELEPHONE ENCOUNTER
Spoke to pt's son, Bryson and informed that I received his mom's paperwork from [facility], but pt has not been seen since 10/2020 and she needs an Annual appt. Pt was scheduled for an Annual with Dr. Brambila.

## 2022-12-14 ENCOUNTER — HOSPITAL ENCOUNTER (OUTPATIENT)
Dept: RADIOLOGY | Facility: OTHER | Age: 77
Discharge: HOME | End: 2022-12-14
Attending: FAMILY MEDICINE
Payer: MEDICARE

## 2022-12-14 DIAGNOSIS — Z78.0 MENOPAUSE: ICD-10-CM

## 2022-12-14 PROCEDURE — 77080 DEXA BONE DENSITY SPINE HIP: ICD-10-PCS | Mod: 26,,, | Performed by: RADIOLOGY

## 2022-12-14 PROCEDURE — 77080 DXA BONE DENSITY AXIAL: CPT | Mod: TC

## 2022-12-14 PROCEDURE — 77080 DXA BONE DENSITY AXIAL: CPT | Mod: 26,,, | Performed by: RADIOLOGY

## 2023-01-06 ENCOUNTER — HOSPITAL ENCOUNTER (OUTPATIENT)
Dept: RADIOLOGY | Facility: OTHER | Age: 78
Discharge: HOME | End: 2023-01-06
Attending: FAMILY MEDICINE
Payer: MEDICARE

## 2023-01-06 DIAGNOSIS — Z12.31 ENCOUNTER FOR SCREENING MAMMOGRAM FOR BREAST CANCER: ICD-10-CM

## 2023-01-06 PROCEDURE — 77067 SCR MAMMO BI INCL CAD: CPT | Mod: 26,,, | Performed by: RADIOLOGY

## 2023-01-06 PROCEDURE — 77067 SCR MAMMO BI INCL CAD: CPT | Mod: TC

## 2023-01-06 PROCEDURE — 77063 BREAST TOMOSYNTHESIS BI: CPT | Mod: 26,,, | Performed by: RADIOLOGY

## 2023-01-06 PROCEDURE — 77067 MAMMO DIGITAL SCREENING BILAT WITH TOMO: ICD-10-PCS | Mod: 26,,, | Performed by: RADIOLOGY

## 2023-01-06 PROCEDURE — 77063 MAMMO DIGITAL SCREENING BILAT WITH TOMO: ICD-10-PCS | Mod: 26,,, | Performed by: RADIOLOGY

## 2023-01-11 ENCOUNTER — OFFICE VISIT (OUTPATIENT)
Dept: FAMILY MEDICINE | Facility: CLINIC | Age: 78
End: 2023-01-11
Attending: FAMILY MEDICINE
Payer: MEDICARE

## 2023-01-11 VITALS
TEMPERATURE: 98 F | HEIGHT: 64 IN | WEIGHT: 222 LBS | OXYGEN SATURATION: 97 % | BODY MASS INDEX: 37.9 KG/M2 | HEART RATE: 114 BPM

## 2023-01-11 DIAGNOSIS — Z00.00 ROUTINE GENERAL MEDICAL EXAMINATION AT HEALTH CARE FACILITY: Primary | ICD-10-CM

## 2023-01-11 DIAGNOSIS — F02.80 ALZHEIMERS DISEASE: Chronic | ICD-10-CM

## 2023-01-11 DIAGNOSIS — K50.10 CROHN'S DISEASE OF COLON WITHOUT COMPLICATION: ICD-10-CM

## 2023-01-11 DIAGNOSIS — G30.9 ALZHEIMERS DISEASE: Chronic | ICD-10-CM

## 2023-01-11 DIAGNOSIS — D62 ANEMIA DUE TO ACUTE BLOOD LOSS: ICD-10-CM

## 2023-01-11 DIAGNOSIS — M81.0 OSTEOPOROSIS, UNSPECIFIED OSTEOPOROSIS TYPE, UNSPECIFIED PATHOLOGICAL FRACTURE PRESENCE: ICD-10-CM

## 2023-01-11 DIAGNOSIS — D50.9 IRON DEFICIENCY ANEMIA, UNSPECIFIED IRON DEFICIENCY ANEMIA TYPE: ICD-10-CM

## 2023-01-11 DIAGNOSIS — R73.02 GLUCOSE INTOLERANCE (IMPAIRED GLUCOSE TOLERANCE): ICD-10-CM

## 2023-01-11 DIAGNOSIS — N18.31 STAGE 3A CHRONIC KIDNEY DISEASE: Chronic | ICD-10-CM

## 2023-01-11 DIAGNOSIS — K21.9 GASTROESOPHAGEAL REFLUX DISEASE WITHOUT ESOPHAGITIS: ICD-10-CM

## 2023-01-11 DIAGNOSIS — R93.3 ABNORMAL FINDINGS ON DIAGNOSTIC IMAGING OF OTHER PARTS OF DIGESTIVE TRACT: ICD-10-CM

## 2023-01-11 DIAGNOSIS — G30.1 LATE ONSET ALZHEIMER'S DISEASE WITHOUT BEHAVIORAL DISTURBANCE: ICD-10-CM

## 2023-01-11 DIAGNOSIS — F32.3 MAJOR DEPRESSION WITH PSYCHOTIC FEATURES: Chronic | ICD-10-CM

## 2023-01-11 DIAGNOSIS — K50.819 CROHN'S DISEASE OF SMALL AND LARGE INTESTINES WITH COMPLICATION: Chronic | ICD-10-CM

## 2023-01-11 DIAGNOSIS — F02.80 LATE ONSET ALZHEIMER'S DISEASE WITHOUT BEHAVIORAL DISTURBANCE: ICD-10-CM

## 2023-01-11 PROCEDURE — 99213 OFFICE O/P EST LOW 20 MIN: CPT | Mod: PBBFAC,PO | Performed by: FAMILY MEDICINE

## 2023-01-11 PROCEDURE — 99999 PR PBB SHADOW E&M-EST. PATIENT-LVL III: CPT | Mod: PBBFAC,,, | Performed by: FAMILY MEDICINE

## 2023-01-11 PROCEDURE — 99214 PR OFFICE/OUTPT VISIT, EST, LEVL IV, 30-39 MIN: ICD-10-PCS | Mod: S$PBB,,, | Performed by: FAMILY MEDICINE

## 2023-01-11 PROCEDURE — 99999 PR PBB SHADOW E&M-EST. PATIENT-LVL III: ICD-10-PCS | Mod: PBBFAC,,, | Performed by: FAMILY MEDICINE

## 2023-01-11 PROCEDURE — 99214 OFFICE O/P EST MOD 30 MIN: CPT | Mod: S$PBB,,, | Performed by: FAMILY MEDICINE

## 2023-01-11 RX ORDER — PANTOPRAZOLE SODIUM 40 MG/1
40 TABLET, DELAYED RELEASE ORAL DAILY
Qty: 90 TABLET | Refills: 3 | Status: SHIPPED | OUTPATIENT
Start: 2023-01-11 | End: 2024-01-28

## 2023-01-11 RX ORDER — MEMANTINE HYDROCHLORIDE 10 MG/1
10 TABLET ORAL DAILY
Qty: 180 TABLET | Refills: 1 | Status: SHIPPED | OUTPATIENT
Start: 2023-01-11 | End: 2024-01-29 | Stop reason: SDUPTHER

## 2023-01-11 RX ORDER — QUETIAPINE FUMARATE 200 MG/1
200 TABLET, FILM COATED ORAL DAILY
Qty: 90 TABLET | Refills: 3 | Status: SHIPPED | OUTPATIENT
Start: 2023-01-11 | End: 2023-12-07 | Stop reason: SDUPTHER

## 2023-01-11 RX ORDER — DONEPEZIL HYDROCHLORIDE 10 MG/1
10 TABLET, FILM COATED ORAL DAILY
Qty: 90 TABLET | Refills: 3 | Status: SHIPPED | OUTPATIENT
Start: 2023-01-11 | End: 2024-01-29 | Stop reason: SDUPTHER

## 2023-01-11 RX ORDER — AZATHIOPRINE 50 MG/1
50 TABLET ORAL DAILY
Qty: 90 TABLET | Refills: 3 | Status: SHIPPED | OUTPATIENT
Start: 2023-01-11 | End: 2024-01-29

## 2023-01-11 RX ORDER — FERROUS SULFATE 325(65) MG
325 TABLET ORAL 2 TIMES DAILY
Qty: 180 TABLET | Refills: 1 | Status: SHIPPED | OUTPATIENT
Start: 2023-01-11 | End: 2024-01-29 | Stop reason: SDUPTHER

## 2023-01-12 NOTE — PROGRESS NOTES
Subjective:       Patient ID: Anabella Aranda is a 77 y.o. female.    Chief Complaint: No chief complaint on file.    75 yr old pleasant black female with HLD,  chronic iron deficiency anemia ulcerative colitis, Chronic venous insufficiency, s/o IVC filter, presents today for her 3 month check. C/O KNEE PAIN AND ALSO WORSENING GERD SYMPTOMS.       KNEE PAIN BILATERAL - CHRONIC AND GETTING WORSE - SHE HAS OA AND SHE USES WALKER FOR THAT -     GERD - ON ZANTAC AS NEEDED HOWEVER IT IS NOT HELPING - SEEN GI BEFORE - DENIES ANY N/V/C/D/FEVER     Obesity - she gained a lot since last year - she is mostly staying home and eating a lot and  has no activity outside of house - she is lonely in a big house and sometimes hearing voices - they ar good voices and she is not hearing when she is out of house - she denies any depression or anxiety - she does have issues with sleeping at night - she denies any personal or family h/o psychiatry disorders - no SI/HI/delusions    Ulcerative colitis - she is on Imuran and follows GI - no side effects    Depression with dementia - follows Psych and neuro - pt grand son reports she is still same but not worse - she still has issues with sleeping - caregiver also applied for home health      Anemia - chronic - due to IBD - she is on diet alone and takes MVI -      HLD - need to b on low fat diet - LDLCALC                  133.2               02/04/2019                  LE edema/lymphedema - had h/o DVT and she also had Green field filter - she was on anticoagulation till hospital and stopped after surgery - seen vascular medicine and stable since then - she denies any heart problems, liver disorder - she also denies any chest pain, SOB, ROBLES      History as below - reviewed         HM  -labs UTD  -vaccines defer to next visit    Review of Systems   Constitutional: Negative.  Negative for activity change, diaphoresis and unexpected weight change.   HENT: Negative.  Negative for nasal  congestion, ear discharge, hearing loss, rhinorrhea, sore throat and voice change.    Eyes: Negative.  Negative for pain, discharge and visual disturbance.   Respiratory: Negative.  Negative for chest tightness, shortness of breath and wheezing.    Cardiovascular: Negative.  Negative for chest pain.   Gastrointestinal: Negative.  Negative for abdominal distention, anal bleeding, constipation and nausea.   Endocrine: Negative.  Negative for cold intolerance, polydipsia and polyuria.   Genitourinary: Negative.  Negative for decreased urine volume, difficulty urinating, dysuria, frequency, menstrual problem and vaginal pain.   Musculoskeletal: Negative.  Negative for arthralgias, gait problem and myalgias.   Integumentary:  Negative for color change, pallor and wound. Negative.   Allergic/Immunologic: Negative.  Negative for environmental allergies and immunocompromised state.   Neurological: Negative.  Negative for dizziness, tremors, seizures, speech difficulty and headaches.   Hematological: Negative.  Negative for adenopathy. Does not bruise/bleed easily.   Psychiatric/Behavioral: Negative.  Negative for agitation, confusion, decreased concentration, hallucinations, self-injury and suicidal ideas. The patient is not nervous/anxious.    PMH/PSH/FH/SH/MED/ALLERGY reviewed        Objective:       Vitals:    01/11/23 1604   Pulse: (!) 114   Temp: 98 °F (36.7 °C)       Physical Exam  Constitutional:       General: She is not in acute distress.     Appearance: She is well-developed. She is not diaphoretic.   HENT:      Head: Normocephalic and atraumatic.      Right Ear: External ear normal.      Left Ear: External ear normal.      Nose: Nose normal.      Mouth/Throat:      Pharynx: No oropharyngeal exudate.   Eyes:      General: No scleral icterus.        Right eye: No discharge.         Left eye: No discharge.      Conjunctiva/sclera: Conjunctivae normal.      Pupils: Pupils are equal, round, and reactive to light.    Neck:      Thyroid: No thyromegaly.      Vascular: No JVD.      Trachea: No tracheal deviation.   Cardiovascular:      Rate and Rhythm: Normal rate and regular rhythm.      Heart sounds: Normal heart sounds. No murmur heard.    No friction rub. No gallop.   Pulmonary:      Effort: Pulmonary effort is normal.      Breath sounds: Normal breath sounds. No stridor. No wheezing or rales.   Chest:      Chest wall: No tenderness.   Abdominal:      General: Bowel sounds are normal. There is no distension.      Palpations: Abdomen is soft. There is no mass.      Tenderness: There is no abdominal tenderness. There is no guarding or rebound.      Hernia: No hernia is present.   Musculoskeletal:         General: Tenderness present. Normal range of motion.      Cervical back: Normal range of motion and neck supple.      Comments: B/l knee edematous, restricted rom and impingement+   Lymphadenopathy:      Cervical: No cervical adenopathy.   Skin:     General: Skin is warm and dry.      Coloration: Skin is not pale.      Findings: No erythema or rash.   Neurological:      Mental Status: She is alert and oriented to person, place, and time.      Cranial Nerves: No cranial nerve deficit.      Motor: No abnormal muscle tone.      Coordination: Coordination normal.      Deep Tendon Reflexes: Reflexes are normal and symmetric. Reflexes normal.   Psychiatric:         Behavior: Behavior normal.         Thought Content: Thought content normal.         Judgment: Judgment normal.       Assessment:       1. Routine general medical examination at health care facility    2. Crohn's disease of colon without complication    3. Crohn's disease of small and large intestines with complication    4. Alzheimers disease    5. Stage 3a chronic kidney disease    6. Glucose intolerance (impaired glucose tolerance)    7. Major depression with psychotic features    8. Osteoporosis, unspecified osteoporosis type, unspecified pathological fracture presence     9. Late onset Alzheimer's disease without behavioral disturbance    10. Iron deficiency anemia    11. Anemia due to acute blood loss    12. Gastroesophageal reflux disease without esophagitis    13. Abnormal findings on diagnostic imaging of other parts of digestive tract        Plan:           Diagnoses and all orders for this visit:    Routine general medical examination at [facility] care facility  -     CBC Auto Differential; Future  -     Comprehensive Metabolic Panel; Future  -     Lipid Panel; Future  -     Hemoglobin A1C; Future    Crohn's disease of colon without complication  -     azaTHIOprine (IMURAN) 50 mg Tab; Take 1 tablet (50 mg total) by mouth once daily.  -     CBC Auto Differential; Future  -     Comprehensive Metabolic Panel; Future  -     Lipid Panel; Future  -     Hemoglobin A1C; Future    Crohn's disease of small and large intestines with complication  -     CBC Auto Differential; Future  -     Comprehensive Metabolic Panel; Future  -     Lipid Panel; Future  -     Hemoglobin A1C; Future    Alzheimers disease  -     CBC Auto Differential; Future  -     Comprehensive Metabolic Panel; Future  -     Lipid Panel; Future  -     Hemoglobin A1C; Future    Stage 3a chronic kidney disease  -     CBC Auto Differential; Future  -     Comprehensive Metabolic Panel; Future  -     Lipid Panel; Future  -     Hemoglobin A1C; Future    Glucose intolerance (impaired glucose tolerance)  -     CBC Auto Differential; Future  -     Comprehensive Metabolic Panel; Future  -     Lipid Panel; Future  -     Hemoglobin A1C; Future    Major depression with psychotic features  -     QUEtiapine (SEROQUEL) 200 MG Tab; Take 1 tablet (200 mg total) by mouth once daily.  -     CBC Auto Differential; Future  -     Comprehensive Metabolic Panel; Future  -     Lipid Panel; Future  -     Hemoglobin A1C; Future    Osteoporosis, unspecified osteoporosis type, unspecified pathological fracture presence  -     CBC Auto Differential;  Future  -     Comprehensive Metabolic Panel; Future  -     Lipid Panel; Future  -     Hemoglobin A1C; Future    Late onset Alzheimer's disease without behavioral disturbance  -     donepeziL (ARICEPT) 10 MG tablet; Take 1 tablet (10 mg total) by mouth once daily.  -     memantine (NAMENDA) 10 MG Tab; Take 1 tablet (10 mg total) by mouth once daily.  -     CBC Auto Differential; Future  -     Comprehensive Metabolic Panel; Future  -     Lipid Panel; Future  -     Hemoglobin A1C; Future    Iron deficiency anemia  -     ferrous sulfate (FEOSOL) 325 mg (65 mg iron) Tab tablet; Take 1 tablet (325 mg total) by mouth 2 (two) times daily.  -     CBC Auto Differential; Future  -     Comprehensive Metabolic Panel; Future  -     Lipid Panel; Future  -     Hemoglobin A1C; Future    Anemia due to acute blood loss  -     ferrous sulfate (FEOSOL) 325 mg (65 mg iron) Tab tablet; Take 1 tablet (325 mg total) by mouth 2 (two) times daily.  -     CBC Auto Differential; Future  -     Comprehensive Metabolic Panel; Future  -     Lipid Panel; Future  -     Hemoglobin A1C; Future    Gastroesophageal reflux disease without esophagitis  -     pantoprazole (PROTONIX) 40 MG tablet; Take 1 tablet (40 mg total) by mouth once daily.  -     CBC Auto Differential; Future  -     Comprehensive Metabolic Panel; Future  -     Lipid Panel; Future  -     Hemoglobin A1C; Future    Abnormal findings on diagnostic imaging of other parts of digestive tract  -     Lipid Panel; Future  WELLNESS CHECK  -NORMAL EXAM  -LABS     B/L knee pain/OA  -voltaren gel prn  -heat/ice  -refer HH for PT    GERD  -worsening  -refer GI    ANXIETY/DEPRESSION/dementia  -FOLLOW outside PSYCH  -on seroquel and aricept and namenda    GLUC INTOLERANCE  -LABS reassuring    S/P IVC filter and chronic venous insufficiency  -not on anticoagulant due to UC and rectal bleed/anemia  -stable    US/anemia  -refilled iron  - GI for follow up  -on Imuran daily    CHRONIC DVT  -HEM/ONC follow  up    CKD III  -no worsening  -labs    HLD  -low fat diet    Spent adequate time in obtaining history and explaining differentials        RTC 6 M OR PRN

## 2023-01-25 ENCOUNTER — LAB VISIT (OUTPATIENT)
Dept: LAB | Facility: HOSPITAL | Age: 78
End: 2023-01-25
Attending: FAMILY MEDICINE
Payer: MEDICARE

## 2023-01-25 DIAGNOSIS — R73.02 GLUCOSE INTOLERANCE (IMPAIRED GLUCOSE TOLERANCE): ICD-10-CM

## 2023-01-25 DIAGNOSIS — Z00.00 ROUTINE GENERAL MEDICAL EXAMINATION AT HEALTH CARE FACILITY: ICD-10-CM

## 2023-01-25 DIAGNOSIS — K50.10 CROHN'S DISEASE OF COLON WITHOUT COMPLICATION: ICD-10-CM

## 2023-01-25 DIAGNOSIS — D50.9 IRON DEFICIENCY ANEMIA, UNSPECIFIED IRON DEFICIENCY ANEMIA TYPE: ICD-10-CM

## 2023-01-25 DIAGNOSIS — D62 ANEMIA DUE TO ACUTE BLOOD LOSS: ICD-10-CM

## 2023-01-25 DIAGNOSIS — M81.0 OSTEOPOROSIS, UNSPECIFIED OSTEOPOROSIS TYPE, UNSPECIFIED PATHOLOGICAL FRACTURE PRESENCE: ICD-10-CM

## 2023-01-25 DIAGNOSIS — K21.9 GASTROESOPHAGEAL REFLUX DISEASE WITHOUT ESOPHAGITIS: ICD-10-CM

## 2023-01-25 DIAGNOSIS — R93.3 ABNORMAL FINDINGS ON DIAGNOSTIC IMAGING OF OTHER PARTS OF DIGESTIVE TRACT: ICD-10-CM

## 2023-01-25 DIAGNOSIS — F32.3 MAJOR DEPRESSION WITH PSYCHOTIC FEATURES: Chronic | ICD-10-CM

## 2023-01-25 DIAGNOSIS — N18.31 STAGE 3A CHRONIC KIDNEY DISEASE: Chronic | ICD-10-CM

## 2023-01-25 DIAGNOSIS — F02.80 LATE ONSET ALZHEIMER'S DISEASE WITHOUT BEHAVIORAL DISTURBANCE: ICD-10-CM

## 2023-01-25 DIAGNOSIS — K50.819 CROHN'S DISEASE OF SMALL AND LARGE INTESTINES WITH COMPLICATION: Chronic | ICD-10-CM

## 2023-01-25 DIAGNOSIS — G30.1 LATE ONSET ALZHEIMER'S DISEASE WITHOUT BEHAVIORAL DISTURBANCE: ICD-10-CM

## 2023-01-25 DIAGNOSIS — G30.9 ALZHEIMERS DISEASE: Chronic | ICD-10-CM

## 2023-01-25 DIAGNOSIS — F02.80 ALZHEIMERS DISEASE: Chronic | ICD-10-CM

## 2023-01-25 LAB
ALBUMIN SERPL BCP-MCNC: 3.3 G/DL (ref 3.5–5.2)
ALP SERPL-CCNC: 85 U/L (ref 55–135)
ALT SERPL W/O P-5'-P-CCNC: 10 U/L (ref 10–44)
ANION GAP SERPL CALC-SCNC: 11 MMOL/L (ref 8–16)
AST SERPL-CCNC: 18 U/L (ref 10–40)
BASOPHILS # BLD AUTO: 0.03 K/UL (ref 0–0.2)
BASOPHILS NFR BLD: 0.8 % (ref 0–1.9)
BILIRUB SERPL-MCNC: 0.4 MG/DL (ref 0.1–1)
BUN SERPL-MCNC: 11 MG/DL (ref 8–23)
CALCIUM SERPL-MCNC: 8.7 MG/DL (ref 8.7–10.5)
CHLORIDE SERPL-SCNC: 108 MMOL/L (ref 95–110)
CHOLEST SERPL-MCNC: 201 MG/DL (ref 120–199)
CHOLEST/HDLC SERPL: 3.4 {RATIO} (ref 2–5)
CO2 SERPL-SCNC: 23 MMOL/L (ref 23–29)
CREAT SERPL-MCNC: 1.2 MG/DL (ref 0.5–1.4)
DIFFERENTIAL METHOD: ABNORMAL
EOSINOPHIL # BLD AUTO: 0.1 K/UL (ref 0–0.5)
EOSINOPHIL NFR BLD: 2.7 % (ref 0–8)
ERYTHROCYTE [DISTWIDTH] IN BLOOD BY AUTOMATED COUNT: 13.2 % (ref 11.5–14.5)
EST. GFR  (NO RACE VARIABLE): 47 ML/MIN/1.73 M^2
ESTIMATED AVG GLUCOSE: 103 MG/DL (ref 68–131)
GLUCOSE SERPL-MCNC: 114 MG/DL (ref 70–110)
HBA1C MFR BLD: 5.2 % (ref 4–5.6)
HCT VFR BLD AUTO: 37.4 % (ref 37–48.5)
HDLC SERPL-MCNC: 59 MG/DL (ref 40–75)
HDLC SERPL: 29.4 % (ref 20–50)
HGB BLD-MCNC: 12.5 G/DL (ref 12–16)
IMM GRANULOCYTES # BLD AUTO: 0.01 K/UL (ref 0–0.04)
IMM GRANULOCYTES NFR BLD AUTO: 0.3 % (ref 0–0.5)
LDLC SERPL CALC-MCNC: 128 MG/DL (ref 63–159)
LYMPHOCYTES # BLD AUTO: 1.4 K/UL (ref 1–4.8)
LYMPHOCYTES NFR BLD: 37.6 % (ref 18–48)
MCH RBC QN AUTO: 31.4 PG (ref 27–31)
MCHC RBC AUTO-ENTMCNC: 33.4 G/DL (ref 32–36)
MCV RBC AUTO: 94 FL (ref 82–98)
MONOCYTES # BLD AUTO: 0.3 K/UL (ref 0.3–1)
MONOCYTES NFR BLD: 7.4 % (ref 4–15)
NEUTROPHILS # BLD AUTO: 1.9 K/UL (ref 1.8–7.7)
NEUTROPHILS NFR BLD: 51.2 % (ref 38–73)
NONHDLC SERPL-MCNC: 142 MG/DL
NRBC BLD-RTO: 0 /100 WBC
PLATELET # BLD AUTO: 266 K/UL (ref 150–450)
PMV BLD AUTO: 8.8 FL (ref 9.2–12.9)
POTASSIUM SERPL-SCNC: 4.1 MMOL/L (ref 3.5–5.1)
PROT SERPL-MCNC: 7.2 G/DL (ref 6–8.4)
RBC # BLD AUTO: 3.98 M/UL (ref 4–5.4)
SODIUM SERPL-SCNC: 142 MMOL/L (ref 136–145)
TRIGL SERPL-MCNC: 70 MG/DL (ref 30–150)
WBC # BLD AUTO: 3.64 K/UL (ref 3.9–12.7)

## 2023-01-25 PROCEDURE — 83036 HEMOGLOBIN GLYCOSYLATED A1C: CPT | Performed by: FAMILY MEDICINE

## 2023-01-25 PROCEDURE — 80053 COMPREHEN METABOLIC PANEL: CPT | Performed by: FAMILY MEDICINE

## 2023-01-25 PROCEDURE — 80061 LIPID PANEL: CPT | Performed by: FAMILY MEDICINE

## 2023-01-25 PROCEDURE — 36415 COLL VENOUS BLD VENIPUNCTURE: CPT | Performed by: FAMILY MEDICINE

## 2023-01-25 PROCEDURE — 85025 COMPLETE CBC W/AUTO DIFF WBC: CPT | Performed by: FAMILY MEDICINE

## 2023-01-31 ENCOUNTER — TELEPHONE (OUTPATIENT)
Dept: FAMILY MEDICINE | Facility: CLINIC | Age: 78
End: 2023-01-31
Payer: MEDICARE

## 2023-02-06 ENCOUNTER — TELEPHONE (OUTPATIENT)
Dept: FAMILY MEDICINE | Facility: CLINIC | Age: 78
End: 2023-02-06
Payer: MEDICARE

## 2023-02-06 NOTE — TELEPHONE ENCOUNTER
----- Message from Radha Olson MA sent at 2/6/2023 12:00 PM CST -----    ----- Message -----  From: Shon Brambila MD  Sent: 2/3/2023  10:52 PM CST  To: Radha Olson MA    Yes dementia and ulcerative colitis - he need to contact social security office      ----- Message -----  From: Radha Olson MA  Sent: 2/3/2023   5:11 PM CST  To: Shon Brambila MD    Has patient been on any disability ? Patient son is trying to apply disability for his mother   ----- Message -----  From: Daylin Beltran  Sent: 2/1/2023  10:04 AM CST  To: [facility] Staff    Type:  Needs Medical Advice    Who Called: Son/Bryson         Would the patient rather a call back or a response via [facility]? Yes   Best Call Back Number: [phone] Bryson Moreno  Additional Information: Son needs to know the date her disability started and any testing she has received   List of medications are needed/

## 2023-02-06 NOTE — TELEPHONE ENCOUNTER
Spoke to pt's son, Bryson and stated that Social Security stated that his mother was too old for Social Security and wanted to know if there was something else that can be done. Pls advise.

## 2023-03-24 ENCOUNTER — TELEPHONE (OUTPATIENT)
Dept: FAMILY MEDICINE | Facility: CLINIC | Age: 78
End: 2023-03-24
Payer: MEDICARE

## 2023-03-24 NOTE — TELEPHONE ENCOUNTER
----- Message from Francoise Beltran sent at 3/24/2023 10:37 AM CDT -----  Pt Requesting Call Back    Who called: Sarah of [facility] for pt's long term care insurance  Who called for pt:  Best call back #: [phone]  Add notes: Sarah said she faxed over pt's plan of treatment form for long time care insurance for care of treatment and wanted to see if Dr. Brambila received it; Sarah said she will fax it again

## 2023-04-04 ENCOUNTER — TELEPHONE (OUTPATIENT)
Dept: FAMILY MEDICINE | Facility: CLINIC | Age: 78
End: 2023-04-04
Payer: MEDICARE

## 2023-04-04 NOTE — TELEPHONE ENCOUNTER
----- Message from Mary Leavitt sent at 4/4/2023  9:38 AM CDT -----  Contact: emerson  Type:  Long term care     Who Called:Emerson kumar/ [employer] (long term care insurance)  Does the patient know what this is regarding?:fax information regarding long term care insurance   Would the patient rather a call back or a response via [facility]? Call   Best Call Back Number: fax #  phone #  [phone] ext [phone]  Additional Information:     Fax was sent on 03/14 and 03/21

## 2023-04-04 NOTE — TELEPHONE ENCOUNTER
Spoke to Sarah and informed her that pt's paperwork was faxed on 3/30. Sarah asked that the paperwork be refaxed to fax # [phone].

## 2023-06-16 ENCOUNTER — PATIENT MESSAGE (OUTPATIENT)
Dept: PODIATRY | Facility: CLINIC | Age: 78
End: 2023-06-16
Payer: MEDICARE

## 2023-12-07 DIAGNOSIS — F32.3 MAJOR DEPRESSION WITH PSYCHOTIC FEATURES: Chronic | ICD-10-CM

## 2023-12-07 RX ORDER — QUETIAPINE FUMARATE 200 MG/1
200 TABLET, FILM COATED ORAL DAILY
Qty: 30 TABLET | Refills: 0 | Status: SHIPPED | OUTPATIENT
Start: 2023-12-07 | End: 2024-01-09

## 2023-12-07 NOTE — TELEPHONE ENCOUNTER
Pt's sonBryson is requesting a refill on her Seroquel. Pt has an Annual appt next month with Dr. Brambila. Pls advise.

## 2023-12-07 NOTE — TELEPHONE ENCOUNTER
Care Due:                  Date            Visit Type   Department     Provider  --------------------------------------------------------------------------------                                EP -                              PRIMARY      [facility] FAMILY    Shon Peñaloza  Last Visit: 01-      CARE ([facility])   MEDICINE       Kosta  Next Visit: None Scheduled  None         None Found                                                            Last  Test          Frequency    Reason                     Performed    Due Date  --------------------------------------------------------------------------------    Office Visit  6 months...  azaTHIOprine.............  01-   07-    CBC.........  3 months...  azaTHIOprine.............  01- 04-    CMP.........  3 months...  azaTHIOprine.............  01- 04-    [facility] Embedded Care Due Messages. Reference number: [id number].   12/07/2023 10:11:53 AM CST

## 2024-01-09 DIAGNOSIS — F32.3 MAJOR DEPRESSION WITH PSYCHOTIC FEATURES: Chronic | ICD-10-CM

## 2024-01-09 RX ORDER — QUETIAPINE FUMARATE 200 MG/1
200 TABLET, FILM COATED ORAL
Qty: 30 TABLET | Refills: 0 | Status: SHIPPED | OUTPATIENT
Start: 2024-01-09 | End: 2024-01-29 | Stop reason: SDUPTHER

## 2024-01-09 NOTE — TELEPHONE ENCOUNTER
No care due was identified.  [facility] Embedded Care Due Messages. Reference number: [id number].   1/09/2024 9:04:33 AM CST

## 2024-01-27 DIAGNOSIS — K50.10 CROHN'S DISEASE OF COLON WITHOUT COMPLICATION: ICD-10-CM

## 2024-01-27 DIAGNOSIS — K21.9 GASTROESOPHAGEAL REFLUX DISEASE WITHOUT ESOPHAGITIS: ICD-10-CM

## 2024-01-27 NOTE — TELEPHONE ENCOUNTER
No care due was identified.  [facility] Embedded Care Due Messages. Reference number: [id number].   1/27/2024 5:51:50 AM CST

## 2024-01-28 RX ORDER — PANTOPRAZOLE SODIUM 40 MG/1
40 TABLET, DELAYED RELEASE ORAL
Qty: 90 TABLET | Refills: 0 | Status: SHIPPED | OUTPATIENT
Start: 2024-01-28 | End: 2024-01-29 | Stop reason: SDUPTHER

## 2024-01-28 NOTE — TELEPHONE ENCOUNTER
Refill Routing Note   Medication(s) are not appropriate for processing by [facility] for the following reason(s):        Outside of protocol    [facility] action(s):  Route  Approve             Appointments  past 12m or future 3m with PCP    Date Provider   Last Visit   1/11/2023 Shon Brambila MD   Next Visit   1/29/2024 Shon Brambila MD   ED visits in past 90 days: 0        Note composed:4:36 AM 01/28/2024

## 2024-01-29 ENCOUNTER — LAB VISIT (OUTPATIENT)
Dept: LAB | Facility: HOSPITAL | Age: 79
End: 2024-01-29
Attending: FAMILY MEDICINE
Payer: MEDICARE

## 2024-01-29 ENCOUNTER — OFFICE VISIT (OUTPATIENT)
Dept: FAMILY MEDICINE | Facility: CLINIC | Age: 79
End: 2024-01-29
Attending: FAMILY MEDICINE
Payer: MEDICARE

## 2024-01-29 VITALS
HEART RATE: 70 BPM | HEIGHT: 64 IN | OXYGEN SATURATION: 95 % | BODY MASS INDEX: 36.74 KG/M2 | WEIGHT: 215.19 LBS | TEMPERATURE: 98 F

## 2024-01-29 DIAGNOSIS — K21.9 GASTROESOPHAGEAL REFLUX DISEASE WITHOUT ESOPHAGITIS: ICD-10-CM

## 2024-01-29 DIAGNOSIS — D84.821 DRUG-INDUCED IMMUNODEFICIENCY: ICD-10-CM

## 2024-01-29 DIAGNOSIS — Z00.00 ROUTINE GENERAL MEDICAL EXAMINATION AT HEALTH CARE FACILITY: ICD-10-CM

## 2024-01-29 DIAGNOSIS — N18.31 STAGE 3A CHRONIC KIDNEY DISEASE: ICD-10-CM

## 2024-01-29 DIAGNOSIS — I82.5Y1 CHRONIC DEEP VEIN THROMBOSIS (DVT) OF PROXIMAL VEIN OF RIGHT LOWER EXTREMITY: ICD-10-CM

## 2024-01-29 DIAGNOSIS — D50.9 IRON DEFICIENCY ANEMIA, UNSPECIFIED IRON DEFICIENCY ANEMIA TYPE: ICD-10-CM

## 2024-01-29 DIAGNOSIS — M81.0 OSTEOPOROSIS, UNSPECIFIED OSTEOPOROSIS TYPE, UNSPECIFIED PATHOLOGICAL FRACTURE PRESENCE: ICD-10-CM

## 2024-01-29 DIAGNOSIS — R73.02 GLUCOSE INTOLERANCE (IMPAIRED GLUCOSE TOLERANCE): ICD-10-CM

## 2024-01-29 DIAGNOSIS — K50.10 CROHN'S DISEASE OF COLON WITHOUT COMPLICATION: ICD-10-CM

## 2024-01-29 DIAGNOSIS — F02.80 LATE ONSET ALZHEIMER'S DISEASE WITHOUT BEHAVIORAL DISTURBANCE: ICD-10-CM

## 2024-01-29 DIAGNOSIS — Z79.899 DRUG-INDUCED IMMUNODEFICIENCY: ICD-10-CM

## 2024-01-29 DIAGNOSIS — G30.1 LATE ONSET ALZHEIMER'S DISEASE WITHOUT BEHAVIORAL DISTURBANCE: ICD-10-CM

## 2024-01-29 DIAGNOSIS — E43 SEVERE MALNUTRITION: ICD-10-CM

## 2024-01-29 DIAGNOSIS — D62 ANEMIA DUE TO ACUTE BLOOD LOSS: ICD-10-CM

## 2024-01-29 DIAGNOSIS — F32.3 MAJOR DEPRESSION WITH PSYCHOTIC FEATURES: ICD-10-CM

## 2024-01-29 DIAGNOSIS — G30.9 ALZHEIMERS DISEASE: ICD-10-CM

## 2024-01-29 DIAGNOSIS — K50.819 CROHN'S DISEASE OF SMALL AND LARGE INTESTINES WITH COMPLICATION: ICD-10-CM

## 2024-01-29 DIAGNOSIS — Z00.00 ROUTINE GENERAL MEDICAL EXAMINATION AT HEALTH CARE FACILITY: Primary | ICD-10-CM

## 2024-01-29 DIAGNOSIS — Z93.2 ILEOSTOMY STATUS: ICD-10-CM

## 2024-01-29 DIAGNOSIS — F02.80 ALZHEIMERS DISEASE: ICD-10-CM

## 2024-01-29 PROBLEM — E66.01 MORBID OBESITY: Chronic | Status: RESOLVED | Noted: 2017-10-17 | Resolved: 2024-01-29

## 2024-01-29 LAB
ALBUMIN SERPL BCP-MCNC: 3.3 G/DL (ref 3.5–5.2)
ALP SERPL-CCNC: 93 U/L (ref 55–135)
ALT SERPL W/O P-5'-P-CCNC: 10 U/L (ref 10–44)
ANION GAP SERPL CALC-SCNC: 11 MMOL/L (ref 8–16)
AST SERPL-CCNC: 19 U/L (ref 10–40)
BASOPHILS # BLD AUTO: 0.04 K/UL (ref 0–0.2)
BASOPHILS NFR BLD: 0.9 % (ref 0–1.9)
BILIRUB SERPL-MCNC: 0.4 MG/DL (ref 0.1–1)
BUN SERPL-MCNC: 15 MG/DL (ref 8–23)
CALCIUM SERPL-MCNC: 8.5 MG/DL (ref 8.7–10.5)
CHLORIDE SERPL-SCNC: 112 MMOL/L (ref 95–110)
CHOLEST SERPL-MCNC: 194 MG/DL (ref 120–199)
CHOLEST/HDLC SERPL: 3 {RATIO} (ref 2–5)
CO2 SERPL-SCNC: 22 MMOL/L (ref 23–29)
CREAT SERPL-MCNC: 1.2 MG/DL (ref 0.5–1.4)
DIFFERENTIAL METHOD BLD: ABNORMAL
EOSINOPHIL # BLD AUTO: 0.1 K/UL (ref 0–0.5)
EOSINOPHIL NFR BLD: 1.1 % (ref 0–8)
ERYTHROCYTE [DISTWIDTH] IN BLOOD BY AUTOMATED COUNT: 13.4 % (ref 11.5–14.5)
EST. GFR  (NO RACE VARIABLE): 47 ML/MIN/1.73 M^2
GLUCOSE SERPL-MCNC: 117 MG/DL (ref 70–110)
HCT VFR BLD AUTO: 39.1 % (ref 37–48.5)
HDLC SERPL-MCNC: 65 MG/DL (ref 40–75)
HDLC SERPL: 33.5 % (ref 20–50)
HGB BLD-MCNC: 13.1 G/DL (ref 12–16)
IMM GRANULOCYTES # BLD AUTO: 0.01 K/UL (ref 0–0.04)
IMM GRANULOCYTES NFR BLD AUTO: 0.2 % (ref 0–0.5)
LDLC SERPL CALC-MCNC: 118 MG/DL (ref 63–159)
LYMPHOCYTES # BLD AUTO: 1.5 K/UL (ref 1–4.8)
LYMPHOCYTES NFR BLD: 31.4 % (ref 18–48)
MCH RBC QN AUTO: 31 PG (ref 27–31)
MCHC RBC AUTO-ENTMCNC: 33.5 G/DL (ref 32–36)
MCV RBC AUTO: 92 FL (ref 82–98)
MONOCYTES # BLD AUTO: 0.3 K/UL (ref 0.3–1)
MONOCYTES NFR BLD: 6.4 % (ref 4–15)
NEUTROPHILS # BLD AUTO: 2.8 K/UL (ref 1.8–7.7)
NEUTROPHILS NFR BLD: 60 % (ref 38–73)
NONHDLC SERPL-MCNC: 129 MG/DL
NRBC BLD-RTO: 0 /100 WBC
PLATELET # BLD AUTO: 260 K/UL (ref 150–450)
PMV BLD AUTO: 9 FL (ref 9.2–12.9)
POTASSIUM SERPL-SCNC: 3.9 MMOL/L (ref 3.5–5.1)
PROT SERPL-MCNC: 7.4 G/DL (ref 6–8.4)
RBC # BLD AUTO: 4.23 M/UL (ref 4–5.4)
SODIUM SERPL-SCNC: 145 MMOL/L (ref 136–145)
TRIGL SERPL-MCNC: 55 MG/DL (ref 30–150)
TSH SERPL DL<=0.005 MIU/L-ACNC: 1.08 UIU/ML (ref 0.4–4)
WBC # BLD AUTO: 4.68 K/UL (ref 3.9–12.7)

## 2024-01-29 PROCEDURE — 80053 COMPREHEN METABOLIC PANEL: CPT | Performed by: FAMILY MEDICINE

## 2024-01-29 PROCEDURE — 99214 OFFICE O/P EST MOD 30 MIN: CPT | Mod: S$PBB,,, | Performed by: FAMILY MEDICINE

## 2024-01-29 PROCEDURE — 99213 OFFICE O/P EST LOW 20 MIN: CPT | Mod: PBBFAC,PO | Performed by: FAMILY MEDICINE

## 2024-01-29 PROCEDURE — 85025 COMPLETE CBC W/AUTO DIFF WBC: CPT | Performed by: FAMILY MEDICINE

## 2024-01-29 PROCEDURE — 99999 PR PBB SHADOW E&M-EST. PATIENT-LVL III: CPT | Mod: PBBFAC,,, | Performed by: FAMILY MEDICINE

## 2024-01-29 PROCEDURE — 80061 LIPID PANEL: CPT | Performed by: FAMILY MEDICINE

## 2024-01-29 PROCEDURE — 84443 ASSAY THYROID STIM HORMONE: CPT | Performed by: FAMILY MEDICINE

## 2024-01-29 PROCEDURE — 36415 COLL VENOUS BLD VENIPUNCTURE: CPT | Performed by: FAMILY MEDICINE

## 2024-01-29 RX ORDER — MEMANTINE HYDROCHLORIDE 10 MG/1
10 TABLET ORAL DAILY
Qty: 180 TABLET | Refills: 3 | Status: SHIPPED | OUTPATIENT
Start: 2024-01-29

## 2024-01-29 RX ORDER — DONEPEZIL HYDROCHLORIDE 10 MG/1
10 TABLET, FILM COATED ORAL DAILY
Qty: 90 TABLET | Refills: 3 | Status: SHIPPED | OUTPATIENT
Start: 2024-01-29

## 2024-01-29 RX ORDER — AZATHIOPRINE 50 MG/1
50 TABLET ORAL
Qty: 90 TABLET | Refills: 3 | Status: SHIPPED | OUTPATIENT
Start: 2024-01-29

## 2024-01-29 RX ORDER — PANTOPRAZOLE SODIUM 40 MG/1
40 TABLET, DELAYED RELEASE ORAL DAILY
Qty: 90 TABLET | Refills: 3 | Status: SHIPPED | OUTPATIENT
Start: 2024-01-29

## 2024-01-29 RX ORDER — QUETIAPINE FUMARATE 200 MG/1
200 TABLET, FILM COATED ORAL DAILY
Qty: 90 TABLET | Refills: 3 | Status: SHIPPED | OUTPATIENT
Start: 2024-01-29 | End: 2024-02-14 | Stop reason: SDUPTHER

## 2024-01-29 RX ORDER — FERROUS SULFATE 325(65) MG
325 TABLET ORAL 2 TIMES DAILY
Qty: 180 TABLET | Refills: 1 | Status: SHIPPED | OUTPATIENT
Start: 2024-01-29 | End: 2024-02-05

## 2024-01-29 NOTE — PROGRESS NOTES
Subjective:       Patient ID: Anabella Aranda is a 78 y.o. female.    Chief Complaint: Follow-up and Generalized Body Aches    78 yr old pleasant black female with HLD,  chronic iron deficiency anemia ulcerative colitis, Chronic venous insufficiency, s/o IVC filter, presents today for her annual and 6 month check. C/O KNEE PAIN AND ALSO WORSENING GERD SYMPTOMS.       KNEE PAIN BILATERAL - CHRONIC AND GETTING WORSE - SHE HAS OA AND SHE USES WALKER FOR THAT -     GERD - ON ZANTAC AS NEEDED HOWEVER IT IS NOT HELPING - SEEN GI BEFORE - DENIES ANY N/V/C/D/FEVER     Obesity - she gained a lot since last year - she is mostly staying home and eating a lot and  has no activity outside of house - she is lonely in a big house and sometimes hearing voices - they ar good voices and she is not hearing when she is out of house - she denies any depression or anxiety - she does have issues with sleeping at night - she denies any personal or family h/o psychiatry disorders - no SI/HI/delusions    Ulcerative colitis - she is on Imuran and follows GI - no side effects    Depression with dementia - follows Psych and neuro - pt grand son reports she is still same but not worse - she still has issues with sleeping - caregiver also applied for home health      Anemia - chronic - due to IBD - she is on diet alone and takes MVI -      HLD - need to b on low fat diet - LDLCALC                  133.2               02/04/2019                  LE edema/lymphedema - had h/o DVT and she also had Green field filter - she was on anticoagulation till hospital and stopped after surgery - seen vascular medicine and stable since then - she denies any heart problems, liver disorder - she also denies any chest pain, SOB, ROBLES      History as below - reviewed         HM  -labs UTD  -vaccines defer to next visit    Follow-up  Pertinent negatives include no arthralgias, chest pain, congestion, diaphoresis, headaches, myalgias, nausea or sore throat.      Review of Systems   Constitutional: Negative.  Negative for activity change, diaphoresis and unexpected weight change.   HENT: Negative.  Negative for nasal congestion, ear discharge, hearing loss, rhinorrhea, sore throat and voice change.    Eyes: Negative.  Negative for pain, discharge and visual disturbance.   Respiratory: Negative.  Negative for chest tightness, shortness of breath and wheezing.    Cardiovascular: Negative.  Negative for chest pain.   Gastrointestinal: Negative.  Negative for abdominal distention, anal bleeding, constipation and nausea.   Endocrine: Negative.  Negative for cold intolerance, polydipsia and polyuria.   Genitourinary: Negative.  Negative for decreased urine volume, difficulty urinating, dysuria, frequency, menstrual problem and vaginal pain.   Musculoskeletal: Negative.  Negative for arthralgias, gait problem and myalgias.   Integumentary:  Negative for color change, pallor and wound. Negative.   Allergic/Immunologic: Negative.  Negative for environmental allergies and immunocompromised state.   Neurological: Negative.  Negative for dizziness, tremors, seizures, speech difficulty and headaches.   Hematological: Negative.  Negative for adenopathy. Does not bruise/bleed easily.   Psychiatric/Behavioral: Negative.  Negative for agitation, confusion, decreased concentration, hallucinations, self-injury and suicidal ideas. The patient is not nervous/anxious.      PMH/PSH/FH/SH/MED/ALLERGY reviewed    Past Medical History:   Diagnosis Date    Arthritis     C. difficile colitis 10/13/2014    4/15/2015    Chronic kidney disease, stage 3     Dementia     Hepatitis B     Hypertension     Major depression with psychotic features     Ulcerative colitis 03/2014       Past Surgical History:   Procedure Laterality Date    CHOLECYSTECTOMY      COLONOSCOPY N/A 4/29/2016    Procedure: COLONOSCOPY;  Surgeon: Umm Arenas MD;  Location[facility];  Service: Endoscopy;  Laterality: N/A;     ESOPHAGOGASTRODUODENOSCOPY N/A 1/25/2019    Procedure: ESOPHAGOGASTRODUODENOSCOPY (EGD);  Surgeon: Jenise Hallman MD;  Location: [facility] ENDO;  Service: Endoscopy;  Laterality: N/A;    ESOPHAGOGASTRODUODENOSCOPY N/A 10/21/2019    Procedure: EGD (ESOPHAGOGASTRODUODENOSCOPY);  Surgeon: Og Carrera MD;  Location: [facility] ENDO;  Service: Colon and Rectal;  Laterality: N/A;    ESOPHAGOGASTRODUODENOSCOPY N/A 11/27/2019    Procedure: EGD (ESOPHAGOGASTRODUODENOSCOPY);  Surgeon: Og Carrera MD;  Location: [facility] ENDO;  Service: Colon and Rectal;  Laterality: N/A;    FLEXIBLE SIGMOIDOSCOPY N/A 1/25/2019    Procedure: SIGMOIDOSCOPY, FLEXIBLE;  Surgeon: Jenise Hallman MD;  Location: [facility] ENDO;  Service: Endoscopy;  Laterality: N/A;    FLEXIBLE SIGMOIDOSCOPY N/A 10/21/2019    Procedure: SIGMOIDOSCOPY, FLEXIBLE;  Surgeon: Og Carrera MD;  Location: [facility] ENDO;  Service: Colon and Rectal;  Laterality: N/A;    FLEXIBLE SIGMOIDOSCOPY N/A 11/27/2019    Procedure: SIGMOIDOSCOPY, FLEXIBLE;  Surgeon: Og Carrera MD;  Location: [facility];  Service: Colon and Rectal;  Laterality: N/A;  with methylene blue dye    BETZY FILTER PLACEMENT Right 01/2014    JOINT REPLACEMENT      KNEE ARTHROPLASTY Left 10/16/2019    Procedure: ARTHROPLASTY, KNEE;  Surgeon: Ghassan Lindsay MD;  Location: [facility];  Service: Orthopedics;  Laterality: Left;  Franco notifiedconfirmed with Franco  [phone] 10/15/19 kb    LAPAROSCOPIC ILEOSTOMY N/A 1/8/2020    Procedure: CREATION, ILEOSTOMY, LAPAROSCOPIC, ERAS low, possible hand port, lithotomy;  Surgeon: Benjie Worley MD;  Location: [facility] FLR;  Service: Colon and Rectal;  Laterality: N/A;    LAPAROSCOPIC TOTAL COLECTOMY  06/24/2016    LYSIS OF ADHESIONS  1/8/2020    Procedure: LYSIS, ADHESIONS;  Surgeon: Benjie Worley MD;  Location: [facility] OR 2ND FLR;  Service: Colon and Rectal;;    NASAL SINUS SURGERY      TONSILLECTOMY      TOTAL KNEE ARTHROPLASTY Right 04/07/2008     TOTAL KNEE ARTHROPLASTY Left 10/16/2019       Family History   Problem Relation Age of Onset    Throat cancer Father     Colon cancer Paternal Grandmother        Social History     Socioeconomic History    Marital status: Single   Tobacco Use    Smoking status: Former     Current packs/day: 0.00     Types: Cigarettes     Quit date: 1997     Years since quittin.4     Passive exposure: Never    Smokeless tobacco: Former   Substance and Sexual Activity    Alcohol use: No    Drug use: No    Sexual activity: Not Currently     Social Determinants of Health     Financial Resource Strain: High Risk (2024)    Overall Financial Resource Strain (CARDIA)     Difficulty of Paying Living Expenses: Hard   Food Insecurity: Food Insecurity Present (2024)    Hunger Vital Sign     Worried About Running Out of Food in the Last Year: Sometimes true     Ran Out of Food in the Last Year: Sometimes true   Transportation Needs: No Transportation Needs (2024)    PRAPARE - Transportation     Lack of Transportation (Medical): No     Lack of Transportation (Non-Medical): No   Physical Activity: Inactive (2024)    Exercise Vital Sign     Days of Exercise per Week: 0 days     Minutes of Exercise per Session: 0 min   Stress: Patient Declined (2024)    [employer] [place] [employer] - Occupational Stress Questionnaire     Feeling of Stress : Patient declined   Social Connections: Unknown (2024)    Social Connection and Isolation Panel [NHANES]     Frequency of Communication with Friends and Family: Never     Frequency of Social Gatherings with Friends and Family: Never     Active Member of Clubs or Organizations: No     Attends Club or Organization Meetings: Never     Marital Status: Never    Housing Stability: High Risk (2024)    Housing Stability Vital Sign     Unable to Pay for Housing in the Last Year: Yes     Unstable Housing in the Last Year: No       Current Outpatient  Medications   Medication Sig Dispense Refill    aspirin (ECOTRIN) 81 MG EC tablet Take 1 tablet (81 mg total) by mouth once daily. 30 tablet 0    azaTHIOprine (IMURAN) 50 mg Tab TAKE 1 TABLET(50 MG) BY MOUTH EVERY DAY 90 tablet 3    donepeziL (ARICEPT) 10 MG tablet Take 1 tablet (10 mg total) by mouth once daily. 90 tablet 3    ferrous sulfate (FEOSOL) 325 mg (65 mg iron) Tab tablet Take 1 tablet (325 mg total) by mouth 2 (two) times daily. 180 tablet 1    memantine (NAMENDA) 10 MG Tab Take 1 tablet (10 mg total) by mouth once daily. 180 tablet 3    pantoprazole (PROTONIX) 40 MG tablet Take 1 tablet (40 mg total) by mouth once daily. 90 tablet 3    QUEtiapine (SEROQUEL) 200 MG Tab Take 1 tablet (200 mg total) by mouth once daily. 90 tablet 3     No current facility-administered medications for this visit.       Review of patient's allergies indicates:   Allergen Reactions    Sulfa (sulfonamide antibiotics) Swelling             Objective:       Vitals:    01/29/24 1028   Pulse: 70   Temp: 98 °F (36.7 °C)       Physical Exam  Constitutional:       General: She is not in acute distress.     Appearance: She is well-developed. She is not diaphoretic.   HENT:      Head: Normocephalic and atraumatic.      Right Ear: External ear normal.      Left Ear: External ear normal.      Nose: Nose normal.      Mouth/Throat:      Pharynx: No oropharyngeal exudate.   Eyes:      General: No scleral icterus.        Right eye: No discharge.         Left eye: No discharge.      Conjunctiva/sclera: Conjunctivae normal.      Pupils: Pupils are equal, round, and reactive to light.   Neck:      Thyroid: No thyromegaly.      Vascular: No JVD.      Trachea: No tracheal deviation.   Cardiovascular:      Rate and Rhythm: Normal rate and regular rhythm.      Heart sounds: Normal heart sounds. No murmur heard.     No friction rub. No gallop.   Pulmonary:      Effort: Pulmonary effort is normal.      Breath sounds: Normal breath sounds. No stridor.  No wheezing or rales.   Chest:      Chest wall: No tenderness.   Abdominal:      General: Bowel sounds are normal. There is no distension.      Palpations: Abdomen is soft. There is no mass.      Tenderness: There is no abdominal tenderness. There is no guarding or rebound.      Hernia: No hernia is present.   Musculoskeletal:         General: Tenderness present. Normal range of motion.      Cervical back: Normal range of motion and neck supple.      Comments: B/l knee edematous, restricted rom and impingement+   Lymphadenopathy:      Cervical: No cervical adenopathy.   Skin:     General: Skin is warm and dry.      Coloration: Skin is not pale.      Findings: No erythema or rash.   Neurological:      Mental Status: She is alert and oriented to person, place, and time.      Cranial Nerves: No cranial nerve deficit.      Motor: No abnormal muscle tone.      Coordination: Coordination normal.      Deep Tendon Reflexes: Reflexes are normal and symmetric. Reflexes normal.   Psychiatric:         Behavior: Behavior normal.         Thought Content: Thought content normal.         Judgment: Judgment normal.         Assessment:       1. Routine general medical examination at health care facility    2. Crohn's disease of colon without complication    3. Crohn's disease of small and large intestines with complication    4. Stage 3a chronic kidney disease    5. Alzheimers disease    6. Glucose intolerance (impaired glucose tolerance)    7. Late onset Alzheimer's disease without behavioral disturbance    8. Major depression with psychotic features    9. Iron deficiency anemia, unspecified iron deficiency anemia type    10. Osteoporosis, unspecified osteoporosis type, unspecified pathological fracture presence    11. Gastroesophageal reflux disease without esophagitis    12. Anemia due to acute blood loss    13. Drug-induced immunodeficiency    14. Chronic deep vein thrombosis (DVT) of proximal vein of right lower extremity    15.  Ileostomy status    16. Severe malnutrition        Plan:           Anabella was seen today for follow-up and generalized body aches.    Diagnoses and all orders for this visit:    Routine general medical examination at health care facility  -     CBC Auto Differential; Future  -     Comprehensive Metabolic Panel; Future  -     Lipid Panel; Future  -     TSH; Future    Crohn's disease of colon without complication  -     CBC Auto Differential; Future  -     Comprehensive Metabolic Panel; Future  -     Lipid Panel; Future  -     TSH; Future    Crohn's disease of small and large intestines with complication  -     CBC Auto Differential; Future  -     Comprehensive Metabolic Panel; Future  -     Lipid Panel; Future  -     TSH; Future    Stage 3a chronic kidney disease  -     CBC Auto Differential; Future  -     Comprehensive Metabolic Panel; Future  -     Lipid Panel; Future  -     TSH; Future    Alzheimers disease  -     CBC Auto Differential; Future  -     Comprehensive Metabolic Panel; Future  -     Lipid Panel; Future  -     TSH; Future    Glucose intolerance (impaired glucose tolerance)  -     CBC Auto Differential; Future  -     Comprehensive Metabolic Panel; Future  -     Lipid Panel; Future  -     TSH; Future    Late onset Alzheimer's disease without behavioral disturbance  -     memantine (NAMENDA) 10 MG Tab; Take 1 tablet (10 mg total) by mouth once daily.  -     donepeziL (ARICEPT) 10 MG tablet; Take 1 tablet (10 mg total) by mouth once daily.  -     CBC Auto Differential; Future  -     Comprehensive Metabolic Panel; Future  -     Lipid Panel; Future  -     TSH; Future    Major depression with psychotic features  -     QUEtiapine (SEROQUEL) 200 MG Tab; Take 1 tablet (200 mg total) by mouth once daily.  -     CBC Auto Differential; Future  -     Comprehensive Metabolic Panel; Future  -     Lipid Panel; Future  -     TSH; Future    Iron deficiency anemia, unspecified iron deficiency anemia type  -     ferrous  sulfate (FEOSOL) 325 mg (65 mg iron) Tab tablet; Take 1 tablet (325 mg total) by mouth 2 (two) times daily.  -     CBC Auto Differential; Future  -     Comprehensive Metabolic Panel; Future  -     Lipid Panel; Future  -     TSH; Future    Osteoporosis, unspecified osteoporosis type, unspecified pathological fracture presence  -     CBC Auto Differential; Future  -     Comprehensive Metabolic Panel; Future  -     Lipid Panel; Future  -     TSH; Future    Gastroesophageal reflux disease without esophagitis  -     pantoprazole (PROTONIX) 40 MG tablet; Take 1 tablet (40 mg total) by mouth once daily.  -     CBC Auto Differential; Future  -     Comprehensive Metabolic Panel; Future  -     Lipid Panel; Future  -     TSH; Future    Anemia due to acute blood loss  -     ferrous sulfate (FEOSOL) 325 mg (65 mg iron) Tab tablet; Take 1 tablet (325 mg total) by mouth 2 (two) times daily.  -     CBC Auto Differential; Future  -     Comprehensive Metabolic Panel; Future  -     Lipid Panel; Future  -     TSH; Future    Drug-induced immunodeficiency  -     CBC Auto Differential; Future  -     Comprehensive Metabolic Panel; Future  -     Lipid Panel; Future  -     TSH; Future    Chronic deep vein thrombosis (DVT) of proximal vein of right lower extremity  -     CBC Auto Differential; Future  -     Comprehensive Metabolic Panel; Future  -     Lipid Panel; Future  -     TSH; Future    Ileostomy status  -     CBC Auto Differential; Future  -     Comprehensive Metabolic Panel; Future  -     Lipid Panel; Future  -     TSH; Future    Severe malnutrition  -     CBC Auto Differential; Future  -     Comprehensive Metabolic Panel; Future  -     Lipid Panel; Future  -     TSH; Future    WELLNESS CHECK  -NORMAL EXAM  -LABS     B/L knee pain/OA  -voltaren gel prn  -heat/ice  -refer HH for PT    GERD  -worsening  -refer GI    ANXIETY/DEPRESSION/dementia  -FOLLOW outside PSYCH  -on seroquel and aricept and namenda    GLUC INTOLERANCE  -LABS  reassuring    S/P IVC filter and chronic venous insufficiency  -not on anticoagulant due to UC and rectal bleed/anemia  -stable    US/anemia  -refilled iron  - GI for follow up  -on Imuran daily    CHRONIC DVT  -HEM/ONC follow up    CKD III  -no worsening  -labs    HLD  -low fat diet    Spent adequate time in obtaining history and explaining differentials    30 minutes spent during this visit of which greater than 50% devoted to face-face counseling and coordination of care regarding diagnosis and management plan      RTC 6 M OR PRN

## 2024-02-03 ENCOUNTER — PATIENT MESSAGE (OUTPATIENT)
Dept: FAMILY MEDICINE | Facility: CLINIC | Age: 79
End: 2024-02-03
Payer: MEDICARE

## 2024-02-14 DIAGNOSIS — F32.3 MAJOR DEPRESSION WITH PSYCHOTIC FEATURES: ICD-10-CM

## 2024-02-14 RX ORDER — QUETIAPINE FUMARATE 200 MG/1
200 TABLET, FILM COATED ORAL
Qty: 30 TABLET | Refills: 0 | Status: SHIPPED | OUTPATIENT
Start: 2024-02-14

## 2024-02-14 NOTE — TELEPHONE ENCOUNTER
Care Due:                  Date            Visit Type   Department     Provider  --------------------------------------------------------------------------------                                EP -                              PRIMARY      [facility] FAMILY    Shon Peñaloza  Last Visit: 01-      CARE ([facility])   MEDICINE       Kosta  Next Visit: None Scheduled  None         None Found                                                            Last  Test          Frequency    Reason                     Performed    Due Date  --------------------------------------------------------------------------------    CBC.........  3 months...  azaTHIOprine.............  01- 04-    CMP.........  3 months...  azaTHIOprine.............  01- 04-    [employer] Embedded Care Due Messages. Reference number: [id number].   2/14/2024 9:02:08 AM CST

## 2024-02-27 DIAGNOSIS — Z00.00 ENCOUNTER FOR MEDICARE ANNUAL WELLNESS EXAM: ICD-10-CM

## 2024-03-06 ENCOUNTER — HOSPITAL ENCOUNTER (OUTPATIENT)
Dept: RADIOLOGY | Facility: OTHER | Age: 79
Discharge: HOME | End: 2024-03-06
Attending: FAMILY MEDICINE
Payer: MEDICARE

## 2024-03-06 DIAGNOSIS — Z12.31 ENCOUNTER FOR SCREENING MAMMOGRAM FOR BREAST CANCER: ICD-10-CM

## 2024-03-06 PROCEDURE — 77067 SCR MAMMO BI INCL CAD: CPT | Mod: TC

## 2024-03-06 PROCEDURE — 77067 SCR MAMMO BI INCL CAD: CPT | Mod: 26,,, | Performed by: RADIOLOGY

## 2024-03-06 PROCEDURE — 77063 BREAST TOMOSYNTHESIS BI: CPT | Mod: 26,,, | Performed by: RADIOLOGY

## 2024-03-18 ENCOUNTER — PATIENT MESSAGE (OUTPATIENT)
Dept: FAMILY MEDICINE | Facility: CLINIC | Age: 79
End: 2024-03-18
Payer: MEDICARE

## 2024-04-11 ENCOUNTER — PATIENT MESSAGE (OUTPATIENT)
Dept: ADMINISTRATIVE | Facility: CLINIC | Age: 79
End: 2024-04-11
Payer: MEDICARE

## 2024-04-12 ENCOUNTER — TELEPHONE (OUTPATIENT)
Dept: ADMINISTRATIVE | Facility: CLINIC | Age: 79
End: 2024-04-12
Payer: MEDICARE

## 2024-04-15 ENCOUNTER — OFFICE VISIT (OUTPATIENT)
Dept: HOME HEALTH SERVICES | Facility: CLINIC | Age: 79
End: 2024-04-15
Payer: MEDICARE

## 2024-04-15 VITALS — HEIGHT: 64 IN | WEIGHT: 215 LBS | BODY MASS INDEX: 36.7 KG/M2

## 2024-04-15 DIAGNOSIS — Z93.2 ILEOSTOMY STATUS: ICD-10-CM

## 2024-04-15 DIAGNOSIS — F02.80 ALZHEIMERS DISEASE: Chronic | ICD-10-CM

## 2024-04-15 DIAGNOSIS — K21.9 GASTROESOPHAGEAL REFLUX DISEASE, UNSPECIFIED WHETHER ESOPHAGITIS PRESENT: ICD-10-CM

## 2024-04-15 DIAGNOSIS — Z79.899 DRUG-INDUCED IMMUNODEFICIENCY: ICD-10-CM

## 2024-04-15 DIAGNOSIS — N18.31 STAGE 3A CHRONIC KIDNEY DISEASE: Chronic | ICD-10-CM

## 2024-04-15 DIAGNOSIS — E66.01 SEVERE OBESITY (BMI 35.0-39.9) WITH COMORBIDITY: ICD-10-CM

## 2024-04-15 DIAGNOSIS — K50.819 CROHN'S DISEASE OF SMALL AND LARGE INTESTINES WITH COMPLICATION: Chronic | ICD-10-CM

## 2024-04-15 DIAGNOSIS — D84.821 DRUG-INDUCED IMMUNODEFICIENCY: ICD-10-CM

## 2024-04-15 DIAGNOSIS — Z00.00 ENCOUNTER FOR PREVENTIVE HEALTH EXAMINATION: ICD-10-CM

## 2024-04-15 DIAGNOSIS — G30.9 ALZHEIMERS DISEASE: Chronic | ICD-10-CM

## 2024-04-15 DIAGNOSIS — Z00.00 ENCOUNTER FOR MEDICARE ANNUAL WELLNESS EXAM: Primary | ICD-10-CM

## 2024-04-15 DIAGNOSIS — F32.3 MAJOR DEPRESSION WITH PSYCHOTIC FEATURES: Chronic | ICD-10-CM

## 2024-04-15 PROCEDURE — G0439 PPPS, SUBSEQ VISIT: HCPCS | Mod: 95,,, | Performed by: NURSE PRACTITIONER

## 2024-04-19 PROBLEM — E43 SEVERE MALNUTRITION: Status: RESOLVED | Noted: 2019-10-27 | Resolved: 2024-04-19

## 2024-04-19 PROBLEM — Z93.2 ILEOSTOMY STATUS: Status: ACTIVE | Noted: 2024-04-19

## 2024-04-19 PROBLEM — E66.01 SEVERE OBESITY (BMI 35.0-39.9) WITH COMORBIDITY: Status: ACTIVE | Noted: 2024-04-19

## 2024-04-19 NOTE — PATIENT INSTRUCTIONS
Counseling and Referral of Other Preventative  (Italic type indicates deductible and co-insurance are waived)    Patient Name: Anabella Aranda  Today's Date: 4/19/2024    Health Maintenance       Date Due Completion Date    Annual UACr Never done ---    Shingles Vaccine (1 of 2) Never done ---    RSV Vaccine (Age 60+ and Pregnant patients) (1 - 1-dose 60+ series) Never done ---    Pneumococcal Vaccines (Age 65+) (2 of 2 - PPSV23 or PCV20) 09/09/2016 7/15/2016    COVID-19 Vaccine (3 - Moderna risk series) 05/05/2021 4/7/2021    Influenza Vaccine (1) 09/01/2023 9/17/2020    Hemoglobin A1c (Prediabetes) 01/25/2024 1/25/2023    Mammogram 03/06/2025 3/6/2024    DEXA Scan 12/14/2025 12/14/2022    TETANUS VACCINE 07/15/2026 7/15/2016    Lipid Panel 01/29/2029 1/29/2024        No orders of the defined types were placed in this encounter.    The following information is provided to all patients.  This information is to help you find resources for any of the problems found today that may be affecting your health:                  Living healthy guide: www.[facility].[place].gov      Understanding Diabetes: www.diabetes.org      Eating healthy: www.cdc.gov/healthyweight      [facility] home safety checklist: www.cdc.gov/steadi/patient.html      Agency on Aging: www.goea.[place].[facility]      Alcoholics anonymous (AA): www.aa.org      Physical Activity: www.ramila.nih.gov/qm3eqch      Tobacco use: www.quitwithusla.org

## 2024-04-19 NOTE — PROGRESS NOTES
"The patient location is: [place]  The chief complaint leading to consultation is: Health Risk Assessment    Visit type: audiovisual  Face to Face time with patient: 20  35 minutes of total time spent on the encounter, which includes face to face time and non-face to face time preparing to see the patient (eg, review of tests), Obtaining and/or reviewing separately obtained history, Documenting clinical information in the electronic or other health record, Independently interpreting results (not separately reported) and communicating results to the patient/family/caregiver, or Care coordination (not separately reported).         Each patient to whom he or she provides medical services by telemedicine is:  (1) informed of the relationship between the physician and patient and the respective role of any other health care provider with respect to management of the patient; and (2) notified that he or she may decline to receive medical services by telemedicine and may withdraw from such care at any time.    Notes:       Anabella Aranda presented for an initial Medicare AWV today. The following components were reviewed and updated:    Medical history  Family History  Social history  Allergies and Current Medications  Health Risk Assessment  Health Maintenance  Care Team    **See Completed Assessments for Annual Wellness visit with in the encounter summary    The following assessments were completed:  Depression Screening  Cognitive function Screening  Timed Get Up Test  Whisper Test      Opioid documentation:      Patient does not have a current opioid prescription.          Vitals:    04/15/24 0759   Weight: 97.5 kg (215 lb)   Height: 5' 4" (1.626 m)     Body mass index is 36.9 kg/m².       Physical Exam  Constitutional:       Appearance: She is obese.   Neurological:      Mental Status: She is alert. Mental status is at baseline.           Diagnoses and health risks identified today and associated " recommendations/orders:  1. Encounter for Medicare annual wellness exam  - Ambulatory Referral/Consult to Enhanced Annual Wellness Visit (eAWV)    2. Encounter for preventive health examination  Assessments completed. Preventive measures, health maintenance, and immunizations reviewed with patient.    3. Alzheimers disease  Stable, patient on Aricept and Namenda. Followed by PCP.    4. Major depression with psychotic features  Stable, patient on Seroquel. Followed by PCP.    5. Stage 3a chronic kidney disease  Stable, followed by PCP.    6. Drug-induced immunodeficiency  Stable, patient on Imuran. Followed by PCP.    7. Crohn's disease of small and large intestines with complication  Stable, followed by PCP.    8. Ileostomy status  Stable, followed by PCP.    9. Gastroesophageal reflux disease, unspecified whether esophagitis present  Stable, patient on Protonix. Followed by PCP.      Provided Roulette with a 5-10 year written screening schedule and personal prevention plan. Recommendations were developed using the USPSTF age appropriate recommendations. Education, counseling, and referrals were provided as needed.  After Visit Summary printed and given to patient which includes a list of additional screenings\tests needed.    Follow up in about 1 year (around 4/15/2025) for your next annual wellness visit.      Odilia Ramirez, REESE  I offered to discuss advanced care planning, including how to pick a person who would make decisions for you if you were unable to make them for yourself, called a health care power of , and what kind of decisions you might make such as use of life sustaining treatments such as ventilators and tube feeding when faced with a life limiting illness recorded on a living will that they will need to know. (How you want to be cared for as you near the end of your natural life)     X Patient is interested in learning more about how to make advanced directives.  I provided them  paperwork and offered to discuss this with them.

## 2024-08-27 ENCOUNTER — TELEPHONE (OUTPATIENT)
Dept: FAMILY MEDICINE | Facility: CLINIC | Age: 79
End: 2024-08-27
Payer: MEDICARE

## 2024-08-27 NOTE — TELEPHONE ENCOUNTER
Son Bryson is going to get patient handicapped license plate / placard and need papers filled out and signed.  Son asking to have mailed to his home at   [address]   [address] [place] [address]    Paper on your desk for signature    Thank You

## 2024-08-27 NOTE — TELEPHONE ENCOUNTER
----- Message from Ana Satya sent at 8/27/2024  3:53 PM CDT -----  Type:  Patient Returning Call    Who Called: pt's sonBryson   Does the patient know what this is regarding?: wants to know if there an email to send a handicap form to be completed, replace old one   Would the patient rather a call back or a response via [facility]? Call   Best Call Back Number: [phone]   Additional Information:

## 2025-01-07 ENCOUNTER — TELEPHONE (OUTPATIENT)
Dept: FAMILY MEDICINE | Facility: CLINIC | Age: 80
End: 2025-01-07
Payer: MEDICARE

## 2025-01-07 NOTE — TELEPHONE ENCOUNTER
----- Message from [facility] sent at 1/7/2025 11:05 AM CST -----   Name of Who is Calling:     What is the request in detail:  request call back in reference to verify  plan of treatment has been received  Please contact to further discuss and advise      Can the clinic reply by [facility]:     What Number to Call Back if not in [facility]:  emerson / physician [place] insurance / [phone]

## 2025-01-10 ENCOUNTER — TELEPHONE (OUTPATIENT)
Dept: FAMILY MEDICINE | Facility: CLINIC | Age: 80
End: 2025-01-10
Payer: MEDICARE

## 2025-01-10 NOTE — TELEPHONE ENCOUNTER
----- Message from Rachael sent at 1/10/2025  8:43 AM CST -----  Type:  Needs Medical Advice    Who Called: [facility]  Best Call Back Number: [phone]  Additional Information: requesting a call back in regards to following up on a fax, states this is 2nd call.

## 2025-01-14 ENCOUNTER — TELEPHONE (OUTPATIENT)
Dept: FAMILY MEDICINE | Facility: CLINIC | Age: 80
End: 2025-01-14
Payer: MEDICARE

## 2025-01-14 NOTE — TELEPHONE ENCOUNTER
Spoke with son, xavier lawton   Stated he has been meaning to return our call but his mom has been a 24 / 7 kind of issue.    Finished paper work and will fax to [facility]

## 2025-01-14 NOTE — TELEPHONE ENCOUNTER
----- Message from Marlyn sent at 1/14/2025 12:45 PM CST -----  Type:  Needs Medical Advice    Who Called: [facility]    Would the patient rather a call back or a response via [facility]? Call   Best Call Back Number: [phone]  Additional Information: office stating they haven't received information requested would josephine a call.

## 2025-01-21 DIAGNOSIS — K50.10 CROHN'S DISEASE OF COLON WITHOUT COMPLICATION: ICD-10-CM

## 2025-01-21 RX ORDER — AZATHIOPRINE 50 MG/1
50 TABLET ORAL
Qty: 90 TABLET | Refills: 3 | Status: SHIPPED | OUTPATIENT
Start: 2025-01-21

## 2025-01-21 NOTE — TELEPHONE ENCOUNTER
Care Due:                  Date            Visit Type   Department     Provider  --------------------------------------------------------------------------------                                EP -                              PRIMARY      [facility] FAMILY    Shon Peñaloza  Last Visit: 01-      CARE ([facility])   MEDICINE       Kosta  Next Visit: None Scheduled  None         None Found                                                            Last  Test          Frequency    Reason                     Performed    Due Date  --------------------------------------------------------------------------------    Office Visit  6 months...  azaTHIOprine.............  01- 07-    CBC.........  3 months...  azaTHIOprine.............  01- 04-    CMP.........  3 months...  azaTHIOprine.............  01- 04-    [employer] Embedded Care Due Messages. Reference number: [id number].   1/21/2025 5:50:46 AM CST

## 2025-02-17 DIAGNOSIS — F02.80 LATE ONSET ALZHEIMER'S DISEASE WITHOUT BEHAVIORAL DISTURBANCE: ICD-10-CM

## 2025-02-17 DIAGNOSIS — G30.1 LATE ONSET ALZHEIMER'S DISEASE WITHOUT BEHAVIORAL DISTURBANCE: ICD-10-CM

## 2025-02-18 DIAGNOSIS — G30.1 LATE ONSET ALZHEIMER'S DISEASE WITHOUT BEHAVIORAL DISTURBANCE: ICD-10-CM

## 2025-02-18 DIAGNOSIS — F02.80 LATE ONSET ALZHEIMER'S DISEASE WITHOUT BEHAVIORAL DISTURBANCE: ICD-10-CM

## 2025-02-18 RX ORDER — DONEPEZIL HYDROCHLORIDE 10 MG/1
10 TABLET, FILM COATED ORAL
Qty: 90 TABLET | Refills: 3 | Status: SHIPPED | OUTPATIENT
Start: 2025-02-18 | End: 2025-02-18 | Stop reason: SDUPTHER

## 2025-02-18 RX ORDER — DONEPEZIL HYDROCHLORIDE 10 MG/1
10 TABLET, FILM COATED ORAL DAILY
Qty: 90 TABLET | Refills: 3 | Status: SHIPPED | OUTPATIENT
Start: 2025-02-18

## 2025-02-18 RX ORDER — DONEPEZIL HYDROCHLORIDE 10 MG/1
10 TABLET, FILM COATED ORAL
Qty: 90 TABLET | Refills: 3 | OUTPATIENT
Start: 2025-02-18

## 2025-02-18 NOTE — TELEPHONE ENCOUNTER
----- Message from Bonifacio sent at 2/18/2025  9:08 AM CST -----  Contact: pt son  Type: Requesting to speak with nurseWho Called: PT son Regarding: would dr to approve medication donepeziL (ARICEPT) 10 MG tabletWould the patient rather a call back or a response via [facility]? Call [facility] Call Back Number: [phone] Additional Information:

## 2025-03-24 DIAGNOSIS — Z00.00 ENCOUNTER FOR MEDICARE ANNUAL WELLNESS EXAM: ICD-10-CM

## 2025-04-03 ENCOUNTER — OFFICE VISIT (OUTPATIENT)
Dept: FAMILY MEDICINE | Facility: CLINIC | Age: 80
End: 2025-04-03
Attending: FAMILY MEDICINE
Payer: MEDICARE

## 2025-04-03 VITALS
SYSTOLIC BLOOD PRESSURE: 121 MMHG | HEART RATE: 104 BPM | WEIGHT: 214.94 LBS | HEIGHT: 64 IN | DIASTOLIC BLOOD PRESSURE: 70 MMHG | OXYGEN SATURATION: 100 % | BODY MASS INDEX: 36.7 KG/M2 | TEMPERATURE: 98 F

## 2025-04-03 DIAGNOSIS — K50.819 CROHN'S DISEASE OF SMALL AND LARGE INTESTINES WITH COMPLICATION: ICD-10-CM

## 2025-04-03 DIAGNOSIS — N18.31 STAGE 3A CHRONIC KIDNEY DISEASE: ICD-10-CM

## 2025-04-03 DIAGNOSIS — D50.9 IRON DEFICIENCY ANEMIA, UNSPECIFIED IRON DEFICIENCY ANEMIA TYPE: ICD-10-CM

## 2025-04-03 DIAGNOSIS — F02.80 LATE ONSET ALZHEIMER'S DISEASE WITHOUT BEHAVIORAL DISTURBANCE: ICD-10-CM

## 2025-04-03 DIAGNOSIS — R73.02 GLUCOSE INTOLERANCE (IMPAIRED GLUCOSE TOLERANCE): ICD-10-CM

## 2025-04-03 DIAGNOSIS — M81.0 OSTEOPOROSIS, UNSPECIFIED OSTEOPOROSIS TYPE, UNSPECIFIED PATHOLOGICAL FRACTURE PRESENCE: ICD-10-CM

## 2025-04-03 DIAGNOSIS — F32.3 MAJOR DEPRESSION WITH PSYCHOTIC FEATURES: ICD-10-CM

## 2025-04-03 DIAGNOSIS — K50.10 CROHN'S DISEASE OF COLON WITHOUT COMPLICATION: Primary | ICD-10-CM

## 2025-04-03 DIAGNOSIS — K21.9 GASTROESOPHAGEAL REFLUX DISEASE WITHOUT ESOPHAGITIS: ICD-10-CM

## 2025-04-03 DIAGNOSIS — G30.1 LATE ONSET ALZHEIMER'S DISEASE WITHOUT BEHAVIORAL DISTURBANCE: ICD-10-CM

## 2025-04-03 DIAGNOSIS — Z12.31 ENCOUNTER FOR SCREENING MAMMOGRAM FOR BREAST CANCER: ICD-10-CM

## 2025-04-03 PROCEDURE — 99213 OFFICE O/P EST LOW 20 MIN: CPT | Mod: PBBFAC,PO | Performed by: FAMILY MEDICINE

## 2025-04-03 PROCEDURE — 99999 PR PBB SHADOW E&M-EST. PATIENT-LVL III: CPT | Mod: PBBFAC,,, | Performed by: FAMILY MEDICINE

## 2025-04-03 RX ORDER — PANTOPRAZOLE SODIUM 40 MG/1
40 TABLET, DELAYED RELEASE ORAL DAILY
Qty: 90 TABLET | Refills: 3 | Status: SHIPPED | OUTPATIENT
Start: 2025-04-03

## 2025-04-03 RX ORDER — AZATHIOPRINE 50 MG/1
50 TABLET ORAL DAILY
Qty: 90 TABLET | Refills: 3 | Status: SHIPPED | OUTPATIENT
Start: 2025-04-03

## 2025-04-03 RX ORDER — MEMANTINE HYDROCHLORIDE 10 MG/1
10 TABLET ORAL DAILY
Qty: 180 TABLET | Refills: 3 | Status: SHIPPED | OUTPATIENT
Start: 2025-04-03

## 2025-04-03 RX ORDER — DONEPEZIL HYDROCHLORIDE 10 MG/1
10 TABLET, FILM COATED ORAL DAILY
Qty: 90 TABLET | Refills: 3 | Status: SHIPPED | OUTPATIENT
Start: 2025-04-03

## 2025-04-03 RX ORDER — QUETIAPINE FUMARATE 200 MG/1
200 TABLET, FILM COATED ORAL DAILY
Qty: 90 TABLET | Refills: 4 | Status: SHIPPED | OUTPATIENT
Start: 2025-04-03

## 2025-04-03 NOTE — PROGRESS NOTES
Subjective:       Patient ID: Anabella Aranda is a 79 y.o. female.    Chief Complaint: Annual Exam    79 yr old pleasant black female with HLD,  chronic iron deficiency anemia ulcerative colitis, Chronic venous insufficiency, s/o IVC filter, presents today for her annual and 6 month check.       KNEE PAIN BILATERAL - CHRONIC AND GETTING WORSE - SHE HAS OA AND SHE USES WALKER FOR THAT -     GERD - ON meds -  SEEN GI BEFORE - DENIES ANY N/V/C/D/FEVER     Obesity - she gained a lot since last year - she is mostly staying home and eating a lot and  has no activity outside of house - she is lonely in a big house and sometimes hearing voices - they ar good voices and she is not hearing when she is out of house - she denies any depression or anxiety - she does have issues with sleeping at night - she denies any personal or family h/o psychiatry disorders - no SI/HI/delusions    Ulcerative colitis - she is on Imuran and follows GI - no side effects    Depression with dementia - follows Psych and neuro - pt grand son reports she is still same but not worse - she still has issues with sleeping - caregiver also applied for home health      Anemia - chronic - due to IBD - she is on diet alone and takes MVI -      HLD - need to b on low fat diet -  LDLCALC                  118.0               01/29/2024                    LE edema/lymphedema - had h/o DVT and she also had Green field filter - she was on anticoagulation till hospital and stopped after surgery - seen vascular medicine and stable since then - she denies any heart problems, liver disorder - she also denies any chest pain, SOB, ROBLES      History as below - reviewed         HM  -labs UTD  -vaccines defer to next visit    Follow-up  Pertinent negatives include no arthralgias, chest pain, congestion, diaphoresis, headaches, myalgias, nausea or sore throat.     Review of Systems   Constitutional: Negative.  Negative for activity change, diaphoresis and unexpected  weight change.   HENT: Negative.  Negative for nasal congestion, ear discharge, hearing loss, rhinorrhea, sore throat and voice change.    Eyes: Negative.  Negative for pain, discharge and visual disturbance.   Respiratory: Negative.  Negative for chest tightness, shortness of breath and wheezing.    Cardiovascular: Negative.  Negative for chest pain.   Gastrointestinal: Negative.  Negative for abdominal distention, anal bleeding, constipation and nausea.   Endocrine: Negative.  Negative for cold intolerance, polydipsia and polyuria.   Genitourinary: Negative.  Negative for decreased urine volume, difficulty urinating, dysuria, frequency, menstrual problem and vaginal pain.   Musculoskeletal: Negative.  Negative for arthralgias, gait problem and myalgias.   Integumentary:  Negative for color change, pallor and wound. Negative.   Allergic/Immunologic: Negative.  Negative for environmental allergies and immunocompromised state.   Neurological: Negative.  Negative for dizziness, tremors, seizures, speech difficulty and headaches.   Hematological: Negative.  Negative for adenopathy. Does not bruise/bleed easily.   Psychiatric/Behavioral: Negative.  Negative for agitation, confusion, decreased concentration, hallucinations, self-injury and suicidal ideas. The patient is not nervous/anxious.      PMH/PSH/FH/SH/MED/ALLERGY reviewed    Past Medical History:   Diagnosis Date    Arthritis     C. difficile colitis 10/13/2014    4/15/2015    Chronic kidney disease, stage 3     Dementia     Hepatitis B     Hypertension     Major depression with psychotic features     Ulcerative colitis 03/2014       Past Surgical History:   Procedure Laterality Date    CHOLECYSTECTOMY      COLONOSCOPY N/A 4/29/2016    Procedure: COLONOSCOPY;  Surgeon: Umm Arenas MD;  Location: [facility];  Service: Endoscopy;  Laterality: N/A;    ESOPHAGOGASTRODUODENOSCOPY N/A 1/25/2019    Procedure: ESOPHAGOGASTRODUODENOSCOPY (EGD);  Surgeon: Jenise  SHANTA Hallman MD;  Location: [facility] ENDO;  Service: Endoscopy;  Laterality: N/A;    ESOPHAGOGASTRODUODENOSCOPY N/A 10/21/2019    Procedure: EGD (ESOPHAGOGASTRODUODENOSCOPY);  Surgeon: Og Carrera MD;  Location: [facility] ENDO;  Service: Colon and Rectal;  Laterality: N/A;    ESOPHAGOGASTRODUODENOSCOPY N/A 11/27/2019    Procedure: EGD (ESOPHAGOGASTRODUODENOSCOPY);  Surgeon: Og Carrera MD;  Location: [facility] ENDO;  Service: Colon and Rectal;  Laterality: N/A;    FLEXIBLE SIGMOIDOSCOPY N/A 1/25/2019    Procedure: SIGMOIDOSCOPY, FLEXIBLE;  Surgeon: Jenise Hallman MD;  Location: [facility] ENDO;  Service: Endoscopy;  Laterality: N/A;    FLEXIBLE SIGMOIDOSCOPY N/A 10/21/2019    Procedure: SIGMOIDOSCOPY, FLEXIBLE;  Surgeon: Og Carrera MD;  Location: [facility] ENDO;  Service: Colon and Rectal;  Laterality: N/A;    FLEXIBLE SIGMOIDOSCOPY N/A 11/27/2019    Procedure: SIGMOIDOSCOPY, FLEXIBLE;  Surgeon: Og Carrera MD;  Location: [facility];  Service: Colon and Rectal;  Laterality: N/A;  with methylene blue dye    BETZY FILTER PLACEMENT Right 01/2014    JOINT REPLACEMENT      KNEE ARTHROPLASTY Left 10/16/2019    Procedure: ARTHROPLASTY, KNEE;  Surgeon: Ghassan Lindsay MD;  Location: [facility];  Service: Orthopedics;  Laterality: Left;  Franco notifiedconfirmed with Franco  [phone] 10/15/19 kb    LAPAROSCOPIC ILEOSTOMY N/A 1/8/2020    Procedure: CREATION, ILEOSTOMY, LAPAROSCOPIC, ERAS low, possible hand port, lithotomy;  Surgeon: Benjie Worley MD;  Location: [facility] OR 2ND FLR;  Service: Colon and Rectal;  Laterality: N/A;    LAPAROSCOPIC TOTAL COLECTOMY  06/24/2016    LYSIS OF ADHESIONS  1/8/2020    Procedure: LYSIS, ADHESIONS;  Surgeon: Benjie Worley MD;  Location: [facility] OR 2ND FLR;  Service: Colon and Rectal;;    NASAL SINUS SURGERY      TONSILLECTOMY      TOTAL KNEE ARTHROPLASTY Right 04/07/2008    TOTAL KNEE ARTHROPLASTY Left 10/16/2019       Family History   Problem Relation Name Age of Onset     Throat cancer Father      Colon cancer Paternal Grandmother         Social History     Socioeconomic History    Marital status: Single   Tobacco Use    Smoking status: Former     Current packs/day: 0.00     Types: Cigarettes     Quit date: 1997     Years since quittin.6     Passive exposure: Never    Smokeless tobacco: Former   Substance and Sexual Activity    Alcohol use: No    Drug use: No    Sexual activity: Not Currently     Social Drivers of Health     Financial Resource Strain: Medium Risk (4/15/2024)    Overall Financial Resource Strain (CARDIA)     Difficulty of Paying Living Expenses: Somewhat hard   Food Insecurity: Food Insecurity Present (4/15/2024)    Hunger Vital Sign     Worried About Running Out of Food in the Last Year: Sometimes true     Ran Out of Food in the Last Year: Sometimes true   Transportation Needs: No Transportation Needs (4/15/2024)    PRAPARE - Transportation     Lack of Transportation (Medical): No     Lack of Transportation (Non-Medical): No   Physical Activity: Inactive (4/15/2024)    Exercise Vital Sign     Days of Exercise per Week: 0 days     Minutes of Exercise per Session: 0 min   Stress: Stress Concern Present (4/15/2024)    [employer] [place] [employer] - Occupational Stress Questionnaire     Feeling of Stress : To some extent   Housing Stability: Low Risk  (4/15/2024)    Housing Stability Vital Sign     Unable to Pay for Housing in the Last Year: No     Homeless in the Last Year: No   Recent Concern: Housing Stability - High Risk (2024)    Housing Stability Vital Sign     Unable to Pay for Housing in the Last Year: Yes     Unstable Housing in the Last Year: No       Current Outpatient Medications   Medication Sig Dispense Refill    aspirin (ECOTRIN) 81 MG EC tablet Take 1 tablet (81 mg total) by mouth once daily. 30 tablet 0    azaTHIOprine (IMURAN) 50 mg Tab Take 1 tablet (50 mg total) by mouth once daily. 90 tablet 3    donepeziL (ARICEPT) 10 MG  tablet Take 1 tablet (10 mg total) by mouth once daily. 90 tablet 3    memantine (NAMENDA) 10 MG Tab Take 1 tablet (10 mg total) by mouth once daily. 180 tablet 3    pantoprazole (PROTONIX) 40 MG tablet Take 1 tablet (40 mg total) by mouth once daily. 90 tablet 3    QUEtiapine (SEROQUEL) 200 MG Tab Take 1 tablet (200 mg total) by mouth once daily. 90 tablet 4     No current facility-administered medications for this visit.       Review of patient's allergies indicates:   Allergen Reactions    Sulfa (sulfonamide antibiotics) Swelling             Objective:       Vitals:    04/03/25 1307   BP: 121/70   Pulse: 104   Temp: 98.1 °F (36.7 °C)       Physical Exam  Constitutional:       General: She is not in acute distress.     Appearance: She is well-developed. She is not diaphoretic.   HENT:      Head: Normocephalic and atraumatic.      Right Ear: External ear normal.      Left Ear: External ear normal.      Nose: Nose normal.      Mouth/Throat:      Pharynx: No oropharyngeal exudate.   Eyes:      General: No scleral icterus.        Right eye: No discharge.         Left eye: No discharge.      Conjunctiva/sclera: Conjunctivae normal.      Pupils: Pupils are equal, round, and reactive to light.   Neck:      Thyroid: No thyromegaly.      Vascular: No JVD.      Trachea: No tracheal deviation.   Cardiovascular:      Rate and Rhythm: Normal rate and regular rhythm.      Heart sounds: Normal heart sounds. No murmur heard.     No friction rub. No gallop.   Pulmonary:      Effort: Pulmonary effort is normal.      Breath sounds: Normal breath sounds. No stridor. No wheezing or rales.   Chest:      Chest wall: No tenderness.   Abdominal:      General: Bowel sounds are normal. There is no distension.      Palpations: Abdomen is soft. There is no mass.      Tenderness: There is no abdominal tenderness. There is no guarding or rebound.      Hernia: No hernia is present.   Musculoskeletal:         General: Tenderness present. Normal  range of motion.      Cervical back: Normal range of motion and neck supple.      Comments: B/l knee edematous, restricted rom and impingement+   Lymphadenopathy:      Cervical: No cervical adenopathy.   Skin:     General: Skin is warm and dry.      Coloration: Skin is not pale.      Findings: No erythema or rash.   Neurological:      Mental Status: She is alert and oriented to person, place, and time.      Cranial Nerves: No cranial nerve deficit.      Motor: No abnormal muscle tone.      Coordination: Coordination normal.      Deep Tendon Reflexes: Reflexes are normal and symmetric. Reflexes normal.   Psychiatric:         Behavior: Behavior normal.         Thought Content: Thought content normal.         Judgment: Judgment normal.         Assessment:       1. Crohn's disease of colon without complication    2. Late onset Alzheimer's disease without behavioral disturbance    3. Glucose intolerance (impaired glucose tolerance)    4. Iron deficiency anemia, unspecified iron deficiency anemia type    5. Major depression with psychotic features    6. Crohn's disease of small and large intestines with complication    7. Gastroesophageal reflux disease without esophagitis    8. Osteoporosis, unspecified osteoporosis type, unspecified pathological fracture presence    9. Stage 3a chronic kidney disease    10. Encounter for screening mammogram for breast cancer        Plan:           Anabella was seen today for annual exam.    Diagnoses and all orders for this visit:    Crohn's disease of colon without complication  -     CBC Auto Differential; Future  -     Comprehensive Metabolic Panel; Future  -     Lipid Panel; Future  -     TSH; Future  -     Hemoglobin A1C; Future  -     Urinalysis; Future  -     Microalbumin/Creatinine Ratio, Urine; Future  -     azaTHIOprine (IMURAN) 50 mg Tab; Take 1 tablet (50 mg total) by mouth once daily.    Late onset Alzheimer's disease without behavioral disturbance  -     CBC Auto  Differential; Future  -     Comprehensive Metabolic Panel; Future  -     Lipid Panel; Future  -     TSH; Future  -     Hemoglobin A1C; Future  -     Urinalysis; Future  -     Microalbumin/Creatinine Ratio, Urine; Future  -     memantine (NAMENDA) 10 MG Tab; Take 1 tablet (10 mg total) by mouth once daily.  -     donepeziL (ARICEPT) 10 MG tablet; Take 1 tablet (10 mg total) by mouth once daily.    Glucose intolerance (impaired glucose tolerance)  -     CBC Auto Differential; Future  -     Comprehensive Metabolic Panel; Future  -     Lipid Panel; Future  -     TSH; Future  -     Hemoglobin A1C; Future  -     Urinalysis; Future  -     Microalbumin/Creatinine Ratio, Urine; Future    Iron deficiency anemia, unspecified iron deficiency anemia type  -     CBC Auto Differential; Future  -     Comprehensive Metabolic Panel; Future  -     Lipid Panel; Future  -     TSH; Future  -     Hemoglobin A1C; Future  -     Urinalysis; Future  -     Microalbumin/Creatinine Ratio, Urine; Future    Major depression with psychotic features  -     CBC Auto Differential; Future  -     Comprehensive Metabolic Panel; Future  -     Lipid Panel; Future  -     TSH; Future  -     Hemoglobin A1C; Future  -     Urinalysis; Future  -     Microalbumin/Creatinine Ratio, Urine; Future  -     QUEtiapine (SEROQUEL) 200 MG Tab; Take 1 tablet (200 mg total) by mouth once daily.    Crohn's disease of small and large intestines with complication  -     CBC Auto Differential; Future  -     Comprehensive Metabolic Panel; Future  -     Lipid Panel; Future  -     TSH; Future  -     Hemoglobin A1C; Future  -     Urinalysis; Future  -     Microalbumin/Creatinine Ratio, Urine; Future    Gastroesophageal reflux disease without esophagitis  -     CBC Auto Differential; Future  -     Comprehensive Metabolic Panel; Future  -     Lipid Panel; Future  -     TSH; Future  -     Hemoglobin A1C; Future  -     Urinalysis; Future  -     Microalbumin/Creatinine Ratio, Urine;  Future  -     pantoprazole (PROTONIX) 40 MG tablet; Take 1 tablet (40 mg total) by mouth once daily.    Osteoporosis, unspecified osteoporosis type, unspecified pathological fracture presence  -     CBC Auto Differential; Future  -     Comprehensive Metabolic Panel; Future  -     Lipid Panel; Future  -     TSH; Future  -     Hemoglobin A1C; Future  -     Urinalysis; Future  -     Microalbumin/Creatinine Ratio, Urine; Future    Stage 3a chronic kidney disease  -     CBC Auto Differential; Future  -     Comprehensive Metabolic Panel; Future  -     Lipid Panel; Future  -     TSH; Future  -     Hemoglobin A1C; Future  -     Urinalysis; Future  -     Microalbumin/Creatinine Ratio, Urine; Future    Encounter for screening mammogram for breast cancer  -     Mammo Digital Screening Bilat w/ Raheel (XPD); Future    WELLNESS CHECK  -NORMAL EXAM  -LABS      GERD  -on meds  -follow GIg  -refer GI    ANXIETY/DEPRESSION/dementia  -FOLLOW outside PSYCH  -on seroquel and aricept and namenda    GLUC INTOLERANCE  -LABS reassuring    S/P IVC filter and chronic venous insufficiency  -not on anticoagulant due to UC and rectal bleed/anemia  -stable    US/anemia  -refilled iron  - GI for follow up  -on Imuran daily    CHRONIC DVT  -HEM/ONC follow up    CKD III  -no worsening  -labs    HLD  -low fat diet    Spent adequate time in obtaining history and explaining differentials    40 minutes spent during this visit of which greater than 50% devoted to face-face counseling and coordination of care regarding diagnosis and management plan      RTC 6 M OR PRN

## 2025-04-08 ENCOUNTER — TELEPHONE (OUTPATIENT)
Dept: FAMILY MEDICINE | Facility: CLINIC | Age: 80
End: 2025-04-08
Payer: MEDICARE

## 2025-04-08 ENCOUNTER — LAB VISIT (OUTPATIENT)
Dept: LAB | Facility: OTHER | Age: 80
End: 2025-04-08
Attending: FAMILY MEDICINE
Payer: MEDICARE

## 2025-04-08 DIAGNOSIS — N18.31 STAGE 3A CHRONIC KIDNEY DISEASE: ICD-10-CM

## 2025-04-08 DIAGNOSIS — F32.3 MAJOR DEPRESSION WITH PSYCHOTIC FEATURES: ICD-10-CM

## 2025-04-08 DIAGNOSIS — R73.02 GLUCOSE INTOLERANCE (IMPAIRED GLUCOSE TOLERANCE): ICD-10-CM

## 2025-04-08 DIAGNOSIS — K21.9 GASTROESOPHAGEAL REFLUX DISEASE WITHOUT ESOPHAGITIS: ICD-10-CM

## 2025-04-08 DIAGNOSIS — K50.819 CROHN'S DISEASE OF SMALL AND LARGE INTESTINES WITH COMPLICATION: ICD-10-CM

## 2025-04-08 DIAGNOSIS — D50.9 IRON DEFICIENCY ANEMIA, UNSPECIFIED IRON DEFICIENCY ANEMIA TYPE: ICD-10-CM

## 2025-04-08 DIAGNOSIS — J02.9 SORE THROAT: Primary | ICD-10-CM

## 2025-04-08 DIAGNOSIS — M81.0 OSTEOPOROSIS, UNSPECIFIED OSTEOPOROSIS TYPE, UNSPECIFIED PATHOLOGICAL FRACTURE PRESENCE: ICD-10-CM

## 2025-04-08 DIAGNOSIS — G30.1 LATE ONSET ALZHEIMER'S DISEASE WITHOUT BEHAVIORAL DISTURBANCE: ICD-10-CM

## 2025-04-08 DIAGNOSIS — K50.10 CROHN'S DISEASE OF COLON WITHOUT COMPLICATION: ICD-10-CM

## 2025-04-08 DIAGNOSIS — F02.80 LATE ONSET ALZHEIMER'S DISEASE WITHOUT BEHAVIORAL DISTURBANCE: ICD-10-CM

## 2025-04-08 LAB
ABSOLUTE EOSINOPHIL (OHS): 0.07 K/UL
ABSOLUTE MONOCYTE (OHS): 0.26 K/UL (ref 0.3–1)
ABSOLUTE NEUTROPHIL COUNT (OHS): 2.53 K/UL (ref 1.8–7.7)
BASOPHILS # BLD AUTO: 0.04 K/UL
BASOPHILS NFR BLD AUTO: 0.9 %
CHOLEST SERPL-MCNC: 183 MG/DL (ref 120–199)
CHOLEST/HDLC SERPL: 2.7 {RATIO} (ref 2–5)
EAG (OHS): 94 MG/DL (ref 68–131)
ERYTHROCYTE [DISTWIDTH] IN BLOOD BY AUTOMATED COUNT: 13.8 % (ref 11.5–14.5)
HBA1C MFR BLD: 4.9 % (ref 4–5.6)
HCT VFR BLD AUTO: 38.2 % (ref 37–48.5)
HDLC SERPL-MCNC: 67 MG/DL (ref 40–75)
HDLC SERPL: 36.6 % (ref 20–50)
HGB BLD-MCNC: 12.6 GM/DL (ref 12–16)
IMM GRANULOCYTES # BLD AUTO: 0.01 K/UL (ref 0–0.04)
IMM GRANULOCYTES NFR BLD AUTO: 0.2 % (ref 0–0.5)
LDLC SERPL CALC-MCNC: 106.6 MG/DL (ref 63–159)
LYMPHOCYTES # BLD AUTO: 1.41 K/UL (ref 1–4.8)
MCH RBC QN AUTO: 30.7 PG (ref 27–31)
MCHC RBC AUTO-ENTMCNC: 33 G/DL (ref 32–36)
MCV RBC AUTO: 93 FL (ref 82–98)
NONHDLC SERPL-MCNC: 116 MG/DL
NUCLEATED RBC (/100WBC) (OHS): 0 /100 WBC
PLATELET # BLD AUTO: 226 K/UL (ref 150–450)
PMV BLD AUTO: 10.1 FL (ref 9.2–12.9)
RBC # BLD AUTO: 4.11 M/UL (ref 4–5.4)
RELATIVE EOSINOPHIL (OHS): 1.6 %
RELATIVE LYMPHOCYTE (OHS): 32.6 % (ref 18–48)
RELATIVE MONOCYTE (OHS): 6 % (ref 4–15)
RELATIVE NEUTROPHIL (OHS): 58.7 % (ref 38–73)
TRIGL SERPL-MCNC: 47 MG/DL (ref 30–150)
TSH SERPL-ACNC: 0.92 UIU/ML (ref 0.4–4)
WBC # BLD AUTO: 4.32 K/UL (ref 3.9–12.7)

## 2025-04-08 PROCEDURE — 85025 COMPLETE CBC W/AUTO DIFF WBC: CPT

## 2025-04-08 PROCEDURE — 83036 HEMOGLOBIN GLYCOSYLATED A1C: CPT

## 2025-04-08 PROCEDURE — 84443 ASSAY THYROID STIM HORMONE: CPT

## 2025-04-08 PROCEDURE — 80061 LIPID PANEL: CPT

## 2025-04-08 PROCEDURE — 36415 COLL VENOUS BLD VENIPUNCTURE: CPT

## 2025-04-08 NOTE — TELEPHONE ENCOUNTER
Called and sp with chem lab at [facility]   the specimen was hemolized, did we want a re collect .  Told them yes.

## 2025-04-08 NOTE — TELEPHONE ENCOUNTER
----- Message from Kia sent at 4/8/2025 12:17 PM CDT -----  Type:  Orders for Labs Who Called: [facility]Would the patient rather a call back or a response via [facility]? Call back Best Call Back Number: ext [phone] information: Please be advised, caller stated that for labs, they wanted to know if they needed to be canceled or have them re sent

## 2025-04-10 NOTE — TELEPHONE ENCOUNTER
Spoke with son Bryson he will get her to [facility] to have re drawn will let us know what day he can get her there

## 2025-04-14 ENCOUNTER — TELEPHONE (OUTPATIENT)
Dept: FAMILY MEDICINE | Facility: CLINIC | Age: 80
End: 2025-04-14
Payer: MEDICARE

## 2025-04-14 NOTE — TELEPHONE ENCOUNTER
----- Message from Anjali sent at 4/14/2025  1:02 PM CDT -----  Type:   CallWho Called:ptDoes the patient know what this is regarding?:LabsWould the patient rather a call back or a response via [facility]? callBest Call Back Number: [phone] Information:

## 2025-04-14 NOTE — TELEPHONE ENCOUNTER
Pt's cmp had hemolized   family finally calling back as I called last week to let them know   They will not be in the city for a while will call when in city to have scheduled

## 2025-05-19 ENCOUNTER — PATIENT MESSAGE (OUTPATIENT)
Dept: FAMILY MEDICINE | Facility: CLINIC | Age: 80
End: 2025-05-19
Payer: MEDICARE

## 2025-05-19 ENCOUNTER — TELEPHONE (OUTPATIENT)
Dept: FAMILY MEDICINE | Facility: CLINIC | Age: 80
End: 2025-05-19
Payer: MEDICARE

## 2025-05-19 NOTE — TELEPHONE ENCOUNTER
[facility] handicap paper work on your desk for signature        Contact: Bryson [phone].     Patient is calling for Medical Advice regarding:  please put the  paperwork for the handicap sticker for his mother. Please put this in PP and call son Bryson [phone].  How long has patient had these symptoms:  Pharmacy name and phone#:Patient wants a call back or thru [facility]:callbackComments:    Please advise patient replies from provider may take up to 48 hours.

## 2025-05-29 ENCOUNTER — PATIENT MESSAGE (OUTPATIENT)
Dept: FAMILY MEDICINE | Facility: CLINIC | Age: 80
End: 2025-05-29
Payer: MEDICARE

## 2025-07-09 DIAGNOSIS — Z78.0 MENOPAUSE: ICD-10-CM

## (undated) DEVICE — PULSAVAC ZIMMER

## (undated) DEVICE — STAPLER SKIN ROTATING HEAD

## (undated) DEVICE — DRAPE STERI U-SHAPED 47X51IN

## (undated) DEVICE — DRESSING AQUACEL AG ADV 3.5X6

## (undated) DEVICE — COVER BACK TABLE 72X21

## (undated) DEVICE — COVER LIGHT HANDLE 80/CA

## (undated) DEVICE — SEE MEDLINE ITEM 156902

## (undated) DEVICE — DRESSING AQUACEL AG ADV 3.5X12

## (undated) DEVICE — TUBING HF INSUFFLATION W/ FLTR

## (undated) DEVICE — APPLICATOR CHLORAPREP ORN 26ML

## (undated) DEVICE — BOWL MIXING BONE CEMENT

## (undated) DEVICE — SEE MEDLINE ITEM 152543

## (undated) DEVICE — HOOD T-5 TEAR AWAY STERILE

## (undated) DEVICE — SUT COATED VICRYL 4/0 27IN

## (undated) DEVICE — BLADE SURG #15 CARBON STEEL

## (undated) DEVICE — CATH MALECOT 22FR 6EA/BX

## (undated) DEVICE — Device

## (undated) DEVICE — TROCAR ENDOPATH XCEL 5X75MM

## (undated) DEVICE — CUTTER PROXIMATE BLUE 100MM

## (undated) DEVICE — SEE MEDLINE ITEM 152487

## (undated) DEVICE — GAUZE FLUFF XXLG 36X36 2 PLY

## (undated) DEVICE — NDL 22GA X1 1/2 REG BEVEL

## (undated) DEVICE — DRESSING ADH ISLAND 3.6 X 14

## (undated) DEVICE — TOURNIQUET SB QC DP 34X4IN

## (undated) DEVICE — SUT 1 36IN PDS II VIO MONO

## (undated) DEVICE — SEE MEDLINE ITEM 154981

## (undated) DEVICE — SUT CTD VICRYL CT-1 27

## (undated) DEVICE — SYR ONLY LUER LOCK 20CC

## (undated) DEVICE — DRESSING ABSRBNT ISLAND 3.6X8

## (undated) DEVICE — SEE MEDLINE ITEM 157144

## (undated) DEVICE — BLADE 90X13X1.27MM

## (undated) DEVICE — GLOVE PROTEXIS HYDROGEL SZ8.5

## (undated) DEVICE — DRESSING AQUACEL AG 3.5X10IN

## (undated) DEVICE — NDL 18GA X1 1/2 REG BEVEL

## (undated) DEVICE — SUT 0 VICRYL / UR6 (J603)

## (undated) DEVICE — CAUTERY TIP POLISHER

## (undated) DEVICE — SUT 2/0 36IN COATED VICRYL

## (undated) DEVICE — ELECTRODE REM PLYHSV RETURN 9

## (undated) DEVICE — SEE MEDLINE ITEM 157117

## (undated) DEVICE — ADHESIVE DERMABOND ADVANCED

## (undated) DEVICE — SEE MEDLINE ITEM 152622

## (undated) DEVICE — TROCAR ENDOPATH XCEL 5MM 7.5CM

## (undated) DEVICE — KIT ANTIFOG

## (undated) DEVICE — DRAPE INCISE IOBAN 2 23X23IN

## (undated) DEVICE — DRESSING LEUKOPLAST FLEX 1X3IN

## (undated) DEVICE — MANIFOLD 4 PORT

## (undated) DEVICE — SUT 3-0 VICRYL SH CR/8 18

## (undated) DEVICE — DRAPE ABDOMINAL TIBURON 14X11

## (undated) DEVICE — LUBRICANT SURGILUBE 2 OZ

## (undated) DEVICE — TRAY FOLEY 16FR INFECTION CONT

## (undated) DEVICE — SOL NACL IRR 3000ML

## (undated) DEVICE — KIT GELPORT LAPAROSCOPIC ABD

## (undated) DEVICE — SEE MEDLINE ITEM 146347

## (undated) DEVICE — SEE MEDLINE ITEM 146417

## (undated) DEVICE — SEE MEDLINE ITEM 152523

## (undated) DEVICE — NDL BOX COUNTER

## (undated) DEVICE — GLOVE PROTEXIS HYDROGEL SZ8

## (undated) DEVICE — SEE MEDLINE ITEM 146271

## (undated) DEVICE — COVER OVERHEAD SURG LT BLUE

## (undated) DEVICE — SEALER LIGASURE LAP 37CM 5MM

## (undated) DEVICE — GLOVE 6.5 PROTEXIS PI BLUE

## (undated) DEVICE — MARKER SKIN STND TIP BLUE BARR

## (undated) DEVICE — GLOVE PROTEXIS PI CLASSIC 6.0

## (undated) DEVICE — DRAIN PENROSE XRAY 12 X 1/2 ST

## (undated) DEVICE — PAD PREP 50/CA

## (undated) DEVICE — STAPLER CONTOUR 40MM GREEN

## (undated) DEVICE — BLADE SAG DUAL 25MM

## (undated) DEVICE — GOWN SURGICAL XX LARGE X LONG

## (undated) DEVICE — SET DECANTER MEDICHOICE